# Patient Record
Sex: FEMALE | Race: WHITE | NOT HISPANIC OR LATINO | URBAN - METROPOLITAN AREA
[De-identification: names, ages, dates, MRNs, and addresses within clinical notes are randomized per-mention and may not be internally consistent; named-entity substitution may affect disease eponyms.]

---

## 2017-07-03 ENCOUNTER — EMERGENCY (EMERGENCY)
Facility: HOSPITAL | Age: 57
LOS: 0 days | Discharge: HOME | End: 2017-07-03
Attending: EMERGENCY MEDICINE
Payer: COMMERCIAL

## 2017-07-03 VITALS
OXYGEN SATURATION: 94 % | HEIGHT: 61 IN | RESPIRATION RATE: 18 BRPM | SYSTOLIC BLOOD PRESSURE: 135 MMHG | TEMPERATURE: 99 F | WEIGHT: 199.96 LBS | HEART RATE: 99 BPM | DIASTOLIC BLOOD PRESSURE: 110 MMHG

## 2017-07-03 VITALS
DIASTOLIC BLOOD PRESSURE: 99 MMHG | TEMPERATURE: 98 F | OXYGEN SATURATION: 94 % | RESPIRATION RATE: 16 BRPM | HEART RATE: 99 BPM | SYSTOLIC BLOOD PRESSURE: 142 MMHG

## 2017-07-03 PROCEDURE — 71010: CPT | Mod: 26

## 2017-07-03 PROCEDURE — 99284 EMERGENCY DEPT VISIT MOD MDM: CPT

## 2017-07-03 RX ORDER — IPRATROPIUM/ALBUTEROL SULFATE 18-103MCG
3 AEROSOL WITH ADAPTER (GRAM) INHALATION
Qty: 0 | Refills: 0 | Status: COMPLETED | OUTPATIENT
Start: 2017-07-03 | End: 2017-07-03

## 2017-07-03 RX ORDER — ALBUTEROL 90 UG/1
2 AEROSOL, METERED ORAL
Qty: 2 | Refills: 0 | OUTPATIENT
Start: 2017-07-03 | End: 2017-07-06

## 2017-07-03 RX ORDER — AZITHROMYCIN 500 MG/1
1 TABLET, FILM COATED ORAL
Qty: 5 | Refills: 0 | OUTPATIENT
Start: 2017-07-03 | End: 2017-07-08

## 2017-07-03 RX ADMIN — Medication 3 MILLILITER(S): at 13:44

## 2017-07-03 RX ADMIN — Medication 3 MILLILITER(S): at 13:55

## 2017-07-03 RX ADMIN — Medication 3 MILLILITER(S): at 14:17

## 2017-07-03 RX ADMIN — Medication 50 MILLIGRAM(S): at 13:43

## 2017-07-03 NOTE — ED ADULT NURSE NOTE - PMH
Agoraphobia    Anxiety    COPD (chronic obstructive pulmonary disease)    DM (diabetes mellitus)    Emphysema lung    HTN (hypertension)    Hypothyroid    Smoker

## 2017-07-03 NOTE — ED PROVIDER NOTE - OBJECTIVE STATEMENT
56 yo female smoker, history of copd, Emphysema, agoraphobia presents with cough, white/clear sputum and difficulty breathing. Patient denies fever, chills, chest pain, recent travel.

## 2017-07-03 NOTE — ED ADULT NURSE NOTE - OBJECTIVE STATEMENT
pt states has copd, emphysema. pt c/o cough with clear white mucus last night. pt has agoraphoria, has not been treated for copd. denies chest pain. pt c/o upper back pain x 2 weeks

## 2017-07-04 DIAGNOSIS — E11.9 TYPE 2 DIABETES MELLITUS WITHOUT COMPLICATIONS: ICD-10-CM

## 2017-07-04 DIAGNOSIS — E03.9 HYPOTHYROIDISM, UNSPECIFIED: ICD-10-CM

## 2017-07-04 DIAGNOSIS — R06.89 OTHER ABNORMALITIES OF BREATHING: ICD-10-CM

## 2017-07-04 DIAGNOSIS — J44.9 CHRONIC OBSTRUCTIVE PULMONARY DISEASE, UNSPECIFIED: ICD-10-CM

## 2017-07-04 DIAGNOSIS — Z79.51 LONG TERM (CURRENT) USE OF INHALED STEROIDS: ICD-10-CM

## 2017-07-04 DIAGNOSIS — I10 ESSENTIAL (PRIMARY) HYPERTENSION: ICD-10-CM

## 2017-07-27 ENCOUNTER — EMERGENCY (EMERGENCY)
Facility: HOSPITAL | Age: 57
LOS: 0 days | Discharge: HOME | End: 2017-07-27
Attending: EMERGENCY MEDICINE
Payer: COMMERCIAL

## 2017-07-27 VITALS
RESPIRATION RATE: 18 BRPM | SYSTOLIC BLOOD PRESSURE: 158 MMHG | OXYGEN SATURATION: 99 % | TEMPERATURE: 98 F | HEART RATE: 90 BPM | DIASTOLIC BLOOD PRESSURE: 89 MMHG

## 2017-07-27 VITALS
SYSTOLIC BLOOD PRESSURE: 147 MMHG | WEIGHT: 190.04 LBS | TEMPERATURE: 99 F | OXYGEN SATURATION: 96 % | HEIGHT: 61 IN | HEART RATE: 101 BPM | DIASTOLIC BLOOD PRESSURE: 93 MMHG | RESPIRATION RATE: 18 BRPM

## 2017-07-27 LAB
ALBUMIN SERPL ELPH-MCNC: 3.8 G/DL — SIGNIFICANT CHANGE UP (ref 3.3–5)
ALP SERPL-CCNC: 129 U/L — HIGH (ref 40–120)
ALT FLD-CCNC: 17 U/L — SIGNIFICANT CHANGE UP (ref 12–78)
ANION GAP SERPL CALC-SCNC: 9 MMOL/L — SIGNIFICANT CHANGE UP (ref 5–17)
APPEARANCE UR: CLEAR — SIGNIFICANT CHANGE UP
APTT BLD: 30.9 SEC — SIGNIFICANT CHANGE UP (ref 27.5–37.4)
AST SERPL-CCNC: 12 U/L — LOW (ref 15–37)
BACTERIA # UR AUTO: ABNORMAL
BASOPHILS # BLD AUTO: 0.2 K/UL — SIGNIFICANT CHANGE UP (ref 0–0.2)
BASOPHILS NFR BLD AUTO: 1.9 % — SIGNIFICANT CHANGE UP (ref 0–2)
BILIRUB SERPL-MCNC: 0.5 MG/DL — SIGNIFICANT CHANGE UP (ref 0.2–1.2)
BILIRUB UR-MCNC: NEGATIVE — SIGNIFICANT CHANGE UP
BUN SERPL-MCNC: 9 MG/DL — SIGNIFICANT CHANGE UP (ref 7–23)
CALCIUM SERPL-MCNC: 8.9 MG/DL — SIGNIFICANT CHANGE UP (ref 8.5–10.1)
CHLORIDE SERPL-SCNC: 100 MMOL/L — SIGNIFICANT CHANGE UP (ref 96–108)
CK MB BLD-MCNC: <0.9 % — SIGNIFICANT CHANGE UP (ref 0–3.5)
CK MB CFR SERPL CALC: <0.5 NG/ML — SIGNIFICANT CHANGE UP (ref 0.5–3.6)
CK SERPL-CCNC: 57 U/L — SIGNIFICANT CHANGE UP (ref 26–192)
CO2 SERPL-SCNC: 25 MMOL/L — SIGNIFICANT CHANGE UP (ref 22–31)
COLOR SPEC: YELLOW — SIGNIFICANT CHANGE UP
CREAT SERPL-MCNC: 0.9 MG/DL — SIGNIFICANT CHANGE UP (ref 0.5–1.3)
DIFF PNL FLD: NEGATIVE — SIGNIFICANT CHANGE UP
EOSINOPHIL # BLD AUTO: 0.5 K/UL — SIGNIFICANT CHANGE UP (ref 0–0.5)
EOSINOPHIL NFR BLD AUTO: 4 % — SIGNIFICANT CHANGE UP (ref 0–6)
EPI CELLS # UR: SIGNIFICANT CHANGE UP
GLUCOSE SERPL-MCNC: 358 MG/DL — HIGH (ref 70–99)
GLUCOSE UR QL: 1000 MG/DL
HCT VFR BLD CALC: 46 % — HIGH (ref 34.5–45)
HGB BLD-MCNC: 15 G/DL — SIGNIFICANT CHANGE UP (ref 11.5–15.5)
INR BLD: 0.97 RATIO — SIGNIFICANT CHANGE UP (ref 0.88–1.16)
KETONES UR-MCNC: NEGATIVE — SIGNIFICANT CHANGE UP
LACTATE SERPL-SCNC: 2.2 MMOL/L — HIGH (ref 0.7–2)
LEUKOCYTE ESTERASE UR-ACNC: ABNORMAL
LYMPHOCYTES # BLD AUTO: 37 % — SIGNIFICANT CHANGE UP (ref 13–44)
LYMPHOCYTES # BLD AUTO: 4.5 K/UL — HIGH (ref 1–3.3)
MCHC RBC-ENTMCNC: 30.9 PG — SIGNIFICANT CHANGE UP (ref 27–34)
MCHC RBC-ENTMCNC: 32.6 GM/DL — SIGNIFICANT CHANGE UP (ref 32–36)
MCV RBC AUTO: 94.5 FL — SIGNIFICANT CHANGE UP (ref 80–100)
MONOCYTES # BLD AUTO: 0.7 K/UL — SIGNIFICANT CHANGE UP (ref 0–0.9)
MONOCYTES NFR BLD AUTO: 5.6 % — SIGNIFICANT CHANGE UP (ref 2–14)
NEUTROPHILS # BLD AUTO: 6.3 K/UL — SIGNIFICANT CHANGE UP (ref 1.8–7.4)
NEUTROPHILS NFR BLD AUTO: 51.5 % — SIGNIFICANT CHANGE UP (ref 43–77)
NITRITE UR-MCNC: NEGATIVE — SIGNIFICANT CHANGE UP
PH UR: 6 — SIGNIFICANT CHANGE UP (ref 5–8)
PLATELET # BLD AUTO: 225 K/UL — SIGNIFICANT CHANGE UP (ref 150–400)
POTASSIUM SERPL-MCNC: 4.3 MMOL/L — SIGNIFICANT CHANGE UP (ref 3.5–5.3)
POTASSIUM SERPL-SCNC: 4.3 MMOL/L — SIGNIFICANT CHANGE UP (ref 3.5–5.3)
PROT SERPL-MCNC: 7.5 GM/DL — SIGNIFICANT CHANGE UP (ref 6–8.3)
PROT UR-MCNC: 15 MG/DL
PROTHROM AB SERPL-ACNC: 10.6 SEC — SIGNIFICANT CHANGE UP (ref 9.8–12.7)
RBC # BLD: 4.86 M/UL — SIGNIFICANT CHANGE UP (ref 3.8–5.2)
RBC # FLD: 12.4 % — SIGNIFICANT CHANGE UP (ref 11–15)
SODIUM SERPL-SCNC: 134 MMOL/L — LOW (ref 135–145)
SP GR SPEC: 1.01 — SIGNIFICANT CHANGE UP (ref 1.01–1.02)
TROPONIN I SERPL-MCNC: <.015 NG/ML — SIGNIFICANT CHANGE UP (ref 0.01–0.04)
UROBILINOGEN FLD QL: NEGATIVE MG/DL — SIGNIFICANT CHANGE UP
WBC # BLD: 12.3 K/UL — HIGH (ref 3.8–10.5)
WBC # FLD AUTO: 12.3 K/UL — HIGH (ref 3.8–10.5)
WBC UR QL: ABNORMAL

## 2017-07-27 PROCEDURE — 71020: CPT | Mod: 26

## 2017-07-27 PROCEDURE — 99285 EMERGENCY DEPT VISIT HI MDM: CPT | Mod: 25

## 2017-07-27 RX ORDER — CLONAZEPAM 1 MG
1 TABLET ORAL ONCE
Qty: 0 | Refills: 0 | Status: DISCONTINUED | OUTPATIENT
Start: 2017-07-27 | End: 2017-07-27

## 2017-07-27 RX ORDER — IPRATROPIUM/ALBUTEROL SULFATE 18-103MCG
3 AEROSOL WITH ADAPTER (GRAM) INHALATION ONCE
Qty: 0 | Refills: 0 | Status: COMPLETED | OUTPATIENT
Start: 2017-07-27 | End: 2017-07-27

## 2017-07-27 RX ORDER — METFORMIN HYDROCHLORIDE 850 MG/1
1 TABLET ORAL
Qty: 28 | Refills: 0 | OUTPATIENT
Start: 2017-07-27 | End: 2017-08-10

## 2017-07-27 RX ORDER — NITROFURANTOIN MACROCRYSTAL 50 MG
100 CAPSULE ORAL ONCE
Qty: 0 | Refills: 0 | Status: COMPLETED | OUTPATIENT
Start: 2017-07-27 | End: 2017-07-27

## 2017-07-27 RX ORDER — ALBUTEROL 90 UG/1
2 AEROSOL, METERED ORAL
Qty: 1 | Refills: 0 | OUTPATIENT
Start: 2017-07-27 | End: 2017-08-01

## 2017-07-27 RX ORDER — ESCITALOPRAM OXALATE 10 MG/1
50 TABLET, FILM COATED ORAL
Qty: 0 | Refills: 0 | COMMUNITY

## 2017-07-27 RX ORDER — IBUPROFEN 200 MG
1 TABLET ORAL
Qty: 20 | Refills: 0 | OUTPATIENT
Start: 2017-07-27 | End: 2017-08-01

## 2017-07-27 RX ORDER — FLUCONAZOLE 150 MG/1
150 TABLET ORAL ONCE
Qty: 0 | Refills: 0 | Status: COMPLETED | OUTPATIENT
Start: 2017-07-27 | End: 2017-07-27

## 2017-07-27 RX ORDER — INSULIN HUMAN 100 [IU]/ML
4 INJECTION, SOLUTION SUBCUTANEOUS ONCE
Qty: 0 | Refills: 0 | Status: COMPLETED | OUTPATIENT
Start: 2017-07-27 | End: 2017-07-27

## 2017-07-27 RX ORDER — NITROFURANTOIN MACROCRYSTAL 50 MG
1 CAPSULE ORAL
Qty: 14 | Refills: 0 | OUTPATIENT
Start: 2017-07-27 | End: 2017-08-03

## 2017-07-27 RX ADMIN — FLUCONAZOLE 150 MILLIGRAM(S): 150 TABLET ORAL at 11:23

## 2017-07-27 RX ADMIN — Medication 3 MILLILITER(S): at 08:24

## 2017-07-27 RX ADMIN — INSULIN HUMAN 4 UNIT(S): 100 INJECTION, SOLUTION SUBCUTANEOUS at 11:23

## 2017-07-27 RX ADMIN — Medication 100 MILLIGRAM(S): at 11:22

## 2017-07-27 RX ADMIN — Medication 1 MILLIGRAM(S): at 12:15

## 2017-07-27 NOTE — ED ADULT NURSE REASSESSMENT NOTE - NS ED NURSE REASSESS COMMENT FT1
58 yo female c/o sore throat, denies sob, n/v/d. requesting Klonopin dose, md aware. pt resting comfortably. no acute distress noted. fs to be repeated at 1230.

## 2017-07-27 NOTE — ED PROVIDER NOTE - MEDICAL DECISION MAKING DETAILS
pt with new onset DM II spoke to Dr. Espinoza regarding situation to dc with metformin as per Dr. Espinoza, close follow up urged, dc with macrobid as well for UTI and albuterol for COPD.

## 2017-07-27 NOTE — ED ADULT NURSE NOTE - OBJECTIVE STATEMENT
Received patient complaining of sore throat, sores on tongue, severe dry cough, vaginal soreness/burning , no discharge x 2 weeks. Patient states she ran out of her prescription medication and has not followed up with her PMD.

## 2017-07-27 NOTE — ED PROVIDER NOTE - ENMT, MLM
Airway patent, Nasal mucosa clear. Mouth with normal mucosa. Throat has no vesicles, no oropharyngeal exudates  mild erythema on soft palate, and uvula is midline.

## 2017-07-27 NOTE — ED ADULT TRIAGE NOTE - CHIEF COMPLAINT QUOTE
"Sore throat" Reports having sore throat for 3 days, worsens with swallowing, with no productive cough, fever highest recorded fever 102, chills, denies chest pain, shortness of breath, abdominal pain, diarrhea.

## 2017-07-28 DIAGNOSIS — I10 ESSENTIAL (PRIMARY) HYPERTENSION: ICD-10-CM

## 2017-07-28 DIAGNOSIS — E03.9 HYPOTHYROIDISM, UNSPECIFIED: ICD-10-CM

## 2017-07-28 DIAGNOSIS — F17.200 NICOTINE DEPENDENCE, UNSPECIFIED, UNCOMPLICATED: ICD-10-CM

## 2017-07-28 DIAGNOSIS — J02.9 ACUTE PHARYNGITIS, UNSPECIFIED: ICD-10-CM

## 2017-07-28 DIAGNOSIS — F40.00 AGORAPHOBIA, UNSPECIFIED: ICD-10-CM

## 2017-07-28 DIAGNOSIS — F41.9 ANXIETY DISORDER, UNSPECIFIED: ICD-10-CM

## 2017-07-28 DIAGNOSIS — E11.9 TYPE 2 DIABETES MELLITUS WITHOUT COMPLICATIONS: ICD-10-CM

## 2017-07-28 DIAGNOSIS — N39.0 URINARY TRACT INFECTION, SITE NOT SPECIFIED: ICD-10-CM

## 2017-07-28 DIAGNOSIS — J44.9 CHRONIC OBSTRUCTIVE PULMONARY DISEASE, UNSPECIFIED: ICD-10-CM

## 2017-07-28 LAB
CULTURE RESULTS: SIGNIFICANT CHANGE UP
SPECIMEN SOURCE: SIGNIFICANT CHANGE UP

## 2017-08-03 ENCOUNTER — EMERGENCY (EMERGENCY)
Facility: HOSPITAL | Age: 57
LOS: 0 days | Discharge: HOME | End: 2017-08-03
Attending: EMERGENCY MEDICINE
Payer: COMMERCIAL

## 2017-08-03 VITALS
HEIGHT: 61 IN | DIASTOLIC BLOOD PRESSURE: 91 MMHG | WEIGHT: 190.04 LBS | TEMPERATURE: 99 F | SYSTOLIC BLOOD PRESSURE: 153 MMHG | HEART RATE: 87 BPM | RESPIRATION RATE: 20 BRPM | OXYGEN SATURATION: 96 %

## 2017-08-03 VITALS
RESPIRATION RATE: 17 BRPM | SYSTOLIC BLOOD PRESSURE: 157 MMHG | HEART RATE: 78 BPM | OXYGEN SATURATION: 97 % | TEMPERATURE: 98 F

## 2017-08-03 LAB
ACETONE SERPL-MCNC: NEGATIVE — SIGNIFICANT CHANGE UP
ALBUMIN SERPL ELPH-MCNC: 3.9 G/DL — SIGNIFICANT CHANGE UP (ref 3.3–5)
ALP SERPL-CCNC: 107 U/L — SIGNIFICANT CHANGE UP (ref 40–120)
ALT FLD-CCNC: 22 U/L — SIGNIFICANT CHANGE UP (ref 12–78)
ANION GAP SERPL CALC-SCNC: 9 MMOL/L — SIGNIFICANT CHANGE UP (ref 5–17)
APPEARANCE UR: ABNORMAL
AST SERPL-CCNC: 12 U/L — LOW (ref 15–37)
BACTERIA # UR AUTO: ABNORMAL
BASOPHILS # BLD AUTO: 0.1 K/UL — SIGNIFICANT CHANGE UP (ref 0–0.2)
BASOPHILS NFR BLD AUTO: 1 % — SIGNIFICANT CHANGE UP (ref 0–2)
BILIRUB SERPL-MCNC: 0.3 MG/DL — SIGNIFICANT CHANGE UP (ref 0.2–1.2)
BILIRUB UR-MCNC: NEGATIVE — SIGNIFICANT CHANGE UP
BUN SERPL-MCNC: 15 MG/DL — SIGNIFICANT CHANGE UP (ref 7–23)
CALCIUM SERPL-MCNC: 9 MG/DL — SIGNIFICANT CHANGE UP (ref 8.5–10.1)
CHLORIDE SERPL-SCNC: 99 MMOL/L — SIGNIFICANT CHANGE UP (ref 96–108)
CO2 SERPL-SCNC: 29 MMOL/L — SIGNIFICANT CHANGE UP (ref 22–31)
COLOR SPEC: YELLOW — SIGNIFICANT CHANGE UP
CREAT SERPL-MCNC: 0.87 MG/DL — SIGNIFICANT CHANGE UP (ref 0.5–1.3)
DIFF PNL FLD: NEGATIVE — SIGNIFICANT CHANGE UP
EOSINOPHIL # BLD AUTO: 0.6 K/UL — HIGH (ref 0–0.5)
EOSINOPHIL NFR BLD AUTO: 4.4 % — SIGNIFICANT CHANGE UP (ref 0–6)
EPI CELLS # UR: ABNORMAL
GLUCOSE SERPL-MCNC: 310 MG/DL — HIGH (ref 70–99)
GLUCOSE UR QL: 1000 MG/DL
HCT VFR BLD CALC: 46.4 % — HIGH (ref 34.5–45)
HGB BLD-MCNC: 15.6 G/DL — HIGH (ref 11.5–15.5)
KETONES UR-MCNC: NEGATIVE — SIGNIFICANT CHANGE UP
LEUKOCYTE ESTERASE UR-ACNC: NEGATIVE — SIGNIFICANT CHANGE UP
LYMPHOCYTES # BLD AUTO: 31.2 % — SIGNIFICANT CHANGE UP (ref 13–44)
LYMPHOCYTES # BLD AUTO: 4.1 K/UL — HIGH (ref 1–3.3)
MAGNESIUM SERPL-MCNC: 2.1 MG/DL — SIGNIFICANT CHANGE UP (ref 1.6–2.6)
MCHC RBC-ENTMCNC: 31.2 PG — SIGNIFICANT CHANGE UP (ref 27–34)
MCHC RBC-ENTMCNC: 33.6 GM/DL — SIGNIFICANT CHANGE UP (ref 32–36)
MCV RBC AUTO: 93 FL — SIGNIFICANT CHANGE UP (ref 80–100)
MONOCYTES # BLD AUTO: 0.6 K/UL — SIGNIFICANT CHANGE UP (ref 0–0.9)
MONOCYTES NFR BLD AUTO: 4.7 % — SIGNIFICANT CHANGE UP (ref 2–14)
NEUTROPHILS # BLD AUTO: 7.7 K/UL — HIGH (ref 1.8–7.4)
NEUTROPHILS NFR BLD AUTO: 58.7 % — SIGNIFICANT CHANGE UP (ref 43–77)
NITRITE UR-MCNC: NEGATIVE — SIGNIFICANT CHANGE UP
PH UR: 5 — SIGNIFICANT CHANGE UP (ref 5–8)
PLATELET # BLD AUTO: 250 K/UL — SIGNIFICANT CHANGE UP (ref 150–400)
POTASSIUM SERPL-MCNC: 4.2 MMOL/L — SIGNIFICANT CHANGE UP (ref 3.5–5.3)
POTASSIUM SERPL-SCNC: 4.2 MMOL/L — SIGNIFICANT CHANGE UP (ref 3.5–5.3)
PROT SERPL-MCNC: 7.6 GM/DL — SIGNIFICANT CHANGE UP (ref 6–8.3)
PROT UR-MCNC: NEGATIVE MG/DL — SIGNIFICANT CHANGE UP
RBC # BLD: 4.99 M/UL — SIGNIFICANT CHANGE UP (ref 3.8–5.2)
RBC # FLD: 12.1 % — SIGNIFICANT CHANGE UP (ref 11–15)
SODIUM SERPL-SCNC: 137 MMOL/L — SIGNIFICANT CHANGE UP (ref 135–145)
SP GR SPEC: 1.01 — SIGNIFICANT CHANGE UP (ref 1.01–1.02)
UROBILINOGEN FLD QL: NEGATIVE MG/DL — SIGNIFICANT CHANGE UP
WBC # BLD: 13.2 K/UL — HIGH (ref 3.8–10.5)
WBC # FLD AUTO: 13.2 K/UL — HIGH (ref 3.8–10.5)
WBC UR QL: ABNORMAL

## 2017-08-03 PROCEDURE — 99284 EMERGENCY DEPT VISIT MOD MDM: CPT

## 2017-08-03 RX ORDER — UMECLIDINIUM BROMIDE AND VILANTEROL TRIFENATATE 62.5; 25 UG/1; UG/1
1 POWDER RESPIRATORY (INHALATION)
Qty: 1 | Refills: 0 | OUTPATIENT
Start: 2017-08-03 | End: 2017-09-02

## 2017-08-03 RX ORDER — METFORMIN HYDROCHLORIDE 850 MG/1
1 TABLET ORAL
Qty: 60 | Refills: 0 | OUTPATIENT
Start: 2017-08-03 | End: 2017-09-02

## 2017-08-03 RX ORDER — INSULIN HUMAN 100 [IU]/ML
4 INJECTION, SOLUTION SUBCUTANEOUS ONCE
Qty: 0 | Refills: 0 | Status: COMPLETED | OUTPATIENT
Start: 2017-08-03 | End: 2017-08-03

## 2017-08-03 RX ORDER — IPRATROPIUM/ALBUTEROL SULFATE 18-103MCG
3 AEROSOL WITH ADAPTER (GRAM) INHALATION ONCE
Qty: 0 | Refills: 0 | Status: COMPLETED | OUTPATIENT
Start: 2017-08-03 | End: 2017-08-03

## 2017-08-03 RX ORDER — SODIUM CHLORIDE 9 MG/ML
2000 INJECTION INTRAMUSCULAR; INTRAVENOUS; SUBCUTANEOUS ONCE
Qty: 0 | Refills: 0 | Status: COMPLETED | OUTPATIENT
Start: 2017-08-03 | End: 2017-08-03

## 2017-08-03 RX ADMIN — Medication 3 MILLILITER(S): at 20:23

## 2017-08-03 RX ADMIN — INSULIN HUMAN 4 UNIT(S): 100 INJECTION, SOLUTION SUBCUTANEOUS at 19:20

## 2017-08-03 RX ADMIN — SODIUM CHLORIDE 2000 MILLILITER(S): 9 INJECTION INTRAMUSCULAR; INTRAVENOUS; SUBCUTANEOUS at 17:54

## 2017-08-03 NOTE — ED PROVIDER NOTE - PHYSICAL EXAMINATION
Gen: Alert, NAD, obese  Head: NC, AT   Eyes: PERRL, EOMI, normal lids/conjunctiva  ENT: dry muous membranes  Neck: supple, no tenderness, Trachea midline  Pulm: Bilateral BS, normal resp effort, no wheeze/stridor/retractions  CV: RRR, no M/R/G, 2+ radial and dp pulses bl, no edema  Abd: soft, NT/ND, +BS, no hepatosplenomegaly  Mskel: extremities x4 with normal ROM and no joint effusions. no ctl spine ttp.   Skin: no rash, no bruising   Neuro: AAOx3, no sensory/motor deficits, CN 2-12 intact

## 2017-08-03 NOTE — ED PROVIDER NOTE - OBJECTIVE STATEMENT
Pertinent PMH/PSH/FHx/SHx and Review of Systems contained within:  57F hx of dm pw hyperglycemia dced july 27 from inpatient. unable to take metformin because script ran out. no dizziness, nausea, vomiting, weakness or lethargy  Fh and Sh not otherwise contributory  ROS otherwise negative

## 2017-08-03 NOTE — ED PROVIDER NOTE - MEDICAL DECISION MAKING DETAILS
patient pw hyperglycemia, likely owning to medication non compliance and dietary indiscretion. will need hydrated and rule out dka or hnk. patient pw hyperglycemia, likely owning to medication non compliance and dietary indiscretion. will need hydrated and rule out dka or hnk. blood sugar now in low 200s. will dc with uptitrated metformin. patient has no symptoms of uti, will not treat.

## 2017-08-04 DIAGNOSIS — Z79.4 LONG TERM (CURRENT) USE OF INSULIN: ICD-10-CM

## 2017-08-04 DIAGNOSIS — F41.9 ANXIETY DISORDER, UNSPECIFIED: ICD-10-CM

## 2017-08-04 DIAGNOSIS — E03.8 OTHER SPECIFIED HYPOTHYROIDISM: ICD-10-CM

## 2017-08-04 DIAGNOSIS — Z79.1 LONG TERM (CURRENT) USE OF NON-STEROIDAL ANTI-INFLAMMATORIES (NSAID): ICD-10-CM

## 2017-08-04 DIAGNOSIS — E11.65 TYPE 2 DIABETES MELLITUS WITH HYPERGLYCEMIA: ICD-10-CM

## 2017-08-04 DIAGNOSIS — Z79.51 LONG TERM (CURRENT) USE OF INHALED STEROIDS: ICD-10-CM

## 2017-08-04 DIAGNOSIS — J44.9 CHRONIC OBSTRUCTIVE PULMONARY DISEASE, UNSPECIFIED: ICD-10-CM

## 2017-08-04 DIAGNOSIS — R73.9 HYPERGLYCEMIA, UNSPECIFIED: ICD-10-CM

## 2017-08-04 DIAGNOSIS — I10 ESSENTIAL (PRIMARY) HYPERTENSION: ICD-10-CM

## 2017-08-04 DIAGNOSIS — J43.9 EMPHYSEMA, UNSPECIFIED: ICD-10-CM

## 2017-08-04 DIAGNOSIS — F40.00 AGORAPHOBIA, UNSPECIFIED: ICD-10-CM

## 2017-08-04 DIAGNOSIS — F17.210 NICOTINE DEPENDENCE, CIGARETTES, UNCOMPLICATED: ICD-10-CM

## 2017-08-04 LAB
B-OH-BUTYR SERPL-SCNC: 0.1 MMOL/L — SIGNIFICANT CHANGE UP
CULTURE RESULTS: SIGNIFICANT CHANGE UP
SPECIMEN SOURCE: SIGNIFICANT CHANGE UP

## 2017-11-15 ENCOUNTER — EMERGENCY (EMERGENCY)
Facility: HOSPITAL | Age: 57
LOS: 0 days | Discharge: HOME | End: 2017-11-16
Attending: EMERGENCY MEDICINE
Payer: COMMERCIAL

## 2017-11-15 VITALS
RESPIRATION RATE: 20 BRPM | SYSTOLIC BLOOD PRESSURE: 152 MMHG | HEART RATE: 110 BPM | HEIGHT: 61 IN | TEMPERATURE: 99 F | OXYGEN SATURATION: 98 % | WEIGHT: 190.04 LBS | DIASTOLIC BLOOD PRESSURE: 74 MMHG

## 2017-11-15 DIAGNOSIS — F17.210 NICOTINE DEPENDENCE, CIGARETTES, UNCOMPLICATED: ICD-10-CM

## 2017-11-15 DIAGNOSIS — R06.02 SHORTNESS OF BREATH: ICD-10-CM

## 2017-11-15 DIAGNOSIS — F41.9 ANXIETY DISORDER, UNSPECIFIED: ICD-10-CM

## 2017-11-15 DIAGNOSIS — E03.9 HYPOTHYROIDISM, UNSPECIFIED: ICD-10-CM

## 2017-11-15 DIAGNOSIS — R05 COUGH: ICD-10-CM

## 2017-11-15 DIAGNOSIS — J44.1 CHRONIC OBSTRUCTIVE PULMONARY DISEASE WITH (ACUTE) EXACERBATION: ICD-10-CM

## 2017-11-15 DIAGNOSIS — E11.9 TYPE 2 DIABETES MELLITUS WITHOUT COMPLICATIONS: ICD-10-CM

## 2017-11-15 DIAGNOSIS — I10 ESSENTIAL (PRIMARY) HYPERTENSION: ICD-10-CM

## 2017-11-15 DIAGNOSIS — F40.00 AGORAPHOBIA, UNSPECIFIED: ICD-10-CM

## 2017-11-15 DIAGNOSIS — R06.00 DYSPNEA, UNSPECIFIED: ICD-10-CM

## 2017-11-15 DIAGNOSIS — Z79.4 LONG TERM (CURRENT) USE OF INSULIN: ICD-10-CM

## 2017-11-15 DIAGNOSIS — J44.9 CHRONIC OBSTRUCTIVE PULMONARY DISEASE, UNSPECIFIED: ICD-10-CM

## 2017-11-15 LAB
ALBUMIN SERPL ELPH-MCNC: 3.7 G/DL — SIGNIFICANT CHANGE UP (ref 3.3–5)
ALP SERPL-CCNC: 86 U/L — SIGNIFICANT CHANGE UP (ref 40–120)
ALT FLD-CCNC: 23 U/L — SIGNIFICANT CHANGE UP (ref 12–78)
ANION GAP SERPL CALC-SCNC: 10 MMOL/L — SIGNIFICANT CHANGE UP (ref 5–17)
APTT BLD: 35.5 SEC — SIGNIFICANT CHANGE UP (ref 27.5–37.4)
AST SERPL-CCNC: 18 U/L — SIGNIFICANT CHANGE UP (ref 15–37)
BASOPHILS # BLD AUTO: 0.2 K/UL — SIGNIFICANT CHANGE UP (ref 0–0.2)
BASOPHILS NFR BLD AUTO: 1 % — SIGNIFICANT CHANGE UP (ref 0–2)
BILIRUB SERPL-MCNC: 0.4 MG/DL — SIGNIFICANT CHANGE UP (ref 0.2–1.2)
BUN SERPL-MCNC: 28 MG/DL — HIGH (ref 7–23)
CALCIUM SERPL-MCNC: 9.4 MG/DL — SIGNIFICANT CHANGE UP (ref 8.5–10.1)
CHLORIDE SERPL-SCNC: 105 MMOL/L — SIGNIFICANT CHANGE UP (ref 96–108)
CK MB BLD-MCNC: <0.9 % — SIGNIFICANT CHANGE UP (ref 0–3.5)
CK MB BLD-MCNC: <1 % — SIGNIFICANT CHANGE UP (ref 0–3.5)
CK MB CFR SERPL CALC: <0.5 NG/ML — SIGNIFICANT CHANGE UP (ref 0.5–3.6)
CK MB CFR SERPL CALC: <0.5 NG/ML — SIGNIFICANT CHANGE UP (ref 0.5–3.6)
CK SERPL-CCNC: 50 U/L — SIGNIFICANT CHANGE UP (ref 26–192)
CK SERPL-CCNC: 54 U/L — SIGNIFICANT CHANGE UP (ref 26–192)
CO2 SERPL-SCNC: 24 MMOL/L — SIGNIFICANT CHANGE UP (ref 22–31)
CREAT SERPL-MCNC: 0.96 MG/DL — SIGNIFICANT CHANGE UP (ref 0.5–1.3)
D DIMER BLD IA.RAPID-MCNC: 221 NG/ML DDU — SIGNIFICANT CHANGE UP
EOSINOPHIL # BLD AUTO: 0.3 K/UL — SIGNIFICANT CHANGE UP (ref 0–0.5)
EOSINOPHIL NFR BLD AUTO: 2.1 % — SIGNIFICANT CHANGE UP (ref 0–6)
GLUCOSE SERPL-MCNC: 105 MG/DL — HIGH (ref 70–99)
HCG SERPL-ACNC: 4 MIU/ML — SIGNIFICANT CHANGE UP
HCT VFR BLD CALC: 41.3 % — SIGNIFICANT CHANGE UP (ref 34.5–45)
HGB BLD-MCNC: 14.3 G/DL — SIGNIFICANT CHANGE UP (ref 11.5–15.5)
INR BLD: 1.06 RATIO — SIGNIFICANT CHANGE UP (ref 0.88–1.16)
LYMPHOCYTES # BLD AUTO: 25.1 % — SIGNIFICANT CHANGE UP (ref 13–44)
LYMPHOCYTES # BLD AUTO: 3.9 K/UL — HIGH (ref 1–3.3)
MCHC RBC-ENTMCNC: 32 PG — SIGNIFICANT CHANGE UP (ref 27–34)
MCHC RBC-ENTMCNC: 34.7 GM/DL — SIGNIFICANT CHANGE UP (ref 32–36)
MCV RBC AUTO: 92.3 FL — SIGNIFICANT CHANGE UP (ref 80–100)
MONOCYTES # BLD AUTO: 1 K/UL — HIGH (ref 0–0.9)
MONOCYTES NFR BLD AUTO: 6.2 % — SIGNIFICANT CHANGE UP (ref 2–14)
NEUTROPHILS # BLD AUTO: 10.1 K/UL — HIGH (ref 1.8–7.4)
NEUTROPHILS NFR BLD AUTO: 65.6 % — SIGNIFICANT CHANGE UP (ref 43–77)
NT-PROBNP SERPL-SCNC: 65 PG/ML — SIGNIFICANT CHANGE UP (ref 0–125)
PLATELET # BLD AUTO: 276 K/UL — SIGNIFICANT CHANGE UP (ref 150–400)
POTASSIUM SERPL-MCNC: 4.7 MMOL/L — SIGNIFICANT CHANGE UP (ref 3.5–5.3)
POTASSIUM SERPL-SCNC: 4.7 MMOL/L — SIGNIFICANT CHANGE UP (ref 3.5–5.3)
PROT SERPL-MCNC: 8 GM/DL — SIGNIFICANT CHANGE UP (ref 6–8.3)
PROTHROM AB SERPL-ACNC: 11.6 SEC — SIGNIFICANT CHANGE UP (ref 9.8–12.7)
RBC # BLD: 4.47 M/UL — SIGNIFICANT CHANGE UP (ref 3.8–5.2)
RBC # FLD: 12.1 % — SIGNIFICANT CHANGE UP (ref 11–15)
SODIUM SERPL-SCNC: 139 MMOL/L — SIGNIFICANT CHANGE UP (ref 135–145)
TROPONIN I SERPL-MCNC: <.015 NG/ML — SIGNIFICANT CHANGE UP (ref 0.01–0.04)
TROPONIN I SERPL-MCNC: <.015 NG/ML — SIGNIFICANT CHANGE UP (ref 0.01–0.04)
TSH SERPL-MCNC: 0.01 UIU/ML — LOW (ref 0.36–3.74)
WBC # BLD: 15.4 K/UL — HIGH (ref 3.8–10.5)
WBC # FLD AUTO: 15.4 K/UL — HIGH (ref 3.8–10.5)

## 2017-11-15 PROCEDURE — 71010: CPT | Mod: 26

## 2017-11-15 PROCEDURE — 71275 CT ANGIOGRAPHY CHEST: CPT | Mod: 26

## 2017-11-15 PROCEDURE — 99285 EMERGENCY DEPT VISIT HI MDM: CPT

## 2017-11-15 RX ORDER — DIPHENHYDRAMINE HCL 50 MG
50 CAPSULE ORAL ONCE
Qty: 0 | Refills: 0 | Status: COMPLETED | OUTPATIENT
Start: 2017-11-15 | End: 2017-11-15

## 2017-11-15 RX ORDER — IPRATROPIUM/ALBUTEROL SULFATE 18-103MCG
3 AEROSOL WITH ADAPTER (GRAM) INHALATION ONCE
Qty: 0 | Refills: 0 | Status: COMPLETED | OUTPATIENT
Start: 2017-11-15 | End: 2017-11-15

## 2017-11-15 RX ORDER — MAGNESIUM SULFATE 500 MG/ML
1 VIAL (ML) INJECTION ONCE
Qty: 0 | Refills: 0 | Status: COMPLETED | OUTPATIENT
Start: 2017-11-15 | End: 2017-11-15

## 2017-11-15 RX ORDER — CLONAZEPAM 1 MG
1 TABLET ORAL ONCE
Qty: 0 | Refills: 0 | Status: DISCONTINUED | OUTPATIENT
Start: 2017-11-15 | End: 2017-11-15

## 2017-11-15 RX ADMIN — Medication 3 MILLILITER(S): at 18:32

## 2017-11-15 RX ADMIN — Medication 3 MILLILITER(S): at 22:39

## 2017-11-15 RX ADMIN — Medication 1 MILLIGRAM(S): at 19:01

## 2017-11-15 RX ADMIN — Medication 50 MILLIGRAM(S): at 22:35

## 2017-11-15 RX ADMIN — Medication 125 MILLIGRAM(S): at 18:32

## 2017-11-15 RX ADMIN — Medication 3 MILLILITER(S): at 23:43

## 2017-11-15 NOTE — ED PROVIDER NOTE - PROGRESS NOTE DETAILS
Pt endorsed by Dr Ramirez present for eval of SOB & cough, pending CT & repeat CE CT neg for acute pathology, pt still c/o sx, will given more meds  re-assess Pt improved.  Discussed results and outcome of testing with the patient, given copy as well.  Patient advised to please follow up with their primary care doctor within the next 24 hours and return to the Emergency Department for worsening symptoms or any other concerns.  Patient advised that their doctor may call  to follow up on the specific results of the tests performed today in the emergency department.

## 2017-11-15 NOTE — ED PROVIDER NOTE - OBJECTIVE STATEMENT
57 year old female with PMH of COPD, 57 year old female with PMH of COPD, anxiety, DM II, HTN, hypothyroid presenting due to SOB, wheezing, SOB noted since yesterday worse since this AM. Denies fever/chills, + cough.

## 2017-11-15 NOTE — ED PROVIDER NOTE - CONSTITUTIONAL, MLM
normal... Well appearing, well nourished, awake, alert, oriented to person, place, time/situation and in no apparent distress. Well appearing, well nourished, awake, alert, oriented to person, place, time/situation and in mild respiratory distress

## 2017-11-15 NOTE — ED ADULT TRIAGE NOTE - CHIEF COMPLAINT QUOTE
cough with increase difficulty breathing for a couple of days. Pt c/o mid back pain. Pt has hx of COPD able to speak in full sentences in triage

## 2017-11-16 VITALS
DIASTOLIC BLOOD PRESSURE: 64 MMHG | RESPIRATION RATE: 18 BRPM | OXYGEN SATURATION: 98 % | TEMPERATURE: 98 F | SYSTOLIC BLOOD PRESSURE: 144 MMHG | HEART RATE: 101 BPM

## 2017-11-16 LAB
BASE EXCESS BLDA CALC-SCNC: -2.2 MMOL/L — LOW (ref -2–2)
BLOOD GAS COMMENTS: SIGNIFICANT CHANGE UP
BLOOD GAS SOURCE: SIGNIFICANT CHANGE UP
HCO3 BLDA-SCNC: 20 MMOL/L — LOW (ref 21–29)
HOROWITZ INDEX BLDA+IHG-RTO: 21 — SIGNIFICANT CHANGE UP
PCO2 BLDA: 30 MMHG — LOW (ref 32–46)
PH BLD: 7.45 — SIGNIFICANT CHANGE UP (ref 7.35–7.45)
PO2 BLDA: 99 MMHG — SIGNIFICANT CHANGE UP (ref 74–108)
SAO2 % BLDA: 98 % — HIGH (ref 92–96)

## 2017-11-16 RX ORDER — ALBUTEROL 90 UG/1
2 AEROSOL, METERED ORAL
Qty: 1 | Refills: 0 | OUTPATIENT
Start: 2017-11-16 | End: 2017-12-16

## 2017-11-16 RX ORDER — IPRATROPIUM/ALBUTEROL SULFATE 18-103MCG
3 AEROSOL WITH ADAPTER (GRAM) INHALATION ONCE
Qty: 0 | Refills: 0 | Status: DISCONTINUED | OUTPATIENT
Start: 2017-11-16 | End: 2017-11-16

## 2017-11-16 RX ORDER — ACETAMINOPHEN WITH CODEINE 300MG-30MG
1 TABLET ORAL ONCE
Qty: 0 | Refills: 0 | Status: DISCONTINUED | OUTPATIENT
Start: 2017-11-16 | End: 2017-11-16

## 2017-11-16 RX ORDER — IPRATROPIUM/ALBUTEROL SULFATE 18-103MCG
3 AEROSOL WITH ADAPTER (GRAM) INHALATION ONCE
Qty: 0 | Refills: 0 | Status: COMPLETED | OUTPATIENT
Start: 2017-11-16 | End: 2017-11-16

## 2017-11-16 RX ADMIN — Medication 100 GRAM(S): at 00:07

## 2017-11-16 RX ADMIN — Medication 3 MILLILITER(S): at 01:09

## 2017-11-16 RX ADMIN — Medication 1 TABLET(S): at 01:09

## 2018-02-15 ENCOUNTER — INPATIENT (INPATIENT)
Facility: HOSPITAL | Age: 58
LOS: 0 days | Discharge: HOME | End: 2018-02-15
Attending: STUDENT IN AN ORGANIZED HEALTH CARE EDUCATION/TRAINING PROGRAM | Admitting: INTERNAL MEDICINE
Payer: COMMERCIAL

## 2018-02-15 VITALS
SYSTOLIC BLOOD PRESSURE: 125 MMHG | WEIGHT: 240.08 LBS | DIASTOLIC BLOOD PRESSURE: 77 MMHG | HEART RATE: 102 BPM | HEIGHT: 61 IN | TEMPERATURE: 99 F | OXYGEN SATURATION: 95 % | RESPIRATION RATE: 19 BRPM

## 2018-02-15 VITALS
HEART RATE: 93 BPM | SYSTOLIC BLOOD PRESSURE: 137 MMHG | DIASTOLIC BLOOD PRESSURE: 79 MMHG | TEMPERATURE: 98 F | OXYGEN SATURATION: 96 % | RESPIRATION RATE: 19 BRPM

## 2018-02-15 LAB
ALBUMIN SERPL ELPH-MCNC: 3.1 G/DL — LOW (ref 3.3–5)
ALP SERPL-CCNC: 102 U/L — SIGNIFICANT CHANGE UP (ref 40–120)
ALT FLD-CCNC: 14 U/L — SIGNIFICANT CHANGE UP (ref 12–78)
AMYLASE P1 CFR SERPL: 25 U/L — SIGNIFICANT CHANGE UP (ref 25–115)
ANION GAP SERPL CALC-SCNC: 6 MMOL/L — SIGNIFICANT CHANGE UP (ref 5–17)
APPEARANCE UR: CLEAR — SIGNIFICANT CHANGE UP
AST SERPL-CCNC: 15 U/L — SIGNIFICANT CHANGE UP (ref 15–37)
BACTERIA # UR AUTO: ABNORMAL
BASOPHILS # BLD AUTO: 0.06 K/UL — SIGNIFICANT CHANGE UP (ref 0–0.2)
BASOPHILS NFR BLD AUTO: 0.4 % — SIGNIFICANT CHANGE UP (ref 0–2)
BILIRUB DIRECT SERPL-MCNC: 0.1 MG/DL — SIGNIFICANT CHANGE UP (ref 0.05–0.2)
BILIRUB INDIRECT FLD-MCNC: 0.2 MG/DL — SIGNIFICANT CHANGE UP (ref 0.2–1)
BILIRUB SERPL-MCNC: 0.3 MG/DL — SIGNIFICANT CHANGE UP (ref 0.2–1.2)
BILIRUB UR-MCNC: NEGATIVE — SIGNIFICANT CHANGE UP
BUN SERPL-MCNC: 13 MG/DL — SIGNIFICANT CHANGE UP (ref 7–23)
CALCIUM SERPL-MCNC: 8.6 MG/DL — SIGNIFICANT CHANGE UP (ref 8.5–10.1)
CHLORIDE SERPL-SCNC: 108 MMOL/L — SIGNIFICANT CHANGE UP (ref 96–108)
CO2 SERPL-SCNC: 27 MMOL/L — SIGNIFICANT CHANGE UP (ref 22–31)
COLOR SPEC: YELLOW — SIGNIFICANT CHANGE UP
CREAT SERPL-MCNC: 0.76 MG/DL — SIGNIFICANT CHANGE UP (ref 0.5–1.3)
DIFF PNL FLD: ABNORMAL
EOSINOPHIL # BLD AUTO: 0.44 K/UL — SIGNIFICANT CHANGE UP (ref 0–0.5)
EOSINOPHIL NFR BLD AUTO: 2.7 % — SIGNIFICANT CHANGE UP (ref 0–6)
EPI CELLS # UR: ABNORMAL
GLUCOSE SERPL-MCNC: 72 MG/DL — SIGNIFICANT CHANGE UP (ref 70–99)
GLUCOSE UR QL: NEGATIVE MG/DL — SIGNIFICANT CHANGE UP
HCG UR QL: NEGATIVE — SIGNIFICANT CHANGE UP
HCT VFR BLD CALC: 38 % — SIGNIFICANT CHANGE UP (ref 34.5–45)
HGB BLD-MCNC: 12.6 G/DL — SIGNIFICANT CHANGE UP (ref 11.5–15.5)
IMM GRANULOCYTES NFR BLD AUTO: 0.7 % — SIGNIFICANT CHANGE UP (ref 0–1.5)
KETONES UR-MCNC: NEGATIVE — SIGNIFICANT CHANGE UP
LEUKOCYTE ESTERASE UR-ACNC: ABNORMAL
LIDOCAIN IGE QN: 104 U/L — SIGNIFICANT CHANGE UP (ref 73–393)
LYMPHOCYTES # BLD AUTO: 24.5 % — SIGNIFICANT CHANGE UP (ref 13–44)
LYMPHOCYTES # BLD AUTO: 4 K/UL — HIGH (ref 1–3.3)
MCHC RBC-ENTMCNC: 29.6 PG — SIGNIFICANT CHANGE UP (ref 27–34)
MCHC RBC-ENTMCNC: 33.2 GM/DL — SIGNIFICANT CHANGE UP (ref 32–36)
MCV RBC AUTO: 89.4 FL — SIGNIFICANT CHANGE UP (ref 80–100)
MONOCYTES # BLD AUTO: 0.84 K/UL — SIGNIFICANT CHANGE UP (ref 0–0.9)
MONOCYTES NFR BLD AUTO: 5.1 % — SIGNIFICANT CHANGE UP (ref 2–14)
NEUTROPHILS # BLD AUTO: 10.89 K/UL — HIGH (ref 1.8–7.4)
NEUTROPHILS NFR BLD AUTO: 66.6 % — SIGNIFICANT CHANGE UP (ref 43–77)
NITRITE UR-MCNC: NEGATIVE — SIGNIFICANT CHANGE UP
NRBC # BLD: 0 /100 WBCS — SIGNIFICANT CHANGE UP (ref 0–0)
PH UR: 6 — SIGNIFICANT CHANGE UP (ref 5–8)
PLATELET # BLD AUTO: 238 K/UL — SIGNIFICANT CHANGE UP (ref 150–400)
POTASSIUM SERPL-MCNC: 4.2 MMOL/L — SIGNIFICANT CHANGE UP (ref 3.5–5.3)
POTASSIUM SERPL-SCNC: 4.2 MMOL/L — SIGNIFICANT CHANGE UP (ref 3.5–5.3)
PROT SERPL-MCNC: 7.3 GM/DL — SIGNIFICANT CHANGE UP (ref 6–8.3)
PROT UR-MCNC: NEGATIVE MG/DL — SIGNIFICANT CHANGE UP
RBC # BLD: 4.25 M/UL — SIGNIFICANT CHANGE UP (ref 3.8–5.2)
RBC # FLD: 13.2 % — SIGNIFICANT CHANGE UP (ref 10.3–14.5)
RBC CASTS # UR COMP ASSIST: SIGNIFICANT CHANGE UP /HPF (ref 0–4)
SODIUM SERPL-SCNC: 141 MMOL/L — SIGNIFICANT CHANGE UP (ref 135–145)
SP GR SPEC: 1.01 — SIGNIFICANT CHANGE UP (ref 1.01–1.02)
UROBILINOGEN FLD QL: NEGATIVE MG/DL — SIGNIFICANT CHANGE UP
WBC # BLD: 16.34 K/UL — HIGH (ref 3.8–10.5)
WBC # FLD AUTO: 16.34 K/UL — HIGH (ref 3.8–10.5)
WBC UR QL: ABNORMAL

## 2018-02-15 PROCEDURE — 99285 EMERGENCY DEPT VISIT HI MDM: CPT

## 2018-02-15 PROCEDURE — 74177 CT ABD & PELVIS W/CONTRAST: CPT | Mod: 26

## 2018-02-15 RX ORDER — PIPERACILLIN AND TAZOBACTAM 4; .5 G/20ML; G/20ML
3.38 INJECTION, POWDER, LYOPHILIZED, FOR SOLUTION INTRAVENOUS ONCE
Qty: 0 | Refills: 0 | Status: COMPLETED | OUTPATIENT
Start: 2018-02-15 | End: 2018-02-15

## 2018-02-15 RX ORDER — SODIUM CHLORIDE 9 MG/ML
1000 INJECTION INTRAMUSCULAR; INTRAVENOUS; SUBCUTANEOUS
Qty: 0 | Refills: 0 | Status: DISCONTINUED | OUTPATIENT
Start: 2018-02-15 | End: 2018-02-15

## 2018-02-15 RX ORDER — METRONIDAZOLE 500 MG
1 TABLET ORAL
Qty: 28 | Refills: 0 | OUTPATIENT
Start: 2018-02-15 | End: 2018-02-21

## 2018-02-15 RX ORDER — MOXIFLOXACIN HYDROCHLORIDE TABLETS, 400 MG 400 MG/1
1 TABLET, FILM COATED ORAL
Qty: 14 | Refills: 0 | OUTPATIENT
Start: 2018-02-15 | End: 2018-02-21

## 2018-02-15 RX ORDER — ALBUTEROL 90 UG/1
2 AEROSOL, METERED ORAL
Qty: 1 | Refills: 0 | OUTPATIENT
Start: 2018-02-15 | End: 2018-03-16

## 2018-02-15 RX ORDER — TRAMADOL HYDROCHLORIDE 50 MG/1
1 TABLET ORAL
Qty: 12 | Refills: 0 | OUTPATIENT
Start: 2018-02-15 | End: 2018-02-17

## 2018-02-15 RX ORDER — KETOROLAC TROMETHAMINE 30 MG/ML
30 SYRINGE (ML) INJECTION ONCE
Qty: 0 | Refills: 0 | Status: DISCONTINUED | OUTPATIENT
Start: 2018-02-15 | End: 2018-02-15

## 2018-02-15 RX ADMIN — Medication 30 MILLIGRAM(S): at 13:44

## 2018-02-15 RX ADMIN — PIPERACILLIN AND TAZOBACTAM 200 GRAM(S): 4; .5 INJECTION, POWDER, LYOPHILIZED, FOR SOLUTION INTRAVENOUS at 16:21

## 2018-02-15 RX ADMIN — SODIUM CHLORIDE 75 MILLILITER(S): 9 INJECTION INTRAMUSCULAR; INTRAVENOUS; SUBCUTANEOUS at 11:07

## 2018-02-15 RX ADMIN — Medication 30 MILLIGRAM(S): at 11:59

## 2018-02-15 NOTE — H&P ADULT - NSHPLABSRESULTS_GEN_ALL_CORE
12.6   16.34 )-----------( 238      ( 15 Feb 2018 11:16 )             38.0   02-15    141  |  108  |  13  ----------------------------<  72  4.2   |  27  |  0.76    Ca    8.6      15 Feb 2018 11:16    TPro  7.3  /  Alb  3.1<L>  /  TBili  0.3  /  DBili  .10  /  AST  15  /  ALT  14  /  AlkPhos  102  02-15  < from: CT Abdomen and Pelvis w/ IV Cont (02.15.18 @ 14:02) >    Findings consistent with acute colitis with involvement of the cecum and  ascending colon. No CT evidence of appendicitis. Initially noted are   multiple gallstones without gallbladder wall thickening or ductal   dilatation..

## 2018-02-15 NOTE — ED PROVIDER NOTE - PROGRESS NOTE DETAILS
pt does not want to be admitted, states that she has agraphobia and does not want to stay, wants to try oral if her symptoms worsen, she states that she will return

## 2018-02-15 NOTE — ED ADULT NURSE NOTE - OBJECTIVE STATEMENT
Complaint of abdominal pain starting monday, with diarrhea and chills, denies fever, nausea, vomiting, chest pain, reeder, sob.

## 2018-02-15 NOTE — ED PROVIDER NOTE - OBJECTIVE STATEMENT
58 year old female presents today c/o abdominal pain x 1 day, she describes a diffuse severe and constant pain rated "11/10" worse om the RLQ, associated with diarrhea (-) nausea or vomiting (-) fevers or chills (-) distenntion 58 year old female presents today c/o abdominal pain x 1 day, she describes a diffuse severe and constant pain rated "11/10" worse om the RLQ, associated with diarrhea (-) nausea or vomiting (-) fevers or chills (-) distention

## 2018-02-15 NOTE — H&P ADULT - HISTORY OF PRESENT ILLNESS
: 58 year old female presents today c/o abdominal pain x 1 day, she describes a diffuse severe and constant pain rated "11/10" worse om the RLQ, associated with diarrhea (-) nausea or vomiting (-) fevers or chills (-) distention

## 2018-02-16 LAB
CULTURE RESULTS: SIGNIFICANT CHANGE UP
SPECIMEN SOURCE: SIGNIFICANT CHANGE UP

## 2018-02-20 DIAGNOSIS — I10 ESSENTIAL (PRIMARY) HYPERTENSION: ICD-10-CM

## 2018-02-20 DIAGNOSIS — R10.9 UNSPECIFIED ABDOMINAL PAIN: ICD-10-CM

## 2018-02-20 DIAGNOSIS — F41.9 ANXIETY DISORDER, UNSPECIFIED: ICD-10-CM

## 2018-02-20 DIAGNOSIS — F40.00 AGORAPHOBIA, UNSPECIFIED: ICD-10-CM

## 2018-02-20 DIAGNOSIS — J43.9 EMPHYSEMA, UNSPECIFIED: ICD-10-CM

## 2018-02-20 DIAGNOSIS — E11.9 TYPE 2 DIABETES MELLITUS WITHOUT COMPLICATIONS: ICD-10-CM

## 2018-02-20 DIAGNOSIS — E03.9 HYPOTHYROIDISM, UNSPECIFIED: ICD-10-CM

## 2018-02-20 DIAGNOSIS — F17.210 NICOTINE DEPENDENCE, CIGARETTES, UNCOMPLICATED: ICD-10-CM

## 2018-02-20 DIAGNOSIS — K52.9 NONINFECTIVE GASTROENTERITIS AND COLITIS, UNSPECIFIED: ICD-10-CM

## 2018-03-02 NOTE — ED PROVIDER NOTE - EXITCARE/DISCHARGE INSTRUCTIONS
See Cardiac Rehab monitor notes  
Launch [employer] and print the 'Prescriptions from this Visit' Report

## 2018-05-01 ENCOUNTER — OUTPATIENT (OUTPATIENT)
Dept: OUTPATIENT SERVICES | Facility: HOSPITAL | Age: 58
LOS: 1 days | End: 2018-05-01
Payer: MEDICAID

## 2018-05-01 PROCEDURE — G9005: CPT

## 2018-05-12 DIAGNOSIS — R69 ILLNESS, UNSPECIFIED: ICD-10-CM

## 2018-09-13 ENCOUNTER — EMERGENCY (EMERGENCY)
Facility: HOSPITAL | Age: 58
LOS: 0 days | Discharge: HOME | End: 2018-09-13
Attending: EMERGENCY MEDICINE
Payer: COMMERCIAL

## 2018-09-13 VITALS
DIASTOLIC BLOOD PRESSURE: 84 MMHG | OXYGEN SATURATION: 93 % | TEMPERATURE: 99 F | HEART RATE: 97 BPM | SYSTOLIC BLOOD PRESSURE: 144 MMHG | RESPIRATION RATE: 16 BRPM

## 2018-09-13 VITALS
TEMPERATURE: 98 F | DIASTOLIC BLOOD PRESSURE: 92 MMHG | HEART RATE: 108 BPM | RESPIRATION RATE: 17 BRPM | WEIGHT: 262.35 LBS | OXYGEN SATURATION: 100 % | HEIGHT: 61 IN | SYSTOLIC BLOOD PRESSURE: 145 MMHG

## 2018-09-13 LAB
ALBUMIN SERPL ELPH-MCNC: 3.5 G/DL — SIGNIFICANT CHANGE UP (ref 3.3–5)
ALP SERPL-CCNC: 102 U/L — SIGNIFICANT CHANGE UP (ref 40–120)
ALT FLD-CCNC: 26 U/L — SIGNIFICANT CHANGE UP (ref 12–78)
ANION GAP SERPL CALC-SCNC: 8 MMOL/L — SIGNIFICANT CHANGE UP (ref 5–17)
APPEARANCE UR: CLEAR — SIGNIFICANT CHANGE UP
AST SERPL-CCNC: 24 U/L — SIGNIFICANT CHANGE UP (ref 15–37)
BACTERIA # UR AUTO: ABNORMAL
BASOPHILS # BLD AUTO: 0.11 K/UL — SIGNIFICANT CHANGE UP (ref 0–0.2)
BASOPHILS NFR BLD AUTO: 0.7 % — SIGNIFICANT CHANGE UP (ref 0–2)
BILIRUB SERPL-MCNC: 0.2 MG/DL — SIGNIFICANT CHANGE UP (ref 0.2–1.2)
BILIRUB UR-MCNC: NEGATIVE — SIGNIFICANT CHANGE UP
BUN SERPL-MCNC: 15 MG/DL — SIGNIFICANT CHANGE UP (ref 7–23)
CALCIUM SERPL-MCNC: 8.9 MG/DL — SIGNIFICANT CHANGE UP (ref 8.5–10.1)
CHLORIDE SERPL-SCNC: 106 MMOL/L — SIGNIFICANT CHANGE UP (ref 96–108)
CO2 SERPL-SCNC: 26 MMOL/L — SIGNIFICANT CHANGE UP (ref 22–31)
COLOR SPEC: YELLOW — SIGNIFICANT CHANGE UP
CREAT SERPL-MCNC: 0.97 MG/DL — SIGNIFICANT CHANGE UP (ref 0.5–1.3)
DIFF PNL FLD: NEGATIVE — SIGNIFICANT CHANGE UP
EOSINOPHIL # BLD AUTO: 0.43 K/UL — SIGNIFICANT CHANGE UP (ref 0–0.5)
EOSINOPHIL NFR BLD AUTO: 2.8 % — SIGNIFICANT CHANGE UP (ref 0–6)
EPI CELLS # UR: ABNORMAL
GLUCOSE SERPL-MCNC: 119 MG/DL — HIGH (ref 70–99)
GLUCOSE UR QL: NEGATIVE MG/DL — SIGNIFICANT CHANGE UP
HCT VFR BLD CALC: 41.2 % — SIGNIFICANT CHANGE UP (ref 34.5–45)
HGB BLD-MCNC: 13.7 G/DL — SIGNIFICANT CHANGE UP (ref 11.5–15.5)
IMM GRANULOCYTES NFR BLD AUTO: 0.9 % — SIGNIFICANT CHANGE UP (ref 0–1.5)
KETONES UR-MCNC: NEGATIVE — SIGNIFICANT CHANGE UP
LACTATE SERPL-SCNC: 1.5 MMOL/L — SIGNIFICANT CHANGE UP (ref 0.7–2)
LEUKOCYTE ESTERASE UR-ACNC: ABNORMAL
LIDOCAIN IGE QN: 117 U/L — SIGNIFICANT CHANGE UP (ref 73–393)
LYMPHOCYTES # BLD AUTO: 23.2 % — SIGNIFICANT CHANGE UP (ref 13–44)
LYMPHOCYTES # BLD AUTO: 3.52 K/UL — HIGH (ref 1–3.3)
MCHC RBC-ENTMCNC: 30.2 PG — SIGNIFICANT CHANGE UP (ref 27–34)
MCHC RBC-ENTMCNC: 33.3 GM/DL — SIGNIFICANT CHANGE UP (ref 32–36)
MCV RBC AUTO: 90.9 FL — SIGNIFICANT CHANGE UP (ref 80–100)
MONOCYTES # BLD AUTO: 0.82 K/UL — SIGNIFICANT CHANGE UP (ref 0–0.9)
MONOCYTES NFR BLD AUTO: 5.4 % — SIGNIFICANT CHANGE UP (ref 2–14)
NEUTROPHILS # BLD AUTO: 10.14 K/UL — HIGH (ref 1.8–7.4)
NEUTROPHILS NFR BLD AUTO: 67 % — SIGNIFICANT CHANGE UP (ref 43–77)
NITRITE UR-MCNC: NEGATIVE — SIGNIFICANT CHANGE UP
NRBC # BLD: 0 /100 WBCS — SIGNIFICANT CHANGE UP (ref 0–0)
PH UR: 5 — SIGNIFICANT CHANGE UP (ref 5–8)
PLATELET # BLD AUTO: 218 K/UL — SIGNIFICANT CHANGE UP (ref 150–400)
POTASSIUM SERPL-MCNC: 4.5 MMOL/L — SIGNIFICANT CHANGE UP (ref 3.5–5.3)
POTASSIUM SERPL-SCNC: 4.5 MMOL/L — SIGNIFICANT CHANGE UP (ref 3.5–5.3)
PROT SERPL-MCNC: 7.4 GM/DL — SIGNIFICANT CHANGE UP (ref 6–8.3)
PROT UR-MCNC: NEGATIVE MG/DL — SIGNIFICANT CHANGE UP
RBC # BLD: 4.53 M/UL — SIGNIFICANT CHANGE UP (ref 3.8–5.2)
RBC # FLD: 13.9 % — SIGNIFICANT CHANGE UP (ref 10.3–14.5)
SODIUM SERPL-SCNC: 140 MMOL/L — SIGNIFICANT CHANGE UP (ref 135–145)
SP GR SPEC: 1.01 — SIGNIFICANT CHANGE UP (ref 1.01–1.02)
UROBILINOGEN FLD QL: NEGATIVE MG/DL — SIGNIFICANT CHANGE UP
WBC # BLD: 15.16 K/UL — HIGH (ref 3.8–10.5)
WBC # FLD AUTO: 15.16 K/UL — HIGH (ref 3.8–10.5)
WBC UR QL: SIGNIFICANT CHANGE UP

## 2018-09-13 PROCEDURE — 99285 EMERGENCY DEPT VISIT HI MDM: CPT

## 2018-09-13 PROCEDURE — 74177 CT ABD & PELVIS W/CONTRAST: CPT | Mod: 26

## 2018-09-13 RX ORDER — ALBUTEROL 90 UG/1
0 AEROSOL, METERED ORAL
Qty: 0 | Refills: 0 | COMMUNITY

## 2018-09-13 RX ORDER — TRAMADOL HYDROCHLORIDE 50 MG/1
1 TABLET ORAL
Qty: 12 | Refills: 0 | OUTPATIENT
Start: 2018-09-13 | End: 2018-09-15

## 2018-09-13 RX ORDER — FLUCONAZOLE 150 MG/1
150 TABLET ORAL ONCE
Qty: 0 | Refills: 0 | Status: COMPLETED | OUTPATIENT
Start: 2018-09-13 | End: 2018-09-13

## 2018-09-13 RX ORDER — METFORMIN HYDROCHLORIDE 850 MG/1
1 TABLET ORAL
Qty: 0 | Refills: 0 | COMMUNITY

## 2018-09-13 RX ORDER — MORPHINE SULFATE 50 MG/1
4 CAPSULE, EXTENDED RELEASE ORAL ONCE
Qty: 0 | Refills: 0 | Status: DISCONTINUED | OUTPATIENT
Start: 2018-09-13 | End: 2018-09-13

## 2018-09-13 RX ORDER — METRONIDAZOLE 7.5 MG/G
1 GEL VAGINAL
Qty: 1 | Refills: 0 | OUTPATIENT
Start: 2018-09-13 | End: 2018-09-19

## 2018-09-13 RX ADMIN — MORPHINE SULFATE 4 MILLIGRAM(S): 50 CAPSULE, EXTENDED RELEASE ORAL at 16:50

## 2018-09-13 RX ADMIN — FLUCONAZOLE 150 MILLIGRAM(S): 150 TABLET ORAL at 18:37

## 2018-09-13 RX ADMIN — MORPHINE SULFATE 4 MILLIGRAM(S): 50 CAPSULE, EXTENDED RELEASE ORAL at 16:46

## 2018-09-13 NOTE — ED ADULT TRIAGE NOTE - CHIEF COMPLAINT QUOTE
"Last couple of weeks I was having a knife in my vagina, it is that pain, I went to my doctor and he took my urine and I had no infection and I got it again and I went to urgent care and they gave me medication, my gynecologist will not see me until November, I also have the pain in my right side and the last time I was here they said I have colitis" patient denies vaginal discharge, reports burning

## 2018-09-13 NOTE — ED PROVIDER NOTE - MEDICAL DECISION MAKING DETAILS
pt with adnexal mass, vaginitis to treat with flagyl and given fluconazole in ED - otherwise diarrhea to

## 2018-09-13 NOTE — ED ADULT NURSE NOTE - NSIMPLEMENTINTERV_GEN_ALL_ED
Implemented All Fall with Harm Risk Interventions:  [place] to call system. Call bell, personal items and telephone within reach. Instruct patient to call for assistance. Room bathroom lighting operational. Non-slip footwear when patient is off stretcher. Physically safe environment: no spills, clutter or unnecessary equipment. Stretcher in lowest position, wheels locked, appropriate side rails in place. Provide visual cue, wrist band, yellow gown, etc. Monitor gait and stability. Monitor for mental status changes and reorient to person, place, and time. Review medications for side effects contributing to fall risk. Reinforce activity limits and safety measures with patient and family. Provide visual clues: red socks.

## 2018-09-13 NOTE — ED PROVIDER NOTE - OBJECTIVE STATEMENT
58 year old female with PMH of DM II, COPD, Anxiety, Hypothyroid, colitis hx presenting to ED due to vaginal pain on and off for past 2-3 weeks. States no fever/chills but noted some persistent diarrhea. States otherwise no vaginal discharge.

## 2018-09-14 DIAGNOSIS — N76.0 ACUTE VAGINITIS: ICD-10-CM

## 2018-09-14 DIAGNOSIS — J44.9 CHRONIC OBSTRUCTIVE PULMONARY DISEASE, UNSPECIFIED: ICD-10-CM

## 2018-09-14 DIAGNOSIS — I10 ESSENTIAL (PRIMARY) HYPERTENSION: ICD-10-CM

## 2018-09-14 DIAGNOSIS — K52.9 NONINFECTIVE GASTROENTERITIS AND COLITIS, UNSPECIFIED: ICD-10-CM

## 2018-09-14 DIAGNOSIS — E11.9 TYPE 2 DIABETES MELLITUS WITHOUT COMPLICATIONS: ICD-10-CM

## 2018-09-14 DIAGNOSIS — F41.9 ANXIETY DISORDER, UNSPECIFIED: ICD-10-CM

## 2018-09-14 DIAGNOSIS — F40.00 AGORAPHOBIA, UNSPECIFIED: ICD-10-CM

## 2018-09-14 DIAGNOSIS — F17.200 NICOTINE DEPENDENCE, UNSPECIFIED, UNCOMPLICATED: ICD-10-CM

## 2018-09-14 DIAGNOSIS — R19.7 DIARRHEA, UNSPECIFIED: ICD-10-CM

## 2018-09-14 DIAGNOSIS — N94.9 UNSPECIFIED CONDITION ASSOCIATED WITH FEMALE GENITAL ORGANS AND MENSTRUAL CYCLE: ICD-10-CM

## 2018-09-14 DIAGNOSIS — E03.9 HYPOTHYROIDISM, UNSPECIFIED: ICD-10-CM

## 2018-09-14 DIAGNOSIS — R10.2 PELVIC AND PERINEAL PAIN: ICD-10-CM

## 2018-09-14 LAB
CULTURE RESULTS: SIGNIFICANT CHANGE UP
SPECIMEN SOURCE: SIGNIFICANT CHANGE UP

## 2018-12-04 ENCOUNTER — INPATIENT (INPATIENT)
Facility: HOSPITAL | Age: 58
LOS: 2 days | Discharge: HOME | End: 2018-12-07
Attending: INTERNAL MEDICINE | Admitting: INTERNAL MEDICINE
Payer: COMMERCIAL

## 2018-12-04 VITALS
DIASTOLIC BLOOD PRESSURE: 95 MMHG | RESPIRATION RATE: 20 BRPM | WEIGHT: 199.96 LBS | HEART RATE: 112 BPM | TEMPERATURE: 99 F | HEIGHT: 61 IN | OXYGEN SATURATION: 93 % | SYSTOLIC BLOOD PRESSURE: 192 MMHG

## 2018-12-04 DIAGNOSIS — F41.9 ANXIETY DISORDER, UNSPECIFIED: ICD-10-CM

## 2018-12-04 DIAGNOSIS — I10 ESSENTIAL (PRIMARY) HYPERTENSION: ICD-10-CM

## 2018-12-04 DIAGNOSIS — E03.9 HYPOTHYROIDISM, UNSPECIFIED: ICD-10-CM

## 2018-12-04 DIAGNOSIS — E66.9 OBESITY, UNSPECIFIED: ICD-10-CM

## 2018-12-04 DIAGNOSIS — E11.9 TYPE 2 DIABETES MELLITUS WITHOUT COMPLICATIONS: ICD-10-CM

## 2018-12-04 DIAGNOSIS — J44.1 CHRONIC OBSTRUCTIVE PULMONARY DISEASE WITH (ACUTE) EXACERBATION: ICD-10-CM

## 2018-12-04 DIAGNOSIS — F17.200 NICOTINE DEPENDENCE, UNSPECIFIED, UNCOMPLICATED: ICD-10-CM

## 2018-12-04 DIAGNOSIS — J40 BRONCHITIS, NOT SPECIFIED AS ACUTE OR CHRONIC: ICD-10-CM

## 2018-12-04 LAB
ALBUMIN SERPL ELPH-MCNC: 3.3 G/DL — SIGNIFICANT CHANGE UP (ref 3.3–5)
ALP SERPL-CCNC: 136 U/L — HIGH (ref 40–120)
ALT FLD-CCNC: 22 U/L — SIGNIFICANT CHANGE UP (ref 12–78)
ANION GAP SERPL CALC-SCNC: 8 MMOL/L — SIGNIFICANT CHANGE UP (ref 5–17)
APTT BLD: 37.3 SEC — HIGH (ref 28.5–37)
AST SERPL-CCNC: 18 U/L — SIGNIFICANT CHANGE UP (ref 15–37)
BASOPHILS # BLD AUTO: 0.11 K/UL — SIGNIFICANT CHANGE UP (ref 0–0.2)
BASOPHILS NFR BLD AUTO: 0.6 % — SIGNIFICANT CHANGE UP (ref 0–2)
BILIRUB SERPL-MCNC: 0.3 MG/DL — SIGNIFICANT CHANGE UP (ref 0.2–1.2)
BUN SERPL-MCNC: 14 MG/DL — SIGNIFICANT CHANGE UP (ref 7–23)
CALCIUM SERPL-MCNC: 8.4 MG/DL — LOW (ref 8.5–10.1)
CHLORIDE SERPL-SCNC: 105 MMOL/L — SIGNIFICANT CHANGE UP (ref 96–108)
CK MB BLD-MCNC: 0.8 % — SIGNIFICANT CHANGE UP (ref 0–3.5)
CK MB CFR SERPL CALC: 1.6 NG/ML — SIGNIFICANT CHANGE UP (ref 0.5–3.6)
CK SERPL-CCNC: 194 U/L — HIGH (ref 26–192)
CO2 SERPL-SCNC: 28 MMOL/L — SIGNIFICANT CHANGE UP (ref 22–31)
CREAT SERPL-MCNC: 0.99 MG/DL — SIGNIFICANT CHANGE UP (ref 0.5–1.3)
EOSINOPHIL # BLD AUTO: 0.2 K/UL — SIGNIFICANT CHANGE UP (ref 0–0.5)
EOSINOPHIL NFR BLD AUTO: 1 % — SIGNIFICANT CHANGE UP (ref 0–6)
GLUCOSE BLDC GLUCOMTR-MCNC: 318 MG/DL — HIGH (ref 70–99)
GLUCOSE BLDC GLUCOMTR-MCNC: 337 MG/DL — HIGH (ref 70–99)
GLUCOSE SERPL-MCNC: 277 MG/DL — HIGH (ref 70–99)
HCT VFR BLD CALC: 40.4 % — SIGNIFICANT CHANGE UP (ref 34.5–45)
HGB BLD-MCNC: 13.3 G/DL — SIGNIFICANT CHANGE UP (ref 11.5–15.5)
IMM GRANULOCYTES NFR BLD AUTO: 1.2 % — SIGNIFICANT CHANGE UP (ref 0–1.5)
INR BLD: 1.06 RATIO — SIGNIFICANT CHANGE UP (ref 0.88–1.16)
LACTATE SERPL-SCNC: 1.4 MMOL/L — SIGNIFICANT CHANGE UP (ref 0.7–2)
LYMPHOCYTES # BLD AUTO: 13.3 % — SIGNIFICANT CHANGE UP (ref 13–44)
LYMPHOCYTES # BLD AUTO: 2.65 K/UL — SIGNIFICANT CHANGE UP (ref 1–3.3)
MCHC RBC-ENTMCNC: 30.5 PG — SIGNIFICANT CHANGE UP (ref 27–34)
MCHC RBC-ENTMCNC: 32.9 GM/DL — SIGNIFICANT CHANGE UP (ref 32–36)
MCV RBC AUTO: 92.7 FL — SIGNIFICANT CHANGE UP (ref 80–100)
MONOCYTES # BLD AUTO: 1.23 K/UL — HIGH (ref 0–0.9)
MONOCYTES NFR BLD AUTO: 6.2 % — SIGNIFICANT CHANGE UP (ref 2–14)
NEUTROPHILS # BLD AUTO: 15.44 K/UL — HIGH (ref 1.8–7.4)
NEUTROPHILS NFR BLD AUTO: 77.7 % — HIGH (ref 43–77)
NRBC # BLD: 0 /100 WBCS — SIGNIFICANT CHANGE UP (ref 0–0)
NT-PROBNP SERPL-SCNC: 252 PG/ML — HIGH (ref 0–125)
PLATELET # BLD AUTO: 278 K/UL — SIGNIFICANT CHANGE UP (ref 150–400)
POTASSIUM SERPL-MCNC: 3.9 MMOL/L — SIGNIFICANT CHANGE UP (ref 3.5–5.3)
POTASSIUM SERPL-SCNC: 3.9 MMOL/L — SIGNIFICANT CHANGE UP (ref 3.5–5.3)
PROT SERPL-MCNC: 8 GM/DL — SIGNIFICANT CHANGE UP (ref 6–8.3)
PROTHROM AB SERPL-ACNC: 11.9 SEC — SIGNIFICANT CHANGE UP (ref 10–12.9)
RBC # BLD: 4.36 M/UL — SIGNIFICANT CHANGE UP (ref 3.8–5.2)
RBC # FLD: 13.7 % — SIGNIFICANT CHANGE UP (ref 10.3–14.5)
SODIUM SERPL-SCNC: 141 MMOL/L — SIGNIFICANT CHANGE UP (ref 135–145)
TROPONIN I SERPL-MCNC: <.015 NG/ML — SIGNIFICANT CHANGE UP (ref 0.01–0.04)
WBC # BLD: 19.87 K/UL — HIGH (ref 3.8–10.5)
WBC # FLD AUTO: 19.87 K/UL — HIGH (ref 3.8–10.5)

## 2018-12-04 PROCEDURE — 99285 EMERGENCY DEPT VISIT HI MDM: CPT | Mod: 25

## 2018-12-04 PROCEDURE — 99223 1ST HOSP IP/OBS HIGH 75: CPT

## 2018-12-04 PROCEDURE — 93010 ELECTROCARDIOGRAM REPORT: CPT

## 2018-12-04 PROCEDURE — 71045 X-RAY EXAM CHEST 1 VIEW: CPT | Mod: 26

## 2018-12-04 RX ORDER — FLUTICASONE PROPIONATE 50 MCG
1 SPRAY, SUSPENSION NASAL EVERY 12 HOURS
Qty: 0 | Refills: 0 | Status: DISCONTINUED | OUTPATIENT
Start: 2018-12-04 | End: 2018-12-07

## 2018-12-04 RX ORDER — FLUTICASONE PROPIONATE 220 MCG
0 AEROSOL WITH ADAPTER (GRAM) INHALATION
Qty: 0 | Refills: 0 | COMMUNITY

## 2018-12-04 RX ORDER — IPRATROPIUM/ALBUTEROL SULFATE 18-103MCG
3 AEROSOL WITH ADAPTER (GRAM) INHALATION EVERY 6 HOURS
Qty: 0 | Refills: 0 | Status: DISCONTINUED | OUTPATIENT
Start: 2018-12-04 | End: 2018-12-07

## 2018-12-04 RX ORDER — ACETAMINOPHEN 500 MG
650 TABLET ORAL ONCE
Qty: 0 | Refills: 0 | Status: COMPLETED | OUTPATIENT
Start: 2018-12-04 | End: 2018-12-04

## 2018-12-04 RX ORDER — INSULIN LISPRO 100/ML
VIAL (ML) SUBCUTANEOUS AT BEDTIME
Qty: 0 | Refills: 0 | Status: DISCONTINUED | OUTPATIENT
Start: 2018-12-04 | End: 2018-12-07

## 2018-12-04 RX ORDER — SITAGLIPTIN 50 MG/1
0 TABLET, FILM COATED ORAL
Qty: 0 | Refills: 0 | COMMUNITY

## 2018-12-04 RX ORDER — ESCITALOPRAM OXALATE 10 MG/1
10 TABLET, FILM COATED ORAL DAILY
Qty: 0 | Refills: 0 | Status: DISCONTINUED | OUTPATIENT
Start: 2018-12-04 | End: 2018-12-05

## 2018-12-04 RX ORDER — DEXTROSE 50 % IN WATER 50 %
25 SYRINGE (ML) INTRAVENOUS ONCE
Qty: 0 | Refills: 0 | Status: DISCONTINUED | OUTPATIENT
Start: 2018-12-04 | End: 2018-12-07

## 2018-12-04 RX ORDER — CLONAZEPAM 1 MG
1 TABLET ORAL THREE TIMES A DAY
Qty: 0 | Refills: 0 | Status: DISCONTINUED | OUTPATIENT
Start: 2018-12-04 | End: 2018-12-04

## 2018-12-04 RX ORDER — METFORMIN HYDROCHLORIDE 850 MG/1
1000 TABLET ORAL ONCE
Qty: 0 | Refills: 0 | Status: COMPLETED | OUTPATIENT
Start: 2018-12-04 | End: 2018-12-04

## 2018-12-04 RX ORDER — SODIUM CHLORIDE 9 MG/ML
1000 INJECTION, SOLUTION INTRAVENOUS
Qty: 0 | Refills: 0 | Status: DISCONTINUED | OUTPATIENT
Start: 2018-12-04 | End: 2018-12-07

## 2018-12-04 RX ORDER — CLONAZEPAM 1 MG
0 TABLET ORAL
Qty: 0 | Refills: 0 | COMMUNITY

## 2018-12-04 RX ORDER — DEXTROSE 50 % IN WATER 50 %
15 SYRINGE (ML) INTRAVENOUS ONCE
Qty: 0 | Refills: 0 | Status: DISCONTINUED | OUTPATIENT
Start: 2018-12-04 | End: 2018-12-07

## 2018-12-04 RX ORDER — GABAPENTIN 400 MG/1
600 CAPSULE ORAL THREE TIMES A DAY
Qty: 0 | Refills: 0 | Status: DISCONTINUED | OUTPATIENT
Start: 2018-12-04 | End: 2018-12-07

## 2018-12-04 RX ORDER — CLONAZEPAM 1 MG
1 TABLET ORAL ONCE
Qty: 0 | Refills: 0 | Status: DISCONTINUED | OUTPATIENT
Start: 2018-12-04 | End: 2018-12-04

## 2018-12-04 RX ORDER — HYDROXYZINE HCL 10 MG
50 TABLET ORAL EVERY 8 HOURS
Qty: 0 | Refills: 0 | Status: DISCONTINUED | OUTPATIENT
Start: 2018-12-04 | End: 2018-12-07

## 2018-12-04 RX ORDER — AZITHROMYCIN 500 MG/1
500 TABLET, FILM COATED ORAL ONCE
Qty: 0 | Refills: 0 | Status: COMPLETED | OUTPATIENT
Start: 2018-12-04 | End: 2018-12-04

## 2018-12-04 RX ORDER — METFORMIN HYDROCHLORIDE 850 MG/1
1000 TABLET ORAL
Qty: 0 | Refills: 0 | Status: DISCONTINUED | OUTPATIENT
Start: 2018-12-04 | End: 2018-12-07

## 2018-12-04 RX ORDER — IPRATROPIUM/ALBUTEROL SULFATE 18-103MCG
3 AEROSOL WITH ADAPTER (GRAM) INHALATION ONCE
Qty: 0 | Refills: 0 | Status: COMPLETED | OUTPATIENT
Start: 2018-12-04 | End: 2018-12-04

## 2018-12-04 RX ORDER — LEVOTHYROXINE SODIUM 125 MCG
175 TABLET ORAL DAILY
Qty: 0 | Refills: 0 | Status: DISCONTINUED | OUTPATIENT
Start: 2018-12-04 | End: 2018-12-07

## 2018-12-04 RX ORDER — DEXTROSE 50 % IN WATER 50 %
12.5 SYRINGE (ML) INTRAVENOUS ONCE
Qty: 0 | Refills: 0 | Status: DISCONTINUED | OUTPATIENT
Start: 2018-12-04 | End: 2018-12-07

## 2018-12-04 RX ORDER — MAGNESIUM SULFATE 500 MG/ML
2 VIAL (ML) INJECTION ONCE
Qty: 0 | Refills: 0 | Status: COMPLETED | OUTPATIENT
Start: 2018-12-04 | End: 2018-12-04

## 2018-12-04 RX ORDER — AZITHROMYCIN 500 MG/1
250 TABLET, FILM COATED ORAL DAILY
Qty: 0 | Refills: 0 | Status: DISCONTINUED | OUTPATIENT
Start: 2018-12-05 | End: 2018-12-07

## 2018-12-04 RX ORDER — METFORMIN HYDROCHLORIDE 850 MG/1
0 TABLET ORAL
Qty: 0 | Refills: 0 | COMMUNITY

## 2018-12-04 RX ORDER — GLIMEPIRIDE 1 MG
0 TABLET ORAL
Qty: 0 | Refills: 0 | COMMUNITY

## 2018-12-04 RX ORDER — GABAPENTIN 400 MG/1
0 CAPSULE ORAL
Qty: 0 | Refills: 0 | COMMUNITY

## 2018-12-04 RX ORDER — BENZOCAINE AND MENTHOL 5; 1 G/100ML; G/100ML
1 LIQUID ORAL ONCE
Qty: 0 | Refills: 0 | Status: COMPLETED | OUTPATIENT
Start: 2018-12-04 | End: 2018-12-04

## 2018-12-04 RX ORDER — ESCITALOPRAM OXALATE 10 MG/1
20 TABLET, FILM COATED ORAL ONCE
Qty: 0 | Refills: 0 | Status: COMPLETED | OUTPATIENT
Start: 2018-12-04 | End: 2018-12-04

## 2018-12-04 RX ORDER — ESCITALOPRAM OXALATE 10 MG/1
0 TABLET, FILM COATED ORAL
Qty: 0 | Refills: 0 | COMMUNITY

## 2018-12-04 RX ORDER — GLUCAGON INJECTION, SOLUTION 0.5 MG/.1ML
1 INJECTION, SOLUTION SUBCUTANEOUS ONCE
Qty: 0 | Refills: 0 | Status: DISCONTINUED | OUTPATIENT
Start: 2018-12-04 | End: 2018-12-07

## 2018-12-04 RX ORDER — ENOXAPARIN SODIUM 100 MG/ML
40 INJECTION SUBCUTANEOUS DAILY
Qty: 0 | Refills: 0 | Status: DISCONTINUED | OUTPATIENT
Start: 2018-12-04 | End: 2018-12-07

## 2018-12-04 RX ORDER — INSULIN LISPRO 100/ML
VIAL (ML) SUBCUTANEOUS
Qty: 0 | Refills: 0 | Status: DISCONTINUED | OUTPATIENT
Start: 2018-12-04 | End: 2018-12-07

## 2018-12-04 RX ADMIN — Medication 125 MILLIGRAM(S): at 08:39

## 2018-12-04 RX ADMIN — Medication 3 MILLILITER(S): at 17:15

## 2018-12-04 RX ADMIN — ESCITALOPRAM OXALATE 20 MILLIGRAM(S): 10 TABLET, FILM COATED ORAL at 09:39

## 2018-12-04 RX ADMIN — Medication 650 MILLIGRAM(S): at 11:53

## 2018-12-04 RX ADMIN — AZITHROMYCIN 255 MILLIGRAM(S): 500 TABLET, FILM COATED ORAL at 10:40

## 2018-12-04 RX ADMIN — Medication 40 MILLIGRAM(S): at 21:41

## 2018-12-04 RX ADMIN — METFORMIN HYDROCHLORIDE 1000 MILLIGRAM(S): 850 TABLET ORAL at 09:38

## 2018-12-04 RX ADMIN — Medication 3 MILLILITER(S): at 08:20

## 2018-12-04 RX ADMIN — Medication 4: at 21:38

## 2018-12-04 RX ADMIN — METFORMIN HYDROCHLORIDE 1000 MILLIGRAM(S): 850 TABLET ORAL at 18:07

## 2018-12-04 RX ADMIN — Medication 650 MILLIGRAM(S): at 12:35

## 2018-12-04 RX ADMIN — Medication 1 MILLIGRAM(S): at 09:38

## 2018-12-04 RX ADMIN — Medication 50 GRAM(S): at 08:39

## 2018-12-04 RX ADMIN — Medication 40 MILLIGRAM(S): at 16:06

## 2018-12-04 RX ADMIN — GABAPENTIN 600 MILLIGRAM(S): 400 CAPSULE ORAL at 21:39

## 2018-12-04 RX ADMIN — Medication 1 MILLIGRAM(S): at 16:01

## 2018-12-04 RX ADMIN — Medication 8: at 16:15

## 2018-12-04 RX ADMIN — BENZOCAINE AND MENTHOL 1 LOZENGE: 5; 1 LIQUID ORAL at 20:22

## 2018-12-04 NOTE — CONSULT NOTE ADULT - SUBJECTIVE AND OBJECTIVE BOX
Patient is a 58y old  Female who presents with a chief complaint of short of breath (04 Dec 2018 14:14)    HPI:  : 58 year old female with  COPD, Active tobacco abuse since age 13,  DM II, HTN, Hypothyroid, Anxiety and Obesity.  Presented  to ED due to cough and SOB - states worsening for past few days but has had cough for past few months. Denies any fever/chills and otherwise feeling of sore throat and ear congestion as well, brought in with O2 sat of 77% otherwise wheezing diffusely. (04 Dec 2018 14:14)    PAST MEDICAL & SURGICAL HISTORY:  DM (diabetes mellitus)  HTN (hypertension)  Smoker  Emphysema lung  Hypothyroid  Agoraphobia  COPD (chronic obstructive pulmonary disease)  Anxiety  No significant past surgical history    FAMILY HISTORY:  No pertinent family history in first degree relatives    SOCIAL HISTORY: BMI (kg/m2): 37.8 . Active smoker.    Allergies  No Known Allergies    MEDICATIONS  (STANDING):  ALBUTerol/ipratropium for Nebulization 3 milliLiter(s) Nebulizer every 6 hours  dextrose 5%. 1000 milliLiter(s) (50 mL/Hr) IV Continuous <Continuous>  dextrose 50% Injectable 12.5 Gram(s) IV Push once  dextrose 50% Injectable 25 Gram(s) IV Push once  dextrose 50% Injectable 25 Gram(s) IV Push once  escitalopram 10 milliGRAM(s) Oral daily  fluticasone propionate 50 MICROgram(s)/spray Nasal Spray 1 Spray(s) Both Nostrils every 12 hours  gabapentin 600 milliGRAM(s) Oral three times a day  insulin lispro (HumaLOG) corrective regimen sliding scale   SubCutaneous three times a day before meals  insulin lispro (HumaLOG) corrective regimen sliding scale   SubCutaneous at bedtime  levothyroxine 175 MICROGram(s) Oral daily  metFORMIN 1000 milliGRAM(s) Oral two times a day  methylPREDNISolone sodium succinate Injectable 40 milliGRAM(s) IV Push every 8 hours    MEDICATIONS  (PRN):  dextrose 40% Gel 15 Gram(s) Oral once PRN Blood Glucose LESS THAN 70 milliGRAM(s)/deciliter  glucagon  Injectable 1 milliGRAM(s) IntraMuscular once PRN Glucose LESS THAN 70 milligrams/deciliter  hydrOXYzine hydrochloride 50 milliGRAM(s) Oral every 8 hours PRN Itching    REVIEW OF SYSTEMS:    Constitutional:            No fever, weight loss or fatigue  HEENT:                     No difficulty hearing, tinnitus, vertigo; No sinus or throat pain  Respiratory:             sob, cough  Cardiovascular:           No chest pain, palpitations  Gastrointestinal:        No abdominal or epigastric pain. No N/V/diarrhea or hematemesis  Genitourinary:            No dysuria, frequency, hematuria or incontinence  SKIN:                         no rash  Musculoskeletal:        No joint pain or swelling  Extremities:                No swelling  Neurological:              No headaches  Psychiatric:                 No depression, anxiety    Vital Signs Last 24 Hrs  T(C): 37.2 (04 Dec 2018 18:09), Max: 37.3 (04 Dec 2018 14:12)  T(F): 99 (04 Dec 2018 18:09), Max: 99.1 (04 Dec 2018 14:12)  HR: 112 (04 Dec 2018 18:09) (99 - 112)  BP: 160/97 (04 Dec 2018 18:09) (140/59 - 192/95)  BP(mean): --  RR: 20 (04 Dec 2018 18:09) (16 - 20)  SpO2: 96% (04 Dec 2018 18:09) (92% - 98%)    PHYSICAL EXAM:  GEN:         Awake, responsive and comfortable.  HEENT:    Normal.    RESP:        wheezing  CVS:          Regular rate and rhythm.   ABD:         Soft, non-tender, non-distended;   :             No costovertebral angle tenderness  SKIN:           Warm and dry.  EXTR:            No clubbing, cyanosis or edema  CNS:              Intact sensory and motor function.  PSYCH:        cooperative, no anxiety or depression    LABS:                        13.3   19.87 )-----------( 278      ( 04 Dec 2018 09:25 )             40.4     12-04    141  |  105  |  14  ----------------------------<  277<H>  3.9   |  28  |  0.99    Ca    8.4<L>      04 Dec 2018 09:25    TPro  8.0  /  Alb  3.3  /  TBili  0.3  /  DBili  x   /  AST  18  /  ALT  22  /  AlkPhos  136<H>  12-04    PT/INR - ( 04 Dec 2018 09:25 )   PT: 11.9 sec;   INR: 1.06 ratio      PTT - ( 04 Dec 2018 09:25 )  PTT:37.3 sec    EKG:  sinus    RADIOLOGY & ADDITIONAL STUDIES:  < from: Xray Chest 1 View AP/PA. (12.04.18 @ 09:47) >    EXAM:  XR CHEST AP OR PA 1V                          PROCEDURE DATE:  12/04/2018      INTERPRETATION:  AP semierect chest on December 4, 2018 at 9:05 AM.   Patient has cough and is short of breath.    Heart appears normal in size.    The lungfields and pleural surfaces are unremarkable.    The chest is similar to November 15, 2017.    IMPRESSION: Negative chest.    GIANA VILLEDA M.D., ATTENDING RADIOLOGIST  This document has been electronically signed. Dec  4 2018  9:50AM      ASSESSMENT AND PLAN:  ·	SOB with wheezing.  ·	Acute COPD exacerbation.  ·	Leukocytosis.  ·	Tobacco abuse.  ·	Obesity.  ·	Suspect MENEU  ·	HTN.  ·	DM  ·	Hypothyroidism  ·	Anxiety.    Continue nebulizer and steroids.  Empiric antibiotic, obtain procalcitonin.  Smoking cessation.  PFT and sleep study out pt.  Weight reduction.  DVT prophylaxis.

## 2018-12-04 NOTE — H&P ADULT - NSHPLABSRESULTS_GEN_ALL_CORE
13.3   19.87 )-----------( 278      ( 04 Dec 2018 09:25 )             40.4   Serum Pro-Brain Natriuretic Peptide: 252 pg/mL (12.04.18 @ 09:25)  12-04    141  |  105  |  14  ----------------------------<  277<H>  3.9   |  28  |  0.99    Ca    8.4<L>      04 Dec 2018 09:25    TPro  8.0  /  Alb  3.3  /  TBili  0.3  /  DBili  x   /  AST  18  /  ALT  22  /  AlkPhos  136<H>  12-04  < from: Xray Chest 1 View AP/PA. (12.04.18 @ 09:47) >      IMPRESSION: Negative chest.

## 2018-12-04 NOTE — ED ADULT TRIAGE NOTE - CHIEF COMPLAINT QUOTE
Not feeling well, coughing with sob and sore throat for 1 week, completed 7 days of antibiotic but still not feeling better.

## 2018-12-04 NOTE — ED PROVIDER NOTE - OBJECTIVE STATEMENT
58 year old female with PMH of COPD, DM II, HTN, Hypothyroid, anxiety presenting to ED due to cough and SOB - states worsening for past few days but has had cough for past few months. Denies any fever/chills and otherwise feeling of sore throat and ear congestion as well, brought in with O2 sat of 77% otherwise wheezing diffusely.

## 2018-12-04 NOTE — ED ADULT NURSE NOTE - OBJECTIVE STATEMENT
Patient complains of sob, sore throat,  and  cough, pt has hx of copd. patient took antibiotics with no relief. Patient sounds like she is losing her voice Patient complains of sob, sore throat,  and  cough, pt has hx of copd. patient took antibiotics with no relief. Patient has hoarseness in her voice.

## 2018-12-04 NOTE — H&P ADULT - HISTORY OF PRESENT ILLNESS
: 58 year old female with PMH of COPD, DM II, HTN, Hypothyroid, anxiety presenting to ED due to cough and SOB - states worsening for past few days but has had cough for past few months. Denies any fever/chills and otherwise feeling of sore throat and ear congestion as well, brought in with O2 sat of 77% otherwise wheezing diffusely.

## 2018-12-04 NOTE — ED PROVIDER NOTE - MEDICAL DECISION MAKING DETAILS
COPD exacerbation noted with likely bronchitis given WBC elevation - started on azithromycin will admit for further care with Dr. Cummings.

## 2018-12-04 NOTE — H&P ADULT - ASSESSMENT
58f with history of emphysema still smoking with short of breath /wheezing     IMPROVE VTE Individual Risk Assessment    RISK                                                          Points  [] Previous VTE                                           3  [] Thrombophilia                                        2  [] Lower limb paralysis                              2   [] Current Cancer                                       2   [] Immobilization > 24 hrs                        1  [] ICU/CCU stay > 24 hours                       1  [] Age > 60                                                   1    IMPROVE VTE Score:2

## 2018-12-04 NOTE — ED PROVIDER NOTE - CONSTITUTIONAL, MLM
normal... Well appearing, well nourished, awake, alert, oriented to person, place, time/situation and mild to moderate distress secondary to SOB

## 2018-12-05 DIAGNOSIS — E66.9 OBESITY, UNSPECIFIED: ICD-10-CM

## 2018-12-05 DIAGNOSIS — E03.9 HYPOTHYROIDISM, UNSPECIFIED: ICD-10-CM

## 2018-12-05 LAB
ANION GAP SERPL CALC-SCNC: 7 MMOL/L — SIGNIFICANT CHANGE UP (ref 5–17)
BUN SERPL-MCNC: 16 MG/DL — SIGNIFICANT CHANGE UP (ref 7–23)
CALCIUM SERPL-MCNC: 8.4 MG/DL — LOW (ref 8.5–10.1)
CHLORIDE SERPL-SCNC: 102 MMOL/L — SIGNIFICANT CHANGE UP (ref 96–108)
CO2 SERPL-SCNC: 29 MMOL/L — SIGNIFICANT CHANGE UP (ref 22–31)
CREAT SERPL-MCNC: 0.94 MG/DL — SIGNIFICANT CHANGE UP (ref 0.5–1.3)
FLUAV SPEC QL CULT: NEGATIVE — SIGNIFICANT CHANGE UP
FLUBV AG SPEC QL IA: NEGATIVE — SIGNIFICANT CHANGE UP
GLUCOSE BLDC GLUCOMTR-MCNC: 308 MG/DL — HIGH (ref 70–99)
GLUCOSE SERPL-MCNC: 311 MG/DL — HIGH (ref 70–99)
HBA1C BLD-MCNC: 9.1 % — HIGH (ref 4–5.6)
HCT VFR BLD CALC: 38.1 % — SIGNIFICANT CHANGE UP (ref 34.5–45)
HGB BLD-MCNC: 12.4 G/DL — SIGNIFICANT CHANGE UP (ref 11.5–15.5)
MCHC RBC-ENTMCNC: 30 PG — SIGNIFICANT CHANGE UP (ref 27–34)
MCHC RBC-ENTMCNC: 32.5 GM/DL — SIGNIFICANT CHANGE UP (ref 32–36)
MCV RBC AUTO: 92 FL — SIGNIFICANT CHANGE UP (ref 80–100)
NRBC # BLD: 0 /100 WBCS — SIGNIFICANT CHANGE UP (ref 0–0)
PLATELET # BLD AUTO: 273 K/UL — SIGNIFICANT CHANGE UP (ref 150–400)
POTASSIUM SERPL-MCNC: 4.4 MMOL/L — SIGNIFICANT CHANGE UP (ref 3.5–5.3)
POTASSIUM SERPL-SCNC: 4.4 MMOL/L — SIGNIFICANT CHANGE UP (ref 3.5–5.3)
PROCALCITONIN SERPL-MCNC: 1.92 NG/ML — HIGH (ref 0.02–0.1)
RBC # BLD: 4.14 M/UL — SIGNIFICANT CHANGE UP (ref 3.8–5.2)
RBC # FLD: 13.6 % — SIGNIFICANT CHANGE UP (ref 10.3–14.5)
SODIUM SERPL-SCNC: 138 MMOL/L — SIGNIFICANT CHANGE UP (ref 135–145)
WBC # BLD: 18.3 K/UL — HIGH (ref 3.8–10.5)
WBC # FLD AUTO: 18.3 K/UL — HIGH (ref 3.8–10.5)

## 2018-12-05 PROCEDURE — 99233 SBSQ HOSP IP/OBS HIGH 50: CPT | Mod: 25

## 2018-12-05 PROCEDURE — 99406 BEHAV CHNG SMOKING 3-10 MIN: CPT

## 2018-12-05 RX ORDER — MONTELUKAST 4 MG/1
10 TABLET, CHEWABLE ORAL DAILY
Qty: 0 | Refills: 0 | Status: DISCONTINUED | OUTPATIENT
Start: 2018-12-05 | End: 2018-12-07

## 2018-12-05 RX ORDER — IPRATROPIUM/ALBUTEROL SULFATE 18-103MCG
3 AEROSOL WITH ADAPTER (GRAM) INHALATION ONCE
Qty: 0 | Refills: 0 | Status: COMPLETED | OUTPATIENT
Start: 2018-12-05 | End: 2018-12-05

## 2018-12-05 RX ORDER — PIPERACILLIN AND TAZOBACTAM 4; .5 G/20ML; G/20ML
3.38 INJECTION, POWDER, LYOPHILIZED, FOR SOLUTION INTRAVENOUS EVERY 8 HOURS
Qty: 0 | Refills: 0 | Status: DISCONTINUED | OUTPATIENT
Start: 2018-12-05 | End: 2018-12-07

## 2018-12-05 RX ORDER — CLONAZEPAM 1 MG
1 TABLET ORAL THREE TIMES A DAY
Qty: 0 | Refills: 0 | Status: DISCONTINUED | OUTPATIENT
Start: 2018-12-05 | End: 2018-12-07

## 2018-12-05 RX ORDER — ACETAMINOPHEN 500 MG
650 TABLET ORAL EVERY 6 HOURS
Qty: 0 | Refills: 0 | Status: DISCONTINUED | OUTPATIENT
Start: 2018-12-05 | End: 2018-12-07

## 2018-12-05 RX ORDER — CLONAZEPAM 1 MG
1 TABLET ORAL ONCE
Qty: 0 | Refills: 0 | Status: DISCONTINUED | OUTPATIENT
Start: 2018-12-05 | End: 2018-12-05

## 2018-12-05 RX ORDER — NICOTINE POLACRILEX 2 MG
1 GUM BUCCAL DAILY
Qty: 0 | Refills: 0 | Status: DISCONTINUED | OUTPATIENT
Start: 2018-12-05 | End: 2018-12-07

## 2018-12-05 RX ORDER — ACETAMINOPHEN 500 MG
650 TABLET ORAL ONCE
Qty: 0 | Refills: 0 | Status: COMPLETED | OUTPATIENT
Start: 2018-12-05 | End: 2018-12-05

## 2018-12-05 RX ORDER — ESCITALOPRAM OXALATE 10 MG/1
20 TABLET, FILM COATED ORAL DAILY
Qty: 0 | Refills: 0 | Status: DISCONTINUED | OUTPATIENT
Start: 2018-12-05 | End: 2018-12-07

## 2018-12-05 RX ORDER — BUDESONIDE, MICRONIZED 100 %
0.5 POWDER (GRAM) MISCELLANEOUS EVERY 12 HOURS
Qty: 0 | Refills: 0 | Status: DISCONTINUED | OUTPATIENT
Start: 2018-12-05 | End: 2018-12-07

## 2018-12-05 RX ADMIN — Medication 1 MILLIGRAM(S): at 22:10

## 2018-12-05 RX ADMIN — ENOXAPARIN SODIUM 40 MILLIGRAM(S): 100 INJECTION SUBCUTANEOUS at 13:45

## 2018-12-05 RX ADMIN — GABAPENTIN 600 MILLIGRAM(S): 400 CAPSULE ORAL at 22:11

## 2018-12-05 RX ADMIN — Medication 3 MILLILITER(S): at 18:07

## 2018-12-05 RX ADMIN — Medication 10: at 13:32

## 2018-12-05 RX ADMIN — AZITHROMYCIN 250 MILLIGRAM(S): 500 TABLET, FILM COATED ORAL at 14:29

## 2018-12-05 RX ADMIN — Medication 40 MILLIGRAM(S): at 14:30

## 2018-12-05 RX ADMIN — ESCITALOPRAM OXALATE 20 MILLIGRAM(S): 10 TABLET, FILM COATED ORAL at 12:07

## 2018-12-05 RX ADMIN — Medication 40 MILLIGRAM(S): at 05:24

## 2018-12-05 RX ADMIN — METFORMIN HYDROCHLORIDE 1000 MILLIGRAM(S): 850 TABLET ORAL at 05:27

## 2018-12-05 RX ADMIN — Medication 0.5 MILLIGRAM(S): at 18:07

## 2018-12-05 RX ADMIN — PIPERACILLIN AND TAZOBACTAM 25 GRAM(S): 4; .5 INJECTION, POWDER, LYOPHILIZED, FOR SOLUTION INTRAVENOUS at 22:10

## 2018-12-05 RX ADMIN — Medication 3 MILLILITER(S): at 11:38

## 2018-12-05 RX ADMIN — Medication 175 MICROGRAM(S): at 05:28

## 2018-12-05 RX ADMIN — GABAPENTIN 600 MILLIGRAM(S): 400 CAPSULE ORAL at 05:26

## 2018-12-05 RX ADMIN — Medication 40 MILLIGRAM(S): at 22:10

## 2018-12-05 RX ADMIN — MONTELUKAST 10 MILLIGRAM(S): 4 TABLET, CHEWABLE ORAL at 14:30

## 2018-12-05 RX ADMIN — Medication 3 MILLILITER(S): at 23:45

## 2018-12-05 RX ADMIN — Medication 650 MILLIGRAM(S): at 00:27

## 2018-12-05 RX ADMIN — Medication 2: at 22:14

## 2018-12-05 RX ADMIN — Medication 200 MILLIGRAM(S): at 12:06

## 2018-12-05 RX ADMIN — Medication 8: at 08:20

## 2018-12-05 RX ADMIN — Medication 3 MILLILITER(S): at 04:55

## 2018-12-05 RX ADMIN — Medication 3 MILLILITER(S): at 05:09

## 2018-12-05 RX ADMIN — Medication 1 MILLIGRAM(S): at 06:30

## 2018-12-05 RX ADMIN — Medication 3 MILLILITER(S): at 00:00

## 2018-12-05 RX ADMIN — Medication 3 MILLILITER(S): at 16:16

## 2018-12-05 RX ADMIN — METFORMIN HYDROCHLORIDE 1000 MILLIGRAM(S): 850 TABLET ORAL at 17:26

## 2018-12-05 RX ADMIN — Medication 1 PATCH: at 14:29

## 2018-12-05 RX ADMIN — GABAPENTIN 600 MILLIGRAM(S): 400 CAPSULE ORAL at 14:29

## 2018-12-05 RX ADMIN — Medication 1 MILLIGRAM(S): at 14:30

## 2018-12-05 RX ADMIN — Medication 8: at 16:30

## 2018-12-05 NOTE — PROGRESS NOTE ADULT - PROBLEM SELECTOR PLAN 1
Continue IV Solumedrol  On Zithromax  Pulmonary following  Continue Duoneb tx q6h, added Pulmicort neb BID  Added Singulair 10 mg qhs  supplemental O2  Tessalon prn cough Continue IV Solumedrol  On Zithromax  Pulmonary following  Continue Duoneb tx q6h, added Pulmicort neb BID  Added Singulair 10 mg qhs  supplemental O2  Tessalon prn cough  check respiratory failure viral panel

## 2018-12-05 NOTE — PHYSICAL THERAPY INITIAL EVALUATION ADULT - LIVES WITH, PROFILE
Pt lives in an apt with her son. Stairs (+). Independent prior to admission. No AD owned. No falls reported./children

## 2018-12-06 LAB
BASE EXCESS BLDA CALC-SCNC: 3.4 MMOL/L — HIGH (ref -2–2)
BLOOD GAS COMMENTS: SIGNIFICANT CHANGE UP
BLOOD GAS COMMENTS: SIGNIFICANT CHANGE UP
BLOOD GAS SOURCE: SIGNIFICANT CHANGE UP
HCO3 BLDA-SCNC: 29 MMOL/L — SIGNIFICANT CHANGE UP (ref 21–29)
HCT VFR BLD CALC: 39 % — SIGNIFICANT CHANGE UP (ref 34.5–45)
HGB BLD-MCNC: 12.5 G/DL — SIGNIFICANT CHANGE UP (ref 11.5–15.5)
HOROWITZ INDEX BLDA+IHG-RTO: 32 — SIGNIFICANT CHANGE UP
MCHC RBC-ENTMCNC: 30 PG — SIGNIFICANT CHANGE UP (ref 27–34)
MCHC RBC-ENTMCNC: 32.1 GM/DL — SIGNIFICANT CHANGE UP (ref 32–36)
MCV RBC AUTO: 93.8 FL — SIGNIFICANT CHANGE UP (ref 80–100)
NRBC # BLD: 0 /100 WBCS — SIGNIFICANT CHANGE UP (ref 0–0)
PCO2 BLDA: 49 MMHG — HIGH (ref 32–46)
PH BLD: 7.39 — SIGNIFICANT CHANGE UP (ref 7.35–7.45)
PLATELET # BLD AUTO: 269 K/UL — SIGNIFICANT CHANGE UP (ref 150–400)
PO2 BLDA: 74 MMHG — SIGNIFICANT CHANGE UP (ref 74–108)
RBC # BLD: 4.16 M/UL — SIGNIFICANT CHANGE UP (ref 3.8–5.2)
RBC # FLD: 13.5 % — SIGNIFICANT CHANGE UP (ref 10.3–14.5)
SAO2 % BLDA: 94 % — SIGNIFICANT CHANGE UP (ref 92–96)
WBC # BLD: 18.61 K/UL — HIGH (ref 3.8–10.5)
WBC # FLD AUTO: 18.61 K/UL — HIGH (ref 3.8–10.5)

## 2018-12-06 PROCEDURE — 99232 SBSQ HOSP IP/OBS MODERATE 35: CPT

## 2018-12-06 RX ADMIN — PIPERACILLIN AND TAZOBACTAM 25 GRAM(S): 4; .5 INJECTION, POWDER, LYOPHILIZED, FOR SOLUTION INTRAVENOUS at 22:08

## 2018-12-06 RX ADMIN — Medication 1 PATCH: at 19:51

## 2018-12-06 RX ADMIN — GABAPENTIN 600 MILLIGRAM(S): 400 CAPSULE ORAL at 14:37

## 2018-12-06 RX ADMIN — METFORMIN HYDROCHLORIDE 1000 MILLIGRAM(S): 850 TABLET ORAL at 05:27

## 2018-12-06 RX ADMIN — Medication 40 MILLIGRAM(S): at 22:02

## 2018-12-06 RX ADMIN — Medication 3 MILLILITER(S): at 11:05

## 2018-12-06 RX ADMIN — PIPERACILLIN AND TAZOBACTAM 25 GRAM(S): 4; .5 INJECTION, POWDER, LYOPHILIZED, FOR SOLUTION INTRAVENOUS at 05:27

## 2018-12-06 RX ADMIN — MONTELUKAST 10 MILLIGRAM(S): 4 TABLET, CHEWABLE ORAL at 14:37

## 2018-12-06 RX ADMIN — Medication 175 MICROGRAM(S): at 05:27

## 2018-12-06 RX ADMIN — METFORMIN HYDROCHLORIDE 1000 MILLIGRAM(S): 850 TABLET ORAL at 17:16

## 2018-12-06 RX ADMIN — Medication 1 MILLIGRAM(S): at 05:25

## 2018-12-06 RX ADMIN — ENOXAPARIN SODIUM 40 MILLIGRAM(S): 100 INJECTION SUBCUTANEOUS at 11:30

## 2018-12-06 RX ADMIN — Medication 8: at 08:32

## 2018-12-06 RX ADMIN — Medication 200 MILLIGRAM(S): at 04:02

## 2018-12-06 RX ADMIN — Medication 0.5 MILLIGRAM(S): at 17:47

## 2018-12-06 RX ADMIN — Medication 40 MILLIGRAM(S): at 05:27

## 2018-12-06 RX ADMIN — Medication 4: at 16:37

## 2018-12-06 RX ADMIN — AZITHROMYCIN 250 MILLIGRAM(S): 500 TABLET, FILM COATED ORAL at 11:29

## 2018-12-06 RX ADMIN — Medication 6: at 11:32

## 2018-12-06 RX ADMIN — GABAPENTIN 600 MILLIGRAM(S): 400 CAPSULE ORAL at 22:02

## 2018-12-06 RX ADMIN — Medication 4: at 22:08

## 2018-12-06 RX ADMIN — Medication 1 PATCH: at 11:30

## 2018-12-06 RX ADMIN — Medication 40 MILLIGRAM(S): at 14:37

## 2018-12-06 RX ADMIN — Medication 1 PATCH: at 17:17

## 2018-12-06 RX ADMIN — ESCITALOPRAM OXALATE 20 MILLIGRAM(S): 10 TABLET, FILM COATED ORAL at 11:28

## 2018-12-06 RX ADMIN — GABAPENTIN 600 MILLIGRAM(S): 400 CAPSULE ORAL at 05:27

## 2018-12-06 RX ADMIN — Medication 1 MILLIGRAM(S): at 22:02

## 2018-12-06 RX ADMIN — Medication 200 MILLIGRAM(S): at 11:29

## 2018-12-06 RX ADMIN — Medication 200 MILLIGRAM(S): at 19:48

## 2018-12-06 RX ADMIN — Medication 3 MILLILITER(S): at 17:47

## 2018-12-06 RX ADMIN — Medication 1 MILLIGRAM(S): at 14:37

## 2018-12-06 RX ADMIN — Medication 0.5 MILLIGRAM(S): at 06:26

## 2018-12-06 RX ADMIN — Medication 3 MILLILITER(S): at 06:26

## 2018-12-06 RX ADMIN — Medication 3 MILLILITER(S): at 23:21

## 2018-12-06 RX ADMIN — PIPERACILLIN AND TAZOBACTAM 25 GRAM(S): 4; .5 INJECTION, POWDER, LYOPHILIZED, FOR SOLUTION INTRAVENOUS at 14:38

## 2018-12-06 RX ADMIN — Medication 1 PATCH: at 05:30

## 2018-12-06 NOTE — PROGRESS NOTE ADULT - PROBLEM SELECTOR PLAN 1
Continue IV Solumedrol  On Zithromax and Zosyn  Pulmonary following  Continue Duoneb tx q6h, added Pulmicort neb BID  Added Singulair 10 mg qhs  supplemental O2  Tessalon prn cough  respiratory viral panel negative

## 2018-12-06 NOTE — DIETITIAN INITIAL EVALUATION ADULT. - NS AS NUTRI INTERV MEALS SNACK
Fiber - modified diet/Recommend Low fiber & continue [facility] no snack/Carbohydrate - modified diet

## 2018-12-06 NOTE — DIETITIAN INITIAL EVALUATION ADULT. - PERTINENT MEDS FT
acetaminophen   Tablet .. PRN  ALBUTerol/ipratropium for Nebulization  azithromycin   Tablet  benzonatate PRN  buDESOnide   0.5 milliGRAM(s) Respule  clonazePAM Tablet  dextrose 40% Gel PRN  dextrose 5%.  dextrose 50% Injectable  dextrose 50% Injectable  dextrose 50% Injectable  enoxaparin Injectable  escitalopram  fluticasone propionate 50 MICROgram(s)/spray Nasal Spray  gabapentin  glucagon  Injectable PRN  hydrOXYzine hydrochloride PRN  insulin lispro (HumaLOG) corrective regimen sliding scale  insulin lispro (HumaLOG) corrective regimen sliding scale  levothyroxine  metFORMIN  methylPREDNISolone sodium succinate Injectable  montelukast  nicotine -  14 mG/24Hr(s) Patch  piperacillin/tazobactam IVPB.

## 2018-12-06 NOTE — DIETITIAN INITIAL EVALUATION ADULT. - ORAL INTAKE PTA
2 meals daily; Breakfast; Coffee & occasional banana Lunch; convenient foods weight watches or [employer] cuisine frozen foods & frozen vegetables Dinner; Mac & cheese with broccoli, peas or steak ,hamburger/good

## 2018-12-06 NOTE — DIETITIAN INITIAL EVALUATION ADULT. - PERTINENT LABORATORY DATA
12-05 Na 138   Glu 311   K+ 4.4   Cr 0.94   BUN 16   Phos n/a   Alb 3.3(12/4)   PAB n/a   Hgb 12.5 g/dL Hct 39.0 % HgA1C n/a     24Hr FS:287 mg/dL, IXQM=570,323, 298, WBC=18.61

## 2018-12-06 NOTE — PROGRESS NOTE ADULT - SUBJECTIVE AND OBJECTIVE BOX
INTERVAL HPI:   58 year old female with  COPD, Active tobacco abuse since age 13,  DM II, HTN, Hypothyroid, Anxiety and Obesity.  Presented  to ED due to cough and SOB - states worsening for past few days but has had cough for past few months. Denies any fever/chills and otherwise feeling of sore throat and ear congestion as well, brought in with O2 sat of 77% otherwise wheezing diffusely.     OVERNIGHT EVENTS:  Feels better today.    Vital Signs Last 24 Hrs  T(C): 36.6 (06 Dec 2018 11:10), Max: 37.2 (05 Dec 2018 17:04)  T(F): 97.8 (06 Dec 2018 11:10), Max: 99 (05 Dec 2018 17:04)  HR: 92 (06 Dec 2018 12:28) (84 - 112)  BP: 135/67 (06 Dec 2018 11:10) (106/60 - 158/77)  BP(mean): --  RR: 17 (06 Dec 2018 11:10) (16 - 18)  SpO2: 90% (06 Dec 2018 12:28) (90% - 96%)    PHYSICAL EXAM:  GEN:         Awake, responsive and comfortable.  HEENT:    Normal.    RESP:      no wheezing.  CVS:         Regular rate and rhythm.   ABD:         Soft, non-tender, non-distended;     MEDICATIONS  (STANDING):  ALBUTerol/ipratropium for Nebulization 3 milliLiter(s) Nebulizer every 6 hours  azithromycin   Tablet 250 milliGRAM(s) Oral daily  buDESOnide   0.5 milliGRAM(s) Respule 0.5 milliGRAM(s) Inhalation every 12 hours  clonazePAM Tablet 1 milliGRAM(s) Oral three times a day  dextrose 5%. 1000 milliLiter(s) (50 mL/Hr) IV Continuous <Continuous>  dextrose 50% Injectable 12.5 Gram(s) IV Push once  dextrose 50% Injectable 25 Gram(s) IV Push once  dextrose 50% Injectable 25 Gram(s) IV Push once  enoxaparin Injectable 40 milliGRAM(s) SubCutaneous daily  escitalopram 20 milliGRAM(s) Oral daily  fluticasone propionate 50 MICROgram(s)/spray Nasal Spray 1 Spray(s) Both Nostrils every 12 hours  gabapentin 600 milliGRAM(s) Oral three times a day  insulin lispro (HumaLOG) corrective regimen sliding scale   SubCutaneous three times a day before meals  insulin lispro (HumaLOG) corrective regimen sliding scale   SubCutaneous at bedtime  levothyroxine 175 MICROGram(s) Oral daily  metFORMIN 1000 milliGRAM(s) Oral two times a day  methylPREDNISolone sodium succinate Injectable 40 milliGRAM(s) IV Push every 8 hours  montelukast 10 milliGRAM(s) Oral daily  nicotine -  14 mG/24Hr(s) Patch 1 patch Transdermal daily  piperacillin/tazobactam IVPB. 3.375 Gram(s) IV Intermittent every 8 hours    MEDICATIONS  (PRN):  acetaminophen   Tablet .. 650 milliGRAM(s) Oral every 6 hours PRN Mild Pain (1 - 3), Moderate Pain (4 - 6)  benzonatate 200 milliGRAM(s) Oral every 8 hours PRN Cough  dextrose 40% Gel 15 Gram(s) Oral once PRN Blood Glucose LESS THAN 70 milliGRAM(s)/deciliter  glucagon  Injectable 1 milliGRAM(s) IntraMuscular once PRN Glucose LESS THAN 70 milligrams/deciliter  hydrOXYzine hydrochloride 50 milliGRAM(s) Oral every 8 hours PRN Itching    LABS:                        12.5   18.61 )-----------( 269      ( 06 Dec 2018 07:47 )             39.0     12-05    138  |  102  |  16  ----------------------------<  311<H>  4.4   |  29  |  0.94    Ca    8.4<L>      05 Dec 2018 08:10    12-06 @ 12:57  pH: 7.39  pCO2: 49  pO2: 74  SaO2: 94    ASSESSMENT AND PLAN:  ·	SOB with wheezing.  ·	Acute COPD exacerbation.  ·	Leukocytosis.  ·	Tobacco abuse.  ·	Obesity.  ·	Suspect MEENU  ·	HTN.  ·	DM  ·	Hypothyroidism  ·	Anxiety.    Continue antibiotics, nebulizer and steroids.  Smoking cessation.  PFT and sleep study out pt.  Weight reduction.  DVT prophylaxis.
INTERVAL HPI:  58 year old female with  COPD, Active tobacco abuse since age 13,  DM II, HTN, Hypothyroid, Anxiety and Obesity.  Presented  to ED due to cough and SOB - states worsening for past few days but has had cough for past few months. Denies any fever/chills and otherwise feeling of sore throat and ear congestion as well, brought in with O2 sat of 77% otherwise wheezing diffusely. (04 Dec 2018 14:14)    OVERNIGHT EVENTS:  Awake, still with cough.    Vital Signs Last 24 Hrs  T(C): 36.7 (05 Dec 2018 14:07), Max: 37.5 (05 Dec 2018 00:01)  T(F): 98.1 (05 Dec 2018 14:07), Max: 99.5 (05 Dec 2018 00:01)  HR: 95 (05 Dec 2018 18:01) (84 - 114)  BP: 115/69 (05 Dec 2018 18:01) (106/60 - 148/79)  BP(mean): --  RR: 16 (05 Dec 2018 14:07) (16 - 18)  SpO2: 93% (05 Dec 2018 14:07) (92% - 97%)    PHYSICAL EXAM:  GEN:         Awake, responsive and comfortable.  HEENT:    Normal.    RESP:        wheezing  CVS:           Regular rate and rhythm.   ABD:         Soft, non-tender, non-distended;     MEDICATIONS  (STANDING):  ALBUTerol/ipratropium for Nebulization 3 milliLiter(s) Nebulizer every 6 hours  azithromycin   Tablet 250 milliGRAM(s) Oral daily  buDESOnide   0.5 milliGRAM(s) Respule 0.5 milliGRAM(s) Inhalation every 12 hours  clonazePAM Tablet 1 milliGRAM(s) Oral three times a day  dextrose 5%. 1000 milliLiter(s) (50 mL/Hr) IV Continuous <Continuous>  dextrose 50% Injectable 12.5 Gram(s) IV Push once  dextrose 50% Injectable 25 Gram(s) IV Push once  dextrose 50% Injectable 25 Gram(s) IV Push once  enoxaparin Injectable 40 milliGRAM(s) SubCutaneous daily  escitalopram 20 milliGRAM(s) Oral daily  fluticasone propionate 50 MICROgram(s)/spray Nasal Spray 1 Spray(s) Both Nostrils every 12 hours  gabapentin 600 milliGRAM(s) Oral three times a day  insulin lispro (HumaLOG) corrective regimen sliding scale   SubCutaneous three times a day before meals  insulin lispro (HumaLOG) corrective regimen sliding scale   SubCutaneous at bedtime  levothyroxine 175 MICROGram(s) Oral daily  metFORMIN 1000 milliGRAM(s) Oral two times a day  methylPREDNISolone sodium succinate Injectable 40 milliGRAM(s) IV Push every 8 hours  montelukast 10 milliGRAM(s) Oral daily  nicotine -  14 mG/24Hr(s) Patch 1 patch Transdermal daily    MEDICATIONS  (PRN):  acetaminophen   Tablet .. 650 milliGRAM(s) Oral every 6 hours PRN Mild Pain (1 - 3), Moderate Pain (4 - 6)  benzonatate 200 milliGRAM(s) Oral every 8 hours PRN Cough  dextrose 40% Gel 15 Gram(s) Oral once PRN Blood Glucose LESS THAN 70 milliGRAM(s)/deciliter  glucagon  Injectable 1 milliGRAM(s) IntraMuscular once PRN Glucose LESS THAN 70 milligrams/deciliter  hydrOXYzine hydrochloride 50 milliGRAM(s) Oral every 8 hours PRN Itching    LABS:                        12.4   18.30 )-----------( 273      ( 05 Dec 2018 08:10 )             38.1     12-05    138  |  102  |  16  ----------------------------<  311<H>  4.4   |  29  |  0.94    Ca    8.4<L>      05 Dec 2018 08:10    TPro  8.0  /  Alb  3.3  /  TBili  0.3  /  DBili  x   /  AST  18  /  ALT  22  /  AlkPhos  136<H>  12-04    PT/INR - ( 04 Dec 2018 09:25 )   PT: 11.9 sec;   INR: 1.06 ratio      PTT - ( 04 Dec 2018 09:25 )  PTT:37.3 sec    ASSESSMENT AND PLAN:  ·	SOB with wheezing.  ·	Acute COPD exacerbation.  ·	Leukocytosis.  ·	Tobacco abuse.  ·	Obesity.  ·	Suspect MEENU  ·	HTN.  ·	DM  ·	Hypothyroidism  ·	Anxiety.    Procalcitonin is elevated, along with leukocytosis, so will broaden antibiotic coverage.   Continue nebulizer and steroids.  Smoking cessation.  PFT and sleep study out pt.  Weight reduction.  DVT prophylaxis.
Patient is a 58y old  Female who presents with a chief complaint of short of breath (04 Dec 2018 21:55)    INTERVAL HPI/OVERNIGHT EVENTS:  Reports continued SOB and cough with dyspnea.    MEDICATIONS  (STANDING):  ALBUTerol/ipratropium for Nebulization 3 milliLiter(s) Nebulizer every 6 hours  azithromycin   Tablet 250 milliGRAM(s) Oral daily  buDESOnide   0.5 milliGRAM(s) Respule 0.5 milliGRAM(s) Inhalation every 12 hours  dextrose 5%. 1000 milliLiter(s) (50 mL/Hr) IV Continuous <Continuous>  dextrose 50% Injectable 12.5 Gram(s) IV Push once  dextrose 50% Injectable 25 Gram(s) IV Push once  dextrose 50% Injectable 25 Gram(s) IV Push once  enoxaparin Injectable 40 milliGRAM(s) SubCutaneous daily  escitalopram 10 milliGRAM(s) Oral daily  fluticasone propionate 50 MICROgram(s)/spray Nasal Spray 1 Spray(s) Both Nostrils every 12 hours  gabapentin 600 milliGRAM(s) Oral three times a day  insulin lispro (HumaLOG) corrective regimen sliding scale   SubCutaneous three times a day before meals  insulin lispro (HumaLOG) corrective regimen sliding scale   SubCutaneous at bedtime  levothyroxine 175 MICROGram(s) Oral daily  metFORMIN 1000 milliGRAM(s) Oral two times a day  methylPREDNISolone sodium succinate Injectable 40 milliGRAM(s) IV Push every 8 hours  montelukast 10 milliGRAM(s) Oral daily  nicotine -  14 mG/24Hr(s) Patch 1 patch Transdermal daily    MEDICATIONS  (PRN):  benzonatate 200 milliGRAM(s) Oral every 8 hours PRN Cough  dextrose 40% Gel 15 Gram(s) Oral once PRN Blood Glucose LESS THAN 70 milliGRAM(s)/deciliter  glucagon  Injectable 1 milliGRAM(s) IntraMuscular once PRN Glucose LESS THAN 70 milligrams/deciliter  hydrOXYzine hydrochloride 50 milliGRAM(s) Oral every 8 hours PRN Itching    Allergies:  No Known Allergies    REVIEW OF SYSTEMS:  CONSTITUTIONAL: No fever, weight loss, or fatigue  EYES: No eye pain, visual disturbances, or discharge  ENMT:  No difficulty hearing, tinnitus, vertigo; No sinus or throat pain  NECK: No pain or stiffness  BREASTS: No pain, masses, or nipple discharge  RESPIRATORY: +cough, + wheezing, no chills or hemoptysis; + shortness of breath  CARDIOVASCULAR: No chest pain, palpitations, dizziness, or leg swelling  GASTROINTESTINAL: No abdominal or epigastric pain. No nausea, vomiting, or hematemesis; No diarrhea or constipation. No melena or hematochezia.  GENITOURINARY: No dysuria, frequency, hematuria, or incontinence  NEUROLOGICAL: No headaches, memory loss, loss of strength, numbness, or tremors  SKIN: No itching, burning, rashes, or lesions   LYMPH NODES: No enlarged glands  ENDOCRINE: No heat or cold intolerance; No hair loss  MUSCULOSKELETAL: No joint pain or swelling; No muscle, back, or extremity pain  PSYCHIATRIC: No depression, anxiety, mood swings, or difficulty sleeping  HEME/LYMPH: No easy bruising, or bleeding gums  ALLERGY AND IMMUNOLOGIC: No hives or eczema    Vital Signs Last 24 Hrs  T(C): 37.4 (05 Dec 2018 05:20), Max: 37.5 (05 Dec 2018 00:01)  T(F): 99.4 (05 Dec 2018 05:20), Max: 99.5 (05 Dec 2018 00:01)  HR: 91 (05 Dec 2018 05:20) (84 - 114)  BP: 124/67 (05 Dec 2018 05:20) (124/67 - 160/97)  BP(mean): --  RR: 18 (05 Dec 2018 05:20) (18 - 20)  SpO2: 97% (05 Dec 2018 05:20) (93% - 98%)  Daily     Daily   I&O's Summary    04 Dec 2018 07:01  -  05 Dec 2018 07:00  --------------------------------------------------------  IN: 200 mL / OUT: 0 mL / NET: 200 mL      CAPILLARY BLOOD GLUCOSE      POCT Blood Glucose.: 308 mg/dL (05 Dec 2018 08:00)  POCT Blood Glucose.: 318 mg/dL (04 Dec 2018 21:34)  POCT Blood Glucose.: 337 mg/dL (04 Dec 2018 16:13)    PHYSICAL EXAM:  GENERAL: NAD, well-groomed, well-developed  HEAD:  Atraumatic, Normocephalic  EYES: EOMI, PERRLA, conjunctiva and sclera clear  ENMT: No tonsillar erythema, exudates, or enlargement; Moist mucous membranes, Good dentition, No lesions  NECK: Supple, No JVD, Normal thyroid  NERVOUS SYSTEM:  Alert & Oriented X3, Good concentration; Motor Strength 5/5 B/L upper and lower extremities; DTRs 2+ intact and symmetric  CHEST/LUNG: + bilateral wheezing  HEART: Regular rate and rhythm; No murmurs, rubs, or gallops  ABDOMEN: obese, Soft, Nontender, Nondistended; Bowel sounds present  EXTREMITIES:  2+ Peripheral Pulses, No clubbing, cyanosis, or edema  LYMPH: No lymphadenopathy noted  SKIN: No rashes or lesions    LABS:                        12.4   18.30 )-----------( 273      ( 05 Dec 2018 08:10 )             38.1     12-05    138  |  102  |  16  ----------------------------<  311<H>  4.4   |  29  |  0.94    Ca    8.4<L>      05 Dec 2018 08:10    TPro  8.0  /  Alb  3.3  /  TBili  0.3  /  DBili  x   /  AST  18  /  ALT  22  /  AlkPhos  136<H>  12-04    CARDIAC MARKERS ( 04 Dec 2018 09:25 )  <.015 ng/mL / x     / 194 U/L / x     / 1.6 ng/mL    PT/INR - ( 04 Dec 2018 09:25 )   PT: 11.9 sec;   INR: 1.06 ratio    PTT - ( 04 Dec 2018 09:25 )  PTT:37.3 sec    Culture - Blood (collected 04 Dec 2018 09:55)  Source: .Blood Blood  Preliminary Report (05 Dec 2018 10:01):    No growth to date.    RADIOLOGY & ADDITIONAL TESTS:  < from: Xray Chest 1 View AP/PA. (12.04.18 @ 09:47) >  EXAM:  XR CHEST AP OR PA 1V                            PROCEDURE DATE:  12/04/2018          INTERPRETATION:  AP semierect chest on December 4, 2018 at 9:05 AM.   Patient has cough and is short of breath.    Heart appears normal in size.    The lungfields and pleural surfaces are unremarkable.    The chest is similar to November 15, 2017.    IMPRESSION: Negative chest.    DVT prophylaxis - Lovenox
Patient is a 58y old  Female who presents with a chief complaint of short of breath (05 Dec 2018 18:33)    INTERVAL HPI/OVERNIGHT EVENTS:  Reports feeling "50 times better" compared to yesterday.    MEDICATIONS  (STANDING):  ALBUTerol/ipratropium for Nebulization 3 milliLiter(s) Nebulizer every 6 hours  azithromycin   Tablet 250 milliGRAM(s) Oral daily  buDESOnide   0.5 milliGRAM(s) Respule 0.5 milliGRAM(s) Inhalation every 12 hours  clonazePAM Tablet 1 milliGRAM(s) Oral three times a day  dextrose 5%. 1000 milliLiter(s) (50 mL/Hr) IV Continuous <Continuous>  dextrose 50% Injectable 12.5 Gram(s) IV Push once  dextrose 50% Injectable 25 Gram(s) IV Push once  dextrose 50% Injectable 25 Gram(s) IV Push once  enoxaparin Injectable 40 milliGRAM(s) SubCutaneous daily  escitalopram 20 milliGRAM(s) Oral daily  fluticasone propionate 50 MICROgram(s)/spray Nasal Spray 1 Spray(s) Both Nostrils every 12 hours  gabapentin 600 milliGRAM(s) Oral three times a day  insulin lispro (HumaLOG) corrective regimen sliding scale   SubCutaneous three times a day before meals  insulin lispro (HumaLOG) corrective regimen sliding scale   SubCutaneous at bedtime  levothyroxine 175 MICROGram(s) Oral daily  metFORMIN 1000 milliGRAM(s) Oral two times a day  methylPREDNISolone sodium succinate Injectable 40 milliGRAM(s) IV Push every 8 hours  montelukast 10 milliGRAM(s) Oral daily  nicotine -  14 mG/24Hr(s) Patch 1 patch Transdermal daily  piperacillin/tazobactam IVPB. 3.375 Gram(s) IV Intermittent every 8 hours    MEDICATIONS  (PRN):  acetaminophen   Tablet .. 650 milliGRAM(s) Oral every 6 hours PRN Mild Pain (1 - 3), Moderate Pain (4 - 6)  benzonatate 200 milliGRAM(s) Oral every 8 hours PRN Cough  dextrose 40% Gel 15 Gram(s) Oral once PRN Blood Glucose LESS THAN 70 milliGRAM(s)/deciliter  glucagon  Injectable 1 milliGRAM(s) IntraMuscular once PRN Glucose LESS THAN 70 milligrams/deciliter  hydrOXYzine hydrochloride 50 milliGRAM(s) Oral every 8 hours PRN Itching    Allergies:  No Known Allergies    REVIEW OF SYSTEMS:  CONSTITUTIONAL: No fever, weight loss, or fatigue  EYES: No eye pain, visual disturbances, or discharge  ENMT:  No difficulty hearing, tinnitus, vertigo; No sinus or throat pain  NECK: No pain or stiffness  BREASTS: No pain, masses, or nipple discharge  RESPIRATORY: + cough, no wheezing, chills or hemoptysis; + shortness of breath  CARDIOVASCULAR: No chest pain, palpitations, dizziness, or leg swelling  GASTROINTESTINAL: No abdominal or epigastric pain. No nausea, vomiting, or hematemesis; No diarrhea or constipation. No melena or hematochezia.  GENITOURINARY: No dysuria, frequency, hematuria, or incontinence  NEUROLOGICAL: No headaches, memory loss, loss of strength, numbness, or tremors  SKIN: No itching, burning, rashes, or lesions   LYMPH NODES: No enlarged glands  ENDOCRINE: No heat or cold intolerance; No hair loss  MUSCULOSKELETAL: No joint pain or swelling; No muscle, back, or extremity pain  PSYCHIATRIC: No depression, anxiety, mood swings, or difficulty sleeping  HEME/LYMPH: No easy bruising, or bleeding gums  ALLERGY AND IMMUNOLOGIC: No hives or eczema    Vital Signs Last 24 Hrs  T(C): 36.8 (06 Dec 2018 06:10), Max: 37.2 (05 Dec 2018 17:04)  T(F): 98.2 (06 Dec 2018 06:10), Max: 99 (05 Dec 2018 17:04)  HR: 86 (06 Dec 2018 06:40) (86 - 112)  BP: 158/77 (06 Dec 2018 06:10) (106/60 - 158/77)  BP(mean): --  RR: 18 (06 Dec 2018 06:10) (16 - 18)  SpO2: 94% (06 Dec 2018 06:40) (91% - 96%)  Daily     Daily Weight in k.4 (06 Dec 2018 06:10)  I&O's Summary    CAPILLARY BLOOD GLUCOSE      POCT Blood Glucose.: 323 mg/dL (06 Dec 2018 08:30)  POCT Blood Glucose.: 298 mg/dL (05 Dec 2018 22:13)  POCT Blood Glucose.: 312 mg/dL (05 Dec 2018 16:30)  POCT Blood Glucose.: 399 mg/dL (05 Dec 2018 12:34)    PHYSICAL EXAM:  GENERAL: NAD, well-groomed, well-developed  HEAD:  Atraumatic, Normocephalic  EYES: EOMI, PERRLA, conjunctiva and sclera clear  ENMT: No tonsillar erythema, exudates, or enlargement; Moist mucous membranes, Good dentition, No lesions  NECK: Supple, No JVD, Normal thyroid  NERVOUS SYSTEM:  Alert & Oriented X3, Good concentration; Motor Strength 5/5 B/L upper and lower extremities; DTRs 2+ intact and symmetric  CHEST/LUNG: Clear to percussion bilaterally; No rales, rhonchi, wheezing, or rubs  HEART: Regular rate and rhythm; No murmurs, rubs, or gallops  ABDOMEN: Soft, Nontender, Nondistended; Bowel sounds present  EXTREMITIES:  2+ Peripheral Pulses, No clubbing, cyanosis, or edema  LYMPH: No lymphadenopathy noted  SKIN: No rashes or lesions    LABS:                        12.5   18.61 )-----------( 269      ( 06 Dec 2018 07:47 )             39.0     12-05    138  |  102  |  16  ----------------------------<  311<H>  4.4   |  29  |  0.94    Ca    8.4<L>      05 Dec 2018 08:10    Culture - Blood (collected 04 Dec 2018 14:57)  Source: .Blood Blood  Preliminary Report (05 Dec 2018 15:05):    No growth to date.    Culture - Blood (collected 04 Dec 2018 09:55)  Source: .Blood Blood  Preliminary Report (05 Dec 2018 10:01):    No growth to date.      DVT prophylaxis - Lovenox

## 2018-12-06 NOTE — DIETITIAN INITIAL EVALUATION ADULT. - NS AS NUTRI INTERV ED CONTENT
Purpose of the nutrition education/Provided meal planning with the plate method & ambulatory nutrition services outpatient directory/Priority modifications/Survival information/Recommended modifications Purpose of the nutrition education/Priority modifications/Survival information/Recommended modifications/Provided meal planning with the plate method & ambulatory nutrition services outpatient directory & fiber restricted nutrition therapy handout material

## 2018-12-06 NOTE — PROGRESS NOTE ADULT - PROBLEM SELECTOR PLAN 2
was on Metformin 1000 mg BID and Januvia 100 mg daily at home  now on Metformin and SSI coverage
was on Metformin 1000 mg BID and Januvia 100 mg daily at home  now on Metformin and SSI coverage

## 2018-12-06 NOTE — DIETITIAN INITIAL EVALUATION ADULT. - OTHER INFO
Pt seen for BMI>40 & TxoC1V=6.1%. Pt lives with son. Pt's son does food shopping & pt cooks; pt uses food stamps. Pt manages DM type 2 with Glimepiride 4mg daily, Metformin 1000mg daily & Metformin 1000mg daily & unable to SMBG due to unable to afford cost of strips & did not receive prescription from MD. Pt with hyperglycemia due to IV solumedrol. Pt with progressively worsening SOB, cough x 5-6 days. Pt consumed 50% breakfast & lunch today. Pt states hx Crohn's & colitis with hx diarrhea & occasional +Rt sided bd pain, however GI distress does not effect po appetite & po intake. Pt reports last BM x 1(12/5). Pt agrees to follow Low Fiber diet to improve GI distress associated with Crohn's & colitis.

## 2018-12-06 NOTE — DIETITIAN INITIAL EVALUATION ADULT. - ADHERENCE
NCS; pt states food insecurity & unable to afford fresh fruits or vegetables & healthier food items./fair

## 2018-12-06 NOTE — PROGRESS NOTE ADULT - PROBLEM SELECTOR PLAN 4
counseled on proper diet and exercise as well as weight loss strategies especially given diabetes
counseled on proper diet and exercise as well as weight loss strategies especially given diabetes

## 2018-12-06 NOTE — PROGRESS NOTE ADULT - ASSESSMENT
59 yo F with complaint of SOB, cough and dyspnea for past 5-6 days, progressively worsening.  Denies fever.  Received flu shot 2 months ago.  She continued to smoke and quit 2 days ago.
59 yo F with complaint of SOB, cough and dyspnea for past 5-6 days, progressively worsening.  Denies fever.  Received flu shot 2 months ago.  She continued to smoke and quit 3 days ago.

## 2018-12-06 NOTE — PROGRESS NOTE ADULT - ATTENDING COMMENTS
DC plan - Home when stable, possibly tomorrow if cleared by Pulmonary on PO steroids
DC plan - Home when stable

## 2018-12-06 NOTE — CHART NOTE - NSCHARTNOTEFT_GEN_A_CORE
Upon Nutritional Assessment by the Registered Dietitian your patient was determined to meet criteria / has evidence of the following diagnosis/diagnoses:          [ ]  Mild Protein Calorie Malnutrition        [ ]  Moderate Protein Calorie Malnutrition        [ ] Severe Protein Calorie Malnutrition        [ ] Unspecified Protein Calorie Malnutrition        [ ] Underweight / BMI <19        [x ] Morbid Obesity / BMI > 40      Findings as based on:  •  Comprehensive nutrition assessment and consultation  •  Calorie counts (nutrient intake analysis)  •  Food acceptance and intake status from observations by staff  •  Follow up  •  Patient education  •  Intervention secondary to interdisciplinary rounds  •   concerns      Treatment:    The following diet has been recommended:  Low fiber/[facility] no snack    PROVIDER Section:     By signing this assessment you are acknowledging and agree with the diagnosis/diagnoses assigned by the Registered Dietitian    Comments:

## 2018-12-07 ENCOUNTER — TRANSCRIPTION ENCOUNTER (OUTPATIENT)
Age: 58
End: 2018-12-07

## 2018-12-07 VITALS
DIASTOLIC BLOOD PRESSURE: 67 MMHG | OXYGEN SATURATION: 96 % | RESPIRATION RATE: 16 BRPM | TEMPERATURE: 98 F | HEART RATE: 97 BPM | SYSTOLIC BLOOD PRESSURE: 121 MMHG

## 2018-12-07 LAB
HCT VFR BLD CALC: 42.9 % — SIGNIFICANT CHANGE UP (ref 34.5–45)
HGB BLD-MCNC: 14.1 G/DL — SIGNIFICANT CHANGE UP (ref 11.5–15.5)
MCHC RBC-ENTMCNC: 30 PG — SIGNIFICANT CHANGE UP (ref 27–34)
MCHC RBC-ENTMCNC: 32.9 GM/DL — SIGNIFICANT CHANGE UP (ref 32–36)
MCV RBC AUTO: 91.3 FL — SIGNIFICANT CHANGE UP (ref 80–100)
NRBC # BLD: 0 /100 WBCS — SIGNIFICANT CHANGE UP (ref 0–0)
PLATELET # BLD AUTO: 281 K/UL — SIGNIFICANT CHANGE UP (ref 150–400)
RBC # BLD: 4.7 M/UL — SIGNIFICANT CHANGE UP (ref 3.8–5.2)
RBC # FLD: 13.1 % — SIGNIFICANT CHANGE UP (ref 10.3–14.5)
WBC # BLD: 20.72 K/UL — HIGH (ref 3.8–10.5)
WBC # FLD AUTO: 20.72 K/UL — HIGH (ref 3.8–10.5)

## 2018-12-07 PROCEDURE — 99239 HOSP IP/OBS DSCHRG MGMT >30: CPT

## 2018-12-07 RX ORDER — GLIMEPIRIDE 1 MG
1 TABLET ORAL
Qty: 0 | Refills: 0 | COMMUNITY

## 2018-12-07 RX ORDER — IPRATROPIUM/ALBUTEROL SULFATE 18-103MCG
3 AEROSOL WITH ADAPTER (GRAM) INHALATION
Qty: 300 | Refills: 0 | OUTPATIENT
Start: 2018-12-07

## 2018-12-07 RX ORDER — HYDROXYZINE HCL 10 MG
0 TABLET ORAL
Qty: 0 | Refills: 0 | COMMUNITY

## 2018-12-07 RX ORDER — NICOTINE POLACRILEX 2 MG
1 GUM BUCCAL
Qty: 30 | Refills: 0 | OUTPATIENT
Start: 2018-12-07

## 2018-12-07 RX ORDER — HYDROXYZINE HCL 10 MG
1 TABLET ORAL
Qty: 0 | Refills: 0 | COMMUNITY

## 2018-12-07 RX ADMIN — PIPERACILLIN AND TAZOBACTAM 25 GRAM(S): 4; .5 INJECTION, POWDER, LYOPHILIZED, FOR SOLUTION INTRAVENOUS at 05:26

## 2018-12-07 RX ADMIN — Medication 3 MILLILITER(S): at 11:47

## 2018-12-07 RX ADMIN — Medication 1 MILLIGRAM(S): at 05:25

## 2018-12-07 RX ADMIN — Medication 40 MILLIGRAM(S): at 14:07

## 2018-12-07 RX ADMIN — ESCITALOPRAM OXALATE 20 MILLIGRAM(S): 10 TABLET, FILM COATED ORAL at 11:58

## 2018-12-07 RX ADMIN — Medication 40 MILLIGRAM(S): at 05:24

## 2018-12-07 RX ADMIN — METFORMIN HYDROCHLORIDE 1000 MILLIGRAM(S): 850 TABLET ORAL at 08:25

## 2018-12-07 RX ADMIN — Medication 200 MILLIGRAM(S): at 05:24

## 2018-12-07 RX ADMIN — AZITHROMYCIN 250 MILLIGRAM(S): 500 TABLET, FILM COATED ORAL at 11:56

## 2018-12-07 RX ADMIN — Medication 1 PATCH: at 06:41

## 2018-12-07 RX ADMIN — Medication 1 PATCH: at 11:59

## 2018-12-07 RX ADMIN — Medication 10: at 11:55

## 2018-12-07 RX ADMIN — Medication 175 MICROGRAM(S): at 05:25

## 2018-12-07 RX ADMIN — MONTELUKAST 10 MILLIGRAM(S): 4 TABLET, CHEWABLE ORAL at 12:00

## 2018-12-07 RX ADMIN — Medication 10: at 08:24

## 2018-12-07 RX ADMIN — Medication 3 MILLILITER(S): at 05:38

## 2018-12-07 RX ADMIN — Medication 0.5 MILLIGRAM(S): at 05:38

## 2018-12-07 RX ADMIN — ENOXAPARIN SODIUM 40 MILLIGRAM(S): 100 INJECTION SUBCUTANEOUS at 11:57

## 2018-12-07 RX ADMIN — Medication 1 PATCH: at 12:01

## 2018-12-07 RX ADMIN — GABAPENTIN 600 MILLIGRAM(S): 400 CAPSULE ORAL at 05:25

## 2018-12-07 NOTE — DISCHARGE NOTE ADULT - NSFTFATTESTRESTRICTRD_GEN_ALL_CORE
Meng Pabon  1957  Date of Office Visit: 6/28/18  Encounter Provider: Han Nicholas MD  Place of Service: [facility] CARDIOLOGY    CHIEF COMPLAINT:   Chronic total occlusion of the LAD   stable angina     HISTORY OF PRESENT ILLNESS:  I had the pleasure of seeing Mr. Pabon back in follow up today. As you well know, he is a very pleasant 61-year-old gentleman with a medical history of gastroesophageal reflux disease, hypertension, hyperlipidemia, dyspnea on exertion with chronic stable angina. He had a positive treadmill stress test and had ischemia in his distal septal and apical walls. He was taken for coronary angiography and he had a  of the mid LAD followed by a tubular 99% stenosis in the mid segment that was seen by right to left collaterals. He had just moderate disease in his circumflex and PDA with just at most 50% stenosis. At that time, he had a CCS score of 2 and was placed on Imdur therapy.   Since our last visit he has been doing very well. He denies any dyspnea on exertion. He does state that when he walks up an incline at a brisk pace he will occasionally have chest discomfort that he describes as moderate in intensity. It is short in duration and lasts 2-3 minutes in duration and resolves with rest. Once he rests he is able to start walking again at a slower pace typically. He denies any chest pain when walking downhill or on flat ground. 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 He does not have any significant issues from what I can tell with that. His blood pressure has been well controlled. He has not followed with primary care and his last lab work was back in 07/2017 when he had a very mildly elevated LDL at 96.           Review of Systems   Constitution: Negative for fever, weakness and malaise/fatigue.   HENT: Negative for nosebleeds and sore throat.    Eyes: Negative for blurred vision and double  "vision.   Cardiovascular: Positive for chest pain. Negative for claudication, palpitations and syncope.   Respiratory: Negative for cough, shortness of breath and snoring.    Endocrine: Negative for cold intolerance, heat intolerance and polydipsia.   Skin: Negative for itching, poor wound healing and rash.   Musculoskeletal: Negative for joint pain, joint swelling, muscle weakness and myalgias.   Gastrointestinal: Negative for abdominal pain, melena, nausea and vomiting.   Neurological: Positive for light-headedness. Negative for loss of balance, seizures and vertigo.   Psychiatric/Behavioral: Negative for altered mental status and depression.          Past Medical History:   Diagnosis Date   • Atypical chest pain    • Basal cell carcinoma    • CAD (coronary artery disease)    • Chronic total occlusion of coronary artery    • GERD (gastroesophageal reflux disease)    • Hyperlipidemia    • Hypertension        The following portions of the patient's history were reviewed and updated as appropriate: Social history , Family history and Surgical history     Current Outpatient Prescriptions on File Prior to Visit   Medication Sig Dispense Refill   • aspirin 325 MG tablet Take 1 tablet by mouth daily.     • atenolol (TENORMIN) 50 MG tablet TAKE 1 AND 1/2 TABLETS BY MOUTH DAILY 135 tablet 0   • atorvastatin (LIPITOR) 40 MG tablet TAKE 1 TABLET BY MOUTH DAILY 90 tablet 1   • isosorbide mononitrate (IMDUR) 30 MG 24 hr tablet TAKE 1 TABLET BY MOUTH DAILY 90 tablet 0   • lisinopril (PRINIVIL,ZESTRIL) 20 MG tablet TAKE 1 TABLET BY MOUTH DAILY 30 tablet 5     No current facility-administered medications on file prior to visit.        No Known Allergies    Vitals:    06/28/18 1532   BP: 138/70   Pulse: 70   Weight: 104 kg (229 lb)   Height: 182.9 cm (72\")     Physical Exam   Constitutional: He is oriented to person, place, and time. He appears well-developed and well-nourished.   HENT:   Head: Normocephalic and atraumatic. "   Eyes: Conjunctivae and EOM are normal. No scleral icterus.   Neck: Normal range of motion. Neck supple. Normal carotid pulses, no hepatojugular reflux and no JVD present. Carotid bruit is not present. No tracheal deviation present. No thyromegaly present.   Cardiovascular: Normal rate and regular rhythm.  Exam reveals no gallop and no friction rub.    No murmur heard.  Pulses:       Carotid pulses are 2+ on the right side, and 2+ on the left side.       Radial pulses are 2+ on the right side, and 2+ on the left side.        Femoral pulses are 2+ on the right side, and 2+ on the left side.       Dorsalis pedis pulses are 2+ on the right side, and 2+ on the left side.        Posterior tibial pulses are 2+ on the right side, and 2+ on the left side.   Pulmonary/Chest: Breath sounds normal. No respiratory distress. He has no decreased breath sounds. He has no wheezes. He has no rhonchi. He has no rales. He exhibits no tenderness.   Abdominal: Soft. Bowel sounds are normal. He exhibits no distension. There is no tenderness. There is no rebound.   Musculoskeletal: He exhibits no edema or deformity.   Neurological: He is alert and oriented to person, place, and time. He has normal strength. No sensory deficit.   Skin: No rash noted. No erythema.   Psychiatric: He has a normal mood and affect. His behavior is normal.       No components found for: CBC  No results found for: CMP  No components found for: LIPID  No results found for: BMP      ECG 12 Lead  Date/Time: 6/28/2018 6:09 PM  Performed by: BENEDICT RICHMOND  Authorized by: BENEDICT RICHMOND   Comparison: compared with previous ECG from 5/18/2017  Similar to previous ECG  Rhythm: sinus rhythm  Rate: normal  QRS axis: normal  Clinical impression: normal ECG               DISCUSSION/SUMMARY 61-year-old gentleman with a medical history of essential hypertension, coronary disease with a chronic total occlusion of the mid left anterior descending, mild to  moderate disease in the posterior descending artery and a history of stable angina who presents back for follow-up.   Patient currently has angina only when he walks at a brisk pace up inclines.  He does fine with his normal day-to-day activities and is remaining pretty active.      1. Coronary artery disease with previously documented chronic total occlusion of the mid left anterior descending.  Still with CCS class II angina    -I would recommend continuation of his current medical regimen including lisinopril, Atorvatatin, and Imdur.  I have told him that hits his chest discomfort becomes more frequent he can  double down on his Imdur therapy as he is on a low-dose     -Continue Lipitor 40 mg.  Repeat CMP has been ordered along with lipid panel.     2. Essential hypertension.  Continue current medical regimen.  Well controlled.  3.   Hyperlipidemia: Repeat CMP and lipid panel.  On Lipitor.    I will see him back in six months or earlier with problems.      Coronary Artery Disease  Assessment  • The patient has CCS class II - angina only during vigorous physical activity  • The patient has stable angina     Plan  • Lifestyle modifications discussed include adhering to a heart healthy diet, avoidance of tobacco products, maintenance of a healthy weight, medication compliance, regular exercise and regular monitoring of cholesterol and blood pressure    Subjective - Objective  • Current antiplatelet therapy includes aspirin 81 mg       ATTENDING CERTIFICATION

## 2018-12-07 NOTE — DISCHARGE NOTE ADULT - CARE PROVIDER_API CALL
Mark Camara), Internal Medicine  [address]  [address]  [place] [address]  Phone: [phone]  Fax: [phone]    Faizan Claudio), Medicine  [address]  [address]  [place] [address]  Phone: [phone]  Fax: [phone]

## 2018-12-07 NOTE — DISCHARGE NOTE ADULT - PATIENT PORTAL LINK FT
You can access the [facility] Patient Portal, offered by [facility], by registering with the following website: [email or web address]

## 2018-12-07 NOTE — DISCHARGE NOTE ADULT - SECONDARY DIAGNOSIS.
Type 2 diabetes mellitus without complication, without long-term current use of insulin Smoker Obesity, Class II, BMI 35-39.9, with comorbidity Hypothyroidism, unspecified type Morbid obesity with BMI of 45.0-49.9, adult

## 2018-12-07 NOTE — DISCHARGE NOTE ADULT - MEDICATION SUMMARY - MEDICATIONS TO TAKE
I will START or STAY ON the medications listed below when I get home from the hospital:    predniSONE 10 mg oral tablet  -- 4 tabs - 40 mg by mouth once daily for 3 days then 3 tabs daily for 3 days then 2 tabs daily for 3 days then 1 tab daily for 3 days  -- It is very important that you take or use this exactly as directed.  Do not skip doses or discontinue unless directed by your doctor.  Obtain medical advice before taking any non-prescription drugs as some may affect the action of this medication.  Take with food or milk.    -- Indication: For COPD exacerbation    KlonoPIN 1 mg oral tablet  -- orally 3 times a day, As Needed  -- Indication: For Anxiety    gabapentin 600 mg oral tablet  -- orally 3 times a day  -- Indication: For Type 2 diabetes mellitus without complication, without long-term current use of insulin    escitalopram 10 mg oral tablet  -- 1 tab(s) by mouth once a day  -- Indication: For Anxiety    Lexapro 20 mg oral tablet  -- orally once a day  -- Indication: For Anxiety    Januvia 100 mg oral tablet  -- orally once a day  -- Indication: For Type 2 diabetes mellitus without complication, without long-term current use of insulin    metFORMIN 1000 mg oral tablet  -- orally 2 times a day  -- Indication: For Type 2 diabetes mellitus without complication, without long-term current use of insulin    cetirizine 10 mg oral tablet  -- Indication: For Allergy    benzonatate 100 mg oral capsule  -- 2 cap(s) by mouth every 8 hours, As needed, Cough  -- Indication: For COugh    hydrOXYzine hydrochloride 50 mg oral tablet  -- 1 tab(s) by mouth every 8 hours, As needed, Itching  -- Indication: For itch    ipratropium-albuterol 0.5 mg-2.5 mg/3 mLinhalation solution  -- 3 milliliter(s) inhaled every 6 hours, As Needed -for shortness of breath and/or wheezing   -- Indication: For COPD exacerbation    Ventolin HFA 90 mcg/inh inhalation aerosol  -- 2 puff(s) inhaled 4 times a day   -- For inhalation only.  It is very important that you take or use this exactly as directed.  Do not skip doses or discontinue unless directed by your doctor.  Obtain medical advice before taking any non-prescription drugs as some may affect the action of this medication.  Shake well before use.    -- Indication: For COPD exacerbation    Anoro Ellipta 62.5 mcg-25 mcg/inh inhalation powder  -- 1 puff(s) inhaled once a day  -- Indication: For COPD exacerbation    montelukast 10 mg oral tablet  -- 1 tab(s) by mouth once a day (at bedtime)   -- Indication: For COPD exacerbation    azithromycin 250 mg oral tablet  -- 1 tab(s) by mouth once a day  -- Indication: For COPD exacerbation    fluticasone 50 mcg/inh nasal spray  -- 1 spray(s) into nose once a day  -- Indication: For Allergy    nicotine 21 mg/24 hr transdermal film, extended release  -- 1 patch by transdermal patch once a day  -- Indication: For Smoker    levothyroxine 175 mcg (0.175 mg) oral tablet  -- Indication: For Hypothyroidism, unspecified type    Vitamin D3 1000 intl units oral capsule  -- 1 cap(s) by mouth once a day  -- Indication: For vit d

## 2018-12-07 NOTE — DISCHARGE NOTE ADULT - CARE PLAN
Principal Discharge DX:	COPD exacerbation  Goal:	quit smoking and prevent further worsening  Assessment and plan of treatment:	advised smoking cessation, compliance with medications and outpatient follow-up  Secondary Diagnosis:	Type 2 diabetes mellitus without complication, without long-term current use of insulin  Assessment and plan of treatment:	diet, medication compliance  Secondary Diagnosis:	Smoker  Secondary Diagnosis:	Obesity, Class II, BMI 35-39.9, with comorbidity  Assessment and plan of treatment:	strict diet, exercise and weight loss  Secondary Diagnosis:	Hypothyroidism, unspecified type Principal Discharge DX:	COPD exacerbation  Goal:	quit smoking and prevent further worsening  Assessment and plan of treatment:	advised smoking cessation, compliance with medications and outpatient follow-up  follow-up CBC within 1 week for normalization of elevated WBC  Secondary Diagnosis:	Type 2 diabetes mellitus without complication, without long-term current use of insulin  Assessment and plan of treatment:	diet, medication compliance  Secondary Diagnosis:	Smoker  Secondary Diagnosis:	Obesity, Class II, BMI 35-39.9, with comorbidity  Assessment and plan of treatment:	strict diet, exercise and weight loss  Secondary Diagnosis:	Hypothyroidism, unspecified type Principal Discharge DX:	COPD exacerbation  Goal:	quit smoking and prevent further worsening  Assessment and plan of treatment:	advised smoking cessation, compliance with medications and outpatient follow-up  follow-up CBC within 1 week for normalization of elevated WBC  needs home O2 (desaturated to 84% on ambulation) and Nebulizer for home  Secondary Diagnosis:	Type 2 diabetes mellitus without complication, without long-term current use of insulin  Assessment and plan of treatment:	diet, medication compliance  Secondary Diagnosis:	Smoker  Secondary Diagnosis:	Obesity, Class II, BMI 35-39.9, with comorbidity  Assessment and plan of treatment:	strict diet, exercise and weight loss  Secondary Diagnosis:	Hypothyroidism, unspecified type Principal Discharge DX:	COPD exacerbation  Goal:	quit smoking and prevent further worsening  Assessment and plan of treatment:	advised smoking cessation, compliance with medications and outpatient follow-up  follow-up CBC within 1 week for normalization of elevated WBC  needs home O2 (desaturated to 84% on ambulation) and Nebulizer for home  Secondary Diagnosis:	Type 2 diabetes mellitus without complication, without long-term current use of insulin  Assessment and plan of treatment:	diet, medication compliance  Secondary Diagnosis:	Smoker  Secondary Diagnosis:	Hypothyroidism, unspecified type  Secondary Diagnosis:	Morbid obesity with BMI of 45.0-49.9, adult  Assessment and plan of treatment:	strict diet, exercise and weight loss

## 2018-12-07 NOTE — DISCHARGE NOTE ADULT - PLAN OF CARE
quit smoking and prevent further worsening advised smoking cessation, compliance with medications and outpatient follow-up diet, medication compliance strict diet, exercise and weight loss advised smoking cessation, compliance with medications and outpatient follow-up  follow-up CBC within 1 week for normalization of elevated WBC advised smoking cessation, compliance with medications and outpatient follow-up  follow-up CBC within 1 week for normalization of elevated WBC  needs home O2 (desaturated to 84% on ambulation) and Nebulizer for home

## 2018-12-07 NOTE — DISCHARGE NOTE ADULT - HOSPITAL COURSE
57 yo F with complaint of SOB, cough and dyspnea for 5-6 days prior to admission, progressively worsening. Treated with IV Solumedrol, IV Zithromax and Zosyn and Nebulizer treatment with improvement in symptoms.  Stable for discharge home on steroid taper, Neb and MDI as well as Nicotine patch.  Needs follow-up with new PCP and Pulmonary in 3-4 days.

## 2018-12-08 RX ORDER — AZITHROMYCIN 500 MG/1
1 TABLET, FILM COATED ORAL
Qty: 1 | Refills: 0 | OUTPATIENT
Start: 2018-12-08

## 2018-12-09 LAB
CULTURE RESULTS: SIGNIFICANT CHANGE UP
CULTURE RESULTS: SIGNIFICANT CHANGE UP
SPECIMEN SOURCE: SIGNIFICANT CHANGE UP
SPECIMEN SOURCE: SIGNIFICANT CHANGE UP

## 2018-12-10 DIAGNOSIS — E03.9 HYPOTHYROIDISM, UNSPECIFIED: ICD-10-CM

## 2018-12-10 DIAGNOSIS — J44.1 CHRONIC OBSTRUCTIVE PULMONARY DISEASE WITH (ACUTE) EXACERBATION: ICD-10-CM

## 2018-12-10 DIAGNOSIS — Z72.0 TOBACCO USE: ICD-10-CM

## 2018-12-10 DIAGNOSIS — J40 BRONCHITIS, NOT SPECIFIED AS ACUTE OR CHRONIC: ICD-10-CM

## 2018-12-10 DIAGNOSIS — E11.9 TYPE 2 DIABETES MELLITUS WITHOUT COMPLICATIONS: ICD-10-CM

## 2018-12-10 DIAGNOSIS — F41.9 ANXIETY DISORDER, UNSPECIFIED: ICD-10-CM

## 2018-12-10 DIAGNOSIS — R06.02 SHORTNESS OF BREATH: ICD-10-CM

## 2018-12-10 DIAGNOSIS — D72.829 ELEVATED WHITE BLOOD CELL COUNT, UNSPECIFIED: ICD-10-CM

## 2018-12-10 DIAGNOSIS — E66.01 MORBID (SEVERE) OBESITY DUE TO EXCESS CALORIES: ICD-10-CM

## 2018-12-10 DIAGNOSIS — F17.210 NICOTINE DEPENDENCE, CIGARETTES, UNCOMPLICATED: ICD-10-CM

## 2018-12-10 DIAGNOSIS — I10 ESSENTIAL (PRIMARY) HYPERTENSION: ICD-10-CM

## 2018-12-10 DIAGNOSIS — G47.33 OBSTRUCTIVE SLEEP APNEA (ADULT) (PEDIATRIC): ICD-10-CM

## 2018-12-18 ENCOUNTER — INPATIENT (INPATIENT)
Facility: HOSPITAL | Age: 58
LOS: 3 days | Discharge: HOME HEALTH | End: 2018-12-22
Attending: INTERNAL MEDICINE | Admitting: INTERNAL MEDICINE
Payer: COMMERCIAL

## 2018-12-18 VITALS
OXYGEN SATURATION: 95 % | RESPIRATION RATE: 19 BRPM | HEART RATE: 107 BPM | TEMPERATURE: 99 F | DIASTOLIC BLOOD PRESSURE: 79 MMHG | WEIGHT: 240.08 LBS | SYSTOLIC BLOOD PRESSURE: 142 MMHG | HEIGHT: 61 IN

## 2018-12-18 DIAGNOSIS — F41.9 ANXIETY DISORDER, UNSPECIFIED: ICD-10-CM

## 2018-12-18 DIAGNOSIS — E11.9 TYPE 2 DIABETES MELLITUS WITHOUT COMPLICATIONS: ICD-10-CM

## 2018-12-18 DIAGNOSIS — N73.9 FEMALE PELVIC INFLAMMATORY DISEASE, UNSPECIFIED: ICD-10-CM

## 2018-12-18 DIAGNOSIS — E03.9 HYPOTHYROIDISM, UNSPECIFIED: ICD-10-CM

## 2018-12-18 DIAGNOSIS — F17.200 NICOTINE DEPENDENCE, UNSPECIFIED, UNCOMPLICATED: ICD-10-CM

## 2018-12-18 DIAGNOSIS — I10 ESSENTIAL (PRIMARY) HYPERTENSION: ICD-10-CM

## 2018-12-18 DIAGNOSIS — J44.9 CHRONIC OBSTRUCTIVE PULMONARY DISEASE, UNSPECIFIED: ICD-10-CM

## 2018-12-18 LAB
ACETONE SERPL-MCNC: NEGATIVE — SIGNIFICANT CHANGE UP
ALBUMIN SERPL ELPH-MCNC: 3.1 G/DL — LOW (ref 3.3–5)
ALP SERPL-CCNC: 116 U/L — SIGNIFICANT CHANGE UP (ref 40–120)
ALT FLD-CCNC: 16 U/L — SIGNIFICANT CHANGE UP (ref 12–78)
ANION GAP SERPL CALC-SCNC: 8 MMOL/L — SIGNIFICANT CHANGE UP (ref 5–17)
APPEARANCE UR: CLEAR — SIGNIFICANT CHANGE UP
APTT BLD: 33.3 SEC — SIGNIFICANT CHANGE UP (ref 28.5–37)
AST SERPL-CCNC: 6 U/L — LOW (ref 15–37)
BASOPHILS # BLD AUTO: 0.13 K/UL — SIGNIFICANT CHANGE UP (ref 0–0.2)
BASOPHILS NFR BLD AUTO: 0.5 % — SIGNIFICANT CHANGE UP (ref 0–2)
BILIRUB SERPL-MCNC: 0.4 MG/DL — SIGNIFICANT CHANGE UP (ref 0.2–1.2)
BILIRUB UR-MCNC: NEGATIVE — SIGNIFICANT CHANGE UP
BLD GP AB SCN SERPL QL: SIGNIFICANT CHANGE UP
BUN SERPL-MCNC: 12 MG/DL — SIGNIFICANT CHANGE UP (ref 7–23)
CALCIUM SERPL-MCNC: 9.1 MG/DL — SIGNIFICANT CHANGE UP (ref 8.5–10.1)
CHLORIDE SERPL-SCNC: 100 MMOL/L — SIGNIFICANT CHANGE UP (ref 96–108)
CO2 SERPL-SCNC: 27 MMOL/L — SIGNIFICANT CHANGE UP (ref 22–31)
COLOR SPEC: YELLOW — SIGNIFICANT CHANGE UP
CREAT SERPL-MCNC: 0.93 MG/DL — SIGNIFICANT CHANGE UP (ref 0.5–1.3)
DIFF PNL FLD: NEGATIVE — SIGNIFICANT CHANGE UP
EOSINOPHIL # BLD AUTO: 0.42 K/UL — SIGNIFICANT CHANGE UP (ref 0–0.5)
EOSINOPHIL NFR BLD AUTO: 1.7 % — SIGNIFICANT CHANGE UP (ref 0–6)
EPI CELLS # UR: SIGNIFICANT CHANGE UP
GLUCOSE BLDC GLUCOMTR-MCNC: 291 MG/DL — HIGH (ref 70–99)
GLUCOSE BLDC GLUCOMTR-MCNC: 463 MG/DL — CRITICAL HIGH (ref 70–99)
GLUCOSE BLDC GLUCOMTR-MCNC: 486 MG/DL — CRITICAL HIGH (ref 70–99)
GLUCOSE SERPL-MCNC: 348 MG/DL — HIGH (ref 70–99)
GLUCOSE UR QL: 1000 MG/DL
HCT VFR BLD CALC: 41.6 % — SIGNIFICANT CHANGE UP (ref 34.5–45)
HGB BLD-MCNC: 14 G/DL — SIGNIFICANT CHANGE UP (ref 11.5–15.5)
IMM GRANULOCYTES NFR BLD AUTO: 1.1 % — SIGNIFICANT CHANGE UP (ref 0–1.5)
INR BLD: 1.07 RATIO — SIGNIFICANT CHANGE UP (ref 0.88–1.16)
KETONES UR-MCNC: NEGATIVE — SIGNIFICANT CHANGE UP
LACTATE SERPL-SCNC: 2.5 MMOL/L — HIGH (ref 0.7–2)
LEUKOCYTE ESTERASE UR-ACNC: NEGATIVE — SIGNIFICANT CHANGE UP
LYMPHOCYTES # BLD AUTO: 12.4 % — LOW (ref 13–44)
LYMPHOCYTES # BLD AUTO: 3.06 K/UL — SIGNIFICANT CHANGE UP (ref 1–3.3)
MAGNESIUM SERPL-MCNC: 1.9 MG/DL — SIGNIFICANT CHANGE UP (ref 1.6–2.6)
MCHC RBC-ENTMCNC: 30.4 PG — SIGNIFICANT CHANGE UP (ref 27–34)
MCHC RBC-ENTMCNC: 33.7 GM/DL — SIGNIFICANT CHANGE UP (ref 32–36)
MCV RBC AUTO: 90.4 FL — SIGNIFICANT CHANGE UP (ref 80–100)
MONOCYTES # BLD AUTO: 1.6 K/UL — HIGH (ref 0–0.9)
MONOCYTES NFR BLD AUTO: 6.5 % — SIGNIFICANT CHANGE UP (ref 2–14)
NEUTROPHILS # BLD AUTO: 19.16 K/UL — HIGH (ref 1.8–7.4)
NEUTROPHILS NFR BLD AUTO: 77.8 % — HIGH (ref 43–77)
NITRITE UR-MCNC: NEGATIVE — SIGNIFICANT CHANGE UP
NRBC # BLD: 0 /100 WBCS — SIGNIFICANT CHANGE UP (ref 0–0)
PH UR: 5 — SIGNIFICANT CHANGE UP (ref 5–8)
PLATELET # BLD AUTO: 270 K/UL — SIGNIFICANT CHANGE UP (ref 150–400)
POTASSIUM SERPL-MCNC: 4.5 MMOL/L — SIGNIFICANT CHANGE UP (ref 3.5–5.3)
POTASSIUM SERPL-SCNC: 4.5 MMOL/L — SIGNIFICANT CHANGE UP (ref 3.5–5.3)
PROT SERPL-MCNC: 7.6 GM/DL — SIGNIFICANT CHANGE UP (ref 6–8.3)
PROT UR-MCNC: 15 MG/DL
PROTHROM AB SERPL-ACNC: 12 SEC — SIGNIFICANT CHANGE UP (ref 10–12.9)
RBC # BLD: 4.6 M/UL — SIGNIFICANT CHANGE UP (ref 3.8–5.2)
RBC # FLD: 13.8 % — SIGNIFICANT CHANGE UP (ref 10.3–14.5)
SODIUM SERPL-SCNC: 135 MMOL/L — SIGNIFICANT CHANGE UP (ref 135–145)
SP GR SPEC: 1.01 — SIGNIFICANT CHANGE UP (ref 1.01–1.02)
UROBILINOGEN FLD QL: NEGATIVE MG/DL — SIGNIFICANT CHANGE UP
WBC # BLD: 24.64 K/UL — HIGH (ref 3.8–10.5)
WBC # FLD AUTO: 24.64 K/UL — HIGH (ref 3.8–10.5)
WBC UR QL: SIGNIFICANT CHANGE UP

## 2018-12-18 PROCEDURE — 99285 EMERGENCY DEPT VISIT HI MDM: CPT | Mod: 25

## 2018-12-18 PROCEDURE — 76700 US EXAM ABDOM COMPLETE: CPT | Mod: 26

## 2018-12-18 PROCEDURE — 10060 I&D ABSCESS SIMPLE/SINGLE: CPT

## 2018-12-18 PROCEDURE — 74177 CT ABD & PELVIS W/CONTRAST: CPT | Mod: 26

## 2018-12-18 RX ORDER — INSULIN LISPRO 100/ML
VIAL (ML) SUBCUTANEOUS AT BEDTIME
Qty: 0 | Refills: 0 | Status: DISCONTINUED | OUTPATIENT
Start: 2018-12-18 | End: 2018-12-22

## 2018-12-18 RX ORDER — SODIUM CHLORIDE 9 MG/ML
1000 INJECTION, SOLUTION INTRAVENOUS
Qty: 0 | Refills: 0 | Status: DISCONTINUED | OUTPATIENT
Start: 2018-12-18 | End: 2018-12-22

## 2018-12-18 RX ORDER — CLONAZEPAM 1 MG
1 TABLET ORAL THREE TIMES A DAY
Qty: 0 | Refills: 0 | Status: DISCONTINUED | OUTPATIENT
Start: 2018-12-18 | End: 2018-12-22

## 2018-12-18 RX ORDER — IPRATROPIUM/ALBUTEROL SULFATE 18-103MCG
3 AEROSOL WITH ADAPTER (GRAM) INHALATION EVERY 6 HOURS
Qty: 0 | Refills: 0 | Status: DISCONTINUED | OUTPATIENT
Start: 2018-12-18 | End: 2018-12-22

## 2018-12-18 RX ORDER — METFORMIN HYDROCHLORIDE 850 MG/1
1000 TABLET ORAL
Qty: 0 | Refills: 0 | Status: DISCONTINUED | OUTPATIENT
Start: 2018-12-18 | End: 2018-12-22

## 2018-12-18 RX ORDER — KETOROLAC TROMETHAMINE 30 MG/ML
15 SYRINGE (ML) INJECTION ONCE
Qty: 0 | Refills: 0 | Status: DISCONTINUED | OUTPATIENT
Start: 2018-12-18 | End: 2018-12-18

## 2018-12-18 RX ORDER — CEFAZOLIN SODIUM 1 G
2000 VIAL (EA) INJECTION EVERY 8 HOURS
Qty: 0 | Refills: 0 | Status: DISCONTINUED | OUTPATIENT
Start: 2018-12-19 | End: 2018-12-22

## 2018-12-18 RX ORDER — MORPHINE SULFATE 50 MG/1
4 CAPSULE, EXTENDED RELEASE ORAL ONCE
Qty: 0 | Refills: 0 | Status: DISCONTINUED | OUTPATIENT
Start: 2018-12-18 | End: 2018-12-18

## 2018-12-18 RX ORDER — SODIUM CHLORIDE 9 MG/ML
1000 INJECTION INTRAMUSCULAR; INTRAVENOUS; SUBCUTANEOUS ONCE
Qty: 0 | Refills: 0 | Status: COMPLETED | OUTPATIENT
Start: 2018-12-18 | End: 2018-12-18

## 2018-12-18 RX ORDER — INSULIN LISPRO 100/ML
VIAL (ML) SUBCUTANEOUS
Qty: 0 | Refills: 0 | Status: DISCONTINUED | OUTPATIENT
Start: 2018-12-18 | End: 2018-12-19

## 2018-12-18 RX ORDER — NICOTINE POLACRILEX 2 MG
1 GUM BUCCAL DAILY
Qty: 0 | Refills: 0 | Status: DISCONTINUED | OUTPATIENT
Start: 2018-12-18 | End: 2018-12-22

## 2018-12-18 RX ORDER — GLUCAGON INJECTION, SOLUTION 0.5 MG/.1ML
1 INJECTION, SOLUTION SUBCUTANEOUS ONCE
Qty: 0 | Refills: 0 | Status: DISCONTINUED | OUTPATIENT
Start: 2018-12-18 | End: 2018-12-22

## 2018-12-18 RX ORDER — ENOXAPARIN SODIUM 100 MG/ML
40 INJECTION SUBCUTANEOUS DAILY
Qty: 0 | Refills: 0 | Status: DISCONTINUED | OUTPATIENT
Start: 2018-12-18 | End: 2018-12-22

## 2018-12-18 RX ORDER — CEFAZOLIN SODIUM 1 G
VIAL (EA) INJECTION
Qty: 0 | Refills: 0 | Status: DISCONTINUED | OUTPATIENT
Start: 2018-12-18 | End: 2018-12-22

## 2018-12-18 RX ORDER — DEXTROSE 50 % IN WATER 50 %
25 SYRINGE (ML) INTRAVENOUS ONCE
Qty: 0 | Refills: 0 | Status: DISCONTINUED | OUTPATIENT
Start: 2018-12-18 | End: 2018-12-22

## 2018-12-18 RX ORDER — LOSARTAN POTASSIUM 100 MG/1
50 TABLET, FILM COATED ORAL DAILY
Qty: 0 | Refills: 0 | Status: DISCONTINUED | OUTPATIENT
Start: 2018-12-18 | End: 2018-12-22

## 2018-12-18 RX ORDER — ESCITALOPRAM OXALATE 10 MG/1
10 TABLET, FILM COATED ORAL DAILY
Qty: 0 | Refills: 0 | Status: DISCONTINUED | OUTPATIENT
Start: 2018-12-18 | End: 2018-12-22

## 2018-12-18 RX ORDER — CEFAZOLIN SODIUM 1 G
2000 VIAL (EA) INJECTION ONCE
Qty: 0 | Refills: 0 | Status: COMPLETED | OUTPATIENT
Start: 2018-12-18 | End: 2018-12-18

## 2018-12-18 RX ORDER — DEXTROSE 50 % IN WATER 50 %
12.5 SYRINGE (ML) INTRAVENOUS ONCE
Qty: 0 | Refills: 0 | Status: DISCONTINUED | OUTPATIENT
Start: 2018-12-18 | End: 2018-12-22

## 2018-12-18 RX ORDER — PIPERACILLIN AND TAZOBACTAM 4; .5 G/20ML; G/20ML
3.38 INJECTION, POWDER, LYOPHILIZED, FOR SOLUTION INTRAVENOUS ONCE
Qty: 0 | Refills: 0 | Status: COMPLETED | OUTPATIENT
Start: 2018-12-18 | End: 2018-12-18

## 2018-12-18 RX ORDER — ACETAMINOPHEN 500 MG
975 TABLET ORAL ONCE
Qty: 0 | Refills: 0 | Status: COMPLETED | OUTPATIENT
Start: 2018-12-18 | End: 2018-12-18

## 2018-12-18 RX ORDER — FLUTICASONE PROPIONATE 50 MCG
1 SPRAY, SUSPENSION NASAL
Qty: 0 | Refills: 0 | Status: DISCONTINUED | OUTPATIENT
Start: 2018-12-18 | End: 2018-12-22

## 2018-12-18 RX ORDER — ATORVASTATIN CALCIUM 80 MG/1
10 TABLET, FILM COATED ORAL AT BEDTIME
Qty: 0 | Refills: 0 | Status: DISCONTINUED | OUTPATIENT
Start: 2018-12-18 | End: 2018-12-22

## 2018-12-18 RX ORDER — OXYCODONE AND ACETAMINOPHEN 5; 325 MG/1; MG/1
1 TABLET ORAL ONCE
Qty: 0 | Refills: 0 | Status: DISCONTINUED | OUTPATIENT
Start: 2018-12-18 | End: 2018-12-18

## 2018-12-18 RX ORDER — LEVOTHYROXINE SODIUM 125 MCG
175 TABLET ORAL DAILY
Qty: 0 | Refills: 0 | Status: DISCONTINUED | OUTPATIENT
Start: 2018-12-18 | End: 2018-12-22

## 2018-12-18 RX ORDER — DEXTROSE 50 % IN WATER 50 %
15 SYRINGE (ML) INTRAVENOUS ONCE
Qty: 0 | Refills: 0 | Status: DISCONTINUED | OUTPATIENT
Start: 2018-12-18 | End: 2018-12-22

## 2018-12-18 RX ORDER — LACTOBACILLUS ACIDOPHILUS 100MM CELL
1 CAPSULE ORAL
Qty: 0 | Refills: 0 | Status: DISCONTINUED | OUTPATIENT
Start: 2018-12-18 | End: 2018-12-22

## 2018-12-18 RX ORDER — GABAPENTIN 400 MG/1
600 CAPSULE ORAL THREE TIMES A DAY
Qty: 0 | Refills: 0 | Status: DISCONTINUED | OUTPATIENT
Start: 2018-12-18 | End: 2018-12-22

## 2018-12-18 RX ADMIN — MORPHINE SULFATE 4 MILLIGRAM(S): 50 CAPSULE, EXTENDED RELEASE ORAL at 19:01

## 2018-12-18 RX ADMIN — GABAPENTIN 600 MILLIGRAM(S): 400 CAPSULE ORAL at 21:29

## 2018-12-18 RX ADMIN — MORPHINE SULFATE 4 MILLIGRAM(S): 50 CAPSULE, EXTENDED RELEASE ORAL at 09:19

## 2018-12-18 RX ADMIN — SODIUM CHLORIDE 1000 MILLILITER(S): 9 INJECTION INTRAMUSCULAR; INTRAVENOUS; SUBCUTANEOUS at 12:21

## 2018-12-18 RX ADMIN — Medication 975 MILLIGRAM(S): at 12:20

## 2018-12-18 RX ADMIN — Medication 15 MILLIGRAM(S): at 12:21

## 2018-12-18 RX ADMIN — Medication 100 MILLIGRAM(S): at 09:19

## 2018-12-18 RX ADMIN — Medication 100 MILLIGRAM(S): at 21:29

## 2018-12-18 RX ADMIN — PIPERACILLIN AND TAZOBACTAM 200 GRAM(S): 4; .5 INJECTION, POWDER, LYOPHILIZED, FOR SOLUTION INTRAVENOUS at 18:16

## 2018-12-18 RX ADMIN — Medication 3 MILLILITER(S): at 23:55

## 2018-12-18 RX ADMIN — SODIUM CHLORIDE 1000 MILLILITER(S): 9 INJECTION INTRAMUSCULAR; INTRAVENOUS; SUBCUTANEOUS at 09:19

## 2018-12-18 RX ADMIN — ATORVASTATIN CALCIUM 10 MILLIGRAM(S): 80 TABLET, FILM COATED ORAL at 21:29

## 2018-12-18 RX ADMIN — Medication 4: at 23:27

## 2018-12-18 RX ADMIN — MORPHINE SULFATE 4 MILLIGRAM(S): 50 CAPSULE, EXTENDED RELEASE ORAL at 18:16

## 2018-12-18 RX ADMIN — Medication 100 MILLIGRAM(S): at 19:29

## 2018-12-18 NOTE — H&P ADULT - ASSESSMENT
IMPROVE VTE Individual Risk Assessment    RISK                                                                Points    [  ] Previous VTE                                                  3    [  ] Thrombophilia                                               2    [  ] Lower limb paralysis                                      2        (unable to hold up >15 seconds)      [  ] Current Cancer                                              2         (within 6 months)    [  ] Immobilization > 24 hrs                                1    [  ] ICU/CCU stay > 24 hours                              1    [  ] Age > 60                                                      1    IMPROVE VTE Score ____0  ____ given  secondary  to  morbid  obesity  copd  pelvic  abscess

## 2018-12-18 NOTE — ED ADULT NURSE NOTE - NSIMPLEMENTINTERV_GEN_ALL_ED
Implemented All Universal Safety Interventions:  [place] to call system. Call bell, personal items and telephone within reach. Instruct patient to call for assistance. Room bathroom lighting operational. Non-slip footwear when patient is off stretcher. Physically safe environment: no spills, clutter or unnecessary equipment. Stretcher in lowest position, wheels locked, appropriate side rails in place.

## 2018-12-18 NOTE — ED PROVIDER NOTE - ATTENDING CONTRIBUTION TO CARE
Agree with Resident Dr Fay  In brief, patient with morbid obesity and dm pw pelvic abscess. on exam, febrile. large area of thickened cellulitis with deep tenderness and induration. ct shows pelvic abscess. drained by me at bedside. admit for abx.

## 2018-12-18 NOTE — H&P ADULT - HISTORY OF PRESENT ILLNESS
patient  reports worsening  lower  abdominal  pain  for  several  dys  evaluated  in  eER  found  to  have  pelvic  abscess  incised  drained  by  ER  MD  admitted  because  of  comorbidities  DM  COPD  Morbid  Obesty  and  leucocyrotisis of  24,000

## 2018-12-18 NOTE — H&P ADULT - NSHPPHYSICALEXAM_GEN_ALL_CORE
PHYSICAL EXAM:    GENERAL: NAD, morbidly  obese  HEAD:  Atraumatic, Normocephalic  EYES: EOMI, PERRLA, conjunctiva and sclera clear  ENMT: No tonsillar erythema, exudates, or enlargement; Moist mucous membranes, , No lesions  NECK: Supple, No JVD, Normal thyroid  NERVOUS SYSTEM:  Alert & Oriented X4, ; Motor Strength 5/5 B/L upper and lower extremities; DTRs 2+ intact and symmetric  CHEST/LUNG: Clear  bilaterally; No rales, rhonchi, wheezing, or rubs  HEART: Regular rate and rhythm; No murmurs, rubs, or gallops  ABDOMEN: Soft, Nontender, Nondistended; no  masses Bowel sounds present  EXTREMITIES:  + Peripheral Pulses, No clubbing, cyanosis, or edema  LYMPH: No lymphadenopathy noted   GEN  hard  erythematos  mass  suprapbic  area  RECTAL: deferred

## 2018-12-18 NOTE — H&P ADULT - NSHPLABSRESULTS_GEN_ALL_CORE
LABS:                        14.0   24.64 )-----------( 270      ( 18 Dec 2018 09:14 )             41.6         135  |  100  |  12  ----------------------------<  348<H>  4.5   |  27  |  0.93    Ca    9.1      18 Dec 2018 09:14  Mg     1.9         TPro  7.6  /  Alb  3.1<L>  /  TBili  0.4  /  DBili  x   /  AST  6<L>  /  ALT  16  /  AlkPhos  116      PT/INR - ( 18 Dec 2018 13:48 )   PT: 12.0 sec;   INR: 1.07 ratio         PTT - ( 18 Dec 2018 13:48 )  PTT:33.3 sec  Urinalysis Basic - ( 18 Dec 2018 12:42 )    Color: Yellow / Appearance: Clear / S.010 / pH: x  Gluc: x / Ketone: Negative  / Bili: Negative / Urobili: Negative mg/dL   Blood: x / Protein: 15 mg/dL / Nitrite: Negative   Leuk Esterase: Negative / RBC: x / WBC 0-2   Sq Epi: x / Non Sq Epi: Few / Bacteria: x      < from: CT Abdomen and Pelvis w/ IV Cont (18 @ 13:43) >      INTERPRETATION:  CT of the abdomen and pelvis with IV contrast    Clinical Indication: pelvic abscess    Technique: Axial multidetector CT images of theabdomen and pelvis are   acquired following the administration of oral and IV contrast (95 cc   Omnipaque-350 administered, 5 cc discarded).    Comparison: 2018.    Findings: Limited sections through the lung bases demonstrate small   bilateral atelectasis.    Hepatic steatosis, unchanged. Gallstones are again seen in the   gallbladder. Hyperdensity in the gallbladder fundus may represent sludge;   however, a gallbladder mass/cancer cannot be excluded. The common bile   duct is mildly dilated at 8 mm in caliber. The pancreas, spleen, kidneys   and the right adrenal appear unremarkable. No evidence for a ureteral   calculus. No hydronephrosis. Stable nonspecific left adrenal thickening.    Small fat-containing periumbilical hernia.    Theappendix appears normal. No bowel obstruction or grossly thickened   bowel wall. No evidence for free air, or ascites. Enlarged 1.3 cm right   external iliac chain lymph node. Mildly enlarged 1.2 cm right inguinal   lymph node.    The urinary bladderand uterus appear unremarkable. There is a 4.9 cm   hypodense lesion in the left adnexal region, grossly unchanged,   suggestive of a left ovarian cyst.    There is a subcutaneous soft tissue stranding in the right groin.   Apparent 3.3 x 2.4 x 4.0 cm subcutaneous fluid collection in the right   groin with an enhancing wall, suggestive of an abscess.    Impression: Hepatic steatosis.    Cholelithiasis. Hyperdensity in the gallbladder fundus may represent   sludge; however, a gallbladder mass/cancer cannot be excluded. Mildly   dilated common bile duct. If clinically indicated, abdominal MR without   and with IV contrast including MRCP may be pursued for further evaluation.    Mildly enlarged lymph nodes at the right external iliac chain and the   right groin.    4.9 cm hypodense lesion in the left adnexal region, grossly unchanged,   suggestive of a left ovarian cyst.    Apparent 3.3 x 2.4 x 4.0 cm subcutaneous fluid collection in the right   groin with an enhancing wall and adjacent stranding, suggestive of an   abscess.      < end of copied text >

## 2018-12-18 NOTE — ED PROVIDER NOTE - MEDICAL DECISION MAKING DETAILS
patient pw pelvic abscess. will evaluate for the depth of the abscess and the extent to which it can be incised at bedside

## 2018-12-18 NOTE — ED PROVIDER NOTE - OBJECTIVE STATEMENT
Pertinent PMH/PSH/FHx/SHx and Review of Systems contained within:  58F with recent admission for copd, hx morbid obesity, hypothyroidism and smoker pw pelvic abscess. patient notes she has developed swelling in the upper vaginal area for several days. associated with pain, discomfort and difficulty urinating 2/2 pressure. she has no nausea, vomiting, fever, chills. she notes her blood sugars have been high. respirations have improved since that time and she is stable on her home o2. there is no diarrhea, dysuria, rash, bleeding, ha, vision loss, rhinorrhea. she hasn't taken anything for her symptoms   Fh and Sh not otherwise contributory  ROS otherwise negative

## 2018-12-18 NOTE — ED PROVIDER NOTE - PHYSICAL EXAMINATION
Gen: Alert, NAD, on oxygen  Head: NC, AT   Eyes: PERRL, EOMI, normal lids/conjunctiva  ENT: normal hearing, patent oropharynx without erythema/exudate, uvula midline  Neck: supple, no tenderness, Trachea midline  Pulm: Bilateral BS, normal resp effort, no wheeze/stridor/retractions  CV: RRR, no M/R/G, 2+ radial and dp pulses bl, no edema  Abd: soft, obese NT/ND, +BS, no hepatosplenomegaly  Mskel: extremities x4 with normal ROM and no joint effusions. no ctl spine ttp.   Skin: no rash, no bruising   Neuro: AAOx3, no sensory/motor deficits, CN 2-12 intact  : large 10 x 10cm area of induration and swelling in the suprapubic region

## 2018-12-18 NOTE — ED ADULT TRIAGE NOTE - CHIEF COMPLAINT QUOTE
patient BIBA , c/o of vaginal pain , patient stated " I have an abscess on my vagina " , patient denied chest pain , denied difficulty breathing , patient on oxygen at home 2 L NC

## 2018-12-19 LAB
ALBUMIN SERPL ELPH-MCNC: 3.1 G/DL — LOW (ref 3.3–5)
ALP SERPL-CCNC: 110 U/L — SIGNIFICANT CHANGE UP (ref 40–120)
ALT FLD-CCNC: 14 U/L — SIGNIFICANT CHANGE UP (ref 12–78)
ANION GAP SERPL CALC-SCNC: 8 MMOL/L — SIGNIFICANT CHANGE UP (ref 5–17)
AST SERPL-CCNC: 13 U/L — LOW (ref 15–37)
BILIRUB SERPL-MCNC: 0.6 MG/DL — SIGNIFICANT CHANGE UP (ref 0.2–1.2)
BUN SERPL-MCNC: 9 MG/DL — SIGNIFICANT CHANGE UP (ref 7–23)
CALCIUM SERPL-MCNC: 8.8 MG/DL — SIGNIFICANT CHANGE UP (ref 8.5–10.1)
CHLORIDE SERPL-SCNC: 96 MMOL/L — SIGNIFICANT CHANGE UP (ref 96–108)
CO2 SERPL-SCNC: 29 MMOL/L — SIGNIFICANT CHANGE UP (ref 22–31)
CREAT SERPL-MCNC: 1 MG/DL — SIGNIFICANT CHANGE UP (ref 0.5–1.3)
ERYTHROCYTE [SEDIMENTATION RATE] IN BLOOD: 67 MM/HR — HIGH (ref 0–20)
GLUCOSE BLDC GLUCOMTR-MCNC: 368 MG/DL — HIGH (ref 70–99)
GLUCOSE BLDC GLUCOMTR-MCNC: 378 MG/DL — HIGH (ref 70–99)
GLUCOSE BLDC GLUCOMTR-MCNC: 380 MG/DL — HIGH (ref 70–99)
GLUCOSE BLDC GLUCOMTR-MCNC: 411 MG/DL — HIGH (ref 70–99)
GLUCOSE BLDC GLUCOMTR-MCNC: 458 MG/DL — CRITICAL HIGH (ref 70–99)
GLUCOSE BLDC GLUCOMTR-MCNC: 483 MG/DL — CRITICAL HIGH (ref 70–99)
GLUCOSE SERPL-MCNC: 360 MG/DL — HIGH (ref 70–99)
HCT VFR BLD CALC: 39.7 % — SIGNIFICANT CHANGE UP (ref 34.5–45)
HGB BLD-MCNC: 13 G/DL — SIGNIFICANT CHANGE UP (ref 11.5–15.5)
LACTATE SERPL-SCNC: 1.6 MMOL/L — SIGNIFICANT CHANGE UP (ref 0.7–2)
MCHC RBC-ENTMCNC: 30.1 PG — SIGNIFICANT CHANGE UP (ref 27–34)
MCHC RBC-ENTMCNC: 32.7 GM/DL — SIGNIFICANT CHANGE UP (ref 32–36)
MCV RBC AUTO: 91.9 FL — SIGNIFICANT CHANGE UP (ref 80–100)
NRBC # BLD: 0 /100 WBCS — SIGNIFICANT CHANGE UP (ref 0–0)
PLATELET # BLD AUTO: 271 K/UL — SIGNIFICANT CHANGE UP (ref 150–400)
POTASSIUM SERPL-MCNC: 4.2 MMOL/L — SIGNIFICANT CHANGE UP (ref 3.5–5.3)
POTASSIUM SERPL-SCNC: 4.2 MMOL/L — SIGNIFICANT CHANGE UP (ref 3.5–5.3)
PROT SERPL-MCNC: 7.8 GM/DL — SIGNIFICANT CHANGE UP (ref 6–8.3)
RBC # BLD: 4.32 M/UL — SIGNIFICANT CHANGE UP (ref 3.8–5.2)
RBC # FLD: 13.7 % — SIGNIFICANT CHANGE UP (ref 10.3–14.5)
SODIUM SERPL-SCNC: 133 MMOL/L — LOW (ref 135–145)
WBC # BLD: 21.6 K/UL — HIGH (ref 3.8–10.5)
WBC # FLD AUTO: 21.6 K/UL — HIGH (ref 3.8–10.5)

## 2018-12-19 RX ORDER — OXYCODONE HYDROCHLORIDE 5 MG/1
5 TABLET ORAL
Qty: 0 | Refills: 0 | Status: DISCONTINUED | OUTPATIENT
Start: 2018-12-19 | End: 2018-12-22

## 2018-12-19 RX ORDER — INSULIN GLARGINE 100 [IU]/ML
5 INJECTION, SOLUTION SUBCUTANEOUS AT BEDTIME
Qty: 0 | Refills: 0 | Status: DISCONTINUED | OUTPATIENT
Start: 2018-12-19 | End: 2018-12-20

## 2018-12-19 RX ORDER — ACETAMINOPHEN 500 MG
650 TABLET ORAL EVERY 6 HOURS
Qty: 0 | Refills: 0 | Status: DISCONTINUED | OUTPATIENT
Start: 2018-12-19 | End: 2018-12-22

## 2018-12-19 RX ORDER — DOCUSATE SODIUM 100 MG
100 CAPSULE ORAL THREE TIMES A DAY
Qty: 0 | Refills: 0 | Status: DISCONTINUED | OUTPATIENT
Start: 2018-12-19 | End: 2018-12-22

## 2018-12-19 RX ORDER — INSULIN LISPRO 100/ML
6 VIAL (ML) SUBCUTANEOUS ONCE
Qty: 0 | Refills: 0 | Status: COMPLETED | OUTPATIENT
Start: 2018-12-19 | End: 2018-12-19

## 2018-12-19 RX ORDER — INSULIN LISPRO 100/ML
VIAL (ML) SUBCUTANEOUS
Qty: 0 | Refills: 0 | Status: DISCONTINUED | OUTPATIENT
Start: 2018-12-19 | End: 2018-12-22

## 2018-12-19 RX ADMIN — Medication 10 MILLIGRAM(S): at 05:44

## 2018-12-19 RX ADMIN — ENOXAPARIN SODIUM 40 MILLIGRAM(S): 100 INJECTION SUBCUTANEOUS at 12:45

## 2018-12-19 RX ADMIN — Medication 3 MILLILITER(S): at 05:14

## 2018-12-19 RX ADMIN — Medication 3 MILLILITER(S): at 23:10

## 2018-12-19 RX ADMIN — Medication 100 MILLIGRAM(S): at 21:27

## 2018-12-19 RX ADMIN — OXYCODONE HYDROCHLORIDE 5 MILLIGRAM(S): 5 TABLET ORAL at 19:35

## 2018-12-19 RX ADMIN — GABAPENTIN 600 MILLIGRAM(S): 400 CAPSULE ORAL at 05:44

## 2018-12-19 RX ADMIN — Medication 3 MILLILITER(S): at 17:07

## 2018-12-19 RX ADMIN — Medication 1 MILLIGRAM(S): at 05:52

## 2018-12-19 RX ADMIN — Medication 1 TABLET(S): at 12:05

## 2018-12-19 RX ADMIN — OXYCODONE AND ACETAMINOPHEN 1 TABLET(S): 5; 325 TABLET ORAL at 00:37

## 2018-12-19 RX ADMIN — Medication 3 MILLILITER(S): at 11:01

## 2018-12-19 RX ADMIN — Medication 1 SPRAY(S): at 05:45

## 2018-12-19 RX ADMIN — Medication 650 MILLIGRAM(S): at 05:13

## 2018-12-19 RX ADMIN — Medication 175 MICROGRAM(S): at 06:47

## 2018-12-19 RX ADMIN — Medication 6: at 12:05

## 2018-12-19 RX ADMIN — Medication 100 MILLIGRAM(S): at 05:44

## 2018-12-19 RX ADMIN — Medication 1 PATCH: at 21:46

## 2018-12-19 RX ADMIN — GABAPENTIN 600 MILLIGRAM(S): 400 CAPSULE ORAL at 13:32

## 2018-12-19 RX ADMIN — OXYCODONE HYDROCHLORIDE 5 MILLIGRAM(S): 5 TABLET ORAL at 11:16

## 2018-12-19 RX ADMIN — Medication 100 MILLIGRAM(S): at 13:32

## 2018-12-19 RX ADMIN — Medication 1 TABLET(S): at 18:24

## 2018-12-19 RX ADMIN — Medication 3: at 21:28

## 2018-12-19 RX ADMIN — Medication 100 MILLIGRAM(S): at 21:31

## 2018-12-19 RX ADMIN — METFORMIN HYDROCHLORIDE 1000 MILLIGRAM(S): 850 TABLET ORAL at 17:43

## 2018-12-19 RX ADMIN — LOSARTAN POTASSIUM 50 MILLIGRAM(S): 100 TABLET, FILM COATED ORAL at 05:44

## 2018-12-19 RX ADMIN — METFORMIN HYDROCHLORIDE 1000 MILLIGRAM(S): 850 TABLET ORAL at 08:50

## 2018-12-19 RX ADMIN — Medication 1 PATCH: at 12:44

## 2018-12-19 RX ADMIN — Medication 12: at 17:43

## 2018-12-19 RX ADMIN — Medication 100 MILLIGRAM(S): at 14:24

## 2018-12-19 RX ADMIN — ESCITALOPRAM OXALATE 10 MILLIGRAM(S): 10 TABLET, FILM COATED ORAL at 12:45

## 2018-12-19 RX ADMIN — Medication 6 UNIT(S): at 12:50

## 2018-12-19 RX ADMIN — Medication 1 TABLET(S): at 09:44

## 2018-12-19 RX ADMIN — INSULIN GLARGINE 5 UNIT(S): 100 INJECTION, SOLUTION SUBCUTANEOUS at 21:27

## 2018-12-19 RX ADMIN — Medication 5: at 08:51

## 2018-12-19 RX ADMIN — GABAPENTIN 600 MILLIGRAM(S): 400 CAPSULE ORAL at 21:27

## 2018-12-19 RX ADMIN — OXYCODONE HYDROCHLORIDE 5 MILLIGRAM(S): 5 TABLET ORAL at 17:51

## 2018-12-19 RX ADMIN — Medication 1 SPRAY(S): at 17:44

## 2018-12-19 RX ADMIN — Medication 100 MILLIGRAM(S): at 06:40

## 2018-12-19 RX ADMIN — OXYCODONE HYDROCHLORIDE 5 MILLIGRAM(S): 5 TABLET ORAL at 09:53

## 2018-12-19 NOTE — PHYSICAL THERAPY INITIAL EVALUATION ADULT - MODIFIED CLINICAL TEST OF SENSORY INTEGRATION IN BALANCE TEST
Barthel Index: Feeding Score _10__, Bathing Score __5_, Grooming Score _5__, Dressing Score _10__, Bowels Score _10__, Bladder Score 10___, Toilet Score _10__, Transfers Score _15__, Mobility Score 15___, Stairs Score _0__,     Total Score ___90

## 2018-12-19 NOTE — CONSULT NOTE ADULT - SUBJECTIVE AND OBJECTIVE BOX
Dr. Dajuan Molina contact information [phone]    GENERAL SURGERY CONSULT NOTE    Patient is a 58y old  morbidly obesse Female who presents with a chief complaint of an abscess in the lower abdomen to the ER.  She had a CT scan which revealed an absces with surrounding cellulitis.  She had an I&D in the ER - cultures were taken    HPI:  Patient is a 58y old  morbidly obesse Female who presents with a chief complaint of an abscess in the lower abdomen to the ER.  She had a CT scan which revealed an absces with surrounding cellulitis.  She had an I&D in the ER - cultures were taken - her WBC ws 24K    PAST MEDICAL & SURGICAL HISTORY:  DM (diabetes mellitus)  HTN (hypertension)  Smoker  Emphysema lung  Hypothyroid  Agoraphobia  COPD (chronic obstructive pulmonary disease)  Anxiety  No significant past surgical history      Review of Systems:    I have reviewed 9 systems with the patient and the only positive findings were     MEDICATIONS  (STANDING):  ALBUTerol/ipratropium for Nebulization 3 milliLiter(s) Nebulizer every 6 hours  atorvastatin 10 milliGRAM(s) Oral at bedtime  ceFAZolin   IVPB 2000 milliGRAM(s) IV Intermittent every 8 hours  ceFAZolin   IVPB      clindamycin IVPB 600 milliGRAM(s) IV Intermittent every 8 hours  dextrose 5%. 1000 milliLiter(s) (50 mL/Hr) IV Continuous <Continuous>  dextrose 50% Injectable 12.5 Gram(s) IV Push once  dextrose 50% Injectable 25 Gram(s) IV Push once  dextrose 50% Injectable 25 Gram(s) IV Push once  docusate sodium 100 milliGRAM(s) Oral three times a day  enoxaparin Injectable 40 milliGRAM(s) SubCutaneous daily  escitalopram 10 milliGRAM(s) Oral daily  fluticasone propionate 50 MICROgram(s)/spray Nasal Spray 1 Spray(s) Both Nostrils two times a day  gabapentin 600 milliGRAM(s) Oral three times a day  insulin glargine Injectable (LANTUS) 5 Unit(s) SubCutaneous at bedtime  insulin lispro (HumaLOG) corrective regimen sliding scale   SubCutaneous three times a day before meals  insulin lispro (HumaLOG) corrective regimen sliding scale   SubCutaneous at bedtime  lactobacillus acidophilus 1 Tablet(s) Oral three times a day with meals  levothyroxine 175 MICROGram(s) Oral daily  losartan 50 milliGRAM(s) Oral daily  metFORMIN 1000 milliGRAM(s) Oral two times a day  nicotine - 21 mG/24Hr(s) Patch 1 patch Transdermal daily  predniSONE   Tablet 10 milliGRAM(s) Oral daily    MEDICATIONS  (PRN):  acetaminophen   Tablet .. 650 milliGRAM(s) Oral every 6 hours PRN Moderate Pain (4 - 6)  clonazePAM Tablet 1 milliGRAM(s) Oral three times a day PRN anxiety  dextrose 40% Gel 15 Gram(s) Oral once PRN Blood Glucose LESS THAN 70 milliGRAM(s)/deciliter  glucagon  Injectable 1 milliGRAM(s) IntraMuscular once PRN Glucose LESS THAN 70 milligrams/deciliter  oxyCODONE    IR 5 milliGRAM(s) Oral four times a day PRN Severe Pain (7 - 10)      Allergies    No Known Allergies    Intolerances  FAMILY HISTORY:  No pertinent family history in first degree relatives      Vital Signs Last 24 Hrs  T(C): 37.3 (19 Dec 2018 14:19), Max: 38.3 (18 Dec 2018 21:05)  T(F): 99.1 (19 Dec 2018 14:19), Max: 101 (19 Dec 2018 04:53)  HR: 97 (19 Dec 2018 14:19) (97 - 115)  BP: 93/41 (19 Dec 2018 14:19) (93/41 - 160/74)  BP(mean): --  RR: 18 (19 Dec 2018 14:19) (18 - 20)  SpO2: 90% (19 Dec 2018 14:19) (90% - 98%)    I&O's Detail    18 Dec 2018 07:01  -  19 Dec 2018 07:00  --------------------------------------------------------  IN:    IV PiggyBack: 150 mL    Oral Fluid: 100 mL  Total IN: 250 mL    OUT:    Voided: 2 mL  Total OUT: 2 mL    Total NET: 248 mL      19 Dec 2018 07:01  -  19 Dec 2018 15:11  --------------------------------------------------------  IN:    Oral Fluid: 360 mL  Total IN: 360 mL    OUT:  Total OUT: 0 mL    Total NET: 360 mL          Physical Exam:    General:  Appears stated age, well-groomed, well-nourished, no distress  Eyes : LEVON  Abdomen:  Obese - lower abd cellulitsi - some purulent dranage of the dressing  Skin:  No rash  Musculoskeletal:  normal strength  Neuro/Psych:  Alert, oriented tp time, place and person       LABS:                        13.0   21.60 )-----------( 271      ( 19 Dec 2018 08:04 )             39.7         133<L>  |  96  |  9   ----------------------------<  360<H>  4.2   |  29  |  1.00    Ca    8.8      19 Dec 2018 08:04  Mg     1.9         TPro  7.8  /  Alb  3.1<L>  /  TBili  0.6  /  DBili  x   /  AST  13<L>  /  ALT  14  /  AlkPhos  110      PT/INR - ( 18 Dec 2018 13:48 )   PT: 12.0 sec;   INR: 1.07 ratio         PTT - ( 18 Dec 2018 13:48 )  PTT:33.3 sec  Urinalysis Basic - ( 18 Dec 2018 12:42 )    Color: Yellow / Appearance: Clear / S.010 / pH: x  Gluc: x / Ketone: Negative  / Bili: Negative / Urobili: Negative mg/dL   Blood: x / Protein: 15 mg/dL / Nitrite: Negative   Leuk Esterase: Negative / RBC: x / WBC 0-2   Sq Epi: x / Non Sq Epi: Few / Bacteria: x        RADIOLOGY & ADDITIONAL STUDIES:  < from: CT Abdomen and Pelvis w/ IV Cont (18 @ 13:43) >  Impression: Hepatic steatosis.    Cholelithiasis. Hyperdensity in the gallbladder fundus may represent   sludge; however, a gallbladder mass/cancer cannot be excluded. Mildly   dilated common bile duct. If clinically indicated, abdominal MR without   and with IV contrast including MRCP may be pursued for further evaluation.    Mildly enlarged lymph nodes at the right external iliac chain and the   right groin.    4.9 cm hypodense lesion in the left adnexal region, grossly unchanged,   suggestive of a left ovarian cyst.    Apparent 3.3 x 2.4 x 4.0 cm subcutaneous fluid collection in the right   groin with an enhancing wall and adjacent stranding, suggestive of an   abscess.    < end of copied text >

## 2018-12-19 NOTE — CONSULT NOTE ADULT - ASSESSMENT
Patient has lower abdominal cellulitis with abscess which has been drained in the ER cultures have been taken. Patient at the present time is on IV antibiotics. We are waiting for cultures and sensitivities to adjust the amount.    I reviewed the CT images with radiology and compared them to previous CTs which were done there is no change in her intra-abdominal organ systems the gallbladder is the same as it was in the past without any evidence of inflammation.

## 2018-12-20 ENCOUNTER — TRANSCRIPTION ENCOUNTER (OUTPATIENT)
Age: 58
End: 2018-12-20

## 2018-12-20 LAB
-  AMIKACIN: SIGNIFICANT CHANGE UP
-  AMOXICILLIN/CLAVULANIC ACID: SIGNIFICANT CHANGE UP
-  AMPICILLIN/SULBACTAM: SIGNIFICANT CHANGE UP
-  AMPICILLIN: SIGNIFICANT CHANGE UP
-  AZTREONAM: SIGNIFICANT CHANGE UP
-  CEFAZOLIN: SIGNIFICANT CHANGE UP
-  CEFEPIME: SIGNIFICANT CHANGE UP
-  CEFOXITIN: SIGNIFICANT CHANGE UP
-  CEFTRIAXONE: SIGNIFICANT CHANGE UP
-  CIPROFLOXACIN: SIGNIFICANT CHANGE UP
-  CIPROFLOXACIN: SIGNIFICANT CHANGE UP
-  ERTAPENEM: SIGNIFICANT CHANGE UP
-  GENTAMICIN: SIGNIFICANT CHANGE UP
-  IMIPENEM: SIGNIFICANT CHANGE UP
-  LEVOFLOXACIN: SIGNIFICANT CHANGE UP
-  LEVOFLOXACIN: SIGNIFICANT CHANGE UP
-  MEROPENEM: SIGNIFICANT CHANGE UP
-  NITROFURANTOIN: SIGNIFICANT CHANGE UP
-  PIPERACILLIN/TAZOBACTAM: SIGNIFICANT CHANGE UP
-  TETRACYCLINE: SIGNIFICANT CHANGE UP
-  TETRACYCLINE: SIGNIFICANT CHANGE UP
-  TOBRAMYCIN: SIGNIFICANT CHANGE UP
-  TRIMETHOPRIM/SULFAMETHOXAZOLE: SIGNIFICANT CHANGE UP
-  VANCOMYCIN: SIGNIFICANT CHANGE UP
-  VANCOMYCIN: SIGNIFICANT CHANGE UP
ALBUMIN SERPL ELPH-MCNC: 2.6 G/DL — LOW (ref 3.3–5)
ALP SERPL-CCNC: 91 U/L — SIGNIFICANT CHANGE UP (ref 40–120)
ALT FLD-CCNC: 13 U/L — SIGNIFICANT CHANGE UP (ref 12–78)
ANION GAP SERPL CALC-SCNC: 9 MMOL/L — SIGNIFICANT CHANGE UP (ref 5–17)
AST SERPL-CCNC: 4 U/L — LOW (ref 15–37)
BILIRUB SERPL-MCNC: 0.4 MG/DL — SIGNIFICANT CHANGE UP (ref 0.2–1.2)
BUN SERPL-MCNC: 12 MG/DL — SIGNIFICANT CHANGE UP (ref 7–23)
CALCIUM SERPL-MCNC: 8.5 MG/DL — SIGNIFICANT CHANGE UP (ref 8.5–10.1)
CHLORIDE SERPL-SCNC: 99 MMOL/L — SIGNIFICANT CHANGE UP (ref 96–108)
CO2 SERPL-SCNC: 28 MMOL/L — SIGNIFICANT CHANGE UP (ref 22–31)
CREAT SERPL-MCNC: 0.89 MG/DL — SIGNIFICANT CHANGE UP (ref 0.5–1.3)
CULTURE RESULTS: SIGNIFICANT CHANGE UP
GLUCOSE BLDC GLUCOMTR-MCNC: 298 MG/DL — HIGH (ref 70–99)
GLUCOSE BLDC GLUCOMTR-MCNC: 376 MG/DL — HIGH (ref 70–99)
GLUCOSE BLDC GLUCOMTR-MCNC: 411 MG/DL — HIGH (ref 70–99)
GLUCOSE BLDC GLUCOMTR-MCNC: 420 MG/DL — HIGH (ref 70–99)
GLUCOSE BLDC GLUCOMTR-MCNC: 440 MG/DL — HIGH (ref 70–99)
GLUCOSE BLDC GLUCOMTR-MCNC: 465 MG/DL — CRITICAL HIGH (ref 70–99)
GLUCOSE BLDC GLUCOMTR-MCNC: 474 MG/DL — CRITICAL HIGH (ref 70–99)
GLUCOSE SERPL-MCNC: 343 MG/DL — HIGH (ref 70–99)
HCT VFR BLD CALC: 37.7 % — SIGNIFICANT CHANGE UP (ref 34.5–45)
HGB BLD-MCNC: 12.2 G/DL — SIGNIFICANT CHANGE UP (ref 11.5–15.5)
MCHC RBC-ENTMCNC: 29.5 PG — SIGNIFICANT CHANGE UP (ref 27–34)
MCHC RBC-ENTMCNC: 32.4 GM/DL — SIGNIFICANT CHANGE UP (ref 32–36)
MCV RBC AUTO: 91.3 FL — SIGNIFICANT CHANGE UP (ref 80–100)
METHOD TYPE: SIGNIFICANT CHANGE UP
NRBC # BLD: 0 /100 WBCS — SIGNIFICANT CHANGE UP (ref 0–0)
ORGANISM # SPEC MICROSCOPIC CNT: SIGNIFICANT CHANGE UP
ORGANISM # SPEC MICROSCOPIC CNT: SIGNIFICANT CHANGE UP
PLATELET # BLD AUTO: 254 K/UL — SIGNIFICANT CHANGE UP (ref 150–400)
POTASSIUM SERPL-MCNC: 3.8 MMOL/L — SIGNIFICANT CHANGE UP (ref 3.5–5.3)
POTASSIUM SERPL-SCNC: 3.8 MMOL/L — SIGNIFICANT CHANGE UP (ref 3.5–5.3)
PROT SERPL-MCNC: 6.7 GM/DL — SIGNIFICANT CHANGE UP (ref 6–8.3)
RBC # BLD: 4.13 M/UL — SIGNIFICANT CHANGE UP (ref 3.8–5.2)
RBC # FLD: 13.5 % — SIGNIFICANT CHANGE UP (ref 10.3–14.5)
SODIUM SERPL-SCNC: 136 MMOL/L — SIGNIFICANT CHANGE UP (ref 135–145)
SPECIMEN SOURCE: SIGNIFICANT CHANGE UP
WBC # BLD: 12.15 K/UL — HIGH (ref 3.8–10.5)
WBC # FLD AUTO: 12.15 K/UL — HIGH (ref 3.8–10.5)

## 2018-12-20 RX ORDER — MORPHINE SULFATE 50 MG/1
2 CAPSULE, EXTENDED RELEASE ORAL ONCE
Qty: 0 | Refills: 0 | Status: DISCONTINUED | OUTPATIENT
Start: 2018-12-20 | End: 2018-12-22

## 2018-12-20 RX ORDER — INSULIN LISPRO 100/ML
5 VIAL (ML) SUBCUTANEOUS
Qty: 0 | Refills: 0 | Status: DISCONTINUED | OUTPATIENT
Start: 2018-12-20 | End: 2018-12-22

## 2018-12-20 RX ORDER — INSULIN GLARGINE 100 [IU]/ML
8 INJECTION, SOLUTION SUBCUTANEOUS AT BEDTIME
Qty: 0 | Refills: 0 | Status: DISCONTINUED | OUTPATIENT
Start: 2018-12-20 | End: 2018-12-21

## 2018-12-20 RX ORDER — ACETAMINOPHEN 500 MG
1000 TABLET ORAL ONCE
Qty: 0 | Refills: 0 | Status: COMPLETED | OUTPATIENT
Start: 2018-12-20 | End: 2018-12-20

## 2018-12-20 RX ADMIN — INSULIN GLARGINE 8 UNIT(S): 100 INJECTION, SOLUTION SUBCUTANEOUS at 21:20

## 2018-12-20 RX ADMIN — OXYCODONE HYDROCHLORIDE 5 MILLIGRAM(S): 5 TABLET ORAL at 17:34

## 2018-12-20 RX ADMIN — Medication 3 MILLILITER(S): at 17:14

## 2018-12-20 RX ADMIN — METFORMIN HYDROCHLORIDE 1000 MILLIGRAM(S): 850 TABLET ORAL at 05:58

## 2018-12-20 RX ADMIN — Medication 100 MILLIGRAM(S): at 12:59

## 2018-12-20 RX ADMIN — Medication 1 MILLIGRAM(S): at 21:20

## 2018-12-20 RX ADMIN — Medication 100 MILLIGRAM(S): at 05:57

## 2018-12-20 RX ADMIN — Medication 1: at 21:22

## 2018-12-20 RX ADMIN — METFORMIN HYDROCHLORIDE 1000 MILLIGRAM(S): 850 TABLET ORAL at 16:59

## 2018-12-20 RX ADMIN — OXYCODONE HYDROCHLORIDE 5 MILLIGRAM(S): 5 TABLET ORAL at 17:04

## 2018-12-20 RX ADMIN — Medication 1 TABLET(S): at 08:01

## 2018-12-20 RX ADMIN — Medication 1 TABLET(S): at 16:58

## 2018-12-20 RX ADMIN — Medication 100 MILLIGRAM(S): at 21:21

## 2018-12-20 RX ADMIN — Medication 1 PATCH: at 19:30

## 2018-12-20 RX ADMIN — Medication 1000 MILLIGRAM(S): at 13:50

## 2018-12-20 RX ADMIN — Medication 1 TABLET(S): at 11:24

## 2018-12-20 RX ADMIN — Medication 3 MILLILITER(S): at 05:43

## 2018-12-20 RX ADMIN — GABAPENTIN 600 MILLIGRAM(S): 400 CAPSULE ORAL at 21:20

## 2018-12-20 RX ADMIN — OXYCODONE HYDROCHLORIDE 5 MILLIGRAM(S): 5 TABLET ORAL at 02:15

## 2018-12-20 RX ADMIN — Medication 1 PATCH: at 09:27

## 2018-12-20 RX ADMIN — Medication 100 MILLIGRAM(S): at 12:46

## 2018-12-20 RX ADMIN — Medication 1 PATCH: at 11:23

## 2018-12-20 RX ADMIN — Medication 1 SPRAY(S): at 16:59

## 2018-12-20 RX ADMIN — Medication 12: at 11:25

## 2018-12-20 RX ADMIN — Medication 10: at 08:02

## 2018-12-20 RX ADMIN — Medication 1 MILLIGRAM(S): at 11:22

## 2018-12-20 RX ADMIN — GABAPENTIN 600 MILLIGRAM(S): 400 CAPSULE ORAL at 12:47

## 2018-12-20 RX ADMIN — Medication 3 MILLILITER(S): at 11:11

## 2018-12-20 RX ADMIN — Medication 400 MILLIGRAM(S): at 12:40

## 2018-12-20 RX ADMIN — Medication 1 PATCH: at 13:42

## 2018-12-20 RX ADMIN — ENOXAPARIN SODIUM 40 MILLIGRAM(S): 100 INJECTION SUBCUTANEOUS at 11:24

## 2018-12-20 RX ADMIN — Medication 3 MILLILITER(S): at 23:54

## 2018-12-20 RX ADMIN — Medication 10 MILLIGRAM(S): at 05:57

## 2018-12-20 RX ADMIN — ESCITALOPRAM OXALATE 10 MILLIGRAM(S): 10 TABLET, FILM COATED ORAL at 11:25

## 2018-12-20 RX ADMIN — Medication 100 MILLIGRAM(S): at 12:47

## 2018-12-20 RX ADMIN — Medication 12: at 16:57

## 2018-12-20 RX ADMIN — GABAPENTIN 600 MILLIGRAM(S): 400 CAPSULE ORAL at 05:58

## 2018-12-20 RX ADMIN — OXYCODONE HYDROCHLORIDE 5 MILLIGRAM(S): 5 TABLET ORAL at 01:44

## 2018-12-20 RX ADMIN — Medication 175 MICROGRAM(S): at 05:41

## 2018-12-20 RX ADMIN — Medication 1 MILLIGRAM(S): at 05:57

## 2018-12-20 NOTE — DISCHARGE NOTE ADULT - CARE PROVIDERS DIRECT ADDRESSES
[email or web address]

## 2018-12-20 NOTE — DISCHARGE NOTE ADULT - INSTRUCTIONS
Cleanse wound with NS and pack with 1/4inch iodoform gauze and cover with sterile gauze daily, dash tlc  diabetic

## 2018-12-20 NOTE — PROGRESS NOTE ADULT - ATTENDING COMMENTS
Patient seen/examined.  Agree w above note and plan and have discussed plan w house staff.  Remains afebrile.  Pubis w packing in and surrounding erythema.  Continue abx.      Baldo Bhatia MD

## 2018-12-20 NOTE — DIETITIAN INITIAL EVALUATION ADULT. - OTHER INFO
Pt seen today due to BMI > 40 & HgbA1c 9.1 %. She lives w/ her son. Does not follow any type of diet,  States she really isn't interested Poor upper dentition without any difficulty chewing/swallowing. Last BM yesterday 12/19. Pt reports checking her FS 3-4x/d, she is able verbalize foods containing carbohydrates. Receptive to diet education. Consuming 100% meals.

## 2018-12-20 NOTE — DIETITIAN INITIAL EVALUATION ADULT. - NS AS NUTRI INTERV ED CONTENT
Type 2 DN nutrition therapy; Dash/TLC ; Weight loss Tips/Purpose of the nutrition education/Survival information

## 2018-12-20 NOTE — DISCHARGE NOTE ADULT - PATIENT PORTAL LINK FT
You can access the [facility] Patient Portal, offered by [facility], by registering with the following website: [email or web address]

## 2018-12-20 NOTE — DISCHARGE NOTE ADULT - MEDICATION SUMMARY - MEDICATIONS TO TAKE
I will START or STAY ON the medications listed below when I get home from the hospital:    predniSONE 10 mg oral tablet  -- 1 tab(s) by mouth once a day   -- It is very important that you take or use this exactly as directed.  Do not skip doses or discontinue unless directed by your doctor.  Obtain medical advice before taking any non-prescription drugs as some may affect the action of this medication.  Take with food or milk.    -- Indication: For COPD (chronic obstructive pulmonary disease)    losartan 50 mg oral tablet  -- 1 tab(s) by mouth once a day  -- Indication: For HTN (hypertension)    KlonoPIN 1 mg oral tablet  -- orally 3 times a day, As Needed  -- Indication: For Anxiety    Lexapro 20 mg oral tablet  -- orally once a day  -- Indication: For Anxiety    metFORMIN 1000 mg oral tablet  -- orally 2 times a day  -- Indication: For DM (diabetes mellitus)    Januvia 100 mg oral tablet  -- orally once a day  -- Indication: For DM (diabetes mellitus)    atorvastatin 10 mg oral tablet  -- 1 tab(s) by mouth once a day (at bedtime)  -- Indication: For DM (diabetes mellitus)    hydrOXYzine hydrochloride 50 mg oral tablet  -- 1 tab(s) by mouth every 8 hours, As needed, Itching  -- Indication: For Anxiety    ipratropium-albuterol 0.5 mg-2.5 mg/3 mLinhalation solution  -- 3 milliliter(s) inhaled every 6 hours, As Needed -for shortness of breath and/or wheezing   -- Indication: For COPD (chronic obstructive pulmonary disease)    Anoro Ellipta 62.5 mcg-25 mcg/inh inhalation powder  -- 1 puff(s) inhaled once a day  -- Indication: For COPD (chronic obstructive pulmonary disease)    docusate sodium 100 mg oral capsule  -- 1 cap(s) by mouth 3 times a day  -- Indication: For COnstipation    montelukast 10 mg oral tablet  -- 1 tab(s) by mouth once a day (at bedtime)   -- Indication: For COPD (chronic obstructive pulmonary disease)    fluticasone 50 mcg/inh nasal spray  -- 1 spray(s) into nose once a day  -- Indication: For COPD (chronic obstructive pulmonary disease)    Augmentin 875 mg-125 mg oral tablet  -- 875 milligram(s) by mouth every 12 hours   -- Finish all this medication unless otherwise directed by prescriber.  Take with food or milk.    -- Indication: For Pelvic abscess in female    lactobacillus acidophilus oral capsule  -- 1 cap(s) by mouth 2 times a day   -- Indication: For Supplement    nicotine 21 mg/24 hr transdermal film, extended release  -- 1 patch by transdermal patch once a day  -- Indication: For Smoker    levothyroxine 175 mcg (0.175 mg) oral tablet  -- Indication: For Hypothyroid

## 2018-12-20 NOTE — DISCHARGE NOTE ADULT - CARE PROVIDER_API CALL
Dajuan Molina), Surgery  [address]  [address]  [place] [address]  Phone: [phone]  Fax: [phone]    Florentino Espinoza), [place] [address]  Phone: [phone]  Fax: [phone]

## 2018-12-20 NOTE — DISCHARGE NOTE ADULT - NS AS DC PROVIDER CONTACT Y/N MULTI
During CST at 0512 baby had a 30 sec destat. O2 steady at 84%. HR and Resp WNL. Baby self resolved.    Yes

## 2018-12-20 NOTE — DIETITIAN INITIAL EVALUATION ADULT. - PERTINENT LABORATORY DATA
12-20 Na136 mmol/L Glu 343 mg/dL<H> K+ 3.8 mmol/L Cr  0.89 mg/dL BUN 12 mg/dL 12-20 Alb 2.6 g/dL<L> 12-05 [id number] 9.1 %<H>12-20 ALT 13 U/L AST 4 U/L<L> Alkaline Phosphatase 91 U/L

## 2018-12-20 NOTE — DIETITIAN INITIAL EVALUATION ADULT. - PERTINENT MEDS FT
MEDICATIONS  (STANDING):  ALBUTerol/ipratropium for Nebulization 3 milliLiter(s) Nebulizer every 6 hours  atorvastatin 10 milliGRAM(s) Oral at bedtime  ceFAZolin   IVPB 2000 milliGRAM(s) IV Intermittent every 8 hours  ceFAZolin   IVPB      clindamycin IVPB 600 milliGRAM(s) IV Intermittent every 8 hours  dextrose 5%. 1000 milliLiter(s) (50 mL/Hr) IV Continuous <Continuous>  dextrose 50% Injectable 12.5 Gram(s) IV Push once  dextrose 50% Injectable 25 Gram(s) IV Push once  dextrose 50% Injectable 25 Gram(s) IV Push once  docusate sodium 100 milliGRAM(s) Oral three times a day  enoxaparin Injectable 40 milliGRAM(s) SubCutaneous daily  escitalopram 10 milliGRAM(s) Oral daily  fluticasone propionate 50 MICROgram(s)/spray Nasal Spray 1 Spray(s) Both Nostrils two times a day  gabapentin 600 milliGRAM(s) Oral three times a day  insulin glargine Injectable (LANTUS) 8 Unit(s) SubCutaneous at bedtime  insulin lispro (HumaLOG) corrective regimen sliding scale   SubCutaneous at bedtime  insulin lispro (HumaLOG) corrective regimen sliding scale   SubCutaneous three times a day before meals  lactobacillus acidophilus 1 Tablet(s) Oral three times a day with meals  levothyroxine 175 MICROGram(s) Oral daily  losartan 50 milliGRAM(s) Oral daily  metFORMIN 1000 milliGRAM(s) Oral two times a day  morphine  - Injectable 2 milliGRAM(s) IV Push once  nicotine - 21 mG/24Hr(s) Patch 1 patch Transdermal daily  predniSONE   Tablet 10 milliGRAM(s) Oral daily    MEDICATIONS  (PRN):  acetaminophen   Tablet .. 650 milliGRAM(s) Oral every 6 hours PRN Moderate Pain (4 - 6)  clonazePAM Tablet 1 milliGRAM(s) Oral three times a day PRN anxiety  dextrose 40% Gel 15 Gram(s) Oral once PRN Blood Glucose LESS THAN 70 milliGRAM(s)/deciliter  glucagon  Injectable 1 milliGRAM(s) IntraMuscular once PRN Glucose LESS THAN 70 milligrams/deciliter  oxyCODONE    IR 5 milliGRAM(s) Oral four times a day PRN Severe Pain (7 - 10)

## 2018-12-20 NOTE — DISCHARGE NOTE ADULT - SECONDARY DIAGNOSIS.
COPD (chronic obstructive pulmonary disease) Anxiety DM (diabetes mellitus) Hypothyroid Smoker Morbid obesity

## 2018-12-20 NOTE — DISCHARGE NOTE ADULT - HOSPITAL COURSE
patient  treated  with  I/D  in  ER  local  wound  care  pain  managed  with  tylenol  and  Oxycodone  required  long  and  and  short  acting  ibsulin  for  glycemic  control,  abscess  improved  glycemia  controlled  patient  discharged  with  continued  oral  Ab  local  wound  care  and  lantus

## 2018-12-20 NOTE — DISCHARGE NOTE ADULT - MEDICATION SUMMARY - MEDICATIONS TO STOP TAKING
I will STOP taking the medications listed below when I get home from the hospital:    cetirizine 10 mg oral tablet    escitalopram 10 mg oral tablet  -- 1 tab(s) by mouth once a day    predniSONE 10 mg oral tablet  -- 4 tabs - 40 mg by mouth once daily for 3 days then 3 tabs daily for 3 days then 2 tabs daily for 3 days then 1 tab daily for 3 days  -- It is very important that you take or use this exactly as directed.  Do not skip doses or discontinue unless directed by your doctor.  Obtain medical advice before taking any non-prescription drugs as some may affect the action of this medication.  Take with food or milk.    benzonatate 100 mg oral capsule  -- 2 cap(s) by mouth every 8 hours, As needed, Cough    azithromycin 250 mg oral tablet  -- 1 tab(s) by mouth once a day

## 2018-12-20 NOTE — DISCHARGE NOTE ADULT - CARE PLAN
Principal Discharge DX:	Pelvic abscess in female  Goal:	avoidance of  recurrence  Assessment and plan of treatment:	local  wound  care  finish  Ab  glycaemic  control  Secondary Diagnosis:	COPD (chronic obstructive pulmonary disease)  Secondary Diagnosis:	Anxiety  Secondary Diagnosis:	DM (diabetes mellitus)  Secondary Diagnosis:	Hypothyroid  Secondary Diagnosis:	Smoker  Secondary Diagnosis:	Morbid obesity

## 2018-12-21 LAB
ANION GAP SERPL CALC-SCNC: 9 MMOL/L — SIGNIFICANT CHANGE UP (ref 5–17)
BUN SERPL-MCNC: 11 MG/DL — SIGNIFICANT CHANGE UP (ref 7–23)
CALCIUM SERPL-MCNC: 8.8 MG/DL — SIGNIFICANT CHANGE UP (ref 8.5–10.1)
CHLORIDE SERPL-SCNC: 97 MMOL/L — SIGNIFICANT CHANGE UP (ref 96–108)
CO2 SERPL-SCNC: 29 MMOL/L — SIGNIFICANT CHANGE UP (ref 22–31)
CREAT SERPL-MCNC: 0.98 MG/DL — SIGNIFICANT CHANGE UP (ref 0.5–1.3)
GLUCOSE BLDC GLUCOMTR-MCNC: 309 MG/DL — HIGH (ref 70–99)
GLUCOSE BLDC GLUCOMTR-MCNC: 357 MG/DL — HIGH (ref 70–99)
GLUCOSE BLDC GLUCOMTR-MCNC: 415 MG/DL — HIGH (ref 70–99)
GLUCOSE BLDC GLUCOMTR-MCNC: 465 MG/DL — CRITICAL HIGH (ref 70–99)
GLUCOSE BLDC GLUCOMTR-MCNC: 474 MG/DL — CRITICAL HIGH (ref 70–99)
GLUCOSE BLDC GLUCOMTR-MCNC: 519 MG/DL — CRITICAL HIGH (ref 70–99)
GLUCOSE SERPL-MCNC: 426 MG/DL — HIGH (ref 70–99)
HCT VFR BLD CALC: 38.3 % — SIGNIFICANT CHANGE UP (ref 34.5–45)
HGB BLD-MCNC: 12.4 G/DL — SIGNIFICANT CHANGE UP (ref 11.5–15.5)
MAGNESIUM SERPL-MCNC: 2 MG/DL — SIGNIFICANT CHANGE UP (ref 1.6–2.6)
MCHC RBC-ENTMCNC: 29.7 PG — SIGNIFICANT CHANGE UP (ref 27–34)
MCHC RBC-ENTMCNC: 32.4 GM/DL — SIGNIFICANT CHANGE UP (ref 32–36)
MCV RBC AUTO: 91.8 FL — SIGNIFICANT CHANGE UP (ref 80–100)
NRBC # BLD: 0 /100 WBCS — SIGNIFICANT CHANGE UP (ref 0–0)
PHOSPHATE SERPL-MCNC: 2.9 MG/DL — SIGNIFICANT CHANGE UP (ref 2.5–4.5)
PLATELET # BLD AUTO: 308 K/UL — SIGNIFICANT CHANGE UP (ref 150–400)
POTASSIUM SERPL-MCNC: 4.3 MMOL/L — SIGNIFICANT CHANGE UP (ref 3.5–5.3)
POTASSIUM SERPL-SCNC: 4.3 MMOL/L — SIGNIFICANT CHANGE UP (ref 3.5–5.3)
RBC # BLD: 4.17 M/UL — SIGNIFICANT CHANGE UP (ref 3.8–5.2)
RBC # FLD: 13.6 % — SIGNIFICANT CHANGE UP (ref 10.3–14.5)
SODIUM SERPL-SCNC: 135 MMOL/L — SIGNIFICANT CHANGE UP (ref 135–145)
WBC # BLD: 13.06 K/UL — HIGH (ref 3.8–10.5)
WBC # FLD AUTO: 13.06 K/UL — HIGH (ref 3.8–10.5)

## 2018-12-21 RX ORDER — INSULIN GLARGINE 100 [IU]/ML
8 INJECTION, SOLUTION SUBCUTANEOUS
Qty: 0 | Refills: 0 | Status: DISCONTINUED | OUTPATIENT
Start: 2018-12-21 | End: 2018-12-22

## 2018-12-21 RX ADMIN — Medication 12: at 11:20

## 2018-12-21 RX ADMIN — Medication 1 TABLET(S): at 17:02

## 2018-12-21 RX ADMIN — Medication 5 UNIT(S): at 15:40

## 2018-12-21 RX ADMIN — Medication 100 MILLIGRAM(S): at 22:45

## 2018-12-21 RX ADMIN — Medication 12: at 07:59

## 2018-12-21 RX ADMIN — Medication 2: at 21:34

## 2018-12-21 RX ADMIN — ATORVASTATIN CALCIUM 10 MILLIGRAM(S): 80 TABLET, FILM COATED ORAL at 21:32

## 2018-12-21 RX ADMIN — Medication 1 TABLET(S): at 08:03

## 2018-12-21 RX ADMIN — Medication 100 MILLIGRAM(S): at 12:46

## 2018-12-21 RX ADMIN — GABAPENTIN 600 MILLIGRAM(S): 400 CAPSULE ORAL at 21:32

## 2018-12-21 RX ADMIN — INSULIN GLARGINE 8 UNIT(S): 100 INJECTION, SOLUTION SUBCUTANEOUS at 21:33

## 2018-12-21 RX ADMIN — INSULIN GLARGINE 8 UNIT(S): 100 INJECTION, SOLUTION SUBCUTANEOUS at 08:02

## 2018-12-21 RX ADMIN — Medication 100 MILLIGRAM(S): at 21:32

## 2018-12-21 RX ADMIN — Medication 175 MICROGRAM(S): at 05:06

## 2018-12-21 RX ADMIN — GABAPENTIN 600 MILLIGRAM(S): 400 CAPSULE ORAL at 06:39

## 2018-12-21 RX ADMIN — Medication 3 MILLILITER(S): at 23:54

## 2018-12-21 RX ADMIN — OXYCODONE HYDROCHLORIDE 5 MILLIGRAM(S): 5 TABLET ORAL at 15:40

## 2018-12-21 RX ADMIN — Medication 1 PATCH: at 11:25

## 2018-12-21 RX ADMIN — Medication 5 UNIT(S): at 08:00

## 2018-12-21 RX ADMIN — Medication 100 MILLIGRAM(S): at 06:37

## 2018-12-21 RX ADMIN — Medication 10 MILLIGRAM(S): at 06:39

## 2018-12-21 RX ADMIN — Medication 100 MILLIGRAM(S): at 13:09

## 2018-12-21 RX ADMIN — ENOXAPARIN SODIUM 40 MILLIGRAM(S): 100 INJECTION SUBCUTANEOUS at 11:20

## 2018-12-21 RX ADMIN — Medication 1 PATCH: at 08:03

## 2018-12-21 RX ADMIN — OXYCODONE HYDROCHLORIDE 5 MILLIGRAM(S): 5 TABLET ORAL at 16:30

## 2018-12-21 RX ADMIN — Medication 100 MILLIGRAM(S): at 05:06

## 2018-12-21 RX ADMIN — Medication 3 MILLILITER(S): at 05:11

## 2018-12-21 RX ADMIN — Medication 3 MILLILITER(S): at 17:07

## 2018-12-21 RX ADMIN — OXYCODONE HYDROCHLORIDE 5 MILLIGRAM(S): 5 TABLET ORAL at 20:44

## 2018-12-21 RX ADMIN — OXYCODONE HYDROCHLORIDE 5 MILLIGRAM(S): 5 TABLET ORAL at 19:51

## 2018-12-21 RX ADMIN — METFORMIN HYDROCHLORIDE 1000 MILLIGRAM(S): 850 TABLET ORAL at 17:02

## 2018-12-21 RX ADMIN — Medication 5 UNIT(S): at 11:20

## 2018-12-21 RX ADMIN — ESCITALOPRAM OXALATE 10 MILLIGRAM(S): 10 TABLET, FILM COATED ORAL at 12:46

## 2018-12-21 RX ADMIN — LOSARTAN POTASSIUM 50 MILLIGRAM(S): 100 TABLET, FILM COATED ORAL at 06:41

## 2018-12-21 RX ADMIN — Medication 3 MILLILITER(S): at 11:34

## 2018-12-21 RX ADMIN — Medication 10: at 15:40

## 2018-12-21 RX ADMIN — OXYCODONE HYDROCHLORIDE 5 MILLIGRAM(S): 5 TABLET ORAL at 07:13

## 2018-12-21 RX ADMIN — OXYCODONE HYDROCHLORIDE 5 MILLIGRAM(S): 5 TABLET ORAL at 06:39

## 2018-12-21 RX ADMIN — Medication 1 MILLIGRAM(S): at 17:02

## 2018-12-21 RX ADMIN — Medication 1 TABLET(S): at 11:22

## 2018-12-21 RX ADMIN — METFORMIN HYDROCHLORIDE 1000 MILLIGRAM(S): 850 TABLET ORAL at 06:39

## 2018-12-21 RX ADMIN — Medication 1 MILLIGRAM(S): at 07:57

## 2018-12-21 RX ADMIN — GABAPENTIN 600 MILLIGRAM(S): 400 CAPSULE ORAL at 13:10

## 2018-12-21 NOTE — PROGRESS NOTE ADULT - ATTENDING COMMENTS
The patient only needs local wound care. The patient can be discharged when medically cleared the visiting nurses and office followup.

## 2018-12-21 NOTE — CHART NOTE - NSCHARTNOTEFT_GEN_A_CORE
Per discussion with Dr. Molina, patient is surgically stable for d/c home with antibiotics and VNS for wound care to continue daily irrigation of wound with NS, and packing with iodoform. Per discussion with Dr. Molina, patient is surgically stable for d/c home with antibiotics and VNS for wound care to continue daily irrigation of wound with NS, and packing with iodoform once cleared by medical team.

## 2018-12-22 VITALS — OXYGEN SATURATION: 98 %

## 2018-12-22 LAB
ANION GAP SERPL CALC-SCNC: 7 MMOL/L — SIGNIFICANT CHANGE UP (ref 5–17)
BUN SERPL-MCNC: 16 MG/DL — SIGNIFICANT CHANGE UP (ref 7–23)
CALCIUM SERPL-MCNC: 8.9 MG/DL — SIGNIFICANT CHANGE UP (ref 8.5–10.1)
CHLORIDE SERPL-SCNC: 99 MMOL/L — SIGNIFICANT CHANGE UP (ref 96–108)
CO2 SERPL-SCNC: 32 MMOL/L — HIGH (ref 22–31)
CREAT SERPL-MCNC: 0.94 MG/DL — SIGNIFICANT CHANGE UP (ref 0.5–1.3)
CULTURE RESULTS: SIGNIFICANT CHANGE UP
GLUCOSE BLDC GLUCOMTR-MCNC: 240 MG/DL — HIGH (ref 70–99)
GLUCOSE BLDC GLUCOMTR-MCNC: 258 MG/DL — HIGH (ref 70–99)
GLUCOSE SERPL-MCNC: 258 MG/DL — HIGH (ref 70–99)
HCT VFR BLD CALC: 37.3 % — SIGNIFICANT CHANGE UP (ref 34.5–45)
HGB BLD-MCNC: 12.1 G/DL — SIGNIFICANT CHANGE UP (ref 11.5–15.5)
MCHC RBC-ENTMCNC: 29.8 PG — SIGNIFICANT CHANGE UP (ref 27–34)
MCHC RBC-ENTMCNC: 32.4 GM/DL — SIGNIFICANT CHANGE UP (ref 32–36)
MCV RBC AUTO: 91.9 FL — SIGNIFICANT CHANGE UP (ref 80–100)
NRBC # BLD: 0 /100 WBCS — SIGNIFICANT CHANGE UP (ref 0–0)
ORGANISM # SPEC MICROSCOPIC CNT: SIGNIFICANT CHANGE UP
PLATELET # BLD AUTO: 277 K/UL — SIGNIFICANT CHANGE UP (ref 150–400)
POTASSIUM SERPL-MCNC: 4.1 MMOL/L — SIGNIFICANT CHANGE UP (ref 3.5–5.3)
POTASSIUM SERPL-SCNC: 4.1 MMOL/L — SIGNIFICANT CHANGE UP (ref 3.5–5.3)
RBC # BLD: 4.06 M/UL — SIGNIFICANT CHANGE UP (ref 3.8–5.2)
RBC # FLD: 13.5 % — SIGNIFICANT CHANGE UP (ref 10.3–14.5)
SODIUM SERPL-SCNC: 138 MMOL/L — SIGNIFICANT CHANGE UP (ref 135–145)
SPECIMEN SOURCE: SIGNIFICANT CHANGE UP
WBC # BLD: 10.97 K/UL — HIGH (ref 3.8–10.5)
WBC # FLD AUTO: 10.97 K/UL — HIGH (ref 3.8–10.5)

## 2018-12-22 RX ORDER — CETIRIZINE HYDROCHLORIDE 10 MG/1
0 TABLET ORAL
Qty: 0 | Refills: 0 | COMMUNITY

## 2018-12-22 RX ORDER — GABAPENTIN 400 MG/1
0 CAPSULE ORAL
Qty: 0 | Refills: 0 | COMMUNITY

## 2018-12-22 RX ORDER — CHOLECALCIFEROL (VITAMIN D3) 125 MCG
1 CAPSULE ORAL
Qty: 0 | Refills: 0 | COMMUNITY

## 2018-12-22 RX ORDER — ESCITALOPRAM OXALATE 10 MG/1
1 TABLET, FILM COATED ORAL
Qty: 0 | Refills: 0 | COMMUNITY

## 2018-12-22 RX ORDER — DOCUSATE SODIUM 100 MG
1 CAPSULE ORAL
Qty: 0 | Refills: 0 | COMMUNITY
Start: 2018-12-22

## 2018-12-22 RX ORDER — LACTOBACILLUS ACIDOPHILUS 100MM CELL
1 CAPSULE ORAL
Qty: 20 | Refills: 0 | OUTPATIENT
Start: 2018-12-22 | End: 2018-12-31

## 2018-12-22 RX ORDER — ATORVASTATIN CALCIUM 80 MG/1
1 TABLET, FILM COATED ORAL
Qty: 30 | Refills: 0
Start: 2018-12-22 | End: 2019-01-20

## 2018-12-22 RX ADMIN — Medication 650 MILLIGRAM(S): at 12:17

## 2018-12-22 RX ADMIN — Medication 3 MILLILITER(S): at 05:18

## 2018-12-22 RX ADMIN — Medication 5 UNIT(S): at 06:15

## 2018-12-22 RX ADMIN — ENOXAPARIN SODIUM 40 MILLIGRAM(S): 100 INJECTION SUBCUTANEOUS at 11:29

## 2018-12-22 RX ADMIN — ESCITALOPRAM OXALATE 10 MILLIGRAM(S): 10 TABLET, FILM COATED ORAL at 11:28

## 2018-12-22 RX ADMIN — INSULIN GLARGINE 8 UNIT(S): 100 INJECTION, SOLUTION SUBCUTANEOUS at 07:30

## 2018-12-22 RX ADMIN — Medication 650 MILLIGRAM(S): at 11:27

## 2018-12-22 RX ADMIN — LOSARTAN POTASSIUM 50 MILLIGRAM(S): 100 TABLET, FILM COATED ORAL at 05:48

## 2018-12-22 RX ADMIN — Medication 10 MILLIGRAM(S): at 05:49

## 2018-12-22 RX ADMIN — GABAPENTIN 600 MILLIGRAM(S): 400 CAPSULE ORAL at 05:46

## 2018-12-22 RX ADMIN — OXYCODONE HYDROCHLORIDE 5 MILLIGRAM(S): 5 TABLET ORAL at 12:55

## 2018-12-22 RX ADMIN — Medication 1 MILLIGRAM(S): at 01:51

## 2018-12-22 RX ADMIN — Medication 175 MICROGRAM(S): at 05:47

## 2018-12-22 RX ADMIN — Medication 1 TABLET(S): at 11:28

## 2018-12-22 RX ADMIN — Medication 4: at 06:15

## 2018-12-22 RX ADMIN — Medication 1 MILLIGRAM(S): at 08:22

## 2018-12-22 RX ADMIN — METFORMIN HYDROCHLORIDE 1000 MILLIGRAM(S): 850 TABLET ORAL at 05:48

## 2018-12-22 RX ADMIN — Medication 100 MILLIGRAM(S): at 06:28

## 2018-12-22 RX ADMIN — OXYCODONE HYDROCHLORIDE 5 MILLIGRAM(S): 5 TABLET ORAL at 08:10

## 2018-12-22 RX ADMIN — OXYCODONE HYDROCHLORIDE 5 MILLIGRAM(S): 5 TABLET ORAL at 06:33

## 2018-12-22 RX ADMIN — Medication 1 TABLET(S): at 07:29

## 2018-12-22 RX ADMIN — Medication 100 MILLIGRAM(S): at 05:28

## 2018-12-22 RX ADMIN — Medication 3 MILLILITER(S): at 12:16

## 2018-12-22 NOTE — PROGRESS NOTE ADULT - REASON FOR ADMISSION
pelvic  abscess
pelvic  abscess  leucocytosis

## 2018-12-22 NOTE — PROGRESS NOTE ADULT - SUBJECTIVE AND OBJECTIVE BOX
INTERVAL HPI/OVERNIGHT EVENTS:        REVIEW OF SYSTEMS:  CONSTITUTIONAL:  lowe4r  abd  pain  episodically    NECK: No pain or stiffnes  RESPIRATORY: No SOB   CARDIOVASCULAR: No chest pain, palpitations, dizziness,   GASTROINTESTINAL: No abdominal pain. No nausea, vomiting,   NEUROLOGICAL: No headaches, no  blurry  vision no  dizziness  SKIN: No itching,   MUSCULOSKELETAL:  MEDICATION:  acetaminophen   Tablet .. 650 milliGRAM(s) Oral every 6 hours PRN  ALBUTerol/ipratropium for Nebulization 3 milliLiter(s) Nebulizer every 6 hours  atorvastatin 10 milliGRAM(s) Oral at bedtime  ceFAZolin   IVPB 2000 milliGRAM(s) IV Intermittent every 8 hours  ceFAZolin   IVPB      clindamycin IVPB 600 milliGRAM(s) IV Intermittent every 8 hours  clonazePAM Tablet 1 milliGRAM(s) Oral three times a day PRN  dextrose 40% Gel 15 Gram(s) Oral once PRN  dextrose 5%. 1000 milliLiter(s) IV Continuous <Continuous>  dextrose 50% Injectable 12.5 Gram(s) IV Push once  dextrose 50% Injectable 25 Gram(s) IV Push once  dextrose 50% Injectable 25 Gram(s) IV Push once  docusate sodium 100 milliGRAM(s) Oral three times a day  enoxaparin Injectable 40 milliGRAM(s) SubCutaneous daily  escitalopram 10 milliGRAM(s) Oral daily  fluticasone propionate 50 MICROgram(s)/spray Nasal Spray 1 Spray(s) Both Nostrils two times a day  gabapentin 600 milliGRAM(s) Oral three times a day  glucagon  Injectable 1 milliGRAM(s) IntraMuscular once PRN  insulin glargine Injectable (LANTUS) 5 Unit(s) SubCutaneous at bedtime  insulin lispro (HumaLOG) corrective regimen sliding scale   SubCutaneous at bedtime  insulin lispro (HumaLOG) corrective regimen sliding scale   SubCutaneous three times a day before meals  lactobacillus acidophilus 1 Tablet(s) Oral three times a day with meals  levothyroxine 175 MICROGram(s) Oral daily  losartan 50 milliGRAM(s) Oral daily  metFORMIN 1000 milliGRAM(s) Oral two times a day  morphine  - Injectable 2 milliGRAM(s) IV Push once  nicotine - 21 mG/24Hr(s) Patch 1 patch Transdermal daily  oxyCODONE    IR 5 milliGRAM(s) Oral four times a day PRN  predniSONE   Tablet 10 milliGRAM(s) Oral daily    Vital Signs Last 24 Hrs  T(C): 36.2 (20 Dec 2018 05:03), Max: 37.4 (19 Dec 2018 17:40)  T(F): 97.2 (20 Dec 2018 05:03), Max: 99.3 (19 Dec 2018 17:40)  HR: 89 (20 Dec 2018 06:22) (79 - 111)  BP: 119/57 (20 Dec 2018 05:03) (93/41 - 155/68)  BP(mean): --  RR: 18 (20 Dec 2018 05:03) (16 - 18)  SpO2: 98% (20 Dec 2018 06:22) (88% - 98%)    PHYSICAL EXAM:  GENERAL: NAD, well-groomed, well-developed  EYES:  conjunctiva and sclera clear  ENMT:  Moist mucous membranes,   NECK: Supple, No JVD, Normal thyroid  NERVOUS SYSTEM:  Alert oriented   no  focal  deficits;   CHEST/LUNG: Clear    HEART: Regular rate and rhythm; No murmurs, rubs, or gallops  ABDOMEN: Soft, Nontender, Nondistended; Bowel sounds present  rt  lower  abd  eryhtematoe  mildly  tender  nmasss    EXTREMITIES:  no  edema no  tenderness  SKIN: No rashes   LABS:                        13.0   21.60 )-----------( 271      ( 19 Dec 2018 08:04 )             39.7     12-    133<L>  |  96  |  9   ----------------------------<  360<H>  4.2   |  29  |  1.00    Ca    8.8      19 Dec 2018 08:04  Mg     1.9     18    TPro  7.8  /  Alb  3.1<L>  /  TBili  0.6  /  DBili  x   /  AST  13<L>  /  ALT  14  /  AlkPhos  110  12-19    PT/INR - ( 18 Dec 2018 13:48 )   PT: 12.0 sec;   INR: 1.07 ratio         PTT - ( 18 Dec 2018 13:48 )  PTT:33.3 sec  Urinalysis Basic - ( 18 Dec 2018 12:42 )    Color: Yellow / Appearance: Clear / S.010 / pH: x  Gluc: x / Ketone: Negative  / Bili: Negative / Urobili: Negative mg/dL   Blood: x / Protein: 15 mg/dL / Nitrite: Negative   Leuk Esterase: Negative / RBC: x / WBC 0-2   Sq Epi: x / Non Sq Epi: Few / Bacteria: x      CAPILLARY BLOOD GLUCOSE      POCT Blood Glucose.: 368 mg/dL (19 Dec 2018 21:18)  POCT Blood Glucose.: 411 mg/dL (19 Dec 2018 17:35)  POCT Blood Glucose.: 378 mg/dL (19 Dec 2018 16:00)  POCT Blood Glucose.: 483 mg/dL (19 Dec 2018 11:27)  POCT Blood Glucose.: 458 mg/dL (19 Dec 2018 11:24)  POCT Blood Glucose.: 380 mg/dL (19 Dec 2018 08:10)      RADIOLOGY & ADDITIONAL TESTS:    Imaging reports  Personally Reviewed:  [x ] YES  [ ] NO    Consultant(s) Notes Reviewed:  [ x] YES  [ ] NO    Care Discussed with Consultants/Other Providers [ x] YES  [ ] NO  Problem/Plan - 1:  ·  Problem: Pelvic abscess in female.  Plan: ancef  clindamycin surgical eval. cont  tylenol  add  oxycodone  prn    Problem/Plan - 2:  ·  Problem: DM (diabetes mellitus).  Plan: metformin  diet short  and  long  acting  insulins  statin  ARB. increase  lantus    Problem/Plan - 3:  ·  Problem: HTN (hypertension).  Plan: cozaar.     Problem/Plan - 4:  ·  Problem: COPD (chronic obstructive pulmonary disease).  Plan: duoneb prednisone.     Problem/Plan - 5:  ·  Problem: Hypothyroid.  Plan: synthroid.     Problem/Plan - 6:  Problem: Smoker. Plan: nicotine  patch.    Problem/Plan - 7:  ·  Problem: Anxiety.  Plan: klonopin.
INTERVAL HPI/OVERNIGHT EVENTS:        REVIEW OF SYSTEMS:  CONSTITUTIONAL:  abd  pain  with  dressing  change    NECK: No pain or stiffnes  RESPIRATORY: No SOB   CARDIOVASCULAR: No chest pain, palpitations, dizziness,   GASTROINTESTINAL: No abdominal pain. No nausea, vomiting,   NEUROLOGICAL: No headaches, no  blurry  vision no  dizziness  SKIN: No itching,   MUSCULOSKELETAL: No pain    MEDICATION:  acetaminophen   Tablet .. 650 milliGRAM(s) Oral every 6 hours PRN  ALBUTerol/ipratropium for Nebulization 3 milliLiter(s) Nebulizer every 6 hours  atorvastatin 10 milliGRAM(s) Oral at bedtime  ceFAZolin   IVPB 2000 milliGRAM(s) IV Intermittent every 8 hours  ceFAZolin   IVPB      clindamycin IVPB 600 milliGRAM(s) IV Intermittent every 8 hours  clonazePAM Tablet 1 milliGRAM(s) Oral three times a day PRN  dextrose 40% Gel 15 Gram(s) Oral once PRN  dextrose 5%. 1000 milliLiter(s) IV Continuous <Continuous>  dextrose 50% Injectable 12.5 Gram(s) IV Push once  dextrose 50% Injectable 25 Gram(s) IV Push once  dextrose 50% Injectable 25 Gram(s) IV Push once  docusate sodium 100 milliGRAM(s) Oral three times a day  enoxaparin Injectable 40 milliGRAM(s) SubCutaneous daily  escitalopram 10 milliGRAM(s) Oral daily  fluticasone propionate 50 MICROgram(s)/spray Nasal Spray 1 Spray(s) Both Nostrils two times a day  gabapentin 600 milliGRAM(s) Oral three times a day  glucagon  Injectable 1 milliGRAM(s) IntraMuscular once PRN  insulin glargine Injectable (LANTUS) 8 Unit(s) SubCutaneous two times a day  insulin lispro (HumaLOG) corrective regimen sliding scale   SubCutaneous at bedtime  insulin lispro (HumaLOG) corrective regimen sliding scale   SubCutaneous three times a day before meals  insulin lispro Injectable (HumaLOG) 5 Unit(s) SubCutaneous three times a day before meals  lactobacillus acidophilus 1 Tablet(s) Oral three times a day with meals  levothyroxine 175 MICROGram(s) Oral daily  losartan 50 milliGRAM(s) Oral daily  metFORMIN 1000 milliGRAM(s) Oral two times a day  morphine  - Injectable 2 milliGRAM(s) IV Push once  nicotine - 21 mG/24Hr(s) Patch 1 patch Transdermal daily  oxyCODONE    IR 5 milliGRAM(s) Oral four times a day PRN  predniSONE   Tablet 10 milliGRAM(s) Oral daily    Vital Signs Last 24 Hrs  T(C): 36.2 (21 Dec 2018 04:57), Max: 36.8 (20 Dec 2018 10:53)  T(F): 97.2 (21 Dec 2018 04:57), Max: 98.3 (20 Dec 2018 10:53)  HR: 85 (21 Dec 2018 05:12) (76 - 94)  BP: 125/60 (21 Dec 2018 04:57) (103/43 - 143/64)  BP(mean): --  RR: 18 (21 Dec 2018 04:57) (16 - 18)  SpO2: 95% (21 Dec 2018 05:12) (93% - 99%)    PHYSICAL EXAM:  GENERAL: NAD, well-groomed, well-developed  EYES:  conjunctiva and sclera clear  ENMT:  Moist mucous membranes,   NECK: Supple, No JVD, Normal thyroid  NERVOUS SYSTEM:  Alert oriented   no  focal  deficits;   CHEST/LUNG: Clear    HEART: Regular rate and rhythm; No murmurs, rubs, or gallops  ABDOMEN: Soft, Nontender, Nondistended; Bowel sounds present  EXTREMITIES:  no  edema no  tenderness  SKIN: No rashes   LABS:                        12.2   12.15 )-----------( 254      ( 20 Dec 2018 07:41 )             37.7     12-20    136  |  99  |  12  ----------------------------<  343<H>  3.8   |  28  |  0.89    Ca    8.5      20 Dec 2018 07:41    TPro  6.7  /  Alb  2.6<L>  /  TBili  0.4  /  DBili  x   /  AST  4<L>  /  ALT  13  /  AlkPhos  91  12-20        CAPILLARY BLOOD GLUCOSE      POCT Blood Glucose.: 298 mg/dL (20 Dec 2018 21:14)  POCT Blood Glucose.: 474 mg/dL (20 Dec 2018 16:44)  POCT Blood Glucose.: 465 mg/dL (20 Dec 2018 16:43)  POCT Blood Glucose.: 411 mg/dL (20 Dec 2018 11:20)  POCT Blood Glucose.: 440 mg/dL (20 Dec 2018 11:19)  POCT Blood Glucose.: 376 mg/dL (20 Dec 2018 08:01)  POCT Blood Glucose.: 420 mg/dL (20 Dec 2018 08:00)      RADIOLOGY & ADDITIONAL TESTS:    Imaging reports  Personally Reviewed:  [x ] YES  [ ] NO    Consultant(s) Notes Reviewed:  [x ] YES  [ ] NO    Care Discussed with Consultants/Other Providers [ x] YES  [ ] NO  Problem/Plan - 1:  ·  Problem: Pelvic abscess in female.  Plan: ancef  clindamycin surgical eval. cont  tylenol  add  oxycodone  prn    Problem/Plan - 2:  ·  Problem: DM (diabetes mellitus).  Plan: metformin  diet short  and  long  acting  insulins  statin  ARB. increase  lantus to  bid  bnutritional  insulin    Problem/Plan - 3:  ·  Problem: HTN (hypertension).  Plan: cozaar.     Problem/Plan - 4:  ·  Problem: COPD (chronic obstructive pulmonary disease).  Plan: duoneb prednisone.     Problem/Plan - 5:  ·  Problem: Hypothyroid.  Plan: synthroid.     Problem/Plan - 6:  Problem: Smoker. Plan: nicotine  patch.
INTERVAL HPI/OVERNIGHT EVENTS:        REVIEW OF SYSTEMS:  CONSTITUTIONAL:  low  abd  pain    NECK: No pain or stiffnes  RESPIRATORY: No SOB   CARDIOVASCULAR: No chest pain, palpitations, dizziness,   GASTROINTESTINAL: No abdominal pain. No nausea, vomiting,   NEUROLOGICAL: No headaches, no  blurry  vision no  dizziness  SKIN: No itching,   MUSCULOSKELETAL:    MEDICATION:  acetaminophen   Tablet .. 650 milliGRAM(s) Oral every 6 hours PRN  ALBUTerol/ipratropium for Nebulization 3 milliLiter(s) Nebulizer every 6 hours  atorvastatin 10 milliGRAM(s) Oral at bedtime  ceFAZolin   IVPB 2000 milliGRAM(s) IV Intermittent every 8 hours  ceFAZolin   IVPB      clindamycin IVPB 600 milliGRAM(s) IV Intermittent every 8 hours  clonazePAM Tablet 1 milliGRAM(s) Oral three times a day PRN  dextrose 40% Gel 15 Gram(s) Oral once PRN  dextrose 5%. 1000 milliLiter(s) IV Continuous <Continuous>  dextrose 50% Injectable 12.5 Gram(s) IV Push once  dextrose 50% Injectable 25 Gram(s) IV Push once  dextrose 50% Injectable 25 Gram(s) IV Push once  enoxaparin Injectable 40 milliGRAM(s) SubCutaneous daily  escitalopram 10 milliGRAM(s) Oral daily  fluticasone propionate 50 MICROgram(s)/spray Nasal Spray 1 Spray(s) Both Nostrils two times a day  gabapentin 600 milliGRAM(s) Oral three times a day  glucagon  Injectable 1 milliGRAM(s) IntraMuscular once PRN  insulin lispro (HumaLOG) corrective regimen sliding scale   SubCutaneous three times a day before meals  insulin lispro (HumaLOG) corrective regimen sliding scale   SubCutaneous at bedtime  lactobacillus acidophilus 1 Tablet(s) Oral three times a day with meals  levothyroxine 175 MICROGram(s) Oral daily  losartan 50 milliGRAM(s) Oral daily  metFORMIN 1000 milliGRAM(s) Oral two times a day  nicotine - 21 mG/24Hr(s) Patch 1 patch Transdermal daily  predniSONE   Tablet 10 milliGRAM(s) Oral daily    Vital Signs Last 24 Hrs  T(C): 38.3 (19 Dec 2018 04:53), Max: 38.8 (18 Dec 2018 12:26)  T(F): 101 (19 Dec 2018 04:53), Max: 101.9 (18 Dec 2018 12:26)  HR: 106 (19 Dec 2018 05:14) (105 - 115)  BP: 135/83 (19 Dec 2018 04:53) (118/67 - 184/73)  BP(mean): --  RR: 19 (19 Dec 2018 04:53) (18 - 20)  SpO2: 98% (19 Dec 2018 05:14) (91% - 98%)    PHYSICAL EXAM:  GENERAL: NAD, well-groomed, well-developed  EYES:  conjunctiva and sclera clear  ENMT:  Moist mucous membranes,   NECK: Supple, No JVD, Normal thyroid  NERVOUS SYSTEM:  Alert oriented   no  focal  deficits;   CHEST/LUNG: Clear    HEART: Regular rate and rhythm; No murmurs, rubs, or gallops  ABDOMEN: Rt  pelvic  abscess  EXTREMITIES:  no  edema no  tenderness  SKIN: No rashes   LABS:                        13.0   21.60 )-----------( 271      ( 19 Dec 2018 08:04 )             39.7     12    133<L>  |  96  |  9   ----------------------------<  360<H>  4.2   |  29  |  1.00    Ca    8.8      19 Dec 2018 08:04  Mg     1.9     12    TPro  7.8  /  Alb  3.1<L>  /  TBili  0.6  /  DBili  x   /  AST  13<L>  /  ALT  14  /  AlkPhos  110  12-    PT/INR - ( 18 Dec 2018 13:48 )   PT: 12.0 sec;   INR: 1.07 ratio         PTT - ( 18 Dec 2018 13:48 )  PTT:33.3 sec  Urinalysis Basic - ( 18 Dec 2018 12:42 )    Color: Yellow / Appearance: Clear / S.010 / pH: x  Gluc: x / Ketone: Negative  / Bili: Negative / Urobili: Negative mg/dL   Blood: x / Protein: 15 mg/dL / Nitrite: Negative   Leuk Esterase: Negative / RBC: x / WBC 0-2   Sq Epi: x / Non Sq Epi: Few / Bacteria: x      CAPILLARY BLOOD GLUCOSE      POCT Blood Glucose.: 380 mg/dL (19 Dec 2018 08:10)  POCT Blood Glucose.: 486 mg/dL (18 Dec 2018 23:04)  POCT Blood Glucose.: 463 mg/dL (18 Dec 2018 23:02)  POCT Blood Glucose.: 291 mg/dL (18 Dec 2018 12:11)      RADIOLOGY & ADDITIONAL TESTS:    Imaging reports  Personally Reviewed:  [x ] YES  [ ] NO    Consultant(s) Notes Reviewed:  [x ] YES  [ ] NO    Care Discussed with Consultants/Other Providers [ x] YES  [ ] NO  Problem/Plan - 1:  ·  Problem: Pelvic abscess in female.  Plan: ancef  clindamycin surgical eval. cont  tylenol  add  oxycodone  prn    Problem/Plan - 2:  ·  Problem: DM (diabetes mellitus).  Plan: metformin  diet short  and  long  acting  insulins  statin  ARB.     Problem/Plan - 3:  ·  Problem: HTN (hypertension).  Plan: cozaar.     Problem/Plan - 4:  ·  Problem: COPD (chronic obstructive pulmonary disease).  Plan: duoneb prednisone.     Problem/Plan - 5:  ·  Problem: Hypothyroid.  Plan: synthroid.     Problem/Plan - 6:  Problem: Smoker. Plan: nicotine  patch.    Problem/Plan - 7:  ·  Problem: Anxiety.  Plan: klonopin.
INTERVAL HPI/OVERNIGHT EVENTS:        REVIEW OF SYSTEMS:  CONSTITUTIONAL:  no  complaints    NECK: No pain or stiffnes  RESPIRATORY: No SOB   CARDIOVASCULAR: No chest pain, palpitations, dizziness,   GASTROINTESTINAL: No abdominal pain. No nausea, vomiting,   NEUROLOGICAL: No headaches, no  blurry  vision no  dizziness  SKIN: No itching,   MUSCULOSKELETAL: No pain    MEDICATION:  acetaminophen   Tablet .. 650 milliGRAM(s) Oral every 6 hours PRN  ALBUTerol/ipratropium for Nebulization 3 milliLiter(s) Nebulizer every 6 hours  atorvastatin 10 milliGRAM(s) Oral at bedtime  ceFAZolin   IVPB 2000 milliGRAM(s) IV Intermittent every 8 hours  ceFAZolin   IVPB      clindamycin IVPB 600 milliGRAM(s) IV Intermittent every 8 hours  clonazePAM Tablet 1 milliGRAM(s) Oral three times a day PRN  dextrose 40% Gel 15 Gram(s) Oral once PRN  dextrose 5%. 1000 milliLiter(s) IV Continuous <Continuous>  dextrose 50% Injectable 12.5 Gram(s) IV Push once  dextrose 50% Injectable 25 Gram(s) IV Push once  dextrose 50% Injectable 25 Gram(s) IV Push once  docusate sodium 100 milliGRAM(s) Oral three times a day  enoxaparin Injectable 40 milliGRAM(s) SubCutaneous daily  escitalopram 10 milliGRAM(s) Oral daily  fluticasone propionate 50 MICROgram(s)/spray Nasal Spray 1 Spray(s) Both Nostrils two times a day  gabapentin 600 milliGRAM(s) Oral three times a day  glucagon  Injectable 1 milliGRAM(s) IntraMuscular once PRN  insulin glargine Injectable (LANTUS) 8 Unit(s) SubCutaneous two times a day  insulin lispro (HumaLOG) corrective regimen sliding scale   SubCutaneous at bedtime  insulin lispro (HumaLOG) corrective regimen sliding scale   SubCutaneous three times a day before meals  insulin lispro Injectable (HumaLOG) 5 Unit(s) SubCutaneous three times a day before meals  lactobacillus acidophilus 1 Tablet(s) Oral three times a day with meals  levothyroxine 175 MICROGram(s) Oral daily  losartan 50 milliGRAM(s) Oral daily  metFORMIN 1000 milliGRAM(s) Oral two times a day  morphine  - Injectable 2 milliGRAM(s) IV Push once  nicotine - 21 mG/24Hr(s) Patch 1 patch Transdermal daily  oxyCODONE    IR 5 milliGRAM(s) Oral four times a day PRN  predniSONE   Tablet 10 milliGRAM(s) Oral daily    Vital Signs Last 24 Hrs  T(C): 36.6 (22 Dec 2018 05:04), Max: 37.1 (21 Dec 2018 11:20)  T(F): 97.8 (22 Dec 2018 05:04), Max: 98.7 (21 Dec 2018 11:20)  HR: 82 (22 Dec 2018 05:18) (82 - 100)  BP: 121/56 (22 Dec 2018 05:04) (112/58 - 151/73)  BP(mean): --  RR: 18 (22 Dec 2018 05:04) (16 - 18)  SpO2: 98% (22 Dec 2018 05:18) (92% - 98%)    PHYSICAL EXAM:  GENERAL: NAD, well-groomed, well-developed  EYES:  conjunctiva and sclera clear  ENMT:  Moist mucous membranes,   NECK: Supple, No JVD, Normal thyroid  NERVOUS SYSTEM:  Alert oriented   no  focal  deficits;   CHEST/LUNG: Clear    HEART: Regular rate and rhythm; No murmurs, rubs, or gallops  ABDOMEN: Soft, Nontender, Nondistended; Bowel sounds present  EXTREMITIES:  no  edema no  tenderness  SKIN: No rashes   LABS:                        12.4   13.06 )-----------( 308      ( 21 Dec 2018 08:29 )             38.3     12-21    135  |  97  |  11  ----------------------------<  426<H>  4.3   |  29  |  0.98    Ca    8.8      21 Dec 2018 08:29  Phos  2.9     12-21  Mg     2.0     12-21    TPro  6.7  /  Alb  2.6<L>  /  TBili  0.4  /  DBili  x   /  AST  4<L>  /  ALT  13  /  AlkPhos  91  12-20        CAPILLARY BLOOD GLUCOSE      POCT Blood Glucose.: 258 mg/dL (22 Dec 2018 07:26)  POCT Blood Glucose.: 240 mg/dL (22 Dec 2018 06:10)  POCT Blood Glucose.: 309 mg/dL (21 Dec 2018 21:08)  POCT Blood Glucose.: 357 mg/dL (21 Dec 2018 15:37)  POCT Blood Glucose.: 474 mg/dL (21 Dec 2018 11:12)  POCT Blood Glucose.: 519 mg/dL (21 Dec 2018 11:11)  POCT Blood Glucose.: 415 mg/dL (21 Dec 2018 07:55)  POCT Blood Glucose.: 465 mg/dL (21 Dec 2018 07:54)      RADIOLOGY & ADDITIONAL TESTS:    Imaging reports  Personally Reviewed:  [ x] YES  [ ] NO    Consultant(s) Notes Reviewed:  [ x] YES  [ ] NO    Care Discussed with Consultants/Other Providers [x ] YES  [ ] NO  Problem/Plan - 1:  ·  Problem: Pelvic abscess in female.  Plan:  surgical eval. cont  tylenol  add  oxycodone  prn  give  Augmentin  as  out  patient  continue  local care    Problem/Plan - 2:  ·  Problem: DM (diabetes mellitus).  Plan: metformin  diet short  and  long  acting  insulins  statin  ARB. increase  lantus to  bid  bnutritional  insulin    Problem/Plan - 3:  ·  Problem: HTN (hypertension).  Plan: cozaar.     Problem/Plan - 4:  ·  Problem: COPD (chronic obstructive pulmonary disease).  Plan: duoneb prednisone.     Problem/Plan - 5:  ·  Problem: Hypothyroid.  Plan: synthroid.     Problem/Plan - 6:  Problem: Smoker. Plan: nicotine  patch.  discharge  home  continue  lantus  and  humalog  will  restart  januvia  and continue  metformin
INTERVAL HPI/OVERNIGHT EVENTS: No acute events overnight. patient states that pain is improved.    Vital Signs Last 24 Hrs  T(C): 36.6 (22 Dec 2018 05:04), Max: 37.1 (21 Dec 2018 11:20)  T(F): 97.8 (22 Dec 2018 05:04), Max: 98.7 (21 Dec 2018 11:20)  HR: 82 (22 Dec 2018 05:18) (82 - 100)  BP: 121/56 (22 Dec 2018 05:04) (112/58 - 151/73)  BP(mean): --  RR: 18 (22 Dec 2018 05:04) (16 - 18)  SpO2: 98% (22 Dec 2018 05:18) (92% - 98%)    MEDICATIONS  (STANDING):  ALBUTerol/ipratropium for Nebulization 3 milliLiter(s) Nebulizer every 6 hours  atorvastatin 10 milliGRAM(s) Oral at bedtime  ceFAZolin   IVPB 2000 milliGRAM(s) IV Intermittent every 8 hours  ceFAZolin   IVPB      clindamycin IVPB 600 milliGRAM(s) IV Intermittent every 8 hours  dextrose 5%. 1000 milliLiter(s) (50 mL/Hr) IV Continuous <Continuous>  dextrose 50% Injectable 12.5 Gram(s) IV Push once  dextrose 50% Injectable 25 Gram(s) IV Push once  dextrose 50% Injectable 25 Gram(s) IV Push once  docusate sodium 100 milliGRAM(s) Oral three times a day  enoxaparin Injectable 40 milliGRAM(s) SubCutaneous daily  escitalopram 10 milliGRAM(s) Oral daily  fluticasone propionate 50 MICROgram(s)/spray Nasal Spray 1 Spray(s) Both Nostrils two times a day  gabapentin 600 milliGRAM(s) Oral three times a day  insulin glargine Injectable (LANTUS) 8 Unit(s) SubCutaneous two times a day  insulin lispro (HumaLOG) corrective regimen sliding scale   SubCutaneous at bedtime  insulin lispro (HumaLOG) corrective regimen sliding scale   SubCutaneous three times a day before meals  insulin lispro Injectable (HumaLOG) 5 Unit(s) SubCutaneous three times a day before meals  lactobacillus acidophilus 1 Tablet(s) Oral three times a day with meals  levothyroxine 175 MICROGram(s) Oral daily  losartan 50 milliGRAM(s) Oral daily  metFORMIN 1000 milliGRAM(s) Oral two times a day  morphine  - Injectable 2 milliGRAM(s) IV Push once  nicotine - 21 mG/24Hr(s) Patch 1 patch Transdermal daily  predniSONE   Tablet 10 milliGRAM(s) Oral daily    MEDICATIONS  (PRN):  acetaminophen   Tablet .. 650 milliGRAM(s) Oral every 6 hours PRN Moderate Pain (4 - 6)  clonazePAM Tablet 1 milliGRAM(s) Oral three times a day PRN anxiety  dextrose 40% Gel 15 Gram(s) Oral once PRN Blood Glucose LESS THAN 70 milliGRAM(s)/deciliter  glucagon  Injectable 1 milliGRAM(s) IntraMuscular once PRN Glucose LESS THAN 70 milligrams/deciliter  oxyCODONE    IR 5 milliGRAM(s) Oral four times a day PRN Severe Pain (7 - 10)      PHYSICAL EXAM    GENERAL: AAO in NAD  CHEST/LUNG: No respiratory distress   ABDOMEN: Obese, soft, NTND.  : Midline/Right groin with 2 sites from prior I&D. 1 with packing, which was removed. No gross pus, minimal blood. Area indurated and hard, tender. No units in hospital with 1/4" packing (I visited all units this morning in search for supplies), so I used 1 layer of gauze to pack both incision sites. Covered with 4x4. Patient tolerated well.    I&O:  I&O's Detail    20 Dec 2018 07:01  -  21 Dec 2018 07:00  --------------------------------------------------------  IN:    IV PiggyBack: 100 mL    Oral Fluid: 440 mL    Solution: 150 mL    Solution: 150 mL  Total IN: 840 mL    OUT:  Total OUT: 0 mL    Total NET: 840 mL      21 Dec 2018 07:01  -  22 Dec 2018 06:25  --------------------------------------------------------  IN:    Oral Fluid: 540 mL    Solution: 50 mL    Solution: 50 mL  Total IN: 640 mL    OUT:  Total OUT: 0 mL    Total NET: 640 mL          LABS:                        12.4   13.06 )-----------( 308      ( 21 Dec 2018 08:29 )             38.3     12-21    135  |  97  |  11  ----------------------------<  426<H>  4.3   |  29  |  0.98    Ca    8.8      21 Dec 2018 08:29  Phos  2.9     12-21  Mg     2.0     12-21    TPro  6.7  /  Alb  2.6<L>  /  TBili  0.4  /  DBili  x   /  AST  4<L>  /  ALT  13  /  AlkPhos  91  12-20              Impression: 58F with groin abscess s/p I&D.    Plan:  Pain control  Needs better blood glucose control  Discharge planning with [facility].
SURGERY PROGRESS HPI:  Called by RN because patient's dressing fell off while she was going to the bathroom. Pt seen and examined at bedside. Pain is well controlled on pain medication. Pt denies complaints. Pt tolerating diet. Pt denies nausea and vomiting.  +Normal BM/flatus. Voiding well. Pt denies chest pain, SOB, dizziness, fever, chills. Ambulating.    Vital Signs Last 24 Hrs  T(C): 36.5 (19 Dec 2018 23:36), Max: 38.3 (19 Dec 2018 04:53)  T(F): 97.7 (19 Dec 2018 23:36), Max: 101 (19 Dec 2018 04:53)  HR: 95 (19 Dec 2018 23:52) (81 - 111)  BP: 100/58 (19 Dec 2018 23:36) (93/41 - 155/68)  BP(mean): --  RR: 16 (19 Dec 2018 23:36) (16 - 19)  SpO2: 97% (19 Dec 2018 23:52) (88% - 98%)      PHYSICAL EXAM:  GENERAL: NAD  CHEST/LUNG: Clear to ausculation, bilaterally   HEART: RRR S1S2  ABDOMEN: non distended, +BS, soft, non tender, no guarding  Groin: Right groin abscess with a small opening and small amount of purulent drainage. Cellulitis with induration. Tender to palpation. Irrigated with NS. Iodoform packing placed and new dressing placed on top. Patient tolerated well.    EXTREMITIES:  calf soft, non tender b/l    I&O's Detail    18 Dec 2018 07:01  -  19 Dec 2018 07:00  --------------------------------------------------------  IN:    IV PiggyBack: 150 mL    Oral Fluid: 100 mL  Total IN: 250 mL    OUT:    Voided: 2 mL  Total OUT: 2 mL    Total NET: 248 mL      19 Dec 2018 07:01  -  20 Dec 2018 04:16  --------------------------------------------------------  IN:    IV PiggyBack: 100 mL    Oral Fluid: 360 mL    Solution: 50 mL  Total IN: 510 mL    OUT:  Total OUT: 0 mL    Total NET: 510 mL      LABS:                        13.0   21.60 )-----------( 271      ( 19 Dec 2018 08:04 )             39.7         133<L>  |  96  |  9   ----------------------------<  360<H>  4.2   |  29  |  1.00    Ca    8.8      19 Dec 2018 08:04  Mg     1.9         TPro  7.8  /  Alb  3.1<L>  /  TBili  0.6  /  DBili  x   /  AST  13<L>  /  ALT  14  /  AlkPhos  110      PT/INR - ( 18 Dec 2018 13:48 )   PT: 12.0 sec;   INR: 1.07 ratio    PTT - ( 18 Dec 2018 13:48 )  PTT:33.3 sec    Urinalysis Basic - ( 18 Dec 2018 12:42 )  Color: Yellow / Appearance: Clear / S.010 / pH: x  Gluc: x / Ketone: Negative  / Bili: Negative / Urobili: Negative mg/dL   Blood: x / Protein: 15 mg/dL / Nitrite: Negative   Leuk Esterase: Negative / RBC: x / WBC 0-2   Sq Epi: x / Non Sq Epi: Few / Bacteria: x    Culture Results:   Few Escherichia coli  Few Enterococcus faecalis ( @ 00:35)  Culture Results:   10,000 - 49,000 CFU/mL Enterococcus faecalis ( @ 17:03)    CT Abdomen and Pelvis w/ IV Cont (18 @ 13:43)   Impression: Hepatic steatosis.  Cholelithiasis. Hyperdensity in the gallbladder fundus may represent   sludge; however, a gallbladder mass/cancer cannot be excluded. Mildly   dilated common bile duct. If clinically indicated, abdominal MR without   and with IV contrast including MRCP may be pursued for further evaluation.  Mildly enlarged lymph nodes at the right external iliac chain and the   right groin.  4.9 cm hypodense lesion in the left adnexal region, grossly unchanged,   suggestive of a left ovarian cyst.  Apparent 3.3 x 2.4 x 4.0 cm subcutaneous fluid collection in the right   groin with an enhancing wall and adjacent stranding, suggestive of an   abscess.    US Abdomen Complete (18 @ 17:34)   IMPRESSION:  Gallstones and sludge within a thickened and edematous gallbladder. No   intrahepatic ductal dilatation. Underlying hepatomegaly with fatty   infiltration.  Negative sonographic Gil sign..        Assessment: 57 y/o female with right groin abscess    Plan:  -continue local wound care and packing  -continue antibiotics   -pain management prn  -continue DVT prophylaxis, OOB, Ambulating  -f/u labs and culture  -continue medical management   -will discuss pt with surgical attending
SURGERY PROGRESS HPI:  Pt seen and examined at bedside. Pain is well controlled on pain medication. Pt denies complaints. Pt tolerating diet. Pt denies nausea and vomiting.  +Normal BM/flatus. Voiding well. Pt denies chest pain, SOB, dizziness, fever, chills. Ambulating.      Vital Signs Last 24 Hrs  T(C): 36.2 (21 Dec 2018 04:57), Max: 36.8 (20 Dec 2018 10:53)  T(F): 97.2 (21 Dec 2018 04:57), Max: 98.3 (20 Dec 2018 10:53)  HR: 85 (21 Dec 2018 05:12) (76 - 94)  BP: 125/60 (21 Dec 2018 04:57) (103/43 - 143/64)  BP(mean): --  RR: 18 (21 Dec 2018 04:57) (16 - 18)  SpO2: 95% (21 Dec 2018 05:12) (93% - 99%)      GENERAL: NAD  CHEST/LUNG: Clear to ausculation, bilaterally   HEART: RRR S1S2  ABDOMEN: non distended, +BS, soft, non tender, no guarding  Groin: Right groin abscess with a small opening and small amount of purulent drainage. Cellulitis with induration. Tender to palpation. Irrigated with NS. Iodoform packing placed and new dressing placed on top. Patient tolerated well.    EXTREMITIES:  calf soft, non tender b/l    I&O's Detail    19 Dec 2018 07:01  -  20 Dec 2018 07:00  --------------------------------------------------------  IN:    IV PiggyBack: 100 mL    Oral Fluid: 360 mL    Solution: 100 mL    Solution: 50 mL  Total IN: 610 mL    OUT:  Total OUT: 0 mL    Total NET: 610 mL      20 Dec 2018 07:01  -  21 Dec 2018 06:34  --------------------------------------------------------  IN:    IV PiggyBack: 100 mL    Oral Fluid: 440 mL    Solution: 150 mL    Solution: 150 mL  Total IN: 840 mL    OUT:  Total OUT: 0 mL    Total NET: 840 mL          LABS:                        12.2   12.15 )-----------( 254      ( 20 Dec 2018 07:41 )             37.7     12-20    136  |  99  |  12  ----------------------------<  343<H>  3.8   |  28  |  0.89    Ca    8.5      20 Dec 2018 07:41    TPro  6.7  /  Alb  2.6<L>  /  TBili  0.4  /  DBili  x   /  AST  4<L>  /  ALT  13  /  AlkPhos  91  12-20        Culture Results:   Few Escherichia coli  Few Enterococcus faecalis  Few Coag Negative Staphylococcus "Susceptibilities not performed" (12-19 @ 00:35)  Culture Results:   10,000 - 49,000 CFU/mL Enterococcus faecalis (12-18 @ 17:03)      Assessment: 59 y/o female with right groin abscess    Plan:  -continue local wound care and packing  -continue antibiotics   -pain management prn  -continue DVT prophylaxis, OOB, Ambulating  -f/u labs and culture  -continue medical management   -will discuss pt with surgical attending

## 2018-12-22 NOTE — PROGRESS NOTE ADULT - ATTENDING COMMENTS
I have seen and examined the patient and agree with the surgical PA's note. This abscess is no pelvic it is cellulitis in the left gron extended over to the pubis - concur with outpatient ABX and wound care.  Dr. Dajuan Molina contact information [phone]

## 2018-12-26 DIAGNOSIS — N73.9 FEMALE PELVIC INFLAMMATORY DISEASE, UNSPECIFIED: ICD-10-CM

## 2018-12-26 DIAGNOSIS — J44.9 CHRONIC OBSTRUCTIVE PULMONARY DISEASE, UNSPECIFIED: ICD-10-CM

## 2018-12-26 DIAGNOSIS — F41.9 ANXIETY DISORDER, UNSPECIFIED: ICD-10-CM

## 2018-12-26 DIAGNOSIS — D72.829 ELEVATED WHITE BLOOD CELL COUNT, UNSPECIFIED: ICD-10-CM

## 2018-12-26 DIAGNOSIS — E03.9 HYPOTHYROIDISM, UNSPECIFIED: ICD-10-CM

## 2018-12-26 DIAGNOSIS — K42.9 UMBILICAL HERNIA WITHOUT OBSTRUCTION OR GANGRENE: ICD-10-CM

## 2018-12-26 DIAGNOSIS — Z79.84 LONG TERM (CURRENT) USE OF ORAL HYPOGLYCEMIC DRUGS: ICD-10-CM

## 2018-12-26 DIAGNOSIS — F40.00 AGORAPHOBIA, UNSPECIFIED: ICD-10-CM

## 2018-12-26 DIAGNOSIS — F17.200 NICOTINE DEPENDENCE, UNSPECIFIED, UNCOMPLICATED: ICD-10-CM

## 2018-12-26 DIAGNOSIS — E66.01 MORBID (SEVERE) OBESITY DUE TO EXCESS CALORIES: ICD-10-CM

## 2018-12-26 DIAGNOSIS — E11.9 TYPE 2 DIABETES MELLITUS WITHOUT COMPLICATIONS: ICD-10-CM

## 2019-01-19 ENCOUNTER — INPATIENT (INPATIENT)
Facility: HOSPITAL | Age: 59
LOS: 2 days | Discharge: HOME HEALTH | End: 2019-01-22
Attending: INTERNAL MEDICINE | Admitting: INTERNAL MEDICINE
Payer: COMMERCIAL

## 2019-01-19 VITALS
HEIGHT: 62 IN | HEART RATE: 100 BPM | DIASTOLIC BLOOD PRESSURE: 54 MMHG | RESPIRATION RATE: 18 BRPM | OXYGEN SATURATION: 95 % | SYSTOLIC BLOOD PRESSURE: 108 MMHG | TEMPERATURE: 98 F | WEIGHT: 263.89 LBS

## 2019-01-19 LAB
ALBUMIN SERPL ELPH-MCNC: 3.7 G/DL — SIGNIFICANT CHANGE UP (ref 3.3–5)
ALP SERPL-CCNC: 105 U/L — SIGNIFICANT CHANGE UP (ref 40–120)
ALT FLD-CCNC: 17 U/L — SIGNIFICANT CHANGE UP (ref 12–78)
ANION GAP SERPL CALC-SCNC: 8 MMOL/L — SIGNIFICANT CHANGE UP (ref 5–17)
APPEARANCE UR: CLEAR — SIGNIFICANT CHANGE UP
APTT BLD: 35.4 SEC — SIGNIFICANT CHANGE UP (ref 27.5–36.3)
AST SERPL-CCNC: 20 U/L — SIGNIFICANT CHANGE UP (ref 15–37)
BACTERIA # UR AUTO: ABNORMAL
BASOPHILS # BLD AUTO: 0.13 K/UL — SIGNIFICANT CHANGE UP (ref 0–0.2)
BASOPHILS NFR BLD AUTO: 0.7 % — SIGNIFICANT CHANGE UP (ref 0–2)
BILIRUB SERPL-MCNC: 0.4 MG/DL — SIGNIFICANT CHANGE UP (ref 0.2–1.2)
BILIRUB UR-MCNC: NEGATIVE — SIGNIFICANT CHANGE UP
BUN SERPL-MCNC: 16 MG/DL — SIGNIFICANT CHANGE UP (ref 7–23)
CALCIUM SERPL-MCNC: 9.2 MG/DL — SIGNIFICANT CHANGE UP (ref 8.5–10.1)
CHLORIDE SERPL-SCNC: 99 MMOL/L — SIGNIFICANT CHANGE UP (ref 96–108)
CO2 SERPL-SCNC: 28 MMOL/L — SIGNIFICANT CHANGE UP (ref 22–31)
COLOR SPEC: YELLOW — SIGNIFICANT CHANGE UP
CREAT SERPL-MCNC: 0.85 MG/DL — SIGNIFICANT CHANGE UP (ref 0.5–1.3)
DIFF PNL FLD: ABNORMAL
EOSINOPHIL # BLD AUTO: 0.59 K/UL — HIGH (ref 0–0.5)
EOSINOPHIL NFR BLD AUTO: 3.2 % — SIGNIFICANT CHANGE UP (ref 0–6)
EPI CELLS # UR: SIGNIFICANT CHANGE UP
ERYTHROCYTE [SEDIMENTATION RATE] IN BLOOD: 29 MM/HR — HIGH (ref 0–20)
GLUCOSE BLDC GLUCOMTR-MCNC: 274 MG/DL — HIGH (ref 70–99)
GLUCOSE SERPL-MCNC: 192 MG/DL — HIGH (ref 70–99)
GLUCOSE UR QL: NEGATIVE MG/DL — SIGNIFICANT CHANGE UP
HCT VFR BLD CALC: 42.4 % — SIGNIFICANT CHANGE UP (ref 34.5–45)
HGB BLD-MCNC: 13.8 G/DL — SIGNIFICANT CHANGE UP (ref 11.5–15.5)
IMM GRANULOCYTES NFR BLD AUTO: 1.4 % — SIGNIFICANT CHANGE UP (ref 0–1.5)
INR BLD: 0.99 RATIO — SIGNIFICANT CHANGE UP (ref 0.88–1.16)
KETONES UR-MCNC: NEGATIVE — SIGNIFICANT CHANGE UP
LACTATE SERPL-SCNC: 1.6 MMOL/L — SIGNIFICANT CHANGE UP (ref 0.7–2)
LEUKOCYTE ESTERASE UR-ACNC: NEGATIVE — SIGNIFICANT CHANGE UP
LYMPHOCYTES # BLD AUTO: 23.8 % — SIGNIFICANT CHANGE UP (ref 13–44)
LYMPHOCYTES # BLD AUTO: 4.36 K/UL — HIGH (ref 1–3.3)
MAGNESIUM SERPL-MCNC: 2.1 MG/DL — SIGNIFICANT CHANGE UP (ref 1.6–2.6)
MCHC RBC-ENTMCNC: 30.1 PG — SIGNIFICANT CHANGE UP (ref 27–34)
MCHC RBC-ENTMCNC: 32.5 GM/DL — SIGNIFICANT CHANGE UP (ref 32–36)
MCV RBC AUTO: 92.4 FL — SIGNIFICANT CHANGE UP (ref 80–100)
MONOCYTES # BLD AUTO: 0.92 K/UL — HIGH (ref 0–0.9)
MONOCYTES NFR BLD AUTO: 5 % — SIGNIFICANT CHANGE UP (ref 2–14)
NEUTROPHILS # BLD AUTO: 12.04 K/UL — HIGH (ref 1.8–7.4)
NEUTROPHILS NFR BLD AUTO: 65.9 % — SIGNIFICANT CHANGE UP (ref 43–77)
NITRITE UR-MCNC: NEGATIVE — SIGNIFICANT CHANGE UP
NRBC # BLD: 0 /100 WBCS — SIGNIFICANT CHANGE UP (ref 0–0)
PH UR: 5 — SIGNIFICANT CHANGE UP (ref 5–8)
PLATELET # BLD AUTO: 302 K/UL — SIGNIFICANT CHANGE UP (ref 150–400)
POTASSIUM SERPL-MCNC: 4.3 MMOL/L — SIGNIFICANT CHANGE UP (ref 3.5–5.3)
POTASSIUM SERPL-SCNC: 4.3 MMOL/L — SIGNIFICANT CHANGE UP (ref 3.5–5.3)
PROT SERPL-MCNC: 8 GM/DL — SIGNIFICANT CHANGE UP (ref 6–8.3)
PROT UR-MCNC: NEGATIVE MG/DL — SIGNIFICANT CHANGE UP
PROTHROM AB SERPL-ACNC: 11.1 SEC — SIGNIFICANT CHANGE UP (ref 10–12.9)
RBC # BLD: 4.59 M/UL — SIGNIFICANT CHANGE UP (ref 3.8–5.2)
RBC # FLD: 14.2 % — SIGNIFICANT CHANGE UP (ref 10.3–14.5)
RBC CASTS # UR COMP ASSIST: SIGNIFICANT CHANGE UP /HPF (ref 0–4)
SODIUM SERPL-SCNC: 135 MMOL/L — SIGNIFICANT CHANGE UP (ref 135–145)
SP GR SPEC: 1.01 — SIGNIFICANT CHANGE UP (ref 1.01–1.02)
UROBILINOGEN FLD QL: NEGATIVE MG/DL — SIGNIFICANT CHANGE UP
WBC # BLD: 18.66 K/UL — HIGH (ref 3.8–10.5)
WBC # FLD AUTO: 18.66 K/UL — HIGH (ref 3.8–10.5)
WBC UR QL: SIGNIFICANT CHANGE UP

## 2019-01-19 PROCEDURE — 99223 1ST HOSP IP/OBS HIGH 75: CPT

## 2019-01-19 PROCEDURE — 99285 EMERGENCY DEPT VISIT HI MDM: CPT

## 2019-01-19 PROCEDURE — 71045 X-RAY EXAM CHEST 1 VIEW: CPT | Mod: 26

## 2019-01-19 PROCEDURE — 93010 ELECTROCARDIOGRAM REPORT: CPT

## 2019-01-19 PROCEDURE — 72193 CT PELVIS W/DYE: CPT | Mod: 26

## 2019-01-19 RX ORDER — GLUCAGON INJECTION, SOLUTION 0.5 MG/.1ML
1 INJECTION, SOLUTION SUBCUTANEOUS ONCE
Qty: 0 | Refills: 0 | Status: DISCONTINUED | OUTPATIENT
Start: 2019-01-19 | End: 2019-01-22

## 2019-01-19 RX ORDER — SODIUM CHLORIDE 9 MG/ML
1000 INJECTION, SOLUTION INTRAVENOUS
Qty: 0 | Refills: 0 | Status: DISCONTINUED | OUTPATIENT
Start: 2019-01-19 | End: 2019-01-22

## 2019-01-19 RX ORDER — INSULIN LISPRO 100/ML
VIAL (ML) SUBCUTANEOUS AT BEDTIME
Qty: 0 | Refills: 0 | Status: DISCONTINUED | OUTPATIENT
Start: 2019-01-19 | End: 2019-01-22

## 2019-01-19 RX ORDER — CLONAZEPAM 1 MG
1 TABLET ORAL THREE TIMES A DAY
Qty: 0 | Refills: 0 | Status: DISCONTINUED | OUTPATIENT
Start: 2019-01-19 | End: 2019-01-22

## 2019-01-19 RX ORDER — INSULIN LISPRO 100/ML
VIAL (ML) SUBCUTANEOUS
Qty: 0 | Refills: 0 | Status: DISCONTINUED | OUTPATIENT
Start: 2019-01-19 | End: 2019-01-22

## 2019-01-19 RX ORDER — LOSARTAN POTASSIUM 100 MG/1
50 TABLET, FILM COATED ORAL DAILY
Qty: 0 | Refills: 0 | Status: DISCONTINUED | OUTPATIENT
Start: 2019-01-19 | End: 2019-01-22

## 2019-01-19 RX ORDER — HYDROXYZINE HCL 10 MG
50 TABLET ORAL DAILY
Qty: 0 | Refills: 0 | Status: DISCONTINUED | OUTPATIENT
Start: 2019-01-19 | End: 2019-01-22

## 2019-01-19 RX ORDER — SODIUM CHLORIDE 9 MG/ML
1000 INJECTION INTRAMUSCULAR; INTRAVENOUS; SUBCUTANEOUS
Qty: 0 | Refills: 0 | Status: DISCONTINUED | OUTPATIENT
Start: 2019-01-19 | End: 2019-01-22

## 2019-01-19 RX ORDER — ONDANSETRON 8 MG/1
4 TABLET, FILM COATED ORAL ONCE
Qty: 0 | Refills: 0 | Status: DISCONTINUED | OUTPATIENT
Start: 2019-01-19 | End: 2019-01-22

## 2019-01-19 RX ORDER — DEXTROSE 50 % IN WATER 50 %
25 SYRINGE (ML) INTRAVENOUS ONCE
Qty: 0 | Refills: 0 | Status: DISCONTINUED | OUTPATIENT
Start: 2019-01-19 | End: 2019-01-22

## 2019-01-19 RX ORDER — DEXTROSE 50 % IN WATER 50 %
12.5 SYRINGE (ML) INTRAVENOUS ONCE
Qty: 0 | Refills: 0 | Status: DISCONTINUED | OUTPATIENT
Start: 2019-01-19 | End: 2019-01-22

## 2019-01-19 RX ORDER — ENOXAPARIN SODIUM 100 MG/ML
40 INJECTION SUBCUTANEOUS DAILY
Qty: 0 | Refills: 0 | Status: DISCONTINUED | OUTPATIENT
Start: 2019-01-19 | End: 2019-01-22

## 2019-01-19 RX ORDER — MORPHINE SULFATE 50 MG/1
4 CAPSULE, EXTENDED RELEASE ORAL ONCE
Qty: 0 | Refills: 0 | Status: DISCONTINUED | OUTPATIENT
Start: 2019-01-19 | End: 2019-01-19

## 2019-01-19 RX ORDER — NICOTINE POLACRILEX 2 MG
1 GUM BUCCAL DAILY
Qty: 0 | Refills: 0 | Status: DISCONTINUED | OUTPATIENT
Start: 2019-01-19 | End: 2019-01-22

## 2019-01-19 RX ORDER — ESCITALOPRAM OXALATE 10 MG/1
20 TABLET, FILM COATED ORAL DAILY
Qty: 0 | Refills: 0 | Status: DISCONTINUED | OUTPATIENT
Start: 2019-01-19 | End: 2019-01-22

## 2019-01-19 RX ORDER — ATORVASTATIN CALCIUM 80 MG/1
10 TABLET, FILM COATED ORAL AT BEDTIME
Qty: 0 | Refills: 0 | Status: DISCONTINUED | OUTPATIENT
Start: 2019-01-19 | End: 2019-01-22

## 2019-01-19 RX ORDER — MONTELUKAST 4 MG/1
10 TABLET, CHEWABLE ORAL AT BEDTIME
Qty: 0 | Refills: 0 | Status: DISCONTINUED | OUTPATIENT
Start: 2019-01-19 | End: 2019-01-22

## 2019-01-19 RX ORDER — TIOTROPIUM BROMIDE 18 UG/1
1 CAPSULE ORAL; RESPIRATORY (INHALATION) DAILY
Qty: 0 | Refills: 0 | Status: DISCONTINUED | OUTPATIENT
Start: 2019-01-19 | End: 2019-01-22

## 2019-01-19 RX ORDER — ALBUTEROL 90 UG/1
2 AEROSOL, METERED ORAL EVERY 6 HOURS
Qty: 0 | Refills: 0 | Status: DISCONTINUED | OUTPATIENT
Start: 2019-01-19 | End: 2019-01-22

## 2019-01-19 RX ORDER — DEXTROSE 50 % IN WATER 50 %
15 SYRINGE (ML) INTRAVENOUS ONCE
Qty: 0 | Refills: 0 | Status: DISCONTINUED | OUTPATIENT
Start: 2019-01-19 | End: 2019-01-22

## 2019-01-19 RX ORDER — INSULIN GLARGINE 100 [IU]/ML
30 INJECTION, SOLUTION SUBCUTANEOUS AT BEDTIME
Qty: 0 | Refills: 0 | Status: DISCONTINUED | OUTPATIENT
Start: 2019-01-19 | End: 2019-01-22

## 2019-01-19 RX ORDER — DOCUSATE SODIUM 100 MG
100 CAPSULE ORAL THREE TIMES A DAY
Qty: 0 | Refills: 0 | Status: DISCONTINUED | OUTPATIENT
Start: 2019-01-19 | End: 2019-01-22

## 2019-01-19 RX ORDER — PIPERACILLIN AND TAZOBACTAM 4; .5 G/20ML; G/20ML
3.38 INJECTION, POWDER, LYOPHILIZED, FOR SOLUTION INTRAVENOUS ONCE
Qty: 0 | Refills: 0 | Status: COMPLETED | OUTPATIENT
Start: 2019-01-19 | End: 2019-01-19

## 2019-01-19 RX ORDER — VANCOMYCIN HCL 1 G
1000 VIAL (EA) INTRAVENOUS ONCE
Qty: 0 | Refills: 0 | Status: COMPLETED | OUTPATIENT
Start: 2019-01-19 | End: 2019-01-19

## 2019-01-19 RX ORDER — LEVOTHYROXINE SODIUM 125 MCG
175 TABLET ORAL DAILY
Qty: 0 | Refills: 0 | Status: DISCONTINUED | OUTPATIENT
Start: 2019-01-19 | End: 2019-01-22

## 2019-01-19 RX ADMIN — ATORVASTATIN CALCIUM 10 MILLIGRAM(S): 80 TABLET, FILM COATED ORAL at 22:16

## 2019-01-19 RX ADMIN — Medication 1 MILLIGRAM(S): at 20:56

## 2019-01-19 RX ADMIN — ENOXAPARIN SODIUM 40 MILLIGRAM(S): 100 INJECTION SUBCUTANEOUS at 22:57

## 2019-01-19 RX ADMIN — MONTELUKAST 10 MILLIGRAM(S): 4 TABLET, CHEWABLE ORAL at 22:16

## 2019-01-19 RX ADMIN — SODIUM CHLORIDE 75 MILLILITER(S): 9 INJECTION INTRAMUSCULAR; INTRAVENOUS; SUBCUTANEOUS at 22:17

## 2019-01-19 RX ADMIN — PIPERACILLIN AND TAZOBACTAM 200 GRAM(S): 4; .5 INJECTION, POWDER, LYOPHILIZED, FOR SOLUTION INTRAVENOUS at 19:09

## 2019-01-19 RX ADMIN — INSULIN GLARGINE 30 UNIT(S): 100 INJECTION, SOLUTION SUBCUTANEOUS at 22:57

## 2019-01-19 RX ADMIN — MORPHINE SULFATE 4 MILLIGRAM(S): 50 CAPSULE, EXTENDED RELEASE ORAL at 16:05

## 2019-01-19 RX ADMIN — ESCITALOPRAM OXALATE 20 MILLIGRAM(S): 10 TABLET, FILM COATED ORAL at 22:16

## 2019-01-19 RX ADMIN — Medication 1: at 22:17

## 2019-01-19 RX ADMIN — Medication 250 MILLIGRAM(S): at 19:47

## 2019-01-19 RX ADMIN — ALBUTEROL 2 PUFF(S): 90 AEROSOL, METERED ORAL at 23:12

## 2019-01-19 RX ADMIN — MORPHINE SULFATE 4 MILLIGRAM(S): 50 CAPSULE, EXTENDED RELEASE ORAL at 15:51

## 2019-01-19 NOTE — H&P ADULT - NSHPLABSRESULTS_GEN_ALL_CORE
13.8   18.66 )-----------( 302      ( 19 Jan 2019 15:51 )             42.4     01-19    135  |  99  |  16  ----------------------------<  192<H>  4.3   |  28  |  0.85    Ca    9.2      19 Jan 2019 15:51  Mg     2.1     01-19    TPro  8.0  /  Alb  3.7  /  TBili  0.4  /  DBili  x   /  AST  20  /  ALT  17  /  AlkPhos  105  01-19    CT pelvis with IV contrast.  IMPRESSION: Interval decrease of the size of the right groin collection   and surrounding fat infiltration with residual collection of 2.5 x 1.6 cm.    CXR - pending  EKG - pending 13.8   18.66 )-----------( 302      ( 19 Jan 2019 15:51 )             42.4     01-19    135  |  99  |  16  ----------------------------<  192<H>  4.3   |  28  |  0.85    Ca    9.2      19 Jan 2019 15:51  Mg     2.1     01-19    TPro  8.0  /  Alb  3.7  /  TBili  0.4  /  DBili  x   /  AST  20  /  ALT  17  /  AlkPhos  105  01-19    CT pelvis with IV contrast.  IMPRESSION: Interval decrease of the size of the right groin collection   and surrounding fat infiltration with residual collection of 2.5 x 1.6 cm.    CXR - on my review, no obvious infiltrate or edema.  EKG - pending.

## 2019-01-19 NOTE — ED PROVIDER NOTE - MEDICAL DECISION MAKING DETAILS
patient pw groin abscess. will ct scan and reassess. patient pw groin abscess. will ct scan and reassess. ct shows smaller collection that is actively draining. will restart on iv abx but do not think it will be again necessary to do bedside i&d. will discuss with dr recinos who saw patient previously. case discussed with denis who asks to admit to hospitalist and he will take over in the morning.

## 2019-01-19 NOTE — H&P ADULT - NSHPREVIEWOFSYSTEMS_GEN_ALL_CORE
Constitutional:  Denied fever/chills.  Denied acute weight change.  Denied excessive fatigue.  Eyes:  Denied acute visual change or pain.  Ears:  Denied acute hearing loss or pain.  Nose/throat:  Denied nasal congestion, sinus pain, sore throat.  Respiratory:  Denied acute SOB or cough.  Cardiovascular:  Denied chest pain or palpitations.  GI:  Denied nausea/vomiting.  Denied abdominal pain.  Denied constipation or diarrhea.  :  Denied dysuria.  Denied suprapubic or flank pain.  Neuro:  Denied HA or dizziness.  Denied acute neurological deficits.    Musculoskeletal:  Denied acute joint pain.  Denied acute change in ROM.  Skin:  Denied acute rash.

## 2019-01-19 NOTE — H&P ADULT - ASSESSMENT
1.  Right groin abscess - not resolving.  IV Zosyn and Vancomycin.  Surgery consult - Dr. Molina notified by ER.  ID consult - Dr. Claros.  Add on to labs already done - ESR, CRP, and lactate.    2.  Diabetes.  Reports outpatient Levemir increased to 30 units BID by endocrinologist.  Will start with Lantus 30 units at bedtime.  Sliding scale insulin.    3.  COPD - not in exacerbation.  Smoker.  Continue home inhaler.  Counseled and encouraged to stop smoking.  Nicotine patch.    4.  Hypothyroidism.  Check TSH.  Outpatient list from 12/9 shows levothyroxine 150 mcg daily but discharge note from 12/22 shows 175 mcg daily.    Dr. Espinoza to clarify.    5.  Hypertension.  Continue outpatient medications.    Lovenox for DVT prophylaxis.  Due to non-resolving, symptomatic abscess, my clinical judgment is that patient will require at least two inpatient night hospital stay. 1.  Right groin abscess - not resolving.  IV Zosyn and Vancomycin.  Surgery consult - Dr. Molina notified by ER.  ID consult - defer to Dr. Espinoza.  Add on to labs already done - ESR, CRP, and lactate.    2.  Diabetes.  Reports outpatient Levemir increased to 30 units BID by endocrinologist.  Will start with Lantus 30 units at bedtime.  Sliding scale insulin.    3.  COPD - not in exacerbation.  Smoker.  Continue home inhaler.  Counseled and encouraged to stop smoking.  Nicotine patch.    4.  Hypothyroidism.  Check TSH.  Outpatient list from 12/9 shows levothyroxine 150 mcg daily but discharge note from 12/22 shows 175 mcg daily.    Dr. Espinoza to clarify.    5.  Hypertension.  Continue outpatient medications.    Lovenox for DVT prophylaxis.  Due to non-resolving, symptomatic abscess, my clinical judgment is that patient will require at least two inpatient night hospital stay. 1.  Right groin abscess - not resolving.  IV Zosyn and Vancomycin.  Surgery consult - Dr. Molina notified by ER.  ID consult - defer to Dr. Espinoza.  Add on to labs already done - ESR, CRP, and lactate.    2.  Diabetes.  Reports outpatient Levemir increased to 30 units BID by endocrinologist.  Will start with Lantus 30 units at bedtime and increase as needed.  Sliding scale insulin.    3.  COPD - not in exacerbation.  Smoker.  Continue home inhaler.  Counseled and encouraged to stop smoking.  Nicotine patch.    4.  Hypothyroidism.  Check TSH.  Outpatient list from 12/9 shows levothyroxine 150 mcg daily but discharge note from 12/22 shows 175 mcg daily.    Dr. Espinoza to clarify.    5.  Hypertension.  Continue outpatient medications.    Lovenox for DVT prophylaxis.  Due to non-resolving, symptomatic abscess, my clinical judgment is that patient will require at least two inpatient night hospital stay. 1.  Right groin abscess - not resolving.  Meet sepsis criteria.  IV Zosyn and Vancomycin.  Surgery consult - Dr. Molina notified by ER.  ID consult - defer to Dr. Espinoza.  Add on to labs already done - ESR, CRP, and lactate.    2.  Diabetes.  Reports outpatient Levemir increased to 30 units BID by endocrinologist.  Will start with Lantus 30 units at bedtime and increase as needed.  Sliding scale insulin.    3.  COPD - not in exacerbation.  Chronic respiratory failure, uses oxygen 2LNC at home as needed.  Cigarette smoker.  Continue home inhaler.  Counseled and encouraged to stop smoking.  Nicotine patch.    4.  Hypothyroidism.  Check TSH.  Outpatient list from 12/9 shows levothyroxine 150 mcg daily but discharge note from 12/22 shows 175 mcg daily.    Dr. Espinoza to clarify.    5.  Hypertension.  Continue outpatient medications.    Lovenox for DVT prophylaxis.  Due to non-resolving, symptomatic abscess, my clinical judgment is that patient will require at least two inpatient night hospital stay. 1.  Right groin abscess - not resolving.  Meet sepsis criteria.  IV Zosyn and Vancomycin.  Surgery consult - Dr. Molina notified by ER.  ID consult - defer to Dr. Espinoza.  Add on to labs already done - ESR, CRP, and lactate.  Check blood cultures.  IVF x one liter as cardiac status unknown.  Patient denied history of CHF.    2.  Diabetes.  Reports outpatient Levemir increased to 30 units BID by endocrinologist.  Will start with Lantus 30 units at bedtime and increase as needed.  Sliding scale insulin.    3.  COPD - not in exacerbation.  Chronic respiratory failure, uses oxygen 2LNC at home as needed.  Cigarette smoker.  Continue home inhaler.  Counseled and encouraged to stop smoking.  Nicotine patch.    4.  Hypothyroidism.  Check TSH.  Outpatient list from 12/9 shows levothyroxine 150 mcg daily but discharge note from 12/22 shows 175 mcg daily.    Dr. Espinoza to clarify.    5.  Hypertension.  Continue outpatient medications.    Lovenox for DVT prophylaxis.  Due to non-resolving, symptomatic abscess, my clinical judgment is that patient will require at least two inpatient night hospital stay. 1.  Right groin abscess - not resolving.  Meet sepsis criteria.  IV Zosyn and Vancomycin.  Surgery consult - Dr. Gresham aware per discussion with ER.  ID consult - defer to Dr. Espinoza.  Add on to labs already done - ESR, CRP, and lactate.  Check blood cultures.  IVF x one liter as cardiac status unknown.  Patient denied history of CHF.    2.  Diabetes.  Reports outpatient Levemir increased to 30 units BID by endocrinologist.  Will start with Lantus 30 units at bedtime and increase as needed.  Sliding scale insulin.    3.  COPD - not in exacerbation.  Chronic respiratory failure, uses oxygen 2LNC at home as needed.  Cigarette smoker.  Continue home inhaler.  Counseled and encouraged to stop smoking.  Nicotine patch.    4.  Hypothyroidism.  Check TSH.  Outpatient list from 12/9 shows levothyroxine 150 mcg daily but discharge note from 12/22 shows 175 mcg daily.    Dr. Espinoza to clarify.    5.  Hypertension.  Continue outpatient medications.    Lovenox for DVT prophylaxis.  Due to non-resolving, symptomatic abscess, my clinical judgment is that patient will require at least two inpatient night hospital stay.

## 2019-01-19 NOTE — ED ADULT TRIAGE NOTE - CHIEF COMPLAINT QUOTE
biba for eval of abscess lower abdominal area s/p I&d 1/17/19 here x 4 days following procedure states abscess ruptured last night with drainage recommended to return to er for re eval per pmd and surgery, also c/o elevated blood glucose seen at endocrinologist today with meds adjusted

## 2019-01-19 NOTE — ED ADULT NURSE NOTE - NSIMPLEMENTINTERV_GEN_ALL_ED
Implemented All Universal Safety Interventions:  [place] to call system. Call bell, personal items and telephone within reach. Instruct patient to call for assistance. Room bathroom lighting operational. Non-slip footwear when patient is off stretcher. Physically safe environment: no spills, clutter or unnecessary equipment. Stretcher in lowest position, wheels locked, appropriate side rails in place.

## 2019-01-19 NOTE — CONSULT NOTE ADULT - SUBJECTIVE AND OBJECTIVE BOX
GENERAL SURGERY CONSULT NOTE    Patient is a 59y old  Female who presents with a chief complaint of Drainage from right groin abscess. (2019 18:48)      HPI:  Patient came to ER due to right groin pain and drainage (started today).  Fluid described as brown.  Denied fever or chills.  Patient at [facility] from 18-18 for right groin abscess, treated with bedside I&D with antibiotics.  Patient discharged on Augmentin.  States she completed course of antibiotic.  CT abdomen and pelvis shows decreased right groin collection. (2019 18:48)    Pt seen and examined at bedside. Complains of pain around right groin abscess since discharge from the hospital in [facility], states she completed her antibiotics at home, and per home nursing, wound was healing well. Last night, she states a copious amount of purulent fluid drained from right groin wound. Denies fever/chills, abdominal pain, n/v or other complaints.     PAST MEDICAL & SURGICAL HISTORY:  DM (diabetes mellitus)  HTN (hypertension)  Smoker  Emphysema lung  Hypothyroid  Agoraphobia  COPD (chronic obstructive pulmonary disease)  Anxiety  No significant past surgical history      Review of Systems:    I have reviewed 9 systems with the patient and the only positive findings were +right groin pain    MEDICATIONS  (STANDING):  ALBUTerol    90 MICROgram(s) HFA Inhaler 2 Puff(s) Inhalation every 6 hours  atorvastatin 10 milliGRAM(s) Oral at bedtime  dextrose 5%. 1000 milliLiter(s) (50 mL/Hr) IV Continuous <Continuous>  dextrose 50% Injectable 12.5 Gram(s) IV Push once  dextrose 50% Injectable 25 Gram(s) IV Push once  dextrose 50% Injectable 25 Gram(s) IV Push once  docusate sodium 100 milliGRAM(s) Oral three times a day  enoxaparin Injectable 40 milliGRAM(s) SubCutaneous daily  escitalopram 20 milliGRAM(s) Oral daily  insulin glargine Injectable (LANTUS) 30 Unit(s) SubCutaneous at bedtime  insulin lispro (HumaLOG) corrective regimen sliding scale   SubCutaneous three times a day before meals  insulin lispro (HumaLOG) corrective regimen sliding scale   SubCutaneous at bedtime  levothyroxine 175 MICROGram(s) Oral daily  losartan 50 milliGRAM(s) Oral daily  montelukast 10 milliGRAM(s) Oral at bedtime  nicotine -  14 mG/24Hr(s) Patch 1 patch Transdermal daily  sodium chloride 0.9%. 1000 milliLiter(s) (75 mL/Hr) IV Continuous <Continuous>  tiotropium 18 MICROgram(s) Capsule 1 Capsule(s) Inhalation daily    MEDICATIONS  (PRN):  clonazePAM Tablet 1 milliGRAM(s) Oral three times a day PRN anxiety  dextrose 40% Gel 15 Gram(s) Oral once PRN Blood Glucose LESS THAN 70 milliGRAM(s)/deciliter  glucagon  Injectable 1 milliGRAM(s) IntraMuscular once PRN Glucose LESS THAN 70 milligrams/deciliter  hydrOXYzine  Oral Tab/Cap - Peds 50 milliGRAM(s) Oral daily PRN Itching      Allergies    No Known Allergies    SOCIAL HISTORY   Smokes "3 cigarettes a day" x15 years  Denies alcohol or illicit drug use.    FAMILY HISTORY:  No pertinent family history in first degree relatives    Vital Signs Last 24 Hrs  T(C): 36.2 (2019 20:30), Max: 36.9 (2019 14:09)  T(F): 97.1 (2019 20:30), Max: 98.4 (2019 14:09)  HR: 100 (2019 20:30) (99 - 100)  BP: 119/82 (2019 20:30) (108/54 - 151/97)  RR: 18 (2019 20:30) (18 - 22)  SpO2: 92% (2019 20:30) (92% - 95%)    Physical Exam:    General:  Appears stated age, well-groomed, well-nourished, no distress  Eyes : LEVON  HENT:  WNL, no JVD  Chest:  clear breath sounds  Cardiovascular: S1S2, RRR  Abdomen:  Obese. Soft, NT/ND.   Extremities: calf soft, nontender b/l   Skin: Palpable 2x2cm indurated area, with tiny opening to right groin. No real fluctuance appreciated. Expressed minimal light s/s fluid, no purulence. Covered with DSD.   Musculoskeletal:  normal strength  Neuro/Psych:  Alert, oriented to time, place and person       LABS:                        13.8   18.66 )-----------( 302      ( 2019 15:51 )             42.4     -    135  |  99  |  16  ----------------------------<  192<H>  4.3   |  28  |  0.85    Ca    9.2      2019 15:51  Mg     2.1         TPro  8.0  /  Alb  3.7  /  TBili  0.4  /  DBili  x   /  AST  20  /  ALT  17  /  AlkPhos  105      PT/INR - ( 2019 15:51 )   PT: 11.1 sec;   INR: 0.99 ratio         PTT - ( 2019 15:51 )  PTT:35.4 sec  Urinalysis Basic - ( 2019 18:38 )    Color: Yellow / Appearance: Clear / S.010 / pH: x  Gluc: x / Ketone: Negative  / Bili: Negative / Urobili: Negative mg/dL   Blood: x / Protein: Negative mg/dL / Nitrite: Negative   Leuk Esterase: Negative / RBC: 0-2 /HPF / WBC 0-2   Sq Epi: x / Non Sq Epi: Few / Bacteria: Occasional    RADIOLOGY & ADDITIONAL STUDIES:  < from: CT Pelvis w/ IV Cont (19 @ 17:59) >    IMPRESSION: Interval decrease of the size of the right groin collection   and surrounding fat infiltration with residual collection of 2.5 x 1.6 cm.    < end of copied text >

## 2019-01-19 NOTE — ED ADULT NURSE NOTE - OBJECTIVE STATEMENT
received er bed 26 c/o persistent pain with redness and warmth purulent d/c from r lower abdominal/suprapubic abscess seen here 2 weeks ago for i&d of abscess admitted x 4 days states abscess ruptured last night recommended to return to er for further treatment and eval

## 2019-01-19 NOTE — H&P ADULT - NSHPPHYSICALEXAM_GEN_ALL_CORE
T(C): 36.9 (01-19-19 @ 14:09), Max: 36.9 (01-19-19 @ 14:09)  HR: 99 (01-19-19 @ 18:24) (99 - 100)  BP: 151/97 (01-19-19 @ 18:24) (108/54 - 151/97)  RR: 22 (01-19-19 @ 18:24) (18 - 22)  SpO2: 93% (01-19-19 @ 18:24) (93% - 95%)    GENERAL:  NAD.    HEENT:  PERRL, EOMI, anicteric sclerae, normal appearing oral mucosa, poor/missing dentition  NECK:  Supple, no JVD.  Thyroid not palpable.    LUNGS:  Clear to auscultation, no wheezes, rhonchi, or crackles.  Respiratory effort does not appear labored.  CVS:  RRR, S1 and S2.  No obvious rubs or murmurs.  No peripheral edema.  ABDOMEN:  Soft, not tender to palpation.  No masses or HSM noted.  No distension.  Bowel sounds present.  MUSCULOSKELETAL:  Moves all extremities, strength 5/5.  No obvious deformities.    NEURO:  Cranial nerves grossly intact.  No focal sensorimotor deficits.  DTR 2+.  SKIN:  No obvious rash.  Turgor okay.  Right groin mass, appx. 3 cm, brownish red clear fluid noted with applied pressure.  Left groin intertriginous candidiasis.    PSYCHIATRIC:  Mood and affect appear appropriate to situation.    LYMPH NODES:  No cervical or supraclavicular nodes palpable.

## 2019-01-19 NOTE — ED PROVIDER NOTE - OBJECTIVE STATEMENT
Pertinent PMH/PSH/FHx/SHx and Review of Systems contained within:  59F hx obesity and dm pw right groin drainage. patient had abscess there in dec, s/p bedside I&d and admitted for iv abx. patient subsequently was discharged and was doing well till last night when she noted discharge from the right groin again. no fever, chills, nausea, vomiting, bleeding, weakness, vision change, sob. patient denies ha, vision change, rhinorrhea, abd pain  Fh and Sh not otherwise contributory  ROS otherwise negative

## 2019-01-19 NOTE — H&P ADULT - HISTORY OF PRESENT ILLNESS
Patient came to ER due to right groin pain and drainage (started today).  Fluid described as brown.  Denied fever or chills.  Patient at [facility] from 12/18/18-12/22/18 for right groin abscess, treated with bedside I&D with antibiotics.  Patient discharged on Augmentin.  States she completed course of antibiotic.  CT abdomen and pelvis shows decreased right groin collection.

## 2019-01-19 NOTE — ED PROVIDER NOTE - PHYSICAL EXAMINATION
Gen: Alert, NAD  Head: NC, AT   Eyes: PERRL, EOMI, normal lids/conjunctiva  ENT: normal hearing, patent oropharynx without erythema/exudate, uvula midline  Neck: supple, no tenderness, Trachea midline  Pulm: Bilateral BS, normal resp effort, no wheeze/stridor/retractions  CV: RRR, no M/R/G, 2+ radial and dp pulses bl, no edema  Abd: obese, soft, NT/ND, +BS, no hepatosplenomegaly  Mskel: extremities x4 with normal ROM and no joint effusions. no ctl spine ttp.   Skin: right groin fluctuance with purulent drainage   Neuro: AAOx3, no sensory/motor deficits, CN 2-12 intact

## 2019-01-20 LAB
ANION GAP SERPL CALC-SCNC: 6 MMOL/L — SIGNIFICANT CHANGE UP (ref 5–17)
BASOPHILS # BLD AUTO: 0.09 K/UL — SIGNIFICANT CHANGE UP (ref 0–0.2)
BASOPHILS NFR BLD AUTO: 0.7 % — SIGNIFICANT CHANGE UP (ref 0–2)
BUN SERPL-MCNC: 16 MG/DL — SIGNIFICANT CHANGE UP (ref 7–23)
CALCIUM SERPL-MCNC: 9.1 MG/DL — SIGNIFICANT CHANGE UP (ref 8.5–10.1)
CHLORIDE SERPL-SCNC: 104 MMOL/L — SIGNIFICANT CHANGE UP (ref 96–108)
CO2 SERPL-SCNC: 31 MMOL/L — SIGNIFICANT CHANGE UP (ref 22–31)
CREAT SERPL-MCNC: 0.9 MG/DL — SIGNIFICANT CHANGE UP (ref 0.5–1.3)
CRP SERPL-MCNC: 3.7 MG/DL — HIGH (ref 0–0.4)
EOSINOPHIL # BLD AUTO: 0.44 K/UL — SIGNIFICANT CHANGE UP (ref 0–0.5)
EOSINOPHIL NFR BLD AUTO: 3.3 % — SIGNIFICANT CHANGE UP (ref 0–6)
GLUCOSE BLDC GLUCOMTR-MCNC: 134 MG/DL — HIGH (ref 70–99)
GLUCOSE BLDC GLUCOMTR-MCNC: 147 MG/DL — HIGH (ref 70–99)
GLUCOSE BLDC GLUCOMTR-MCNC: 197 MG/DL — HIGH (ref 70–99)
GLUCOSE BLDC GLUCOMTR-MCNC: 277 MG/DL — HIGH (ref 70–99)
GLUCOSE SERPL-MCNC: 130 MG/DL — HIGH (ref 70–99)
HCT VFR BLD CALC: 39.5 % — SIGNIFICANT CHANGE UP (ref 34.5–45)
HGB BLD-MCNC: 12.9 G/DL — SIGNIFICANT CHANGE UP (ref 11.5–15.5)
IMM GRANULOCYTES NFR BLD AUTO: 1.3 % — SIGNIFICANT CHANGE UP (ref 0–1.5)
LYMPHOCYTES # BLD AUTO: 22.6 % — SIGNIFICANT CHANGE UP (ref 13–44)
LYMPHOCYTES # BLD AUTO: 3.02 K/UL — SIGNIFICANT CHANGE UP (ref 1–3.3)
MCHC RBC-ENTMCNC: 30.4 PG — SIGNIFICANT CHANGE UP (ref 27–34)
MCHC RBC-ENTMCNC: 32.7 GM/DL — SIGNIFICANT CHANGE UP (ref 32–36)
MCV RBC AUTO: 93.2 FL — SIGNIFICANT CHANGE UP (ref 80–100)
MONOCYTES # BLD AUTO: 0.78 K/UL — SIGNIFICANT CHANGE UP (ref 0–0.9)
MONOCYTES NFR BLD AUTO: 5.8 % — SIGNIFICANT CHANGE UP (ref 2–14)
NEUTROPHILS # BLD AUTO: 8.83 K/UL — HIGH (ref 1.8–7.4)
NEUTROPHILS NFR BLD AUTO: 66.3 % — SIGNIFICANT CHANGE UP (ref 43–77)
NRBC # BLD: 0 /100 WBCS — SIGNIFICANT CHANGE UP (ref 0–0)
PLATELET # BLD AUTO: 254 K/UL — SIGNIFICANT CHANGE UP (ref 150–400)
POTASSIUM SERPL-MCNC: 4.5 MMOL/L — SIGNIFICANT CHANGE UP (ref 3.5–5.3)
POTASSIUM SERPL-SCNC: 4.5 MMOL/L — SIGNIFICANT CHANGE UP (ref 3.5–5.3)
RBC # BLD: 4.24 M/UL — SIGNIFICANT CHANGE UP (ref 3.8–5.2)
RBC # FLD: 14.4 % — SIGNIFICANT CHANGE UP (ref 10.3–14.5)
SODIUM SERPL-SCNC: 141 MMOL/L — SIGNIFICANT CHANGE UP (ref 135–145)
TSH SERPL-MCNC: 1.39 UU/ML — SIGNIFICANT CHANGE UP (ref 0.36–3.74)
WBC # BLD: 13.34 K/UL — HIGH (ref 3.8–10.5)
WBC # FLD AUTO: 13.34 K/UL — HIGH (ref 3.8–10.5)

## 2019-01-20 RX ORDER — ACETAMINOPHEN 500 MG
650 TABLET ORAL ONCE
Qty: 0 | Refills: 0 | Status: COMPLETED | OUTPATIENT
Start: 2019-01-20 | End: 2019-01-20

## 2019-01-20 RX ORDER — OXYCODONE AND ACETAMINOPHEN 5; 325 MG/1; MG/1
1 TABLET ORAL ONCE
Qty: 0 | Refills: 0 | Status: DISCONTINUED | OUTPATIENT
Start: 2019-01-20 | End: 2019-01-20

## 2019-01-20 RX ADMIN — Medication 650 MILLIGRAM(S): at 02:00

## 2019-01-20 RX ADMIN — MONTELUKAST 10 MILLIGRAM(S): 4 TABLET, CHEWABLE ORAL at 21:23

## 2019-01-20 RX ADMIN — Medication 1 PATCH: at 12:35

## 2019-01-20 RX ADMIN — Medication 175 MICROGRAM(S): at 06:00

## 2019-01-20 RX ADMIN — Medication 1 PATCH: at 19:00

## 2019-01-20 RX ADMIN — ALBUTEROL 2 PUFF(S): 90 AEROSOL, METERED ORAL at 06:01

## 2019-01-20 RX ADMIN — ESCITALOPRAM OXALATE 20 MILLIGRAM(S): 10 TABLET, FILM COATED ORAL at 12:35

## 2019-01-20 RX ADMIN — ALBUTEROL 2 PUFF(S): 90 AEROSOL, METERED ORAL at 11:54

## 2019-01-20 RX ADMIN — OXYCODONE AND ACETAMINOPHEN 1 TABLET(S): 5; 325 TABLET ORAL at 08:35

## 2019-01-20 RX ADMIN — INSULIN GLARGINE 30 UNIT(S): 100 INJECTION, SOLUTION SUBCUTANEOUS at 21:24

## 2019-01-20 RX ADMIN — LOSARTAN POTASSIUM 50 MILLIGRAM(S): 100 TABLET, FILM COATED ORAL at 06:00

## 2019-01-20 RX ADMIN — Medication 3: at 11:55

## 2019-01-20 RX ADMIN — Medication 1: at 16:10

## 2019-01-20 RX ADMIN — TIOTROPIUM BROMIDE 1 CAPSULE(S): 18 CAPSULE ORAL; RESPIRATORY (INHALATION) at 12:37

## 2019-01-20 RX ADMIN — OXYCODONE AND ACETAMINOPHEN 1 TABLET(S): 5; 325 TABLET ORAL at 07:36

## 2019-01-20 RX ADMIN — ATORVASTATIN CALCIUM 10 MILLIGRAM(S): 80 TABLET, FILM COATED ORAL at 21:23

## 2019-01-20 RX ADMIN — Medication 650 MILLIGRAM(S): at 01:00

## 2019-01-20 RX ADMIN — Medication 1 MILLIGRAM(S): at 21:28

## 2019-01-20 RX ADMIN — ENOXAPARIN SODIUM 40 MILLIGRAM(S): 100 INJECTION SUBCUTANEOUS at 11:54

## 2019-01-21 DIAGNOSIS — E11.9 TYPE 2 DIABETES MELLITUS WITHOUT COMPLICATIONS: ICD-10-CM

## 2019-01-21 DIAGNOSIS — J43.9 EMPHYSEMA, UNSPECIFIED: ICD-10-CM

## 2019-01-21 DIAGNOSIS — F17.200 NICOTINE DEPENDENCE, UNSPECIFIED, UNCOMPLICATED: ICD-10-CM

## 2019-01-21 DIAGNOSIS — I10 ESSENTIAL (PRIMARY) HYPERTENSION: ICD-10-CM

## 2019-01-21 DIAGNOSIS — L02.214 CUTANEOUS ABSCESS OF GROIN: ICD-10-CM

## 2019-01-21 LAB
ALBUMIN SERPL ELPH-MCNC: 3.2 G/DL — LOW (ref 3.3–5)
ALP SERPL-CCNC: 92 U/L — SIGNIFICANT CHANGE UP (ref 40–120)
ALT FLD-CCNC: 16 U/L — SIGNIFICANT CHANGE UP (ref 12–78)
ANION GAP SERPL CALC-SCNC: 4 MMOL/L — LOW (ref 5–17)
AST SERPL-CCNC: 15 U/L — SIGNIFICANT CHANGE UP (ref 15–37)
BILIRUB SERPL-MCNC: 0.3 MG/DL — SIGNIFICANT CHANGE UP (ref 0.2–1.2)
BUN SERPL-MCNC: 15 MG/DL — SIGNIFICANT CHANGE UP (ref 7–23)
CALCIUM SERPL-MCNC: 8.5 MG/DL — SIGNIFICANT CHANGE UP (ref 8.5–10.1)
CHLORIDE SERPL-SCNC: 105 MMOL/L — SIGNIFICANT CHANGE UP (ref 96–108)
CO2 SERPL-SCNC: 32 MMOL/L — HIGH (ref 22–31)
CREAT SERPL-MCNC: 0.89 MG/DL — SIGNIFICANT CHANGE UP (ref 0.5–1.3)
ERYTHROCYTE [SEDIMENTATION RATE] IN BLOOD: 51 MM/HR — HIGH (ref 0–20)
GLUCOSE BLDC GLUCOMTR-MCNC: 153 MG/DL — HIGH (ref 70–99)
GLUCOSE BLDC GLUCOMTR-MCNC: 233 MG/DL — HIGH (ref 70–99)
GLUCOSE BLDC GLUCOMTR-MCNC: 236 MG/DL — HIGH (ref 70–99)
GLUCOSE BLDC GLUCOMTR-MCNC: 259 MG/DL — HIGH (ref 70–99)
GLUCOSE BLDC GLUCOMTR-MCNC: 260 MG/DL — HIGH (ref 70–99)
GLUCOSE SERPL-MCNC: 211 MG/DL — HIGH (ref 70–99)
HCT VFR BLD CALC: 42.9 % — SIGNIFICANT CHANGE UP (ref 34.5–45)
HGB BLD-MCNC: 13.3 G/DL — SIGNIFICANT CHANGE UP (ref 11.5–15.5)
MCHC RBC-ENTMCNC: 29.3 PG — SIGNIFICANT CHANGE UP (ref 27–34)
MCHC RBC-ENTMCNC: 31 GM/DL — LOW (ref 32–36)
MCV RBC AUTO: 94.5 FL — SIGNIFICANT CHANGE UP (ref 80–100)
NRBC # BLD: 0 /100 WBCS — SIGNIFICANT CHANGE UP (ref 0–0)
PLATELET # BLD AUTO: 266 K/UL — SIGNIFICANT CHANGE UP (ref 150–400)
POTASSIUM SERPL-MCNC: 4.6 MMOL/L — SIGNIFICANT CHANGE UP (ref 3.5–5.3)
POTASSIUM SERPL-SCNC: 4.6 MMOL/L — SIGNIFICANT CHANGE UP (ref 3.5–5.3)
PROT SERPL-MCNC: 7.3 GM/DL — SIGNIFICANT CHANGE UP (ref 6–8.3)
RBC # BLD: 4.54 M/UL — SIGNIFICANT CHANGE UP (ref 3.8–5.2)
RBC # FLD: 14.2 % — SIGNIFICANT CHANGE UP (ref 10.3–14.5)
SODIUM SERPL-SCNC: 141 MMOL/L — SIGNIFICANT CHANGE UP (ref 135–145)
WBC # BLD: 11.71 K/UL — HIGH (ref 3.8–10.5)
WBC # FLD AUTO: 11.71 K/UL — HIGH (ref 3.8–10.5)

## 2019-01-21 RX ORDER — NYSTATIN CREAM 100000 [USP'U]/G
1 CREAM TOPICAL
Qty: 0 | Refills: 0 | Status: DISCONTINUED | OUTPATIENT
Start: 2019-01-21 | End: 2019-01-22

## 2019-01-21 RX ORDER — PIPERACILLIN AND TAZOBACTAM 4; .5 G/20ML; G/20ML
3.38 INJECTION, POWDER, LYOPHILIZED, FOR SOLUTION INTRAVENOUS EVERY 8 HOURS
Qty: 0 | Refills: 0 | Status: DISCONTINUED | OUTPATIENT
Start: 2019-01-21 | End: 2019-01-22

## 2019-01-21 RX ORDER — VANCOMYCIN HCL 1 G
1000 VIAL (EA) INTRAVENOUS EVERY 12 HOURS
Qty: 0 | Refills: 0 | Status: DISCONTINUED | OUTPATIENT
Start: 2019-01-21 | End: 2019-01-22

## 2019-01-21 RX ORDER — POLYETHYLENE GLYCOL 3350 17 G/17G
17 POWDER, FOR SOLUTION ORAL DAILY
Qty: 0 | Refills: 0 | Status: DISCONTINUED | OUTPATIENT
Start: 2019-01-21 | End: 2019-01-22

## 2019-01-21 RX ORDER — FLUCONAZOLE 150 MG/1
100 TABLET ORAL DAILY
Qty: 0 | Refills: 0 | Status: DISCONTINUED | OUTPATIENT
Start: 2019-01-21 | End: 2019-01-22

## 2019-01-21 RX ADMIN — Medication 2: at 11:44

## 2019-01-21 RX ADMIN — PIPERACILLIN AND TAZOBACTAM 25 GRAM(S): 4; .5 INJECTION, POWDER, LYOPHILIZED, FOR SOLUTION INTRAVENOUS at 22:40

## 2019-01-21 RX ADMIN — ATORVASTATIN CALCIUM 10 MILLIGRAM(S): 80 TABLET, FILM COATED ORAL at 22:44

## 2019-01-21 RX ADMIN — Medication 1 PATCH: at 19:51

## 2019-01-21 RX ADMIN — Medication 175 MICROGRAM(S): at 06:29

## 2019-01-21 RX ADMIN — ALBUTEROL 2 PUFF(S): 90 AEROSOL, METERED ORAL at 06:30

## 2019-01-21 RX ADMIN — ALBUTEROL 2 PUFF(S): 90 AEROSOL, METERED ORAL at 00:10

## 2019-01-21 RX ADMIN — Medication 1 PATCH: at 08:26

## 2019-01-21 RX ADMIN — PIPERACILLIN AND TAZOBACTAM 25 GRAM(S): 4; .5 INJECTION, POWDER, LYOPHILIZED, FOR SOLUTION INTRAVENOUS at 13:16

## 2019-01-21 RX ADMIN — Medication 1: at 22:43

## 2019-01-21 RX ADMIN — Medication 250 MILLIGRAM(S): at 09:06

## 2019-01-21 RX ADMIN — Medication 1 PATCH: at 11:45

## 2019-01-21 RX ADMIN — Medication 1: at 08:04

## 2019-01-21 RX ADMIN — Medication 1 PATCH: at 12:06

## 2019-01-21 RX ADMIN — Medication 250 MILLIGRAM(S): at 21:09

## 2019-01-21 RX ADMIN — ALBUTEROL 2 PUFF(S): 90 AEROSOL, METERED ORAL at 11:08

## 2019-01-21 RX ADMIN — INSULIN GLARGINE 30 UNIT(S): 100 INJECTION, SOLUTION SUBCUTANEOUS at 22:45

## 2019-01-21 RX ADMIN — Medication 1 MILLIGRAM(S): at 06:29

## 2019-01-21 RX ADMIN — Medication 1 MILLIGRAM(S): at 13:19

## 2019-01-21 RX ADMIN — Medication 50 MILLIGRAM(S): at 11:44

## 2019-01-21 RX ADMIN — TIOTROPIUM BROMIDE 1 CAPSULE(S): 18 CAPSULE ORAL; RESPIRATORY (INHALATION) at 11:08

## 2019-01-21 RX ADMIN — ALBUTEROL 2 PUFF(S): 90 AEROSOL, METERED ORAL at 17:06

## 2019-01-21 RX ADMIN — NYSTATIN CREAM 1 APPLICATION(S): 100000 CREAM TOPICAL at 17:36

## 2019-01-21 RX ADMIN — LOSARTAN POTASSIUM 50 MILLIGRAM(S): 100 TABLET, FILM COATED ORAL at 06:30

## 2019-01-21 RX ADMIN — ESCITALOPRAM OXALATE 20 MILLIGRAM(S): 10 TABLET, FILM COATED ORAL at 11:08

## 2019-01-21 RX ADMIN — ENOXAPARIN SODIUM 40 MILLIGRAM(S): 100 INJECTION SUBCUTANEOUS at 11:08

## 2019-01-21 RX ADMIN — MONTELUKAST 10 MILLIGRAM(S): 4 TABLET, CHEWABLE ORAL at 22:44

## 2019-01-21 RX ADMIN — Medication 2: at 16:01

## 2019-01-21 NOTE — CONSULT NOTE ADULT - ASSESSMENT
A/P: 59F admitted with Right groin abscess, collection decreased in size since admission in December 2018 for right groin abscess.   Nontoxic appearing, afebrile, leukocytosis.     -Continue IV antibiotics  -local wound care with gauze dressing, monitor area for developing fluctuance, may need I&D if not improving  -F/u labs, trend WBC  -medical management, tight blood glucose control  -DVT ppx, OOB to ambulate as tolerated  -Discussed with Dr. Gresham as above
groin abscess; intertrigo  prev discharge on augmentin which covers everything that grew in groin culture   much more candida intertrigo treasure as lots of steroid use for her copd   add diflucan   sxfu   likely discharge on po augmentin and diflucan in 24-48 hours   will follow with you thanks

## 2019-01-21 NOTE — CONSULT NOTE ADULT - SUBJECTIVE AND OBJECTIVE BOX
HPI:  Patient came to ER due to right groin pain and drainage (started today).  Fluid described as brown.  Denied fever or chills.  Patient at [facility] from 12/18/18-12/22/18 for right groin abscess, treated with bedside I&D with antibiotics.  Patient discharged on Augmentin.  States she completed course of antibiotic.  CT abdomen and pelvis shows decreased right groin collection. (19 Jan 2019 18:48)      PAST MEDICAL & SURGICAL HISTORY:  DM (diabetes mellitus)  HTN (hypertension)  Smoker  Emphysema lung  Hypothyroid  Agoraphobia  COPD (chronic obstructive pulmonary disease)  Anxiety  No significant past surgical history      SOCHX:   tobacco,  -  alcohol    FMHX: FA/MO  - contributory       Recent Travel:    Immunizations:    Allergies    No Known Allergies    Intolerances        MEDICATIONS  (STANDING):  ALBUTerol    90 MICROgram(s) HFA Inhaler 2 Puff(s) Inhalation every 6 hours  atorvastatin 10 milliGRAM(s) Oral at bedtime  dextrose 5%. 1000 milliLiter(s) (50 mL/Hr) IV Continuous <Continuous>  dextrose 50% Injectable 12.5 Gram(s) IV Push once  dextrose 50% Injectable 25 Gram(s) IV Push once  dextrose 50% Injectable 25 Gram(s) IV Push once  docusate sodium 100 milliGRAM(s) Oral three times a day  enoxaparin Injectable 40 milliGRAM(s) SubCutaneous daily  escitalopram 20 milliGRAM(s) Oral daily  insulin glargine Injectable (LANTUS) 30 Unit(s) SubCutaneous at bedtime  insulin lispro (HumaLOG) corrective regimen sliding scale   SubCutaneous three times a day before meals  insulin lispro (HumaLOG) corrective regimen sliding scale   SubCutaneous at bedtime  levothyroxine 175 MICROGram(s) Oral daily  losartan 50 milliGRAM(s) Oral daily  montelukast 10 milliGRAM(s) Oral at bedtime  nicotine -  14 mG/24Hr(s) Patch 1 patch Transdermal daily  nystatin Cream 1 Application(s) Topical two times a day  ondansetron Injectable 4 milliGRAM(s) IV Push once  piperacillin/tazobactam IVPB. 3.375 Gram(s) IV Intermittent every 8 hours  sodium chloride 0.9%. 1000 milliLiter(s) (75 mL/Hr) IV Continuous <Continuous>  tiotropium 18 MICROgram(s) Capsule 1 Capsule(s) Inhalation daily  vancomycin  IVPB 1000 milliGRAM(s) IV Intermittent every 12 hours    MEDICATIONS  (PRN):  clonazePAM Tablet 1 milliGRAM(s) Oral three times a day PRN anxiety  dextrose 40% Gel 15 Gram(s) Oral once PRN Blood Glucose LESS THAN 70 milliGRAM(s)/deciliter  glucagon  Injectable 1 milliGRAM(s) IntraMuscular once PRN Glucose LESS THAN 70 milligrams/deciliter  hydrOXYzine  Oral Tab/Cap - Peds 50 milliGRAM(s) Oral daily PRN Itching  polyethylene glycol 3350 17 Gram(s) Oral daily PRN Constipation      REVIEW OF SYSTEMS:  CONSTITUTIONAL: No fever, weight loss, or fatigue  EYES: No eye pain, visual disturbances, or discharge  ENMT:  No difficulty hearing, tinnitus, vertigo; No sinus or throat pain  NECK: No pain or stiffness  BREASTS: No pain, masses, or nipple discharge  RESPIRATORY: No cough, wheezing, chills or hemoptysis; No shortness of breath  CARDIOVASCULAR: No chest pain, palpitations, dizziness, or leg swelling  GASTROINTESTINAL: No abdominal or epigastric pain. No nausea, vomiting, or hematemesis; No diarrhea or constipation. No melena or hematochezia.  GENITOURINARY: No dysuria, frequency, hematuria, or incontinence  NEUROLOGICAL: No headaches, memory loss, loss of strength, numbness, or tremors  SKIN: No itching, burning, rashes, or lesions   LYMPH NODES: No enlarged glands  ENDOCRINE: No heat or cold intolerance; No hair loss  MUSCULOSKELETAL: No joint pain or swelling; No muscle, back, or extremity pain  PSYCHIATRIC: No depression, anxiety, mood swings, or difficulty sleeping  HEME/LYMPH: No easy bruising, or bleeding gums  ALLERGY AND IMMUNOLOGIC: No hives or eczema    VITAL SIGNS:    T(C): 36.6 (01-21-19 @ 17:38), Max: 36.8 (01-20-19 @ 23:01)  T(F): 97.8 (01-21-19 @ 17:38), Max: 98.3 (01-20-19 @ 23:01)  HR: 88 (01-21-19 @ 17:38) (88 - 96)  BP: 135/93 (01-21-19 @ 17:38) (124/68 - 151/72)  RR: 18 (01-21-19 @ 17:38) (17 - 18)  SpO2: 94% (01-21-19 @ 17:38) (91% - 98%)    PHYSICAL EXAM:    GENERAL: NAD, well-groomed, well-developed  HEAD:  Atraumatic, Normocephalic  EYES: EOMI, PERRLA, conjunctiva and sclera clear  ENMT: No tonsillar erythema, exudates, or enlargement; Moist mucous membranes, Good dentition, No lesions  NECK: Supple, No JVD, Normal thyroid  NERVOUS SYSTEM:  Alert & Oriented X3, Good concentration; Motor Strength 5/5 B/L upper and lower extremities; DTRs 2+ intact and symmetric  CHEST/LUNG: Clear to auscultation bilaterally; No rales, rhonchi, wheezing bilaterally  HEART: Regular rate and rhythm; No murmurs, rubs, or gallops  ABDOMEN: Soft, Nontender, Nondistended; Bowel sounds present  EXTREMITIES:  2+ Peripheral Pulses, No clubbing, cyanosis, or edema  LYMPH: No lymphadenopathy noted  SKIN: No rashes or lesions  BACK: no pressor sore     LABS:                         13.3   11.71 )-----------( 266      ( 21 Jan 2019 06:17 )             42.9     01-21    141  |  105  |  15  ----------------------------<  211<H>  4.6   |  32<H>  |  0.89    Ca    8.5      21 Jan 2019 06:17    TPro  7.3  /  Alb  3.2<L>  /  TBili  0.3  /  DBili  x   /  AST  15  /  ALT  16  /  AlkPhos  92  01-21    LIVER FUNCTIONS - ( 21 Jan 2019 06:17 )  Alb: 3.2 g/dL / Pro: 7.3 gm/dL / ALK PHOS: 92 U/L / ALT: 16 U/L / AST: 15 U/L / GGT: x                     Thyroid Stimulating Hormone, Serum: 1.390 uU/mL (01-20 @ 07:30)      Sedimentation Rate, Erythrocyte: 51 mm/hr (01-21 @ 06:17)  Sedimentation Rate, Erythrocyte: 29 mm/hr (01-19 @ 21:56)                          Radiology: 59 year old with recurrent admissions in last 2 months ;; first copd ; then groin abscess ; now same again  Patient came to ER due to right groin pain and drainage (started today).  Fluid described as brown.  Denied fever or chills.  Patient at [facility] from 12/18/18-12/22/18 for right groin abscess, treated with bedside I&D with antibiotics.  Patient discharged on Augmentin.  States she completed course of antibiotic.  CT abdomen and pelvis shows decreased right groin collection. (19 Jan 2019 18:48)      PAST MEDICAL & SURGICAL HISTORY:  DM (diabetes mellitus)  HTN (hypertension)  Smoker  Emphysema lung  Hypothyroid  Agoraphobia  COPD (chronic obstructive pulmonary disease)  Anxiety  No significant past surgical history      SOCHX:   2 cig per day tobacco,  1ppd till 1 month ago   lives with 26 year old son who works   no    alcohol  unemployed  FMHX: FA/MO  -non  contributory       Recent Travel:    Immunizations:    Allergies    No Known Allergies    Intolerances        MEDICATIONS  (STANDING):  ALBUTerol    90 MICROgram(s) HFA Inhaler 2 Puff(s) Inhalation every 6 hours  atorvastatin 10 milliGRAM(s) Oral at bedtime  dextrose 5%. 1000 milliLiter(s) (50 mL/Hr) IV Continuous <Continuous>  dextrose 50% Injectable 12.5 Gram(s) IV Push once  dextrose 50% Injectable 25 Gram(s) IV Push once  dextrose 50% Injectable 25 Gram(s) IV Push once  docusate sodium 100 milliGRAM(s) Oral three times a day  enoxaparin Injectable 40 milliGRAM(s) SubCutaneous daily  escitalopram 20 milliGRAM(s) Oral daily  insulin glargine Injectable (LANTUS) 30 Unit(s) SubCutaneous at bedtime  insulin lispro (HumaLOG) corrective regimen sliding scale   SubCutaneous three times a day before meals  insulin lispro (HumaLOG) corrective regimen sliding scale   SubCutaneous at bedtime  levothyroxine 175 MICROGram(s) Oral daily  losartan 50 milliGRAM(s) Oral daily  montelukast 10 milliGRAM(s) Oral at bedtime  nicotine -  14 mG/24Hr(s) Patch 1 patch Transdermal daily  nystatin Cream 1 Application(s) Topical two times a day  ondansetron Injectable 4 milliGRAM(s) IV Push once  piperacillin/tazobactam IVPB. 3.375 Gram(s) IV Intermittent every 8 hours  sodium chloride 0.9%. 1000 milliLiter(s) (75 mL/Hr) IV Continuous <Continuous>  tiotropium 18 MICROgram(s) Capsule 1 Capsule(s) Inhalation daily  vancomycin  IVPB 1000 milliGRAM(s) IV Intermittent every 12 hours    MEDICATIONS  (PRN):  clonazePAM Tablet 1 milliGRAM(s) Oral three times a day PRN anxiety  dextrose 40% Gel 15 Gram(s) Oral once PRN Blood Glucose LESS THAN 70 milliGRAM(s)/deciliter  glucagon  Injectable 1 milliGRAM(s) IntraMuscular once PRN Glucose LESS THAN 70 milligrams/deciliter  hydrOXYzine  Oral Tab/Cap - Peds 50 milliGRAM(s) Oral daily PRN Itching  polyethylene glycol 3350 17 Gram(s) Oral daily PRN Constipation      REVIEW OF SYSTEMS:  CONSTITUTIONAL: No fever, weight loss, or fatigue  EYES: No eye pain, visual disturbances, or discharge  ENMT:  No difficulty hearing, tinnitus, vertigo; No sinus or throat pain  NECK: No pain or stiffness  BREASTS: No pain, masses, or nipple discharge  RESPIRATORY: No cough, wheezing, chills or hemoptysis; No shortness of breath  CARDIOVASCULAR: No chest pain, palpitations, dizziness, or leg swelling  GASTROINTESTINAL: No abdominal or epigastric pain. No nausea, vomiting, or hematemesis; No diarrhea or constipation. No melena or hematochezia.  GENITOURINARY: No dysuria, frequency, hematuria, or incontinence  NEUROLOGICAL: No headaches, memory loss, loss of strength, numbness, or tremors  SKIN: severe  itching, burning, rashes, over groin   LYMPH NODES: No enlarged glands  ENDOCRINE: No heat or cold intolerance; No hair loss  MUSCULOSKELETAL: No joint pain or swelling; No muscle, back, or extremity pain  PSYCHIATRIC: No depression, anxiety, mood swings, or difficulty sleeping  HEME/LYMPH: No easy bruising, or bleeding gums  ALLERGY AND IMMUNOLOGIC: No hives or eczema    VITAL SIGNS:    T(C): 36.6 (01-21-19 @ 17:38), Max: 36.8 (01-20-19 @ 23:01)  T(F): 97.8 (01-21-19 @ 17:38), Max: 98.3 (01-20-19 @ 23:01)  HR: 88 (01-21-19 @ 17:38) (88 - 96)  BP: 135/93 (01-21-19 @ 17:38) (124/68 - 151/72)  RR: 18 (01-21-19 @ 17:38) (17 - 18)  SpO2: 94% (01-21-19 @ 17:38) (91% - 98%)    PHYSICAL EXAM:    GENERAL: NAD, well-groomed, well-developed  morbid obesity   HEAD:  Atraumatic, Normocephalic  EYES: EOMI, PERRLA, conjunctiva and sclera clear  ENMT:  Moist mucous membranes,   NECK: Supple, No JVD, Normal thyroid  NERVOUS SYSTEM:  Alert & Oriented X3, Good concentration; Motor Strength 5/5 B/L upper and lower extremities;   CHEST/LUNG: Clear to auscultation bilaterally; No rales, rhonchi, wheezing bilaterally  HEART: Regular rate and rhythm; No murmurs, rubs, or gallops  ABDOMEN: Soft, Nontender, Nondistended; Bowel sounds present  EXTREMITIES:  2+ Peripheral Pulses, No clubbing, cyanosis, or edema  LYMPH: No lymphadenopathy noted  SKIN: extensive groin intertrigo   i cant exoress any pus ;; fungal vs stap vs gram NEGATIVE   BACK: no pressor sore   morbid obesity   LABS:                         13.3   11.71 )-----------( 266      ( 21 Jan 2019 06:17 )             42.9     01-21    141  |  105  |  15  ----------------------------<  211<H>  4.6   |  32<H>  |  0.89    Ca    8.5      21 Jan 2019 06:17    TPro  7.3  /  Alb  3.2<L>  /  TBili  0.3  /  DBili  x   /  AST  15  /  ALT  16  /  AlkPhos  92  01-21    LIVER FUNCTIONS - ( 21 Jan 2019 06:17 )  Alb: 3.2 g/dL / Pro: 7.3 gm/dL / ALK PHOS: 92 U/L / ALT: 16 U/L / AST: 15 U/L / GGT: x                     Thyroid Stimulating Hormone, Serum: 1.390 uU/mL (01-20 @ 07:30)      Sedimentation Rate, Erythrocyte: 51 mm/hr (01-21 @ 06:17)  Sedimentation Rate, Erythrocyte: 29 mm/hr (01-19 @ 21:56)                          Radiology:      < from: CT Pelvis w/ IV Cont (01.19.19 @ 17:59) >  EXAM:  CT PELVIS ONLY IC                            PROCEDURE DATE:  01/19/2019          INTERPRETATION:  CLINICAL INFORMATION: Right pelvic abscess follow-up    COMPARISON: 12/18/2018 (CT of the abdomen)    PROCEDURE:   Contiguous axial scan of the pelvis with intravenous, followed by coronal   and sagittal reformation.   96 mL of Omnipaque were administered without   adverse reaction.   4 mL of contrast were discarded.    FINDINGS:    BLADDER: Within normal limits.  REPRODUCTIVE ORGANS: 5 x 3.5 cm sized hypodense lesion in the left adnexa   without significant interval change.         BOWEL: No bowel obstruction. Appendix is normal.  PERITONEUM: No ascites.  VESSELS:  Within normal limits.  RETROPERITONEUM: No lymphadenopathy.    ABDOMINAL WALL: Moderate sized fat-containing ventral hernia with a small   calcification within the hernia sac, new since prior. There is a 2.5 x   1.6 cm sized focal collection is mild enhancement with surrounding fat   stranding in the right groin (3-379).Compared to prior, there has been   significant decrease in the size of the collection, surrounding fat   stranding and overlying skin thickening.  BONES: No acute abnormality.    IMPRESSION: Interval decrease of the size of the right groin collection   and surrounding fat infiltration with residual collection of 2.5 x 1.6 cm.          < end of copied text >

## 2019-01-22 ENCOUNTER — TRANSCRIPTION ENCOUNTER (OUTPATIENT)
Age: 59
End: 2019-01-22

## 2019-01-22 VITALS
OXYGEN SATURATION: 93 % | DIASTOLIC BLOOD PRESSURE: 74 MMHG | SYSTOLIC BLOOD PRESSURE: 156 MMHG | TEMPERATURE: 99 F | RESPIRATION RATE: 16 BRPM | HEART RATE: 96 BPM

## 2019-01-22 LAB
ANION GAP SERPL CALC-SCNC: 6 MMOL/L — SIGNIFICANT CHANGE UP (ref 5–17)
BUN SERPL-MCNC: 15 MG/DL — SIGNIFICANT CHANGE UP (ref 7–23)
CALCIUM SERPL-MCNC: 8.1 MG/DL — LOW (ref 8.5–10.1)
CHLORIDE SERPL-SCNC: 106 MMOL/L — SIGNIFICANT CHANGE UP (ref 96–108)
CO2 SERPL-SCNC: 28 MMOL/L — SIGNIFICANT CHANGE UP (ref 22–31)
CREAT SERPL-MCNC: 0.89 MG/DL — SIGNIFICANT CHANGE UP (ref 0.5–1.3)
GLUCOSE BLDC GLUCOMTR-MCNC: 175 MG/DL — HIGH (ref 70–99)
GLUCOSE BLDC GLUCOMTR-MCNC: 183 MG/DL — HIGH (ref 70–99)
GLUCOSE SERPL-MCNC: 210 MG/DL — HIGH (ref 70–99)
HCT VFR BLD CALC: 40.7 % — SIGNIFICANT CHANGE UP (ref 34.5–45)
HGB BLD-MCNC: 13 G/DL — SIGNIFICANT CHANGE UP (ref 11.5–15.5)
MCHC RBC-ENTMCNC: 29.6 PG — SIGNIFICANT CHANGE UP (ref 27–34)
MCHC RBC-ENTMCNC: 31.9 GM/DL — LOW (ref 32–36)
MCV RBC AUTO: 92.7 FL — SIGNIFICANT CHANGE UP (ref 80–100)
NRBC # BLD: 0 /100 WBCS — SIGNIFICANT CHANGE UP (ref 0–0)
PLATELET # BLD AUTO: 249 K/UL — SIGNIFICANT CHANGE UP (ref 150–400)
POTASSIUM SERPL-MCNC: 4.2 MMOL/L — SIGNIFICANT CHANGE UP (ref 3.5–5.3)
POTASSIUM SERPL-SCNC: 4.2 MMOL/L — SIGNIFICANT CHANGE UP (ref 3.5–5.3)
RBC # BLD: 4.39 M/UL — SIGNIFICANT CHANGE UP (ref 3.8–5.2)
RBC # FLD: 13.8 % — SIGNIFICANT CHANGE UP (ref 10.3–14.5)
SODIUM SERPL-SCNC: 140 MMOL/L — SIGNIFICANT CHANGE UP (ref 135–145)
WBC # BLD: 11.77 K/UL — HIGH (ref 3.8–10.5)
WBC # FLD AUTO: 11.77 K/UL — HIGH (ref 3.8–10.5)

## 2019-01-22 RX ORDER — NICOTINE POLACRILEX 2 MG
1 GUM BUCCAL
Qty: 30 | Refills: 0
Start: 2019-01-22

## 2019-01-22 RX ORDER — METFORMIN HYDROCHLORIDE 850 MG/1
0 TABLET ORAL
Qty: 0 | Refills: 0 | COMMUNITY

## 2019-01-22 RX ORDER — ESCITALOPRAM OXALATE 10 MG/1
0 TABLET, FILM COATED ORAL
Qty: 0 | Refills: 0 | COMMUNITY

## 2019-01-22 RX ORDER — FLUCONAZOLE 150 MG/1
1 TABLET ORAL
Qty: 14 | Refills: 0 | OUTPATIENT
Start: 2019-01-22 | End: 2019-02-04

## 2019-01-22 RX ORDER — NYSTATIN CREAM 100000 [USP'U]/G
1 CREAM TOPICAL
Qty: 60 | Refills: 0
Start: 2019-01-22 | End: 2019-02-04

## 2019-01-22 RX ADMIN — LOSARTAN POTASSIUM 50 MILLIGRAM(S): 100 TABLET, FILM COATED ORAL at 05:48

## 2019-01-22 RX ADMIN — PIPERACILLIN AND TAZOBACTAM 25 GRAM(S): 4; .5 INJECTION, POWDER, LYOPHILIZED, FOR SOLUTION INTRAVENOUS at 05:45

## 2019-01-22 RX ADMIN — NYSTATIN CREAM 1 APPLICATION(S): 100000 CREAM TOPICAL at 05:46

## 2019-01-22 RX ADMIN — Medication 1: at 11:11

## 2019-01-22 RX ADMIN — Medication 1 MILLIGRAM(S): at 05:52

## 2019-01-22 RX ADMIN — TIOTROPIUM BROMIDE 1 CAPSULE(S): 18 CAPSULE ORAL; RESPIRATORY (INHALATION) at 11:11

## 2019-01-22 RX ADMIN — FLUCONAZOLE 100 MILLIGRAM(S): 150 TABLET ORAL at 11:10

## 2019-01-22 RX ADMIN — ALBUTEROL 2 PUFF(S): 90 AEROSOL, METERED ORAL at 11:11

## 2019-01-22 RX ADMIN — Medication 1 PATCH: at 07:25

## 2019-01-22 RX ADMIN — ESCITALOPRAM OXALATE 20 MILLIGRAM(S): 10 TABLET, FILM COATED ORAL at 11:10

## 2019-01-22 RX ADMIN — Medication 1 PATCH: at 11:11

## 2019-01-22 RX ADMIN — Medication 1: at 07:40

## 2019-01-22 RX ADMIN — Medication 175 MICROGRAM(S): at 05:48

## 2019-01-22 NOTE — DISCHARGE NOTE ADULT - CARE PROVIDER_API CALL
Florentino Espinoza), [facility] Medicine  [address] [place] [address]  Phone: [phone]  Fax: [phone]    Belkis Gresham), Surgery  [address] [address]  Phone: [phone]  Fax: [phone]    private  pulmonary,   Phone: (   )    -  Fax: (   )    -    private endocrinology,   Phone: (   )    -  Fax: (   )    -

## 2019-01-22 NOTE — PROGRESS NOTE ADULT - ASSESSMENT
groin abscess; intertrigo  prev discharge on augmentin which covers everything that grew in groin culture   much more candida intertrigo treasure as lots of steroid use for her copd   add diflucan   sxfu   likely discharge on po augmentin and diflucan in 24-48 hours   will follow with you thanks

## 2019-01-22 NOTE — DISCHARGE NOTE ADULT - NSTOBACCOHOTLINE_GEN_A_CS
[facility] Smokers Quitline ([phone]) [facility] Smokers Quitline ([phone])

## 2019-01-22 NOTE — PROGRESS NOTE ADULT - REASON FOR ADMISSION
Drainage from right groin abscess.

## 2019-01-22 NOTE — DISCHARGE NOTE ADULT - PATIENT PORTAL LINK FT
You can access the [facility] Portal, offered by [facility], by registering with the following website: [email or web address]

## 2019-01-22 NOTE — DISCHARGE NOTE ADULT - CARE PLAN
Principal Discharge DX:	Groin abscess  Goal:	healing  avoidance of  recurrence  Assessment and plan of treatment:	local  care  Oral AB  antifungals diabetic  control  Secondary Diagnosis:	COPD (chronic obstructive pulmonary disease)  Secondary Diagnosis:	Anxiety  Secondary Diagnosis:	DM (diabetes mellitus)  Secondary Diagnosis:	HTN (hypertension)  Secondary Diagnosis:	Hypothyroid  Secondary Diagnosis:	Morbid obesity

## 2019-01-22 NOTE — PROGRESS NOTE ADULT - SUBJECTIVE AND OBJECTIVE BOX
INTERVAL HPI/OVERNIGHT EVENTS:        REVIEW OF SYSTEMS:  CONSTITUTIONAL:  c/o  rt  groin  pain    NECK: No pain or stiffnes  RESPIRATORY: No SOB   CARDIOVASCULAR: No chest pain, palpitations, dizziness,   GASTROINTESTINAL: No abdominal pain. No nausea, vomiting,   NEUROLOGICAL: No headaches, no  blurry  vision no  dizziness  SKIN: itching, groin  agrea  MUSCULOSKELETAL: No pain    MEDICATION:  ALBUTerol    90 MICROgram(s) HFA Inhaler 2 Puff(s) Inhalation every 6 hours  atorvastatin 10 milliGRAM(s) Oral at bedtime  clonazePAM Tablet 1 milliGRAM(s) Oral three times a day PRN  dextrose 40% Gel 15 Gram(s) Oral once PRN  dextrose 5%. 1000 milliLiter(s) IV Continuous <Continuous>  dextrose 50% Injectable 12.5 Gram(s) IV Push once  dextrose 50% Injectable 25 Gram(s) IV Push once  dextrose 50% Injectable 25 Gram(s) IV Push once  docusate sodium 100 milliGRAM(s) Oral three times a day  enoxaparin Injectable 40 milliGRAM(s) SubCutaneous daily  escitalopram 20 milliGRAM(s) Oral daily  glucagon  Injectable 1 milliGRAM(s) IntraMuscular once PRN  hydrOXYzine  Oral Tab/Cap - Peds 50 milliGRAM(s) Oral daily PRN  insulin glargine Injectable (LANTUS) 30 Unit(s) SubCutaneous at bedtime  insulin lispro (HumaLOG) corrective regimen sliding scale   SubCutaneous three times a day before meals  insulin lispro (HumaLOG) corrective regimen sliding scale   SubCutaneous at bedtime  levothyroxine 175 MICROGram(s) Oral daily  losartan 50 milliGRAM(s) Oral daily  montelukast 10 milliGRAM(s) Oral at bedtime  nicotine -  14 mG/24Hr(s) Patch 1 patch Transdermal daily  ondansetron Injectable 4 milliGRAM(s) IV Push once  piperacillin/tazobactam IVPB. 3.375 Gram(s) IV Intermittent every 8 hours  sodium chloride 0.9%. 1000 milliLiter(s) IV Continuous <Continuous>  tiotropium 18 MICROgram(s) Capsule 1 Capsule(s) Inhalation daily  vancomycin  IVPB 1000 milliGRAM(s) IV Intermittent every 12 hours    Vital Signs Last 24 Hrs  T(C): 36.7 (2019 05:06), Max: 36.8 (2019 23:01)  T(F): 98 (2019 05:06), Max: 98.3 (2019 23:01)  HR: 93 (2019 05:06) (93 - 101)  BP: 141/70 (2019 05:06) (124/68 - 141/70)  BP(mean): --  RR: 18 (2019 05:06) (17 - 18)  SpO2: 91% (2019 05:06) (91% - 95%)    PHYSICAL EXAM:  GENERAL: NAD, well-groomed, well-developed  EYES:  conjunctiva and sclera clear  ENMT:  Moist mucous membranes,   NECK: Supple, No JVD, Normal thyroid  NERVOUS SYSTEM:  Alert oriented   no  focal  deficits;   CHEST/LUNG: Clear    HEART: Regular rate and rhythm; No murmurs, rubs, or gallops  ABDOMEN: Soft, Nontender, Nondistended; Bowel sounds present  EXTREMITIES:  no  edema no  tenderness  SKIN:  erythematous  rash  groin  erythematous  open  indurated  area  rt  groin  with  serosanguinous  deicharge  LABS:                        13.3   11.71 )-----------( 266      ( 2019 06:17 )             42.9     -    141  |  105  |  15  ----------------------------<  211<H>  4.6   |  32<H>  |  0.89    Ca    8.5      2019 06:17  Mg     2.1         TPro  7.3  /  Alb  3.2<L>  /  TBili  0.3  /  DBili  x   /  AST  15  /  ALT  16  /  AlkPhos  92      PT/INR - ( 2019 15:51 )   PT: 11.1 sec;   INR: 0.99 ratio         PTT - ( 2019 15:51 )  PTT:35.4 sec  Urinalysis Basic - ( 2019 18:38 )    Color: Yellow / Appearance: Clear / S.010 / pH: x  Gluc: x / Ketone: Negative  / Bili: Negative / Urobili: Negative mg/dL   Blood: x / Protein: Negative mg/dL / Nitrite: Negative   Leuk Esterase: Negative / RBC: 0-2 /HPF / WBC 0-2   Sq Epi: x / Non Sq Epi: Few / Bacteria: Occasional      CAPILLARY BLOOD GLUCOSE      POCT Blood Glucose.: 153 mg/dL (2019 07:33)  POCT Blood Glucose.: 147 mg/dL (2019 21:22)  POCT Blood Glucose.: 197 mg/dL (2019 15:26)  POCT Blood Glucose.: 277 mg/dL (2019 11:36)      RADIOLOGY & ADDITIONAL TESTS:    Imaging reports  Personally Reviewed:  [ x] YES  [ ] NO    Consultant(s) Notes Reviewed:  [x ] YES  [ ] NO    Care Discussed with Consultants/Other Providers [ x] YES  [ ] NO  · Assessment		  1.  Right groin abscess - not resolving.  Meet sepsis criteria.  IV Zosyn and Vancomycin.  Surgery consult - NP  surgical  evaluations  noted  ID consult - WITH  DR RAHMAN  CALLED  Check blood cultures.  IVF AS  NEEDED    2.  Diabetes.  Reports outpatient Levemir increased to 30 units BID by endocrinologist.  Will start with Lantus 30 units at bedtime and increase as needed.  Sliding scale insulin.    3.  COPD - not in exacerbation.  Chronic respiratory failure, uses oxygen 2LNC at home as needed.  Cigarette smoker.  Continue home inhaler.  Counseled and encouraged to stop smoking.  Nicotine patch.    4.  Hypothyroidism.  Check TSH.  CONTINUE  SYNTHROID  175MCG  DAILY
HPI:  Patient came to ER due to right groin pain and drainage (started today).  Fluid described as brown.  Denied fever or chills.  Patient at [facility] from 12/18/18-12/22/18 for right groin abscess, treated with bedside I&D with antibiotics.  Patient discharged on Augmentin.  States she completed course of antibiotic.  CT abdomen and pelvis shows decreased right groin collection. (19 Jan 2019 18:48)      Allergies    No Known Allergies    Intolerances        MEDICATIONS  (STANDING):  ALBUTerol    90 MICROgram(s) HFA Inhaler 2 Puff(s) Inhalation every 6 hours  atorvastatin 10 milliGRAM(s) Oral at bedtime  dextrose 5%. 1000 milliLiter(s) (50 mL/Hr) IV Continuous <Continuous>  dextrose 50% Injectable 12.5 Gram(s) IV Push once  dextrose 50% Injectable 25 Gram(s) IV Push once  dextrose 50% Injectable 25 Gram(s) IV Push once  docusate sodium 100 milliGRAM(s) Oral three times a day  enoxaparin Injectable 40 milliGRAM(s) SubCutaneous daily  escitalopram 20 milliGRAM(s) Oral daily  fluconAZOLE   Tablet 100 milliGRAM(s) Oral daily  insulin glargine Injectable (LANTUS) 30 Unit(s) SubCutaneous at bedtime  insulin lispro (HumaLOG) corrective regimen sliding scale   SubCutaneous three times a day before meals  insulin lispro (HumaLOG) corrective regimen sliding scale   SubCutaneous at bedtime  levothyroxine 175 MICROGram(s) Oral daily  losartan 50 milliGRAM(s) Oral daily  montelukast 10 milliGRAM(s) Oral at bedtime  nicotine -  14 mG/24Hr(s) Patch 1 patch Transdermal daily  nystatin Cream 1 Application(s) Topical two times a day  ondansetron Injectable 4 milliGRAM(s) IV Push once  piperacillin/tazobactam IVPB. 3.375 Gram(s) IV Intermittent every 8 hours  sodium chloride 0.9%. 1000 milliLiter(s) (75 mL/Hr) IV Continuous <Continuous>  tiotropium 18 MICROgram(s) Capsule 1 Capsule(s) Inhalation daily  vancomycin  IVPB 1000 milliGRAM(s) IV Intermittent every 12 hours    MEDICATIONS  (PRN):  clonazePAM Tablet 1 milliGRAM(s) Oral three times a day PRN anxiety  dextrose 40% Gel 15 Gram(s) Oral once PRN Blood Glucose LESS THAN 70 milliGRAM(s)/deciliter  glucagon  Injectable 1 milliGRAM(s) IntraMuscular once PRN Glucose LESS THAN 70 milligrams/deciliter  hydrOXYzine  Oral Tab/Cap - Peds 50 milliGRAM(s) Oral daily PRN Itching  polyethylene glycol 3350 17 Gram(s) Oral daily PRN Constipation      REVIEW OF SYSTEMS:    CONSTITUTIONAL: No fever, chills, weight loss, or fatigue  HEENT: No sore throat, runny nose, ear ache  RESPIRATORY: No cough, wheezing, No shortness of breath  CARDIOVASCULAR: No chest pain, palpitations, dizziness  GASTROINTESTINAL: No abdominal pain. No nausea, vomiting, diarrhea  GENITOURINARY: No dysuria, increase frequency, hematuria, or incontinence  NEUROLOGICAL: No headaches, memory loss, loss of strength, numbness, or tremors, no weakness  EXTREMITY: No pedal edema BLE  SKIN: No itching, burning, rashes, or lesions     VITAL SIGNS:  T(C): 37 (01-22-19 @ 05:50), Max: 37.1 (01-21-19 @ 23:45)  T(F): 98.6 (01-22-19 @ 05:50), Max: 98.8 (01-21-19 @ 23:45)  HR: 95 (01-22-19 @ 05:50) (88 - 95)  BP: 150/75 (01-22-19 @ 05:50) (135/93 - 153/76)  RR: 18 (01-22-19 @ 05:50) (17 - 18)  SpO2: 95% (01-22-19 @ 05:50) (94% - 100%)  Wt(kg): --    PHYSICAL EXAM:    GENERAL: not in any distress  HEENT: Neck is supple, normocephalic, atraumatic   CHEST/LUNG: Clear to percussion bilaterally; No rales, rhonchi, wheezing  HEART: Regular rate and rhythm; No murmurs, rubs, or gallops  ABDOMEN: Soft, Nontender, Nondistended; Bowel sounds present, no rebound   EXTREMITIES:  2+ Peripheral Pulses, No clubbing, cyanosis, or edema  GENITOURINARY:   SKIN: wound looks good   BACK: no pressor sore   NERVOUS SYSTEM:  Alert & Oriented X3, Good concentration      LABS:                         13.0   11.77 )-----------( 249      ( 22 Jan 2019 07:27 )             40.7     01-22    140  |  106  |  15  ----------------------------<  210<H>  4.2   |  28  |  0.89    Ca    8.1<L>      22 Jan 2019 07:27    TPro  7.3  /  Alb  3.2<L>  /  TBili  0.3  /  DBili  x   /  AST  15  /  ALT  16  /  AlkPhos  92  01-21    LIVER FUNCTIONS - ( 21 Jan 2019 06:17 )  Alb: 3.2 g/dL / Pro: 7.3 gm/dL / ALK PHOS: 92 U/L / ALT: 16 U/L / AST: 15 U/L / GGT: x                     Thyroid Stimulating Hormone, Serum: 1.390 uU/mL (01-20 @ 07:30)      Sedimentation Rate, Erythrocyte: 51 mm/hr (01-21 @ 06:17)  Sedimentation Rate, Erythrocyte: 29 mm/hr (01-19 @ 21:56)                          Radiology:
INTERVAL HPI/OVERNIGHT EVENTS:        REVIEW OF SYSTEMS:  CONSTITUTIONAL: feels  well   no  complaints    NECK: No pain or stiffnes  RESPIRATORY: No SOB   CARDIOVASCULAR: No chest pain, palpitations, dizziness,   GASTROINTESTINAL: No abdominal pain. No nausea, vomiting,   NEUROLOGICAL: No headaches, no  blurry  vision no  dizziness  SKIN: No itching,   MUSCULOSKELETAL: No pain    MEDICATION:  ALBUTerol    90 MICROgram(s) HFA Inhaler 2 Puff(s) Inhalation every 6 hours  atorvastatin 10 milliGRAM(s) Oral at bedtime  clonazePAM Tablet 1 milliGRAM(s) Oral three times a day PRN  dextrose 40% Gel 15 Gram(s) Oral once PRN  dextrose 5%. 1000 milliLiter(s) IV Continuous <Continuous>  dextrose 50% Injectable 12.5 Gram(s) IV Push once  dextrose 50% Injectable 25 Gram(s) IV Push once  dextrose 50% Injectable 25 Gram(s) IV Push once  docusate sodium 100 milliGRAM(s) Oral three times a day  enoxaparin Injectable 40 milliGRAM(s) SubCutaneous daily  escitalopram 20 milliGRAM(s) Oral daily  fluconAZOLE   Tablet 100 milliGRAM(s) Oral daily  glucagon  Injectable 1 milliGRAM(s) IntraMuscular once PRN  hydrOXYzine  Oral Tab/Cap - Peds 50 milliGRAM(s) Oral daily PRN  insulin glargine Injectable (LANTUS) 30 Unit(s) SubCutaneous at bedtime  insulin lispro (HumaLOG) corrective regimen sliding scale   SubCutaneous three times a day before meals  insulin lispro (HumaLOG) corrective regimen sliding scale   SubCutaneous at bedtime  levothyroxine 175 MICROGram(s) Oral daily  losartan 50 milliGRAM(s) Oral daily  montelukast 10 milliGRAM(s) Oral at bedtime  nicotine -  14 mG/24Hr(s) Patch 1 patch Transdermal daily  nystatin Cream 1 Application(s) Topical two times a day  ondansetron Injectable 4 milliGRAM(s) IV Push once  piperacillin/tazobactam IVPB. 3.375 Gram(s) IV Intermittent every 8 hours  polyethylene glycol 3350 17 Gram(s) Oral daily PRN  sodium chloride 0.9%. 1000 milliLiter(s) IV Continuous <Continuous>  tiotropium 18 MICROgram(s) Capsule 1 Capsule(s) Inhalation daily  vancomycin  IVPB 1000 milliGRAM(s) IV Intermittent every 12 hours    Vital Signs Last 24 Hrs  T(C): 37 (22 Jan 2019 05:50), Max: 37.1 (21 Jan 2019 23:45)  T(F): 98.6 (22 Jan 2019 05:50), Max: 98.8 (21 Jan 2019 23:45)  HR: 95 (22 Jan 2019 05:50) (88 - 95)  BP: 150/75 (22 Jan 2019 05:50) (135/93 - 153/76)  BP(mean): --  RR: 18 (22 Jan 2019 05:50) (17 - 18)  SpO2: 95% (22 Jan 2019 05:50) (94% - 100%)    PHYSICAL EXAM:  GENERAL: NAD, well-groomed, well-developed  EYES:  conjunctiva and sclera clear  ENMT:  Moist mucous membranes,   NECK: Supple, No JVD, Normal thyroid  NERVOUS SYSTEM:  Alert oriented   no  focal  deficits;   CHEST/LUNG: Clear    HEART: Regular rate and rhythm; No murmurs, rubs, or gallops  ABDOMEN: Soft, Nontender, Nondistended; Bowel sounds present  EXTREMITIES:  no  edema no  tenderness  SKIN: rt  groin  erythema  no  tenderness    LABS:                        13.0   11.77 )-----------( 249      ( 22 Jan 2019 07:27 )             40.7     01-22    140  |  106  |  15  ----------------------------<  210<H>  4.2   |  28  |  0.89    Ca    8.1<L>      22 Jan 2019 07:27    TPro  7.3  /  Alb  3.2<L>  /  TBili  0.3  /  DBili  x   /  AST  15  /  ALT  16  /  AlkPhos  92  01-21        CAPILLARY BLOOD GLUCOSE      POCT Blood Glucose.: 183 mg/dL (22 Jan 2019 07:36)  POCT Blood Glucose.: 259 mg/dL (21 Jan 2019 22:39)  POCT Blood Glucose.: 260 mg/dL (21 Jan 2019 21:25)  POCT Blood Glucose.: 233 mg/dL (21 Jan 2019 15:50)  POCT Blood Glucose.: 236 mg/dL (21 Jan 2019 11:41)      RADIOLOGY & ADDITIONAL TESTS:    Imaging reports  Personally Reviewed:  [x ] YES  [ ] NO    Consultant(s) Notes Reviewed:  [x ] YES  [ ] NO    Care Discussed with Consultants/Other Providers [ x] YES  [ ] NO  · Assessment		  1.  Right groin abscess - not resolving.  Meet sepsis criteria.  IV Zosyn and Vancomycin.  Surgery consult - NP  surgical  evaluations  noted  ID consult - WITH  DR RAHMAN  appreciated  IVF AS  NEEDED    2.  Diabetes.  Reports outpatient Levemir increased to 30 units BID by endocrinologist.  Will discharge  with  home  medications  as pe r out  patient  Endo  reccommendations  Sliding scale insulin.    3.  COPD - not in exacerbation.  Chronic respiratory failure, uses oxygen 2LNC at home as needed.  Cigarette smoker.  Continue home inhaler.  Counseled and encouraged to stop smoking.  Nicotine patch.    4.  Hypothyroidism.  Check TSH.  CONTINUE  SYNTHROID  175MCG  DAILY  discharge  home  with  home  care
INTERVAL HPI/OVERNIGHT EVENTS:  PT  ADMITTED TO  MY  SERVICE  BY  HOSPITALIST  AS PE R MARY/[facility]MOND  PROTOCOL.PT  KNOW  TO  ME  FROM  MOFFICE  AND  PRIOR  HOSPITALIZATION  C/O  OF  PUS  DRAINING  FROM  KNOWN  ABDOMINAL  ABSCESS  SITE  EVALUATED  IN  ER  FOUND  TO  BE  SEPTIC  ADMITTED  FOR  FURTHER  W/U  AND  TREATMENT    REVIEW OF SYSTEMS:  CONSTITUTIONAL: FEELS  BETTER    NECK: No pain or stiffnes  RESPIRATORY: No SOB   CARDIOVASCULAR: No chest pain, palpitations, dizziness,   GASTROINTESTINAL: No abdominal pain. No nausea, vomiting,   NEUROLOGICAL: No headaches, no  blurry  vision no  dizziness  SKIN: No itching,   MUSCULOSKELETAL: No pain    MEDICATION:  ALBUTerol    90 MICROgram(s) HFA Inhaler 2 Puff(s) Inhalation every 6 hours  atorvastatin 10 milliGRAM(s) Oral at bedtime  clonazePAM Tablet 1 milliGRAM(s) Oral three times a day PRN  dextrose 40% Gel 15 Gram(s) Oral once PRN  dextrose 5%. 1000 milliLiter(s) IV Continuous <Continuous>  dextrose 50% Injectable 12.5 Gram(s) IV Push once  dextrose 50% Injectable 25 Gram(s) IV Push once  dextrose 50% Injectable 25 Gram(s) IV Push once  docusate sodium 100 milliGRAM(s) Oral three times a day  enoxaparin Injectable 40 milliGRAM(s) SubCutaneous daily  escitalopram 20 milliGRAM(s) Oral daily  glucagon  Injectable 1 milliGRAM(s) IntraMuscular once PRN  hydrOXYzine  Oral Tab/Cap - Peds 50 milliGRAM(s) Oral daily PRN  insulin glargine Injectable (LANTUS) 30 Unit(s) SubCutaneous at bedtime  insulin lispro (HumaLOG) corrective regimen sliding scale   SubCutaneous three times a day before meals  insulin lispro (HumaLOG) corrective regimen sliding scale   SubCutaneous at bedtime  levothyroxine 175 MICROGram(s) Oral daily  losartan 50 milliGRAM(s) Oral daily  montelukast 10 milliGRAM(s) Oral at bedtime  nicotine -  14 mG/24Hr(s) Patch 1 patch Transdermal daily  ondansetron Injectable 4 milliGRAM(s) IV Push once  sodium chloride 0.9%. 1000 milliLiter(s) IV Continuous <Continuous>  tiotropium 18 MICROgram(s) Capsule 1 Capsule(s) Inhalation daily    Vital Signs Last 24 Hrs  T(C): 36.8 (2019 05:48), Max: 36.9 (2019 14:09)  T(F): 98.3 (2019 05:48), Max: 98.4 (2019 14:09)  HR: 104 (2019 05:48) (99 - 104)  BP: 126/57 (2019 05:48) (108/54 - 151/97)  BP(mean): --  RR: 16 (2019 05:48) (16 - 22)  SpO2: 96% (2019 05:48) (92% - 96%)    PHYSICAL EXAM:  GENERAL: NAD, well-groomed, well-developed  EYES:  conjunctiva and sclera clear  ENMT:  Moist mucous membranes,   NECK: Supple, No JVD, Normal thyroid  NERVOUS SYSTEM:  Alert oriented   no  focal  deficits;   CHEST/LUNG: Clear    HEART: Regular rate and rhythm; No murmurs, rubs, or gallops  ABDOMEN: Soft, Nontender     EXTREMITIES:  no  edema no  tenderness  SKIN: RT  GROIN  ERYTHEMATOUS  SUPPLANT  AREA  SEROSANGUINOUS  DSCHARGE  LABS:                        12.9   13.34 )-----------( 254      ( 2019 07:30 )             39.5     01-20    141  |  104  |  16  ----------------------------<  130<H>  4.5   |  31  |  0.90    Ca    9.1      2019 07:30  Mg     2.1         TPro  8.0  /  Alb  3.7  /  TBili  0.4  /  DBili  x   /  AST  20  /  ALT  17  /  AlkPhos  105  -    PT/INR - ( 2019 15:51 )   PT: 11.1 sec;   INR: 0.99 ratio         PTT - ( 2019 15:51 )  PTT:35.4 sec  Urinalysis Basic - ( 2019 18:38 )    Color: Yellow / Appearance: Clear / S.010 / pH: x  Gluc: x / Ketone: Negative  / Bili: Negative / Urobili: Negative mg/dL   Blood: x / Protein: Negative mg/dL / Nitrite: Negative   Leuk Esterase: Negative / RBC: 0-2 /HPF / WBC 0-2   Sq Epi: x / Non Sq Epi: Few / Bacteria: Occasional      CAPILLARY BLOOD GLUCOSE      POCT Blood Glucose.: 134 mg/dL (2019 07:42)  POCT Blood Glucose.: 274 mg/dL (2019 22:01)      RADIOLOGY & ADDITIONAL TESTS:    Imaging reports  Personally Reviewed:  [ X] YES  [ ] NO    Consultant(s) Notes Reviewed:  [ ] YES  [ ] NO    Care Discussed with Consultants/Other Providers [X ] YES  [ ] NO  · Assessment      1.  Right groin abscess - not resolving.  Meet sepsis criteria.  IV Zosyn and Vancomycin.  Surgery consult - Dr. Gresham aware per discussion with ER.  ID consult - WITH  DR RAHMAN  CALLED  Check blood cultures.  IVF AS  NEEDED    2.  Diabetes.  Reports outpatient Levemir increased to 30 units BID by endocrinologist.  Will start with Lantus 30 units at bedtime and increase as needed.  Sliding scale insulin.    3.  COPD - not in exacerbation.  Chronic respiratory failure, uses oxygen 2LNC at home as needed.  Cigarette smoker.  Continue home inhaler.  Counseled and encouraged to stop smoking.  Nicotine patch.    4.  Hypothyroidism.  Check TSH.  CONTINUE  SYNTHROID  175MCG  DAILY      5.  Hypertension.  LOSARTAN    Lovenox for DVT prophylaxis.  Due to non-resolving, symptomatic abscess, my clinical judgment is that patient will require at least two inpatient night hospital sty
Surgery NP note  Patient seen and examined bedside resting comfortably.  No complaints offered.   Denies nausea and vomiting. Tolerating diet.  Normal flatus/BM.     T(F): 98 (01-20-19 @ 17:05), Max: 98.3 (01-20-19 @ 05:48)  HR: 94 (01-20-19 @ 17:05) (94 - 104)  BP: 138/69 (01-20-19 @ 17:05) (119/82 - 138/69)  RR: 18 (01-20-19 @ 17:05) (16 - 18)  SpO2: 91% (01-20-19 @ 17:05) (91% - 96%)  Wt(kg): --  CAPILLARY BLOOD GLUCOSE      POCT Blood Glucose.: 197 mg/dL (20 Jan 2019 15:26)  POCT Blood Glucose.: 277 mg/dL (20 Jan 2019 11:36)  POCT Blood Glucose.: 134 mg/dL (20 Jan 2019 07:42)  POCT Blood Glucose.: 274 mg/dL (19 Jan 2019 22:01)      PHYSICAL EXAM:  General: NAD, WDWN.   Neuro:  Alert & responsive  HEENT: NCAT, EOMI, conjunctiva clear  CV: +S1+S2 regular rate and rhythm  Lung: clear to ausculation bilaterally, respirations nonlabored, good inspiratory effort  Abdomen: soft, Non tender, No Distension. Normal active BS  Skin:  indurated area, with small opening to right groin. No fluctuance, no purulence. Covered with DSD.   Extremities: no pedal edema or calf tenderness noted     LABS:                        12.9   13.34 )-----------( 254      ( 20 Jan 2019 07:30 )             39.5     01-20    141  |  104  |  16  ----------------------------<  130<H>  4.5   |  31  |  0.90    Ca    9.1      20 Jan 2019 07:30  Mg     2.1     01-19    TPro  8.0  /  Alb  3.7  /  TBili  0.4  /  DBili  x   /  AST  20  /  ALT  17  /  AlkPhos  105  01-19    LIVER FUNCTIONS - ( 19 Jan 2019 15:51 )  Alb: 3.7 g/dL / Pro: 8.0 gm/dL / ALK PHOS: 105 U/L / ALT: 17 U/L / AST: 20 U/L / GGT: x           PT/INR - ( 19 Jan 2019 15:51 )   PT: 11.1 sec;   INR: 0.99 ratio         PTT - ( 19 Jan 2019 15:51 )  PTT:35.4 sec  I&O's Detail      A/P: 59F admitted with Right groin abscess, collection decreased in size since admission in December 2018 for right groin abscess. Nontoxic appearing, afebrile, leukocytosis.     -Continue IV antibiotics  -local wound care with gauze dressing, monitor area for developing fluctuance, may need I&D if not improving  -F/u labs, trend WBC  -medical management, tight blood glucose control  -DVT ppx, OOB to ambulate as tolerated  -Discussed with Dr. Gresham no surgical intervention

## 2019-01-22 NOTE — DISCHARGE NOTE ADULT - CARE PROVIDERS DIRECT ADDRESSES
,[email or web address],DirectAddress_Unknown,DirectAddress_Unknown,DirectAddress_Unknown

## 2019-01-22 NOTE — DISCHARGE NOTE ADULT - PROVIDER TOKENS
TOKEN:'[email or web address]',TOKEN:'[email or web address]',FREE:[LAST:[private  pulmonary],PHONE:[(   )    -],FAX:[(   )    -]],FREE:[LAST:[private endocrinology],PHONE:[(   )    -],FAX:[(   )    -]]

## 2019-01-22 NOTE — DISCHARGE NOTE ADULT - SECONDARY DIAGNOSIS.
COPD (chronic obstructive pulmonary disease) Anxiety DM (diabetes mellitus) HTN (hypertension) Hypothyroid Morbid obesity

## 2019-01-22 NOTE — DISCHARGE NOTE ADULT - HOSPITAL COURSE
patient treated  with  local  wound  care  IV  AB  bronchodilators  dvt  prophylaxis  evaluated  by  surgery  ID  clinically  improved  discharged  with  home care  to  follow in  office  with  myself  surgery  pulmonary  and  endocrinology  counseled  on  tobacco  avoidance and  weight  reduction understands  and  agrees patient treated  with  local  wound  care  IV  AB  bronchodilators  dvt  prophylaxis  evaluated  by  surgery  ID  clinically  improved  discharged  with  home care  to  follow in  office  with  myself  surgery  pulmonary  and  endocrinology  counseled  on  tobacco  avoidance and  weight  reduction understands  and  agrees  ADDENDUM DG  GROIN ABSCESS  LEUCOCYTOSIS  POD  NO CLINICAL EVIDENCE  OF  FEVER  OR  SEPSIS

## 2019-01-22 NOTE — DISCHARGE NOTE ADULT - MEDICATION SUMMARY - MEDICATIONS TO TAKE
I will START or STAY ON the medications listed below when I get home from the hospital:    losartan 50 mg oral tablet  -- 1 tab(s) by mouth once a day  -- Indication: For HTN (hypertension)    KlonoPIN 1 mg oral tablet  -- orally 3 times a day, As Needed  -- Indication: For anxiety    Januvia 100 mg oral tablet  -- orally once a day  -- Indication: For DM (diabetes mellitus)    Basaglar KwikPen 100 units/mL subcutaneous solution  -- Indication: For DM (diabetes mellitus)    fluconazole 100 mg oral tablet  -- 1 tab(s) by mouth once a day  -- Indication: For rhinitis    atorvastatin 10 mg oral tablet  -- 1 tab(s) by mouth once a day (at bedtime)  -- Indication: For DM (diabetes mellitus)    hydrOXYzine hydrochloride 50 mg oral tablet  -- 1 tab(s) by mouth every 8 hours, As needed, Itching  -- Indication: For allergies    Anoro Ellipta 62.5 mcg-25 mcg/inh inhalation powder  -- 1 puff(s) inhaled once a day  -- Indication: For copd    ipratropium-albuterol 0.5 mg-2.5 mg/3 mLinhalation solution  -- 3 milliliter(s) by nebulizer 3 times a day   -- For inhalation only.  It is very important that you take or use this exactly as directed.  Do not skip doses or discontinue unless directed by your doctor.  Obtain medical advice before taking any non-prescription drugs as some may affect the action of this medication.    -- Indication: For copd    nystatin 100,000 units/g topical cream  -- 1 application on skin 2 times a day  -- Indication: For tinea  corporis    docusate sodium 100 mg oral capsule  -- 1 cap(s) by mouth 3 times a day  -- Indication: For constipation    montelukast 10 mg oral tablet  -- 1 tab(s) by mouth once a day (at bedtime)   -- Indication: For allergies    fluticasone 50 mcg/inh nasal spray  -- 1 spray(s) into nose once a day  -- Indication: For allergies    Augmentin 875 mg-125 mg oral tablet  -- 1 tab(s) by mouth every 12 hours   -- Finish all this medication unless otherwise directed by prescriber.  Take with food or milk.    -- Indication: For Skin  abscess    nicotine 14 mg/24 hr transdermal film, extended release  -- 1 patch by transdermal patch once a day   -- Indication: For tobacco  abuse    levothyroxine 175 mcg (0.175 mg) oral tablet  -- Indication: For Hypothyroidism

## 2019-01-29 DIAGNOSIS — I10 ESSENTIAL (PRIMARY) HYPERTENSION: ICD-10-CM

## 2019-01-29 DIAGNOSIS — J96.10 CHRONIC RESPIRATORY FAILURE, UNSPECIFIED WHETHER WITH HYPOXIA OR HYPERCAPNIA: ICD-10-CM

## 2019-01-29 DIAGNOSIS — B37.2 CANDIDIASIS OF SKIN AND NAIL: ICD-10-CM

## 2019-01-29 DIAGNOSIS — E11.9 TYPE 2 DIABETES MELLITUS WITHOUT COMPLICATIONS: ICD-10-CM

## 2019-01-29 DIAGNOSIS — F17.200 NICOTINE DEPENDENCE, UNSPECIFIED, UNCOMPLICATED: ICD-10-CM

## 2019-01-29 DIAGNOSIS — Z28.21 IMMUNIZATION NOT CARRIED OUT BECAUSE OF PATIENT REFUSAL: ICD-10-CM

## 2019-01-29 DIAGNOSIS — Z79.84 LONG TERM (CURRENT) USE OF ORAL HYPOGLYCEMIC DRUGS: ICD-10-CM

## 2019-01-29 DIAGNOSIS — J44.9 CHRONIC OBSTRUCTIVE PULMONARY DISEASE, UNSPECIFIED: ICD-10-CM

## 2019-01-29 DIAGNOSIS — E66.01 MORBID (SEVERE) OBESITY DUE TO EXCESS CALORIES: ICD-10-CM

## 2019-01-29 DIAGNOSIS — L02.214 CUTANEOUS ABSCESS OF GROIN: ICD-10-CM

## 2019-01-29 DIAGNOSIS — F40.00 AGORAPHOBIA, UNSPECIFIED: ICD-10-CM

## 2019-01-29 DIAGNOSIS — F41.9 ANXIETY DISORDER, UNSPECIFIED: ICD-10-CM

## 2019-01-29 DIAGNOSIS — E03.9 HYPOTHYROIDISM, UNSPECIFIED: ICD-10-CM

## 2019-03-25 ENCOUNTER — EMERGENCY (EMERGENCY)
Facility: HOSPITAL | Age: 59
LOS: 0 days | Discharge: HOME | End: 2019-03-25
Attending: EMERGENCY MEDICINE
Payer: COMMERCIAL

## 2019-03-25 VITALS
WEIGHT: 259.04 LBS | HEIGHT: 61 IN | OXYGEN SATURATION: 94 % | DIASTOLIC BLOOD PRESSURE: 72 MMHG | RESPIRATION RATE: 20 BRPM | SYSTOLIC BLOOD PRESSURE: 124 MMHG | HEART RATE: 91 BPM | TEMPERATURE: 98 F

## 2019-03-25 VITALS
DIASTOLIC BLOOD PRESSURE: 78 MMHG | RESPIRATION RATE: 20 BRPM | SYSTOLIC BLOOD PRESSURE: 155 MMHG | HEART RATE: 109 BPM | OXYGEN SATURATION: 94 % | TEMPERATURE: 100 F

## 2019-03-25 DIAGNOSIS — E11.9 TYPE 2 DIABETES MELLITUS WITHOUT COMPLICATIONS: ICD-10-CM

## 2019-03-25 DIAGNOSIS — J44.9 CHRONIC OBSTRUCTIVE PULMONARY DISEASE, UNSPECIFIED: ICD-10-CM

## 2019-03-25 DIAGNOSIS — F40.00 AGORAPHOBIA, UNSPECIFIED: ICD-10-CM

## 2019-03-25 DIAGNOSIS — R06.02 SHORTNESS OF BREATH: ICD-10-CM

## 2019-03-25 DIAGNOSIS — I10 ESSENTIAL (PRIMARY) HYPERTENSION: ICD-10-CM

## 2019-03-25 DIAGNOSIS — F41.9 ANXIETY DISORDER, UNSPECIFIED: ICD-10-CM

## 2019-03-25 DIAGNOSIS — J43.9 EMPHYSEMA, UNSPECIFIED: ICD-10-CM

## 2019-03-25 DIAGNOSIS — F17.210 NICOTINE DEPENDENCE, CIGARETTES, UNCOMPLICATED: ICD-10-CM

## 2019-03-25 DIAGNOSIS — E03.9 HYPOTHYROIDISM, UNSPECIFIED: ICD-10-CM

## 2019-03-25 DIAGNOSIS — R07.9 CHEST PAIN, UNSPECIFIED: ICD-10-CM

## 2019-03-25 LAB
ALBUMIN SERPL ELPH-MCNC: 3.9 G/DL — SIGNIFICANT CHANGE UP (ref 3.3–5)
ALP SERPL-CCNC: 95 U/L — SIGNIFICANT CHANGE UP (ref 40–120)
ALT FLD-CCNC: 20 U/L — SIGNIFICANT CHANGE UP (ref 12–78)
ANION GAP SERPL CALC-SCNC: 8 MMOL/L — SIGNIFICANT CHANGE UP (ref 5–17)
APPEARANCE UR: CLEAR — SIGNIFICANT CHANGE UP
APTT BLD: 38.1 SEC — HIGH (ref 27.5–36.3)
AST SERPL-CCNC: 16 U/L — SIGNIFICANT CHANGE UP (ref 15–37)
BACTERIA # UR AUTO: ABNORMAL
BASE EXCESS BLDA CALC-SCNC: 2.6 MMOL/L — HIGH (ref -2–2)
BASOPHILS # BLD AUTO: 0.09 K/UL — SIGNIFICANT CHANGE UP (ref 0–0.2)
BASOPHILS NFR BLD AUTO: 0.6 % — SIGNIFICANT CHANGE UP (ref 0–2)
BILIRUB SERPL-MCNC: 0.3 MG/DL — SIGNIFICANT CHANGE UP (ref 0.2–1.2)
BILIRUB UR-MCNC: NEGATIVE — SIGNIFICANT CHANGE UP
BLOOD GAS COMMENTS: SIGNIFICANT CHANGE UP
BLOOD GAS SOURCE: SIGNIFICANT CHANGE UP
BUN SERPL-MCNC: 20 MG/DL — SIGNIFICANT CHANGE UP (ref 7–23)
CALCIUM SERPL-MCNC: 9.7 MG/DL — SIGNIFICANT CHANGE UP (ref 8.5–10.1)
CHLORIDE SERPL-SCNC: 102 MMOL/L — SIGNIFICANT CHANGE UP (ref 96–108)
CO2 SERPL-SCNC: 28 MMOL/L — SIGNIFICANT CHANGE UP (ref 22–31)
COLOR SPEC: YELLOW — SIGNIFICANT CHANGE UP
CREAT SERPL-MCNC: 0.98 MG/DL — SIGNIFICANT CHANGE UP (ref 0.5–1.3)
DIFF PNL FLD: NEGATIVE — SIGNIFICANT CHANGE UP
EOSINOPHIL # BLD AUTO: 0.99 K/UL — HIGH (ref 0–0.5)
EOSINOPHIL NFR BLD AUTO: 6.2 % — HIGH (ref 0–6)
EPI CELLS # UR: SIGNIFICANT CHANGE UP
GLUCOSE SERPL-MCNC: 71 MG/DL — SIGNIFICANT CHANGE UP (ref 70–99)
GLUCOSE UR QL: NEGATIVE MG/DL — SIGNIFICANT CHANGE UP
HCG SERPL-ACNC: 2 MIU/ML — SIGNIFICANT CHANGE UP
HCO3 BLDA-SCNC: 27 MMOL/L — SIGNIFICANT CHANGE UP (ref 21–29)
HCT VFR BLD CALC: 42.7 % — SIGNIFICANT CHANGE UP (ref 34.5–45)
HGB BLD-MCNC: 14.1 G/DL — SIGNIFICANT CHANGE UP (ref 11.5–15.5)
HOROWITZ INDEX BLDA+IHG-RTO: 28 — SIGNIFICANT CHANGE UP
IMM GRANULOCYTES NFR BLD AUTO: 0.7 % — SIGNIFICANT CHANGE UP (ref 0–1.5)
INR BLD: 1.02 RATIO — SIGNIFICANT CHANGE UP (ref 0.88–1.16)
KETONES UR-MCNC: NEGATIVE — SIGNIFICANT CHANGE UP
LACTATE SERPL-SCNC: 1.4 MMOL/L — SIGNIFICANT CHANGE UP (ref 0.7–2)
LEUKOCYTE ESTERASE UR-ACNC: NEGATIVE — SIGNIFICANT CHANGE UP
LYMPHOCYTES # BLD AUTO: 23 % — SIGNIFICANT CHANGE UP (ref 13–44)
LYMPHOCYTES # BLD AUTO: 3.69 K/UL — HIGH (ref 1–3.3)
MAGNESIUM SERPL-MCNC: 2.2 MG/DL — SIGNIFICANT CHANGE UP (ref 1.6–2.6)
MCHC RBC-ENTMCNC: 29.7 PG — SIGNIFICANT CHANGE UP (ref 27–34)
MCHC RBC-ENTMCNC: 33 GM/DL — SIGNIFICANT CHANGE UP (ref 32–36)
MCV RBC AUTO: 89.9 FL — SIGNIFICANT CHANGE UP (ref 80–100)
MONOCYTES # BLD AUTO: 0.91 K/UL — HIGH (ref 0–0.9)
MONOCYTES NFR BLD AUTO: 5.7 % — SIGNIFICANT CHANGE UP (ref 2–14)
NEUTROPHILS # BLD AUTO: 10.26 K/UL — HIGH (ref 1.8–7.4)
NEUTROPHILS NFR BLD AUTO: 63.8 % — SIGNIFICANT CHANGE UP (ref 43–77)
NITRITE UR-MCNC: NEGATIVE — SIGNIFICANT CHANGE UP
NRBC # BLD: 0 /100 WBCS — SIGNIFICANT CHANGE UP (ref 0–0)
NT-PROBNP SERPL-SCNC: 32 PG/ML — SIGNIFICANT CHANGE UP (ref 0–125)
PCO2 BLDA: 45 MMHG — SIGNIFICANT CHANGE UP (ref 32–46)
PH BLD: 7.4 — SIGNIFICANT CHANGE UP (ref 7.35–7.45)
PH UR: 5 — SIGNIFICANT CHANGE UP (ref 5–8)
PLATELET # BLD AUTO: 284 K/UL — SIGNIFICANT CHANGE UP (ref 150–400)
PO2 BLDA: 83 MMHG — SIGNIFICANT CHANGE UP (ref 74–108)
POTASSIUM SERPL-MCNC: 4.2 MMOL/L — SIGNIFICANT CHANGE UP (ref 3.5–5.3)
POTASSIUM SERPL-SCNC: 4.2 MMOL/L — SIGNIFICANT CHANGE UP (ref 3.5–5.3)
PROT SERPL-MCNC: 8.4 GM/DL — HIGH (ref 6–8.3)
PROT UR-MCNC: NEGATIVE MG/DL — SIGNIFICANT CHANGE UP
PROTHROM AB SERPL-ACNC: 11.4 SEC — SIGNIFICANT CHANGE UP (ref 10–12.9)
RBC # BLD: 4.75 M/UL — SIGNIFICANT CHANGE UP (ref 3.8–5.2)
RBC # FLD: 13.5 % — SIGNIFICANT CHANGE UP (ref 10.3–14.5)
SAO2 % BLDA: 96 % — SIGNIFICANT CHANGE UP (ref 92–96)
SODIUM SERPL-SCNC: 138 MMOL/L — SIGNIFICANT CHANGE UP (ref 135–145)
SP GR SPEC: 1 — LOW (ref 1.01–1.02)
TROPONIN I SERPL-MCNC: <.015 NG/ML — SIGNIFICANT CHANGE UP (ref 0.01–0.04)
UROBILINOGEN FLD QL: NEGATIVE MG/DL — SIGNIFICANT CHANGE UP
WBC # BLD: 16.06 K/UL — HIGH (ref 3.8–10.5)
WBC # FLD AUTO: 16.06 K/UL — HIGH (ref 3.8–10.5)
WBC UR QL: SIGNIFICANT CHANGE UP

## 2019-03-25 PROCEDURE — 93010 ELECTROCARDIOGRAM REPORT: CPT

## 2019-03-25 PROCEDURE — 71045 X-RAY EXAM CHEST 1 VIEW: CPT | Mod: 26

## 2019-03-25 PROCEDURE — 99285 EMERGENCY DEPT VISIT HI MDM: CPT | Mod: 25

## 2019-03-25 PROCEDURE — 71275 CT ANGIOGRAPHY CHEST: CPT | Mod: 26

## 2019-03-25 RX ORDER — PANTOPRAZOLE SODIUM 20 MG/1
40 TABLET, DELAYED RELEASE ORAL ONCE
Qty: 0 | Refills: 0 | Status: COMPLETED | OUTPATIENT
Start: 2019-03-25 | End: 2019-03-25

## 2019-03-25 RX ORDER — ALBUTEROL 90 UG/1
2 AEROSOL, METERED ORAL
Qty: 1 | Refills: 0
Start: 2019-03-25 | End: 2019-04-23

## 2019-03-25 RX ORDER — SODIUM CHLORIDE 9 MG/ML
2000 INJECTION INTRAMUSCULAR; INTRAVENOUS; SUBCUTANEOUS ONCE
Qty: 0 | Refills: 0 | Status: COMPLETED | OUTPATIENT
Start: 2019-03-25 | End: 2019-03-25

## 2019-03-25 RX ORDER — KETOROLAC TROMETHAMINE 30 MG/ML
15 SYRINGE (ML) INJECTION ONCE
Qty: 0 | Refills: 0 | Status: DISCONTINUED | OUTPATIENT
Start: 2019-03-25 | End: 2019-03-25

## 2019-03-25 RX ORDER — IPRATROPIUM/ALBUTEROL SULFATE 18-103MCG
3 AEROSOL WITH ADAPTER (GRAM) INHALATION ONCE
Qty: 0 | Refills: 0 | Status: COMPLETED | OUTPATIENT
Start: 2019-03-25 | End: 2019-03-25

## 2019-03-25 RX ORDER — IBUPROFEN 200 MG
1 TABLET ORAL
Qty: 15 | Refills: 0
Start: 2019-03-25 | End: 2019-03-29

## 2019-03-25 RX ADMIN — Medication 15 MILLIGRAM(S): at 08:20

## 2019-03-25 RX ADMIN — Medication 3 MILLILITER(S): at 09:11

## 2019-03-25 RX ADMIN — Medication 15 MILLIGRAM(S): at 08:50

## 2019-03-25 RX ADMIN — Medication 125 MILLIGRAM(S): at 07:51

## 2019-03-25 RX ADMIN — Medication 3 MILLILITER(S): at 08:20

## 2019-03-25 RX ADMIN — PANTOPRAZOLE SODIUM 40 MILLIGRAM(S): 20 TABLET, DELAYED RELEASE ORAL at 08:20

## 2019-03-25 RX ADMIN — SODIUM CHLORIDE 2000 MILLILITER(S): 9 INJECTION INTRAMUSCULAR; INTRAVENOUS; SUBCUTANEOUS at 08:20

## 2019-03-25 RX ADMIN — Medication 3 MILLILITER(S): at 07:52

## 2019-03-25 NOTE — ED PROVIDER NOTE - MUSCULOSKELETAL, MLM
Spine appears normal, range of motion is not limited, no muscle or joint tenderness no calf tenderness bilaterally no erythema bilaterally

## 2019-03-25 NOTE — ED PROVIDER NOTE - OBJECTIVE STATEMENT
59 years old female c/o mid chest radiates to left chest pain constant increases with breathing and sob since 15:00 pm yesterday. Pt sts she has a hx of copd home O2 2 liters. Pt denies recent hx of trauma, headache, dizziness, blurred visions, light sensitivities, focal/distal weakness or numbness, bilateral calfs pain, nausea, vomiting, fever, chills, abd pain, dysuria, vaginal spotting or discharge or irregular bowel movements. Pt 's last menstrual cycle was 20 years ago.

## 2019-03-25 NOTE — ED PROVIDER NOTE - CONSTITUTIONAL, MLM
normal... Well appearing, well nourished, awake, alert, oriented to person, place, time/situation and in no apparent distress. Speaking in clear full sentences no nasal flaring no shoulders retractions no diaphoresis, not holding her head/chest/abdomen.

## 2019-03-25 NOTE — ED PROVIDER NOTE - PROGRESS NOTE DETAILS
Pt has been ambulating with normal gaits without sob, chest pain, dizziness. Pt sts she is feeling much better now. Breath sounds are clear equal bilaterally. Pt is give and explained all test reports and advised to follow up with her doctor as soon as possible.

## 2019-03-26 LAB
CULTURE RESULTS: SIGNIFICANT CHANGE UP
SPECIMEN SOURCE: SIGNIFICANT CHANGE UP

## 2019-04-26 ENCOUNTER — EMERGENCY (EMERGENCY)
Facility: HOSPITAL | Age: 59
LOS: 0 days | Discharge: HOME | End: 2019-04-26
Attending: EMERGENCY MEDICINE
Payer: COMMERCIAL

## 2019-04-26 VITALS
OXYGEN SATURATION: 90 % | RESPIRATION RATE: 20 BRPM | SYSTOLIC BLOOD PRESSURE: 111 MMHG | DIASTOLIC BLOOD PRESSURE: 64 MMHG | TEMPERATURE: 98 F | HEART RATE: 92 BPM

## 2019-04-26 VITALS
DIASTOLIC BLOOD PRESSURE: 93 MMHG | HEART RATE: 105 BPM | WEIGHT: 259.93 LBS | OXYGEN SATURATION: 95 % | RESPIRATION RATE: 18 BRPM | HEIGHT: 61 IN | SYSTOLIC BLOOD PRESSURE: 152 MMHG | TEMPERATURE: 98 F

## 2019-04-26 DIAGNOSIS — Z79.1 LONG TERM (CURRENT) USE OF NON-STEROIDAL ANTI-INFLAMMATORIES (NSAID): ICD-10-CM

## 2019-04-26 DIAGNOSIS — R60.9 EDEMA, UNSPECIFIED: ICD-10-CM

## 2019-04-26 DIAGNOSIS — F17.210 NICOTINE DEPENDENCE, CIGARETTES, UNCOMPLICATED: ICD-10-CM

## 2019-04-26 DIAGNOSIS — E11.9 TYPE 2 DIABETES MELLITUS WITHOUT COMPLICATIONS: ICD-10-CM

## 2019-04-26 DIAGNOSIS — I10 ESSENTIAL (PRIMARY) HYPERTENSION: ICD-10-CM

## 2019-04-26 DIAGNOSIS — F41.9 ANXIETY DISORDER, UNSPECIFIED: ICD-10-CM

## 2019-04-26 DIAGNOSIS — F40.00 AGORAPHOBIA, UNSPECIFIED: ICD-10-CM

## 2019-04-26 DIAGNOSIS — J44.1 CHRONIC OBSTRUCTIVE PULMONARY DISEASE WITH (ACUTE) EXACERBATION: ICD-10-CM

## 2019-04-26 DIAGNOSIS — J44.9 CHRONIC OBSTRUCTIVE PULMONARY DISEASE, UNSPECIFIED: ICD-10-CM

## 2019-04-26 DIAGNOSIS — E03.9 HYPOTHYROIDISM, UNSPECIFIED: ICD-10-CM

## 2019-04-26 DIAGNOSIS — R06.2 WHEEZING: ICD-10-CM

## 2019-04-26 DIAGNOSIS — Z79.4 LONG TERM (CURRENT) USE OF INSULIN: ICD-10-CM

## 2019-04-26 DIAGNOSIS — J43.9 EMPHYSEMA, UNSPECIFIED: ICD-10-CM

## 2019-04-26 DIAGNOSIS — R05 COUGH: ICD-10-CM

## 2019-04-26 LAB
ALBUMIN SERPL ELPH-MCNC: 3.6 G/DL — SIGNIFICANT CHANGE UP (ref 3.3–5)
ALP SERPL-CCNC: 94 U/L — SIGNIFICANT CHANGE UP (ref 40–120)
ALT FLD-CCNC: 20 U/L — SIGNIFICANT CHANGE UP (ref 12–78)
ANION GAP SERPL CALC-SCNC: 6 MMOL/L — SIGNIFICANT CHANGE UP (ref 5–17)
APTT BLD: 30.9 SEC — SIGNIFICANT CHANGE UP (ref 28.5–37)
AST SERPL-CCNC: 24 U/L — SIGNIFICANT CHANGE UP (ref 15–37)
BASOPHILS # BLD AUTO: 0.08 K/UL — SIGNIFICANT CHANGE UP (ref 0–0.2)
BASOPHILS NFR BLD AUTO: 0.6 % — SIGNIFICANT CHANGE UP (ref 0–2)
BILIRUB SERPL-MCNC: 0.3 MG/DL — SIGNIFICANT CHANGE UP (ref 0.2–1.2)
BUN SERPL-MCNC: 16 MG/DL — SIGNIFICANT CHANGE UP (ref 7–23)
CALCIUM SERPL-MCNC: 9.1 MG/DL — SIGNIFICANT CHANGE UP (ref 8.5–10.1)
CHLORIDE SERPL-SCNC: 108 MMOL/L — SIGNIFICANT CHANGE UP (ref 96–108)
CK MB BLD-MCNC: 1 % — SIGNIFICANT CHANGE UP (ref 0–3.5)
CK MB CFR SERPL CALC: 1.8 NG/ML — SIGNIFICANT CHANGE UP (ref 0.5–3.6)
CK SERPL-CCNC: 176 U/L — SIGNIFICANT CHANGE UP (ref 26–192)
CO2 SERPL-SCNC: 27 MMOL/L — SIGNIFICANT CHANGE UP (ref 22–31)
CREAT SERPL-MCNC: 0.9 MG/DL — SIGNIFICANT CHANGE UP (ref 0.5–1.3)
EOSINOPHIL # BLD AUTO: 0.41 K/UL — SIGNIFICANT CHANGE UP (ref 0–0.5)
EOSINOPHIL NFR BLD AUTO: 3.2 % — SIGNIFICANT CHANGE UP (ref 0–6)
GLUCOSE SERPL-MCNC: 107 MG/DL — HIGH (ref 70–99)
HCT VFR BLD CALC: 38.8 % — SIGNIFICANT CHANGE UP (ref 34.5–45)
HGB BLD-MCNC: 12.4 G/DL — SIGNIFICANT CHANGE UP (ref 11.5–15.5)
IMM GRANULOCYTES NFR BLD AUTO: 0.8 % — SIGNIFICANT CHANGE UP (ref 0–1.5)
INR BLD: 0.98 RATIO — SIGNIFICANT CHANGE UP (ref 0.88–1.16)
LACTATE SERPL-SCNC: 1.7 MMOL/L — SIGNIFICANT CHANGE UP (ref 0.7–2)
LYMPHOCYTES # BLD AUTO: 2.8 K/UL — SIGNIFICANT CHANGE UP (ref 1–3.3)
LYMPHOCYTES # BLD AUTO: 21.7 % — SIGNIFICANT CHANGE UP (ref 13–44)
MCHC RBC-ENTMCNC: 29.4 PG — SIGNIFICANT CHANGE UP (ref 27–34)
MCHC RBC-ENTMCNC: 32 GM/DL — SIGNIFICANT CHANGE UP (ref 32–36)
MCV RBC AUTO: 91.9 FL — SIGNIFICANT CHANGE UP (ref 80–100)
MONOCYTES # BLD AUTO: 0.71 K/UL — SIGNIFICANT CHANGE UP (ref 0–0.9)
MONOCYTES NFR BLD AUTO: 5.5 % — SIGNIFICANT CHANGE UP (ref 2–14)
NEUTROPHILS # BLD AUTO: 8.82 K/UL — HIGH (ref 1.8–7.4)
NEUTROPHILS NFR BLD AUTO: 68.2 % — SIGNIFICANT CHANGE UP (ref 43–77)
NRBC # BLD: 0 /100 WBCS — SIGNIFICANT CHANGE UP (ref 0–0)
NT-PROBNP SERPL-SCNC: 123 PG/ML — SIGNIFICANT CHANGE UP (ref 0–125)
PLATELET # BLD AUTO: 247 K/UL — SIGNIFICANT CHANGE UP (ref 150–400)
POTASSIUM SERPL-MCNC: 4.8 MMOL/L — SIGNIFICANT CHANGE UP (ref 3.5–5.3)
POTASSIUM SERPL-SCNC: 4.8 MMOL/L — SIGNIFICANT CHANGE UP (ref 3.5–5.3)
PROT SERPL-MCNC: 7.7 GM/DL — SIGNIFICANT CHANGE UP (ref 6–8.3)
PROTHROM AB SERPL-ACNC: 11 SEC — SIGNIFICANT CHANGE UP (ref 10–12.9)
RBC # BLD: 4.22 M/UL — SIGNIFICANT CHANGE UP (ref 3.8–5.2)
RBC # FLD: 14.5 % — SIGNIFICANT CHANGE UP (ref 10.3–14.5)
SODIUM SERPL-SCNC: 141 MMOL/L — SIGNIFICANT CHANGE UP (ref 135–145)
TROPONIN I SERPL-MCNC: <.015 NG/ML — SIGNIFICANT CHANGE UP (ref 0.01–0.04)
WBC # BLD: 12.92 K/UL — HIGH (ref 3.8–10.5)
WBC # FLD AUTO: 12.92 K/UL — HIGH (ref 3.8–10.5)

## 2019-04-26 PROCEDURE — 99285 EMERGENCY DEPT VISIT HI MDM: CPT

## 2019-04-26 PROCEDURE — 93010 ELECTROCARDIOGRAM REPORT: CPT

## 2019-04-26 PROCEDURE — 93970 EXTREMITY STUDY: CPT | Mod: 26

## 2019-04-26 PROCEDURE — 71045 X-RAY EXAM CHEST 1 VIEW: CPT | Mod: 26

## 2019-04-26 RX ORDER — AZITHROMYCIN 500 MG/1
500 TABLET, FILM COATED ORAL ONCE
Qty: 0 | Refills: 0 | Status: COMPLETED | OUTPATIENT
Start: 2019-04-26 | End: 2019-04-26

## 2019-04-26 RX ORDER — ALBUTEROL 90 UG/1
2 AEROSOL, METERED ORAL
Qty: 1 | Refills: 1
Start: 2019-04-26 | End: 2019-05-09

## 2019-04-26 RX ORDER — OXYCODONE AND ACETAMINOPHEN 5; 325 MG/1; MG/1
1 TABLET ORAL ONCE
Qty: 0 | Refills: 0 | Status: DISCONTINUED | OUTPATIENT
Start: 2019-04-26 | End: 2019-04-26

## 2019-04-26 RX ORDER — AZITHROMYCIN 500 MG/1
1 TABLET, FILM COATED ORAL
Qty: 4 | Refills: 0
Start: 2019-04-26 | End: 2019-04-29

## 2019-04-26 RX ORDER — CEFTRIAXONE 500 MG/1
1 INJECTION, POWDER, FOR SOLUTION INTRAMUSCULAR; INTRAVENOUS ONCE
Qty: 0 | Refills: 0 | Status: COMPLETED | OUTPATIENT
Start: 2019-04-26 | End: 2019-04-26

## 2019-04-26 RX ORDER — IPRATROPIUM/ALBUTEROL SULFATE 18-103MCG
3 AEROSOL WITH ADAPTER (GRAM) INHALATION
Qty: 0 | Refills: 0 | Status: COMPLETED | OUTPATIENT
Start: 2019-04-26 | End: 2019-04-26

## 2019-04-26 RX ORDER — TRAMADOL HYDROCHLORIDE 50 MG/1
1 TABLET ORAL
Qty: 10 | Refills: 0
Start: 2019-04-26 | End: 2019-04-30

## 2019-04-26 RX ADMIN — CEFTRIAXONE 100 GRAM(S): 500 INJECTION, POWDER, FOR SOLUTION INTRAMUSCULAR; INTRAVENOUS at 15:53

## 2019-04-26 RX ADMIN — Medication 3 MILLILITER(S): at 14:26

## 2019-04-26 RX ADMIN — Medication 3 MILLILITER(S): at 14:02

## 2019-04-26 RX ADMIN — OXYCODONE AND ACETAMINOPHEN 1 TABLET(S): 5; 325 TABLET ORAL at 15:57

## 2019-04-26 RX ADMIN — Medication 125 MILLIGRAM(S): at 14:30

## 2019-04-26 RX ADMIN — OXYCODONE AND ACETAMINOPHEN 1 TABLET(S): 5; 325 TABLET ORAL at 14:30

## 2019-04-26 RX ADMIN — Medication 3 MILLILITER(S): at 14:21

## 2019-04-26 RX ADMIN — AZITHROMYCIN 255 MILLIGRAM(S): 500 TABLET, FILM COATED ORAL at 14:48

## 2019-04-26 NOTE — ED ADULT TRIAGE NOTE - CHIEF COMPLAINT QUOTE
[facility] and referred to ed for evaluation of swelling to legs for 2 weeks It has never been this bad. The pain to legs is worse. She takes Motrin 800 mg usual helps  pain but she ran out and is now taking Tylenol with no relief She has a history of COPD, DM, hypothyroid, high cholesterol anxiety  and HTN

## 2019-04-26 NOTE — ED PROVIDER NOTE - CLINICAL SUMMARY MEDICAL DECISION MAKING FREE TEXT BOX
Patient with copd exacerbation, leg edema, lower extremity sono without dvt, will discharge with steroids, albuterol and abx; results provided, pmd f/u encouraged

## 2019-04-26 NOTE — ED ADULT NURSE NOTE - OBJECTIVE STATEMENT
59 YEAR OLD FEMALE C/C OF PAIN AND SWELLING IN BOTH LOWER EXTREMITIES. PT HAS EXPIRATORY WHEEZES AND HX OF COPD.

## 2019-04-26 NOTE — ED PROVIDER NOTE - OBJECTIVE STATEMENT
58 yo female with obesity, copd, smoker presents with cough, wheezing, difficulty breathing for one week. Patient also c/o edema for one week. Patient denies calf tenderness, recent travel, fever, chills, abdominal pain, chest pain. Patient referred to ED from urgent care. ?sputum; Patient c/o pain to feet.

## 2019-05-01 LAB
CULTURE RESULTS: SIGNIFICANT CHANGE UP
SPECIMEN SOURCE: SIGNIFICANT CHANGE UP

## 2019-05-02 LAB
CULTURE RESULTS: SIGNIFICANT CHANGE UP
SPECIMEN SOURCE: SIGNIFICANT CHANGE UP

## 2019-06-18 ENCOUNTER — EMERGENCY (EMERGENCY)
Facility: HOSPITAL | Age: 59
LOS: 0 days | Discharge: HOME | End: 2019-06-19
Attending: EMERGENCY MEDICINE
Payer: COMMERCIAL

## 2019-06-18 VITALS
HEART RATE: 101 BPM | TEMPERATURE: 99 F | OXYGEN SATURATION: 95 % | WEIGHT: 257.94 LBS | RESPIRATION RATE: 15 BRPM | DIASTOLIC BLOOD PRESSURE: 91 MMHG | HEIGHT: 61 IN | SYSTOLIC BLOOD PRESSURE: 144 MMHG

## 2019-06-18 DIAGNOSIS — R05 COUGH: ICD-10-CM

## 2019-06-18 DIAGNOSIS — R06.02 SHORTNESS OF BREATH: ICD-10-CM

## 2019-06-18 DIAGNOSIS — R06.2 WHEEZING: ICD-10-CM

## 2019-06-18 DIAGNOSIS — R10.9 UNSPECIFIED ABDOMINAL PAIN: ICD-10-CM

## 2019-06-18 LAB
ALBUMIN SERPL ELPH-MCNC: 3.5 G/DL — SIGNIFICANT CHANGE UP (ref 3.3–5)
ALP SERPL-CCNC: 96 U/L — SIGNIFICANT CHANGE UP (ref 40–120)
ALT FLD-CCNC: 19 U/L — SIGNIFICANT CHANGE UP (ref 12–78)
ANION GAP SERPL CALC-SCNC: 6 MMOL/L — SIGNIFICANT CHANGE UP (ref 5–17)
APPEARANCE UR: CLEAR — SIGNIFICANT CHANGE UP
APTT BLD: 36.9 SEC — SIGNIFICANT CHANGE UP (ref 28.5–37)
AST SERPL-CCNC: 20 U/L — SIGNIFICANT CHANGE UP (ref 15–37)
BASOPHILS # BLD AUTO: 0.1 K/UL — SIGNIFICANT CHANGE UP (ref 0–0.2)
BASOPHILS NFR BLD AUTO: 0.6 % — SIGNIFICANT CHANGE UP (ref 0–2)
BILIRUB SERPL-MCNC: 0.4 MG/DL — SIGNIFICANT CHANGE UP (ref 0.2–1.2)
BILIRUB UR-MCNC: NEGATIVE — SIGNIFICANT CHANGE UP
BUN SERPL-MCNC: 17 MG/DL — SIGNIFICANT CHANGE UP (ref 7–23)
CALCIUM SERPL-MCNC: 9.6 MG/DL — SIGNIFICANT CHANGE UP (ref 8.5–10.1)
CHLORIDE SERPL-SCNC: 104 MMOL/L — SIGNIFICANT CHANGE UP (ref 96–108)
CK MB BLD-MCNC: <1.1 % — SIGNIFICANT CHANGE UP (ref 0–3.5)
CK MB CFR SERPL CALC: <1 NG/ML — SIGNIFICANT CHANGE UP (ref 0.5–3.6)
CK SERPL-CCNC: 88 U/L — SIGNIFICANT CHANGE UP (ref 26–192)
CO2 SERPL-SCNC: 31 MMOL/L — SIGNIFICANT CHANGE UP (ref 22–31)
COLOR SPEC: YELLOW — SIGNIFICANT CHANGE UP
CREAT SERPL-MCNC: 0.87 MG/DL — SIGNIFICANT CHANGE UP (ref 0.5–1.3)
DIFF PNL FLD: NEGATIVE — SIGNIFICANT CHANGE UP
EOSINOPHIL # BLD AUTO: 0.38 K/UL — SIGNIFICANT CHANGE UP (ref 0–0.5)
EOSINOPHIL NFR BLD AUTO: 2.4 % — SIGNIFICANT CHANGE UP (ref 0–6)
GLUCOSE SERPL-MCNC: 99 MG/DL — SIGNIFICANT CHANGE UP (ref 70–99)
GLUCOSE UR QL: NEGATIVE MG/DL — SIGNIFICANT CHANGE UP
HCT VFR BLD CALC: 41.2 % — SIGNIFICANT CHANGE UP (ref 34.5–45)
HGB BLD-MCNC: 13.6 G/DL — SIGNIFICANT CHANGE UP (ref 11.5–15.5)
IMM GRANULOCYTES NFR BLD AUTO: 0.7 % — SIGNIFICANT CHANGE UP (ref 0–1.5)
INR BLD: 1.08 RATIO — SIGNIFICANT CHANGE UP (ref 0.88–1.16)
KETONES UR-MCNC: NEGATIVE — SIGNIFICANT CHANGE UP
LACTATE SERPL-SCNC: 1.3 MMOL/L — SIGNIFICANT CHANGE UP (ref 0.7–2)
LEUKOCYTE ESTERASE UR-ACNC: NEGATIVE — SIGNIFICANT CHANGE UP
LYMPHOCYTES # BLD AUTO: 24.8 % — SIGNIFICANT CHANGE UP (ref 13–44)
LYMPHOCYTES # BLD AUTO: 3.87 K/UL — HIGH (ref 1–3.3)
MCHC RBC-ENTMCNC: 29.3 PG — SIGNIFICANT CHANGE UP (ref 27–34)
MCHC RBC-ENTMCNC: 33 GM/DL — SIGNIFICANT CHANGE UP (ref 32–36)
MCV RBC AUTO: 88.8 FL — SIGNIFICANT CHANGE UP (ref 80–100)
MONOCYTES # BLD AUTO: 0.82 K/UL — SIGNIFICANT CHANGE UP (ref 0–0.9)
MONOCYTES NFR BLD AUTO: 5.2 % — SIGNIFICANT CHANGE UP (ref 2–14)
NEUTROPHILS # BLD AUTO: 10.35 K/UL — HIGH (ref 1.8–7.4)
NEUTROPHILS NFR BLD AUTO: 66.3 % — SIGNIFICANT CHANGE UP (ref 43–77)
NITRITE UR-MCNC: NEGATIVE — SIGNIFICANT CHANGE UP
NRBC # BLD: 0 /100 WBCS — SIGNIFICANT CHANGE UP (ref 0–0)
PH UR: 7 — SIGNIFICANT CHANGE UP (ref 5–8)
PLATELET # BLD AUTO: 282 K/UL — SIGNIFICANT CHANGE UP (ref 150–400)
POTASSIUM SERPL-MCNC: 4.6 MMOL/L — SIGNIFICANT CHANGE UP (ref 3.5–5.3)
POTASSIUM SERPL-SCNC: 4.6 MMOL/L — SIGNIFICANT CHANGE UP (ref 3.5–5.3)
PROT SERPL-MCNC: 8 GM/DL — SIGNIFICANT CHANGE UP (ref 6–8.3)
PROT UR-MCNC: NEGATIVE MG/DL — SIGNIFICANT CHANGE UP
PROTHROM AB SERPL-ACNC: 12.1 SEC — SIGNIFICANT CHANGE UP (ref 10–12.9)
RBC # BLD: 4.64 M/UL — SIGNIFICANT CHANGE UP (ref 3.8–5.2)
RBC # FLD: 13.4 % — SIGNIFICANT CHANGE UP (ref 10.3–14.5)
SODIUM SERPL-SCNC: 141 MMOL/L — SIGNIFICANT CHANGE UP (ref 135–145)
SP GR SPEC: 1 — LOW (ref 1.01–1.02)
TROPONIN I SERPL-MCNC: <.015 NG/ML — SIGNIFICANT CHANGE UP (ref 0.01–0.04)
UROBILINOGEN FLD QL: NEGATIVE MG/DL — SIGNIFICANT CHANGE UP
WBC # BLD: 15.63 K/UL — HIGH (ref 3.8–10.5)
WBC # FLD AUTO: 15.63 K/UL — HIGH (ref 3.8–10.5)

## 2019-06-18 PROCEDURE — 71045 X-RAY EXAM CHEST 1 VIEW: CPT | Mod: 26

## 2019-06-18 PROCEDURE — 99285 EMERGENCY DEPT VISIT HI MDM: CPT

## 2019-06-18 PROCEDURE — 93010 ELECTROCARDIOGRAM REPORT: CPT

## 2019-06-18 RX ORDER — IPRATROPIUM/ALBUTEROL SULFATE 18-103MCG
3 AEROSOL WITH ADAPTER (GRAM) INHALATION ONCE
Refills: 0 | Status: COMPLETED | OUTPATIENT
Start: 2019-06-18 | End: 2019-06-18

## 2019-06-18 RX ORDER — MORPHINE SULFATE 50 MG/1
4 CAPSULE, EXTENDED RELEASE ORAL ONCE
Refills: 0 | Status: DISCONTINUED | OUTPATIENT
Start: 2019-06-18 | End: 2019-06-18

## 2019-06-18 RX ORDER — IOHEXOL 300 MG/ML
30 INJECTION, SOLUTION INTRAVENOUS ONCE
Refills: 0 | Status: COMPLETED | OUTPATIENT
Start: 2019-06-18 | End: 2019-06-19

## 2019-06-18 RX ORDER — SODIUM CHLORIDE 9 MG/ML
1000 INJECTION INTRAMUSCULAR; INTRAVENOUS; SUBCUTANEOUS ONCE
Refills: 0 | Status: COMPLETED | OUTPATIENT
Start: 2019-06-18 | End: 2019-06-18

## 2019-06-18 RX ADMIN — SODIUM CHLORIDE 1000 MILLILITER(S): 9 INJECTION INTRAMUSCULAR; INTRAVENOUS; SUBCUTANEOUS at 20:48

## 2019-06-18 RX ADMIN — Medication 3 MILLILITER(S): at 20:48

## 2019-06-18 RX ADMIN — MORPHINE SULFATE 4 MILLIGRAM(S): 50 CAPSULE, EXTENDED RELEASE ORAL at 20:47

## 2019-06-18 RX ADMIN — MORPHINE SULFATE 4 MILLIGRAM(S): 50 CAPSULE, EXTENDED RELEASE ORAL at 23:08

## 2019-06-18 NOTE — ED PROVIDER NOTE - OBJECTIVE STATEMENT
Pertinent PMH/PSH/FHx/SHx and Review of Systems contained within:    58yo F w PMH of Agoraphobia, Anxiety, COPD, emphysema , +smoker, DM, HTN, Hypothyroidism presents to ED for eval of abd pain.  Pt states sx started 3wks ago, described as indigestion, not improved w ranitidine taken at home.  Pt now states pain is RLQ.  Denies fever, chills, vomiting, diarrhea.  Pt also reports SOB, associated w COPD.    No fever/chills, No photophobia/eye pain/changes in vision, No ear pain/sore throat/dysphagia, No chest pain/palpitations, +SOB/cough/wheeze/stridor, +abdominal pain, No neck/back pain, no rash, no changes in neurological status/function. Pertinent PMH/PSH/FHx/SHx and Review of Systems contained within:    58yo F w PMH of Agoraphobia, Anxiety, COPD, emphysema , +smoker, DM, HTN, Hypothyroidism presents to ED for eval of abd pain.  Pt states sx started 3wks ago, described as indigestion, not improved w ranitidine taken at home.  Pt now states pain is RLQ.  Denies fever, chills, vomiting, diarrhea.  Pt also reports SOB, associated w COPD.    No fever/chills, No photophobia/eye pain/changes in vision, No ear pain/sore throat/dysphagia, No chest pain/palpitations, +SOB/cough/wheeze, +abdominal pain, No neck/back pain, no rash, no changes in neurological status/function.

## 2019-06-18 NOTE — ED ADULT NURSE NOTE - NSIMPLEMENTINTERV_GEN_ALL_ED
Implemented All Universal Safety Interventions:  Cassandra to call system. Call bell, personal items and telephone within reach. Instruct patient to call for assistance. Room bathroom lighting operational. Non-slip footwear when patient is off stretcher. Physically safe environment: no spills, clutter or unnecessary equipment. Stretcher in lowest position, wheels locked, appropriate side rails in place.

## 2019-06-18 NOTE — ED PROVIDER NOTE - PHYSICAL EXAMINATION
Gen: Alert, c/o pain, speaking in complete sentences  Head: NC, AT, EOMI, normal lids/conjunctiva  ENT: normal hearing, patent oropharynx, MMM  Neck: supple, no tenderness/meningismus, FROM, Trachea midline  Pulm: distant BS bilaterally  CV: RRR, no M/R/G, +dist pulses  Abd: soft, obese, +RLQ TTP, ND, +BS, no guarding/rebound tenderness  Mskel: no edema/erythema/cyanosis  Skin: no rash  Neuro: no sensory/motor deficits

## 2019-06-19 VITALS
DIASTOLIC BLOOD PRESSURE: 89 MMHG | TEMPERATURE: 98 F | OXYGEN SATURATION: 98 % | SYSTOLIC BLOOD PRESSURE: 135 MMHG | HEART RATE: 94 BPM | RESPIRATION RATE: 18 BRPM

## 2019-06-19 PROCEDURE — 74177 CT ABD & PELVIS W/CONTRAST: CPT | Mod: 26

## 2019-06-19 RX ORDER — ALBUTEROL 90 UG/1
3 AEROSOL, METERED ORAL
Qty: 120 | Refills: 0
Start: 2019-06-19 | End: 2019-06-28

## 2019-06-19 RX ORDER — CLONAZEPAM 1 MG
1 TABLET ORAL ONCE
Refills: 0 | Status: DISCONTINUED | OUTPATIENT
Start: 2019-06-19 | End: 2019-06-19

## 2019-06-19 RX ADMIN — IOHEXOL 30 MILLILITER(S): 300 INJECTION, SOLUTION INTRAVENOUS at 00:24

## 2019-06-19 RX ADMIN — Medication 1 MILLIGRAM(S): at 01:57

## 2019-06-19 NOTE — ED ADULT NURSE REASSESSMENT NOTE - NS ED NURSE REASSESS COMMENT FT1
Assuming patient's care for coverage. Report received from RN and patient informed during rounding. Assessment available on KB. Will continue to monitor. patient ambulatory in ED at this time

## 2019-06-20 LAB
CULTURE RESULTS: SIGNIFICANT CHANGE UP
SPECIMEN SOURCE: SIGNIFICANT CHANGE UP

## 2019-07-03 ENCOUNTER — INBOUND DOCUMENT (OUTPATIENT)
Age: 59
End: 2019-07-03

## 2019-07-11 ENCOUNTER — INPATIENT (INPATIENT)
Facility: HOSPITAL | Age: 59
LOS: 3 days | Discharge: HOME | End: 2019-07-15
Attending: INTERNAL MEDICINE | Admitting: INTERNAL MEDICINE
Payer: COMMERCIAL

## 2019-07-11 VITALS
TEMPERATURE: 98 F | WEIGHT: 250 LBS | RESPIRATION RATE: 17 BRPM | OXYGEN SATURATION: 100 % | HEIGHT: 65 IN | SYSTOLIC BLOOD PRESSURE: 117 MMHG | DIASTOLIC BLOOD PRESSURE: 75 MMHG | HEART RATE: 88 BPM

## 2019-07-11 DIAGNOSIS — J43.9 EMPHYSEMA, UNSPECIFIED: ICD-10-CM

## 2019-07-11 DIAGNOSIS — K80.50 CALCULUS OF BILE DUCT WITHOUT CHOLANGITIS OR CHOLECYSTITIS WITHOUT OBSTRUCTION: ICD-10-CM

## 2019-07-11 DIAGNOSIS — I10 ESSENTIAL (PRIMARY) HYPERTENSION: ICD-10-CM

## 2019-07-11 DIAGNOSIS — K80.20 CALCULUS OF GALLBLADDER WITHOUT CHOLECYSTITIS WITHOUT OBSTRUCTION: ICD-10-CM

## 2019-07-11 DIAGNOSIS — E03.9 HYPOTHYROIDISM, UNSPECIFIED: ICD-10-CM

## 2019-07-11 DIAGNOSIS — E11.9 TYPE 2 DIABETES MELLITUS WITHOUT COMPLICATIONS: ICD-10-CM

## 2019-07-11 DIAGNOSIS — K80.10 CALCULUS OF GALLBLADDER WITH CHRONIC CHOLECYSTITIS WITHOUT OBSTRUCTION: ICD-10-CM

## 2019-07-11 DIAGNOSIS — E66.01 MORBID (SEVERE) OBESITY DUE TO EXCESS CALORIES: ICD-10-CM

## 2019-07-11 LAB
ALBUMIN SERPL ELPH-MCNC: 3.3 G/DL — SIGNIFICANT CHANGE UP (ref 3.3–5)
ALP SERPL-CCNC: 104 U/L — SIGNIFICANT CHANGE UP (ref 40–120)
ALT FLD-CCNC: 18 U/L — SIGNIFICANT CHANGE UP (ref 12–78)
ANION GAP SERPL CALC-SCNC: 4 MMOL/L — LOW (ref 5–17)
APPEARANCE UR: CLEAR — SIGNIFICANT CHANGE UP
APTT BLD: 37 SEC — HIGH (ref 27.5–36.3)
AST SERPL-CCNC: 9 U/L — LOW (ref 15–37)
BASOPHILS # BLD AUTO: 0.09 K/UL — SIGNIFICANT CHANGE UP (ref 0–0.2)
BASOPHILS NFR BLD AUTO: 0.7 % — SIGNIFICANT CHANGE UP (ref 0–2)
BILIRUB SERPL-MCNC: 0.3 MG/DL — SIGNIFICANT CHANGE UP (ref 0.2–1.2)
BILIRUB UR-MCNC: NEGATIVE — SIGNIFICANT CHANGE UP
BUN SERPL-MCNC: 16 MG/DL — SIGNIFICANT CHANGE UP (ref 7–23)
CALCIUM SERPL-MCNC: 9.3 MG/DL — SIGNIFICANT CHANGE UP (ref 8.5–10.1)
CHLORIDE SERPL-SCNC: 102 MMOL/L — SIGNIFICANT CHANGE UP (ref 96–108)
CO2 SERPL-SCNC: 31 MMOL/L — SIGNIFICANT CHANGE UP (ref 22–31)
COLOR SPEC: YELLOW — SIGNIFICANT CHANGE UP
CREAT SERPL-MCNC: 1 MG/DL — SIGNIFICANT CHANGE UP (ref 0.5–1.3)
DIFF PNL FLD: NEGATIVE — SIGNIFICANT CHANGE UP
EOSINOPHIL # BLD AUTO: 0.44 K/UL — SIGNIFICANT CHANGE UP (ref 0–0.5)
EOSINOPHIL NFR BLD AUTO: 3.4 % — SIGNIFICANT CHANGE UP (ref 0–6)
GLUCOSE BLDC GLUCOMTR-MCNC: 86 MG/DL — SIGNIFICANT CHANGE UP (ref 70–99)
GLUCOSE BLDC GLUCOMTR-MCNC: 89 MG/DL — SIGNIFICANT CHANGE UP (ref 70–99)
GLUCOSE SERPL-MCNC: 122 MG/DL — HIGH (ref 70–99)
GLUCOSE UR QL: NEGATIVE MG/DL — SIGNIFICANT CHANGE UP
HCT VFR BLD CALC: 41 % — SIGNIFICANT CHANGE UP (ref 34.5–45)
HGB BLD-MCNC: 13.4 G/DL — SIGNIFICANT CHANGE UP (ref 11.5–15.5)
IMM GRANULOCYTES NFR BLD AUTO: 0.7 % — SIGNIFICANT CHANGE UP (ref 0–1.5)
INR BLD: 1.03 RATIO — SIGNIFICANT CHANGE UP (ref 0.88–1.16)
KETONES UR-MCNC: NEGATIVE — SIGNIFICANT CHANGE UP
LACTATE SERPL-SCNC: 1.4 MMOL/L — SIGNIFICANT CHANGE UP (ref 0.7–2)
LEUKOCYTE ESTERASE UR-ACNC: NEGATIVE — SIGNIFICANT CHANGE UP
LIDOCAIN IGE QN: 129 U/L — SIGNIFICANT CHANGE UP (ref 73–393)
LYMPHOCYTES # BLD AUTO: 27 % — SIGNIFICANT CHANGE UP (ref 13–44)
LYMPHOCYTES # BLD AUTO: 3.46 K/UL — HIGH (ref 1–3.3)
MCHC RBC-ENTMCNC: 29.2 PG — SIGNIFICANT CHANGE UP (ref 27–34)
MCHC RBC-ENTMCNC: 32.7 GM/DL — SIGNIFICANT CHANGE UP (ref 32–36)
MCV RBC AUTO: 89.3 FL — SIGNIFICANT CHANGE UP (ref 80–100)
MONOCYTES # BLD AUTO: 0.91 K/UL — HIGH (ref 0–0.9)
MONOCYTES NFR BLD AUTO: 7.1 % — SIGNIFICANT CHANGE UP (ref 2–14)
NEUTROPHILS # BLD AUTO: 7.82 K/UL — HIGH (ref 1.8–7.4)
NEUTROPHILS NFR BLD AUTO: 61.1 % — SIGNIFICANT CHANGE UP (ref 43–77)
NITRITE UR-MCNC: NEGATIVE — SIGNIFICANT CHANGE UP
NRBC # BLD: 0 /100 WBCS — SIGNIFICANT CHANGE UP (ref 0–0)
PH UR: 5 — SIGNIFICANT CHANGE UP (ref 5–8)
PLATELET # BLD AUTO: 262 K/UL — SIGNIFICANT CHANGE UP (ref 150–400)
POTASSIUM SERPL-MCNC: 4.2 MMOL/L — SIGNIFICANT CHANGE UP (ref 3.5–5.3)
POTASSIUM SERPL-SCNC: 4.2 MMOL/L — SIGNIFICANT CHANGE UP (ref 3.5–5.3)
PROT SERPL-MCNC: 7.4 GM/DL — SIGNIFICANT CHANGE UP (ref 6–8.3)
PROT UR-MCNC: NEGATIVE MG/DL — SIGNIFICANT CHANGE UP
PROTHROM AB SERPL-ACNC: 11.5 SEC — SIGNIFICANT CHANGE UP (ref 10–12.9)
RBC # BLD: 4.59 M/UL — SIGNIFICANT CHANGE UP (ref 3.8–5.2)
RBC # FLD: 13 % — SIGNIFICANT CHANGE UP (ref 10.3–14.5)
SODIUM SERPL-SCNC: 137 MMOL/L — SIGNIFICANT CHANGE UP (ref 135–145)
SP GR SPEC: 1.01 — SIGNIFICANT CHANGE UP (ref 1.01–1.02)
UROBILINOGEN FLD QL: NEGATIVE MG/DL — SIGNIFICANT CHANGE UP
WBC # BLD: 12.81 K/UL — HIGH (ref 3.8–10.5)
WBC # FLD AUTO: 12.81 K/UL — HIGH (ref 3.8–10.5)

## 2019-07-11 PROCEDURE — 76700 US EXAM ABDOM COMPLETE: CPT | Mod: 26

## 2019-07-11 PROCEDURE — 99285 EMERGENCY DEPT VISIT HI MDM: CPT

## 2019-07-11 PROCEDURE — 99223 1ST HOSP IP/OBS HIGH 75: CPT

## 2019-07-11 PROCEDURE — 93010 ELECTROCARDIOGRAM REPORT: CPT

## 2019-07-11 RX ORDER — IPRATROPIUM/ALBUTEROL SULFATE 18-103MCG
3 AEROSOL WITH ADAPTER (GRAM) INHALATION EVERY 6 HOURS
Refills: 0 | Status: DISCONTINUED | OUTPATIENT
Start: 2019-07-11 | End: 2019-07-15

## 2019-07-11 RX ORDER — FAMOTIDINE 10 MG/ML
20 INJECTION INTRAVENOUS ONCE
Refills: 0 | Status: COMPLETED | OUTPATIENT
Start: 2019-07-11 | End: 2019-07-11

## 2019-07-11 RX ORDER — HYDROXYZINE HCL 10 MG
50 TABLET ORAL EVERY 8 HOURS
Refills: 0 | Status: DISCONTINUED | OUTPATIENT
Start: 2019-07-11 | End: 2019-07-15

## 2019-07-11 RX ORDER — ALBUTEROL 90 UG/1
1 AEROSOL, METERED ORAL EVERY 4 HOURS
Refills: 0 | Status: DISCONTINUED | OUTPATIENT
Start: 2019-07-11 | End: 2019-07-15

## 2019-07-11 RX ORDER — DEXTROSE 50 % IN WATER 50 %
12.5 SYRINGE (ML) INTRAVENOUS ONCE
Refills: 0 | Status: DISCONTINUED | OUTPATIENT
Start: 2019-07-11 | End: 2019-07-15

## 2019-07-11 RX ORDER — ONDANSETRON 8 MG/1
8 TABLET, FILM COATED ORAL ONCE
Refills: 0 | Status: COMPLETED | OUTPATIENT
Start: 2019-07-11 | End: 2019-07-11

## 2019-07-11 RX ORDER — LOSARTAN POTASSIUM 100 MG/1
50 TABLET, FILM COATED ORAL DAILY
Refills: 0 | Status: DISCONTINUED | OUTPATIENT
Start: 2019-07-11 | End: 2019-07-15

## 2019-07-11 RX ORDER — TIOTROPIUM BROMIDE 18 UG/1
1 CAPSULE ORAL; RESPIRATORY (INHALATION) DAILY
Refills: 0 | Status: DISCONTINUED | OUTPATIENT
Start: 2019-07-11 | End: 2019-07-15

## 2019-07-11 RX ORDER — DEXTROSE 50 % IN WATER 50 %
15 SYRINGE (ML) INTRAVENOUS ONCE
Refills: 0 | Status: DISCONTINUED | OUTPATIENT
Start: 2019-07-11 | End: 2019-07-15

## 2019-07-11 RX ORDER — SODIUM CHLORIDE 9 MG/ML
1000 INJECTION, SOLUTION INTRAVENOUS
Refills: 0 | Status: DISCONTINUED | OUTPATIENT
Start: 2019-07-11 | End: 2019-07-15

## 2019-07-11 RX ORDER — IOHEXOL 300 MG/ML
30 INJECTION, SOLUTION INTRAVENOUS ONCE
Refills: 0 | Status: COMPLETED | OUTPATIENT
Start: 2019-07-11 | End: 2019-07-11

## 2019-07-11 RX ORDER — DEXTROSE 50 % IN WATER 50 %
25 SYRINGE (ML) INTRAVENOUS ONCE
Refills: 0 | Status: DISCONTINUED | OUTPATIENT
Start: 2019-07-11 | End: 2019-07-15

## 2019-07-11 RX ORDER — GLUCAGON INJECTION, SOLUTION 0.5 MG/.1ML
1 INJECTION, SOLUTION SUBCUTANEOUS ONCE
Refills: 0 | Status: DISCONTINUED | OUTPATIENT
Start: 2019-07-11 | End: 2019-07-15

## 2019-07-11 RX ORDER — CLONAZEPAM 1 MG
0.5 TABLET ORAL THREE TIMES A DAY
Refills: 0 | Status: DISCONTINUED | OUTPATIENT
Start: 2019-07-11 | End: 2019-07-15

## 2019-07-11 RX ORDER — PIPERACILLIN AND TAZOBACTAM 4; .5 G/20ML; G/20ML
3.38 INJECTION, POWDER, LYOPHILIZED, FOR SOLUTION INTRAVENOUS EVERY 8 HOURS
Refills: 0 | Status: DISCONTINUED | OUTPATIENT
Start: 2019-07-11 | End: 2019-07-15

## 2019-07-11 RX ORDER — PIPERACILLIN AND TAZOBACTAM 4; .5 G/20ML; G/20ML
3.38 INJECTION, POWDER, LYOPHILIZED, FOR SOLUTION INTRAVENOUS ONCE
Refills: 0 | Status: COMPLETED | OUTPATIENT
Start: 2019-07-11 | End: 2019-07-11

## 2019-07-11 RX ORDER — MORPHINE SULFATE 50 MG/1
4 CAPSULE, EXTENDED RELEASE ORAL ONCE
Refills: 0 | Status: DISCONTINUED | OUTPATIENT
Start: 2019-07-11 | End: 2019-07-11

## 2019-07-11 RX ORDER — LEVOTHYROXINE SODIUM 125 MCG
175 TABLET ORAL DAILY
Refills: 0 | Status: DISCONTINUED | OUTPATIENT
Start: 2019-07-11 | End: 2019-07-12

## 2019-07-11 RX ORDER — MORPHINE SULFATE 50 MG/1
2 CAPSULE, EXTENDED RELEASE ORAL EVERY 4 HOURS
Refills: 0 | Status: DISCONTINUED | OUTPATIENT
Start: 2019-07-11 | End: 2019-07-14

## 2019-07-11 RX ORDER — SODIUM CHLORIDE 9 MG/ML
2000 INJECTION INTRAMUSCULAR; INTRAVENOUS; SUBCUTANEOUS ONCE
Refills: 0 | Status: COMPLETED | OUTPATIENT
Start: 2019-07-11 | End: 2019-07-11

## 2019-07-11 RX ORDER — INSULIN LISPRO 100/ML
VIAL (ML) SUBCUTANEOUS AT BEDTIME
Refills: 0 | Status: DISCONTINUED | OUTPATIENT
Start: 2019-07-11 | End: 2019-07-14

## 2019-07-11 RX ORDER — HEPARIN SODIUM 5000 [USP'U]/ML
5000 INJECTION INTRAVENOUS; SUBCUTANEOUS EVERY 8 HOURS
Refills: 0 | Status: DISCONTINUED | OUTPATIENT
Start: 2019-07-11 | End: 2019-07-15

## 2019-07-11 RX ADMIN — MORPHINE SULFATE 4 MILLIGRAM(S): 50 CAPSULE, EXTENDED RELEASE ORAL at 11:04

## 2019-07-11 RX ADMIN — Medication 50 MILLIGRAM(S): at 23:05

## 2019-07-11 RX ADMIN — IOHEXOL 30 MILLILITER(S): 300 INJECTION, SOLUTION INTRAVENOUS at 07:59

## 2019-07-11 RX ADMIN — MORPHINE SULFATE 2 MILLIGRAM(S): 50 CAPSULE, EXTENDED RELEASE ORAL at 19:02

## 2019-07-11 RX ADMIN — PIPERACILLIN AND TAZOBACTAM 25 GRAM(S): 4; .5 INJECTION, POWDER, LYOPHILIZED, FOR SOLUTION INTRAVENOUS at 21:38

## 2019-07-11 RX ADMIN — Medication 0.5 MILLIGRAM(S): at 21:49

## 2019-07-11 RX ADMIN — SODIUM CHLORIDE 2000 MILLILITER(S): 9 INJECTION INTRAMUSCULAR; INTRAVENOUS; SUBCUTANEOUS at 07:59

## 2019-07-11 RX ADMIN — ONDANSETRON 8 MILLIGRAM(S): 8 TABLET, FILM COATED ORAL at 07:59

## 2019-07-11 RX ADMIN — SODIUM CHLORIDE 110 MILLILITER(S): 9 INJECTION, SOLUTION INTRAVENOUS at 14:39

## 2019-07-11 RX ADMIN — Medication 30 MILLILITER(S): at 07:59

## 2019-07-11 RX ADMIN — MORPHINE SULFATE 2 MILLIGRAM(S): 50 CAPSULE, EXTENDED RELEASE ORAL at 19:45

## 2019-07-11 RX ADMIN — Medication 3 MILLILITER(S): at 17:03

## 2019-07-11 RX ADMIN — HEPARIN SODIUM 5000 UNIT(S): 5000 INJECTION INTRAVENOUS; SUBCUTANEOUS at 21:37

## 2019-07-11 RX ADMIN — HEPARIN SODIUM 5000 UNIT(S): 5000 INJECTION INTRAVENOUS; SUBCUTANEOUS at 16:09

## 2019-07-11 RX ADMIN — FAMOTIDINE 20 MILLIGRAM(S): 10 INJECTION INTRAVENOUS at 07:59

## 2019-07-11 RX ADMIN — PIPERACILLIN AND TAZOBACTAM 200 GRAM(S): 4; .5 INJECTION, POWDER, LYOPHILIZED, FOR SOLUTION INTRAVENOUS at 14:41

## 2019-07-11 RX ADMIN — Medication 3 MILLILITER(S): at 23:26

## 2019-07-11 NOTE — ED ADULT TRIAGE NOTE - CHIEF COMPLAINT QUOTE
patient reports abdominal pain with nausea, diagnosed with gallstones last month, fever 101 at home, took motrin at 6am

## 2019-07-11 NOTE — H&P ADULT - NSICDXPASTMEDICALHX_GEN_ALL_CORE_FT
PAST MEDICAL HISTORY:  Agoraphobia     Anxiety     COPD (chronic obstructive pulmonary disease)     DM (diabetes mellitus)     Emphysema lung     HTN (hypertension)     Hypothyroid     Smoker

## 2019-07-11 NOTE — H&P ADULT - NSHPPHYSICALEXAM_GEN_ALL_CORE
ICU Vital Signs Last 24 Hrs  T(C): 36.9 (11 Jul 2019 14:17), Max: 36.9 (11 Jul 2019 14:17)  T(F): 98.4 (11 Jul 2019 14:17), Max: 98.4 (11 Jul 2019 14:17)  HR: 96 (11 Jul 2019 14:17) (78 - 96)  BP: 155/57 (11 Jul 2019 14:17) (117/75 - 155/57)  BP(mean): --  ABP: --  ABP(mean): --  RR: 16 (11 Jul 2019 14:17) (16 - 20)  SpO2: 92% (11 Jul 2019 14:17) (91% - 100%)  GENERAL: NAD well-developed  HEAD:  Atraumatic, Normocephalic  EYES: EOMI, PERRLA, conjunctiva and sclera clear  ENMT: No tonsillar erythema, exudates, or enlargement; Moist mucous membranes, Good dentition, No lesions  NECK: Supple, No JVD, Normal thyroid  NERVOUS SYSTEM:  Alert & Oriented X3, Good concentration; Motor Strength 5/5 B/L upper and lower extremities; DTRs 2+ intact and symmetric  CHEST/LUNG: Clear to percussion bilaterally; No rales, rhonchi, wheezing, or rubs  HEART: Regular rate and rhythm; No murmurs, rubs, or gallops  ABDOMEN: Soft, Nontender, Nondistended; Bowel sounds present  EXTREMITIES:  2+ Peripheral Pulses, No clubbing, cyanosis, or edema  LYMPH: No lymphadenopathy   SKIN: No rashes or lesions

## 2019-07-11 NOTE — H&P ADULT - HISTORY OF PRESENT ILLNESS
60yo female with pmh Anxiety, COPD, DM, HTN, Hypothyroidism, psh: none, presents with epigastric pain radiating to b/l back x 2 dyas with fever last night and nausea. Pt states this pain is worse then last month when she had CT with sludge. denies D/C, dysuria. denies sob.

## 2019-07-11 NOTE — ED ADULT NURSE NOTE - TEMPLATE LIST FOR HEAD TO TOE ASSESSMENT
Psychological condition is newly identified.  Medication changes per orders.  Psychological condition  will be reassessed at the next regular appointment.   Abdominal Pain, N/V/D

## 2019-07-11 NOTE — ED PROVIDER NOTE - OBJECTIVE STATEMENT
58yo female with pmh Anxiety, COPD, DM, HTN, Hypothyroidism, psh: none, presents with epigastric pain radiating to b/l back x 2 dyas with fever last night and nausea. Pt states this pain is worse then last month when she had CT with sludge. denies D/C, dysuria. denies sob.     ROS: +fever, no chills. No photophobia/eye pain/changes in vision, No ear pain/sore throat/dysphagia, No chest pain/palpitations. No SOB/cough. +abdominal pain,  no V/D, no black/bloody bm. No dysuria/frequency/discharge, No headache. No Dizziness.  No rash.  No numbness/tingling/weakness.

## 2019-07-11 NOTE — CONSULT NOTE ADULT - SUBJECTIVE AND OBJECTIVE BOX
Chief Complaint:  Patient is a 59y old  Female who presents with a chief complaint of abdominal pain    HPI:   58yo female with pmh Anxiety, COPD, DM, HTN, Hypothyroidism, psh: none, presents with epigastric pain radiating to b/l back x 2 dyas with fever last night and nausea. Pt states this pain is worse then last month when she had CT with sludge. denies D/C, dysuria. denies sob.     PMH/PSH:PAST MEDICAL & SURGICAL HISTORY:  DM (diabetes mellitus)  HTN (hypertension)  Smoker  Emphysema lung  Hypothyroid  Agoraphobia  COPD (chronic obstructive pulmonary disease)  Anxiety  No significant past surgical history      Allergies:  No Known Allergies      Medications:      REVIEW OF SYSTEMS:  All other review of systems is negative unless indicated above.    Relevant Family History:   FAMILY HISTORY:    Relevant Social History:  Denies + every day smoker    Physical Exam:    Vital Signs:  Vital Signs Last 24 Hrs  T(C): 36.6 (2019 11:02), Max: 36.8 (2019 07:02)  T(F): 97.9 (2019 11:02), Max: 98.2 (2019 07:02)  HR: 78 (2019 11:02) (78 - 88)  BP: 120/104 (2019 11:02) (117/75 - 120/104)  BP(mean): --  RR: 20 (2019 11:02) (17 - 20)  SpO2: 91% (2019 11:02) (91% - 100%)  Daily Height in cm: 165.1 (2019 07:02)    Daily     Constitutional: WDWN resting comfortably in bed; NAD  HEENT: NC/AT, PERRL, EOMI, anicteric sclera, no nasal discharge; uvula midline, no oropharyngeal erythema or exudates  Neck: supple; no JVD or thyromegaly  Respiratory: + RHONCHI JACOB, shallow BS  Cardiac: +S1/S2; RRR; no M/R/G; PMI non-displaced  Gastrointestinal: soft,+ RUQ tenderness, ND no rebound or guarding; +BS   Extremities: , no clubbing or cyanosis; no peripheral edema  Musculoskeletal:  no joint swelling, tenderness or erythema  Vascular: 2+ radial, femoral, DP/PT pulses B/L  Skin: warm, dry and intact; no rashes, wounds, or scars  Neurologic: AAOx3; CNS grossly intact; no focal deficits no asterixis, no tremor, no encephalopathy    Laboratory:                          13.4   12.81 )-----------( 262      ( 2019 07:49 )             41.0     07-11    137  |  102  |  16  ----------------------------<  122<H>  4.2   |  31  |  1.00    Ca    9.3      2019 07:49    TPro  7.4  /  Alb  3.3  /  TBili  0.3  /  DBili  x   /  AST  9<L>  /  ALT  18  /  AlkPhos  104  0711    LIVER FUNCTIONS - ( 2019 07:49 )  Alb: 3.3 g/dL / Pro: 7.4 gm/dL / ALK PHOS: 104 U/L / ALT: 18 U/L / AST: 9 U/L / GGT: x           PT/INR - ( 2019 07:49 )   PT: 11.5 sec;   INR: 1.03 ratio         PTT - ( 2019 07:49 )  PTT:37.0 sec  Urinalysis Basic - ( 2019 10:37 )    Color: Yellow / Appearance: Clear / S.010 / pH: x  Gluc: x / Ketone: Negative  / Bili: Negative / Urobili: Negative mg/dL   Blood: x / Protein: Negative mg/dL / Nitrite: Negative   Leuk Esterase: Negative / RBC: x / WBC x   Sq Epi: x / Non Sq Epi: x / Bacteria: x        Lipase [id number] U/L       Intake and Output    Imaging:    < from: CT Abdomen and Pelvis w/ Oral Cont and w/ IV Cont (19 @ 00:29) >    EXAM:  CT ABDOMEN AND PELVIS OC IC                            PROCEDURE DATE:  2019          INTERPRETATION:  CLINICAL INFORMATION: Right lower quadrant pain    COMPARISON: 2019    PROCEDURE:   Contiguous axial scan of the abdomen and pelvis with intravenous and oral   contrast, followed by coronal and sagittal reformation. 85 mL of   Omnipaque were administered without adverse reaction. 15 mL of contrast   were discarded.    FINDINGS:    LOWER CHEST: Within normal limits.    LIVER: Within normal limits.  BILE DUCTS: Normal caliber.  GALLBLADDER: Gallstone and a sludge ball no wall thickening..  SPLEEN: Within normal limits.  PANCREAS: Within normal limits.  ADRENALS: Within normal limits.  KIDNEYS/URETERS: Within normal limits.    BLADDER: Within normal limits.  REPRODUCTIVE ORGANS: 3.3 x 4.8 cm sized left ovarian cyst.     BOWEL: No bowel obstruction. Appendix is normal.  PERITONEUM: No ascites.  VESSELS:  Within normal limits.  RETROPERITONEUM: No lymphadenopathy.    ABDOMINAL WALL: Within normal limits.  BONES: Within normal limits.    IMPRESSION: Gallstone and a sludge ball.    Left ovarian cyst.    LEVY LOPEZ M.D., ATTENDING RADIOLOGIST  This document has been electronically signed. 2019  1:27AM      < from: US Abdomen Complete (19 @ 10:17) >    EXAM:  US ABDOMINAL COMPLETE                        PROCEDURE DATE:  2019    INTERPRETATION:  CLINICAL INFORMATION: Epigastric pain  COMPARISON: None available.  TECHNIQUE: Sonography of the abdomen.     FINDINGS:    Liver: Fatty infiltration.    Bile ducts: Normal caliber. Common bile duct measures 7 mm. Questionable   3 mm calculus in the mid CBD    Gallbladder: Large gallstones with internal sludge. No gallbladder wall   thickening. Sonographic Gil's sign is negative.    Pancreas: Visualized portions are within normal limits.    Spleen: 10.8 cm. Within normal limits.    Right kidney: 10.6 cm. No hydronephrosis.    Left kidney: 10.8 cm.  No hydronephrosis.    Ascites: None.    Aorta and IVC: Visualized portions are within normal limits.    IMPRESSION:     Gallstones and sludge.    Questionable 3 mm calculus in the mid CBD without biliary ductal   dilatation. Correlate with LFTs and with follow-up MRCP if clinically   indicated.    Fatty infiltration of the liver      AIDA QUISPE M.D., ATTENDING RADIOLOGIST  This document has been electronically signed. 2019 11:03AM

## 2019-07-11 NOTE — CONSULT NOTE ADULT - SUBJECTIVE AND OBJECTIVE BOX
Chief Complaint:  Patient is a 59y old  Female who presents with a chief complaint of epigastric pain (2019 15:12)      HPI:  58yo female with pmh Anxiety, COPD, DM, HTN, Hypothyroidism, psh: none, presents with epigastric pain radiating to b/l back x 2 days with fever last night and nausea. Pt states this pain is worse then last month when she had CT with sludge. denies D/C, dysuria. denies sob. (2019 14:18) We were asked to evaluate patient for ? cbd stone       PMH/PSH:PAST MEDICAL & SURGICAL HISTORY:  DM (diabetes mellitus)  HTN (hypertension)  Smoker  Emphysema lung  Hypothyroid  Agoraphobia  COPD (chronic obstructive pulmonary disease)  Anxiety  No significant past surgical history      Allergies:  No Known Allergies      Medications:  ALBUTerol    90 MICROgram(s) HFA Inhaler 1 Puff(s) Inhalation every 4 hours  ALBUTerol/ipratropium for Nebulization 3 milliLiter(s) Nebulizer every 6 hours  clonazePAM  Tablet 0.5 milliGRAM(s) Oral three times a day PRN  dextrose 40% Gel 15 Gram(s) Oral once PRN  dextrose 5% + sodium chloride 0.45%. 1000 milliLiter(s) IV Continuous <Continuous>  dextrose 5%. 1000 milliLiter(s) IV Continuous <Continuous>  dextrose 50% Injectable 12.5 Gram(s) IV Push once  dextrose 50% Injectable 25 Gram(s) IV Push once  dextrose 50% Injectable 25 Gram(s) IV Push once  glucagon  Injectable 1 milliGRAM(s) IntraMuscular once PRN  heparin  Injectable 5000 Unit(s) SubCutaneous every 8 hours  insulin lispro (HumaLOG) corrective regimen sliding scale   SubCutaneous at bedtime  levothyroxine 175 MICROGram(s) Oral daily  losartan 50 milliGRAM(s) Oral daily  piperacillin/tazobactam IVPB.. 3.375 Gram(s) IV Intermittent every 8 hours  tiotropium 18 MICROgram(s) Capsule 1 Capsule(s) Inhalation daily      Review of Systems:    General:  No weight loss, fevers, chills, night sweats, fatigue,   Eyes:  Good vision, no reported pain  ENT:  No sore throat, pain, runny nose, dysphagia  CV:  No pain, palpitations, hypo/hypertension  Resp:  No dyspnea, cough, tachypnea, wheezing  GI:  +pain, No nausea, No vomiting, No diarrhea, No constipation, No pruritis, No rectal bleeding, No tarry stools, No dysphagia,  :  No pain, bleeding, incontinence, nocturia  Muscle:  No pain, weakness  Breast:  No pain, abscess, mass, discharge  Neuro:  No weakness, tingling, memory problems  Psych:  No fatigue, insomnia, mood problems, depression  Endocrine:  No polyuria, polydipsia, cold/heat intolerance  Heme:  No petechiae, ecchymosis, easy bruisability  Skin:  No rash, tattoos, scars, edema    Relevant Family History:   FAMILY HISTORY:      Relevant Social History: Alcohol ( -) , Tobacco ( -) , Illicit drugs (- )     Physical Exam:    Vital Signs:  Vital Signs Last 24 Hrs  T(C): 36.9 (2019 14:17), Max: 36.9 (2019 14:17)  T(F): 98.4 (2019 14:17), Max: 98.4 (2019 14:17)  HR: 96 (2019 14:17) (78 - 96)  BP: 155/57 (2019 14:17) (117/75 - 155/57)  BP(mean): --  RR: 16 (2019 14:17) (16 - 20)  SpO2: 92% (2019 14:17) (91% - 100%)  Daily Height in cm: 165.1 (2019 07:02)    Daily     General:  Appears stated age, well-groomed, well-nourished, no distress  HEENT:  NC/AT,  conjunctivae clear and pink, no thyromegaly, nodules, adenopathy, no JVD, anicteric sclera  Chest:  Full & symmetric excursion, no increased effort, breath sounds clear  Cardiovascular:  Regular rhythm, S1, S2, no murmur/rub/S3/S4, no abdominal bruit, no edema  Abdomen:  Soft, + epi tender, non distended, OBESE  normoactive bowel sounds,  no masses , no hepatosplenomegaly, no signs of chronic liver disease  Extremities:  no cyanosis, clubbing or edema  Skin:  No rash/erythema/ecchymoses/petechiae/wounds/abscess/warm/dry  Neuro/Psych:  Alert, oriented, no asterixis, no tremor, no encephalopathy    Laboratory:                          13.4   12.81 )-----------( 262      ( 2019 07:49 )             41.0     07-11    137  |  102  |  16  ----------------------------<  122<H>  4.2   |  31  |  1.00    Ca    9.3      2019 07:49    TPro  7.4  /  Alb  3.3  /  TBili  0.3  /  DBili  x   /  AST  9<L>  /  ALT  18  /  AlkPhos  104  07-11    LIVER FUNCTIONS - ( 2019 07:49 )  Alb: 3.3 g/dL / Pro: 7.4 gm/dL / ALK PHOS: 104 U/L / ALT: 18 U/L / AST: 9 U/L / GGT: x           PT/INR - ( 2019 07:49 )   PT: 11.5 sec;   INR: 1.03 ratio         PTT - ( 2019 07:49 )  PTT:37.0 sec  Urinalysis Basic - ( 2019 10:37 )    Color: Yellow / Appearance: Clear / S.010 / pH: x  Gluc: x / Ketone: Negative  / Bili: Negative / Urobili: Negative mg/dL   Blood: x / Protein: Negative mg/dL / Nitrite: Negative   Leuk Esterase: Negative / RBC: x / WBC x   Sq Epi: x / Non Sq Epi: x / Bacteria: x         Lipase [id number] U/L       Imaging:  EXAM:  US ABDOMINAL COMPLETE                        PROCEDURE DATE:  2019    INTERPRETATION:  CLINICAL INFORMATION: Epigastric pain    COMPARISON: None available.    TECHNIQUE: Sonography of the abdomen.     FINDINGS:    Liver: Fatty infiltration.    Bile ducts: Normal caliber. Common bile duct measures 7 mm. Questionable   3 mm calculus in the mid CBD    Gallbladder: Large gallstones with internal sludge. No gallbladder wall   thickening. Sonographic Gil's sign is negative.    Pancreas: Visualized portions are within normal limits.    Spleen: 10.8 cm. Within normal limits.    Right kidney: 10.6 cm. No hydronephrosis.    Left kidney: 10.8 cm.  No hydronephrosis.    Ascites: None.    Aorta and IVC: Visualized portions are within normal limits.    IMPRESSION:     Gallstones and sludge.    Questionable 3 mm calculus in the mid CBD without biliary ductal   dilatation. Correlate with LFTs and with follow-up MRCP if clinically   indicated.    Fatty infiltration of the liver    AIDA QUISPE M.D., ATTENDING RADIOLOGIST  This document has been electronically signed. 2019 11:03AM

## 2019-07-11 NOTE — ED PROVIDER NOTE - CLINICAL SUMMARY MEDICAL DECISION MAKING FREE TEXT BOX
pt nontoxic, d/w dr hall for admission for biliary colic who will contact GI for possible CBD stone - normal LFTs. pt nontoxic, d/w dr hall for admission for biliary colic who will contact GI for possible CBD stone - normal LFTs.    pt was to be admitted to surgery but reports to admit to medicine. d/w dr. holm for admission. and dr morris pyle. pt nontoxic, d/w dr hall for admission for biliary colic who will contact GI for possible CBD stone - normal LFTs.    pt was to be admitted to surgery but seen by surg PA and states to admit to medicine. d/w dr. holm for admission. and dr morris pyle. pt nontoxic, d/w dr hall for admission for biliary colic who will contact GI for possible CBD stone - normal LFTs.    pt was to be admitted to surgery but seen by surg PA and states to admit to medicine. d/w dr. cummings for admission. and dr morris pyle.    pt was admitted to DR. Cummings, however realized later that PMD is Dr. Espinoza, d/w dr espinoza for admission.

## 2019-07-11 NOTE — CONSULT NOTE ADULT - PROBLEM SELECTOR RECOMMENDATION 2
doubt CBD stone .  Normal LFTS non dilted CBD .  ptient is clastrphibic and refused MRCp  follow LFTS and advance diey

## 2019-07-11 NOTE — ED PROVIDER NOTE - PHYSICAL EXAMINATION
Gen: Alert, Well appearing. NAD    Head: NC, AT, PERRL, normal lids/conjunctiva   ENT: Bilateral TM WNL, patent oropharynx without erythema/exudate, uvula midline  Neck: supple, no tenderness/meningismus  Pulm: Bilateral clear BS, normal resp effort  CV: RRR, no M/R/G, +dist pulses   Abd: soft, ++ tender epigastric, ND, +BS, no guarding/rebound tenderness  Mskel: no edema/erythema/cyanosis   Skin: no rash, no bruising  Neuro: AAOx3, no sensory/motor deficits, CN 2-12 intact

## 2019-07-11 NOTE — CONSULT NOTE ADULT - SUBJECTIVE AND OBJECTIVE BOX
HPI:  58yo female with pmh Anxiety, COPD, DM, HTN, Hypothyroidism, psh: none, presents with epigastric pain radiating to b/l back x 2 days with fever last night and nausea. Pt states this pain is worse then last month when she had CT with sludge. denies D/C, dysuria. denies sob. continues to smoke now for about 45 years. Has home nebulizer, no home o2.  Has productive cough. found to have o2 sat of 88% by respiratory therapist.    PAST MEDICAL & SURGICAL HISTORY:  DM (diabetes mellitus)  HTN (hypertension)  Smoker  Emphysema lung  Hypothyroid  Agoraphobia  COPD (chronic obstructive pulmonary disease)  Anxiety  No significant past surgical history      FAMILY HISTORY:    SOCIAL HISTORY: BMI (kg/m2): 41.6 (-11 @ 07:02)    Allergies  No Known Allergies          MEDICATIONS  (STANDING):  ALBUTerol    90 MICROgram(s) HFA Inhaler 1 Puff(s) Inhalation every 4 hours  ALBUTerol/ipratropium for Nebulization 3 milliLiter(s) Nebulizer every 6 hours  dextrose 5% + sodium chloride 0.45%. 1000 milliLiter(s) (110 mL/Hr) IV Continuous <Continuous>  dextrose 5%. 1000 milliLiter(s) (50 mL/Hr) IV Continuous <Continuous>  dextrose 50% Injectable 12.5 Gram(s) IV Push once  dextrose 50% Injectable 25 Gram(s) IV Push once  dextrose 50% Injectable 25 Gram(s) IV Push once  heparin  Injectable 5000 Unit(s) SubCutaneous every 8 hours  insulin lispro (HumaLOG) corrective regimen sliding scale   SubCutaneous at bedtime  levothyroxine 175 MICROGram(s) Oral daily  losartan 50 milliGRAM(s) Oral daily  piperacillin/tazobactam IVPB.. 3.375 Gram(s) IV Intermittent every 8 hours  tiotropium 18 MICROgram(s) Capsule 1 Capsule(s) Inhalation daily    MEDICATIONS  (PRN):  clonazePAM  Tablet 0.5 milliGRAM(s) Oral three times a day PRN anxiety  dextrose 40% Gel 15 Gram(s) Oral once PRN Blood Glucose LESS THAN 70 milliGRAM(s)/deciliter  glucagon  Injectable 1 milliGRAM(s) IntraMuscular once PRN Glucose LESS THAN 70 milligrams/deciliter  hydrOXYzine hydrochloride 50 milliGRAM(s) Oral every 8 hours PRN Itching  morphine  - Injectable 2 milliGRAM(s) IV Push every 4 hours PRN Moderate Pain (4 - 6)    REVIEW OF SYSTEMS:    Constitutional:            No fever, weight loss or fatigue  HEENT:                         No difficulty hearing, tinnitus, vertigo; No sinus or throat pain  Respiratory:                     Wheezing, SOB  Cardiovascular:           No chest pain, palpitations  Gastrointestinal:        No abdominal or epigastric pain. No N/V/diarrhea or hematemesis  Genitourinary:            No dysuria, frequency, hematuria or incontinence  SKIN:                             no rash  Musculoskeletal:        No joint pain or swelling  Extremities:                No swelling  Neurological:              No headaches  Psychiatric:                 No depression, anxiety    PQRS:  Vaccines - Influenza and Pneumovax:  BMI:  Tobacco:  Depression:   Colorectal Screening:  Breast Cancer Screening:  Blood Presssure Screening / Control of:  HbAIc:  Ischemic Vascular Disease:  Current Medications Reviewed:    Vital Signs Last 24 Hrs  T(C): 36.9 (2019 16:42), Max: 36.9 (2019 14:17)  T(F): 98.5 (2019 16:42), Max: 98.5 (2019 16:42)  HR: 85 (2019 23:29) (78 - 96)  BP: 129/66 (2019 16:42) (117/75 - 155/57)  BP(mean): --  RR: 18 (2019 16:42) (16 - 20)  SpO2: 88% (2019 23:29) (88% - 100%)    PHYSICAL EXAM:  GEN:         Awake, responsive and comfortable.  HEENT:    Normal.    RESP:            Bilat expiratory wheezes  CVS:             Regular rate and rhythm.   ABD:         obese, RUQ tenderness,   :             No costovertebral angle tenderness  SKIN:           Warm and dry.  EXTR:            No clubbing, cyanosis or edema  CNS:              Intact sensory and motor function.  PSYCH:        cooperative, no anxiety or depression            LABS:                        13.4   12.81 )-----------( 262      ( 2019 07:49 )             41.0     07-11    137  |  102  |  16  ----------------------------<  122<H>  4.2   |  31  |  1.00    Ca    9.3      2019 07:49    TPro  7.4  /  Alb  3.3  /  TBili  0.3  /  DBili  x   /  AST  9<L>  /  ALT  18  /  AlkPhos  104  07-11    PT/INR - ( 2019 07:49 )   PT: 11.5 sec;   INR: 1.03 ratio         PTT - ( 2019 07:49 )  PTT:37.0 sec    Urinalysis Basic - ( 2019 10:37 )    Color: Yellow / Appearance: Clear / S.010 / pH: x  Gluc: x / Ketone: Negative  / Bili: Negative / Urobili: Negative mg/dL   Blood: x / Protein: Negative mg/dL / Nitrite: Negative   Leuk Esterase: Negative / RBC: x / WBC x   Sq Epi: x / Non Sq Epi: x / Bacteria: x            EKG:     RADIOLOGY & ADDITIONAL STUDIES:    ASSESSMENT: cholelithiasis, r/o CBD stone. COPD  PLAN: continue duoneb unit dose. budesonide 160/4.5 Bid, nco2 2l/min for now HPI:  58yo female with pmh Anxiety, COPD, DM, HTN, Hypothyroidism, psh: none, presents with epigastric pain radiating to b/l back x 2 days with fever last night and nausea. Pt states this pain is worse then last month when she had CT with sludge. denies D/C, dysuria. denies sob. continues to smoke now for about 45 years. Has home nebulizer, no home o2.  Has productive cough. found to have o2 sat of 88% by respiratory therapist.    PAST MEDICAL & SURGICAL HISTORY:  DM (diabetes mellitus)  HTN (hypertension)  Smoker  Emphysema lung  Hypothyroid  Agoraphobia  COPD (chronic obstructive pulmonary disease)  Anxiety  No significant past surgical history      FAMILY HISTORY:    SOCIAL HISTORY: BMI (kg/m2): 41.6 (-11 @ 07:02)    Allergies  No Known Allergies          MEDICATIONS  (STANDING):  ALBUTerol    90 MICROgram(s) HFA Inhaler 1 Puff(s) Inhalation every 4 hours  ALBUTerol/ipratropium for Nebulization 3 milliLiter(s) Nebulizer every 6 hours  dextrose 5% + sodium chloride 0.45%. 1000 milliLiter(s) (110 mL/Hr) IV Continuous <Continuous>  dextrose 5%. 1000 milliLiter(s) (50 mL/Hr) IV Continuous <Continuous>  dextrose 50% Injectable 12.5 Gram(s) IV Push once  dextrose 50% Injectable 25 Gram(s) IV Push once  dextrose 50% Injectable 25 Gram(s) IV Push once  heparin  Injectable 5000 Unit(s) SubCutaneous every 8 hours  insulin lispro (HumaLOG) corrective regimen sliding scale   SubCutaneous at bedtime  levothyroxine 175 MICROGram(s) Oral daily  losartan 50 milliGRAM(s) Oral daily  piperacillin/tazobactam IVPB.. 3.375 Gram(s) IV Intermittent every 8 hours  tiotropium 18 MICROgram(s) Capsule 1 Capsule(s) Inhalation daily    MEDICATIONS  (PRN):  clonazePAM  Tablet 0.5 milliGRAM(s) Oral three times a day PRN anxiety  dextrose 40% Gel 15 Gram(s) Oral once PRN Blood Glucose LESS THAN 70 milliGRAM(s)/deciliter  glucagon  Injectable 1 milliGRAM(s) IntraMuscular once PRN Glucose LESS THAN 70 milligrams/deciliter  hydrOXYzine hydrochloride 50 milliGRAM(s) Oral every 8 hours PRN Itching  morphine  - Injectable 2 milliGRAM(s) IV Push every 4 hours PRN Moderate Pain (4 - 6)    REVIEW OF SYSTEMS:    Constitutional:            No fever, weight loss or fatigue  HEENT:                         No difficulty hearing, tinnitus, vertigo; No sinus or throat pain  Respiratory:                     Wheezing, SOB  Cardiovascular:           No chest pain, palpitations  Gastrointestinal:        No abdominal or epigastric pain. No N/V/diarrhea or hematemesis  Genitourinary:            No dysuria, frequency, hematuria or incontinence  SKIN:                             no rash  Musculoskeletal:        No joint pain or swelling  Extremities:                No swelling  Neurological:              No headaches  Psychiatric:                 No depression, anxiety    PQRS:  Vaccines - Influenza and Pneumovax:  BMI:  Tobacco:  Depression:   Colorectal Screening:  Breast Cancer Screening:  Blood Presssure Screening / Control of:  HbAIc:  Ischemic Vascular Disease:  Current Medications Reviewed:    Vital Signs Last 24 Hrs  T(C): 36.9 (2019 16:42), Max: 36.9 (2019 14:17)  T(F): 98.5 (2019 16:42), Max: 98.5 (2019 16:42)  HR: 85 (2019 23:29) (78 - 96)  BP: 129/66 (2019 16:42) (117/75 - 155/57)  BP(mean): --  RR: 18 (2019 16:42) (16 - 20)  SpO2: 88% (2019 23:29) (88% - 100%)    PHYSICAL EXAM:  GEN:         Awake, responsive and comfortable.  HEENT:    Normal.    RESP:            Bilat expiratory wheezes  CVS:             Regular rate and rhythm.   ABD:         obese, RUQ tenderness,   :             No costovertebral angle tenderness  SKIN:           Warm and dry.  EXTR:            No clubbing, cyanosis or edema  CNS:              Intact sensory and motor function.  PSYCH:        cooperative, no anxiety or depression            LABS:                        13.4   12.81 )-----------( 262      ( 2019 07:49 )             41.0     07-11    137  |  102  |  16  ----------------------------<  122<H>  4.2   |  31  |  1.00    Ca    9.3      2019 07:49    TPro  7.4  /  Alb  3.3  /  TBili  0.3  /  DBili  x   /  AST  9<L>  /  ALT  18  /  AlkPhos  104  07-11    PT/INR - ( 2019 07:49 )   PT: 11.5 sec;   INR: 1.03 ratio         PTT - ( 2019 07:49 )  PTT:37.0 sec    Urinalysis Basic - ( 2019 10:37 )    Color: Yellow / Appearance: Clear / S.010 / pH: x  Gluc: x / Ketone: Negative  / Bili: Negative / Urobili: Negative mg/dL   Blood: x / Protein: Negative mg/dL / Nitrite: Negative   Leuk Esterase: Negative / RBC: x / WBC x   Sq Epi: x / Non Sq Epi: x / Bacteria: x            EKG:     RADIOLOGY & ADDITIONAL STUDIES:    ASSESSMENT: cholelithiasis, r/o CBD stone. COPD  PLAN: continue duoneb unit dose. budesonide 160/4.5 Bid, nco2 2l/min for now. CXR.

## 2019-07-11 NOTE — CONSULT NOTE ADULT - PROBLEM SELECTOR RECOMMENDATION 9
Recommend HIDA scan  IV antibiotics, IV hydration  Clear liquids  f/u LFTs  Possible Laparoscopic Cholecystectomy if med/pulm stable

## 2019-07-12 LAB
ALBUMIN SERPL ELPH-MCNC: 3.4 G/DL — SIGNIFICANT CHANGE UP (ref 3.3–5)
ALP SERPL-CCNC: 86 U/L — SIGNIFICANT CHANGE UP (ref 40–120)
ALT FLD-CCNC: 18 U/L — SIGNIFICANT CHANGE UP (ref 12–78)
ANION GAP SERPL CALC-SCNC: 4 MMOL/L — LOW (ref 5–17)
AST SERPL-CCNC: 12 U/L — LOW (ref 15–37)
BILIRUB DIRECT SERPL-MCNC: 0.15 MG/DL — SIGNIFICANT CHANGE UP (ref 0.05–0.2)
BILIRUB INDIRECT FLD-MCNC: 0.2 MG/DL — SIGNIFICANT CHANGE UP (ref 0.2–1)
BILIRUB SERPL-MCNC: 0.3 MG/DL — SIGNIFICANT CHANGE UP (ref 0.2–1.2)
BUN SERPL-MCNC: 10 MG/DL — SIGNIFICANT CHANGE UP (ref 7–23)
CALCIUM SERPL-MCNC: 8.9 MG/DL — SIGNIFICANT CHANGE UP (ref 8.5–10.1)
CHLORIDE SERPL-SCNC: 104 MMOL/L — SIGNIFICANT CHANGE UP (ref 96–108)
CO2 SERPL-SCNC: 32 MMOL/L — HIGH (ref 22–31)
CREAT SERPL-MCNC: 0.88 MG/DL — SIGNIFICANT CHANGE UP (ref 0.5–1.3)
CULTURE RESULTS: SIGNIFICANT CHANGE UP
GLUCOSE BLDC GLUCOMTR-MCNC: 107 MG/DL — HIGH (ref 70–99)
GLUCOSE BLDC GLUCOMTR-MCNC: 190 MG/DL — HIGH (ref 70–99)
GLUCOSE BLDC GLUCOMTR-MCNC: 200 MG/DL — HIGH (ref 70–99)
GLUCOSE SERPL-MCNC: 126 MG/DL — HIGH (ref 70–99)
HBA1C BLD-MCNC: 6.5 % — HIGH (ref 4–5.6)
HCT VFR BLD CALC: 39.1 % — SIGNIFICANT CHANGE UP (ref 34.5–45)
HCV AB S/CO SERPL IA: 0.07 S/CO — SIGNIFICANT CHANGE UP (ref 0–0.99)
HCV AB SERPL-IMP: SIGNIFICANT CHANGE UP
HGB BLD-MCNC: 12.5 G/DL — SIGNIFICANT CHANGE UP (ref 11.5–15.5)
LACTATE SERPL-SCNC: 1.2 MMOL/L — SIGNIFICANT CHANGE UP (ref 0.7–2)
MAGNESIUM SERPL-MCNC: 2.4 MG/DL — SIGNIFICANT CHANGE UP (ref 1.6–2.6)
MCHC RBC-ENTMCNC: 28.9 PG — SIGNIFICANT CHANGE UP (ref 27–34)
MCHC RBC-ENTMCNC: 32 GM/DL — SIGNIFICANT CHANGE UP (ref 32–36)
MCV RBC AUTO: 90.5 FL — SIGNIFICANT CHANGE UP (ref 80–100)
NRBC # BLD: 0 /100 WBCS — SIGNIFICANT CHANGE UP (ref 0–0)
PLATELET # BLD AUTO: 247 K/UL — SIGNIFICANT CHANGE UP (ref 150–400)
POTASSIUM SERPL-MCNC: 4.1 MMOL/L — SIGNIFICANT CHANGE UP (ref 3.5–5.3)
POTASSIUM SERPL-SCNC: 4.1 MMOL/L — SIGNIFICANT CHANGE UP (ref 3.5–5.3)
PROT SERPL-MCNC: 7.1 GM/DL — SIGNIFICANT CHANGE UP (ref 6–8.3)
RBC # BLD: 4.32 M/UL — SIGNIFICANT CHANGE UP (ref 3.8–5.2)
RBC # FLD: 12.8 % — SIGNIFICANT CHANGE UP (ref 10.3–14.5)
SODIUM SERPL-SCNC: 140 MMOL/L — SIGNIFICANT CHANGE UP (ref 135–145)
SPECIMEN SOURCE: SIGNIFICANT CHANGE UP
WBC # BLD: 12.51 K/UL — HIGH (ref 3.8–10.5)
WBC # FLD AUTO: 12.51 K/UL — HIGH (ref 3.8–10.5)

## 2019-07-12 PROCEDURE — 78226 HEPATOBILIARY SYSTEM IMAGING: CPT | Mod: 26

## 2019-07-12 PROCEDURE — 71045 X-RAY EXAM CHEST 1 VIEW: CPT | Mod: 26

## 2019-07-12 RX ORDER — LEVOTHYROXINE SODIUM 125 MCG
150 TABLET ORAL DAILY
Refills: 0 | Status: DISCONTINUED | OUTPATIENT
Start: 2019-07-12 | End: 2019-07-15

## 2019-07-12 RX ORDER — INSULIN GLARGINE 100 [IU]/ML
0 INJECTION, SOLUTION SUBCUTANEOUS
Qty: 0 | Refills: 0 | DISCHARGE

## 2019-07-12 RX ORDER — ESCITALOPRAM OXALATE 10 MG/1
20 TABLET, FILM COATED ORAL DAILY
Refills: 0 | Status: DISCONTINUED | OUTPATIENT
Start: 2019-07-12 | End: 2019-07-15

## 2019-07-12 RX ORDER — FLUTICASONE PROPIONATE 50 MCG
1 SPRAY, SUSPENSION NASAL
Qty: 0 | Refills: 0 | DISCHARGE

## 2019-07-12 RX ORDER — LEVOTHYROXINE SODIUM 125 MCG
0 TABLET ORAL
Qty: 0 | Refills: 0 | DISCHARGE

## 2019-07-12 RX ORDER — GABAPENTIN 400 MG/1
600 CAPSULE ORAL THREE TIMES A DAY
Refills: 0 | Status: DISCONTINUED | OUTPATIENT
Start: 2019-07-12 | End: 2019-07-15

## 2019-07-12 RX ORDER — ATORVASTATIN CALCIUM 80 MG/1
10 TABLET, FILM COATED ORAL AT BEDTIME
Refills: 0 | Status: DISCONTINUED | OUTPATIENT
Start: 2019-07-12 | End: 2019-07-15

## 2019-07-12 RX ORDER — ACETAMINOPHEN 500 MG
650 TABLET ORAL EVERY 6 HOURS
Refills: 0 | Status: DISCONTINUED | OUTPATIENT
Start: 2019-07-12 | End: 2019-07-15

## 2019-07-12 RX ORDER — MORPHINE SULFATE 50 MG/1
4 CAPSULE, EXTENDED RELEASE ORAL ONCE
Refills: 0 | Status: DISCONTINUED | OUTPATIENT
Start: 2019-07-12 | End: 2019-07-12

## 2019-07-12 RX ORDER — BUDESONIDE AND FORMOTEROL FUMARATE DIHYDRATE 160; 4.5 UG/1; UG/1
2 AEROSOL RESPIRATORY (INHALATION)
Refills: 0 | Status: DISCONTINUED | OUTPATIENT
Start: 2019-07-11 | End: 2019-07-15

## 2019-07-12 RX ORDER — SITAGLIPTIN 50 MG/1
0 TABLET, FILM COATED ORAL
Qty: 0 | Refills: 0 | DISCHARGE

## 2019-07-12 RX ORDER — MONTELUKAST 4 MG/1
10 TABLET, CHEWABLE ORAL AT BEDTIME
Refills: 0 | Status: DISCONTINUED | OUTPATIENT
Start: 2019-07-12 | End: 2019-07-15

## 2019-07-12 RX ORDER — NYSTATIN CREAM 100000 [USP'U]/G
1 CREAM TOPICAL
Refills: 0 | Status: DISCONTINUED | OUTPATIENT
Start: 2019-07-12 | End: 2019-07-15

## 2019-07-12 RX ADMIN — MORPHINE SULFATE 4 MILLIGRAM(S): 50 CAPSULE, EXTENDED RELEASE ORAL at 10:08

## 2019-07-12 RX ADMIN — LOSARTAN POTASSIUM 50 MILLIGRAM(S): 100 TABLET, FILM COATED ORAL at 05:39

## 2019-07-12 RX ADMIN — MORPHINE SULFATE 2 MILLIGRAM(S): 50 CAPSULE, EXTENDED RELEASE ORAL at 00:51

## 2019-07-12 RX ADMIN — PIPERACILLIN AND TAZOBACTAM 25 GRAM(S): 4; .5 INJECTION, POWDER, LYOPHILIZED, FOR SOLUTION INTRAVENOUS at 05:13

## 2019-07-12 RX ADMIN — HEPARIN SODIUM 5000 UNIT(S): 5000 INJECTION INTRAVENOUS; SUBCUTANEOUS at 21:45

## 2019-07-12 RX ADMIN — BUDESONIDE AND FORMOTEROL FUMARATE DIHYDRATE 2 PUFF(S): 160; 4.5 AEROSOL RESPIRATORY (INHALATION) at 05:13

## 2019-07-12 RX ADMIN — MORPHINE SULFATE 2 MILLIGRAM(S): 50 CAPSULE, EXTENDED RELEASE ORAL at 05:28

## 2019-07-12 RX ADMIN — Medication 3 MILLILITER(S): at 17:33

## 2019-07-12 RX ADMIN — GABAPENTIN 600 MILLIGRAM(S): 400 CAPSULE ORAL at 14:13

## 2019-07-12 RX ADMIN — Medication 3 MILLILITER(S): at 05:26

## 2019-07-12 RX ADMIN — BUDESONIDE AND FORMOTEROL FUMARATE DIHYDRATE 2 PUFF(S): 160; 4.5 AEROSOL RESPIRATORY (INHALATION) at 17:45

## 2019-07-12 RX ADMIN — MORPHINE SULFATE 2 MILLIGRAM(S): 50 CAPSULE, EXTENDED RELEASE ORAL at 01:30

## 2019-07-12 RX ADMIN — GABAPENTIN 600 MILLIGRAM(S): 400 CAPSULE ORAL at 05:38

## 2019-07-12 RX ADMIN — ESCITALOPRAM OXALATE 20 MILLIGRAM(S): 10 TABLET, FILM COATED ORAL at 14:13

## 2019-07-12 RX ADMIN — GABAPENTIN 600 MILLIGRAM(S): 400 CAPSULE ORAL at 21:45

## 2019-07-12 RX ADMIN — MORPHINE SULFATE 2 MILLIGRAM(S): 50 CAPSULE, EXTENDED RELEASE ORAL at 14:06

## 2019-07-12 RX ADMIN — NYSTATIN CREAM 1 APPLICATION(S): 100000 CREAM TOPICAL at 17:45

## 2019-07-12 RX ADMIN — Medication 3 MILLILITER(S): at 23:34

## 2019-07-12 RX ADMIN — MONTELUKAST 10 MILLIGRAM(S): 4 TABLET, CHEWABLE ORAL at 21:45

## 2019-07-12 RX ADMIN — ATORVASTATIN CALCIUM 10 MILLIGRAM(S): 80 TABLET, FILM COATED ORAL at 21:44

## 2019-07-12 RX ADMIN — Medication 650 MILLIGRAM(S): at 19:47

## 2019-07-12 RX ADMIN — SODIUM CHLORIDE 110 MILLILITER(S): 9 INJECTION, SOLUTION INTRAVENOUS at 14:44

## 2019-07-12 RX ADMIN — HEPARIN SODIUM 5000 UNIT(S): 5000 INJECTION INTRAVENOUS; SUBCUTANEOUS at 05:13

## 2019-07-12 RX ADMIN — Medication 650 MILLIGRAM(S): at 21:30

## 2019-07-12 RX ADMIN — MORPHINE SULFATE 2 MILLIGRAM(S): 50 CAPSULE, EXTENDED RELEASE ORAL at 14:26

## 2019-07-12 RX ADMIN — HEPARIN SODIUM 5000 UNIT(S): 5000 INJECTION INTRAVENOUS; SUBCUTANEOUS at 14:12

## 2019-07-12 RX ADMIN — PIPERACILLIN AND TAZOBACTAM 25 GRAM(S): 4; .5 INJECTION, POWDER, LYOPHILIZED, FOR SOLUTION INTRAVENOUS at 14:13

## 2019-07-12 RX ADMIN — PIPERACILLIN AND TAZOBACTAM 25 GRAM(S): 4; .5 INJECTION, POWDER, LYOPHILIZED, FOR SOLUTION INTRAVENOUS at 21:44

## 2019-07-12 RX ADMIN — SODIUM CHLORIDE 110 MILLILITER(S): 9 INJECTION, SOLUTION INTRAVENOUS at 19:48

## 2019-07-12 RX ADMIN — SODIUM CHLORIDE 110 MILLILITER(S): 9 INJECTION, SOLUTION INTRAVENOUS at 00:54

## 2019-07-12 RX ADMIN — MORPHINE SULFATE 2 MILLIGRAM(S): 50 CAPSULE, EXTENDED RELEASE ORAL at 06:17

## 2019-07-12 RX ADMIN — Medication 150 MICROGRAM(S): at 05:13

## 2019-07-12 NOTE — ED ADULT NURSE NOTE - GASTROINTESTINAL WDL
Earleen Najjar from [facility] called  [phone] on call service for weekend    Pt arrived home from [facility] facility without any pain medication : Percocet  Can you refill to [facility]/    Thank you Abdomen soft, nontender, nondistended, bowel sounds present in all 4 quadrants.

## 2019-07-12 NOTE — CHART NOTE - NSCHARTNOTEFT_GEN_A_CORE
Notified by RN pt c/o itching, requesting home med hydroxyzine which is not ordered.  Found pt med rec incomplete.  Reviewed all home meds w/ pt using [facility] on her phone.  Med rec is now completed and home meds ordered as appropriate.

## 2019-07-13 LAB
GLUCOSE BLDC GLUCOMTR-MCNC: 189 MG/DL — HIGH (ref 70–99)
GLUCOSE BLDC GLUCOMTR-MCNC: 195 MG/DL — HIGH (ref 70–99)

## 2019-07-13 RX ORDER — KETOROLAC TROMETHAMINE 30 MG/ML
15 SYRINGE (ML) INJECTION ONCE
Refills: 0 | Status: DISCONTINUED | OUTPATIENT
Start: 2019-07-13 | End: 2019-07-13

## 2019-07-13 RX ORDER — HYDROXYZINE HCL 10 MG
25 TABLET ORAL ONCE
Refills: 0 | Status: COMPLETED | OUTPATIENT
Start: 2019-07-13 | End: 2019-07-13

## 2019-07-13 RX ADMIN — Medication 3 MILLILITER(S): at 17:30

## 2019-07-13 RX ADMIN — Medication 3 MILLILITER(S): at 23:51

## 2019-07-13 RX ADMIN — MORPHINE SULFATE 2 MILLIGRAM(S): 50 CAPSULE, EXTENDED RELEASE ORAL at 09:24

## 2019-07-13 RX ADMIN — PIPERACILLIN AND TAZOBACTAM 25 GRAM(S): 4; .5 INJECTION, POWDER, LYOPHILIZED, FOR SOLUTION INTRAVENOUS at 13:00

## 2019-07-13 RX ADMIN — GABAPENTIN 600 MILLIGRAM(S): 400 CAPSULE ORAL at 21:21

## 2019-07-13 RX ADMIN — Medication 50 MILLIGRAM(S): at 17:02

## 2019-07-13 RX ADMIN — Medication 15 MILLIGRAM(S): at 13:27

## 2019-07-13 RX ADMIN — LOSARTAN POTASSIUM 50 MILLIGRAM(S): 100 TABLET, FILM COATED ORAL at 05:47

## 2019-07-13 RX ADMIN — ATORVASTATIN CALCIUM 10 MILLIGRAM(S): 80 TABLET, FILM COATED ORAL at 21:21

## 2019-07-13 RX ADMIN — ESCITALOPRAM OXALATE 20 MILLIGRAM(S): 10 TABLET, FILM COATED ORAL at 11:35

## 2019-07-13 RX ADMIN — MORPHINE SULFATE 2 MILLIGRAM(S): 50 CAPSULE, EXTENDED RELEASE ORAL at 21:21

## 2019-07-13 RX ADMIN — MORPHINE SULFATE 2 MILLIGRAM(S): 50 CAPSULE, EXTENDED RELEASE ORAL at 13:57

## 2019-07-13 RX ADMIN — HEPARIN SODIUM 5000 UNIT(S): 5000 INJECTION INTRAVENOUS; SUBCUTANEOUS at 13:00

## 2019-07-13 RX ADMIN — MORPHINE SULFATE 2 MILLIGRAM(S): 50 CAPSULE, EXTENDED RELEASE ORAL at 04:02

## 2019-07-13 RX ADMIN — BUDESONIDE AND FORMOTEROL FUMARATE DIHYDRATE 2 PUFF(S): 160; 4.5 AEROSOL RESPIRATORY (INHALATION) at 17:01

## 2019-07-13 RX ADMIN — Medication 25 MILLIGRAM(S): at 21:36

## 2019-07-13 RX ADMIN — Medication 150 MICROGRAM(S): at 05:48

## 2019-07-13 RX ADMIN — GABAPENTIN 600 MILLIGRAM(S): 400 CAPSULE ORAL at 05:47

## 2019-07-13 RX ADMIN — HEPARIN SODIUM 5000 UNIT(S): 5000 INJECTION INTRAVENOUS; SUBCUTANEOUS at 05:48

## 2019-07-13 RX ADMIN — MONTELUKAST 10 MILLIGRAM(S): 4 TABLET, CHEWABLE ORAL at 21:21

## 2019-07-13 RX ADMIN — Medication 3 MILLILITER(S): at 05:44

## 2019-07-13 RX ADMIN — SODIUM CHLORIDE 110 MILLILITER(S): 9 INJECTION, SOLUTION INTRAVENOUS at 03:59

## 2019-07-13 RX ADMIN — MORPHINE SULFATE 2 MILLIGRAM(S): 50 CAPSULE, EXTENDED RELEASE ORAL at 21:36

## 2019-07-13 RX ADMIN — MORPHINE SULFATE 2 MILLIGRAM(S): 50 CAPSULE, EXTENDED RELEASE ORAL at 04:47

## 2019-07-13 RX ADMIN — Medication 3 MILLILITER(S): at 11:07

## 2019-07-13 RX ADMIN — GABAPENTIN 600 MILLIGRAM(S): 400 CAPSULE ORAL at 13:00

## 2019-07-13 RX ADMIN — MORPHINE SULFATE 2 MILLIGRAM(S): 50 CAPSULE, EXTENDED RELEASE ORAL at 09:54

## 2019-07-13 RX ADMIN — PIPERACILLIN AND TAZOBACTAM 25 GRAM(S): 4; .5 INJECTION, POWDER, LYOPHILIZED, FOR SOLUTION INTRAVENOUS at 05:47

## 2019-07-13 RX ADMIN — NYSTATIN CREAM 1 APPLICATION(S): 100000 CREAM TOPICAL at 06:02

## 2019-07-13 RX ADMIN — Medication 15 MILLIGRAM(S): at 13:57

## 2019-07-13 RX ADMIN — MORPHINE SULFATE 2 MILLIGRAM(S): 50 CAPSULE, EXTENDED RELEASE ORAL at 13:27

## 2019-07-13 RX ADMIN — BUDESONIDE AND FORMOTEROL FUMARATE DIHYDRATE 2 PUFF(S): 160; 4.5 AEROSOL RESPIRATORY (INHALATION) at 05:47

## 2019-07-13 RX ADMIN — Medication 0: at 21:28

## 2019-07-13 RX ADMIN — NYSTATIN CREAM 1 APPLICATION(S): 100000 CREAM TOPICAL at 17:01

## 2019-07-13 RX ADMIN — PIPERACILLIN AND TAZOBACTAM 25 GRAM(S): 4; .5 INJECTION, POWDER, LYOPHILIZED, FOR SOLUTION INTRAVENOUS at 21:22

## 2019-07-13 NOTE — PHYSICAL THERAPY INITIAL EVALUATION ADULT - DISCHARGE DISPOSITION, PT EVAL
Home PT with RW and supplemental O2 Home with home PT for home safety evaluation and to improve functional mobility./home w/ home PT

## 2019-07-13 NOTE — PHYSICAL THERAPY INITIAL EVALUATION ADULT - STRENGTHENING, PT EVAL
B UE/LE increase 1 grade B UE/LE increase 1 grade                   Stairs: Independent with Stair climbing step to 1 ral 13 steps

## 2019-07-14 LAB
ALBUMIN SERPL ELPH-MCNC: 3.1 G/DL — LOW (ref 3.3–5)
ALP SERPL-CCNC: 83 U/L — SIGNIFICANT CHANGE UP (ref 40–120)
ALT FLD-CCNC: 13 U/L — SIGNIFICANT CHANGE UP (ref 12–78)
ANION GAP SERPL CALC-SCNC: 5 MMOL/L — SIGNIFICANT CHANGE UP (ref 5–17)
AST SERPL-CCNC: 10 U/L — LOW (ref 15–37)
BILIRUB SERPL-MCNC: 0.3 MG/DL — SIGNIFICANT CHANGE UP (ref 0.2–1.2)
BUN SERPL-MCNC: 9 MG/DL — SIGNIFICANT CHANGE UP (ref 7–23)
CALCIUM SERPL-MCNC: 8.7 MG/DL — SIGNIFICANT CHANGE UP (ref 8.5–10.1)
CHLORIDE SERPL-SCNC: 105 MMOL/L — SIGNIFICANT CHANGE UP (ref 96–108)
CO2 SERPL-SCNC: 31 MMOL/L — SIGNIFICANT CHANGE UP (ref 22–31)
CREAT SERPL-MCNC: 0.78 MG/DL — SIGNIFICANT CHANGE UP (ref 0.5–1.3)
GLUCOSE BLDC GLUCOMTR-MCNC: 178 MG/DL — HIGH (ref 70–99)
GLUCOSE BLDC GLUCOMTR-MCNC: 231 MG/DL — HIGH (ref 70–99)
GLUCOSE BLDC GLUCOMTR-MCNC: 286 MG/DL — HIGH (ref 70–99)
GLUCOSE BLDC GLUCOMTR-MCNC: 311 MG/DL — HIGH (ref 70–99)
GLUCOSE SERPL-MCNC: 159 MG/DL — HIGH (ref 70–99)
HCT VFR BLD CALC: 37.2 % — SIGNIFICANT CHANGE UP (ref 34.5–45)
HGB BLD-MCNC: 12.2 G/DL — SIGNIFICANT CHANGE UP (ref 11.5–15.5)
MCHC RBC-ENTMCNC: 29.5 PG — SIGNIFICANT CHANGE UP (ref 27–34)
MCHC RBC-ENTMCNC: 32.8 GM/DL — SIGNIFICANT CHANGE UP (ref 32–36)
MCV RBC AUTO: 89.9 FL — SIGNIFICANT CHANGE UP (ref 80–100)
NRBC # BLD: 0 /100 WBCS — SIGNIFICANT CHANGE UP (ref 0–0)
PLATELET # BLD AUTO: 216 K/UL — SIGNIFICANT CHANGE UP (ref 150–400)
POTASSIUM SERPL-MCNC: 4.1 MMOL/L — SIGNIFICANT CHANGE UP (ref 3.5–5.3)
POTASSIUM SERPL-SCNC: 4.1 MMOL/L — SIGNIFICANT CHANGE UP (ref 3.5–5.3)
PROT SERPL-MCNC: 6.9 GM/DL — SIGNIFICANT CHANGE UP (ref 6–8.3)
RBC # BLD: 4.14 M/UL — SIGNIFICANT CHANGE UP (ref 3.8–5.2)
RBC # FLD: 12.5 % — SIGNIFICANT CHANGE UP (ref 10.3–14.5)
SODIUM SERPL-SCNC: 141 MMOL/L — SIGNIFICANT CHANGE UP (ref 135–145)
WBC # BLD: 11.25 K/UL — HIGH (ref 3.8–10.5)
WBC # FLD AUTO: 11.25 K/UL — HIGH (ref 3.8–10.5)

## 2019-07-14 RX ORDER — DOCUSATE SODIUM 100 MG
100 CAPSULE ORAL DAILY
Refills: 0 | Status: DISCONTINUED | OUTPATIENT
Start: 2019-07-14 | End: 2019-07-15

## 2019-07-14 RX ORDER — INSULIN LISPRO 100/ML
VIAL (ML) SUBCUTANEOUS
Refills: 0 | Status: DISCONTINUED | OUTPATIENT
Start: 2019-07-14 | End: 2019-07-15

## 2019-07-14 RX ORDER — OXYCODONE AND ACETAMINOPHEN 5; 325 MG/1; MG/1
1 TABLET ORAL EVERY 6 HOURS
Refills: 0 | Status: DISCONTINUED | OUTPATIENT
Start: 2019-07-14 | End: 2019-07-15

## 2019-07-14 RX ORDER — MAGNESIUM HYDROXIDE 400 MG/1
30 TABLET, CHEWABLE ORAL DAILY
Refills: 0 | Status: DISCONTINUED | OUTPATIENT
Start: 2019-07-14 | End: 2019-07-15

## 2019-07-14 RX ADMIN — GABAPENTIN 600 MILLIGRAM(S): 400 CAPSULE ORAL at 14:30

## 2019-07-14 RX ADMIN — HEPARIN SODIUM 5000 UNIT(S): 5000 INJECTION INTRAVENOUS; SUBCUTANEOUS at 21:23

## 2019-07-14 RX ADMIN — BUDESONIDE AND FORMOTEROL FUMARATE DIHYDRATE 2 PUFF(S): 160; 4.5 AEROSOL RESPIRATORY (INHALATION) at 18:57

## 2019-07-14 RX ADMIN — GABAPENTIN 600 MILLIGRAM(S): 400 CAPSULE ORAL at 21:24

## 2019-07-14 RX ADMIN — NYSTATIN CREAM 1 APPLICATION(S): 100000 CREAM TOPICAL at 05:09

## 2019-07-14 RX ADMIN — Medication 50 MILLIGRAM(S): at 05:08

## 2019-07-14 RX ADMIN — LOSARTAN POTASSIUM 50 MILLIGRAM(S): 100 TABLET, FILM COATED ORAL at 05:19

## 2019-07-14 RX ADMIN — Medication 150 MICROGRAM(S): at 05:07

## 2019-07-14 RX ADMIN — NYSTATIN CREAM 1 APPLICATION(S): 100000 CREAM TOPICAL at 18:42

## 2019-07-14 RX ADMIN — MORPHINE SULFATE 2 MILLIGRAM(S): 50 CAPSULE, EXTENDED RELEASE ORAL at 10:20

## 2019-07-14 RX ADMIN — Medication 3 MILLILITER(S): at 11:38

## 2019-07-14 RX ADMIN — Medication 3 MILLILITER(S): at 05:19

## 2019-07-14 RX ADMIN — BUDESONIDE AND FORMOTEROL FUMARATE DIHYDRATE 2 PUFF(S): 160; 4.5 AEROSOL RESPIRATORY (INHALATION) at 05:08

## 2019-07-14 RX ADMIN — OXYCODONE AND ACETAMINOPHEN 1 TABLET(S): 5; 325 TABLET ORAL at 21:23

## 2019-07-14 RX ADMIN — Medication 0.5 MILLIGRAM(S): at 14:36

## 2019-07-14 RX ADMIN — Medication 50 MILLIGRAM(S): at 14:33

## 2019-07-14 RX ADMIN — PIPERACILLIN AND TAZOBACTAM 25 GRAM(S): 4; .5 INJECTION, POWDER, LYOPHILIZED, FOR SOLUTION INTRAVENOUS at 05:08

## 2019-07-14 RX ADMIN — SODIUM CHLORIDE 110 MILLILITER(S): 9 INJECTION, SOLUTION INTRAVENOUS at 08:39

## 2019-07-14 RX ADMIN — MORPHINE SULFATE 2 MILLIGRAM(S): 50 CAPSULE, EXTENDED RELEASE ORAL at 10:35

## 2019-07-14 RX ADMIN — MAGNESIUM HYDROXIDE 30 MILLILITER(S): 400 TABLET, CHEWABLE ORAL at 18:40

## 2019-07-14 RX ADMIN — ATORVASTATIN CALCIUM 10 MILLIGRAM(S): 80 TABLET, FILM COATED ORAL at 21:23

## 2019-07-14 RX ADMIN — Medication 3 MILLILITER(S): at 18:29

## 2019-07-14 RX ADMIN — GABAPENTIN 600 MILLIGRAM(S): 400 CAPSULE ORAL at 05:08

## 2019-07-14 RX ADMIN — SODIUM CHLORIDE 110 MILLILITER(S): 9 INJECTION, SOLUTION INTRAVENOUS at 02:13

## 2019-07-14 RX ADMIN — HEPARIN SODIUM 5000 UNIT(S): 5000 INJECTION INTRAVENOUS; SUBCUTANEOUS at 05:08

## 2019-07-14 RX ADMIN — Medication 100 MILLIGRAM(S): at 21:23

## 2019-07-14 RX ADMIN — ESCITALOPRAM OXALATE 20 MILLIGRAM(S): 10 TABLET, FILM COATED ORAL at 12:35

## 2019-07-14 RX ADMIN — OXYCODONE AND ACETAMINOPHEN 1 TABLET(S): 5; 325 TABLET ORAL at 22:23

## 2019-07-14 RX ADMIN — MONTELUKAST 10 MILLIGRAM(S): 4 TABLET, CHEWABLE ORAL at 21:24

## 2019-07-14 NOTE — CHART NOTE - NSCHARTNOTEFT_GEN_A_CORE
Upon Nutritional Assessment by the Registered Dietitian your patient was determined to meet criteria / has evidence of the following diagnosis/diagnoses:          [ ]  Mild Protein Calorie Malnutrition        [ ]  Moderate Protein Calorie Malnutrition        [ ] Severe Protein Calorie Malnutrition        [ ] Unspecified Protein Calorie Malnutrition        [ ] Underweight / BMI <19        [x ] Morbid Obesity / BMI > 40      Findings as based on:  •  Comprehensive nutrition assessment and consultation  •  Calorie counts (nutrient intake analysis)  •  Food acceptance and intake status from observations by staff  •  Follow up  •  Patient education  •  Intervention secondary to interdisciplinary rounds  •   concerns      Treatment:    The following diet has been recommended:  Continue Dash/TLC diet    PROVIDER Section:     By signing this assessment you are acknowledging and agree with the diagnosis/diagnoses assigned by the Registered Dietitian    Comments:

## 2019-07-14 NOTE — CONSULT NOTE ADULT - SUBJECTIVE AND OBJECTIVE BOX
INTERVAL HPI/OVERNIGHT EVENTS:  condition unchanged. GI f/u noted    Vital Signs Last 24 Hrs  T(C): 36.7 (14 Jul 2019 17:36), Max: 36.8 (14 Jul 2019 11:00)  T(F): 98 (14 Jul 2019 17:36), Max: 98.2 (14 Jul 2019 11:00)  HR: 81 (14 Jul 2019 17:36) (81 - 91)  BP: 135/63 (14 Jul 2019 17:36) (126/55 - 135/63)  BP(mean): --  RR: 18 (14 Jul 2019 17:36) (16 - 18)  SpO2: 90% (14 Jul 2019 17:36) (90% - 98%)        PHYSICAL EXAM:  GEN:         Awake, responsive and comfortable at rest  HEENT:    Normal.    RESP:          diminished BS diffusely  CVS:             Regular rate and rhythm.   ABD:         Soft, non-tender, non-distended;   :             No costovertebral angle tenderness  EXTR:            No clubbing, cyanosis or edema  CNS:              Intact sensory and motor function.        MEDICATIONS  (STANDING):  ALBUTerol    90 MICROgram(s) HFA Inhaler 1 Puff(s) Inhalation every 4 hours  ALBUTerol/ipratropium for Nebulization 3 milliLiter(s) Nebulizer every 6 hours  atorvastatin 10 milliGRAM(s) Oral at bedtime  buDESOnide 160 MICROgram(s)/formoterol 4.5 MICROgram(s) Inhaler 2 Puff(s) Inhalation two times a day  dextrose 5% + sodium chloride 0.45%. 1000 milliLiter(s) (110 mL/Hr) IV Continuous <Continuous>  dextrose 5%. 1000 milliLiter(s) (50 mL/Hr) IV Continuous <Continuous>  dextrose 50% Injectable 12.5 Gram(s) IV Push once  dextrose 50% Injectable 25 Gram(s) IV Push once  dextrose 50% Injectable 25 Gram(s) IV Push once  docusate sodium 100 milliGRAM(s) Oral daily  escitalopram 20 milliGRAM(s) Oral daily  gabapentin 600 milliGRAM(s) Oral three times a day  heparin  Injectable 5000 Unit(s) SubCutaneous every 8 hours  insulin lispro (HumaLOG) corrective regimen sliding scale   SubCutaneous four times a day before meals  levothyroxine 150 MICROGram(s) Oral daily  losartan 50 milliGRAM(s) Oral daily  montelukast 10 milliGRAM(s) Oral at bedtime  nystatin Cream 1 Application(s) Topical two times a day  piperacillin/tazobactam IVPB.. 3.375 Gram(s) IV Intermittent every 8 hours  tiotropium 18 MICROgram(s) Capsule 1 Capsule(s) Inhalation daily    MEDICATIONS  (PRN):  acetaminophen   Tablet .. 650 milliGRAM(s) Oral every 6 hours PRN Temp greater or equal to 38C (100.4F), Mild Pain (1 - 3)  benzonatate 100 milliGRAM(s) Oral every 8 hours PRN Cough  clonazePAM  Tablet 0.5 milliGRAM(s) Oral three times a day PRN anxiety  dextrose 40% Gel 15 Gram(s) Oral once PRN Blood Glucose LESS THAN 70 milliGRAM(s)/deciliter  glucagon  Injectable 1 milliGRAM(s) IntraMuscular once PRN Glucose LESS THAN 70 milligrams/deciliter  hydrOXYzine hydrochloride 50 milliGRAM(s) Oral every 8 hours PRN Itching  magnesium hydroxide Suspension 30 milliLiter(s) Oral daily PRN Constipation  morphine  - Injectable 2 milliGRAM(s) IV Push every 4 hours PRN Moderate Pain (4 - 6)        LABS:                        12.2   11.25 )-----------( 216      ( 14 Jul 2019 06:37 )             37.2     07-14    141  |  105  |  9   ----------------------------<  159<H>  4.1   |  31  |  0.78    Ca    8.7      14 Jul 2019 06:37    TPro  6.9  /  Alb  3.1<L>  /  TBili  0.3  /  DBili  x   /  AST  10<L>  /  ALT  13  /  AlkPhos  83  07-14              RADIOLOGY & ADDITIONAL STUDIES:    ASSESSMENT: cholelithasis, COPD  PLAN: GI mgmt, consult has been requested for home o2.

## 2019-07-15 ENCOUNTER — TRANSCRIPTION ENCOUNTER (OUTPATIENT)
Age: 59
End: 2019-07-15

## 2019-07-15 VITALS
SYSTOLIC BLOOD PRESSURE: 125 MMHG | OXYGEN SATURATION: 95 % | HEART RATE: 80 BPM | DIASTOLIC BLOOD PRESSURE: 80 MMHG | RESPIRATION RATE: 18 BRPM | TEMPERATURE: 98 F

## 2019-07-15 LAB
ALBUMIN SERPL ELPH-MCNC: 3.1 G/DL — LOW (ref 3.3–5)
ALP SERPL-CCNC: 89 U/L — SIGNIFICANT CHANGE UP (ref 40–120)
ALT FLD-CCNC: 14 U/L — SIGNIFICANT CHANGE UP (ref 12–78)
ANION GAP SERPL CALC-SCNC: 6 MMOL/L — SIGNIFICANT CHANGE UP (ref 5–17)
AST SERPL-CCNC: 10 U/L — LOW (ref 15–37)
BILIRUB SERPL-MCNC: 0.4 MG/DL — SIGNIFICANT CHANGE UP (ref 0.2–1.2)
BUN SERPL-MCNC: 10 MG/DL — SIGNIFICANT CHANGE UP (ref 7–23)
CALCIUM SERPL-MCNC: 8.6 MG/DL — SIGNIFICANT CHANGE UP (ref 8.5–10.1)
CHLORIDE SERPL-SCNC: 107 MMOL/L — SIGNIFICANT CHANGE UP (ref 96–108)
CO2 SERPL-SCNC: 32 MMOL/L — HIGH (ref 22–31)
CREAT SERPL-MCNC: 0.75 MG/DL — SIGNIFICANT CHANGE UP (ref 0.5–1.3)
GLUCOSE BLDC GLUCOMTR-MCNC: 131 MG/DL — HIGH (ref 70–99)
GLUCOSE BLDC GLUCOMTR-MCNC: 158 MG/DL — HIGH (ref 70–99)
GLUCOSE SERPL-MCNC: 117 MG/DL — HIGH (ref 70–99)
HCT VFR BLD CALC: 39.4 % — SIGNIFICANT CHANGE UP (ref 34.5–45)
HGB BLD-MCNC: 12.7 G/DL — SIGNIFICANT CHANGE UP (ref 11.5–15.5)
MCHC RBC-ENTMCNC: 29 PG — SIGNIFICANT CHANGE UP (ref 27–34)
MCHC RBC-ENTMCNC: 32.2 GM/DL — SIGNIFICANT CHANGE UP (ref 32–36)
MCV RBC AUTO: 90 FL — SIGNIFICANT CHANGE UP (ref 80–100)
NRBC # BLD: 0 /100 WBCS — SIGNIFICANT CHANGE UP (ref 0–0)
PLATELET # BLD AUTO: 214 K/UL — SIGNIFICANT CHANGE UP (ref 150–400)
POTASSIUM SERPL-MCNC: 4.1 MMOL/L — SIGNIFICANT CHANGE UP (ref 3.5–5.3)
POTASSIUM SERPL-SCNC: 4.1 MMOL/L — SIGNIFICANT CHANGE UP (ref 3.5–5.3)
PROT SERPL-MCNC: 6.9 GM/DL — SIGNIFICANT CHANGE UP (ref 6–8.3)
RBC # BLD: 4.38 M/UL — SIGNIFICANT CHANGE UP (ref 3.8–5.2)
RBC # FLD: 12.5 % — SIGNIFICANT CHANGE UP (ref 10.3–14.5)
SODIUM SERPL-SCNC: 145 MMOL/L — SIGNIFICANT CHANGE UP (ref 135–145)
WBC # BLD: 10.57 K/UL — HIGH (ref 3.8–10.5)
WBC # FLD AUTO: 10.57 K/UL — HIGH (ref 3.8–10.5)

## 2019-07-15 RX ORDER — ACETAMINOPHEN 500 MG
2 TABLET ORAL
Qty: 0 | Refills: 0 | DISCHARGE
Start: 2019-07-15

## 2019-07-15 RX ORDER — GABAPENTIN 400 MG/1
1 CAPSULE ORAL
Qty: 0 | Refills: 0 | DISCHARGE

## 2019-07-15 RX ADMIN — OXYCODONE AND ACETAMINOPHEN 1 TABLET(S): 5; 325 TABLET ORAL at 05:30

## 2019-07-15 RX ADMIN — GABAPENTIN 600 MILLIGRAM(S): 400 CAPSULE ORAL at 13:48

## 2019-07-15 RX ADMIN — Medication 50 MILLIGRAM(S): at 11:10

## 2019-07-15 RX ADMIN — GABAPENTIN 600 MILLIGRAM(S): 400 CAPSULE ORAL at 05:28

## 2019-07-15 RX ADMIN — OXYCODONE AND ACETAMINOPHEN 1 TABLET(S): 5; 325 TABLET ORAL at 13:44

## 2019-07-15 RX ADMIN — OXYCODONE AND ACETAMINOPHEN 1 TABLET(S): 5; 325 TABLET ORAL at 14:45

## 2019-07-15 RX ADMIN — BUDESONIDE AND FORMOTEROL FUMARATE DIHYDRATE 2 PUFF(S): 160; 4.5 AEROSOL RESPIRATORY (INHALATION) at 07:27

## 2019-07-15 RX ADMIN — OXYCODONE AND ACETAMINOPHEN 1 TABLET(S): 5; 325 TABLET ORAL at 04:29

## 2019-07-15 RX ADMIN — ESCITALOPRAM OXALATE 20 MILLIGRAM(S): 10 TABLET, FILM COATED ORAL at 11:30

## 2019-07-15 RX ADMIN — Medication 150 MICROGRAM(S): at 05:28

## 2019-07-15 RX ADMIN — LOSARTAN POTASSIUM 50 MILLIGRAM(S): 100 TABLET, FILM COATED ORAL at 05:28

## 2019-07-15 RX ADMIN — HEPARIN SODIUM 5000 UNIT(S): 5000 INJECTION INTRAVENOUS; SUBCUTANEOUS at 05:28

## 2019-07-15 RX ADMIN — Medication 3 MILLILITER(S): at 11:07

## 2019-07-15 RX ADMIN — Medication 3 MILLILITER(S): at 05:58

## 2019-07-15 NOTE — DISCHARGE NOTE PROVIDER - HOSPITAL COURSE
patient  treated  with  ivf  ivab  antalgics  evalauated  by  surgery  GI   clinically  improved  no  cholecystitis  posssible  stone  in  CND  without  dilatation  patient  refused  MRI  and  ERCP    reported  with  SOB  episodically  Pt  not  c/w  O2  NC  recorded  O2  saturations  on RA  about 95% discharge home   will  obtain O2  sat  after walking  off  O2  before discharge.  to  follow  closely  with  pulmonary  Endocrinology and  in office  to  continue  off  tobacco  and  continue  wt  reduction  diet

## 2019-07-15 NOTE — PROGRESS NOTE ADULT - PROBLEM SELECTOR PROBLEM 1
Cholecystitis with cholelithiasis
Cholelithiasis without cholecystitis

## 2019-07-15 NOTE — DISCHARGE NOTE PROVIDER - NSDCCPCAREPLAN_GEN_ALL_CORE_FT
PRINCIPAL DISCHARGE DIAGNOSIS  Diagnosis: Biliary colic  Assessment and Plan of Treatment:       SECONDARY DISCHARGE DIAGNOSES  Diagnosis: Asthma with COPD  Assessment and Plan of Treatment:     Diagnosis: Common bile duct calculi  Assessment and Plan of Treatment:

## 2019-07-15 NOTE — PROGRESS NOTE ADULT - PROVIDER SPECIALTY LIST ADULT
Gastroenterology
Internal Medicine
Pulmonology
Surgery
Internal Medicine
Gastroenterology

## 2019-07-15 NOTE — DISCHARGE NOTE NURSING/CASE MANAGEMENT/SOCIAL WORK - NSDCDPATPORTLINK_GEN_ALL_CORE
You can access the [facility] Portal, offered by [facility], by registering with the following website: [email or web address]

## 2019-07-15 NOTE — DISCHARGE NOTE PROVIDER - CARE PROVIDERS DIRECT ADDRESSES
,DirectAddress_Unknown,[email or web address],DirectAddress_Unknown

## 2019-07-15 NOTE — DISCHARGE NOTE PROVIDER - PROVIDER TOKENS
PROVIDER:[TOKEN:[[id number]]],PROVIDER:[TOKEN:[[id number]]],PROVIDER:[TOKEN:[[id number]]],FREE:[LAST:[private  endocrinologist],PHONE:[(   )    -],FAX:[(   )    -]]

## 2019-07-15 NOTE — PROGRESS NOTE ADULT - ASSESSMENT
Cholelithiasis with ? CBD stone (3mm) on sonogram.  Normal Lfts     epigastric pain reflux likely

## 2019-07-15 NOTE — PROGRESS NOTE ADULT - SUBJECTIVE AND OBJECTIVE BOX
Gastroenterology  Patient seen and examined bedside resting comfortably.  No complaints offered.   + vague  abdominal pain  Denies nausea and vomiting. Tolerating diet.  Normal flatus/BM.     T(F): 99.4 (07-12-19 @ 05:41), Max: 99.4 (07-12-19 @ 05:41)  HR: 94 (07-12-19 @ 05:41) (78 - 101)  BP: 113/64 (07-12-19 @ 05:41) (113/64 - 155/57)  RR: 17 (07-12-19 @ 05:41) (16 - 20)  SpO2: 91% (07-12-19 @ 05:41) (88% - 95%)  Wt(kg): --  CAPILLARY BLOOD GLUCOSE      POCT Blood Glucose.: 107 mg/dL (12 Jul 2019 05:41)  POCT Blood Glucose.: 89 mg/dL (11 Jul 2019 23:08)  POCT Blood Glucose.: 86 mg/dL (11 Jul 2019 14:18)      PHYSICAL EXAM:  General: NAD, WDWN.   Neuro:  Alert & responsive  HEENT: NCAT, EOMI, conjunctiva clear  CV: +S1+S2 regular rate and rhythm  Lung: clear to ausculation bilaterally, respirations nonlabored, good inspiratory effort  Abdomen: soft, Non Tender, No distention Normal active BS  Extremities: no cyanosis, clubbing or edema    LABS:                        12.5   12.51 )-----------( 247      ( 12 Jul 2019 07:51 )             39.1     07-11    137  |  102  |  16  ----------------------------<  122<H>  4.2   |  31  |  1.00    Ca    9.3      11 Jul 2019 07:49    TPro  7.4  /  Alb  3.3  /  TBili  0.3  /  DBili  x   /  AST  9<L>  /  ALT  18  /  AlkPhos  104  07-11    LIVER FUNCTIONS - ( 11 Jul 2019 07:49 )  Alb: 3.3 g/dL / Pro: 7.4 gm/dL / ALK PHOS: 104 U/L / ALT: 18 U/L / AST: 9 U/L / GGT: x           PT/INR - ( 11 Jul 2019 07:49 )   PT: 11.5 sec;   INR: 1.03 ratio         PTT - ( 11 Jul 2019 07:49 )  PTT:37.0 sec  I&O's Detail    11 Jul 2019 07:01  -  12 Jul 2019 07:00  --------------------------------------------------------  IN:    dextrose 5% + sodium chloride 0.45%.: 1210 mL    IV PiggyBack: 100 mL  Total IN: 1310 mL    OUT:  Total OUT: 0 mL    Total NET: 1310 mL        07-11 @ 07:49    137 | 102 | 16  /9.3 | -- | --  _______________________/  4.2 | 31 | 1.00                           \par   Lipase, Serum: 129 U/L (07-11 @ 07:49)
Gastroenterology  Patient seen and examined bedside resting comfortably.  No complaints offered.   +Vague abdominal pain  Denies nausea and vomiting. Tolerating diet.  Normal flatus/BM.     T(F): 98.2 (07-14-19 @ 11:00), Max: 98.2 (07-14-19 @ 11:00)  HR: 90 (07-14-19 @ 11:00) (81 - 101)  BP: 126/74 (07-14-19 @ 11:00) (126/55 - 137/49)  RR: 16 (07-14-19 @ 11:00) (16 - 16)  SpO2: 98% (07-14-19 @ 11:00) (90% - 98%)  Wt(kg): --  CAPILLARY BLOOD GLUCOSE      POCT Blood Glucose.: 311 mg/dL (14 Jul 2019 08:23)  POCT Blood Glucose.: 189 mg/dL (13 Jul 2019 21:24)      PHYSICAL EXAM:  General: NAD, WDWN.   Neuro:  Alert & responsive  HEENT: NCAT, EOMI, conjunctiva clear  CV: +S1+S2 regular rate and rhythm  Lung: clear to ausculation bilaterally, respirations nonlabored, good inspiratory effort  Abdomen: soft, Non tender, No Distension. Normal active BS  Extremities: no pedal edema or calf tenderness noted     LABS:                        12.2   11.25 )-----------( 216      ( 14 Jul 2019 06:37 )             37.2     07-14    141  |  105  |  9   ----------------------------<  159<H>  4.1   |  31  |  0.78    Ca    8.7      14 Jul 2019 06:37    TPro  6.9  /  Alb  3.1<L>  /  TBili  0.3  /  DBili  x   /  AST  10<L>  /  ALT  13  /  AlkPhos  83  07-14    LIVER FUNCTIONS - ( 14 Jul 2019 06:37 )  Alb: 3.1 g/dL / Pro: 6.9 gm/dL / ALK PHOS: 83 U/L / ALT: 13 U/L / AST: 10 U/L / GGT: x             I&O's Detail    13 Jul 2019 07:01  -  14 Jul 2019 07:00  --------------------------------------------------------  IN:    dextrose 5% + sodium chloride 0.45%.: 1320 mL    Solution: 200 mL  Total IN: 1520 mL    OUT:  Total OUT: 0 mL    Total NET: 1520 mL
Gastroenterology  Patient seen and examined bedside resting comfortably.  No complaints offered.   mild vague abdominal pain  Denies nausea and vomiting. Tolerating diet.  Normal flatus/BM.     T(F): 97.6 (07-15-19 @ 10:56), Max: 98 (07-14-19 @ 17:36)  HR: 84 (07-15-19 @ 10:56) (81 - 91)  BP: 130/77 (07-15-19 @ 10:56) (130/76 - 149/69)  RR: 16 (07-15-19 @ 10:56) (16 - 20)  SpO2: 94% (07-15-19 @ 10:56) (88% - 97%)  Wt(kg): --  CAPILLARY BLOOD GLUCOSE      POCT Blood Glucose.: 158 mg/dL (15 Jul 2019 10:51)  POCT Blood Glucose.: 131 mg/dL (15 Jul 2019 07:26)  POCT Blood Glucose.: 231 mg/dL (14 Jul 2019 21:51)  POCT Blood Glucose.: 178 mg/dL (14 Jul 2019 17:02)  POCT Blood Glucose.: 286 mg/dL (14 Jul 2019 12:30)      PHYSICAL EXAM:  General: NAD, WDWN.   Neuro:  Alert & responsive  HEENT: NCAT, EOMI, conjunctiva clear  CV: +S1+S2 regular rate and rhythm  Lung: clear to ausculation bilaterally, respirations nonlabored, good inspiratory effort  Abdomen: soft, Obese Non Tender, No distention Normal active BS  Extremities: no cyanosis, clubbing or edema    LABS:                        12.7   10.57 )-----------( 214      ( 15 Jul 2019 07:37 )             39.4     07-15    145  |  107  |  10  ----------------------------<  117<H>  4.1   |  32<H>  |  0.75    Ca    8.6      15 Jul 2019 07:37    TPro  6.9  /  Alb  3.1<L>  /  TBili  0.4  /  DBili  x   /  AST  10<L>  /  ALT  14  /  AlkPhos  89  07-15    LIVER FUNCTIONS - ( 15 Jul 2019 07:37 )  Alb: 3.1 g/dL / Pro: 6.9 gm/dL / ALK PHOS: 89 U/L / ALT: 14 U/L / AST: 10 U/L / GGT: x             I&O's Detail    07-15 @ 07:37    145 | 107 | 10  /8.6 | -- | --  _______________________/  4.1 | 32 | 0.75                           \par
INTERVAL HPI/OVERNIGHT EVENTS:        REVIEW OF SYSTEMS:  CONSTITUTIONAL:  c/o abdominal;  pain  diffuse body pains    NECK: No pain or stiffnes  RESPIRATORY: No SOB   CARDIOVASCULAR: No chest pain, palpitations, dizziness,   GASTROINTESTINAL: abdominal pain. No nausea, vomiting,   NEUROLOGICAL: No headaches, no  blurry  vision no  dizziness  SKIN: No itching,   MUSCULOSKELETAL: No pain    MEDICATION:  ALBUTerol    90 MICROgram(s) HFA Inhaler 1 Puff(s) Inhalation every 4 hours  ALBUTerol/ipratropium for Nebulization 3 milliLiter(s) Nebulizer every 6 hours  atorvastatin 10 milliGRAM(s) Oral at bedtime  benzonatate 100 milliGRAM(s) Oral every 8 hours PRN  buDESOnide 160 MICROgram(s)/formoterol 4.5 MICROgram(s) Inhaler 2 Puff(s) Inhalation two times a day  clonazePAM  Tablet 0.5 milliGRAM(s) Oral three times a day PRN  dextrose 40% Gel 15 Gram(s) Oral once PRN  dextrose 5% + sodium chloride 0.45%. 1000 milliLiter(s) IV Continuous <Continuous>  dextrose 5%. 1000 milliLiter(s) IV Continuous <Continuous>  dextrose 50% Injectable 12.5 Gram(s) IV Push once  dextrose 50% Injectable 25 Gram(s) IV Push once  dextrose 50% Injectable 25 Gram(s) IV Push once  escitalopram 20 milliGRAM(s) Oral daily  gabapentin 600 milliGRAM(s) Oral three times a day  glucagon  Injectable 1 milliGRAM(s) IntraMuscular once PRN  heparin  Injectable 5000 Unit(s) SubCutaneous every 8 hours  hydrOXYzine hydrochloride 50 milliGRAM(s) Oral every 8 hours PRN  insulin lispro (HumaLOG) corrective regimen sliding scale   SubCutaneous at bedtime  levothyroxine 150 MICROGram(s) Oral daily  losartan 50 milliGRAM(s) Oral daily  montelukast 10 milliGRAM(s) Oral at bedtime  morphine  - Injectable 2 milliGRAM(s) IV Push every 4 hours PRN  nystatin Cream 1 Application(s) Topical two times a day  piperacillin/tazobactam IVPB.. 3.375 Gram(s) IV Intermittent every 8 hours  tiotropium 18 MICROgram(s) Capsule 1 Capsule(s) Inhalation daily    Vital Signs Last 24 Hrs  T(C): 37.4 (2019 05:41), Max: 37.4 (2019 05:41)  T(F): 99.4 (2019 05:41), Max: 99.4 (2019 05:41)  HR: 94 (2019 05:41) (82 - 101)  BP: 113/64 (2019 05:41) (113/64 - 155/57)  BP(mean): --  RR: 17 (2019 05:41) (16 - 18)  SpO2: 91% (2019 05:41) (88% - 95%)    PHYSICAL EXAM:  GENERAL: NAD, well-groomed, well-developed  EYES:  conjunctiva and sclera clear  ENMT:  Moist mucous membranes,   NECK: Supple, No JVD, Normal thyroid  NERVOUS SYSTEM:  Alert oriented   no  focal  deficits;   CHEST/LUNG: Clear    HEART: Regular rate and rhythm; No murmurs, rubs, or gallops  ABDOMEN: Soft, Nontender, Nondistended; Bowel sounds present  EXTREMITIES:  no  edema no  tenderness  SKIN: No rashes   LABS:                        12.5   12.51 )-----------( 247      ( 2019 07:51 )             39.1     07-12    140  |  104  |  10  ----------------------------<  126<H>  4.1   |  32<H>  |  0.88    Ca    8.9      2019 07:51  Mg     2.4     07-12    TPro  7.1  /  Alb  3.4  /  TBili  0.3  /  DBili  .15  /  AST  12<L>  /  ALT  18  /  AlkPhos  86  07-12    PT/INR - ( 2019 07:49 )   PT: 11.5 sec;   INR: 1.03 ratio         PTT - ( 2019 07:49 )  PTT:37.0 sec  Urinalysis Basic - ( 2019 10:37 )    Color: Yellow / Appearance: Clear / S.010 / pH: x  Gluc: x / Ketone: Negative  / Bili: Negative / Urobili: Negative mg/dL   Blood: x / Protein: Negative mg/dL / Nitrite: Negative   Leuk Esterase: Negative / RBC: x / WBC x   Sq Epi: x / Non Sq Epi: x / Bacteria: x      CAPILLARY BLOOD GLUCOSE      POCT Blood Glucose.: 107 mg/dL (2019 05:41)  POCT Blood Glucose.: 89 mg/dL (2019 23:08)  POCT Blood Glucose.: 86 mg/dL (2019 14:18)    HIDA  scan    no  evedence  of  cholecystitis   RADIOLOGY & ADDITIONAL TESTS:    Imaging reports  Personally Reviewed:  [ x] YES  [ ] NO    Consultant(s) Notes Reviewed:  [x] YES  [ ] NO    Care Discussed with Consultants/Other Providers [x] YES  [ ] NO  Assessment and Plan:   Problem/Plan - 1:  ·  Problem:  cholelithiasis.choledocolithiasis?    Plan: IVF  IVAB  ANTALGICS  fruther  w/u  and  treatment  as per  clincal  course  GI  eval  appreciated    Problem/Plan - 2:  ·  Problem: DM (diabetes mellitus).  Plan: INSULIN COVERAGE.     Problem/Plan - 3:  ·  Problem: HTN (hypertension).  Plan: COZAAR.     Problem/Plan - 4:  ·  Problem: Hypothyroid.  Plan: SYNTHROID.     Problem/Plan - 5:  ·  Problem: Emphysema lung.  Plan: DUONEB.     Problem/Plan - 6:  Problem: Morbid obesity. Plan: WT  REDUCTION  COUNSELING.  anxiety  clonopin
INTERVAL HPI/OVERNIGHT EVENTS:        REVIEW OF SYSTEMS:  CONSTITUTIONAL:  patient in  bed  OFF O2. c/o  tired  of  being  in  hospital    NECK: No pain or stiffnes  RESPIRATORY: No SOB + BLACKBURN  CARDIOVASCULAR: No chest pain, palpitations, dizziness,   GASTROINTESTINAL: No abdominal pain. No nausea, vomiting,   NEUROLOGICAL: No headaches, no  blurry  vision no  dizziness  SKIN: No itching,   MUSCULOSKELETAL: No pain    MEDICATION:  acetaminophen   Tablet .. 650 milliGRAM(s) Oral every 6 hours PRN  ALBUTerol    90 MICROgram(s) HFA Inhaler 1 Puff(s) Inhalation every 4 hours  ALBUTerol/ipratropium for Nebulization 3 milliLiter(s) Nebulizer every 6 hours  atorvastatin 10 milliGRAM(s) Oral at bedtime  benzonatate 100 milliGRAM(s) Oral every 8 hours PRN  buDESOnide 160 MICROgram(s)/formoterol 4.5 MICROgram(s) Inhaler 2 Puff(s) Inhalation two times a day  clonazePAM  Tablet 0.5 milliGRAM(s) Oral three times a day PRN  dextrose 40% Gel 15 Gram(s) Oral once PRN  dextrose 5% + sodium chloride 0.45%. 1000 milliLiter(s) IV Continuous <Continuous>  dextrose 5%. 1000 milliLiter(s) IV Continuous <Continuous>  dextrose 50% Injectable 12.5 Gram(s) IV Push once  dextrose 50% Injectable 25 Gram(s) IV Push once  dextrose 50% Injectable 25 Gram(s) IV Push once  escitalopram 20 milliGRAM(s) Oral daily  gabapentin 600 milliGRAM(s) Oral three times a day  glucagon  Injectable 1 milliGRAM(s) IntraMuscular once PRN  heparin  Injectable 5000 Unit(s) SubCutaneous every 8 hours  hydrOXYzine hydrochloride 50 milliGRAM(s) Oral every 8 hours PRN  insulin lispro (HumaLOG) corrective regimen sliding scale   SubCutaneous four times a day before meals  levothyroxine 150 MICROGram(s) Oral daily  losartan 50 milliGRAM(s) Oral daily  montelukast 10 milliGRAM(s) Oral at bedtime  morphine  - Injectable 2 milliGRAM(s) IV Push every 4 hours PRN  nystatin Cream 1 Application(s) Topical two times a day  piperacillin/tazobactam IVPB.. 3.375 Gram(s) IV Intermittent every 8 hours  tiotropium 18 MICROgram(s) Capsule 1 Capsule(s) Inhalation daily    Vital Signs Last 24 Hrs  T(C): 36.8 (14 Jul 2019 11:00), Max: 36.8 (14 Jul 2019 11:00)  T(F): 98.2 (14 Jul 2019 11:00), Max: 98.2 (14 Jul 2019 11:00)  HR: 81 (14 Jul 2019 11:39) (81 - 101)  BP: 126/74 (14 Jul 2019 11:00) (126/55 - 137/49)  BP(mean): --  RR: 16 (14 Jul 2019 11:00) (16 - 16)  SpO2: 96% (14 Jul 2019 11:39) (91% - 98%)    PHYSICAL EXAM:  GENERAL: NAD, well-groomed, well-developed  EYES:  conjunctiva and sclera clear  ENMT:  Moist mucous membranes,   NECK: Supple, No JVD, Normal thyroid  NERVOUS SYSTEM:  Alert oriented   no  focal  deficits;   CHEST/LUNG: Clear    HEART: Regular rate and rhythm; No murmurs, rubs, or gallops  ABDOMEN: Soft, Nontender, Nondistended; Bowel sounds present  EXTREMITIES:  no  edema no  tenderness  SKIN: No rashes   LABS:                        12.2   11.25 )-----------( 216      ( 14 Jul 2019 06:37 )             37.2     07-14    141  |  105  |  9   ----------------------------<  159<H>  4.1   |  31  |  0.78    Ca    8.7      14 Jul 2019 06:37    TPro  6.9  /  Alb  3.1<L>  /  TBili  0.3  /  DBili  x   /  AST  10<L>  /  ALT  13  /  AlkPhos  83  07-14        CAPILLARY BLOOD GLUCOSE      POCT Blood Glucose.: 286 mg/dL (14 Jul 2019 12:30)  POCT Blood Glucose.: 311 mg/dL (14 Jul 2019 08:23)  POCT Blood Glucose.: 189 mg/dL (13 Jul 2019 21:24)      RADIOLOGY & ADDITIONAL TESTS:    Imaging reports  Personally Reviewed:  [ x] YES  [ ] NO    Consultant(s) Notes Reviewed:  [x ] YES  [ ] NO    Care Discussed with Consultants/Other Providers [ x] YES  [ ] NO  Problem/Plan - 1:  ·  Problem:  cholelithiasis. choledocolithiasis?    Plan: IVF  IVAB  ANTALGICS  further  w/u  and  treatment  as per  clinical  course  GI  lester  appreciated advance  diet  monitor  albs      Problem/Plan - 2:  ·  Problem: DM (diabetes mellitus).  Plan: INSULIN COVERAGE.     Problem/Plan - 3:  ·  Problem: HTN (hypertension).  Plan: COZAAR.     Problem/Plan - 4:  ·  Problem: Hypothyroid.  Plan: SYNTHROID.     Problem/Plan - 5:  ·  Problem: Emphysema lung.  Plan: DUONEB.     Problem/Plan - 6:  Problem: Morbid obesity. Plan: WT  REDUCTION  COUNSELING.  anxiety  clonopin  Neuropathy  neurontin  discharge  in  AM  with  home  O2
INTERVAL HPI/OVERNIGHT EVENTS:    ASKED  BY  ER MD  TO  ADMIT  PT  IN  ER  EVALUATED  BY  SURGERY  FOR  ABDOMINAL  PAIN  DMITTED  FOR  POSSIBLE  CHOLECYSTECTOMY  PT  KNOWN  TO ME  FROM  OFFICE  AND  PRIOR  HOSPITALIZATIONS  ADMITTED  BY  HOSPITALIST      REVIEW OF SYSTEMS:  CONSTITUTIONAL:  ABD PAIN    NECK: No pain or stiffnes  RESPIRATORY: No SOB   CARDIOVASCULAR: No chest pain, palpitations, dizziness,   GASTROINTESTINAL:  abdominal pain. Novomiting,   NEUROLOGICAL: No headaches, no  blurry  vision no  dizziness  SKIN: No itching,   MUSCULOSKELETAL: No pain    MEDICATION:  ALBUTerol    90 MICROgram(s) HFA Inhaler 1 Puff(s) Inhalation every 4 hours  ALBUTerol/ipratropium for Nebulization 3 milliLiter(s) Nebulizer every 6 hours  dextrose 40% Gel 15 Gram(s) Oral once PRN  dextrose 5% + sodium chloride 0.45%. 1000 milliLiter(s) IV Continuous <Continuous>  dextrose 5%. 1000 milliLiter(s) IV Continuous <Continuous>  dextrose 50% Injectable 12.5 Gram(s) IV Push once  dextrose 50% Injectable 25 Gram(s) IV Push once  dextrose 50% Injectable 25 Gram(s) IV Push once  glucagon  Injectable 1 milliGRAM(s) IntraMuscular once PRN  heparin  Injectable 5000 Unit(s) SubCutaneous every 8 hours  insulin lispro (HumaLOG) corrective regimen sliding scale   SubCutaneous at bedtime  levothyroxine 175 MICROGram(s) Oral daily  losartan 50 milliGRAM(s) Oral daily  piperacillin/tazobactam IVPB.. 3.375 Gram(s) IV Intermittent every 8 hours  tiotropium 18 MICROgram(s) Capsule 1 Capsule(s) Inhalation daily    Vital Signs Last 24 Hrs  T(C): 36.9 (2019 14:17), Max: 36.9 (2019 14:17)  T(F): 98.4 (2019 14:17), Max: 98.4 (2019 14:17)  HR: 96 (2019 14:17) (78 - 96)  BP: 155/57 (2019 14:17) (117/75 - 155/57)  BP(mean): --  RR: 16 (2019 14:17) (16 - 20)  SpO2: 92% (2019 14:17) (91% - 100%)    PHYSICAL EXAM:  GENERAL: NAD, well-groomed, well-developed  EYES:  conjunctiva and sclera clear  ENMT:  Moist mucous membranes,   NECK: Supple, No JVD, Normal thyroid  NERVOUS SYSTEM:  Alert oriented   no  focal  deficits;   CHEST/LUNG: Clear    HEART: Regular rate and rhythm; No murmurs, rubs, or gallops  ABDOMEN: Soft,  RT Q  TENDERNESS  , Nondistended; Bowel sounds present  EXTREMITIES:  no  edema no  tenderness  SKIN: No rashes   LABS:                        13.4   12.81 )-----------( 262      ( 2019 07:49 )             41.0     07-11    137  |  102  |  16  ----------------------------<  122<H>  4.2   |  31  |  1.00    Ca    9.3      2019 07:49    TPro  7.4  /  Alb  3.3  /  TBili  0.3  /  DBili  x   /  AST  9<L>  /  ALT  18  /  AlkPhos  104  07-11    PT/INR - ( 2019 07:49 )   PT: 11.5 sec;   INR: 1.03 ratio         PTT - ( 2019 07:49 )  PTT:37.0 sec  Urinalysis Basic - ( 2019 10:37 )    Color: Yellow / Appearance: Clear / S.010 / pH: x  Gluc: x / Ketone: Negative  / Bili: Negative / Urobili: Negative mg/dL   Blood: x / Protein: Negative mg/dL / Nitrite: Negative   Leuk Esterase: Negative / RBC: x / WBC x   Sq Epi: x / Non Sq Epi: x / Bacteria: x      CAPILLARY BLOOD GLUCOSE      POCT Blood Glucose.: 86 mg/dL (2019 14:18)      RADIOLOGY & ADDITIONAL TESTS:    Imaging reports  Personally Reviewed:  [X ] YES  [ ] NO    Consultant(s) Notes Reviewed:  [X ] YES  [ ] NO    Care Discussed with Consultants/Other Providers [X ] YES  [ ] NO
INTERVAL HPI/OVERNIGHT EVENTS:  sitting on side of bed. o2 sat 88%. reportedly dsaturated to mid 70's with ambulation        Vital Signs Last 24 Hrs  T(C): 36.7 (13 Jul 2019 17:02), Max: 37.6 (12 Jul 2019 21:30)  T(F): 98.1 (13 Jul 2019 17:02), Max: 99.6 (12 Jul 2019 21:30)  HR: 91 (13 Jul 2019 17:34) (85 - 101)  BP: 137/49 (13 Jul 2019 17:02) (110/50 - 137/86)  BP(mean): --  RR: 18 (13 Jul 2019 10:00) (18 - 20)  SpO2: 91% (13 Jul 2019 17:34) (90% - 98%)        PHYSICAL EXAM:  GEN:         Awake, responsive and comfortable.  HEENT:    Normal.    RESP:      diminished diffusely  CVS:             Regular rate and rhythm.   ABD:         Soft, non-tender, non-distended;   :             No costovertebral angle tenderness  EXTR:            No clubbing, cyanosis or edema  CNS:              Intact sensory and motor function.        MEDICATIONS  (STANDING):  ALBUTerol    90 MICROgram(s) HFA Inhaler 1 Puff(s) Inhalation every 4 hours  ALBUTerol/ipratropium for Nebulization 3 milliLiter(s) Nebulizer every 6 hours  atorvastatin 10 milliGRAM(s) Oral at bedtime  buDESOnide 160 MICROgram(s)/formoterol 4.5 MICROgram(s) Inhaler 2 Puff(s) Inhalation two times a day  dextrose 5% + sodium chloride 0.45%. 1000 milliLiter(s) (110 mL/Hr) IV Continuous <Continuous>  dextrose 5%. 1000 milliLiter(s) (50 mL/Hr) IV Continuous <Continuous>  dextrose 50% Injectable 12.5 Gram(s) IV Push once  dextrose 50% Injectable 25 Gram(s) IV Push once  dextrose 50% Injectable 25 Gram(s) IV Push once  escitalopram 20 milliGRAM(s) Oral daily  gabapentin 600 milliGRAM(s) Oral three times a day  heparin  Injectable 5000 Unit(s) SubCutaneous every 8 hours  insulin lispro (HumaLOG) corrective regimen sliding scale   SubCutaneous at bedtime  levothyroxine 150 MICROGram(s) Oral daily  losartan 50 milliGRAM(s) Oral daily  montelukast 10 milliGRAM(s) Oral at bedtime  nystatin Cream 1 Application(s) Topical two times a day  piperacillin/tazobactam IVPB.. 3.375 Gram(s) IV Intermittent every 8 hours  tiotropium 18 MICROgram(s) Capsule 1 Capsule(s) Inhalation daily    MEDICATIONS  (PRN):  acetaminophen   Tablet .. 650 milliGRAM(s) Oral every 6 hours PRN Temp greater or equal to 38C (100.4F), Mild Pain (1 - 3)  benzonatate 100 milliGRAM(s) Oral every 8 hours PRN Cough  clonazePAM  Tablet 0.5 milliGRAM(s) Oral three times a day PRN anxiety  dextrose 40% Gel 15 Gram(s) Oral once PRN Blood Glucose LESS THAN 70 milliGRAM(s)/deciliter  glucagon  Injectable 1 milliGRAM(s) IntraMuscular once PRN Glucose LESS THAN 70 milligrams/deciliter  hydrOXYzine hydrochloride 50 milliGRAM(s) Oral every 8 hours PRN Itching  morphine  - Injectable 2 milliGRAM(s) IV Push every 4 hours PRN Moderate Pain (4 - 6)        LABS:                        12.5   12.51 )-----------( 247      ( 12 Jul 2019 07:51 )             39.1     07-12    140  |  104  |  10  ----------------------------<  126<H>  4.1   |  32<H>  |  0.88    Ca    8.9      12 Jul 2019 07:51  Mg     2.4     07-12    TPro  7.1  /  Alb  3.4  /  TBili  0.3  /  DBili  .15  /  AST  12<L>  /  ALT  18  /  AlkPhos  86  07-12              RADIOLOGY & ADDITIONAL STUDIES:    ASSESSMENT: cholithiasis, COPD, tolerating clear liquids  PLAN: GI mgmt, social svcs consult for home o2, continue duoneb, symbicort and nco2
INTERVAL HPI/OVERNIGHT EVENTS:  tolerating  regular  diet  not  keeping  O2  on most  of  time      REVIEW OF SYSTEMS:  CONSTITUTIONAL: feels  well   no  complaints    NECK: No pain or stiffnes  RESPIRATORY: No SOB   CARDIOVASCULAR: No chest pain, palpitations, dizziness,   GASTROINTESTINAL: No abdominal pain. No nausea, vomiting,   NEUROLOGICAL: No headaches, no  blurry  vision no  dizziness  SKIN: No itching,   MUSCULOSKELETAL: No pain    MEDICATION:  acetaminophen   Tablet .. 650 milliGRAM(s) Oral every 6 hours PRN  ALBUTerol    90 MICROgram(s) HFA Inhaler 1 Puff(s) Inhalation every 4 hours  ALBUTerol/ipratropium for Nebulization 3 milliLiter(s) Nebulizer every 6 hours  atorvastatin 10 milliGRAM(s) Oral at bedtime  benzonatate 100 milliGRAM(s) Oral every 8 hours PRN  buDESOnide 160 MICROgram(s)/formoterol 4.5 MICROgram(s) Inhaler 2 Puff(s) Inhalation two times a day  clonazePAM  Tablet 0.5 milliGRAM(s) Oral three times a day PRN  dextrose 40% Gel 15 Gram(s) Oral once PRN  dextrose 5% + sodium chloride 0.45%. 1000 milliLiter(s) IV Continuous <Continuous>  dextrose 5%. 1000 milliLiter(s) IV Continuous <Continuous>  dextrose 50% Injectable 12.5 Gram(s) IV Push once  dextrose 50% Injectable 25 Gram(s) IV Push once  dextrose 50% Injectable 25 Gram(s) IV Push once  docusate sodium 100 milliGRAM(s) Oral daily  escitalopram 20 milliGRAM(s) Oral daily  gabapentin 600 milliGRAM(s) Oral three times a day  glucagon  Injectable 1 milliGRAM(s) IntraMuscular once PRN  heparin  Injectable 5000 Unit(s) SubCutaneous every 8 hours  hydrOXYzine hydrochloride 50 milliGRAM(s) Oral every 8 hours PRN  insulin lispro (HumaLOG) corrective regimen sliding scale   SubCutaneous four times a day before meals  levothyroxine 150 MICROGram(s) Oral daily  losartan 50 milliGRAM(s) Oral daily  magnesium hydroxide Suspension 30 milliLiter(s) Oral daily PRN  montelukast 10 milliGRAM(s) Oral at bedtime  nystatin Cream 1 Application(s) Topical two times a day  oxyCODONE    5 mG/acetaminophen 325 mG 1 Tablet(s) Oral every 6 hours PRN  piperacillin/tazobactam IVPB.. 3.375 Gram(s) IV Intermittent every 8 hours  tiotropium 18 MICROgram(s) Capsule 1 Capsule(s) Inhalation daily    Vital Signs Last 24 Hrs  T(C): 36.6 (15 Jul 2019 05:13), Max: 36.8 (14 Jul 2019 11:00)  T(F): 97.8 (15 Jul 2019 05:13), Max: 98.2 (14 Jul 2019 11:00)  HR: 83 (15 Jul 2019 05:13) (81 - 91)  BP: 149/69 (15 Jul 2019 05:13) (126/74 - 149/69)  BP(mean): --  RR: 18 (15 Jul 2019 05:13) (16 - 18)  SpO2: 92% (15 Jul 2019 05:13) (90% - 98%)    PHYSICAL EXAM:  GENERAL: NAD, well-groomed, well-developed  EYES:  conjunctiva and sclera clear  ENMT:  Moist mucous membranes,   NECK: Supple, No JVD, Normal thyroid  NERVOUS SYSTEM:  Alert oriented   no  focal  deficits;   CHEST/LUNG: Clear    HEART: Regular rate and rhythm; No murmurs, rubs, or gallops  ABDOMEN: Soft, Nontender, Nondistended; Bowel sounds present  EXTREMITIES:  no  edema no  tenderness  SKIN: No rashes   LABS:                        12.2   11.25 )-----------( 216      ( 14 Jul 2019 06:37 )             37.2     07-14    141  |  105  |  9   ----------------------------<  159<H>  4.1   |  31  |  0.78    Ca    8.7      14 Jul 2019 06:37    TPro  6.9  /  Alb  3.1<L>  /  TBili  0.3  /  DBili  x   /  AST  10<L>  /  ALT  13  /  AlkPhos  83  07-14        CAPILLARY BLOOD GLUCOSE      POCT Blood Glucose.: 231 mg/dL (14 Jul 2019 21:51)  POCT Blood Glucose.: 178 mg/dL (14 Jul 2019 17:02)  POCT Blood Glucose.: 286 mg/dL (14 Jul 2019 12:30)  POCT Blood Glucose.: 311 mg/dL (14 Jul 2019 08:23)      RADIOLOGY & ADDITIONAL TESTS:    Imaging reports  Personally Reviewed:  [x ] YES  [ ] NO    Consultant(s) Notes Reviewed:  [x ] YES  [ ] NO    Care Discussed with Consultants/Other Providers [ x] YES  [ ] NO  Problem/Plan - 1:  ·  Problem:  cholelithiasis. choledocolithiasis?    resolved  discharge  home    Problem/Plan - 2:  ·  Problem: DM (diabetes mellitus).  Plan: INSULIN COVERAGE.     Problem/Plan - 3:  ·  Problem: HTN (hypertension).  Plan: COZAAR.     Problem/Plan - 4:  ·  Problem: Hypothyroid.  Plan: SYNTHROID.     Problem/Plan - 5:  ·  Problem: Emphysema lung.  Plan: DUONEB. tiotropium proventil   discharge  home  follow  closely  with  pulmonary  chck  O2  sat  off  O2  after  exercise    Problem/Plan - 6:  Problem: Morbid obesity. Plan: WT  REDUCTION  COUNSELING.  anxiety  clonopin
Patient seen and examined bedside resting comfortably.  c/o occasional mild abd pain.   Denies nausea and vomiting. Tolerating diet.  Flatus+ /BM. said she has not had BM "in a couple of days"  Denies chest pain, dyspnea, cough.    T(F): 99.4 (07-12-19 @ 05:41), Max: 99.4 (07-12-19 @ 05:41)  HR: 94 (07-12-19 @ 05:41) (82 - 101)  BP: 113/64 (07-12-19 @ 05:41) (113/64 - 129/66)  RR: 17 (07-12-19 @ 05:41) (17 - 18)  SpO2: 91% (07-12-19 @ 05:41) (88% - 95%)    CAPILLARY BLOOD GLUCOSE  POCT Blood Glucose.: 107 mg/dL (12 Jul 2019 05:41)  POCT Blood Glucose.: 89 mg/dL (11 Jul 2019 23:08)      PHYSICAL EXAM:  General: NAD  Neuro:  Alert & oriented x 3  Abdomen: obese, soft, nontender . Normactive BS    LABS:                        12.5   12.51 )-----------( 247      ( 12 Jul 2019 07:51 )             39.1     07-12    140  |  104  |  10  ----------------------------<  126<H>  4.1   |  32<H>  |  0.88    Ca    8.9      12 Jul 2019 07:51  Mg     2.4     07-12    TPro  7.1  /  Alb  3.4  /  TBili  0.3  /  DBili  .15  /  AST  12<L>  /  ALT  18  /  AlkPhos  86  07-12      < from: NM Hepatobiliary Imaging (07.12.19 @ 11:28) >    EXAM:  NM HEPATOBILIARY IMG                            PROCEDURE DATE:  07/12/2019          INTERPRETATION:  CLINICAL STATEMENT: 59-year-old female with abdominal   pain.    RADIOPHARMACEUTICAL: 3.0 mCi Tc-99m-Mebrofenin, I.V.; 2 doses    TECHNIQUE:  Dynamic images of the anterior abdomen were obtained for 1   hour following injection of radiotracer. Morphine 4 mg I.V. and a second   dose of radiotracer were administered at 1 hour. Dynamic imaging was   continued for 1 hour followed by static images of the abdomen in the   right lateral and right anterior oblique views immediately thereafter.    FINDINGS: There is prompt, homogeneous uptake of radiotracer by the   hepatocytes. Activity is first seen in the bowel at 60 minutes. The   gallbladder is not visualized in the first hour of imaging, but was seen   5 minutes after the administration of morphine. There is good clearance   of activity from the liver at the end of the study.    IMPRESSION: Normal morphine-augmented hepatobiliary scan.    No evidence of acute cholecystitis.  OBED BRISENO M.D., NUCLEAR MEDICINE ATTENDING  This document has been electronically signed. Jul 12 2019 11:47AM      < from: Xray Chest 1 View- PORTABLE-Routine (07.12.19 @ 07:50) >    EXAM:  XR CHEST PORTABLE ROUTINE 1V                            PROCEDURE DATE:  07/12/2019          INTERPRETATION:  PROCEDURE: AP view of the chest.    CLINICAL INFORMATION: Cough.    COMPARISON: 6/18/2019.    FINDINGS:    Lungs: There are basilarlung opacities.  Heart: The heart is top normal in size.  Mediastinum: The mediastinum is within normal limits.    IMPRESSION:    Basilar lung opacities, which may represent atelectasis or pneumonia.  LALA HANKS M.D., ATTENDING RADIOLOGIST  This document has been electronically signed. Jul 12 2019  8:02AM        I&O's Detail    11 Jul 2019 07:01  -  12 Jul 2019 07:00  --------------------------------------------------------  IN:    dextrose 5% + sodium chloride 0.45%.: 1210 mL    IV PiggyBack: 100 mL  Total IN: 1310 mL    OUT:  Total OUT: 0 mL    Total NET: 1310 mL    GI f/u noted.      Impression:   abd pain has resolved  LFT are normalizing - unable to do MRCP because of pt's claustrophobia  bibasilar opacities - atelectasis vs pneumonia    Plan:  -continue GI & medical  - Pt was discussed with Dr. Gresham. He feels pt does not need urgent surgical intervention at this time. When medically stable, she can be discharged to home with outpt f/u in his office. Elective cholecystectomy after medical optimization.  -reconsult PRN per Dr. Gresham.
INTERVAL HPI/OVERNIGHT EVENTS:        REVIEW OF SYSTEMS:  CONSTITUTIONAL:  feels  better toleratuing  clear  liquids    NECK: No pain or stiffnes  RESPIRATORY: No SOB   CARDIOVASCULAR: No chest pain, palpitations, dizziness,   GASTROINTESTINAL: No abdominal pain. No nausea, vomiting,   NEUROLOGICAL: No headaches, no  blurry  vision no  dizziness  SKIN: No itching,   MUSCULOSKELETAL: No pain    MEDICATION:  acetaminophen   Tablet .. 650 milliGRAM(s) Oral every 6 hours PRN  ALBUTerol    90 MICROgram(s) HFA Inhaler 1 Puff(s) Inhalation every 4 hours  ALBUTerol/ipratropium for Nebulization 3 milliLiter(s) Nebulizer every 6 hours  atorvastatin 10 milliGRAM(s) Oral at bedtime  benzonatate 100 milliGRAM(s) Oral every 8 hours PRN  buDESOnide 160 MICROgram(s)/formoterol 4.5 MICROgram(s) Inhaler 2 Puff(s) Inhalation two times a day  clonazePAM  Tablet 0.5 milliGRAM(s) Oral three times a day PRN  dextrose 40% Gel 15 Gram(s) Oral once PRN  dextrose 5% + sodium chloride 0.45%. 1000 milliLiter(s) IV Continuous <Continuous>  dextrose 5%. 1000 milliLiter(s) IV Continuous <Continuous>  dextrose 50% Injectable 12.5 Gram(s) IV Push once  dextrose 50% Injectable 25 Gram(s) IV Push once  dextrose 50% Injectable 25 Gram(s) IV Push once  escitalopram 20 milliGRAM(s) Oral daily  gabapentin 600 milliGRAM(s) Oral three times a day  glucagon  Injectable 1 milliGRAM(s) IntraMuscular once PRN  heparin  Injectable 5000 Unit(s) SubCutaneous every 8 hours  hydrOXYzine hydrochloride 50 milliGRAM(s) Oral every 8 hours PRN  insulin lispro (HumaLOG) corrective regimen sliding scale   SubCutaneous at bedtime  levothyroxine 150 MICROGram(s) Oral daily  losartan 50 milliGRAM(s) Oral daily  montelukast 10 milliGRAM(s) Oral at bedtime  morphine  - Injectable 2 milliGRAM(s) IV Push every 4 hours PRN  nystatin Cream 1 Application(s) Topical two times a day  piperacillin/tazobactam IVPB.. 3.375 Gram(s) IV Intermittent every 8 hours  tiotropium 18 MICROgram(s) Capsule 1 Capsule(s) Inhalation daily    Vital Signs Last 24 Hrs  T(C): 37.6 (2019 05:55), Max: 38.6 (2019 17:30)  T(F): 99.6 (2019 05:55), Max: 101.4 (2019 17:30)  HR: 90 (2019 05:56) (90 - 119)  BP: 137/86 (2019 05:55) (110/50 - 147/52)  BP(mean): --  RR: 20 (2019 05:55) (18 - 20)  SpO2: 98% (2019 05:56) (93% - 98%)    PHYSICAL EXAM:  GENERAL: NAD, well-groomed, well-developed  EYES:  conjunctiva and sclera clear  ENMT:  Moist mucous membranes,   NECK: Supple, No JVD, Normal thyroid  NERVOUS SYSTEM:  Alert oriented   no  focal  deficits;   CHEST/LUNG: Clear    HEART: Regular rate and rhythm; No murmurs, rubs, or gallops  ABDOMEN: Soft, Nontender, Nondistended; Bowel sounds present  EXTREMITIES:  no  edema no  tenderness  SKIN: No rashes   LABS:                        12.5   12.51 )-----------( 247      ( 2019 07:51 )             39.1     07-12    140  |  104  |  10  ----------------------------<  126<H>  4.1   |  32<H>  |  0.88    Ca    8.9      2019 07:51  Mg     2.4     07-12    TPro  7.1  /  Alb  3.4  /  TBili  0.3  /  DBili  .15  /  AST  12<L>  /  ALT  18  /  AlkPhos  86  07-12      Urinalysis Basic - ( 2019 10:37 )    Color: Yellow / Appearance: Clear / S.010 / pH: x  Gluc: x / Ketone: Negative  / Bili: Negative / Urobili: Negative mg/dL   Blood: x / Protein: Negative mg/dL / Nitrite: Negative   Leuk Esterase: Negative / RBC: x / WBC x   Sq Epi: x / Non Sq Epi: x / Bacteria: x      CAPILLARY BLOOD GLUCOSE      POCT Blood Glucose.: 195 mg/dL (2019 07:39)  POCT Blood Glucose.: 200 mg/dL (2019 21:58)  POCT Blood Glucose.: 190 mg/dL (2019 16:58)      RADIOLOGY & ADDITIONAL TESTS:    Imaging reports  Personally Reviewed:  [ x] YES  [ ] NO    Consultant(s) Notes Reviewed:  [x ] YES  [ ] NO    Care Discussed with Consultants/Other Providers [ x] YES  [ ] NO  Problem/Plan - 1:  ·  Problem:  cholelithiasis. choledocolithiasis?    Plan: IVF  IVAB  ANTALGICS  further  w/u  and  treatment  as per  clinical  course  GI  eval  appreciated advance  diet  monitor  albs      Problem/Plan - 2:  ·  Problem: DM (diabetes mellitus).  Plan: INSULIN COVERAGE.     Problem/Plan - 3:  ·  Problem: HTN (hypertension).  Plan: COZAAR.     Problem/Plan - 4:  ·  Problem: Hypothyroid.  Plan: SYNTHROID.     Problem/Plan - 5:  ·  Problem: Emphysema lung.  Plan: DUONEB.     Problem/Plan - 6:  Problem: Morbid obesity. Plan: WT  REDUCTION  COUNSELING.  anxiety  clonopin
Gastroenterology  Patient seen and examined bedside resting comfortably.  No complaints offered.   +Vague abdominal pain  Denies nausea and vomiting. Tolerating diet.  Normal flatus/BM.     T(F): 98.5 (07-13-19 @ 11:25), Max: 101.4 (07-12-19 @ 17:30)  HR: 99 (07-13-19 @ 11:25) (90 - 119)  BP: 107/61 (07-13-19 @ 11:25) (107/61 - 147/52)  RR: 17 (07-13-19 @ 11:25) (17 - 20)  SpO2: 98% (07-13-19 @ 11:25) (90% - 98%)  Wt(kg): --  CAPILLARY BLOOD GLUCOSE      POCT Blood Glucose.: 195 mg/dL (13 Jul 2019 07:39)  POCT Blood Glucose.: 200 mg/dL (12 Jul 2019 21:58)  POCT Blood Glucose.: 190 mg/dL (12 Jul 2019 16:58)      PHYSICAL EXAM:  General: NAD, WDWN.   Neuro:  Alert & responsive  HEENT: NCAT, EOMI, conjunctiva clear  CV: +S1+S2 regular rate and rhythm  Lung: clear to ausculation bilaterally, respirations nonlabored, good inspiratory effort  Abdomen: soft, Non tender, No Distension. Normal active BS  Extremities: no pedal edema or calf tenderness noted     LABS:                        12.5   12.51 )-----------( 247      ( 12 Jul 2019 07:51 )             39.1     07-12    140  |  104  |  10  ----------------------------<  126<H>  4.1   |  32<H>  |  0.88    Ca    8.9      12 Jul 2019 07:51  Mg     2.4     07-12    TPro  7.1  /  Alb  3.4  /  TBili  0.3  /  DBili  .15  /  AST  12<L>  /  ALT  18  /  AlkPhos  86  07-12    LIVER FUNCTIONS - ( 12 Jul 2019 07:51 )  Alb: 3.4 g/dL / Pro: 7.1 gm/dL / ALK PHOS: 86 U/L / ALT: 18 U/L / AST: 12 U/L / GGT: x             I&O's Detail    12 Jul 2019 07:01  -  13 Jul 2019 07:00  --------------------------------------------------------  IN:    dextrose 5% + sodium chloride 0.45%.: 1475 mL    IV PiggyBack: 325 mL  Total IN: 1800 mL    OUT:  Total OUT: 0 mL    Total NET: 1800 mL

## 2019-07-15 NOTE — PROGRESS NOTE ADULT - PROBLEM SELECTOR PLAN 2
doubt CBD stone .  Normal LFTS non dilated CBD .  patient is claustrophobic and refused MRCP  follow LFTS and advance diet.
INSULIN COVERAGE
Doubt CBD stone  Normal LFTS non dilated CBD .  patient is claustrophobic and refused MRCP  follow LFTS and advance diet.
Normal LFTS non dilated CBD .  patient is claustrophobic and refused MRCP  follow LFTS and advance diet.
doubt CBD stone .  Normal LFTS non dilated CBD .  patient is claustrophobic and refused MRCP  follow LFTS and advance diet.

## 2019-07-15 NOTE — PROGRESS NOTE ADULT - PROBLEM SELECTOR PROBLEM 2
DM (diabetes mellitus)
R/O Choledocholithiasis

## 2019-07-15 NOTE — DISCHARGE NOTE PROVIDER - CARE PROVIDER_API CALL
Wm Porras)  Internal Medicine  [address] [place] [address]  Phone: [phone]  Fax: [phone]  Follow Up Time:     Carl Flores)  Medicine  [address] [facility], [address] [place] [id number]  Phone: [phone]  Fax: [phone]  Follow Up Time:     Florentino Espinoza)  [place] [address]  Phone: [phone]  Fax: [phone]  Follow Up Time:     private  endocrinologist,   Phone: (   )    -  Fax: (   )    -  Follow Up Time:

## 2019-07-23 DIAGNOSIS — K21.9 GASTRO-ESOPHAGEAL REFLUX DISEASE WITHOUT ESOPHAGITIS: ICD-10-CM

## 2019-07-23 DIAGNOSIS — E11.40 TYPE 2 DIABETES MELLITUS WITH DIABETIC NEUROPATHY, UNSPECIFIED: ICD-10-CM

## 2019-07-23 DIAGNOSIS — F41.9 ANXIETY DISORDER, UNSPECIFIED: ICD-10-CM

## 2019-07-23 DIAGNOSIS — K80.20 CALCULUS OF GALLBLADDER WITHOUT CHOLECYSTITIS WITHOUT OBSTRUCTION: ICD-10-CM

## 2019-07-23 DIAGNOSIS — E03.9 HYPOTHYROIDISM, UNSPECIFIED: ICD-10-CM

## 2019-07-23 DIAGNOSIS — F17.210 NICOTINE DEPENDENCE, CIGARETTES, UNCOMPLICATED: ICD-10-CM

## 2019-07-23 DIAGNOSIS — Z79.84 LONG TERM (CURRENT) USE OF ORAL HYPOGLYCEMIC DRUGS: ICD-10-CM

## 2019-07-23 DIAGNOSIS — K80.50 CALCULUS OF BILE DUCT WITHOUT CHOLANGITIS OR CHOLECYSTITIS WITHOUT OBSTRUCTION: ICD-10-CM

## 2019-07-23 DIAGNOSIS — E66.01 MORBID (SEVERE) OBESITY DUE TO EXCESS CALORIES: ICD-10-CM

## 2019-07-23 DIAGNOSIS — I10 ESSENTIAL (PRIMARY) HYPERTENSION: ICD-10-CM

## 2019-07-23 DIAGNOSIS — Z99.81 DEPENDENCE ON SUPPLEMENTAL OXYGEN: ICD-10-CM

## 2019-07-23 DIAGNOSIS — F40.240 CLAUSTROPHOBIA: ICD-10-CM

## 2019-07-23 DIAGNOSIS — J43.9 EMPHYSEMA, UNSPECIFIED: ICD-10-CM

## 2019-08-05 ENCOUNTER — INPATIENT (INPATIENT)
Facility: HOSPITAL | Age: 59
LOS: 7 days | Discharge: HOME | End: 2019-08-13
Attending: INTERNAL MEDICINE | Admitting: INTERNAL MEDICINE
Payer: MEDICAID

## 2019-08-05 VITALS
HEART RATE: 98 BPM | HEIGHT: 61 IN | RESPIRATION RATE: 16 BRPM | TEMPERATURE: 99 F | SYSTOLIC BLOOD PRESSURE: 139 MMHG | DIASTOLIC BLOOD PRESSURE: 78 MMHG | WEIGHT: 259.93 LBS | OXYGEN SATURATION: 96 %

## 2019-08-05 DIAGNOSIS — E11.9 TYPE 2 DIABETES MELLITUS WITHOUT COMPLICATIONS: ICD-10-CM

## 2019-08-05 DIAGNOSIS — I10 ESSENTIAL (PRIMARY) HYPERTENSION: ICD-10-CM

## 2019-08-05 DIAGNOSIS — F41.9 ANXIETY DISORDER, UNSPECIFIED: ICD-10-CM

## 2019-08-05 DIAGNOSIS — K81.0 ACUTE CHOLECYSTITIS: ICD-10-CM

## 2019-08-05 DIAGNOSIS — E03.9 HYPOTHYROIDISM, UNSPECIFIED: ICD-10-CM

## 2019-08-05 LAB
ALBUMIN SERPL ELPH-MCNC: 3.3 G/DL — SIGNIFICANT CHANGE UP (ref 3.3–5)
ALP SERPL-CCNC: 102 U/L — SIGNIFICANT CHANGE UP (ref 40–120)
ALT FLD-CCNC: 12 U/L — SIGNIFICANT CHANGE UP (ref 12–78)
ANION GAP SERPL CALC-SCNC: 7 MMOL/L — SIGNIFICANT CHANGE UP (ref 5–17)
ANISOCYTOSIS BLD QL: SLIGHT — SIGNIFICANT CHANGE UP
APTT BLD: 35.9 SEC — SIGNIFICANT CHANGE UP (ref 27.5–36.3)
AST SERPL-CCNC: 10 U/L — LOW (ref 15–37)
BASOPHILS # BLD AUTO: 0 K/UL — SIGNIFICANT CHANGE UP (ref 0–0.2)
BASOPHILS NFR BLD AUTO: 0 % — SIGNIFICANT CHANGE UP (ref 0–2)
BILIRUB SERPL-MCNC: 0.2 MG/DL — SIGNIFICANT CHANGE UP (ref 0.2–1.2)
BLD GP AB SCN SERPL QL: SIGNIFICANT CHANGE UP
BUN SERPL-MCNC: 15 MG/DL — SIGNIFICANT CHANGE UP (ref 7–23)
CALCIUM SERPL-MCNC: 9 MG/DL — SIGNIFICANT CHANGE UP (ref 8.5–10.1)
CHLORIDE SERPL-SCNC: 106 MMOL/L — SIGNIFICANT CHANGE UP (ref 96–108)
CO2 SERPL-SCNC: 29 MMOL/L — SIGNIFICANT CHANGE UP (ref 22–31)
CREAT SERPL-MCNC: 0.91 MG/DL — SIGNIFICANT CHANGE UP (ref 0.5–1.3)
EOSINOPHIL # BLD AUTO: 0.33 K/UL — SIGNIFICANT CHANGE UP (ref 0–0.5)
EOSINOPHIL NFR BLD AUTO: 2 % — SIGNIFICANT CHANGE UP (ref 0–6)
GLUCOSE BLDC GLUCOMTR-MCNC: 79 MG/DL — SIGNIFICANT CHANGE UP (ref 70–99)
GLUCOSE BLDC GLUCOMTR-MCNC: 92 MG/DL — SIGNIFICANT CHANGE UP (ref 70–99)
GLUCOSE SERPL-MCNC: 105 MG/DL — HIGH (ref 70–99)
HCT VFR BLD CALC: 41.1 % — SIGNIFICANT CHANGE UP (ref 34.5–45)
HGB BLD-MCNC: 13.3 G/DL — SIGNIFICANT CHANGE UP (ref 11.5–15.5)
HYPOCHROMIA BLD QL: SLIGHT — SIGNIFICANT CHANGE UP
INR BLD: 0.99 RATIO — SIGNIFICANT CHANGE UP (ref 0.88–1.16)
LIDOCAIN IGE QN: 223 U/L — SIGNIFICANT CHANGE UP (ref 73–393)
LYMPHOCYTES # BLD AUTO: 34 % — SIGNIFICANT CHANGE UP (ref 13–44)
LYMPHOCYTES # BLD AUTO: 5.55 K/UL — HIGH (ref 1–3.3)
MAGNESIUM SERPL-MCNC: 2.3 MG/DL — SIGNIFICANT CHANGE UP (ref 1.6–2.6)
MANUAL SMEAR VERIFICATION: SIGNIFICANT CHANGE UP
MCHC RBC-ENTMCNC: 28.8 PG — SIGNIFICANT CHANGE UP (ref 27–34)
MCHC RBC-ENTMCNC: 32.4 GM/DL — SIGNIFICANT CHANGE UP (ref 32–36)
MCV RBC AUTO: 89 FL — SIGNIFICANT CHANGE UP (ref 80–100)
MICROCYTES BLD QL: SLIGHT — SIGNIFICANT CHANGE UP
MONOCYTES # BLD AUTO: 0.65 K/UL — SIGNIFICANT CHANGE UP (ref 0–0.9)
MONOCYTES NFR BLD AUTO: 4 % — SIGNIFICANT CHANGE UP (ref 2–14)
NEUTROPHILS # BLD AUTO: 9.79 K/UL — HIGH (ref 1.8–7.4)
NEUTROPHILS NFR BLD AUTO: 60 % — SIGNIFICANT CHANGE UP (ref 43–77)
NRBC # BLD: 0 /100 — SIGNIFICANT CHANGE UP (ref 0–0)
NRBC # BLD: SIGNIFICANT CHANGE UP /100 WBCS (ref 0–0)
PLAT MORPH BLD: NORMAL — SIGNIFICANT CHANGE UP
PLATELET # BLD AUTO: 282 K/UL — SIGNIFICANT CHANGE UP (ref 150–400)
POLYCHROMASIA BLD QL SMEAR: SLIGHT — SIGNIFICANT CHANGE UP
POTASSIUM SERPL-MCNC: 3.9 MMOL/L — SIGNIFICANT CHANGE UP (ref 3.5–5.3)
POTASSIUM SERPL-SCNC: 3.9 MMOL/L — SIGNIFICANT CHANGE UP (ref 3.5–5.3)
PROT SERPL-MCNC: 7.6 GM/DL — SIGNIFICANT CHANGE UP (ref 6–8.3)
PROTHROM AB SERPL-ACNC: 11.1 SEC — SIGNIFICANT CHANGE UP (ref 10–12.9)
RBC # BLD: 4.62 M/UL — SIGNIFICANT CHANGE UP (ref 3.8–5.2)
RBC # FLD: 12.9 % — SIGNIFICANT CHANGE UP (ref 10.3–14.5)
RBC BLD AUTO: ABNORMAL
SODIUM SERPL-SCNC: 142 MMOL/L — SIGNIFICANT CHANGE UP (ref 135–145)
WBC # BLD: 16.32 K/UL — HIGH (ref 3.8–10.5)
WBC # FLD AUTO: 16.32 K/UL — HIGH (ref 3.8–10.5)

## 2019-08-05 PROCEDURE — 99285 EMERGENCY DEPT VISIT HI MDM: CPT

## 2019-08-05 PROCEDURE — 99223 1ST HOSP IP/OBS HIGH 75: CPT

## 2019-08-05 PROCEDURE — 76700 US EXAM ABDOM COMPLETE: CPT | Mod: 26

## 2019-08-05 RX ORDER — LEVOTHYROXINE SODIUM 125 MCG
150 TABLET ORAL DAILY
Refills: 0 | Status: DISCONTINUED | OUTPATIENT
Start: 2019-08-05 | End: 2019-08-13

## 2019-08-05 RX ORDER — ESCITALOPRAM OXALATE 10 MG/1
20 TABLET, FILM COATED ORAL DAILY
Refills: 0 | Status: DISCONTINUED | OUTPATIENT
Start: 2019-08-05 | End: 2019-08-13

## 2019-08-05 RX ORDER — MORPHINE SULFATE 50 MG/1
4 CAPSULE, EXTENDED RELEASE ORAL EVERY 4 HOURS
Refills: 0 | Status: DISCONTINUED | OUTPATIENT
Start: 2019-08-05 | End: 2019-08-12

## 2019-08-05 RX ORDER — LOSARTAN POTASSIUM 100 MG/1
50 TABLET, FILM COATED ORAL DAILY
Refills: 0 | Status: DISCONTINUED | OUTPATIENT
Start: 2019-08-05 | End: 2019-08-13

## 2019-08-05 RX ORDER — CLONAZEPAM 1 MG
1 TABLET ORAL THREE TIMES A DAY
Refills: 0 | Status: DISCONTINUED | OUTPATIENT
Start: 2019-08-05 | End: 2019-08-12

## 2019-08-05 RX ORDER — MORPHINE SULFATE 50 MG/1
4 CAPSULE, EXTENDED RELEASE ORAL ONCE
Refills: 0 | Status: DISCONTINUED | OUTPATIENT
Start: 2019-08-05 | End: 2019-08-05

## 2019-08-05 RX ORDER — PIPERACILLIN AND TAZOBACTAM 4; .5 G/20ML; G/20ML
3.38 INJECTION, POWDER, LYOPHILIZED, FOR SOLUTION INTRAVENOUS EVERY 8 HOURS
Refills: 0 | Status: DISCONTINUED | OUTPATIENT
Start: 2019-08-05 | End: 2019-08-08

## 2019-08-05 RX ORDER — MONTELUKAST 4 MG/1
10 TABLET, CHEWABLE ORAL AT BEDTIME
Refills: 0 | Status: DISCONTINUED | OUTPATIENT
Start: 2019-08-05 | End: 2019-08-13

## 2019-08-05 RX ORDER — PIPERACILLIN AND TAZOBACTAM 4; .5 G/20ML; G/20ML
3.38 INJECTION, POWDER, LYOPHILIZED, FOR SOLUTION INTRAVENOUS ONCE
Refills: 0 | Status: COMPLETED | OUTPATIENT
Start: 2019-08-05 | End: 2019-08-05

## 2019-08-05 RX ORDER — ATORVASTATIN CALCIUM 80 MG/1
10 TABLET, FILM COATED ORAL AT BEDTIME
Refills: 0 | Status: DISCONTINUED | OUTPATIENT
Start: 2019-08-05 | End: 2019-08-13

## 2019-08-05 RX ORDER — HEPARIN SODIUM 5000 [USP'U]/ML
5000 INJECTION INTRAVENOUS; SUBCUTANEOUS EVERY 8 HOURS
Refills: 0 | Status: DISCONTINUED | OUTPATIENT
Start: 2019-08-05 | End: 2019-08-13

## 2019-08-05 RX ORDER — IPRATROPIUM/ALBUTEROL SULFATE 18-103MCG
3 AEROSOL WITH ADAPTER (GRAM) INHALATION EVERY 6 HOURS
Refills: 0 | Status: DISCONTINUED | OUTPATIENT
Start: 2019-08-05 | End: 2019-08-13

## 2019-08-05 RX ORDER — ACETAMINOPHEN 500 MG
1000 TABLET ORAL ONCE
Refills: 0 | Status: COMPLETED | OUTPATIENT
Start: 2019-08-05 | End: 2019-08-05

## 2019-08-05 RX ORDER — SODIUM CHLORIDE 9 MG/ML
1000 INJECTION, SOLUTION INTRAVENOUS
Refills: 0 | Status: DISCONTINUED | OUTPATIENT
Start: 2019-08-05 | End: 2019-08-09

## 2019-08-05 RX ADMIN — MORPHINE SULFATE 4 MILLIGRAM(S): 50 CAPSULE, EXTENDED RELEASE ORAL at 18:29

## 2019-08-05 RX ADMIN — MORPHINE SULFATE 4 MILLIGRAM(S): 50 CAPSULE, EXTENDED RELEASE ORAL at 14:45

## 2019-08-05 RX ADMIN — HEPARIN SODIUM 5000 UNIT(S): 5000 INJECTION INTRAVENOUS; SUBCUTANEOUS at 21:38

## 2019-08-05 RX ADMIN — Medication 1 MILLIGRAM(S): at 15:06

## 2019-08-05 RX ADMIN — HEPARIN SODIUM 5000 UNIT(S): 5000 INJECTION INTRAVENOUS; SUBCUTANEOUS at 13:38

## 2019-08-05 RX ADMIN — SODIUM CHLORIDE 125 MILLILITER(S): 9 INJECTION, SOLUTION INTRAVENOUS at 13:36

## 2019-08-05 RX ADMIN — MORPHINE SULFATE 4 MILLIGRAM(S): 50 CAPSULE, EXTENDED RELEASE ORAL at 08:01

## 2019-08-05 RX ADMIN — MORPHINE SULFATE 4 MILLIGRAM(S): 50 CAPSULE, EXTENDED RELEASE ORAL at 22:40

## 2019-08-05 RX ADMIN — MONTELUKAST 10 MILLIGRAM(S): 4 TABLET, CHEWABLE ORAL at 21:38

## 2019-08-05 RX ADMIN — ATORVASTATIN CALCIUM 10 MILLIGRAM(S): 80 TABLET, FILM COATED ORAL at 21:38

## 2019-08-05 RX ADMIN — ESCITALOPRAM OXALATE 20 MILLIGRAM(S): 10 TABLET, FILM COATED ORAL at 15:03

## 2019-08-05 RX ADMIN — MORPHINE SULFATE 4 MILLIGRAM(S): 50 CAPSULE, EXTENDED RELEASE ORAL at 08:15

## 2019-08-05 RX ADMIN — PIPERACILLIN AND TAZOBACTAM 200 GRAM(S): 4; .5 INJECTION, POWDER, LYOPHILIZED, FOR SOLUTION INTRAVENOUS at 11:03

## 2019-08-05 RX ADMIN — Medication 150 MICROGRAM(S): at 15:47

## 2019-08-05 RX ADMIN — LOSARTAN POTASSIUM 50 MILLIGRAM(S): 100 TABLET, FILM COATED ORAL at 15:03

## 2019-08-05 RX ADMIN — MORPHINE SULFATE 4 MILLIGRAM(S): 50 CAPSULE, EXTENDED RELEASE ORAL at 13:38

## 2019-08-05 RX ADMIN — MORPHINE SULFATE 4 MILLIGRAM(S): 50 CAPSULE, EXTENDED RELEASE ORAL at 22:55

## 2019-08-05 RX ADMIN — Medication 400 MILLIGRAM(S): at 12:02

## 2019-08-05 RX ADMIN — SODIUM CHLORIDE 125 MILLILITER(S): 9 INJECTION, SOLUTION INTRAVENOUS at 21:39

## 2019-08-05 RX ADMIN — PIPERACILLIN AND TAZOBACTAM 25 GRAM(S): 4; .5 INJECTION, POWDER, LYOPHILIZED, FOR SOLUTION INTRAVENOUS at 21:38

## 2019-08-05 RX ADMIN — Medication 20 MILLIGRAM(S): at 21:38

## 2019-08-05 RX ADMIN — MORPHINE SULFATE 4 MILLIGRAM(S): 50 CAPSULE, EXTENDED RELEASE ORAL at 09:50

## 2019-08-05 RX ADMIN — MORPHINE SULFATE 4 MILLIGRAM(S): 50 CAPSULE, EXTENDED RELEASE ORAL at 19:46

## 2019-08-05 RX ADMIN — PIPERACILLIN AND TAZOBACTAM 25 GRAM(S): 4; .5 INJECTION, POWDER, LYOPHILIZED, FOR SOLUTION INTRAVENOUS at 13:37

## 2019-08-05 NOTE — ED ADULT NURSE NOTE - NSIMPLEMENTINTERV_GEN_ALL_ED
Implemented All Universal Safety Interventions:  [place] to call system. Call bell, personal items and telephone within reach. Instruct patient to call for assistance. Room bathroom lighting operational. Non-slip footwear when patient is off stretcher. Physically safe environment: no spills, clutter or unnecessary equipment. Stretcher in lowest position, wheels locked, appropriate side rails in place.

## 2019-08-05 NOTE — H&P ADULT - HISTORY OF PRESENT ILLNESS
58yo Female with PMH Anxiety, COPD, DM, HTN, Hypothyroidism, active smoker, cholelithiasis and no PSH presents to the ER c/o recurrent epigastric abdominal pain x5-6 days, started on wed or thurs per patient but she did not want to come to the ER over the weekend, states pain is worse than usual, described as "sharp" constant with radiation to the back, 10/10 in severity with no alleviating or known triggering factors. Associated with nausea, denies vomiting.    Patient was here last month for similar complaints, HIDA was negative and pain improved, was supposed to follow up with Surgeon in the office for elective cholecystectomy however she states that surgeon did not accept her insurance.

## 2019-08-05 NOTE — ED PROVIDER NOTE - CLINICAL SUMMARY MEDICAL DECISION MAKING FREE TEXT BOX
epigastric pain. likely from stones. epigastric pain. likely from stones. sono positive for acute yuri. case dw dr ennis. admit. epigastric pain. likely from stones. sono positive for acute yuri. case dw dr cheung who wants medical admit. timpone asks for hospitalist to cover for 1st day

## 2019-08-05 NOTE — CONSULT NOTE ADULT - SUBJECTIVE AND OBJECTIVE BOX
GENERAL SURGERY CONSULT NOTE    Patient is a 59y old  Female who presents with a chief complaint of Epigastric abdominal pain.    HPI: 60yo Female with PMH Anxiety, COPD, DM, HTN, Hypothyroidism, active smoker, cholelithiasis and no PSH presents to the ER c/o recurrent epigastric abdominal pain x5-6 days, started on wed or thurs per patient but she did not want to come to the ER over the weekend, states pain is worse than usual, described as "sharp" constant with radiation to the back, 10/10 in severity with no alleviating or known triggering factors. Associated with nausea, denies vomiting.    Patient was here last month for similar complaints, HIDA was negative and pain improved, was supposed to follow up with Surgeon in the office for elective cholecystectomy however she states that surgeon did not accept her insurance.     Denies fevers, chest pain, shortness of breath, or dizziness. No change in BM.      PAST MEDICAL & SURGICAL HISTORY:  DM (diabetes mellitus)  HTN (hypertension)  Smoker  Emphysema lung  Hypothyroid  Agoraphobia  COPD (chronic obstructive pulmonary disease)  Anxiety  No significant past surgical history      Review of Systems:  Contained within HPI    MEDICATIONS  (STANDING):  acetaminophen  IVPB .. 1000 milliGRAM(s) IV Intermittent once    Allergies    No Known Allergies    SOCIAL HISTORY   Smoker, 1/2 pack per day x >25years  Denies recent alcohol or illicit drug use  Patient has agoraphobia     FAMILY HISTORY:  Denies    Vital Signs Last 24 Hrs  T(C): 37 (05 Aug 2019 07:11), Max: 37 (05 Aug 2019 07:11)  T(F): 98.6 (05 Aug 2019 07:11), Max: 98.6 (05 Aug 2019 07:11)  HR: 94 (05 Aug 2019 09:47) (94 - 98)  BP: 151/56 (05 Aug 2019 09:47) (139/78 - 151/56)  BP(mean): --  RR: 18 (05 Aug 2019 09:47) (16 - 18)  SpO2: 92% (05 Aug 2019 09:47) (92% - 96%)    Physical Exam:    General: Morbidly obese, no acute distress  Eyes : LEVON  HENT:  WNL, no JVD  Chest:  clear breath sounds  Cardiovascular:  Regular rate & rhythm  Abdomen: Morbidly obese, Nondistended, Normoactive BS, Soft, +mild epigastric and RUQ tenderness, no guarding, no rebound   Extremities: Calf soft, nontender b/l.    Skin:  No rash  Musculoskeletal:  normal strength  Neuro/Psych:  Alert, oriented to time, place and person       LABS:                        13.3   16.32 )-----------( 282      ( 05 Aug 2019 08:02 )             41.1     08-05    142  |  106  |  15  ----------------------------<  105<H>  3.9   |  29  |  0.91    Ca    9.0      05 Aug 2019 08:02  Mg     2.3     08-05    TPro  7.6  /  Alb  3.3  /  TBili  0.2  /  DBili  x   /  AST  10<L>  /  ALT  12  /  AlkPhos  102  08-05    PT/INR - ( 05 Aug 2019 10:56 )   PT: 11.1 sec;   INR: 0.99 ratio      PTT - ( 05 Aug 2019 10:56 )  PTT:35.9 sec    RADIOLOGY & ADDITIONAL STUDIES:  < from: US Abdomen Complete (08.05.19 @ 09:51) >  EXAM:  US ABDOMINAL COMPLETE                            PROCEDURE DATE:  08/05/2019          INTERPRETATION:  CLINICAL INFORMATION: Right upper quadrant abdominal pain    COMPARISON: 7/11/2019 and CT dated 6/19/2019    TECHNIQUE: Sonography of the abdomen.     FINDINGS:    Liver: Increased echogenicity.    Bile ducts: Borderline dilated. Common bile duct measures 8 mm.     Gallbladder: Cholelithiasis and sludge. Wall thickening. Positive   sonographic Gil's sign noted by technologist.    Pancreas: Obscured by bowel gas.    Spleen: 9.8 cm. Within normal limits.    Right kidney: 10.5 cm. No hydronephrosis.    Left kidney: 10.6 cm.  No hydronephrosis.    Ascites: None.    Aorta and IVC: Visualized portions are within normal limits.    Incidental 1.2 x 1.7 cm urinary bladder nodule.    IMPRESSION:     Fatty liver.  Cholelithiasis with sonographic evidence of acute cholecystitis.  Borderline biliary ductal dilatation.  1.7 cm urinary bladder nodule. Direct visualization with cystoscopy   recommended for further assessment.    < end of copied text >

## 2019-08-05 NOTE — ED PROVIDER NOTE - PHYSICAL EXAMINATION
Gen: Alert, NAD  Head: NC, AT   Eyes: PERRL, EOMI, normal lids/conjunctiva  ENT: normal hearing, patent oropharynx without erythema/exudate, uvula midline  Neck: supple, no tenderness, Trachea midline  Pulm: Bilateral BS, normal resp effort, no wheeze/stridor/retractions  CV: RRR, no M/R/G, 2+ radial and dp pulses bl, no edema  Abd: obese. soft, epigastric ttp.   Mskel: extremities x4 with normal ROM and no joint effusions. no ctl spine ttp.   Skin: no rash, no bruising   Neuro: AAOx3, no sensory/motor deficits, CN 2-12 intact

## 2019-08-05 NOTE — ED ADULT TRIAGE NOTE - CHIEF COMPLAINT QUOTE
Right mid abd pain radiating to back H/O Gall stones from prior visit, No Vomit or diarrhea, drinking from a thermos in triage

## 2019-08-05 NOTE — ED ADULT NURSE NOTE - CHPI ED NUR SYMPTOMS NEG
no nausea/no hematuria/no fever/no chills/no dysuria/no blood in stool/no burning urination/no vomiting

## 2019-08-05 NOTE — H&P ADULT - NSHPPHYSICALEXAM_GEN_ALL_CORE
ICU Vital Signs Last 24 Hrs  T(C): 37 (05 Aug 2019 11:20), Max: 37 (05 Aug 2019 07:11)  T(F): 98.6 (05 Aug 2019 11:20), Max: 98.6 (05 Aug 2019 07:11)  HR: 93 (05 Aug 2019 11:20) (93 - 98)  BP: 123/47 (05 Aug 2019 11:20) (123/47 - 151/56)  BP(mean): --  ABP: --  ABP(mean): --  RR: 18 (05 Aug 2019 11:20) (16 - 18)  SpO2: 92% (05 Aug 2019 11:20) (92% - 96%)  GENERAL: NAD well-developed  HEAD:  Atraumatic, Normocephalic  EYES: EOMI, PERRLA, conjunctiva and sclera clear  ENMT: No tonsillar erythema, exudates, or enlargement; Moist mucous membranes, Good dentition, No lesions  NECK: Supple, No JVD, Normal thyroid  NERVOUS SYSTEM:  Alert & Oriented X3, Good concentration; Motor Strength 5/5 B/L upper and lower extremities; DTRs 2+ intact and symmetric  CHEST/LUNG: Clear to percussion bilaterally; No rales, rhonchi, wheezing, or rubs  HEART: Regular rate and rhythm; No murmurs, rubs, or gallops  ABDOMEN: Soft, Nontender, Nondistended; Bowel sounds present  EXTREMITIES:  2+ Peripheral Pulses, No clubbing, cyanosis, or edema  LYMPH: No lymphadenopathy   SKIN: No rashes or lesions

## 2019-08-05 NOTE — CONSULT NOTE ADULT - ASSESSMENT
A/P: 58YO Female with PMH Anxiety, COPD, Active smoker, HTN, HLD, DM, cholelithiasis presents to the ER c/o Epigastric abdominal pain x5-6 days associated with nausea, Admitted with Cholelithiasis, acute cholecystitis.  LFTs normal, Leukocytosis (16)    -Admit to medicine   -Zosyn  -NPO for now/IV hydration  -Pain management  -GI consult  -OR planning this admission for Lap yuri once Medically optimized and pulm and cardio cleared for procedure. If patient acutely decompensates or clinically worsening, will need perc yuri  -F/u labs  -medical management and supportive care, DVT ppx  -Discussed with Dr. Rios as above

## 2019-08-05 NOTE — ED PROVIDER NOTE - OBJECTIVE STATEMENT
Pertinent PMH/PSH/FHx/SHx and Review of Systems contained within:  59F hx dm, copd on home o2, noncompliant, gallstones and gerd pw epigastric abd pain. pain since jul 23 when was dced from the hospital. she was admitted for r/o choledocholithiasis but unable to lay for mrcp. patient has some nausea without vomiting. no fever, chills, rash, bleeding, cp, sob, ha, vision loss, rhinorrhea, dysuria, numbness, rash, bleeding  Fh and Sh not otherwise contributory  ROS otherwise negative

## 2019-08-05 NOTE — CONSULT NOTE ADULT - SUBJECTIVE AND OBJECTIVE BOX
Patient is a 59y old  Female who presents with a chief complaint of abdominal pain (05 Aug 2019 12:14)    HPI:  60yo Female with  Anxiety, COPD has O2 at home, DM, HTN, Hypothyroidism, Active smoker since age 15, Cholelithiasis and no PSH presents to the ER c/o recurrent epigastric abdominal pain x5-6 days, started on wed or thurs per patient but she did not want to come to the ER over the weekend, states pain is worse than usual, described as "sharp" constant with radiation to the back, 10/10 in severity with no alleviating or known triggering factors. Associated with nausea, denies vomiting.  Patient was here last month for similar complaints, HIDA was negative and pain improved, was supposed to follow up with Surgeon in the office for elective cholecystectomy however she states that surgeon did not accept her insurance.   Admitted with sonographic evidence of Cholecystitis.    PAST MEDICAL & SURGICAL HISTORY:  DM (diabetes mellitus)  HTN (hypertension)  Smoker  Emphysema lung  Hypothyroid  Agoraphobia  COPD (chronic obstructive pulmonary disease)  Anxiety  No significant past surgical history    SOCIAL HISTORY: BMI (kg/m2): 49.1 (08-05 @ 07:11)    Allergies  No Known Allergies    MEDICATIONS  (STANDING):  atorvastatin 10 milliGRAM(s) Oral at bedtime  escitalopram 20 milliGRAM(s) Oral daily  heparin  Injectable 5000 Unit(s) SubCutaneous every 8 hours  lactated ringers. 1000 milliLiter(s) (125 mL/Hr) IV Continuous <Continuous>  levothyroxine 150 MICROGram(s) Oral daily  losartan 50 milliGRAM(s) Oral daily  montelukast 10 milliGRAM(s) Oral at bedtime  piperacillin/tazobactam IVPB.. 3.375 Gram(s) IV Intermittent every 8 hours    MEDICATIONS  (PRN):  ALBUTerol/ipratropium for Nebulization 3 milliLiter(s) Nebulizer every 6 hours PRN Shortness of Breath and/or Wheezing  clonazePAM  Tablet 1 milliGRAM(s) Oral three times a day PRN anxiety  morphine  - Injectable 4 milliGRAM(s) IV Push every 4 hours PRN Moderate Pain (4 - 6)    REVIEW OF SYSTEMS:    Constitutional:            No fever, weight loss or fatigue  HEENT:                         No difficulty hearing, tinnitus, vertigo; No sinus or throat pain  Respiratory:                   no sob.  Cardiovascular:           No chest pain, palpitations  Gastrointestinal:        abdominal and  epigastric pain.   Genitourinary:            No dysuria, frequency, hematuria or incontinence  SKIN:                          no rash  Musculoskeletal:        No joint pain or swelling  Extremities:                No swelling  Neurological:              No headaches  Psychiatric:                 No depression, anxiety    Vital Signs Last 24 Hrs  T(C): 36.7 (05 Aug 2019 17:07), Max: 37 (05 Aug 2019 07:11)  T(F): 98.1 (05 Aug 2019 17:07), Max: 98.6 (05 Aug 2019 07:11)  HR: 87 (05 Aug 2019 17:07) (87 - 98)  BP: 107/59 (05 Aug 2019 17:07) (107/59 - 155/84)  BP(mean): --  RR: 18 (05 Aug 2019 17:07) (16 - 18)  SpO2: 90% (05 Aug 2019 17:07) (90% - 97%)    PHYSICAL EXAM:  GEN:         Awake, responsive and comfortable.  HEENT:    Normal.    RESP:         no wheezing.  CVS:           Regular rate and rhythm.   ABD:         Soft, non-tender, non-distended;   :             No costovertebral angle tenderness  SKIN:           Warm and dry.  EXTR:            No clubbing, cyanosis or edema  CNS:              Intact sensory and motor function.  PSYCH:        cooperative, no anxiety or depression    LABS:                        13.3   16.32 )-----------( 282      ( 05 Aug 2019 08:02 )             41.1     08-05    142  |  106  |  15  ----------------------------<  105<H>  3.9   |  29  |  0.91    Ca    9.0      05 Aug 2019 08:02  Mg     2.3     08-05    TPro  7.6  /  Alb  3.3  /  TBili  0.2  /  DBili  x   /  AST  10<L>  /  ALT  12  /  AlkPhos  102  08-05    PT/INR - ( 05 Aug 2019 10:56 )   PT: 11.1 sec;   INR: 0.99 ratio      PTT - ( 05 Aug 2019 10:56 )  PTT:35.9 sec    RADIOLOGY & ADDITIONAL STUDIES:  < from: US Abdomen Complete (08.05.19 @ 09:51) >    EXAM:  US ABDOMINAL COMPLETE                          PROCEDURE DATE:  08/05/2019      INTERPRETATION:  CLINICAL INFORMATION: Right upper quadrant abdominal pain    COMPARISON: 7/11/2019 and CT dated 6/19/2019    TECHNIQUE: Sonography of the abdomen.     FINDINGS:    Liver: Increased echogenicity.    Bile ducts: Borderline dilated. Common bile duct measures 8 mm.     Gallbladder: Cholelithiasis and sludge. Wall thickening. Positive   sonographic Gil's sign noted by technologist.    Pancreas: Obscured by bowel gas.    Spleen: 9.8 cm. Within normal limits.    Right kidney: 10.5 cm. No hydronephrosis.    Left kidney: 10.6 cm.  No hydronephrosis.    Ascites: None.    Aorta and IVC: Visualized portions are within normal limits.    Incidental 1.2 x 1.7 cm urinary bladder nodule.    IMPRESSION:     Fatty liver.  Cholelithiasis with sonographic evidence of acute cholecystitis.  Borderline biliary ductal dilatation.  1.7 cm urinary bladder nodule. Direct visualization with cystoscopy   recommended for further assessment.    LALA HANKS M.D., ATTENDING RADIOLOGIST  This document has been electronically signed. Aug  5 2019  9:59AM      ASSESSMENT AND PLAN:  ·	Acute Cholecystitis.  ·	Abdominal pain.  ·	Leukocytosis.  ·	COPD.  ·	Tobacco abuse.  ·	Obesity.  ·	Suspect MEENU.  ·	HTN.  ·	DM.  ·	Hypothyroidism.  ·	Anxiety.    Supplemental O2, short course of steroids, nebulizer.  Continue antibiotics,  Acceptable risk for possible cholecystectomy.  ABG.

## 2019-08-05 NOTE — ED ADULT NURSE NOTE - OBJECTIVE STATEMENT
c/o Right lower abd pain radiating to back. 10/10 constant pain that started 2 weeks ago.  H/O Gall stones from prior visit, No Vomit or diarrhea.   Was here 2 weeks ago for gallstones and admitted. Patient received antibiotics and no surgical procedure.  Patient was discharged 1 week ago.  Denies dysuria/hematuria/N/V

## 2019-08-05 NOTE — H&P ADULT - ASSESSMENT
59f with history of  emphysema with acute yuri to be seen by pulmonary for clearance     IMPROVE VTE Individual Risk Assessment        RISK                                                          Points  [  ] Previous VTE                                                3  [  ] Thrombophilia                                             2  [  ] Lower limb paralysis                                   2        (unable to hold up >15 seconds)    [  ] Current Cancer                                            2         (within 6 months)  [  ] Immobilization > 24 hrs                              1  [  ] ICU/CCU stay > 24 hours                            1  [  ] Age > 60                                                    1  IMPROVE VTE Score ___0______

## 2019-08-06 LAB
ALBUMIN SERPL ELPH-MCNC: 3.2 G/DL — LOW (ref 3.3–5)
ALP SERPL-CCNC: 109 U/L — SIGNIFICANT CHANGE UP (ref 40–120)
ALT FLD-CCNC: 20 U/L — SIGNIFICANT CHANGE UP (ref 12–78)
ANION GAP SERPL CALC-SCNC: 7 MMOL/L — SIGNIFICANT CHANGE UP (ref 5–17)
AST SERPL-CCNC: 18 U/L — SIGNIFICANT CHANGE UP (ref 15–37)
BASE EXCESS BLDA CALC-SCNC: 2.9 MMOL/L — HIGH (ref -2–2)
BILIRUB SERPL-MCNC: 0.4 MG/DL — SIGNIFICANT CHANGE UP (ref 0.2–1.2)
BLOOD GAS COMMENTS: SIGNIFICANT CHANGE UP
BLOOD GAS COMMENTS: SIGNIFICANT CHANGE UP
BLOOD GAS SOURCE: SIGNIFICANT CHANGE UP
BUN SERPL-MCNC: 11 MG/DL — SIGNIFICANT CHANGE UP (ref 7–23)
CALCIUM SERPL-MCNC: 8.9 MG/DL — SIGNIFICANT CHANGE UP (ref 8.5–10.1)
CHLORIDE SERPL-SCNC: 103 MMOL/L — SIGNIFICANT CHANGE UP (ref 96–108)
CO2 SERPL-SCNC: 28 MMOL/L — SIGNIFICANT CHANGE UP (ref 22–31)
CREAT SERPL-MCNC: 0.82 MG/DL — SIGNIFICANT CHANGE UP (ref 0.5–1.3)
GLUCOSE BLDC GLUCOMTR-MCNC: 192 MG/DL — HIGH (ref 70–99)
GLUCOSE BLDC GLUCOMTR-MCNC: 195 MG/DL — HIGH (ref 70–99)
GLUCOSE BLDC GLUCOMTR-MCNC: 208 MG/DL — HIGH (ref 70–99)
GLUCOSE SERPL-MCNC: 198 MG/DL — HIGH (ref 70–99)
HCO3 BLDA-SCNC: 28 MMOL/L — SIGNIFICANT CHANGE UP (ref 21–29)
HCT VFR BLD CALC: 40.4 % — SIGNIFICANT CHANGE UP (ref 34.5–45)
HGB BLD-MCNC: 13.2 G/DL — SIGNIFICANT CHANGE UP (ref 11.5–15.5)
HOROWITZ INDEX BLDA+IHG-RTO: 21 — SIGNIFICANT CHANGE UP
LIDOCAIN IGE QN: 77 U/L — SIGNIFICANT CHANGE UP (ref 73–393)
MCHC RBC-ENTMCNC: 29.1 PG — SIGNIFICANT CHANGE UP (ref 27–34)
MCHC RBC-ENTMCNC: 32.7 GM/DL — SIGNIFICANT CHANGE UP (ref 32–36)
MCV RBC AUTO: 89 FL — SIGNIFICANT CHANGE UP (ref 80–100)
NRBC # BLD: 0 /100 WBCS — SIGNIFICANT CHANGE UP (ref 0–0)
PCO2 BLDA: 50 MMHG — HIGH (ref 32–46)
PH BLD: 7.38 — SIGNIFICANT CHANGE UP (ref 7.35–7.45)
PLATELET # BLD AUTO: 262 K/UL — SIGNIFICANT CHANGE UP (ref 150–400)
PO2 BLDA: 52 MMHG — LOW (ref 74–108)
POTASSIUM SERPL-MCNC: 4.6 MMOL/L — SIGNIFICANT CHANGE UP (ref 3.5–5.3)
POTASSIUM SERPL-SCNC: 4.6 MMOL/L — SIGNIFICANT CHANGE UP (ref 3.5–5.3)
PROT SERPL-MCNC: 7.5 GM/DL — SIGNIFICANT CHANGE UP (ref 6–8.3)
RBC # BLD: 4.54 M/UL — SIGNIFICANT CHANGE UP (ref 3.8–5.2)
RBC # FLD: 12.9 % — SIGNIFICANT CHANGE UP (ref 10.3–14.5)
SAO2 % BLDA: 85 % — LOW (ref 92–96)
SODIUM SERPL-SCNC: 138 MMOL/L — SIGNIFICANT CHANGE UP (ref 135–145)
WBC # BLD: 13.38 K/UL — HIGH (ref 3.8–10.5)
WBC # FLD AUTO: 13.38 K/UL — HIGH (ref 3.8–10.5)

## 2019-08-06 PROCEDURE — 93306 TTE W/DOPPLER COMPLETE: CPT | Mod: 26

## 2019-08-06 RX ORDER — INSULIN LISPRO 100/ML
VIAL (ML) SUBCUTANEOUS AT BEDTIME
Refills: 0 | Status: DISCONTINUED | OUTPATIENT
Start: 2019-08-06 | End: 2019-08-08

## 2019-08-06 RX ORDER — DEXTROSE 50 % IN WATER 50 %
15 SYRINGE (ML) INTRAVENOUS ONCE
Refills: 0 | Status: DISCONTINUED | OUTPATIENT
Start: 2019-08-06 | End: 2019-08-08

## 2019-08-06 RX ORDER — GLUCAGON INJECTION, SOLUTION 0.5 MG/.1ML
1 INJECTION, SOLUTION SUBCUTANEOUS ONCE
Refills: 0 | Status: DISCONTINUED | OUTPATIENT
Start: 2019-08-06 | End: 2019-08-08

## 2019-08-06 RX ORDER — DEXTROSE 50 % IN WATER 50 %
25 SYRINGE (ML) INTRAVENOUS ONCE
Refills: 0 | Status: DISCONTINUED | OUTPATIENT
Start: 2019-08-06 | End: 2019-08-08

## 2019-08-06 RX ORDER — SODIUM CHLORIDE 9 MG/ML
1000 INJECTION, SOLUTION INTRAVENOUS
Refills: 0 | Status: DISCONTINUED | OUTPATIENT
Start: 2019-08-06 | End: 2019-08-08

## 2019-08-06 RX ORDER — HYDROMORPHONE HYDROCHLORIDE 2 MG/ML
1 INJECTION INTRAMUSCULAR; INTRAVENOUS; SUBCUTANEOUS EVERY 6 HOURS
Refills: 0 | Status: DISCONTINUED | OUTPATIENT
Start: 2019-08-06 | End: 2019-08-13

## 2019-08-06 RX ORDER — NYSTATIN CREAM 100000 [USP'U]/G
1 CREAM TOPICAL
Refills: 0 | Status: DISCONTINUED | OUTPATIENT
Start: 2019-08-06 | End: 2019-08-13

## 2019-08-06 RX ORDER — INSULIN LISPRO 100/ML
VIAL (ML) SUBCUTANEOUS
Refills: 0 | Status: DISCONTINUED | OUTPATIENT
Start: 2019-08-06 | End: 2019-08-08

## 2019-08-06 RX ORDER — DEXTROSE 50 % IN WATER 50 %
12.5 SYRINGE (ML) INTRAVENOUS ONCE
Refills: 0 | Status: DISCONTINUED | OUTPATIENT
Start: 2019-08-06 | End: 2019-08-08

## 2019-08-06 RX ADMIN — Medication 20 MILLIGRAM(S): at 21:26

## 2019-08-06 RX ADMIN — Medication 1 MILLIGRAM(S): at 21:40

## 2019-08-06 RX ADMIN — SODIUM CHLORIDE 125 MILLILITER(S): 9 INJECTION, SOLUTION INTRAVENOUS at 21:41

## 2019-08-06 RX ADMIN — PIPERACILLIN AND TAZOBACTAM 25 GRAM(S): 4; .5 INJECTION, POWDER, LYOPHILIZED, FOR SOLUTION INTRAVENOUS at 05:53

## 2019-08-06 RX ADMIN — MORPHINE SULFATE 4 MILLIGRAM(S): 50 CAPSULE, EXTENDED RELEASE ORAL at 08:56

## 2019-08-06 RX ADMIN — HEPARIN SODIUM 5000 UNIT(S): 5000 INJECTION INTRAVENOUS; SUBCUTANEOUS at 21:27

## 2019-08-06 RX ADMIN — PIPERACILLIN AND TAZOBACTAM 25 GRAM(S): 4; .5 INJECTION, POWDER, LYOPHILIZED, FOR SOLUTION INTRAVENOUS at 21:27

## 2019-08-06 RX ADMIN — HYDROMORPHONE HYDROCHLORIDE 1 MILLIGRAM(S): 2 INJECTION INTRAMUSCULAR; INTRAVENOUS; SUBCUTANEOUS at 18:19

## 2019-08-06 RX ADMIN — PIPERACILLIN AND TAZOBACTAM 25 GRAM(S): 4; .5 INJECTION, POWDER, LYOPHILIZED, FOR SOLUTION INTRAVENOUS at 14:30

## 2019-08-06 RX ADMIN — Medication 2: at 12:18

## 2019-08-06 RX ADMIN — Medication 20 MILLIGRAM(S): at 14:27

## 2019-08-06 RX ADMIN — MONTELUKAST 10 MILLIGRAM(S): 4 TABLET, CHEWABLE ORAL at 21:40

## 2019-08-06 RX ADMIN — HEPARIN SODIUM 5000 UNIT(S): 5000 INJECTION INTRAVENOUS; SUBCUTANEOUS at 14:28

## 2019-08-06 RX ADMIN — Medication 20 MILLIGRAM(S): at 05:54

## 2019-08-06 RX ADMIN — Medication 1: at 16:47

## 2019-08-06 RX ADMIN — LOSARTAN POTASSIUM 50 MILLIGRAM(S): 100 TABLET, FILM COATED ORAL at 05:53

## 2019-08-06 RX ADMIN — ESCITALOPRAM OXALATE 20 MILLIGRAM(S): 10 TABLET, FILM COATED ORAL at 11:48

## 2019-08-06 RX ADMIN — NYSTATIN CREAM 1 APPLICATION(S): 100000 CREAM TOPICAL at 18:19

## 2019-08-06 RX ADMIN — HYDROMORPHONE HYDROCHLORIDE 1 MILLIGRAM(S): 2 INJECTION INTRAMUSCULAR; INTRAVENOUS; SUBCUTANEOUS at 18:33

## 2019-08-06 RX ADMIN — MORPHINE SULFATE 4 MILLIGRAM(S): 50 CAPSULE, EXTENDED RELEASE ORAL at 09:11

## 2019-08-06 RX ADMIN — Medication 150 MICROGRAM(S): at 05:54

## 2019-08-06 RX ADMIN — SODIUM CHLORIDE 125 MILLILITER(S): 9 INJECTION, SOLUTION INTRAVENOUS at 14:31

## 2019-08-06 RX ADMIN — ATORVASTATIN CALCIUM 10 MILLIGRAM(S): 80 TABLET, FILM COATED ORAL at 21:26

## 2019-08-06 RX ADMIN — HEPARIN SODIUM 5000 UNIT(S): 5000 INJECTION INTRAVENOUS; SUBCUTANEOUS at 05:53

## 2019-08-06 NOTE — PROGRESS NOTE ADULT - SUBJECTIVE AND OBJECTIVE BOX
INTERVAL HPI:  58yo Female with  Anxiety, COPD has O2 at home, DM, HTN, Hypothyroidism, Active smoker since age 15, Cholelithiasis and no PSH presents to the ER c/o recurrent epigastric abdominal pain x5-6 days, started on wed or thurs per patient but she did not want to come to the ER over the weekend, states pain is worse than usual, described as "sharp" constant with radiation to the back, 10/10 in severity with no alleviating or known triggering factors. Associated with nausea, denies vomiting.  Patient was here last month for similar complaints, HIDA was negative and pain improved, was supposed to follow up with Surgeon in the office for elective cholecystectomy however she states that surgeon did not accept her insurance.   Admitted with sonographic evidence of Cholecystitis.    OVERNIGHT EVENTS:  Awake and Comfortable.    Vital Signs Last 24 Hrs  T(C): 37 (06 Aug 2019 11:32), Max: 37.3 (06 Aug 2019 05:17)  T(F): 98.6 (06 Aug 2019 11:32), Max: 99.1 (06 Aug 2019 05:17)  HR: 89 (06 Aug 2019 11:32) (84 - 94)  BP: 130/58 (06 Aug 2019 11:32) (107/59 - 140/72)  BP(mean): --  RR: 18 (06 Aug 2019 11:32) (18 - 18)  SpO2: 99% (06 Aug 2019 11:32) (90% - 99%)    PHYSICAL EXAM:  GEN:         Awake, responsive and comfortable.  HEENT:    Normal.    RESP:       no wheezing.  CVS:          Regular rate and rhythm.   ABD:         Soft, non-tender, non-distended;     MEDICATIONS  (STANDING):  atorvastatin 10 milliGRAM(s) Oral at bedtime  dextrose 5%. 1000 milliLiter(s) (50 mL/Hr) IV Continuous <Continuous>  dextrose 50% Injectable 12.5 Gram(s) IV Push once  dextrose 50% Injectable 25 Gram(s) IV Push once  dextrose 50% Injectable 25 Gram(s) IV Push once  escitalopram 20 milliGRAM(s) Oral daily  heparin  Injectable 5000 Unit(s) SubCutaneous every 8 hours  insulin lispro (HumaLOG) corrective regimen sliding scale   SubCutaneous three times a day before meals  insulin lispro (HumaLOG) corrective regimen sliding scale   SubCutaneous at bedtime  lactated ringers. 1000 milliLiter(s) (125 mL/Hr) IV Continuous <Continuous>  levothyroxine 150 MICROGram(s) Oral daily  losartan 50 milliGRAM(s) Oral daily  methylPREDNISolone sodium succinate Injectable 20 milliGRAM(s) IV Push every 8 hours  montelukast 10 milliGRAM(s) Oral at bedtime  nystatin Cream 1 Application(s) Topical two times a day  piperacillin/tazobactam IVPB.. 3.375 Gram(s) IV Intermittent every 8 hours    MEDICATIONS  (PRN):  ALBUTerol/ipratropium for Nebulization 3 milliLiter(s) Nebulizer every 6 hours PRN Shortness of Breath and/or Wheezing  clonazePAM  Tablet 1 milliGRAM(s) Oral three times a day PRN anxiety  dextrose 40% Gel 15 Gram(s) Oral once PRN Blood Glucose LESS THAN 70 milliGRAM(s)/deciliter  glucagon  Injectable 1 milliGRAM(s) IntraMuscular once PRN Glucose LESS THAN 70 milligrams/deciliter  HYDROmorphone  Injectable 1 milliGRAM(s) IV Push every 6 hours PRN Severe Pain (7 - 10)  morphine  - Injectable 4 milliGRAM(s) IV Push every 4 hours PRN Moderate Pain (4 - 6)    LABS:                        13.2   13.38 )-----------( 262      ( 06 Aug 2019 06:31 )             40.4     08-06    138  |  103  |  11  ----------------------------<  198<H>  4.6   |  28  |  0.82    Ca    8.9      06 Aug 2019 06:31  Mg     2.3     08-05    TPro  7.5  /  Alb  3.2<L>  /  TBili  0.4  /  DBili  x   /  AST  18  /  ALT  20  /  AlkPhos  109  08-06    PT/INR - ( 05 Aug 2019 10:56 )   PT: 11.1 sec;   INR: 0.99 ratio      PTT - ( 05 Aug 2019 10:56 )  PTT:35.9 sec  08-06 @ 07:49  pH: 7.38  pCO2: 50  pO2: 52  SaO2: 85    ASSESSMENT AND PLAN:  ·	Acute Cholecystitis.  ·	Abdominal pain.  ·	Leukocytosis.  ·	COPD.  ·	Chronic hypoxic hypercarbic Respiratory failure.  ·	Tobacco abuse.  ·	Obesity.  ·	Suspect MEENU.  ·	HTN.  ·	DM.  ·	Hypothyroidism.  ·	Anxiety.    ABG show chronic hypoxic hypercarbic Respiratory failure, is on home O2.  Continue O2, steroids and antibiotics.  Smoking cessation and weight reduction certainly will help.  PFT, sleep study out pt.  Acceptable risk for planned cholecystectomy.

## 2019-08-06 NOTE — CONSULT NOTE ADULT - SUBJECTIVE AND OBJECTIVE BOX
HPI:  HPI:  60yo Female with PMH Anxiety, COPD, DM, HTN, Hypothyroidism, active smoker, cholelithiasis and no PSH presents to the ER c/o recurrent epigastric abdominal pain x5-6 days, started on wed or thurs per patient but she did not want to come to the ER over the weekend, states pain is worse than usual, described as "sharp" constant with radiation to the back, 10/10 in severity with no alleviating or known triggering factors. Associated with nausea, denies vomiting.    Patient was here last month for similar complaints, HIDA was negative and pain improved, was supposed to follow up with Surgeon in the office for elective cholecystectomy however she states that surgeon did not accept her insurance. (05 Aug 2019 12:14)      Chief Complaint:  Patient is a 59y old  Female who presents with a chief complaint of abdominal pain (06 Aug 2019 13:33)      Review of Systems:    General:  No wt loss, fevers, chills, night sweats  Eyes:  Good vision, no reported pain  ENT:  No sore throat, pain, runny nose, dysphagia  CV:  No pain, palpitations, hypo/hypertension  Resp:  No dyspnea, cough, tachypnea, wheezing  GI: nausea, vomiting, diarrhea,         Social History/Family History  SOCHX:   tobacco,  -  alcohol    FMHX: FA/MO  - contributory       Discussed with:  PMD, Family    Physical Exam:    Vital Signs:  Vital Signs Last 24 Hrs  T(C): 36.6 (06 Aug 2019 17:48), Max: 37.3 (06 Aug 2019 05:17)  T(F): 97.9 (06 Aug 2019 17:48), Max: 99.1 (06 Aug 2019 05:17)  HR: 91 (06 Aug 2019 17:48) (84 - 94)  BP: 154/83 (06 Aug 2019 17:48) (130/58 - 154/83)  BP(mean): --  RR: 18 (06 Aug 2019 17:48) (18 - 18)  SpO2: 90% (06 Aug 2019 17:48) (90% - 99%)  Daily     Daily Weight in k.3 (06 Aug 2019 05:17)  I&O's Summary    05 Aug 2019 07:01  -  06 Aug 2019 07:00  --------------------------------------------------------  IN: 200 mL / OUT: 0 mL / NET: 200 mL          Chest:  Full & symmetric excursion, no increased effort, breath sounds clear  Cardiovascular:  Regular rhythm, S1, S2, no murmur/rub/S3/S4, no carotid/femoral/abdominal bruit, radial/pedal pulses 2+, no edema  Abdomen:  Soft, non-tender, non-distended, normoactive bowel sounds, no HSM      Laboratory:                          13.2   13.38 )-----------( 262      ( 06 Aug 2019 06:31 )             40.4     08-    138  |  103  |  11  ----------------------------<  198<H>  4.6   |  28  |  0.82    Ca    8.9      06 Aug 2019 06:31  Mg     2.3     -    TPro  7.5  /  Alb  3.2<L>  /  TBili  0.4  /  DBili  x   /  AST  18  /  ALT  20  /  AlkPhos  109  08-    ABG - ( 06 Aug 2019 07:49 )  pH, Arterial: x     pH, Blood: 7.38  /  pCO2: 50    /  pO2: 52    / HCO3: 28    / Base Excess: 2.9   /  SaO2: 85            CAPILLARY BLOOD GLUCOSE      POCT Blood Glucose.: 192 mg/dL (06 Aug 2019 16:45)  POCT Blood Glucose.: 208 mg/dL (06 Aug 2019 11:47)  POCT Blood Glucose.: 92 mg/dL (05 Aug 2019 23:06)    LIVER FUNCTIONS - ( 06 Aug 2019 06:31 )  Alb: 3.2 g/dL / Pro: 7.5 gm/dL / ALK PHOS: 109 U/L / ALT: 20 U/L / AST: 18 U/L / GGT: x           PT/INR - ( 05 Aug 2019 10:56 )   PT: 11.1 sec;   INR: 0.99 ratio         PTT - ( 05 Aug 2019 10:56 )  PTT:35.9 sec        Assessment:  I was asked to evaluate this patient for possible presurgical evaluation for cholecystectomy.	  the chart and labs and vital signs are all reviewed.	  the patient's past medical history as noted.  the patient has no significant risk factors for coronary artery disease.  review of the echocardiogram reveals a preliminary assessment of a normal ejection fraction	  the patient is considered acceptable risk for this type of surgical procedure

## 2019-08-06 NOTE — PROGRESS NOTE ADULT - SUBJECTIVE AND OBJECTIVE BOX
INTERVAL HPI/OVERNIGHT EVENTS: patient  admitted  to  my  service  by  hospitalist  as per  [facility]/VS protocol    58yo Female with PMH Anxiety, COPD, DM, HTN, Hypothyroidism, active smoker, cholelithiasis and no PSH presents to the ER c/o recurrent epigastric abdominal pain x5-6 days, started on wed or thurs per patient but she did not want to come to the ER over the weekend, states pain is worse than usual, described as "sharp" constant with radiation to the back, 10/10 in severity with no alleviating or known triggering factors. Associated with nausea, denies vomiting.  evaluated  in  ER  found  to  have  cholecystitis  had  surgical  evlaution admitted  for  further w/u  and  treatment    REVIEW OF SYSTEMS:  CONSTITUTIONAL:  abdominal  pain    NECK: No pain or stiffnes  RESPIRATORY: No SOB   CARDIOVASCULAR: No chest pain, palpitations, dizziness,   GASTROINTESTINAL:  abdominal pain. No, vomiting,  +  nausea  NEUROLOGICAL: No headaches, no  blurry  vision no  dizziness  SKIN: No itching,   MUSCULOSKELETAL: No pain    MEDICATION:  ALBUTerol/ipratropium for Nebulization 3 milliLiter(s) Nebulizer every 6 hours PRN  atorvastatin 10 milliGRAM(s) Oral at bedtime  clonazePAM  Tablet 1 milliGRAM(s) Oral three times a day PRN  escitalopram 20 milliGRAM(s) Oral daily  heparin  Injectable 5000 Unit(s) SubCutaneous every 8 hours  lactated ringers. 1000 milliLiter(s) IV Continuous <Continuous>  levothyroxine 150 MICROGram(s) Oral daily  losartan 50 milliGRAM(s) Oral daily  methylPREDNISolone sodium succinate Injectable 20 milliGRAM(s) IV Push every 8 hours  montelukast 10 milliGRAM(s) Oral at bedtime  morphine  - Injectable 4 milliGRAM(s) IV Push every 4 hours PRN  piperacillin/tazobactam IVPB.. 3.375 Gram(s) IV Intermittent every 8 hours    Vital Signs Last 24 Hrs  T(C): 37.3 (06 Aug 2019 05:17), Max: 37.3 (06 Aug 2019 05:17)  T(F): 99.1 (06 Aug 2019 05:17), Max: 99.1 (06 Aug 2019 05:17)  HR: 94 (06 Aug 2019 05:17) (84 - 94)  BP: 140/72 (06 Aug 2019 05:17) (107/59 - 155/84)  BP(mean): --  RR: 18 (06 Aug 2019 05:17) (16 - 18)  SpO2: 93% (06 Aug 2019 05:17) (90% - 97%)    PHYSICAL EXAM:  GENERAL: NAD, well-groomed, well-developed  EYES:  conjunctiva and sclera clear  ENMT:  Moist mucous membranes,   NECK: Supple, No JVD, Normal thyroid  NERVOUS SYSTEM:  Alert oriented   no  focal  deficits;   CHEST/LUNG: Clear    HEART: Regular rate and rhythm; No murmurs, rubs, or gallops  ABDOMEN: Soft, mild  tenderness epigastric RUQ   areas, Nondistended; Bowel sounds present  EXTREMITIES:  no  edema no  tenderness  SKIN: No rashes   LABS:                        13.2   13.38 )-----------( 262      ( 06 Aug 2019 06:31 )             40.4     08-06    138  |  103  |  11  ----------------------------<  198<H>  4.6   |  28  |  0.82    Ca    8.9      06 Aug 2019 06:31  Mg     2.3     08-05    TPro  7.5  /  Alb  3.2<L>  /  TBili  0.4  /  DBili  x   /  AST  18  /  ALT  20  /  AlkPhos  109  08-06    PT/INR - ( 05 Aug 2019 10:56 )   PT: 11.1 sec;   INR: 0.99 ratio         PTT - ( 05 Aug 2019 10:56 )  PTT:35.9 sec    CAPILLARY BLOOD GLUCOSE      POCT Blood Glucose.: 92 mg/dL (05 Aug 2019 23:06)  POCT Blood Glucose.: 79 mg/dL (05 Aug 2019 18:33)      RADIOLOGY & ADDITIONAL TESTS:    Imaging reports  Personally Reviewed:  [x ] YES  [ ] NO    Consultant(s) Notes Reviewed:  [ x] YES  [ ] NO    Care Discussed with Consultants/Other Providers [x ] YES  [ ] NO  oblem/Plan - 1:  ·  Problem: Acute cholecystitis.  Plan: antibiotics   surgery Follow up.      Problem/Plan - 2:  ·  Problem: Type 2 diabetes mellitus without complication, without long-term current use of insulin.  Plan: riss.      Problem/Plan - 3:  ·  Problem: Essential hypertension.  Plan: continue losartan     Problem/Plan - 4:  ·  Problem: Acquired hypothyroidism.  Plan: continue synthroid  COPD  duoneb O2 pulmonarty  eval     Problem/Plan - 5:  ·  Problem: Anxiety.  Plan: continue clonazepam   check  TTE  cardilology  evlauation for  optimiziation for  cholecystctomy

## 2019-08-06 NOTE — PROGRESS NOTE ADULT - SUBJECTIVE AND OBJECTIVE BOX
INTERVAL HPI/OVERNIGHT EVENTS:  Pt. seen and examined at bedside in no acute distress. Abdominal pain well controlled. Denies N/V.   Denies fever/chills, chest pain, dyspnea, cough, dizziness.     Vital Signs Last 24 Hrs  T(C): 37.3 (06 Aug 2019 05:17), Max: 37.3 (06 Aug 2019 05:17)  T(F): 99.1 (06 Aug 2019 05:17), Max: 99.1 (06 Aug 2019 05:17)  HR: 94 (06 Aug 2019 05:17) (84 - 94)  BP: 140/72 (06 Aug 2019 05:17) (107/59 - 155/84)  RR: 18 (06 Aug 2019 05:17) (16 - 18)  SpO2: 93% (06 Aug 2019 05:17) (90% - 97%)    PHYSICAL EXAM:    GENERAL: NAD  CHEST/LUNG: Clear to ausculation, bilaterally   HEART: S1S2  ABDOMEN: Morbidly obese, non distended, +BS, soft, nontender, no guarding  EXTREMITIES:  calf soft, non tender b/l. 2+ distal pulses b/l.     I&O's Detail    05 Aug 2019 07:01  -  06 Aug 2019 07:00  --------------------------------------------------------  IN:    IV PiggyBack: 200 mL  Total IN: 200 mL    OUT:  Total OUT: 0 mL    Total NET: 200 mL      LABS:                        13.2   13.38 )-----------( 262      ( 06 Aug 2019 06:31 )             40.4     08-06    138  |  103  |  11  ----------------------------<  198<H>  4.6   |  28  |  0.82    Ca    8.9      06 Aug 2019 06:31  Mg     2.3     08-05    TPro  7.5  /  Alb  3.2<L>  /  TBili  0.4  /  DBili  x   /  AST  18  /  ALT  20  /  AlkPhos  109  08-06    PT/INR - ( 05 Aug 2019 10:56 )   PT: 11.1 sec;   INR: 0.99 ratio         PTT - ( 05 Aug 2019 10:56 )  PTT:35.9 sec    A/P: 60YO Female with PMH Anxiety, COPD, Active smoker, HTN, HLD, DM, cholelithiasis presents to the ER c/o Epigastric abdominal pain x5-6 days associated with nausea, Admitted with Cholelithiasis, acute cholecystitis.  LFTs normal, Leukocytosis improving (13)    -Zosyn  -IV hydration  -Pain management  -Appreciate pulm consult: acceptable risk for cholecystectomy   -OR planning this admission for Lap yuri once Medically optimized and pulm and Cardio cleared for procedure. If patient acutely decompensates or clinically worsening, will need perc yuri  -F/u labs  -medical management and supportive care, DVT ppx  -Discussed with Dr. Rios

## 2019-08-07 ENCOUNTER — TRANSCRIPTION ENCOUNTER (OUTPATIENT)
Age: 59
End: 2019-08-07

## 2019-08-07 LAB
ALBUMIN SERPL ELPH-MCNC: 3.5 G/DL — SIGNIFICANT CHANGE UP (ref 3.3–5)
ALP SERPL-CCNC: 100 U/L — SIGNIFICANT CHANGE UP (ref 40–120)
ALT FLD-CCNC: 18 U/L — SIGNIFICANT CHANGE UP (ref 12–78)
ANION GAP SERPL CALC-SCNC: 8 MMOL/L — SIGNIFICANT CHANGE UP (ref 5–17)
AST SERPL-CCNC: 10 U/L — LOW (ref 15–37)
BILIRUB DIRECT SERPL-MCNC: 0.14 MG/DL — SIGNIFICANT CHANGE UP (ref 0.05–0.2)
BILIRUB INDIRECT FLD-MCNC: 0.3 MG/DL — SIGNIFICANT CHANGE UP (ref 0.2–1)
BILIRUB SERPL-MCNC: 0.4 MG/DL — SIGNIFICANT CHANGE UP (ref 0.2–1.2)
BUN SERPL-MCNC: 16 MG/DL — SIGNIFICANT CHANGE UP (ref 7–23)
CALCIUM SERPL-MCNC: 9.2 MG/DL — SIGNIFICANT CHANGE UP (ref 8.5–10.1)
CHLORIDE SERPL-SCNC: 101 MMOL/L — SIGNIFICANT CHANGE UP (ref 96–108)
CO2 SERPL-SCNC: 28 MMOL/L — SIGNIFICANT CHANGE UP (ref 22–31)
CREAT SERPL-MCNC: 0.81 MG/DL — SIGNIFICANT CHANGE UP (ref 0.5–1.3)
GLUCOSE BLDC GLUCOMTR-MCNC: 219 MG/DL — HIGH (ref 70–99)
GLUCOSE BLDC GLUCOMTR-MCNC: 225 MG/DL — HIGH (ref 70–99)
GLUCOSE BLDC GLUCOMTR-MCNC: 233 MG/DL — HIGH (ref 70–99)
GLUCOSE BLDC GLUCOMTR-MCNC: 333 MG/DL — HIGH (ref 70–99)
GLUCOSE SERPL-MCNC: 212 MG/DL — HIGH (ref 70–99)
HCT VFR BLD CALC: 42.9 % — SIGNIFICANT CHANGE UP (ref 34.5–45)
HGB BLD-MCNC: 14.1 G/DL — SIGNIFICANT CHANGE UP (ref 11.5–15.5)
MAGNESIUM SERPL-MCNC: 2.8 MG/DL — HIGH (ref 1.6–2.6)
MCHC RBC-ENTMCNC: 28.8 PG — SIGNIFICANT CHANGE UP (ref 27–34)
MCHC RBC-ENTMCNC: 32.9 GM/DL — SIGNIFICANT CHANGE UP (ref 32–36)
MCV RBC AUTO: 87.6 FL — SIGNIFICANT CHANGE UP (ref 80–100)
NRBC # BLD: 0 /100 WBCS — SIGNIFICANT CHANGE UP (ref 0–0)
PHOSPHATE SERPL-MCNC: 3 MG/DL — SIGNIFICANT CHANGE UP (ref 2.5–4.5)
PLATELET # BLD AUTO: 293 K/UL — SIGNIFICANT CHANGE UP (ref 150–400)
POTASSIUM SERPL-MCNC: 4.2 MMOL/L — SIGNIFICANT CHANGE UP (ref 3.5–5.3)
POTASSIUM SERPL-SCNC: 4.2 MMOL/L — SIGNIFICANT CHANGE UP (ref 3.5–5.3)
PROT SERPL-MCNC: 8.1 GM/DL — SIGNIFICANT CHANGE UP (ref 6–8.3)
RBC # BLD: 4.9 M/UL — SIGNIFICANT CHANGE UP (ref 3.8–5.2)
RBC # FLD: 12.5 % — SIGNIFICANT CHANGE UP (ref 10.3–14.5)
SODIUM SERPL-SCNC: 137 MMOL/L — SIGNIFICANT CHANGE UP (ref 135–145)
WBC # BLD: 17.81 K/UL — HIGH (ref 3.8–10.5)
WBC # FLD AUTO: 17.81 K/UL — HIGH (ref 3.8–10.5)

## 2019-08-07 RX ORDER — HYDROXYZINE HCL 10 MG
50 TABLET ORAL THREE TIMES A DAY
Refills: 0 | Status: DISCONTINUED | OUTPATIENT
Start: 2019-08-07 | End: 2019-08-07

## 2019-08-07 RX ORDER — HYDROXYZINE HCL 10 MG
50 TABLET ORAL THREE TIMES A DAY
Refills: 0 | Status: DISCONTINUED | OUTPATIENT
Start: 2019-08-07 | End: 2019-08-08

## 2019-08-07 RX ADMIN — SODIUM CHLORIDE 125 MILLILITER(S): 9 INJECTION, SOLUTION INTRAVENOUS at 22:04

## 2019-08-07 RX ADMIN — Medication 50 MILLIGRAM(S): at 17:57

## 2019-08-07 RX ADMIN — Medication 2: at 08:10

## 2019-08-07 RX ADMIN — MORPHINE SULFATE 4 MILLIGRAM(S): 50 CAPSULE, EXTENDED RELEASE ORAL at 03:11

## 2019-08-07 RX ADMIN — HEPARIN SODIUM 5000 UNIT(S): 5000 INJECTION INTRAVENOUS; SUBCUTANEOUS at 05:25

## 2019-08-07 RX ADMIN — MORPHINE SULFATE 4 MILLIGRAM(S): 50 CAPSULE, EXTENDED RELEASE ORAL at 21:58

## 2019-08-07 RX ADMIN — HEPARIN SODIUM 5000 UNIT(S): 5000 INJECTION INTRAVENOUS; SUBCUTANEOUS at 14:09

## 2019-08-07 RX ADMIN — MORPHINE SULFATE 4 MILLIGRAM(S): 50 CAPSULE, EXTENDED RELEASE ORAL at 22:10

## 2019-08-07 RX ADMIN — Medication 2: at 11:52

## 2019-08-07 RX ADMIN — ATORVASTATIN CALCIUM 10 MILLIGRAM(S): 80 TABLET, FILM COATED ORAL at 21:50

## 2019-08-07 RX ADMIN — Medication 150 MICROGRAM(S): at 05:25

## 2019-08-07 RX ADMIN — NYSTATIN CREAM 1 APPLICATION(S): 100000 CREAM TOPICAL at 05:25

## 2019-08-07 RX ADMIN — MONTELUKAST 10 MILLIGRAM(S): 4 TABLET, CHEWABLE ORAL at 21:50

## 2019-08-07 RX ADMIN — Medication 20 MILLIGRAM(S): at 14:08

## 2019-08-07 RX ADMIN — MORPHINE SULFATE 4 MILLIGRAM(S): 50 CAPSULE, EXTENDED RELEASE ORAL at 16:49

## 2019-08-07 RX ADMIN — NYSTATIN CREAM 1 APPLICATION(S): 100000 CREAM TOPICAL at 17:47

## 2019-08-07 RX ADMIN — Medication 1 MILLIGRAM(S): at 20:45

## 2019-08-07 RX ADMIN — Medication 20 MILLIGRAM(S): at 05:24

## 2019-08-07 RX ADMIN — ESCITALOPRAM OXALATE 20 MILLIGRAM(S): 10 TABLET, FILM COATED ORAL at 11:52

## 2019-08-07 RX ADMIN — PIPERACILLIN AND TAZOBACTAM 25 GRAM(S): 4; .5 INJECTION, POWDER, LYOPHILIZED, FOR SOLUTION INTRAVENOUS at 21:58

## 2019-08-07 RX ADMIN — Medication 1 MILLIGRAM(S): at 14:09

## 2019-08-07 RX ADMIN — PIPERACILLIN AND TAZOBACTAM 25 GRAM(S): 4; .5 INJECTION, POWDER, LYOPHILIZED, FOR SOLUTION INTRAVENOUS at 05:23

## 2019-08-07 RX ADMIN — Medication 2: at 16:36

## 2019-08-07 RX ADMIN — Medication 20 MILLIGRAM(S): at 21:51

## 2019-08-07 RX ADMIN — LOSARTAN POTASSIUM 50 MILLIGRAM(S): 100 TABLET, FILM COATED ORAL at 05:25

## 2019-08-07 RX ADMIN — MORPHINE SULFATE 4 MILLIGRAM(S): 50 CAPSULE, EXTENDED RELEASE ORAL at 03:31

## 2019-08-07 RX ADMIN — SODIUM CHLORIDE 125 MILLILITER(S): 9 INJECTION, SOLUTION INTRAVENOUS at 05:23

## 2019-08-07 RX ADMIN — MORPHINE SULFATE 4 MILLIGRAM(S): 50 CAPSULE, EXTENDED RELEASE ORAL at 16:34

## 2019-08-07 RX ADMIN — HEPARIN SODIUM 5000 UNIT(S): 5000 INJECTION INTRAVENOUS; SUBCUTANEOUS at 21:50

## 2019-08-07 RX ADMIN — PIPERACILLIN AND TAZOBACTAM 25 GRAM(S): 4; .5 INJECTION, POWDER, LYOPHILIZED, FOR SOLUTION INTRAVENOUS at 14:09

## 2019-08-07 NOTE — CHART NOTE - NSCHARTNOTEFT_GEN_A_CORE
Pt wants "something more than liquids".  Contacted Dr. Rios. He said ok to start a low fat diet.  He said he needs to arrange surgery with Dr. Michaud. She is tentatively scheduled for Friday.

## 2019-08-07 NOTE — PROGRESS NOTE ADULT - SUBJECTIVE AND OBJECTIVE BOX
58yo Female with PMH Anxiety, COPD, DM, HTN, Hypothyroidism, active smoker, cholelithiasis and no PSH presents to the ER c/o recurrent epigastric abdominal pain x5-6 days, started on wed or thurs per patient but she did not want to come to the ER over the weekend, states pain is worse than usual, described as "sharp" constant with radiation to the back, 10/10 in severity with no alleviating or known triggering factors. Associated with nausea, denies vomiting.    Patient was here last month for similar complaints, HIDA was negative and pain improved, was supposed to follow up with Surgeon in the office for elective cholecystectomy however she states that surgeon did not accept her insurance. (05 Aug 2019 12:14)      Chief Complaint:  Patient is a 59y old  Female who presents with a chief complaint of abdominal pain (06 Aug 2019 13:33)      Review of Systems:    General:  No wt loss, fevers, chills, night sweats  Eyes:  Good vision, no reported pain  ENT:  No sore throat, pain, runny nose, dysphagia  CV:  No pain, palpitations, hypo/hypertension  Resp:  No dyspnea, cough, tachypnea, wheezing  GI: nausea, vomiting, diarrhea,         Social History/Family History  SOCHX:   tobacco,  -  alcohol    FMHX: FA/MO  - contributory       Discussed with:  PMD, Family    Physical Exam:    Vital Signs:  Vital Signs Last 24 Hrs  T(C): 36.6 (06 Aug 2019 17:48), Max: 37.3 (06 Aug 2019 05:17)  T(F): 97.9 (06 Aug 2019 17:48), Max: 99.1 (06 Aug 2019 05:17)  HR: 91 (06 Aug 2019 17:48) (84 - 94)  BP: 154/83 (06 Aug 2019 17:48) (130/58 - 154/83)  BP(mean): --  RR: 18 (06 Aug 2019 17:48) (18 - 18)  SpO2: 90% (06 Aug 2019 17:48) (90% - 99%)  Daily     Daily Weight in k.3 (06 Aug 2019 05:17)  I&O's Summary    05 Aug 2019 07:01  -  06 Aug 2019 07:00  --------------------------------------------------------  IN: 200 mL / OUT: 0 mL / NET: 200 mL          Chest:  Full & symmetric excursion, no increased effort, breath sounds clear  Cardiovascular:  Regular rhythm, S1, S2, no murmur/rub/S3/S4, no carotid/femoral/abdominal bruit, radial/pedal pulses 2+, no edema  Abdomen:  Soft, non-tender, non-distended, normoactive bowel sounds, no HSM      Laboratory:                          13.2   13.38 )-----------( 262      ( 06 Aug 2019 06:31 )             40.4     08-    138  |  103  |  11  ----------------------------<  198<H>  4.6   |  28  |  0.82    Ca    8.9      06 Aug 2019 06:31  Mg     2.3         TPro  7.5  /  Alb  3.2<L>  /  TBili  0.4  /  DBili  x   /  AST  18  /  ALT  20  /  AlkPhos  109  -    ABG - ( 06 Aug 2019 07:49 )  pH, Arterial: x     pH, Blood: 7.38  /  pCO2: 50    /  pO2: 52    / HCO3: 28    / Base Excess: 2.9   /  SaO2: 85            CAPILLARY BLOOD GLUCOSE      POCT Blood Glucose.: 192 mg/dL (06 Aug 2019 16:45)  POCT Blood Glucose.: 208 mg/dL (06 Aug 2019 11:47)  POCT Blood Glucose.: 92 mg/dL (05 Aug 2019 23:06)    LIVER FUNCTIONS - ( 06 Aug 2019 06:31 )  Alb: 3.2 g/dL / Pro: 7.5 gm/dL / ALK PHOS: 109 U/L / ALT: 20 U/L / AST: 18 U/L / GGT: x           PT/INR - ( 05 Aug 2019 10:56 )   PT: 11.1 sec;   INR: 0.99 ratio         PTT - ( 05 Aug 2019 10:56 )  PTT:35.9 sec        Assessment:  I was asked to evaluate this patient for possible presurgical evaluation for cholecystectomy.	  the chart and labs and vital signs are all reviewed.	  the patient's past medical history as noted.  the patient has no significant risk factors for coronary artery disease.  review of the echocardiogram reveals a normal ejection fraction	  the cardiovascular risk assessment is considered acceptable risk for this type of surgical procedure, surgery may proceed.

## 2019-08-07 NOTE — PROGRESS NOTE ADULT - SUBJECTIVE AND OBJECTIVE BOX
INTERVAL HPI:   58yo Female with  Anxiety, COPD has O2 at home, DM, HTN, Hypothyroidism, Active smoker since age 15, Cholelithiasis and no PSH presents to the ER c/o recurrent epigastric abdominal pain x5-6 days, started on wed or thurs per patient but she did not want to come to the ER over the weekend, states pain is worse than usual, described as "sharp" constant with radiation to the back, 10/10 in severity with no alleviating or known triggering factors. Associated with nausea, denies vomiting.  Patient was here last month for similar complaints, HIDA was negative and pain improved, was supposed to follow up with Surgeon in the office for elective cholecystectomy however she states that surgeon did not accept her insurance.   Admitted with sonographic evidence of Cholecystitis.    OVERNIGHT EVENTS:  Awake and comfortable.    Vital Signs Last 24 Hrs  T(C): 36.6 (07 Aug 2019 11:29), Max: 36.8 (07 Aug 2019 05:41)  T(F): 97.8 (07 Aug 2019 11:29), Max: 98.2 (07 Aug 2019 05:41)  HR: 98 (07 Aug 2019 11:29) (91 - 98)  BP: 130/78 (07 Aug 2019 11:29) (130/78 - 157/70)  BP(mean): --  RR: 17 (07 Aug 2019 11:29) (17 - 18)  SpO2: 96% (07 Aug 2019 11:29) (90% - 96%)    PHYSICAL EXAM:  GEN:         Awake, responsive and comfortable.  HEENT:      Normal.    RESP:         no wheezing.  CVS:           Regular rate and rhythm.   ABD:         Soft, non-tender, non-distended;     MEDICATIONS  (STANDING):  atorvastatin 10 milliGRAM(s) Oral at bedtime  dextrose 5%. 1000 milliLiter(s) (50 mL/Hr) IV Continuous <Continuous>  dextrose 50% Injectable 12.5 Gram(s) IV Push once  dextrose 50% Injectable 25 Gram(s) IV Push once  dextrose 50% Injectable 25 Gram(s) IV Push once  escitalopram 20 milliGRAM(s) Oral daily  heparin  Injectable 5000 Unit(s) SubCutaneous every 8 hours  insulin lispro (HumaLOG) corrective regimen sliding scale   SubCutaneous three times a day before meals  insulin lispro (HumaLOG) corrective regimen sliding scale   SubCutaneous at bedtime  lactated ringers. 1000 milliLiter(s) (125 mL/Hr) IV Continuous <Continuous>  levothyroxine 150 MICROGram(s) Oral daily  losartan 50 milliGRAM(s) Oral daily  methylPREDNISolone sodium succinate Injectable 20 milliGRAM(s) IV Push every 8 hours  montelukast 10 milliGRAM(s) Oral at bedtime  nystatin Cream 1 Application(s) Topical two times a day  piperacillin/tazobactam IVPB.. 3.375 Gram(s) IV Intermittent every 8 hours    MEDICATIONS  (PRN):  ALBUTerol/ipratropium for Nebulization 3 milliLiter(s) Nebulizer every 6 hours PRN Shortness of Breath and/or Wheezing  clonazePAM  Tablet 1 milliGRAM(s) Oral three times a day PRN anxiety  dextrose 40% Gel 15 Gram(s) Oral once PRN Blood Glucose LESS THAN 70 milliGRAM(s)/deciliter  glucagon  Injectable 1 milliGRAM(s) IntraMuscular once PRN Glucose LESS THAN 70 milligrams/deciliter  HYDROmorphone  Injectable 1 milliGRAM(s) IV Push every 6 hours PRN Severe Pain (7 - 10)  morphine  - Injectable 4 milliGRAM(s) IV Push every 4 hours PRN Moderate Pain (4 - 6)    LABS:                        14.1   17.81 )-----------( 293      ( 07 Aug 2019 08:07 )             42.9     08-07    137  |  101  |  16  ----------------------------<  212<H>  4.2   |  28  |  0.81    Ca    9.2      07 Aug 2019 08:07  Phos  3.0     08-07  Mg     2.8     08-07    TPro  8.1  /  Alb  3.5  /  TBili  0.4  /  DBili  .14  /  AST  10<L>  /  ALT  18  /  AlkPhos  100  08-07    08-06 @ 07:49  pH: 7.38  pCO2: 50  pO2: 52  SaO2: 85    ASSESSMENT AND PLAN:  ·	Acute Cholecystitis.  ·	Abdominal pain.  ·	Leukocytosis.  ·	COPD.  ·	Chronic hypoxic hypercarbic Respiratory failure.  ·	Tobacco abuse.  ·	Obesity.  ·	Suspect MEENU.  ·	HTN.  ·	DM.  ·	Hypothyroidism.  ·	Anxiety.    Continue O2, steroids and antibiotics.  Smoking cessation and weight reduction certainly will help.  PFT, sleep study out pt.  Acceptable risk for planned cholecystectomy.

## 2019-08-07 NOTE — PROGRESS NOTE ADULT - SUBJECTIVE AND OBJECTIVE BOX
INTERVAL HPI/OVERNIGHT EVENTS:        REVIEW OF SYSTEMS:  CONSTITUTIONAL:   episodic  abd  pains  now also rt  lower  abdomen  NECK: No pain or stiffnes  RESPIRATORY: No SOB   CARDIOVASCULAR: No chest pain, palpitations, dizziness,   GASTROINTESTINAL: abdominal pain. No nausea, vomiting,   NEUROLOGICAL: No headaches, no  blurry  vision no  dizziness  SKIN: No itching,   MUSCULOSKELETAL: No pain    MEDICATION:  ALBUTerol/ipratropium for Nebulization 3 milliLiter(s) Nebulizer every 6 hours PRN  atorvastatin 10 milliGRAM(s) Oral at bedtime  clonazePAM  Tablet 1 milliGRAM(s) Oral three times a day PRN  dextrose 40% Gel 15 Gram(s) Oral once PRN  dextrose 5%. 1000 milliLiter(s) IV Continuous <Continuous>  dextrose 50% Injectable 12.5 Gram(s) IV Push once  dextrose 50% Injectable 25 Gram(s) IV Push once  dextrose 50% Injectable 25 Gram(s) IV Push once  escitalopram 20 milliGRAM(s) Oral daily  glucagon  Injectable 1 milliGRAM(s) IntraMuscular once PRN  heparin  Injectable 5000 Unit(s) SubCutaneous every 8 hours  HYDROmorphone  Injectable 1 milliGRAM(s) IV Push every 6 hours PRN  insulin lispro (HumaLOG) corrective regimen sliding scale   SubCutaneous three times a day before meals  insulin lispro (HumaLOG) corrective regimen sliding scale   SubCutaneous at bedtime  lactated ringers. 1000 milliLiter(s) IV Continuous <Continuous>  levothyroxine 150 MICROGram(s) Oral daily  losartan 50 milliGRAM(s) Oral daily  methylPREDNISolone sodium succinate Injectable 20 milliGRAM(s) IV Push every 8 hours  montelukast 10 milliGRAM(s) Oral at bedtime  morphine  - Injectable 4 milliGRAM(s) IV Push every 4 hours PRN  nystatin Cream 1 Application(s) Topical two times a day  piperacillin/tazobactam IVPB.. 3.375 Gram(s) IV Intermittent every 8 hours    Vital Signs Last 24 Hrs  T(C): 36.8 (07 Aug 2019 05:41), Max: 37 (06 Aug 2019 11:32)  T(F): 98.2 (07 Aug 2019 05:41), Max: 98.6 (06 Aug 2019 11:32)  HR: 94 (07 Aug 2019 05:41) (89 - 97)  BP: 156/93 (07 Aug 2019 05:41) (130/58 - 157/70)  BP(mean): --  RR: 18 (07 Aug 2019 05:41) (18 - 18)  SpO2: 90% (07 Aug 2019 05:41) (90% - 99%)    PHYSICAL EXAM:  GENERAL: NAD, well-groomed, well-developed  EYES:  conjunctiva and sclera clear  ENMT:  Moist mucous membranes,   NECK: Supple, No JVD, Normal thyroid  NERVOUS SYSTEM:  Alert oriented   no  focal  deficits;   CHEST/LUNG: Clear    HEART: Regular rate and rhythm; No murmurs, rubs, or gallops  ABDOMEN: Soft, tenderness  RUQ  RLQ  , Nondistended; Bowel sounds present  EXTREMITIES:  no  edema no  tenderness  SKIN: No rashes   LABS:                        14.1   17.81 )-----------( 293      ( 07 Aug 2019 08:07 )             42.9     08-07    137  |  101  |  16  ----------------------------<  212<H>  4.2   |  28  |  0.81    Ca    9.2      07 Aug 2019 08:07  Phos  3.0     08-07  Mg     2.8     08-07    TPro  8.1  /  Alb  3.5  /  TBili  0.4  /  DBili  .14  /  AST  10<L>  /  ALT  18  /  AlkPhos  100  08-07    PT/INR - ( 05 Aug 2019 10:56 )   PT: 11.1 sec;   INR: 0.99 ratio         PTT - ( 05 Aug 2019 10:56 )  PTT:35.9 sec    CAPILLARY BLOOD GLUCOSE      POCT Blood Glucose.: 233 mg/dL (07 Aug 2019 08:05)  POCT Blood Glucose.: 195 mg/dL (06 Aug 2019 21:36)  POCT Blood Glucose.: 192 mg/dL (06 Aug 2019 16:45)  POCT Blood Glucose.: 208 mg/dL (06 Aug 2019 11:47)      RADIOLOGY & ADDITIONAL TESTS:    Imaging reports  Personally Reviewed:  [x] YES  [ ] NO    Consultant(s) Notes Reviewed:  [x ] YES  [ ] NO    Care Discussed with Consultants/Other Providers [x ] YES  [ ] NO  oblem/Plan - 1:  ·  Problem: Acute cholecystitis.  Plan: antibiotics   surgery Follow up.      Problem/Plan - 2:  ·  Problem: Type 2 diabetes mellitus without complication, without long-term current use of insulin.  Plan: riss.      Problem/Plan - 3:  ·  Problem: Essential hypertension.  Plan: continue losartan     Problem/Plan - 4:  ·  Problem: Acquired hypothyroidism.  Plan: continue synthroid  COPD  duoneb O2 pulmonarty  eval     Problem/Plan - 5:  ·  Problem: Anxiety.  Plan: continue clonazepam   check  TTE  cardilology  evlauation for  optimiziation for  cholecystctomy  PULMONARY  AND  CARDIOLOGY  EVALAUTIONS  NOTED    NO  MEDICAL  COUNTERINDICATIONS  FOR  CHOLECYSTECTOMY

## 2019-08-07 NOTE — PROGRESS NOTE ADULT - SUBJECTIVE AND OBJECTIVE BOX
INTERVAL HPI/OVERNIGHT EVENTS:  Pt. seen and examined at bedside resting comfortably. Complains of persistent epigastric and RUQ abdominal pain, controlled with medications. Denies n/v.   Tolerating diet, patient states she is "starving" and wants to eat.   Denies fever/chills, chest pain, dyspnea, cough, dizziness.     Vital Signs Last 24 Hrs  T(C): 36.6 (07 Aug 2019 11:29), Max: 36.8 (07 Aug 2019 05:41)  T(F): 97.8 (07 Aug 2019 11:29), Max: 98.2 (07 Aug 2019 05:41)  HR: 98 (07 Aug 2019 11:29) (91 - 98)  BP: 130/78 (07 Aug 2019 11:29) (130/78 - 157/70)  RR: 17 (07 Aug 2019 11:29) (17 - 18)  SpO2: 96% (07 Aug 2019 11:29) (90% - 96%)    PHYSICAL EXAM:    GENERAL: NAD  CHEST/LUNG: Clear to ausculation, bilaterally   HEART: S1S2  ABDOMEN: non distended, +BS, soft, +mild RUQ and epigastric tenderness, no guarding  EXTREMITIES:  calf soft, non tender b/l. 2+ distal pulses b/l.     I&O's Detail    06 Aug 2019 07:01  -  07 Aug 2019 07:00  --------------------------------------------------------  IN:    lactated ringers.: 1500 mL    Solution: 200 mL  Total IN: 1700 mL    OUT:    Stool: 1 mL  Total OUT: 1 mL    Total NET: 1699 mL      LABS:                        14.1   17.81 )-----------( 293      ( 07 Aug 2019 08:07 )             42.9     08-07    137  |  101  |  16  ----------------------------<  212<H>  4.2   |  28  |  0.81    Ca    9.2      07 Aug 2019 08:07  Phos  3.0     08-07  Mg     2.8     08-07    TPro  8.1  /  Alb  3.5  /  TBili  0.4  /  DBili  .14  /  AST  10<L>  /  ALT  18  /  AlkPhos  100  08-07      A/P: 60YO Female with PMH Anxiety, COPD, Active smoker, HTN, HLD, DM, cholelithiasis presents to the ER c/o Epigastric abdominal pain x5-6 days associated with nausea, Admitted with Cholelithiasis, acute cholecystitis.  LFTs normal, Leukocytosis worsening     -Zosyn  -IV hydration, NPO pMN  -Lap yuri planning by Dr. Michaud Thursday 4:30 pm pending cardio clearance   -Pain management  -Appreciate pulm consult: acceptable risk for cholecystectomy   -medical clearance in chart  -preop labs  -medical management and supportive care, DVT ppx  -Discussed with Dr. Rios and Dr Michaud

## 2019-08-08 ENCOUNTER — RESULT REVIEW (OUTPATIENT)
Age: 59
End: 2019-08-08

## 2019-08-08 LAB
ALBUMIN SERPL ELPH-MCNC: 3.6 G/DL — SIGNIFICANT CHANGE UP (ref 3.3–5)
ALP SERPL-CCNC: 88 U/L — SIGNIFICANT CHANGE UP (ref 40–120)
ALT FLD-CCNC: 28 U/L — SIGNIFICANT CHANGE UP (ref 12–78)
ANION GAP SERPL CALC-SCNC: 7 MMOL/L — SIGNIFICANT CHANGE UP (ref 5–17)
ANION GAP SERPL CALC-SCNC: 8 MMOL/L — SIGNIFICANT CHANGE UP (ref 5–17)
APTT BLD: 29.5 SEC — SIGNIFICANT CHANGE UP (ref 28.5–37)
AST SERPL-CCNC: 28 U/L — SIGNIFICANT CHANGE UP (ref 15–37)
BASE EXCESS BLDA CALC-SCNC: 2 MMOL/L — SIGNIFICANT CHANGE UP (ref -2–2)
BASOPHILS # BLD AUTO: 0.07 K/UL — SIGNIFICANT CHANGE UP (ref 0–0.2)
BASOPHILS NFR BLD AUTO: 0.3 % — SIGNIFICANT CHANGE UP (ref 0–2)
BILIRUB SERPL-MCNC: 0.6 MG/DL — SIGNIFICANT CHANGE UP (ref 0.2–1.2)
BLD GP AB SCN SERPL QL: SIGNIFICANT CHANGE UP
BLOOD GAS COMMENTS: SIGNIFICANT CHANGE UP
BLOOD GAS COMMENTS: SIGNIFICANT CHANGE UP
BLOOD GAS SOURCE: SIGNIFICANT CHANGE UP
BUN SERPL-MCNC: 17 MG/DL — SIGNIFICANT CHANGE UP (ref 7–23)
BUN SERPL-MCNC: 21 MG/DL — SIGNIFICANT CHANGE UP (ref 7–23)
CALCIUM SERPL-MCNC: 8.9 MG/DL — SIGNIFICANT CHANGE UP (ref 8.5–10.1)
CALCIUM SERPL-MCNC: 8.9 MG/DL — SIGNIFICANT CHANGE UP (ref 8.5–10.1)
CHLORIDE SERPL-SCNC: 100 MMOL/L — SIGNIFICANT CHANGE UP (ref 96–108)
CHLORIDE SERPL-SCNC: 103 MMOL/L — SIGNIFICANT CHANGE UP (ref 96–108)
CO2 SERPL-SCNC: 27 MMOL/L — SIGNIFICANT CHANGE UP (ref 22–31)
CO2 SERPL-SCNC: 28 MMOL/L — SIGNIFICANT CHANGE UP (ref 22–31)
CREAT SERPL-MCNC: 0.78 MG/DL — SIGNIFICANT CHANGE UP (ref 0.5–1.3)
CREAT SERPL-MCNC: 0.91 MG/DL — SIGNIFICANT CHANGE UP (ref 0.5–1.3)
EOSINOPHIL # BLD AUTO: 0 K/UL — SIGNIFICANT CHANGE UP (ref 0–0.5)
EOSINOPHIL NFR BLD AUTO: 0 % — SIGNIFICANT CHANGE UP (ref 0–6)
GLUCOSE BLDC GLUCOMTR-MCNC: 190 MG/DL — HIGH (ref 70–99)
GLUCOSE BLDC GLUCOMTR-MCNC: 190 MG/DL — HIGH (ref 70–99)
GLUCOSE BLDC GLUCOMTR-MCNC: 227 MG/DL — HIGH (ref 70–99)
GLUCOSE BLDC GLUCOMTR-MCNC: 251 MG/DL — HIGH (ref 70–99)
GLUCOSE BLDC GLUCOMTR-MCNC: 259 MG/DL — HIGH (ref 70–99)
GLUCOSE SERPL-MCNC: 183 MG/DL — HIGH (ref 70–99)
GLUCOSE SERPL-MCNC: 272 MG/DL — HIGH (ref 70–99)
HCO3 BLDA-SCNC: 28 MMOL/L — SIGNIFICANT CHANGE UP (ref 21–29)
HCT VFR BLD CALC: 40.4 % — SIGNIFICANT CHANGE UP (ref 34.5–45)
HCT VFR BLD CALC: 41.6 % — SIGNIFICANT CHANGE UP (ref 34.5–45)
HGB BLD-MCNC: 13.3 G/DL — SIGNIFICANT CHANGE UP (ref 11.5–15.5)
HGB BLD-MCNC: 13.8 G/DL — SIGNIFICANT CHANGE UP (ref 11.5–15.5)
HOROWITZ INDEX BLDA+IHG-RTO: 60 — SIGNIFICANT CHANGE UP
IMM GRANULOCYTES NFR BLD AUTO: 1.9 % — HIGH (ref 0–1.5)
INR BLD: 1.07 RATIO — SIGNIFICANT CHANGE UP (ref 0.88–1.16)
LYMPHOCYTES # BLD AUTO: 1.36 K/UL — SIGNIFICANT CHANGE UP (ref 1–3.3)
LYMPHOCYTES # BLD AUTO: 6.1 % — LOW (ref 13–44)
MAGNESIUM SERPL-MCNC: 2.3 MG/DL — SIGNIFICANT CHANGE UP (ref 1.6–2.6)
MAGNESIUM SERPL-MCNC: 2.4 MG/DL — SIGNIFICANT CHANGE UP (ref 1.6–2.6)
MCHC RBC-ENTMCNC: 28.8 PG — SIGNIFICANT CHANGE UP (ref 27–34)
MCHC RBC-ENTMCNC: 29.3 PG — SIGNIFICANT CHANGE UP (ref 27–34)
MCHC RBC-ENTMCNC: 32.9 GM/DL — SIGNIFICANT CHANGE UP (ref 32–36)
MCHC RBC-ENTMCNC: 33.2 GM/DL — SIGNIFICANT CHANGE UP (ref 32–36)
MCV RBC AUTO: 87.4 FL — SIGNIFICANT CHANGE UP (ref 80–100)
MCV RBC AUTO: 88.3 FL — SIGNIFICANT CHANGE UP (ref 80–100)
MONOCYTES # BLD AUTO: 0.6 K/UL — SIGNIFICANT CHANGE UP (ref 0–0.9)
MONOCYTES NFR BLD AUTO: 2.7 % — SIGNIFICANT CHANGE UP (ref 2–14)
NEUTROPHILS # BLD AUTO: 19.76 K/UL — HIGH (ref 1.8–7.4)
NEUTROPHILS NFR BLD AUTO: 89 % — HIGH (ref 43–77)
NRBC # BLD: 0 /100 WBCS — SIGNIFICANT CHANGE UP (ref 0–0)
NRBC # BLD: 0 /100 WBCS — SIGNIFICANT CHANGE UP (ref 0–0)
PCO2 BLDA: 52 MMHG — HIGH (ref 32–46)
PH BLD: 7.35 — SIGNIFICANT CHANGE UP (ref 7.35–7.45)
PHOSPHATE SERPL-MCNC: 3.2 MG/DL — SIGNIFICANT CHANGE UP (ref 2.5–4.5)
PHOSPHATE SERPL-MCNC: 4.1 MG/DL — SIGNIFICANT CHANGE UP (ref 2.5–4.5)
PLATELET # BLD AUTO: 288 K/UL — SIGNIFICANT CHANGE UP (ref 150–400)
PLATELET # BLD AUTO: 291 K/UL — SIGNIFICANT CHANGE UP (ref 150–400)
PO2 BLDA: 79 MMHG — SIGNIFICANT CHANGE UP (ref 74–108)
POTASSIUM SERPL-MCNC: 3.6 MMOL/L — SIGNIFICANT CHANGE UP (ref 3.5–5.3)
POTASSIUM SERPL-MCNC: 4 MMOL/L — SIGNIFICANT CHANGE UP (ref 3.5–5.3)
POTASSIUM SERPL-SCNC: 3.6 MMOL/L — SIGNIFICANT CHANGE UP (ref 3.5–5.3)
POTASSIUM SERPL-SCNC: 4 MMOL/L — SIGNIFICANT CHANGE UP (ref 3.5–5.3)
PROT SERPL-MCNC: 7.7 GM/DL — SIGNIFICANT CHANGE UP (ref 6–8.3)
PROTHROM AB SERPL-ACNC: 12 SEC — SIGNIFICANT CHANGE UP (ref 10–12.9)
RBC # BLD: 4.62 M/UL — SIGNIFICANT CHANGE UP (ref 3.8–5.2)
RBC # BLD: 4.71 M/UL — SIGNIFICANT CHANGE UP (ref 3.8–5.2)
RBC # FLD: 12.5 % — SIGNIFICANT CHANGE UP (ref 10.3–14.5)
RBC # FLD: 12.6 % — SIGNIFICANT CHANGE UP (ref 10.3–14.5)
SAO2 % BLDA: 94 % — SIGNIFICANT CHANGE UP (ref 92–96)
SODIUM SERPL-SCNC: 135 MMOL/L — SIGNIFICANT CHANGE UP (ref 135–145)
SODIUM SERPL-SCNC: 138 MMOL/L — SIGNIFICANT CHANGE UP (ref 135–145)
WBC # BLD: 17.73 K/UL — HIGH (ref 3.8–10.5)
WBC # BLD: 22.22 K/UL — HIGH (ref 3.8–10.5)
WBC # FLD AUTO: 17.73 K/UL — HIGH (ref 3.8–10.5)
WBC # FLD AUTO: 22.22 K/UL — HIGH (ref 3.8–10.5)

## 2019-08-08 PROCEDURE — 88304 TISSUE EXAM BY PATHOLOGIST: CPT | Mod: 26

## 2019-08-08 PROCEDURE — 47562 LAPAROSCOPIC CHOLECYSTECTOMY: CPT

## 2019-08-08 PROCEDURE — 99222 1ST HOSP IP/OBS MODERATE 55: CPT | Mod: 57

## 2019-08-08 PROCEDURE — 47562 LAPAROSCOPIC CHOLECYSTECTOMY: CPT | Mod: AS

## 2019-08-08 RX ORDER — SODIUM CHLORIDE 9 MG/ML
1000 INJECTION, SOLUTION INTRAVENOUS
Refills: 0 | Status: DISCONTINUED | OUTPATIENT
Start: 2019-08-08 | End: 2019-08-09

## 2019-08-08 RX ORDER — INSULIN LISPRO 100/ML
VIAL (ML) SUBCUTANEOUS EVERY 6 HOURS
Refills: 0 | Status: DISCONTINUED | OUTPATIENT
Start: 2019-08-08 | End: 2019-08-08

## 2019-08-08 RX ORDER — INSULIN LISPRO 100/ML
VIAL (ML) SUBCUTANEOUS
Refills: 0 | Status: DISCONTINUED | OUTPATIENT
Start: 2019-08-08 | End: 2019-08-13

## 2019-08-08 RX ORDER — DEXTROSE 50 % IN WATER 50 %
12.5 SYRINGE (ML) INTRAVENOUS ONCE
Refills: 0 | Status: DISCONTINUED | OUTPATIENT
Start: 2019-08-08 | End: 2019-08-13

## 2019-08-08 RX ORDER — MORPHINE SULFATE 50 MG/1
2 CAPSULE, EXTENDED RELEASE ORAL EVERY 4 HOURS
Refills: 0 | Status: DISCONTINUED | OUTPATIENT
Start: 2019-08-08 | End: 2019-08-08

## 2019-08-08 RX ORDER — SODIUM CHLORIDE 9 MG/ML
1000 INJECTION, SOLUTION INTRAVENOUS
Refills: 0 | Status: DISCONTINUED | OUTPATIENT
Start: 2019-08-08 | End: 2019-08-13

## 2019-08-08 RX ORDER — FENTANYL CITRATE 50 UG/ML
0.5 INJECTION INTRAVENOUS
Qty: 2500 | Refills: 0 | Status: DISCONTINUED | OUTPATIENT
Start: 2019-08-08 | End: 2019-08-08

## 2019-08-08 RX ORDER — HYDROXYZINE HCL 10 MG
50 TABLET ORAL
Refills: 0 | Status: DISCONTINUED | OUTPATIENT
Start: 2019-08-08 | End: 2019-08-13

## 2019-08-08 RX ORDER — DEXTROSE 50 % IN WATER 50 %
15 SYRINGE (ML) INTRAVENOUS ONCE
Refills: 0 | Status: DISCONTINUED | OUTPATIENT
Start: 2019-08-08 | End: 2019-08-13

## 2019-08-08 RX ORDER — PIPERACILLIN AND TAZOBACTAM 4; .5 G/20ML; G/20ML
3.38 INJECTION, POWDER, LYOPHILIZED, FOR SOLUTION INTRAVENOUS EVERY 8 HOURS
Refills: 0 | Status: COMPLETED | OUTPATIENT
Start: 2019-08-08 | End: 2019-08-09

## 2019-08-08 RX ORDER — INSULIN LISPRO 100/ML
VIAL (ML) SUBCUTANEOUS
Refills: 0 | Status: DISCONTINUED | OUTPATIENT
Start: 2019-08-08 | End: 2019-08-08

## 2019-08-08 RX ORDER — SODIUM CHLORIDE 9 MG/ML
1000 INJECTION, SOLUTION INTRAVENOUS
Refills: 0 | Status: DISCONTINUED | OUTPATIENT
Start: 2019-08-08 | End: 2019-08-08

## 2019-08-08 RX ORDER — CHLORHEXIDINE GLUCONATE 213 G/1000ML
15 SOLUTION TOPICAL EVERY 12 HOURS
Refills: 0 | Status: DISCONTINUED | OUTPATIENT
Start: 2019-08-08 | End: 2019-08-08

## 2019-08-08 RX ORDER — FENTANYL CITRATE 50 UG/ML
100 INJECTION INTRAVENOUS EVERY 6 HOURS
Refills: 0 | Status: DISCONTINUED | OUTPATIENT
Start: 2019-08-08 | End: 2019-08-08

## 2019-08-08 RX ORDER — CHLORHEXIDINE GLUCONATE 213 G/1000ML
1 SOLUTION TOPICAL
Refills: 0 | Status: DISCONTINUED | OUTPATIENT
Start: 2019-08-08 | End: 2019-08-13

## 2019-08-08 RX ORDER — ONDANSETRON 8 MG/1
4 TABLET, FILM COATED ORAL EVERY 6 HOURS
Refills: 0 | Status: DISCONTINUED | OUTPATIENT
Start: 2019-08-08 | End: 2019-08-13

## 2019-08-08 RX ORDER — HYDROXYZINE HCL 10 MG
25 TABLET ORAL
Refills: 0 | Status: DISCONTINUED | OUTPATIENT
Start: 2019-08-08 | End: 2019-08-08

## 2019-08-08 RX ORDER — GLUCAGON INJECTION, SOLUTION 0.5 MG/.1ML
1 INJECTION, SOLUTION SUBCUTANEOUS ONCE
Refills: 0 | Status: DISCONTINUED | OUTPATIENT
Start: 2019-08-08 | End: 2019-08-13

## 2019-08-08 RX ORDER — DEXTROSE 50 % IN WATER 50 %
25 SYRINGE (ML) INTRAVENOUS ONCE
Refills: 0 | Status: DISCONTINUED | OUTPATIENT
Start: 2019-08-08 | End: 2019-08-13

## 2019-08-08 RX ORDER — INSULIN LISPRO 100/ML
VIAL (ML) SUBCUTANEOUS AT BEDTIME
Refills: 0 | Status: DISCONTINUED | OUTPATIENT
Start: 2019-08-08 | End: 2019-08-13

## 2019-08-08 RX ORDER — DIPHENHYDRAMINE HCL 50 MG
50 CAPSULE ORAL ONCE
Refills: 0 | Status: DISCONTINUED | OUTPATIENT
Start: 2019-08-08 | End: 2019-08-08

## 2019-08-08 RX ADMIN — MORPHINE SULFATE 4 MILLIGRAM(S): 50 CAPSULE, EXTENDED RELEASE ORAL at 02:28

## 2019-08-08 RX ADMIN — Medication 2: at 13:14

## 2019-08-08 RX ADMIN — Medication 150 MICROGRAM(S): at 06:13

## 2019-08-08 RX ADMIN — Medication 50 MILLIGRAM(S): at 04:23

## 2019-08-08 RX ADMIN — HYDROMORPHONE HYDROCHLORIDE 1 MILLIGRAM(S): 2 INJECTION INTRAMUSCULAR; INTRAVENOUS; SUBCUTANEOUS at 22:33

## 2019-08-08 RX ADMIN — MORPHINE SULFATE 4 MILLIGRAM(S): 50 CAPSULE, EXTENDED RELEASE ORAL at 21:15

## 2019-08-08 RX ADMIN — Medication 50 MILLIGRAM(S): at 07:49

## 2019-08-08 RX ADMIN — Medication 20 MILLIGRAM(S): at 06:14

## 2019-08-08 RX ADMIN — MORPHINE SULFATE 4 MILLIGRAM(S): 50 CAPSULE, EXTENDED RELEASE ORAL at 20:51

## 2019-08-08 RX ADMIN — PIPERACILLIN AND TAZOBACTAM 25 GRAM(S): 4; .5 INJECTION, POWDER, LYOPHILIZED, FOR SOLUTION INTRAVENOUS at 06:16

## 2019-08-08 RX ADMIN — ESCITALOPRAM OXALATE 20 MILLIGRAM(S): 10 TABLET, FILM COATED ORAL at 11:05

## 2019-08-08 RX ADMIN — PIPERACILLIN AND TAZOBACTAM 25 GRAM(S): 4; .5 INJECTION, POWDER, LYOPHILIZED, FOR SOLUTION INTRAVENOUS at 13:13

## 2019-08-08 RX ADMIN — FENTANYL CITRATE 100 MICROGRAM(S): 50 INJECTION INTRAVENOUS at 20:18

## 2019-08-08 RX ADMIN — Medication 20 MILLIGRAM(S): at 22:33

## 2019-08-08 RX ADMIN — Medication 50 MILLIGRAM(S): at 14:39

## 2019-08-08 RX ADMIN — Medication 20 MILLIGRAM(S): at 13:14

## 2019-08-08 RX ADMIN — MORPHINE SULFATE 4 MILLIGRAM(S): 50 CAPSULE, EXTENDED RELEASE ORAL at 02:06

## 2019-08-08 RX ADMIN — PIPERACILLIN AND TAZOBACTAM 25 GRAM(S): 4; .5 INJECTION, POWDER, LYOPHILIZED, FOR SOLUTION INTRAVENOUS at 22:34

## 2019-08-08 RX ADMIN — HYDROMORPHONE HYDROCHLORIDE 1 MILLIGRAM(S): 2 INJECTION INTRAMUSCULAR; INTRAVENOUS; SUBCUTANEOUS at 11:25

## 2019-08-08 RX ADMIN — SODIUM CHLORIDE 125 MILLILITER(S): 9 INJECTION, SOLUTION INTRAVENOUS at 13:50

## 2019-08-08 RX ADMIN — HYDROMORPHONE HYDROCHLORIDE 1 MILLIGRAM(S): 2 INJECTION INTRAMUSCULAR; INTRAVENOUS; SUBCUTANEOUS at 11:06

## 2019-08-08 RX ADMIN — LOSARTAN POTASSIUM 50 MILLIGRAM(S): 100 TABLET, FILM COATED ORAL at 06:13

## 2019-08-08 RX ADMIN — HEPARIN SODIUM 5000 UNIT(S): 5000 INJECTION INTRAVENOUS; SUBCUTANEOUS at 22:33

## 2019-08-08 RX ADMIN — HYDROMORPHONE HYDROCHLORIDE 1 MILLIGRAM(S): 2 INJECTION INTRAMUSCULAR; INTRAVENOUS; SUBCUTANEOUS at 22:48

## 2019-08-08 RX ADMIN — HEPARIN SODIUM 5000 UNIT(S): 5000 INJECTION INTRAVENOUS; SUBCUTANEOUS at 13:13

## 2019-08-08 RX ADMIN — Medication 1: at 22:37

## 2019-08-08 RX ADMIN — NYSTATIN CREAM 1 APPLICATION(S): 100000 CREAM TOPICAL at 06:17

## 2019-08-08 NOTE — PROGRESS NOTE ADULT - SUBJECTIVE AND OBJECTIVE BOX
58yo Female with PMH Anxiety, COPD, DM, HTN, Hypothyroidism, active smoker, cholelithiasis and no PSH presents to the ER c/o recurrent epigastric abdominal pain x5-6 days, started on wed or thurs per patient but she did not want to come to the ER over the weekend, states pain is worse than usual, described as "sharp" constant with radiation to the back, 10/10 in severity with no alleviating or known triggering factors. Associated with nausea, denies vomiting.    Patient was here last month for similar complaints, HIDA was negative and pain improved, was supposed to follow up with Surgeon in the office for elective cholecystectomy however she states that surgeon did not accept her insurance. (05 Aug 2019 12:14)      Chief Complaint:  Patient is a 59y old  Female who presents with a chief complaint of abdominal pain (06 Aug 2019 13:33)      Review of Systems:    General:  No wt loss, fevers, chills, night sweats  Eyes:  Good vision, no reported pain  ENT:  No sore throat, pain, runny nose, dysphagia  CV:  No pain, palpitations, hypo/hypertension  Resp:  No dyspnea, cough, tachypnea, wheezing  GI: nausea, vomiting, diarrhea,         Social History/Family History  SOCHX:   tobacco,  -  alcohol    FMHX: FA/MO  - contributory       Discussed with:  PMD, Family    Physical Exam:    Vital Signs:  Vital Signs Last 24 Hrs  T(C): 36.6 (06 Aug 2019 17:48), Max: 37.3 (06 Aug 2019 05:17)  T(F): 97.9 (06 Aug 2019 17:48), Max: 99.1 (06 Aug 2019 05:17)  HR: 91 (06 Aug 2019 17:48) (84 - 94)  BP: 154/83 (06 Aug 2019 17:48) (130/58 - 154/83)  BP(mean): --  RR: 18 (06 Aug 2019 17:48) (18 - 18)  SpO2: 90% (06 Aug 2019 17:48) (90% - 99%)  Daily     Daily Weight in k.3 (06 Aug 2019 05:17)  I&O's Summary    05 Aug 2019 07:01  -  06 Aug 2019 07:00  --------------------------------------------------------  IN: 200 mL / OUT: 0 mL / NET: 200 mL          Chest:  Full & symmetric excursion, no increased effort, breath sounds clear  Cardiovascular:  Regular rhythm, S1, S2, no murmur/rub/S3/S4, no carotid/femoral/abdominal bruit, radial/pedal pulses 2+, no edema  Abdomen:  Soft, non-tender, non-distended, normoactive bowel sounds, no HSM      Laboratory:                          13.2   13.38 )-----------( 262      ( 06 Aug 2019 06:31 )             40.4     08-    138  |  103  |  11  ----------------------------<  198<H>  4.6   |  28  |  0.82    Ca    8.9      06 Aug 2019 06:31  Mg     2.3         TPro  7.5  /  Alb  3.2<L>  /  TBili  0.4  /  DBili  x   /  AST  18  /  ALT  20  /  AlkPhos  109  -    ABG - ( 06 Aug 2019 07:49 )  pH, Arterial: x     pH, Blood: 7.38  /  pCO2: 50    /  pO2: 52    / HCO3: 28    / Base Excess: 2.9   /  SaO2: 85            CAPILLARY BLOOD GLUCOSE      POCT Blood Glucose.: 192 mg/dL (06 Aug 2019 16:45)  POCT Blood Glucose.: 208 mg/dL (06 Aug 2019 11:47)  POCT Blood Glucose.: 92 mg/dL (05 Aug 2019 23:06)    LIVER FUNCTIONS - ( 06 Aug 2019 06:31 )  Alb: 3.2 g/dL / Pro: 7.5 gm/dL / ALK PHOS: 109 U/L / ALT: 20 U/L / AST: 18 U/L / GGT: x           PT/INR - ( 05 Aug 2019 10:56 )   PT: 11.1 sec;   INR: 0.99 ratio         PTT - ( 05 Aug 2019 10:56 )  PTT:35.9 sec        Assessment:  I was asked to evaluate this patient for possible presurgical evaluation for cholecystectomy.	  the chart and labs and vital signs are all reviewed.	  the patient's past medical history as noted.  the patient has no significant risk factors for coronary artery disease.  review of the echocardiogram reveals a normal ejection fraction	  the cardiovascular risk assessment is considered acceptable risk for this type of surgical procedure, surgery may proceed.  For OR today

## 2019-08-08 NOTE — CHART NOTE - NSCHARTNOTEFT_GEN_A_CORE
Upon Nutritional Assessment by the Registered Dietitian your patient was determined to meet criteria / has evidence of the following diagnosis/diagnoses:          [ ]  Mild Protein Calorie Malnutrition        [ ]  Moderate Protein Calorie Malnutrition        [ ] Severe Protein Calorie Malnutrition        [ ] Unspecified Protein Calorie Malnutrition        [ ] Underweight / BMI <19        [x ] Morbid Obesity / BMI > 40      Findings as based on:  •  Comprehensive nutrition assessment and consultation  •  Calorie counts (nutrient intake analysis)  •  Food acceptance and intake status from observations by staff  •  Follow up  •  Patient education  •  Intervention secondary to interdisciplinary rounds  •   concerns      Treatment:    The following diet has been recommended:  Adv po diet when medically feasible Dash/TLC/CCHO with snack    PROVIDER Section:     By signing this assessment you are acknowledging and agree with the diagnosis/diagnoses assigned by the Registered Dietitian    Comments:

## 2019-08-08 NOTE — PROGRESS NOTE ADULT - SUBJECTIVE AND OBJECTIVE BOX
Team Surgery Preop Note    Patient is a 59y old  Female who presents with a chief complaint of abdominal pain (08 Aug 2019 09:13)    Diagnosis: Acute cholecystitis   Procedure: laparoscopic cholecystectomy, possible open  Surgeon: Dr. Rios / Dr. Michaud                          13.3   17.73 )-----------( 288      ( 08 Aug 2019 07:47 )             40.4     08-08    138  |  103  |  17  ----------------------------<  183<H>  4.0   |  27  |  0.78    Ca    8.9      08 Aug 2019 07:47  Phos  3.2     08-08  Mg     2.3     08-08    TPro  8.1  /  Alb  3.5  /  TBili  0.4  /  DBili  .14  /  AST  10<L>  /  ALT  18  /  AlkPhos  100  08-07    PT/INR - ( 08 Aug 2019 07:47 )   PT: 12.0 sec;   INR: 1.07 ratio         PTT - ( 08 Aug 2019 07:47 )  PTT:29.5 sec    A/P: 59F admitted with acute yuri, cholelithiasis scheduled for OR at 2:30 pm    [X] Zosyn  [X ] Type & Screen  [X ] CBC  [X ] BMP  [X ] PT/PTT/INR  [X ] Urinalysis  [X ] Chest X-ray  [ X] EKG  [X ] NPO/IVF  [X ] Consent  [X ] Clearance: cardiac, pulm and medical clearance in chart  [X ] Added on to OR Schedule: scheduled for 2:30 pm

## 2019-08-08 NOTE — DIETITIAN INITIAL EVALUATION ADULT. - PERTINENT LABORATORY DATA
08-07 Na 137   Glu 212   K+ 4.2   Cr 0.81   BUN 16   Phos n/a   Alb 3.5   PAB n/a   Hgb 13.3 g/dL Hct 40.4 % HgA1C n/a     24Hr FS:190 mg/dL  333 mg/dL  225 mg/dL  219 mg/dL

## 2019-08-08 NOTE — DIETITIAN INITIAL EVALUATION ADULT. - OTHER INFO
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 Pt with hx DM type 2 manages BG levels with Januvia 10mg daily & SMBF daily; fasting JI=125-016do/dl.  Pt qualifies for food as health program; referral given & program explained.   Diet hx; Breakfast; yorgurt, toast, AJ or diet CJ Lunch; Turkey s/w, string beans & sugar free applesauce, or pizza Dinner; turky burgers, beef or chicken    Pt NPO today pending Lap yuri. Pt reports last BM x 1(8/5).    Provided diabetic diet instruction & weight loss instruction and Diabetes type 2 nutrition therapy & weight loss tips & ambulatory nutrition services information

## 2019-08-08 NOTE — PROGRESS NOTE ADULT - SUBJECTIVE AND OBJECTIVE BOX
60yo Female with PMH Anxiety, COPD, DM, HTN, Hypothyroidism, active smoker, cholelithiasis and no PSH presents to the ER c/o recurrent epigastric abdominal pain x5-6 days, started on wed or thurs per patient but she did not want to come to the ER over the weekend, states pain is worse than usual, described as "sharp" constant with radiation to the back, 10/10 in severity with no alleviating or known triggering factors. Associated with nausea, denies vomiting.    Patient was here last month for similar complaints, HIDA was negative and pain improved, was supposed to follow up with Surgeon in the office for elective cholecystectomy however she states that surgeon did not accept her insurance. (05 Aug 2019 12:14)      Chief Complaint:  Patient is a 59y old  Female who presents with a chief complaint of abdominal pain (06 Aug 2019 13:33)      Review of Systems:    General:  No wt loss, fevers, chills, night sweats  Eyes:  Good vision, no reported pain  ENT:  No sore throat, pain, runny nose, dysphagia  CV:  No pain, palpitations, hypo/hypertension  Resp:  No dyspnea, cough, tachypnea, wheezing  GI: nausea, vomiting, diarrhea,         Social History/Family History  SOCHX:   tobacco,  -  alcohol    FMHX: FA/MO  - contributory       Discussed with:  PMD, Family    Physical Exam:    Vital Signs:  Vital Signs Last 24 Hrs  T(C): 36.6 (06 Aug 2019 17:48), Max: 37.3 (06 Aug 2019 05:17)  T(F): 97.9 (06 Aug 2019 17:48), Max: 99.1 (06 Aug 2019 05:17)  HR: 91 (06 Aug 2019 17:48) (84 - 94)  BP: 154/83 (06 Aug 2019 17:48) (130/58 - 154/83)  BP(mean): --  RR: 18 (06 Aug 2019 17:48) (18 - 18)  SpO2: 90% (06 Aug 2019 17:48) (90% - 99%)  Daily     Daily Weight in k.3 (06 Aug 2019 05:17)  I&O's Summary    05 Aug 2019 07:01  -  06 Aug 2019 07:00  --------------------------------------------------------  IN: 200 mL / OUT: 0 mL / NET: 200 mL          Chest:  Full & symmetric excursion, no increased effort, breath sounds clear  Cardiovascular:  Regular rhythm, S1, S2, no murmur/rub/S3/S4, no carotid/femoral/abdominal bruit, radial/pedal pulses 2+, no edema  Abdomen:  Soft, non-tender, non-distended, normoactive bowel sounds, no HSM      Laboratory:                          13.2   13.38 )-----------( 262      ( 06 Aug 2019 06:31 )             40.4     08-    138  |  103  |  11  ----------------------------<  198<H>  4.6   |  28  |  0.82    Ca    8.9      06 Aug 2019 06:31  Mg     2.3         TPro  7.5  /  Alb  3.2<L>  /  TBili  0.4  /  DBili  x   /  AST  18  /  ALT  20  /  AlkPhos  109  -    ABG - ( 06 Aug 2019 07:49 )  pH, Arterial: x     pH, Blood: 7.38  /  pCO2: 50    /  pO2: 52    / HCO3: 28    / Base Excess: 2.9   /  SaO2: 85            CAPILLARY BLOOD GLUCOSE      POCT Blood Glucose.: 192 mg/dL (06 Aug 2019 16:45)  POCT Blood Glucose.: 208 mg/dL (06 Aug 2019 11:47)  POCT Blood Glucose.: 92 mg/dL (05 Aug 2019 23:06)    LIVER FUNCTIONS - ( 06 Aug 2019 06:31 )  Alb: 3.2 g/dL / Pro: 7.5 gm/dL / ALK PHOS: 109 U/L / ALT: 20 U/L / AST: 18 U/L / GGT: x           PT/INR - ( 05 Aug 2019 10:56 )   PT: 11.1 sec;   INR: 0.99 ratio         PTT - ( 05 Aug 2019 10:56 )  PTT:35.9 sec        Assessment:  I was asked to evaluate this patient for possible presurgical evaluation for cholecystectomy.	  the chart and labs and vital signs are all reviewed.	  the patient's past medical history as noted.  the patient has no significant risk factors for coronary artery disease.  review of the echocardiogram reveals a normal ejection fraction

## 2019-08-08 NOTE — PROGRESS NOTE ADULT - ATTENDING COMMENTS
Ms. Freeman was seen and examined with YAMINI Lema this morning. She has signs and symptoms of acute cholecystitis, and I suspect that the infection is advanced. She continues to have abdominal pain, this morning on the right upper and lower quadrants radiating to the back. I have discussed the options with the patient, and reviewed both non-operative and operative treatment methods.  I have reviewed the risks and benefits of both approaches, and have discussed the possible complications associated with the surgical procedure.  She is aware that there is a risk of infection, bleeding, injury to surrounding structures and that there may be the need for additional surgery in the future.  I have recommended that we proceed with laparoscopic cholecystectomy. I appreciate the cardiac, pulmonary, and medical risk stratifications by our colleagues.

## 2019-08-08 NOTE — DIETITIAN INITIAL EVALUATION ADULT. - PERTINENT MEDS FT
ALBUTerol/ipratropium for Nebulization PRN  atorvastatin  clonazePAM  Tablet PRN  dextrose 40% Gel PRN  dextrose 50% Injectable  dextrose 50% Injectable  dextrose 50% Injectable  escitalopram  glucagon  Injectable PRN  heparin  Injectable  HYDROmorphone  Injectable PRN  hydrOXYzine hydrochloride PRN  insulin lispro (HumaLOG) corrective regimen sliding scale  lactated ringers.  levothyroxine  losartan  methylPREDNISolone sodium succinate Injectable  montelukast  morphine  - Injectable PRN  nystatin Cream  piperacillin/tazobactam IVPB..

## 2019-08-08 NOTE — PROGRESS NOTE ADULT - SUBJECTIVE AND OBJECTIVE BOX
Surgical PA post-op evaluation    Patient seen and examined bedside, recently extubated, resting comfortably.  Complains of pain at surgical site that is relieved with pain medication.  Patient also complains of dry throat and hoarseness.   Vargas draining well at bedside.  Patient has not ambulated yet.  Patient is requesting food.   Denies nausea, vomiting, chest pain, SOB.    Vital Signs Last 24 Hrs  T(C): 36.9 (08 Aug 2019 19:40), Max: 36.9 (08 Aug 2019 19:40)  T(F): 98.4 (08 Aug 2019 19:40), Max: 98.4 (08 Aug 2019 19:40)  HR: 87 (08 Aug 2019 23:00) (64 - 89)  BP: 138/76 (08 Aug 2019 23:00) (135/74 - 217/122)  BP(mean): 91 (08 Aug 2019 23:00) (87 - 144)  RR: 25 (08 Aug 2019 23:00) (13 - 29)  SpO2: 89% (08 Aug 2019 23:00) (88% - 97%)  I&O's Summary    07 Aug 2019 07:01  -  08 Aug 2019 07:00  --------------------------------------------------------  IN: 3300 mL / OUT: 2 mL / NET: 3298 mL    08 Aug 2019 07:01  -  08 Aug 2019 23:11  --------------------------------------------------------  IN: 0 mL / OUT: 525 mL / NET: -525 mL      PHYSICAL EXAM:    GENERAL: Alert & Oriented X3, NAD, well-groomed, well-developed  CHEST/LUNG: Clear to percussion bilaterally; No rales, rhonchi, wheezing, or rubs  HEART: Regular rate and rhythm; No murmurs, rubs, or gallops  ABDOMEN: Obese, nondistended, +Bowel sounds, soft, appropriate incisional tenderness.   : Vargas indwelling, with clear urine output, ~75cc/hr.   EXTREMITIES:  No calf tenderness, No clubbing, cyanosis, or edema    LABS:                        13.8   22.22 )-----------( 291      ( 08 Aug 2019 21:43 )             41.6     08-08    135  |  100  |  21  ----------------------------<  272<H>  3.6   |  28  |  0.91    Ca    8.9      08 Aug 2019 21:43  Phos  4.1     08-08  Mg     2.4     08-08    TPro  7.7  /  Alb  3.6  /  TBili  0.6  /  DBili  x   /  AST  28  /  ALT  28  /  AlkPhos  88  08-08    PT/INR - ( 08 Aug 2019 07:47 )   PT: 12.0 sec;   INR: 1.07 ratio      PTT - ( 08 Aug 2019 07:47 )  PTT:29.5 sec        Assessment: 60 y/o Female s/p lap yuri POD #0. Stable.     Plan:  -Clear liquids  -pain management   -continue DVT/GI prophylaxis  -OOB, Ambulating  -ecourage incentive spirometer use  -continue local wound care  -antibiotics per protocol  -f/u labs  -d/c vargas in AM  -possible transfer to floor in AM  -continue medical management.

## 2019-08-08 NOTE — PROGRESS NOTE ADULT - SUBJECTIVE AND OBJECTIVE BOX
INTERVAL HPI/OVERNIGHT EVENTS:        REVIEW OF SYSTEMS:  CONSTITUTIONAL:  abd pain    NECK: No pain or stiffnes  RESPIRATORY: No SOB   CARDIOVASCULAR: No chest pain, palpitations, dizziness,   GASTROINTESTINAL: No abdominal pain. No nausea, vomiting,   NEUROLOGICAL: No headaches, no  blurry  vision no  dizziness  SKIN: No itching,   MUSCULOSKELETAL: No pain    MEDICATION:  ALBUTerol/ipratropium for Nebulization 3 milliLiter(s) Nebulizer every 6 hours PRN  atorvastatin 10 milliGRAM(s) Oral at bedtime  clonazePAM  Tablet 1 milliGRAM(s) Oral three times a day PRN  dextrose 40% Gel 15 Gram(s) Oral once PRN  dextrose 5%. 1000 milliLiter(s) IV Continuous <Continuous>  dextrose 50% Injectable 12.5 Gram(s) IV Push once  dextrose 50% Injectable 25 Gram(s) IV Push once  dextrose 50% Injectable 25 Gram(s) IV Push once  escitalopram 20 milliGRAM(s) Oral daily  glucagon  Injectable 1 milliGRAM(s) IntraMuscular once PRN  heparin  Injectable 5000 Unit(s) SubCutaneous every 8 hours  HYDROmorphone  Injectable 1 milliGRAM(s) IV Push every 6 hours PRN  hydrOXYzine hydrochloride 50 milliGRAM(s) Oral three times a day PRN  insulin lispro (HumaLOG) corrective regimen sliding scale   SubCutaneous three times a day before meals  insulin lispro (HumaLOG) corrective regimen sliding scale   SubCutaneous at bedtime  lactated ringers. 1000 milliLiter(s) IV Continuous <Continuous>  levothyroxine 150 MICROGram(s) Oral daily  losartan 50 milliGRAM(s) Oral daily  methylPREDNISolone sodium succinate Injectable 20 milliGRAM(s) IV Push every 8 hours  montelukast 10 milliGRAM(s) Oral at bedtime  morphine  - Injectable 4 milliGRAM(s) IV Push every 4 hours PRN  nystatin Cream 1 Application(s) Topical two times a day  piperacillin/tazobactam IVPB.. 3.375 Gram(s) IV Intermittent every 8 hours    Vital Signs Last 24 Hrs  T(C): 36.1 (08 Aug 2019 05:25), Max: 36.6 (07 Aug 2019 11:29)  T(F): 97 (08 Aug 2019 05:25), Max: 97.9 (07 Aug 2019 23:10)  HR: 72 (08 Aug 2019 05:25) (72 - 98)  BP: 141/54 (08 Aug 2019 05:25) (130/78 - 145/84)  BP(mean): --  RR: 18 (08 Aug 2019 05:25) (17 - 18)  SpO2: 100% (07 Aug 2019 23:10) (96% - 100%)    PHYSICAL EXAM:  GENERAL: NAD, well-groomed, well-developed  EYES:  conjunctiva and sclera clear  ENMT:  Moist mucous membranes,   NECK: Supple, No JVD, Normal thyroid  NERVOUS SYSTEM:  Alert oriented   no  focal  deficits;   CHEST/LUNG: Clear    HEART: Regular rate and rhythm; No murmurs, rubs, or gallops  ABDOMEN: Soft, tenderness  RUQ  , Nondistended; Bowel sounds present  EXTREMITIES:  no  edema no  tenderness  SKIN: No rashes   LABS:                        13.3   17.73 )-----------( 288      ( 08 Aug 2019 07:47 )             40.4     08-07    137  |  101  |  16  ----------------------------<  212<H>  4.2   |  28  |  0.81    Ca    9.2      07 Aug 2019 08:07  Phos  3.0     08-07  Mg     2.8     08-07    TPro  8.1  /  Alb  3.5  /  TBili  0.4  /  DBili  .14  /  AST  10<L>  /  ALT  18  /  AlkPhos  100  08-07        CAPILLARY BLOOD GLUCOSE      POCT Blood Glucose.: 190 mg/dL (08 Aug 2019 07:46)  POCT Blood Glucose.: 333 mg/dL (07 Aug 2019 21:48)  POCT Blood Glucose.: 225 mg/dL (07 Aug 2019 16:33)  POCT Blood Glucose.: 219 mg/dL (07 Aug 2019 11:15)  POCT Blood Glucose.: 233 mg/dL (07 Aug 2019 08:05)      RADIOLOGY & ADDITIONAL TESTS:    Imaging reports  Personally Reviewed:  [ x] YES  [ ] NO    Consultant(s) Notes Reviewed:  [x ] YES  [ ] NO    Care Discussed with Consultants/Other Providers [ x] YES  [ ] NO  oblem/Plan - 1:  ·  Problem: Acute cholecystitis.  Plan: antibiotics   surgery Follow up.      Problem/Plan - 2:  ·  Problem: Type 2 diabetes mellitus without complication, without long-term current use of insulin.  Plan: riss.      Problem/Plan - 3:  ·  Problem: Essential hypertension.  Plan: continue losartan     Problem/Plan - 4:  ·  Problem: Acquired hypothyroidism.  Plan: continue synthroid  COPD  duoneb O2 pulmonarty  eval     Problem/Plan - 5:  ·  Problem: Anxiety.  Plan: continue clonazepam   check  TTE  cardilology  evlauation for  optimiziation for  cholecystctomy  PULMONARY  AND  CARDIOLOGY  EVALAUTIONS  NOTED    NO  MEDICAL  COUNTERINDICATIONS  FOR  CHOLECYSTECTOMY

## 2019-08-08 NOTE — PROGRESS NOTE ADULT - SUBJECTIVE AND OBJECTIVE BOX
INTERVAL HPI:  58yo Female with  Anxiety, COPD has O2 at home, DM, HTN, Hypothyroidism, Active smoker since age 15, Cholelithiasis and no PSH presents to the ER c/o recurrent epigastric abdominal pain x5-6 days, started on wed or thurs per patient but she did not want to come to the ER over the weekend, states pain is worse than usual, described as "sharp" constant with radiation to the back, 10/10 in severity with no alleviating or known triggering factors. Associated with nausea, denies vomiting.  Patient was here last month for similar complaints, HIDA was negative and pain improved, was supposed to follow up with Surgeon in the office for elective cholecystectomy however she states that surgeon did not accept her insurance.   Admitted with sonographic evidence of Cholecystitis.    OVERNIGHT EVENTS:  Comfortable in bed.    Vital Signs Last 24 Hrs  T(C): 36.1 (08 Aug 2019 05:25), Max: 36.6 (07 Aug 2019 11:29)  T(F): 97 (08 Aug 2019 05:25), Max: 97.9 (07 Aug 2019 23:10)  HR: 72 (08 Aug 2019 05:25) (72 - 98)  BP: 141/54 (08 Aug 2019 05:25) (130/78 - 145/84)  BP(mean): --  RR: 18 (08 Aug 2019 05:25) (17 - 18)  SpO2: 100% (07 Aug 2019 23:10) (96% - 100%)    PHYSICAL EXAM:  GEN:         Awake, responsive and comfortable.  HEENT:    Normal.    RESP:       no wheezing.  CVS:          Regular rate and rhythm.   ABD:         Soft, non-tender, non-distended;     MEDICATIONS  (STANDING):  atorvastatin 10 milliGRAM(s) Oral at bedtime  dextrose 50% Injectable 12.5 Gram(s) IV Push once  dextrose 50% Injectable 25 Gram(s) IV Push once  dextrose 50% Injectable 25 Gram(s) IV Push once  escitalopram 20 milliGRAM(s) Oral daily  heparin  Injectable 5000 Unit(s) SubCutaneous every 8 hours  insulin lispro (HumaLOG) corrective regimen sliding scale   SubCutaneous at bedtime  lactated ringers. 1000 milliLiter(s) (125 mL/Hr) IV Continuous <Continuous>  levothyroxine 150 MICROGram(s) Oral daily  losartan 50 milliGRAM(s) Oral daily  methylPREDNISolone sodium succinate Injectable 20 milliGRAM(s) IV Push every 8 hours  montelukast 10 milliGRAM(s) Oral at bedtime  nystatin Cream 1 Application(s) Topical two times a day  piperacillin/tazobactam IVPB.. 3.375 Gram(s) IV Intermittent every 8 hours    MEDICATIONS  (PRN):  ALBUTerol/ipratropium for Nebulization 3 milliLiter(s) Nebulizer every 6 hours PRN Shortness of Breath and/or Wheezing  clonazePAM  Tablet 1 milliGRAM(s) Oral three times a day PRN anxiety  dextrose 40% Gel 15 Gram(s) Oral once PRN Blood Glucose LESS THAN 70 milliGRAM(s)/deciliter  glucagon  Injectable 1 milliGRAM(s) IntraMuscular once PRN Glucose LESS THAN 70 milligrams/deciliter  HYDROmorphone  Injectable 1 milliGRAM(s) IV Push every 6 hours PRN Severe Pain (7 - 10)  hydrOXYzine hydrochloride 50 milliGRAM(s) Oral three times a day PRN Itching  morphine  - Injectable 4 milliGRAM(s) IV Push every 4 hours PRN Moderate Pain (4 - 6)    LABS:                        13.3   17.73 )-----------( 288      ( 08 Aug 2019 07:47 )             40.4     08-08    138  |  103  |  17  ----------------------------<  183<H>  4.0   |  27  |  0.78    Ca    8.9      08 Aug 2019 07:47  Phos  3.2     08-08  Mg     2.3     08-08    TPro  8.1  /  Alb  3.5  /  TBili  0.4  /  DBili  .14  /  AST  10<L>  /  ALT  18  /  AlkPhos  100  08-07    PT/INR - ( 08 Aug 2019 07:47 )   PT: 12.0 sec;   INR: 1.07 ratio      PTT - ( 08 Aug 2019 07:47 )  PTT:29.5 sec  08-06 @ 07:49  pH: 7.38  pCO2: 50  pO2: 52  SaO2: 85    ASSESSMENT AND PLAN:  ·	Acute Cholecystitis.  ·	Abdominal pain.  ·	Leukocytosis.  ·	COPD.  ·	Chronic hypoxic hypercarbic Respiratory failure.  ·	Tobacco abuse.  ·	Obesity.  ·	Suspect MEENU.  ·	HTN.  ·	DM.  ·	Hypothyroidism.  ·	Anxiety.    Planned for Cholecystectomy today.  Continue O2, steroids and antibiotics.  Smoking cessation and weight reduction certainly will help.  PFT, sleep study out pt.  Acceptable risk for planned cholecystectomy.

## 2019-08-09 LAB
ALBUMIN SERPL ELPH-MCNC: 3.4 G/DL — SIGNIFICANT CHANGE UP (ref 3.3–5)
ALP SERPL-CCNC: 83 U/L — SIGNIFICANT CHANGE UP (ref 40–120)
ALT FLD-CCNC: 29 U/L — SIGNIFICANT CHANGE UP (ref 12–78)
ANION GAP SERPL CALC-SCNC: 9 MMOL/L — SIGNIFICANT CHANGE UP (ref 5–17)
AST SERPL-CCNC: 22 U/L — SIGNIFICANT CHANGE UP (ref 15–37)
BASOPHILS # BLD AUTO: 0.05 K/UL — SIGNIFICANT CHANGE UP (ref 0–0.2)
BASOPHILS NFR BLD AUTO: 0.2 % — SIGNIFICANT CHANGE UP (ref 0–2)
BILIRUB SERPL-MCNC: 0.3 MG/DL — SIGNIFICANT CHANGE UP (ref 0.2–1.2)
BUN SERPL-MCNC: 20 MG/DL — SIGNIFICANT CHANGE UP (ref 7–23)
CALCIUM SERPL-MCNC: 8.6 MG/DL — SIGNIFICANT CHANGE UP (ref 8.5–10.1)
CHLORIDE SERPL-SCNC: 100 MMOL/L — SIGNIFICANT CHANGE UP (ref 96–108)
CO2 SERPL-SCNC: 28 MMOL/L — SIGNIFICANT CHANGE UP (ref 22–31)
CREAT SERPL-MCNC: 1.05 MG/DL — SIGNIFICANT CHANGE UP (ref 0.5–1.3)
EOSINOPHIL # BLD AUTO: 0 K/UL — SIGNIFICANT CHANGE UP (ref 0–0.5)
EOSINOPHIL NFR BLD AUTO: 0 % — SIGNIFICANT CHANGE UP (ref 0–6)
GLUCOSE BLDC GLUCOMTR-MCNC: 197 MG/DL — HIGH (ref 70–99)
GLUCOSE BLDC GLUCOMTR-MCNC: 215 MG/DL — HIGH (ref 70–99)
GLUCOSE BLDC GLUCOMTR-MCNC: 245 MG/DL — HIGH (ref 70–99)
GLUCOSE BLDC GLUCOMTR-MCNC: 266 MG/DL — HIGH (ref 70–99)
GLUCOSE BLDC GLUCOMTR-MCNC: 276 MG/DL — HIGH (ref 70–99)
GLUCOSE SERPL-MCNC: 235 MG/DL — HIGH (ref 70–99)
HCT VFR BLD CALC: 40.8 % — SIGNIFICANT CHANGE UP (ref 34.5–45)
HGB BLD-MCNC: 13.1 G/DL — SIGNIFICANT CHANGE UP (ref 11.5–15.5)
IMM GRANULOCYTES NFR BLD AUTO: 1.3 % — SIGNIFICANT CHANGE UP (ref 0–1.5)
LYMPHOCYTES # BLD AUTO: 1.46 K/UL — SIGNIFICANT CHANGE UP (ref 1–3.3)
LYMPHOCYTES # BLD AUTO: 6.2 % — LOW (ref 13–44)
MAGNESIUM SERPL-MCNC: 2.4 MG/DL — SIGNIFICANT CHANGE UP (ref 1.6–2.6)
MCHC RBC-ENTMCNC: 28.7 PG — SIGNIFICANT CHANGE UP (ref 27–34)
MCHC RBC-ENTMCNC: 32.1 GM/DL — SIGNIFICANT CHANGE UP (ref 32–36)
MCV RBC AUTO: 89.3 FL — SIGNIFICANT CHANGE UP (ref 80–100)
MONOCYTES # BLD AUTO: 1 K/UL — HIGH (ref 0–0.9)
MONOCYTES NFR BLD AUTO: 4.2 % — SIGNIFICANT CHANGE UP (ref 2–14)
NEUTROPHILS # BLD AUTO: 20.74 K/UL — HIGH (ref 1.8–7.4)
NEUTROPHILS NFR BLD AUTO: 88.1 % — HIGH (ref 43–77)
NRBC # BLD: 0 /100 WBCS — SIGNIFICANT CHANGE UP (ref 0–0)
PHOSPHATE SERPL-MCNC: 4.3 MG/DL — SIGNIFICANT CHANGE UP (ref 2.5–4.5)
PLATELET # BLD AUTO: 285 K/UL — SIGNIFICANT CHANGE UP (ref 150–400)
POTASSIUM SERPL-MCNC: 4 MMOL/L — SIGNIFICANT CHANGE UP (ref 3.5–5.3)
POTASSIUM SERPL-SCNC: 4 MMOL/L — SIGNIFICANT CHANGE UP (ref 3.5–5.3)
PROT SERPL-MCNC: 7.3 GM/DL — SIGNIFICANT CHANGE UP (ref 6–8.3)
RBC # BLD: 4.57 M/UL — SIGNIFICANT CHANGE UP (ref 3.8–5.2)
RBC # FLD: 13 % — SIGNIFICANT CHANGE UP (ref 10.3–14.5)
SODIUM SERPL-SCNC: 137 MMOL/L — SIGNIFICANT CHANGE UP (ref 135–145)
WBC # BLD: 23.56 K/UL — HIGH (ref 3.8–10.5)
WBC # FLD AUTO: 23.56 K/UL — HIGH (ref 3.8–10.5)

## 2019-08-09 PROCEDURE — 99406 BEHAV CHNG SMOKING 3-10 MIN: CPT

## 2019-08-09 PROCEDURE — 99233 SBSQ HOSP IP/OBS HIGH 50: CPT

## 2019-08-09 PROCEDURE — 47562 LAPAROSCOPIC CHOLECYSTECTOMY: CPT | Mod: AS

## 2019-08-09 RX ORDER — PIPERACILLIN AND TAZOBACTAM 4; .5 G/20ML; G/20ML
375 INJECTION, POWDER, LYOPHILIZED, FOR SOLUTION INTRAVENOUS EVERY 8 HOURS
Refills: 0 | Status: DISCONTINUED | OUTPATIENT
Start: 2019-08-09 | End: 2019-08-09

## 2019-08-09 RX ORDER — INSULIN GLARGINE 100 [IU]/ML
25 INJECTION, SOLUTION SUBCUTANEOUS EVERY MORNING
Refills: 0 | Status: DISCONTINUED | OUTPATIENT
Start: 2019-08-09 | End: 2019-08-11

## 2019-08-09 RX ORDER — PIPERACILLIN AND TAZOBACTAM 4; .5 G/20ML; G/20ML
3.38 INJECTION, POWDER, LYOPHILIZED, FOR SOLUTION INTRAVENOUS EVERY 8 HOURS
Refills: 0 | Status: DISCONTINUED | OUTPATIENT
Start: 2019-08-09 | End: 2019-08-09

## 2019-08-09 RX ORDER — NICOTINE POLACRILEX 2 MG
1 GUM BUCCAL DAILY
Refills: 0 | Status: DISCONTINUED | OUTPATIENT
Start: 2019-08-09 | End: 2019-08-13

## 2019-08-09 RX ORDER — PIPERACILLIN AND TAZOBACTAM 4; .5 G/20ML; G/20ML
3.38 INJECTION, POWDER, LYOPHILIZED, FOR SOLUTION INTRAVENOUS ONCE
Refills: 0 | Status: DISCONTINUED | OUTPATIENT
Start: 2019-08-09 | End: 2019-08-09

## 2019-08-09 RX ADMIN — MORPHINE SULFATE 4 MILLIGRAM(S): 50 CAPSULE, EXTENDED RELEASE ORAL at 18:10

## 2019-08-09 RX ADMIN — Medication 1 MILLIGRAM(S): at 17:04

## 2019-08-09 RX ADMIN — Medication 2: at 07:44

## 2019-08-09 RX ADMIN — HYDROMORPHONE HYDROCHLORIDE 1 MILLIGRAM(S): 2 INJECTION INTRAMUSCULAR; INTRAVENOUS; SUBCUTANEOUS at 10:35

## 2019-08-09 RX ADMIN — Medication 20 MILLIGRAM(S): at 06:21

## 2019-08-09 RX ADMIN — MORPHINE SULFATE 4 MILLIGRAM(S): 50 CAPSULE, EXTENDED RELEASE ORAL at 01:20

## 2019-08-09 RX ADMIN — ESCITALOPRAM OXALATE 20 MILLIGRAM(S): 10 TABLET, FILM COATED ORAL at 13:18

## 2019-08-09 RX ADMIN — HYDROMORPHONE HYDROCHLORIDE 1 MILLIGRAM(S): 2 INJECTION INTRAMUSCULAR; INTRAVENOUS; SUBCUTANEOUS at 20:02

## 2019-08-09 RX ADMIN — Medication 20 MILLIGRAM(S): at 13:18

## 2019-08-09 RX ADMIN — LOSARTAN POTASSIUM 50 MILLIGRAM(S): 100 TABLET, FILM COATED ORAL at 06:34

## 2019-08-09 RX ADMIN — MORPHINE SULFATE 4 MILLIGRAM(S): 50 CAPSULE, EXTENDED RELEASE ORAL at 14:00

## 2019-08-09 RX ADMIN — Medication 4: at 16:07

## 2019-08-09 RX ADMIN — INSULIN GLARGINE 25 UNIT(S): 100 INJECTION, SOLUTION SUBCUTANEOUS at 13:55

## 2019-08-09 RX ADMIN — MONTELUKAST 10 MILLIGRAM(S): 4 TABLET, CHEWABLE ORAL at 21:32

## 2019-08-09 RX ADMIN — MORPHINE SULFATE 4 MILLIGRAM(S): 50 CAPSULE, EXTENDED RELEASE ORAL at 14:15

## 2019-08-09 RX ADMIN — CHLORHEXIDINE GLUCONATE 1 APPLICATION(S): 213 SOLUTION TOPICAL at 06:22

## 2019-08-09 RX ADMIN — Medication 1 MILLIGRAM(S): at 22:23

## 2019-08-09 RX ADMIN — NYSTATIN CREAM 1 APPLICATION(S): 100000 CREAM TOPICAL at 17:40

## 2019-08-09 RX ADMIN — MORPHINE SULFATE 4 MILLIGRAM(S): 50 CAPSULE, EXTENDED RELEASE ORAL at 01:06

## 2019-08-09 RX ADMIN — HEPARIN SODIUM 5000 UNIT(S): 5000 INJECTION INTRAVENOUS; SUBCUTANEOUS at 13:18

## 2019-08-09 RX ADMIN — MORPHINE SULFATE 4 MILLIGRAM(S): 50 CAPSULE, EXTENDED RELEASE ORAL at 06:36

## 2019-08-09 RX ADMIN — Medication 50 MILLIGRAM(S): at 22:47

## 2019-08-09 RX ADMIN — HEPARIN SODIUM 5000 UNIT(S): 5000 INJECTION INTRAVENOUS; SUBCUTANEOUS at 06:21

## 2019-08-09 RX ADMIN — Medication 20 MILLIGRAM(S): at 21:32

## 2019-08-09 RX ADMIN — Medication 6: at 11:52

## 2019-08-09 RX ADMIN — MORPHINE SULFATE 4 MILLIGRAM(S): 50 CAPSULE, EXTENDED RELEASE ORAL at 19:02

## 2019-08-09 RX ADMIN — Medication 1: at 21:35

## 2019-08-09 RX ADMIN — Medication 150 MICROGRAM(S): at 06:34

## 2019-08-09 RX ADMIN — HYDROMORPHONE HYDROCHLORIDE 1 MILLIGRAM(S): 2 INJECTION INTRAMUSCULAR; INTRAVENOUS; SUBCUTANEOUS at 19:11

## 2019-08-09 RX ADMIN — PIPERACILLIN AND TAZOBACTAM 25 GRAM(S): 4; .5 INJECTION, POWDER, LYOPHILIZED, FOR SOLUTION INTRAVENOUS at 06:21

## 2019-08-09 RX ADMIN — PIPERACILLIN AND TAZOBACTAM 25 GRAM(S): 4; .5 INJECTION, POWDER, LYOPHILIZED, FOR SOLUTION INTRAVENOUS at 14:03

## 2019-08-09 RX ADMIN — MORPHINE SULFATE 4 MILLIGRAM(S): 50 CAPSULE, EXTENDED RELEASE ORAL at 06:21

## 2019-08-09 RX ADMIN — HEPARIN SODIUM 5000 UNIT(S): 5000 INJECTION INTRAVENOUS; SUBCUTANEOUS at 21:32

## 2019-08-09 RX ADMIN — HYDROMORPHONE HYDROCHLORIDE 1 MILLIGRAM(S): 2 INJECTION INTRAMUSCULAR; INTRAVENOUS; SUBCUTANEOUS at 10:20

## 2019-08-09 RX ADMIN — ONDANSETRON 4 MILLIGRAM(S): 8 TABLET, FILM COATED ORAL at 22:23

## 2019-08-09 RX ADMIN — ATORVASTATIN CALCIUM 10 MILLIGRAM(S): 80 TABLET, FILM COATED ORAL at 21:33

## 2019-08-09 RX ADMIN — Medication 1 MILLIGRAM(S): at 08:50

## 2019-08-09 RX ADMIN — Medication 1 PATCH: at 13:18

## 2019-08-09 NOTE — PROGRESS NOTE ADULT - SUBJECTIVE AND OBJECTIVE BOX
S/P lap yuri POD # 1  Patient seen and examined at bedside resting comfortably.  Complains of pain at surgical site that is relieved with pain medication.  No BM and flatus unknown.   Knight draining well at bedside.  Patient has not ambulated yet.  Patient is requesting something to drink.   Denies nausea, vomiting, chest pain, dyspnea, cough.      Vital Signs Last 24 Hrs  T(C): 36.8 (09 Aug 2019 04:55), Max: 37.1 (08 Aug 2019 23:28)  T(F): 98.2 (09 Aug 2019 04:55), Max: 98.7 (08 Aug 2019 23:28)  HR: 70 (09 Aug 2019 06:30) (58 - 93)  BP: 136/71 (09 Aug 2019 06:30) (107/86 - 217/122)  BP(mean): 86 (09 Aug 2019 06:30) (75 - 144)  RR: 15 (09 Aug 2019 06:30) (10 - 29)  SpO2: 92% (09 Aug 2019 06:30) (88% - 97%)    I&O's Summary    07 Aug 2019 07:01  -  08 Aug 2019 07:00  --------------------------------------------------------  IN: 3300 mL / OUT: 2 mL / NET: 3298 mL    08 Aug 2019 07:01  -  09 Aug 2019 06:48  --------------------------------------------------------  IN: 0 mL / OUT: 1160 mL / NET: -1160 mL      PHYSICAL EXAM:    GENERAL: Alert & Oriented X3,NAD, well-groomed, well-developed  CHEST/LUNG: Clear to percussion bilaterally; No rales, rhonchi, wheezing, or rubs  HEART: Regular rate and rhythm; No murmurs, rubs, or gallops  ABDOMEN: Obese, non-distended, +Bowel sounds, Soft, appropriate incisional tenderness.  : Knight indwelling, with clear urine output, 1160cc/24hr.    EXTREMITIES:  No calf tenderness, No clubbing, cyanosis, or edema      LABS:                        13.1   23.56 )-----------( 285      ( 09 Aug 2019 04:36 )             40.8     08-09    137  |  100  |  20  ----------------------------<  235<H>  4.0   |  28  |  1.05    Ca    8.6      09 Aug 2019 04:36  Phos  4.3     08-09  Mg     2.4     08-09    TPro  7.3  /  Alb  3.4  /  TBili  0.3  /  DBili  x   /  AST  22  /  ALT  29  /  AlkPhos  83  08-09    PT/INR - ( 08 Aug 2019 07:47 )   PT: 12.0 sec;   INR: 1.07 ratio      PTT - ( 08 Aug 2019 07:47 )  PTT:29.5 sec      Assessment: 58 y/o Female s/p laparoscopic cholecystectomy POD #1.    Plan:  -clear liquids.  -continue DVT/GI prophylaxis.  -OOB, Ambulating and pain management prn.   -encourage incentive spirometer  -continue local wound care  -antibiotics per ID  -f/u labs  -planning transfer to floor.  -continue medical management Surgical PA Progress Note:    S/P lap yuri POD # 1  Patient seen and examined at bedside resting comfortably.  Complains of pain at surgical site that is relieved with pain medication.  No BM and flatus unknown.   Knight draining well at bedside.  Patient has not ambulated yet.  Patient is requesting something to drink.   Denies nausea, vomiting, chest pain, dyspnea, cough.      Vital Signs Last 24 Hrs  T(C): 36.8 (09 Aug 2019 04:55), Max: 37.1 (08 Aug 2019 23:28)  T(F): 98.2 (09 Aug 2019 04:55), Max: 98.7 (08 Aug 2019 23:28)  HR: 70 (09 Aug 2019 06:30) (58 - 93)  BP: 136/71 (09 Aug 2019 06:30) (107/86 - 217/122)  BP(mean): 86 (09 Aug 2019 06:30) (75 - 144)  RR: 15 (09 Aug 2019 06:30) (10 - 29)  SpO2: 92% (09 Aug 2019 06:30) (88% - 97%)    I&O's Summary    07 Aug 2019 07:01  -  08 Aug 2019 07:00  --------------------------------------------------------  IN: 3300 mL / OUT: 2 mL / NET: 3298 mL    08 Aug 2019 07:01  -  09 Aug 2019 06:48  --------------------------------------------------------  IN: 0 mL / OUT: 1160 mL / NET: -1160 mL      PHYSICAL EXAM:    GENERAL: Alert & Oriented X3,NAD, well-groomed, well-developed  CHEST/LUNG: Clear to percussion bilaterally; No rales, rhonchi, wheezing, or rubs  HEART: Regular rate and rhythm; No murmurs, rubs, or gallops  ABDOMEN: Obese, non-distended, +Bowel sounds, Soft, appropriate incisional tenderness.  : Knight indwelling, with clear urine output, 1160cc/24hr.    EXTREMITIES:  No calf tenderness, No clubbing, cyanosis, or edema      LABS:                        13.1   23.56 )-----------( 285      ( 09 Aug 2019 04:36 )             40.8     08-09    137  |  100  |  20  ----------------------------<  235<H>  4.0   |  28  |  1.05    Ca    8.6      09 Aug 2019 04:36  Phos  4.3     08-09  Mg     2.4     08-09    TPro  7.3  /  Alb  3.4  /  TBili  0.3  /  DBili  x   /  AST  22  /  ALT  29  /  AlkPhos  83  08-09    PT/INR - ( 08 Aug 2019 07:47 )   PT: 12.0 sec;   INR: 1.07 ratio      PTT - ( 08 Aug 2019 07:47 )  PTT:29.5 sec      Assessment: 60 y/o Female s/p laparoscopic cholecystectomy POD #1.    Plan:  -clear liquids.  -continue DVT/GI prophylaxis.  -OOB, Ambulating and pain management prn.   -encourage incentive spirometer  -continue local wound care  -f/u labs  -planning transfer to floor.  -continue medical management Surgical PA Progress Note:    S/P lap yuri POD # 1  Patient seen and examined at bedside resting comfortably.  Complains of pain at surgical site that is relieved with pain medication.  No BM and flatus unknown.   Vargas draining well at bedside.  Patient has not ambulated yet.  Patient is requesting something to drink.   Denies nausea, vomiting, chest pain, dyspnea, cough.      Vital Signs Last 24 Hrs  T(C): 36.8 (09 Aug 2019 04:55), Max: 37.1 (08 Aug 2019 23:28)  T(F): 98.2 (09 Aug 2019 04:55), Max: 98.7 (08 Aug 2019 23:28)  HR: 70 (09 Aug 2019 06:30) (58 - 93)  BP: 136/71 (09 Aug 2019 06:30) (107/86 - 217/122)  BP(mean): 86 (09 Aug 2019 06:30) (75 - 144)  RR: 15 (09 Aug 2019 06:30) (10 - 29)  SpO2: 92% (09 Aug 2019 06:30) (88% - 97%)    I&O's Summary    07 Aug 2019 07:01  -  08 Aug 2019 07:00  --------------------------------------------------------  IN: 3300 mL / OUT: 2 mL / NET: 3298 mL    08 Aug 2019 07:01  -  09 Aug 2019 06:48  --------------------------------------------------------  IN: 0 mL / OUT: 1160 mL / NET: -1160 mL      PHYSICAL EXAM:    GENERAL: Alert & Oriented X3,NAD, well-groomed, well-developed  CHEST/LUNG: Clear to percussion bilaterally; No rales, rhonchi, wheezing, or rubs  HEART: Regular rate and rhythm; No murmurs, rubs, or gallops  ABDOMEN: Obese, non-distended, +Bowel sounds, Soft, appropriate incisional tenderness.  : Vargas indwelling, with clear urine output, 1160cc/24hr.    EXTREMITIES:  No calf tenderness, No clubbing, cyanosis, or edema      LABS:                        13.1   23.56 )-----------( 285      ( 09 Aug 2019 04:36 )             40.8     08-09    137  |  100  |  20  ----------------------------<  235<H>  4.0   |  28  |  1.05    Ca    8.6      09 Aug 2019 04:36  Phos  4.3     08-09  Mg     2.4     08-09    TPro  7.3  /  Alb  3.4  /  TBili  0.3  /  DBili  x   /  AST  22  /  ALT  29  /  AlkPhos  83  08-09    PT/INR - ( 08 Aug 2019 07:47 )   PT: 12.0 sec;   INR: 1.07 ratio      PTT - ( 08 Aug 2019 07:47 )  PTT:29.5 sec      Assessment: 60 y/o Female s/p laparoscopic cholecystectomy POD #1.    Plan:  - Start clear liquid diet  - d/c vargas  -continue DVT/GI prophylaxis.  -OOB, Ambulating and pain management prn.   -encourage incentive spirometer  -continue local wound care  - cont abx   -transfer to med/surg floor  - will discuss with surgical attending

## 2019-08-09 NOTE — PHYSICAL THERAPY INITIAL EVALUATION ADULT - PERTINENT HX OF CURRENT PROBLEM, REHAB EVAL
60yo Female with PMH Anxiety, COPD, DM, HTN, Hypothyroidism, active smoker, cholelithiasis and no PSH presents to the ER c/o recurrent epigastric abdominal pain x5-6 days, started on wed or thurs per patient but she did not want to come to the ER over the weekend, states pain is worse than usual, described as "sharp" constant with radiation to the back, 10/10 in severity with no alleviating or known triggering factors.

## 2019-08-09 NOTE — CONSULT NOTE ADULT - SUBJECTIVE AND OBJECTIVE BOX
Patient is a 59y old  Female who presents with a chief complaint of abdominal pain (09 Aug 2019 17:04)      HPI:  58yo Female with PMH Anxiety, COPD, DM, HTN, Hypothyroidism, active smoker, cholelithiasis and no PSH presents to the ER c/o recurrent epigastric abdominal pain x5-6 days, started on wed or thurs per patient but she did not want to come to the ER over the weekend, states pain is worse than usual, described as "sharp" constant with radiation to the back, 10/10 in severity with no alleviating or known triggering factors. Associated with nausea, denies vomiting.    Patient was here last month for similar complaints, HIDA was negative and pain improved, was supposed to follow up with Surgeon in the office for elective cholecystectomy however she states that surgeon did not accept her insurance. (05 Aug 2019 12:14)  Admitted with sonographic evidence of Cholecystitis.  08/08/19: Laparoscopic Cholecystectomy    endocrine called for glucose control      PAST MEDICAL & SURGICAL HISTORY:  DM (diabetes mellitus)  HTN (hypertension)  Smoker  Emphysema lung  Hypothyroid  Agoraphobia  COPD (chronic obstructive pulmonary disease)  Anxiety  No significant past surgical history      Diabetes History: dm2 x2 years on metformin, januvia, basaglar 29units bid and trulicity (recently switched to victoza) at home, no known complications of DM    FAMILY HISTORY:noncontributory        Social History:neg    Allergies    No Known Allergies    Intolerances        MEDICATIONS  (STANDING):  atorvastatin 10 milliGRAM(s) Oral at bedtime  chlorhexidine 4% Liquid 1 Application(s) Topical <User Schedule>  dextrose 5%. 1000 milliLiter(s) (50 mL/Hr) IV Continuous <Continuous>  dextrose 50% Injectable 12.5 Gram(s) IV Push once  dextrose 50% Injectable 25 Gram(s) IV Push once  dextrose 50% Injectable 25 Gram(s) IV Push once  escitalopram 20 milliGRAM(s) Oral daily  heparin  Injectable 5000 Unit(s) SubCutaneous every 8 hours  insulin glargine Injectable (LANTUS) 25 Unit(s) SubCutaneous every morning  insulin lispro (HumaLOG) corrective regimen sliding scale   SubCutaneous three times a day before meals  insulin lispro (HumaLOG) corrective regimen sliding scale   SubCutaneous at bedtime  levothyroxine 150 MICROGram(s) Oral daily  losartan 50 milliGRAM(s) Oral daily  methylPREDNISolone sodium succinate Injectable 20 milliGRAM(s) IV Push every 8 hours  montelukast 10 milliGRAM(s) Oral at bedtime  nicotine - 21 mG/24Hr(s) Patch 1 patch Transdermal daily  nystatin Cream 1 Application(s) Topical two times a day    MEDICATIONS  (PRN):  ALBUTerol/ipratropium for Nebulization 3 milliLiter(s) Nebulizer every 6 hours PRN Shortness of Breath and/or Wheezing  clonazePAM  Tablet 1 milliGRAM(s) Oral three times a day PRN anxiety  dextrose 40% Gel 15 Gram(s) Oral once PRN Blood Glucose LESS THAN 70 milliGRAM(s)/deciliter  glucagon  Injectable 1 milliGRAM(s) IntraMuscular once PRN Glucose LESS THAN 70 milligrams/deciliter  HYDROmorphone  Injectable 1 milliGRAM(s) IV Push every 6 hours PRN Severe Pain (7 - 10)  hydrOXYzine hydrochloride 50 milliGRAM(s) Oral four times a day PRN Itching  morphine  - Injectable 4 milliGRAM(s) IV Push every 4 hours PRN Moderate Pain (4 - 6)  ondansetron Injectable 4 milliGRAM(s) IV Push every 6 hours PRN Nausea      REVIEW OF SYSTEMS:  CONSTITUTIONAL:  as per HPI  CARDIOVASCULAR:  No pressure, squeezing, strangling, tightness, heaviness or aching about the chest, neck, axilla or epigastrium.  RESPIRATORY:  No cough, shortness of breath, PND or orthopnea.  ENDOCRINE:  No heat or cold intolerance, polyuria or polydipsia. abnormal weight gain or loss, oral thrush      T(C): 36.6 (08-09-19 @ 15:38), Max: 37.1 (08-08-19 @ 23:28)  HR: 73 (08-09-19 @ 16:00) (58 - 101)  BP: 120/70 (08-09-19 @ 16:00) (107/86 - 217/122)  RR: 18 (08-09-19 @ 16:00) (10 - 29)  SpO2: 92% (08-09-19 @ 16:00) (85% - 97%)  Wt(kg): --    PHYSICAL EXAM:  GENERAL: NAD, well-groomed, well-developed  HEAD:  Atraumatic, Normocephalic  EYES: Econjunctiva and sclera clear  ENMT: No tonsillar erythema, exudates, or enlargement; Moist mucous membranes, Good dentition, No lesions  CHEST/LUNG: Clear to percussion bilaterally; No rales, rhonchi, wheezing, or rubs  HEART: Regular rate and rhythm; No murmurs, rubs, or gallops      CAPILLARY BLOOD GLUCOSE      POCT Blood Glucose.: 215 mg/dL (09 Aug 2019 16:07)  POCT Blood Glucose.: 245 mg/dL (09 Aug 2019 13:53)  POCT Blood Glucose.: 266 mg/dL (09 Aug 2019 11:52)  POCT Blood Glucose.: 197 mg/dL (09 Aug 2019 07:40)  POCT Blood Glucose.: 251 mg/dL (08 Aug 2019 22:36)  POCT Blood Glucose.: 259 mg/dL (08 Aug 2019 19:50)                            13.1   23.56 )-----------( 285      ( 09 Aug 2019 04:36 )             40.8       CMP:  08-09 @ 04:36  SGPT 29  Albumin 3.4   Alk Phos 83   Anion Gap 9   SGOT 22   Total Bili 0.3   BUN 20   Calcium Total 8.6   CO2 28   Chloride 100   Creatinine 1.05   eGFR if AA 67   eGFR if non AA 58   Glucose 235   Potassium 4.0   Protein 7.3   Sodium 137      Thyroid Function Tests:      Diabetes Tests:     Parathyroids:     Adrenals:       Radiology:

## 2019-08-09 NOTE — PROGRESS NOTE ADULT - SUBJECTIVE AND OBJECTIVE BOX
INTERVAL HPI/OVERNIGHT EVENTS:  s/p  lap yuri      REVIEW OF SYSTEMS:  CONSTITUTIONAL:  fatigue    NECK: No pain or stiffnes  RESPIRATORY: No SOB   CARDIOVASCULAR: No chest pain, palpitations, dizziness,   GASTROINTESTINAL: No abdominal pain. No nausea, vomiting,   NEUROLOGICAL: No headaches, no  blurry  vision no  dizziness  SKIN: No itching,   MUSCULOSKELETAL: No pain    MEDICATION:  ALBUTerol/ipratropium for Nebulization 3 milliLiter(s) Nebulizer every 6 hours PRN  atorvastatin 10 milliGRAM(s) Oral at bedtime  chlorhexidine 4% Liquid 1 Application(s) Topical <User Schedule>  clonazePAM  Tablet 1 milliGRAM(s) Oral three times a day PRN  dextrose 40% Gel 15 Gram(s) Oral once PRN  dextrose 5%. 1000 milliLiter(s) IV Continuous <Continuous>  dextrose 50% Injectable 12.5 Gram(s) IV Push once  dextrose 50% Injectable 25 Gram(s) IV Push once  dextrose 50% Injectable 25 Gram(s) IV Push once  escitalopram 20 milliGRAM(s) Oral daily  glucagon  Injectable 1 milliGRAM(s) IntraMuscular once PRN  heparin  Injectable 5000 Unit(s) SubCutaneous every 8 hours  HYDROmorphone  Injectable 1 milliGRAM(s) IV Push every 6 hours PRN  hydrOXYzine hydrochloride 50 milliGRAM(s) Oral four times a day PRN  insulin lispro (HumaLOG) corrective regimen sliding scale   SubCutaneous three times a day before meals  insulin lispro (HumaLOG) corrective regimen sliding scale   SubCutaneous at bedtime  levothyroxine 150 MICROGram(s) Oral daily  losartan 50 milliGRAM(s) Oral daily  methylPREDNISolone sodium succinate Injectable 20 milliGRAM(s) IV Push every 8 hours  montelukast 10 milliGRAM(s) Oral at bedtime  morphine  - Injectable 4 milliGRAM(s) IV Push every 4 hours PRN  nystatin Cream 1 Application(s) Topical two times a day  ondansetron Injectable 4 milliGRAM(s) IV Push every 6 hours PRN    Vital Signs Last 24 Hrs  T(C): 36.8 (09 Aug 2019 07:39), Max: 37.1 (08 Aug 2019 23:28)  T(F): 98.3 (09 Aug 2019 07:39), Max: 98.7 (08 Aug 2019 23:28)  HR: 88 (09 Aug 2019 09:00) (58 - 93)  BP: 125/79 (09 Aug 2019 09:00) (107/86 - 217/122)  BP(mean): 95 (09 Aug 2019 09:00) (75 - 144)  RR: 22 (09 Aug 2019 09:00) (10 - 29)  SpO2: 94% (09 Aug 2019 09:03) (88% - 97%)    PHYSICAL EXAM:  GENERAL: NAD, well-groomed, well-developed  EYES:  conjunctiva and sclera clear  ENMT:  Moist mucous membranes,   NECK: Supple, No JVD, Normal thyroid  NERVOUS SYSTEM:  Alert oriented   no  focal  deficits;   CHEST/LUNG: Clear    HEART: Regular rate and rhythm; No murmurs, rubs, or gallops  ABDOMEN: Soft,  mild  diffuse  tenderness +  BS  EXTREMITIES:  no  edema no  tenderness  SKIN: No rashes   LABS:                        13.1   23.56 )-----------( 285      ( 09 Aug 2019 04:36 )             40.8     08-09    137  |  100  |  20  ----------------------------<  235<H>  4.0   |  28  |  1.05    Ca    8.6      09 Aug 2019 04:36  Phos  4.3     08-09  Mg     2.4     08-09    TPro  7.3  /  Alb  3.4  /  TBili  0.3  /  DBili  x   /  AST  22  /  ALT  29  /  AlkPhos  83  08-09    PT/INR - ( 08 Aug 2019 07:47 )   PT: 12.0 sec;   INR: 1.07 ratio         PTT - ( 08 Aug 2019 07:47 )  PTT:29.5 sec    CAPILLARY BLOOD GLUCOSE      POCT Blood Glucose.: 197 mg/dL (09 Aug 2019 07:40)  POCT Blood Glucose.: 251 mg/dL (08 Aug 2019 22:36)  POCT Blood Glucose.: 259 mg/dL (08 Aug 2019 19:50)  POCT Blood Glucose.: 190 mg/dL (08 Aug 2019 13:11)  POCT Blood Glucose.: 227 mg/dL (08 Aug 2019 11:31)      RADIOLOGY & ADDITIONAL TESTS:    Imaging reports  Personally Reviewed:  [x ] YES  [ ] NO    Consultant(s) Notes Reviewed:  [ x] YES  [ ] NO    Care Discussed with Consultants/Other Providers [ x] YES  [ ] NO  oblem/Plan - 1:  ·  Problem: Acute cholecystitis. s/p  Lap yuri   Plan: antibiotics pain  managemnt  diet  activity  as per  surgery  surgery Follow up.      Problem/Plan - 2:  ·  Problem: Type 2 diabetes mellitus without complication, without long-term current use of insulin.  Plan: riss.      Problem/Plan - 3:  ·  Problem: Essential hypertension.  Plan: continue losartan     Problem/Plan - 4:  ·  Problem: Acquired hypothyroidism.  Plan: continue synthroid  COPD  duoneb O2     Problem/Plan - 5:  ·  Problem: Anxiety.  Plan: continue clonazepam

## 2019-08-09 NOTE — CHART NOTE - NSCHARTNOTEFT_GEN_A_CORE
This is a 67yr old  F  current smoker. Smokes  1.5 ppd   Discussed risks and advised to quit smoking. Discussed cessation strategies.   Patient ready/not ready/declined smoking cessation.   Nicotine patch accepted  at this time  Smoking cessation counseling given (more than 3 min less than 10 min)  Referred to smoking cessation program. Patient will schedule appt on own when ready. .

## 2019-08-09 NOTE — PROGRESS NOTE ADULT - SUBJECTIVE AND OBJECTIVE BOX
INTERVAL HPI:  58yo Female with  Anxiety, COPD has O2 at home, DM, HTN, Hypothyroidism, Active smoker since age 15, Cholelithiasis and no PSH presents to the ER c/o recurrent epigastric abdominal pain x5-6 days, started on wed or thurs per patient but she did not want to come to the ER over the weekend, states pain is worse than usual, described as "sharp" constant with radiation to the back, 10/10 in severity with no alleviating or known triggering factors. Associated with nausea, denies vomiting.  Patient was here last month for similar complaints, HIDA was negative and pain improved, was supposed to follow up with Surgeon in the office for elective cholecystectomy however she states that surgeon did not accept her insurance.   Admitted with sonographic evidence of Cholecystitis.  08/08/19: Laparoscopic Cholecystectomy    OVERNIGHT EVENTS:  Was sent to ICU, on vent, post Lap Cholecystectomy. Got extubated.    Vital Signs Last 24 Hrs  T(C): 36.7 (09 Aug 2019 11:25), Max: 37.1 (08 Aug 2019 23:28)  T(F): 98.1 (09 Aug 2019 11:25), Max: 98.7 (08 Aug 2019 23:28)  HR: 70 (09 Aug 2019 10:00) (58 - 93)  BP: 129/65 (09 Aug 2019 10:00) (107/86 - 217/122)  BP(mean): 85 (09 Aug 2019 10:00) (75 - 144)  RR: 13 (09 Aug 2019 10:00) (10 - 29)  SpO2: 90% (09 Aug 2019 10:00) (88% - 97%)    Mode: CPAP with PS  FiO2: 35  PEEP: 5  PS: 5    PHYSICAL EXAM:  GEN:         Awake, responsive and comfortable.  HEENT:    Normal.    RESP:        no wheezing.  CVS:          Regular rate and rhythm.   ABD:         Soft, non-tender, non-distended;     MEDICATIONS  (STANDING):  atorvastatin 10 milliGRAM(s) Oral at bedtime  chlorhexidine 4% Liquid 1 Application(s) Topical <User Schedule>  dextrose 5%. 1000 milliLiter(s) (50 mL/Hr) IV Continuous <Continuous>  dextrose 50% Injectable 12.5 Gram(s) IV Push once  dextrose 50% Injectable 25 Gram(s) IV Push once  dextrose 50% Injectable 25 Gram(s) IV Push once  escitalopram 20 milliGRAM(s) Oral daily  heparin  Injectable 5000 Unit(s) SubCutaneous every 8 hours  insulin lispro (HumaLOG) corrective regimen sliding scale   SubCutaneous three times a day before meals  insulin lispro (HumaLOG) corrective regimen sliding scale   SubCutaneous at bedtime  levothyroxine 150 MICROGram(s) Oral daily  losartan 50 milliGRAM(s) Oral daily  methylPREDNISolone sodium succinate Injectable 20 milliGRAM(s) IV Push every 8 hours  montelukast 10 milliGRAM(s) Oral at bedtime  nicotine - 21 mG/24Hr(s) Patch 1 patch Transdermal daily  nystatin Cream 1 Application(s) Topical two times a day    MEDICATIONS  (PRN):  ALBUTerol/ipratropium for Nebulization 3 milliLiter(s) Nebulizer every 6 hours PRN Shortness of Breath and/or Wheezing  clonazePAM  Tablet 1 milliGRAM(s) Oral three times a day PRN anxiety  dextrose 40% Gel 15 Gram(s) Oral once PRN Blood Glucose LESS THAN 70 milliGRAM(s)/deciliter  glucagon  Injectable 1 milliGRAM(s) IntraMuscular once PRN Glucose LESS THAN 70 milligrams/deciliter  HYDROmorphone  Injectable 1 milliGRAM(s) IV Push every 6 hours PRN Severe Pain (7 - 10)  hydrOXYzine hydrochloride 50 milliGRAM(s) Oral four times a day PRN Itching  morphine  - Injectable 4 milliGRAM(s) IV Push every 4 hours PRN Moderate Pain (4 - 6)  ondansetron Injectable 4 milliGRAM(s) IV Push every 6 hours PRN Nausea    LABS:                        13.1   23.56 )-----------( 285      ( 09 Aug 2019 04:36 )             40.8     08-09    137  |  100  |  20  ----------------------------<  235<H>  4.0   |  28  |  1.05    Ca    8.6      09 Aug 2019 04:36  Phos  4.3     08-09  Mg     2.4     08-09    TPro  7.3  /  Alb  3.4  /  TBili  0.3  /  DBili  x   /  AST  22  /  ALT  29  /  AlkPhos  83  08-09    PT/INR - ( 08 Aug 2019 07:47 )   PT: 12.0 sec;   INR: 1.07 ratio      PTT - ( 08 Aug 2019 07:47 )  PTT:29.5 sec  08-08 @ 20:31  pH: 7.35  pCO2: 52  pO2: 79  SaO2: 94  08-06 @ 07:49  pH: 7.38  pCO2: 50  pO2: 52  SaO2: 85    ASSESSMENT AND PLAN:  ·	Acute Cholecystitis S/P Lap Cholecystectomy  ·	Abdominal pain.  ·	Leukocytosis.  ·	COPD.  ·	Chronic hypoxic hypercarbic Respiratory failure.  ·	Tobacco abuse.  ·	Obesity.  ·	Suspect MEENU.  ·	HTN.  ·	DM.  ·	Hypothyroidism.  ·	Anxiety.    For transfer out of ICU.  Continue O2, nebulizer.  Taper steroids.

## 2019-08-09 NOTE — PHYSICAL THERAPY INITIAL EVALUATION ADULT - GAIT TRAINING, PT EVAL
pt will be able to ambulate >1200 feet with appropriate device for return to community ambulation x4 weeks

## 2019-08-09 NOTE — PHYSICAL THERAPY INITIAL EVALUATION ADULT - GENERAL OBSERVATIONS, REHAB EVAL
Pt encountered supine, alert and oriented x4, iv, cardiac monitor, nasal canula in place, dressings to abdomen, NAD. Pt is s/p lap yuri secondary to acute cholecystitis, extubated yesterday, now on nasal canula.

## 2019-08-09 NOTE — PROGRESS NOTE ADULT - SUBJECTIVE AND OBJECTIVE BOX
HPI:  Pt is a 60 yo F active smoker with h/o COPD, HTN, DM, Hypothyroidism, cholelithiasis and anxiety d/o. Pt was here last month abd pain, HIDA was negative and pain improved; pt was supposed to follow up with Surgeon  for elective cholecystectomy however she states that Surgeon did not accept her insurance. Pt now returns (8/5) to the ER c/o recurrent epigastric abdominal pain x5-6 days with radiation to the back. Admitting dx: Cholelithiasis, acute cholecystitis. Pt remained symptomatic and taken to the OR (8/8); s/p lap yuri 2 to acute calculous cholecystitis. Post-op pt left intubated and transferred to the ICU. Pt was extubated in the ICU        ## Labs:  CBC:                        13.1   23.56 )-----------( 285      ( 09 Aug 2019 04:36 )             40.8     Chem:  08-09    137  |  100  |  20  ----------------------------<  235<H>  4.0   |  28  |  1.05    Ca    8.6      09 Aug 2019 04:36  Phos  4.3     08-09  Mg     2.4     08-09    TPro  7.3  /  Alb  3.4  /  TBili  0.3  /  DBili  x   /  AST  22  /  ALT  29  /  AlkPhos  83  08-09    Coags:  PT/INR - ( 08 Aug 2019 07:47 )   PT: 12.0 sec;   INR: 1.07 ratio         PTT - ( 08 Aug 2019 07:47 )  PTT:29.5 sec        ## Imaging:    ## Medications:    losartan 50 milliGRAM(s) Oral daily    ALBUTerol/ipratropium for Nebulization 3 milliLiter(s) Nebulizer every 6 hours PRN  montelukast 10 milliGRAM(s) Oral at bedtime    atorvastatin 10 milliGRAM(s) Oral at bedtime  dextrose 40% Gel 15 Gram(s) Oral once PRN  dextrose 50% Injectable 12.5 Gram(s) IV Push once  dextrose 50% Injectable 25 Gram(s) IV Push once  dextrose 50% Injectable 25 Gram(s) IV Push once  glucagon  Injectable 1 milliGRAM(s) IntraMuscular once PRN  insulin lispro (HumaLOG) corrective regimen sliding scale   SubCutaneous three times a day before meals  insulin lispro (HumaLOG) corrective regimen sliding scale   SubCutaneous at bedtime  levothyroxine 150 MICROGram(s) Oral daily  methylPREDNISolone sodium succinate Injectable 20 milliGRAM(s) IV Push every 8 hours    heparin  Injectable 5000 Unit(s) SubCutaneous every 8 hours      clonazePAM  Tablet 1 milliGRAM(s) Oral three times a day PRN  escitalopram 20 milliGRAM(s) Oral daily  HYDROmorphone  Injectable 1 milliGRAM(s) IV Push every 6 hours PRN  hydrOXYzine hydrochloride 50 milliGRAM(s) Oral four times a day PRN  morphine  - Injectable 4 milliGRAM(s) IV Push every 4 hours PRN  ondansetron Injectable 4 milliGRAM(s) IV Push every 6 hours PRN      ## Vitals:  T(C): 36.8 (08-09-19 @ 07:39), Max: 37.1 (08-08-19 @ 23:28)  HR: 88 (08-09-19 @ 09:00) (58 - 93)  BP: 125/79 (08-09-19 @ 09:00) (107/86 - 217/122)  BP(mean): 95 (08-09-19 @ 09:00) (75 - 144)  RR: 22 (08-09-19 @ 09:00) (10 - 29)  SpO2: 94% (08-09-19 @ 09:03) (88% - 97%)  Wt(kg): --  Vent: Mode: CPAP with PS, RR (patient): 15, FiO2: 35, PEEP: 5, PS: 5  ABG: ABG - ( 08 Aug 2019 20:31 )  pH, Arterial: x     pH, Blood: 7.35  /  pCO2: 52    /  pO2: 79    / HCO3: 28    / Base Excess: 2.0   /  SaO2: 94                    08-08 @ 07:01  -  08-09 @ 07:00  --------------------------------------------------------  IN: 0 mL / OUT: 1160 mL / NET: -1160 mL    08-09 @ 07:01  -  08-09 @ 10:47  --------------------------------------------------------  IN: 0 mL / OUT: 200 mL / NET: -200 mL          ## P/E:  Gen: sitting comfortably in chair in no apparent distress  Lungs: CTA  Heart: RRR  Abd: Soft/+BS  Ext: No edema  Neuro: Awake/alert    CENTRAL LINE: [ ] YES [ ] NO  LOCATION:   DATE INSERTED:  REMOVE: [ ] YES [ ] NO      HARVEY: [ ] YES [ ] NO    DATE INSERTED:  REMOVE:  [ ] YES [ ] NO      A-LINE:  [ ] YES [ ] NO  LOCATION:   DATE INSERTED:  REMOVE:  [ ] YES [ ] NO  EXPLAIN:    CODE STATUS: [x] full code  [ ] DNR  [ ] DNI  [ ] MOLST  Goals of care discussion: [ ] yes

## 2019-08-09 NOTE — PHYSICAL THERAPY INITIAL EVALUATION ADULT - ADDITIONAL COMMENTS
Pt is R hand dominant, wears magnifying glasses, uses a rolling walker, has home O2 (does not use unless she has to), does not drive. Pt ambulates short distances in the community. Pt has a walk in shower, with regular toilet, no previous history of falls. Pt lives in a basement with 12 steps down with 1 handrail.

## 2019-08-09 NOTE — CHART NOTE - NSCHARTNOTEFT_GEN_A_CORE
HPI    Pt is a 60 yo F active smoker with h/o COPD, HTN, DM, Hypothyroidism, cholelithiasis and anxiety d/o. Pt was here last month abd pain, HIDA was negative and pain improved; pt was supposed to follow up with Surgeon  for elective cholecystectomy however she states that Surgeon did not accept her insurance. Pt now returns (8/5) to the ER c/o recurrent epigastric abdominal pain x5-6 days with radiation to the back. Admitting dx: Cholelithiasis, acute cholecystitis. Pt remained symptomatic and taken to the OR (8/8); s/p lap yuri 2 to acute calculous cholecystitis. Post-op pt left intubated and transferred to the ICU. Pt was extubated in the ICU on 8/8/19. Today patient OOB ambulating in room. PT ordered. Psych: Continue pt's psych meds. Patient active smoker smokes 1 1/2 packs per day. Smoking cessation discussed. Offered and accepted nicotine patch.   Patient seen and examined by ICU attending and stable for transfer to medicine service.     Report given to Dr. Espinoza. PMD    Jeannette Cowan, NP-C  critical care

## 2019-08-09 NOTE — PROGRESS NOTE ADULT - SUBJECTIVE AND OBJECTIVE BOX
60yo Female with PMH Anxiety, COPD, DM, HTN, Hypothyroidism, active smoker, cholelithiasis and no PSH presents to the ER c/o recurrent epigastric abdominal pain x5-6 days, started on wed or thurs per patient but she did not want to come to the ER over the weekend, states pain is worse than usual, described as "sharp" constant with radiation to the back, 10/10 in severity with no alleviating or known triggering factors. Associated with nausea, denies vomiting.    Patient was here last month for similar complaints, HIDA was negative and pain improved, was supposed to follow up with Surgeon in the office for elective cholecystectomy however she states that surgeon did not accept her insurance. (05 Aug 2019 12:14)      Chief Complaint:  Patient is a 59y old  Female who presents with a chief complaint of abdominal pain (06 Aug 2019 13:33)      Review of Systems:    General:  No wt loss, fevers, chills, night sweats  Eyes:  Good vision, no reported pain  ENT:  No sore throat, pain, runny nose, dysphagia  CV:  No pain, palpitations, hypo/hypertension  Resp:  No dyspnea, cough, tachypnea, wheezing  GI: nausea, vomiting, diarrhea,         Social History/Family History  SOCHX:   tobacco,  -  alcohol    FMHX: FA/MO  - contributory       Discussed with:  PMD, Family    Physical Exam:    Vital Signs:  Vital Signs Last 24 Hrs  T(C): 36.6 (06 Aug 2019 17:48), Max: 37.3 (06 Aug 2019 05:17)  T(F): 97.9 (06 Aug 2019 17:48), Max: 99.1 (06 Aug 2019 05:17)  HR: 91 (06 Aug 2019 17:48) (84 - 94)  BP: 154/83 (06 Aug 2019 17:48) (130/58 - 154/83)  BP(mean): --  RR: 18 (06 Aug 2019 17:48) (18 - 18)  SpO2: 90% (06 Aug 2019 17:48) (90% - 99%)  Daily     Daily Weight in k.3 (06 Aug 2019 05:17)  I&O's Summary    05 Aug 2019 07:01  -  06 Aug 2019 07:00  --------------------------------------------------------  IN: 200 mL / OUT: 0 mL / NET: 200 mL          Chest:  Full & symmetric excursion, no increased effort, breath sounds clear  Cardiovascular:  Regular rhythm, S1, S2, no murmur/rub/S3/S4, no carotid/femoral/abdominal bruit, radial/pedal pulses 2+, no edema  Abdomen:  Soft, non-tender, non-distended, normoactive bowel sounds, no HSM      Laboratory:                          13.2   13.38 )-----------( 262      ( 06 Aug 2019 06:31 )             40.4     08-    138  |  103  |  11  ----------------------------<  198<H>  4.6   |  28  |  0.82    Ca    8.9      06 Aug 2019 06:31  Mg     2.3         TPro  7.5  /  Alb  3.2<L>  /  TBili  0.4  /  DBili  x   /  AST  18  /  ALT  20  /  AlkPhos  109  -    ABG - ( 06 Aug 2019 07:49 )  pH, Arterial: x     pH, Blood: 7.38  /  pCO2: 50    /  pO2: 52    / HCO3: 28    / Base Excess: 2.9   /  SaO2: 85            CAPILLARY BLOOD GLUCOSE      POCT Blood Glucose.: 192 mg/dL (06 Aug 2019 16:45)  POCT Blood Glucose.: 208 mg/dL (06 Aug 2019 11:47)  POCT Blood Glucose.: 92 mg/dL (05 Aug 2019 23:06)    LIVER FUNCTIONS - ( 06 Aug 2019 06:31 )  Alb: 3.2 g/dL / Pro: 7.5 gm/dL / ALK PHOS: 109 U/L / ALT: 20 U/L / AST: 18 U/L / GGT: x           PT/INR - ( 05 Aug 2019 10:56 )   PT: 11.1 sec;   INR: 0.99 ratio         PTT - ( 05 Aug 2019 10:56 )  PTT:35.9 sec        Assessment:  I was asked to evaluate this patient for possible presurgical evaluation for cholecystectomy.	  the chart and labs and vital signs are all reviewed.	  the patient's past medical history as noted.  the patient has no significant risk factors for coronary artery disease.  review of the echocardiogram reveals a normal ejection fraction	  the cardiovascular risk assessment is considered acceptable risk for this type of surgical procedure, surgery may proceed.  Post lap yuri

## 2019-08-09 NOTE — CONSULT NOTE ADULT - SUBJECTIVE AND OBJECTIVE BOX
HPI:  60 y/o white female with hx of DM, HTN, COPD with home O2 via NC prn, Hypothyroidism, Anxiety, admitted via the ER on 8/5 with          Allergies :    No Known Allergies    Intolerances        Social History:  Tobacco: + tob use  Alcohol: negative  Recent Travel: none  No pets at home  Lives at home with her son and his father      MEDICATIONS  (STANDING):  atorvastatin 10 milliGRAM(s) Oral at bedtime  chlorhexidine 4% Liquid 1 Application(s) Topical <User Schedule>  dextrose 5%. 1000 milliLiter(s) (50 mL/Hr) IV Continuous <Continuous>  dextrose 50% Injectable 12.5 Gram(s) IV Push once  dextrose 50% Injectable 25 Gram(s) IV Push once  dextrose 50% Injectable 25 Gram(s) IV Push once  escitalopram 20 milliGRAM(s) Oral daily  heparin  Injectable 5000 Unit(s) SubCutaneous every 8 hours  insulin glargine Injectable (LANTUS) 25 Unit(s) SubCutaneous every morning  insulin lispro (HumaLOG) corrective regimen sliding scale   SubCutaneous three times a day before meals  insulin lispro (HumaLOG) corrective regimen sliding scale   SubCutaneous at bedtime  levothyroxine 150 MICROGram(s) Oral daily  losartan 50 milliGRAM(s) Oral daily  methylPREDNISolone sodium succinate Injectable 20 milliGRAM(s) IV Push every 8 hours  montelukast 10 milliGRAM(s) Oral at bedtime  nicotine - 21 mG/24Hr(s) Patch 1 patch Transdermal daily  nystatin Cream 1 Application(s) Topical two times a day    MEDICATIONS  (PRN):  ALBUTerol/ipratropium for Nebulization 3 milliLiter(s) Nebulizer every 6 hours PRN Shortness of Breath and/or Wheezing  clonazePAM  Tablet 1 milliGRAM(s) Oral three times a day PRN anxiety  dextrose 40% Gel 15 Gram(s) Oral once PRN Blood Glucose LESS THAN 70 milliGRAM(s)/deciliter  glucagon  Injectable 1 milliGRAM(s) IntraMuscular once PRN Glucose LESS THAN 70 milligrams/deciliter  HYDROmorphone  Injectable 1 milliGRAM(s) IV Push every 6 hours PRN Severe Pain (7 - 10)  hydrOXYzine hydrochloride 50 milliGRAM(s) Oral four times a day PRN Itching  morphine  - Injectable 4 milliGRAM(s) IV Push every 4 hours PRN Moderate Pain (4 - 6)  ondansetron Injectable 4 milliGRAM(s) IV Push every 6 hours PRN Nausea        LABS:  CBC Full  -  ( 09 Aug 2019 04:36 )  WBC Count : 23.56 K/uL  RBC Count : 4.57 M/uL  Hemoglobin : 13.1 g/dL  Hematocrit : 40.8 %  Platelet Count - Automated : 285 K/uL  Mean Cell Volume : 89.3 fl  Mean Cell Hemoglobin : 28.7 pg  Mean Cell Hemoglobin Concentration : 32.1 gm/dL  Auto Neutrophil # : 20.74 K/uL  Auto Lymphocyte # : 1.46 K/uL  Auto Monocyte # : 1.00 K/uL  Auto Eosinophil # : 0.00 K/uL  Auto Basophil # : 0.05 K/uL  Auto Neutrophil % : 88.1 %  Auto Lymphocyte % : 6.2 %  Auto Monocyte % : 4.2 %  Auto Eosinophil % : 0.0 %  Auto Basophil % : 0.2 %    08-09    137  |  100  |  20  ----------------------------<  235<H>  4.0   |  28  |  1.05    Ca    8.6      09 Aug 2019 04:36  Phos  4.3     08-09  Mg     2.4     08-09    TPro  7.3  /  Alb  3.4  /  TBili  0.3  /  DBili  x   /  AST  22  /  ALT  29  /  AlkPhos  83  08-09    LIVER FUNCTIONS - ( 09 Aug 2019 04:36 )  Alb: 3.4 g/dL / Pro: 7.3 gm/dL / ALK PHOS: 83 U/L / ALT: 29 U/L / AST: 22 U/L / GGT: x           PT/INR - ( 08 Aug 2019 07:47 )   PT: 12.0 sec;   INR: 1.07 ratio         PTT - ( 08 Aug 2019 07:47 )  PTT:29.5 sec    ABG - ( 08 Aug 2019 20:31 )  pH, Arterial: x     pH, Blood: 7.35  /  pCO2: 52    /  pO2: 79    / HCO3: 28    / Base Excess: 2.0   /  SaO2: 94          Lipase, Serum: 77 U/L (08-06 @ 06:31)    Lipase, Serum: 223 U/L (08-05 @ 08:02)      Surgical Pathology - pending            Radiology:  < from: US Abdomen Complete (08.05.19 @ 09:51) >  INTERPRETATION:  CLINICAL INFORMATION: Right upper quadrant abdominal pain    COMPARISON: 7/11/2019 and CT dated 6/19/2019    TECHNIQUE: Sonography of the abdomen.     FINDINGS:    Liver: Increased echogenicity.    Bile ducts: Borderline dilated. Common bile duct measures 8 mm.     Gallbladder: Cholelithiasis and sludge. Wall thickening. Positive   sonographic Gil's sign noted by technologist.    Pancreas: Obscured by bowel gas.    Spleen: 9.8 cm. Within normal limits.    Right kidney: 10.5 cm. No hydronephrosis.    Left kidney: 10.6 cm.  No hydronephrosis.    Ascites: None.    Aorta and IVC: Visualized portions are within normal limits.    Incidental 1.2 x 1.7 cm urinary bladder nodule.    IMPRESSION:     Fatty liver.  Cholelithiasis with sonographic evidence of acute cholecystitis.  Borderline biliary ductal dilatation.  1.7 cm urinary bladder nodule. Direct visualization with cystoscopy   recommended for further assessment.               Vital Signs Last 24 Hrs  T(C): 36.6 (09 Aug 2019 15:38), Max: 37.1 (08 Aug 2019 23:28)  T(F): 97.9 (09 Aug 2019 15:38), Max: 98.7 (08 Aug 2019 23:28)  HR: 73 (09 Aug 2019 16:00) (58 - 101)  BP: 120/70 (09 Aug 2019 16:00) (107/86 - 217/122)  BP(mean): 83 (09 Aug 2019 16:00) (74 - 144)  RR: 18 (09 Aug 2019 16:00) (10 - 29)  SpO2: 92% (09 Aug 2019 16:00) (85% - 97%)  Supplemental O2:  Mode: CPAP with PS  FiO2: 35  PEEP: 5  PS: 5    PHYSICAL EXAM    General: 60 y/o white female, awake, alert, with NCO2 in place, pleasant and cooperative, but c/o abdominal pain and feeling anxious, but in NAD at present  HEENT: conj pink, sclerae anicteric, PERRLA, no oral lesions noted, missing several teeth  Neck: supple, no nodes noted  Heart: RR  Lungs: decreased BS at the bases otherwise clear bilaterally now  Abdomen: soft, BS+, tender to palpation in the RLQ and Rt mid abdomen, no masses or HS-megaly detected, no rebound noted                   3 small bandages over surgical sites with no surrounding erythema noted  No CVA or Spinal tenderness elicited to palpation  Extremities: no edema LE's                    no calf tenderness to palpation  Skin: warm, dry, no rash noted          I&O's Summary :    08 Aug 2019 07:01  -  09 Aug 2019 07:00  --------------------------------------------------------  IN: 0 mL / OUT: 1160 mL / NET: -1160 mL    09 Aug 2019 07:01  -  09 Aug 2019 17:06  --------------------------------------------------------  IN: 100 mL / OUT: 650 mL / NET: -550 mL        Impression:    Suggestions: HPI:  58 y/o white female with hx of DM, HTN, COPD with home O2 via NC prn, Hypothyroidism, Anxiety, admitted via the ER on 8/5 with c/o severe abdominal pain with radiation to her back and associated nausea which was worsening over the prior several; days.  No c/o vomiting and no reported fevers or chills.  Patient had previously been at [facility]  in the ER on 6/18 with c/o abdominal pain and CT of the Abdomen at that time revealed GB Stone with sludge and then she was admitted from 7/11 - 7/15 with c/o abdominal pain and w/u at that time with HIDA scan was done and reported negative for Acute Cholecystitis and patient was subsequently d/c'ed to home.  W/u in the ER on this admission with Abdominal Sono revealed changes consistent with Acute Cholecystitis as well as a urinary bladder nodule.  Patient was begun on ab rx with Zosyn and subsequently underwent Lap Cholecystectomy on 8/8 and was admitted to the CCU post-op.  Patient presently c/o abdominal pain but no c/o N/V and no fever or chills reported.  Some SOB but relieved with nebulizer rx and is using NC O2 now.  No c/o difficulty voiding or diarrhea.  Mild sore throat secondary to intubation for her surgery but no difficulty swallowing.      Allergies :    No Known Allergies    Intolerances        Social History:  Tobacco: + tob use  Alcohol: negative  Recent Travel: none  No pets at home  Lives at home with her son and his father ( her significant other)      MEDICATIONS  (STANDING):  atorvastatin 10 milliGRAM(s) Oral at bedtime  chlorhexidine 4% Liquid 1 Application(s) Topical <User Schedule>  dextrose 5%. 1000 milliLiter(s) (50 mL/Hr) IV Continuous <Continuous>  dextrose 50% Injectable 12.5 Gram(s) IV Push once  dextrose 50% Injectable 25 Gram(s) IV Push once  dextrose 50% Injectable 25 Gram(s) IV Push once  escitalopram 20 milliGRAM(s) Oral daily  heparin  Injectable 5000 Unit(s) SubCutaneous every 8 hours  insulin glargine Injectable (LANTUS) 25 Unit(s) SubCutaneous every morning  insulin lispro (HumaLOG) corrective regimen sliding scale   SubCutaneous three times a day before meals  insulin lispro (HumaLOG) corrective regimen sliding scale   SubCutaneous at bedtime  levothyroxine 150 MICROGram(s) Oral daily  losartan 50 milliGRAM(s) Oral daily  methylPREDNISolone sodium succinate Injectable 20 milliGRAM(s) IV Push every 8 hours  montelukast 10 milliGRAM(s) Oral at bedtime  nicotine - 21 mG/24Hr(s) Patch 1 patch Transdermal daily  nystatin Cream 1 Application(s) Topical two times a day    MEDICATIONS  (PRN):  ALBUTerol/ipratropium for Nebulization 3 milliLiter(s) Nebulizer every 6 hours PRN Shortness of Breath and/or Wheezing  clonazePAM  Tablet 1 milliGRAM(s) Oral three times a day PRN anxiety  dextrose 40% Gel 15 Gram(s) Oral once PRN Blood Glucose LESS THAN 70 milliGRAM(s)/deciliter  glucagon  Injectable 1 milliGRAM(s) IntraMuscular once PRN Glucose LESS THAN 70 milligrams/deciliter  HYDROmorphone  Injectable 1 milliGRAM(s) IV Push every 6 hours PRN Severe Pain (7 - 10)  hydrOXYzine hydrochloride 50 milliGRAM(s) Oral four times a day PRN Itching  morphine  - Injectable 4 milliGRAM(s) IV Push every 4 hours PRN Moderate Pain (4 - 6)  ondansetron Injectable 4 milliGRAM(s) IV Push every 6 hours PRN Nausea        LABS:  CBC Full  -  ( 09 Aug 2019 04:36 )  WBC Count : 23.56 K/uL  RBC Count : 4.57 M/uL  Hemoglobin : 13.1 g/dL  Hematocrit : 40.8 %  Platelet Count - Automated : 285 K/uL  Mean Cell Volume : 89.3 fl  Mean Cell Hemoglobin : 28.7 pg  Mean Cell Hemoglobin Concentration : 32.1 gm/dL  Auto Neutrophil # : 20.74 K/uL  Auto Lymphocyte # : 1.46 K/uL  Auto Monocyte # : 1.00 K/uL  Auto Eosinophil # : 0.00 K/uL  Auto Basophil # : 0.05 K/uL  Auto Neutrophil % : 88.1 %  Auto Lymphocyte % : 6.2 %  Auto Monocyte % : 4.2 %  Auto Eosinophil % : 0.0 %  Auto Basophil % : 0.2 %    08-09    137  |  100  |  20  ----------------------------<  235<H>  4.0   |  28  |  1.05    Ca    8.6      09 Aug 2019 04:36  Phos  4.3     08-09  Mg     2.4     08-09    TPro  7.3  /  Alb  3.4  /  TBili  0.3  /  DBili  x   /  AST  22  /  ALT  29  /  AlkPhos  83  08-09    LIVER FUNCTIONS - ( 09 Aug 2019 04:36 )  Alb: 3.4 g/dL / Pro: 7.3 gm/dL / ALK PHOS: 83 U/L / ALT: 29 U/L / AST: 22 U/L / GGT: x           PT/INR - ( 08 Aug 2019 07:47 )   PT: 12.0 sec;   INR: 1.07 ratio         PTT - ( 08 Aug 2019 07:47 )  PTT:29.5 sec    ABG - ( 08 Aug 2019 20:31 )  pH, Arterial: x     pH, Blood: 7.35  /  pCO2: 52    /  pO2: 79    / HCO3: 28    / Base Excess: 2.0   /  SaO2: 94          Lipase, Serum: 77 U/L (08-06 @ 06:31)    Lipase, Serum: 223 U/L (08-05 @ 08:02)      Surgical Pathology - pending            Radiology:  < from: US Abdomen Complete (08.05.19 @ 09:51) >  INTERPRETATION:  CLINICAL INFORMATION: Right upper quadrant abdominal pain    COMPARISON: 7/11/2019 and CT dated 6/19/2019    TECHNIQUE: Sonography of the abdomen.     FINDINGS:    Liver: Increased echogenicity.    Bile ducts: Borderline dilated. Common bile duct measures 8 mm.     Gallbladder: Cholelithiasis and sludge. Wall thickening. Positive   sonographic Gil's sign noted by technologist.    Pancreas: Obscured by bowel gas.    Spleen: 9.8 cm. Within normal limits.    Right kidney: 10.5 cm. No hydronephrosis.    Left kidney: 10.6 cm.  No hydronephrosis.    Ascites: None.    Aorta and IVC: Visualized portions are within normal limits.    Incidental 1.2 x 1.7 cm urinary bladder nodule.    IMPRESSION:     Fatty liver.  Cholelithiasis with sonographic evidence of acute cholecystitis.  Borderline biliary ductal dilatation.  1.7 cm urinary bladder nodule. Direct visualization with cystoscopy   recommended for further assessment.               Vital Signs Last 24 Hrs  T(C): 36.6 (09 Aug 2019 15:38), Max: 37.1 (08 Aug 2019 23:28)  T(F): 97.9 (09 Aug 2019 15:38), Max: 98.7 (08 Aug 2019 23:28)  HR: 73 (09 Aug 2019 16:00) (58 - 101)  BP: 120/70 (09 Aug 2019 16:00) (107/86 - 217/122)  BP(mean): 83 (09 Aug 2019 16:00) (74 - 144)  RR: 18 (09 Aug 2019 16:00) (10 - 29)  SpO2: 92% (09 Aug 2019 16:00) (85% - 97%)  Supplemental O2:  Mode: CPAP with PS  FiO2: 35  PEEP: 5  PS: 5    PHYSICAL EXAM    General: 58 y/o white female, awake, alert, with NCO2 in place, pleasant and cooperative, but c/o abdominal pain and feeling anxious, but in NAD at present  HEENT: conj pink, sclerae anicteric, PERRLA, no oral lesions noted, missing several teeth  Neck: supple, no nodes noted  Heart: RR  Lungs: decreased BS at the bases otherwise clear bilaterally now  Abdomen: soft, BS+, tender to palpation in the RLQ and Rt mid abdomen, no masses or HS-megaly detected, no rebound noted                   3 small bandages over surgical sites with no surrounding erythema noted  No CVA or Spinal tenderness elicited to palpation  Extremities: no edema LE's                    no calf tenderness to palpation  Skin: warm, dry, no rash noted          I&O's Summary :    08 Aug 2019 07:01  -  09 Aug 2019 07:00  --------------------------------------------------------  IN: 0 mL / OUT: 1160 mL / NET: -1160 mL    09 Aug 2019 07:01  -  09 Aug 2019 17:06  --------------------------------------------------------  IN: 100 mL / OUT: 650 mL / NET: -550 mL        Impression:  58 y/o white female with hx of HTN, DM, Hypothyroidism, COPD on home O2 prn and nebulizers, Anxiety, found to have Cholelithiasis on recent w/u for abdominal pain initially on 6/18, with admission from 7/11-7/15 for abdominal pain and now re-admitted on 8/5 with c/o worsening abdominal pain with radiation to her back and found to have  Sepsis due to Acute Cholecystitis - now s/p Lap Cholecystectomy on 8/8.  Note WBC's remain elevated  - ? contributed to by Steroid Rx ?    Suggestions:  Will therefore continue ab rx with Zosyn and follow-up temps and labs.  Await Surgical Pathology and ? Cx from the OR ( no prior Cx's done ).  Consider taper steroids in next few days if Respiratory status remains stable.  Consider Urology evaluation in view of 1.2 x 1.7 cm  Urinary Bladder nodule noted on initial Sono.  Discussed with patient and her friend at bedside.  Thanks, chelsie follow with you.

## 2019-08-10 LAB
ALBUMIN SERPL ELPH-MCNC: 3.5 G/DL — SIGNIFICANT CHANGE UP (ref 3.3–5)
ALP SERPL-CCNC: 86 U/L — SIGNIFICANT CHANGE UP (ref 40–120)
ALT FLD-CCNC: 27 U/L — SIGNIFICANT CHANGE UP (ref 12–78)
ANION GAP SERPL CALC-SCNC: 5 MMOL/L — SIGNIFICANT CHANGE UP (ref 5–17)
AST SERPL-CCNC: 16 U/L — SIGNIFICANT CHANGE UP (ref 15–37)
BILIRUB SERPL-MCNC: 0.3 MG/DL — SIGNIFICANT CHANGE UP (ref 0.2–1.2)
BUN SERPL-MCNC: 19 MG/DL — SIGNIFICANT CHANGE UP (ref 7–23)
CALCIUM SERPL-MCNC: 8.8 MG/DL — SIGNIFICANT CHANGE UP (ref 8.5–10.1)
CHLORIDE SERPL-SCNC: 100 MMOL/L — SIGNIFICANT CHANGE UP (ref 96–108)
CO2 SERPL-SCNC: 32 MMOL/L — HIGH (ref 22–31)
CREAT SERPL-MCNC: 0.8 MG/DL — SIGNIFICANT CHANGE UP (ref 0.5–1.3)
GLUCOSE BLDC GLUCOMTR-MCNC: 109 MG/DL — HIGH (ref 70–99)
GLUCOSE BLDC GLUCOMTR-MCNC: 239 MG/DL — HIGH (ref 70–99)
GLUCOSE BLDC GLUCOMTR-MCNC: 247 MG/DL — HIGH (ref 70–99)
GLUCOSE BLDC GLUCOMTR-MCNC: 276 MG/DL — HIGH (ref 70–99)
GLUCOSE BLDC GLUCOMTR-MCNC: 277 MG/DL — HIGH (ref 70–99)
GLUCOSE SERPL-MCNC: 226 MG/DL — HIGH (ref 70–99)
HCT VFR BLD CALC: 41.5 % — SIGNIFICANT CHANGE UP (ref 34.5–45)
HGB BLD-MCNC: 13.3 G/DL — SIGNIFICANT CHANGE UP (ref 11.5–15.5)
MAGNESIUM SERPL-MCNC: 2.5 MG/DL — SIGNIFICANT CHANGE UP (ref 1.6–2.6)
MCHC RBC-ENTMCNC: 28.6 PG — SIGNIFICANT CHANGE UP (ref 27–34)
MCHC RBC-ENTMCNC: 32 GM/DL — SIGNIFICANT CHANGE UP (ref 32–36)
MCV RBC AUTO: 89.2 FL — SIGNIFICANT CHANGE UP (ref 80–100)
NRBC # BLD: 0 /100 WBCS — SIGNIFICANT CHANGE UP (ref 0–0)
PHOSPHATE SERPL-MCNC: 2.9 MG/DL — SIGNIFICANT CHANGE UP (ref 2.5–4.5)
PLATELET # BLD AUTO: 292 K/UL — SIGNIFICANT CHANGE UP (ref 150–400)
POTASSIUM SERPL-MCNC: 4.2 MMOL/L — SIGNIFICANT CHANGE UP (ref 3.5–5.3)
POTASSIUM SERPL-SCNC: 4.2 MMOL/L — SIGNIFICANT CHANGE UP (ref 3.5–5.3)
PROT SERPL-MCNC: 7.6 GM/DL — SIGNIFICANT CHANGE UP (ref 6–8.3)
RBC # BLD: 4.65 M/UL — SIGNIFICANT CHANGE UP (ref 3.8–5.2)
RBC # FLD: 12.8 % — SIGNIFICANT CHANGE UP (ref 10.3–14.5)
SODIUM SERPL-SCNC: 137 MMOL/L — SIGNIFICANT CHANGE UP (ref 135–145)
WBC # BLD: 19.41 K/UL — HIGH (ref 3.8–10.5)
WBC # FLD AUTO: 19.41 K/UL — HIGH (ref 3.8–10.5)

## 2019-08-10 RX ORDER — PIPERACILLIN AND TAZOBACTAM 4; .5 G/20ML; G/20ML
3.38 INJECTION, POWDER, LYOPHILIZED, FOR SOLUTION INTRAVENOUS EVERY 8 HOURS
Refills: 0 | Status: DISCONTINUED | OUTPATIENT
Start: 2019-08-10 | End: 2019-08-13

## 2019-08-10 RX ORDER — INSULIN LISPRO 100/ML
4 VIAL (ML) SUBCUTANEOUS
Refills: 0 | Status: DISCONTINUED | OUTPATIENT
Start: 2019-08-10 | End: 2019-08-13

## 2019-08-10 RX ADMIN — HYDROMORPHONE HYDROCHLORIDE 1 MILLIGRAM(S): 2 INJECTION INTRAMUSCULAR; INTRAVENOUS; SUBCUTANEOUS at 03:50

## 2019-08-10 RX ADMIN — CHLORHEXIDINE GLUCONATE 1 APPLICATION(S): 213 SOLUTION TOPICAL at 07:43

## 2019-08-10 RX ADMIN — Medication 20 MILLIGRAM(S): at 21:33

## 2019-08-10 RX ADMIN — HYDROMORPHONE HYDROCHLORIDE 1 MILLIGRAM(S): 2 INJECTION INTRAMUSCULAR; INTRAVENOUS; SUBCUTANEOUS at 10:40

## 2019-08-10 RX ADMIN — Medication 1 MILLIGRAM(S): at 16:02

## 2019-08-10 RX ADMIN — MORPHINE SULFATE 4 MILLIGRAM(S): 50 CAPSULE, EXTENDED RELEASE ORAL at 01:20

## 2019-08-10 RX ADMIN — Medication 4 UNIT(S): at 12:25

## 2019-08-10 RX ADMIN — Medication 4 UNIT(S): at 17:01

## 2019-08-10 RX ADMIN — HYDROMORPHONE HYDROCHLORIDE 1 MILLIGRAM(S): 2 INJECTION INTRAMUSCULAR; INTRAVENOUS; SUBCUTANEOUS at 10:28

## 2019-08-10 RX ADMIN — ESCITALOPRAM OXALATE 20 MILLIGRAM(S): 10 TABLET, FILM COATED ORAL at 12:25

## 2019-08-10 RX ADMIN — HEPARIN SODIUM 5000 UNIT(S): 5000 INJECTION INTRAVENOUS; SUBCUTANEOUS at 21:33

## 2019-08-10 RX ADMIN — ATORVASTATIN CALCIUM 10 MILLIGRAM(S): 80 TABLET, FILM COATED ORAL at 21:33

## 2019-08-10 RX ADMIN — MORPHINE SULFATE 4 MILLIGRAM(S): 50 CAPSULE, EXTENDED RELEASE ORAL at 01:03

## 2019-08-10 RX ADMIN — HEPARIN SODIUM 5000 UNIT(S): 5000 INJECTION INTRAVENOUS; SUBCUTANEOUS at 05:19

## 2019-08-10 RX ADMIN — Medication 3 MILLILITER(S): at 16:57

## 2019-08-10 RX ADMIN — Medication 50 MILLIGRAM(S): at 21:32

## 2019-08-10 RX ADMIN — Medication 20 MILLIGRAM(S): at 05:19

## 2019-08-10 RX ADMIN — HEPARIN SODIUM 5000 UNIT(S): 5000 INJECTION INTRAVENOUS; SUBCUTANEOUS at 13:20

## 2019-08-10 RX ADMIN — LOSARTAN POTASSIUM 50 MILLIGRAM(S): 100 TABLET, FILM COATED ORAL at 05:19

## 2019-08-10 RX ADMIN — Medication 50 MILLIGRAM(S): at 13:20

## 2019-08-10 RX ADMIN — HYDROMORPHONE HYDROCHLORIDE 1 MILLIGRAM(S): 2 INJECTION INTRAMUSCULAR; INTRAVENOUS; SUBCUTANEOUS at 17:15

## 2019-08-10 RX ADMIN — Medication 20 MILLIGRAM(S): at 13:21

## 2019-08-10 RX ADMIN — Medication 150 MICROGRAM(S): at 05:19

## 2019-08-10 RX ADMIN — Medication 50 MILLIGRAM(S): at 07:41

## 2019-08-10 RX ADMIN — Medication 1: at 21:34

## 2019-08-10 RX ADMIN — Medication 50 MILLIGRAM(S): at 01:05

## 2019-08-10 RX ADMIN — HYDROMORPHONE HYDROCHLORIDE 1 MILLIGRAM(S): 2 INJECTION INTRAMUSCULAR; INTRAVENOUS; SUBCUTANEOUS at 17:03

## 2019-08-10 RX ADMIN — NYSTATIN CREAM 1 APPLICATION(S): 100000 CREAM TOPICAL at 17:01

## 2019-08-10 RX ADMIN — HYDROMORPHONE HYDROCHLORIDE 1 MILLIGRAM(S): 2 INJECTION INTRAMUSCULAR; INTRAVENOUS; SUBCUTANEOUS at 04:12

## 2019-08-10 RX ADMIN — INSULIN GLARGINE 25 UNIT(S): 100 INJECTION, SOLUTION SUBCUTANEOUS at 07:42

## 2019-08-10 RX ADMIN — Medication 6: at 12:25

## 2019-08-10 RX ADMIN — PIPERACILLIN AND TAZOBACTAM 25 GRAM(S): 4; .5 INJECTION, POWDER, LYOPHILIZED, FOR SOLUTION INTRAVENOUS at 21:34

## 2019-08-10 RX ADMIN — MONTELUKAST 10 MILLIGRAM(S): 4 TABLET, CHEWABLE ORAL at 21:33

## 2019-08-10 RX ADMIN — Medication 1 MILLIGRAM(S): at 21:38

## 2019-08-10 RX ADMIN — Medication 4: at 07:41

## 2019-08-10 NOTE — PROGRESS NOTE ADULT - SUBJECTIVE AND OBJECTIVE BOX
INTERVAL HPI/OVERNIGHT EVENTS:        REVIEW OF SYSTEMS:  CONSTITUTIONAL:  c/o  abd  pain  no  vomting  appetite  good    NECK: No pain or stiffnes  RESPIRATORY: No SOB   CARDIOVASCULAR: No chest pain, palpitations, dizziness,   GASTROINTESTINAL:  abd  pain. No nausea, vomiting,   NEUROLOGICAL: No headaches, no  blurry  vision no  dizziness  SKIN: No itching,   MUSCULOSKELETAL: No pain    MEDICATION:  ALBUTerol/ipratropium for Nebulization 3 milliLiter(s) Nebulizer every 6 hours PRN  atorvastatin 10 milliGRAM(s) Oral at bedtime  chlorhexidine 4% Liquid 1 Application(s) Topical <User Schedule>  clonazePAM  Tablet 1 milliGRAM(s) Oral three times a day PRN  dextrose 40% Gel 15 Gram(s) Oral once PRN  dextrose 5%. 1000 milliLiter(s) IV Continuous <Continuous>  dextrose 50% Injectable 12.5 Gram(s) IV Push once  dextrose 50% Injectable 25 Gram(s) IV Push once  dextrose 50% Injectable 25 Gram(s) IV Push once  escitalopram 20 milliGRAM(s) Oral daily  glucagon  Injectable 1 milliGRAM(s) IntraMuscular once PRN  heparin  Injectable 5000 Unit(s) SubCutaneous every 8 hours  HYDROmorphone  Injectable 1 milliGRAM(s) IV Push every 6 hours PRN  hydrOXYzine hydrochloride 50 milliGRAM(s) Oral four times a day PRN  insulin glargine Injectable (LANTUS) 25 Unit(s) SubCutaneous every morning  insulin lispro (HumaLOG) corrective regimen sliding scale   SubCutaneous three times a day before meals  insulin lispro (HumaLOG) corrective regimen sliding scale   SubCutaneous at bedtime  levothyroxine 150 MICROGram(s) Oral daily  losartan 50 milliGRAM(s) Oral daily  methylPREDNISolone sodium succinate Injectable 20 milliGRAM(s) IV Push every 8 hours  montelukast 10 milliGRAM(s) Oral at bedtime  morphine  - Injectable 4 milliGRAM(s) IV Push every 4 hours PRN  nicotine - 21 mG/24Hr(s) Patch 1 patch Transdermal daily  nystatin Cream 1 Application(s) Topical two times a day  ondansetron Injectable 4 milliGRAM(s) IV Push every 6 hours PRN    Vital Signs Last 24 Hrs  T(C): 37.1 (10 Aug 2019 05:25), Max: 37.3 (10 Aug 2019 00:05)  T(F): 98.8 (10 Aug 2019 05:25), Max: 99.2 (10 Aug 2019 00:05)  HR: 65 (10 Aug 2019 05:25) (63 - 101)  BP: 136/83 (10 Aug 2019 05:25) (120/70 - 158/95)  BP(mean): 83 (09 Aug 2019 16:00) (74 - 111)  RR: 18 (10 Aug 2019 05:25) (11 - 19)  SpO2: 95% (10 Aug 2019 05:25) (85% - 98%)    PHYSICAL EXAM:  GENERAL: NAD, well-groomed, well-developed  EYES:  conjunctiva and sclera clear  ENMT:  Moist mucous membranes,   NECK: Supple, No JVD, Normal thyroid  NERVOUS SYSTEM:  Alert oriented   no  focal  deficits;   CHEST/LUNG: Clear    HEART: Regular rate and rhythm; No murmurs, rubs, or gallops  ABDOMEN: Soft, mild  diffuse  tendernesss , Nondistended; Bowel sounds present  EXTREMITIES:  no  edema no  tenderness  SKIN: No rashes   LABS:                        13.3   19.41 )-----------( 292      ( 10 Aug 2019 07:18 )             41.5     08-10    137  |  100  |  19  ----------------------------<  226<H>  4.2   |  32<H>  |  0.80    Ca    8.8      10 Aug 2019 07:18  Phos  2.9     08-10  Mg     2.5     08-10    TPro  7.6  /  Alb  3.5  /  TBili  0.3  /  DBili  x   /  AST  16  /  ALT  27  /  AlkPhos  86  08-10        CAPILLARY BLOOD GLUCOSE      POCT Blood Glucose.: 239 mg/dL (10 Aug 2019 07:30)  POCT Blood Glucose.: 276 mg/dL (09 Aug 2019 21:34)  POCT Blood Glucose.: 215 mg/dL (09 Aug 2019 16:07)  POCT Blood Glucose.: 245 mg/dL (09 Aug 2019 13:53)  POCT Blood Glucose.: 266 mg/dL (09 Aug 2019 11:52)      RADIOLOGY & ADDITIONAL TESTS:    Imaging reports  Personally Reviewed:  [ x] YES  [ ] NO    Consultant(s) Notes Reviewed:  [x ] YES  [ ] NO    Care Discussed with Consultants/Other Providers [ x] YES  [ ] NO  ·  Problem: Acute cholecystitis. s/p  Lap yuri   Plan: antibiotics pain  managemnt  diet  activity  as per  surgery  surgery Follow up.      Problem/Plan - 2:  ·  Problem: Type 2 diabetes mellitus without complication, without long-term current use of insulin.  Plan: riss.      Problem/Plan - 3:  ·  Problem: Essential hypertension.  Plan: continue losartan     Problem/Plan - 4:  ·  Problem: Acquired hypothyroidism.  Plan: continue synthroid  COPD  duoneb O2     Problem/Plan - 5:  ·  Problem: Anxiety.  Plan: continue clonazepam   tobacco  abuse  nicotine  patcc

## 2019-08-10 NOTE — CHART NOTE - NSCHARTNOTEFT_GEN_A_CORE
13.3   19.41 )-----------( 292      ( 10 Aug 2019 07:18 )             41.5   08-09    137  |  100  |  20  ----------------------------<  235<H>  4.0   |  28  |  1.05    Ca    8.6      09 Aug 2019 04:36  Phos  4.3     08-09  Mg     2.4     08-09    TPro  7.3  /  Alb  3.4  /  TBili  0.3  /  DBili  x   /  AST  22  /  ALT  29  /  AlkPhos  83  08-09    Morning labs reviewed and electrolytes replaced as needed, repeat labs in am.   Leukocytosis likely secondary to Steroids. 13.3   19.41 )-----------( 292      ( 10 Aug 2019 07:18 )             41.5   08-09    137  |  100  |  20  ----------------------------<  235<H>  4.0   |  28  |  1.05    Ca    8.6      09 Aug 2019 04:36  Phos  4.3     08-09  Mg     2.4     08-09    TPro  7.3  /  Alb  3.4  /  TBili  0.3  /  DBili  x   /  AST  22  /  ALT  29  /  AlkPhos  83  08-09    Morning labs reviewed and electrolytes replaced as needed, repeat labs in am.   Leukocytosis likely secondary to Steroids.  Stable from Surgical point of view for discharge. Will sign off case.

## 2019-08-10 NOTE — PROGRESS NOTE ADULT - SUBJECTIVE AND OBJECTIVE BOX
TMAX - 99.2    On day # 5 Zosyn now    Vital Signs Last 24 Hrs  T(C): 36.8 (10 Aug 2019 11:40), Max: 37.3 (10 Aug 2019 00:05)  T(F): 98.3 (10 Aug 2019 11:40), Max: 99.2 (10 Aug 2019 00:05)  HR: 56 (10 Aug 2019 17:03) (56 - 85)  BP: 144/72 (10 Aug 2019 11:40) (129/56 - 144/72)  BP(mean): --  RR: 17 (10 Aug 2019 11:40) (17 - 18)  SpO2: 96% (10 Aug 2019 17:03) (92% - 98%)  Supplemental O2:  on NCO2 now      Awake, alert, c/o lower abdominal pain, but no N/V/D now.  Also c/o cough with thick black mucous production and c/o wheezing and tightness in her chest now.  Has not had any nebulizer treatments today and asking for one now.  No c/o fever/ chills.  Claims she ambulated in the hallway earlier and asking for a regular diet in place of pureed food she is receiving.  Remains on Solumedrol 20mg IVP q 8hrs.      PHYSICAL EXAM  General:  awake, alert, lying on her side in bed now c/o lower abdominal pain but in NAD, cooperative and pleasant and with NCO2 in place now  HEENT:  conj pink, sclerae anicteric, PERRLA, no oral lesions noted  Neck:  supple, no nodes noted  Heart:  RR  Lungs:  decreased BS throughout  Abdomen:  BS+, soft, tender to palpation across the lower abdomen, but no rebound                   3 surgical sites clean with no surrounding erythema - some dried blood at the umbilical site  No CVA or Spinal tenderness to palpation  Extremities:  no edema LE's                     no calf tenderness noted  Skin:  warm, dry, no rash noted           few ecchymoses of the abdominal wall from SQ Heparin administration      I&O's Summary :    09 Aug 2019 07:01  -  10 Aug 2019 07:00  --------------------------------------------------------  IN: 600 mL / OUT: 650 mL / NET: -50 mL    10 Aug 2019 07:01  -  10 Aug 2019 17:21  --------------------------------------------------------  IN: 1000 mL / OUT: 0 mL / NET: 1000 mL        LABS:  CBC Full  -  ( 10 Aug 2019 07:18 )  WBC Count : 19.41 K/uL  RBC Count : 4.65 M/uL  Hemoglobin : 13.3 g/dL  Hematocrit : 41.5 %  Platelet Count - Automated : 292 K/uL  Mean Cell Volume : 89.2 fl  Mean Cell Hemoglobin : 28.6 pg  Mean Cell Hemoglobin Concentration : 32.0 gm/dL  Auto Neutrophil # : x  Auto Lymphocyte # : x  Auto Monocyte # : x  Auto Eosinophil # : x  Auto Basophil # : x  Auto Neutrophil % : x  Auto Lymphocyte % : x  Auto Monocyte % : x  Auto Eosinophil % : x  Auto Basophil % : x    08-10    137  |  100  |  19  ----------------------------<  226<H>  4.2   |  32<H>  |  0.80    Ca    8.8      10 Aug 2019 07:18  Phos  2.9     08-10  Mg     2.5     08-10    TPro  7.6  /  Alb  3.5  /  TBili  0.3  /  DBili  x   /  AST  16  /  ALT  27  /  AlkPhos  86  08-10    LIVER FUNCTIONS - ( 10 Aug 2019 07:18 )  Alb: 3.5 g/dL / Pro: 7.6 gm/dL / ALK PHOS: 86 U/L / ALT: 27 U/L / AST: 16 U/L / GGT: x             ABG - ( 08 Aug 2019 20:31 )  pH, Arterial: x     pH, Blood: 7.35  /  pCO2: 52    /  pO2: 79    / HCO3: 28    / Base Excess: 2.0   /  SaO2: 94          Lipase, Serum: 77 U/L (08-06 @ 06:31)      Lipase, Serum: 223 U/L (08-05 @ 08:02)        Radiology:   < from: US Abdomen Complete (08.05.19 @ 09:51) >  PROCEDURE DATE:  08/05/2019          INTERPRETATION:  CLINICAL INFORMATION: Right upper quadrant abdominal pain    COMPARISON: 7/11/2019 and CT dated 6/19/2019    TECHNIQUE: Sonography of the abdomen.     FINDINGS:    Liver: Increased echogenicity.    Bile ducts: Borderline dilated. Common bile duct measures 8 mm.     Gallbladder: Cholelithiasis and sludge. Wall thickening. Positive   sonographic Gil's sign noted by technologist.    Pancreas: Obscured by bowel gas.    Spleen: 9.8 cm. Within normal limits.    Right kidney: 10.5 cm. No hydronephrosis.    Left kidney: 10.6 cm.  No hydronephrosis.    Ascites: None.    Aorta and IVC: Visualized portions are within normal limits.    Incidental 1.2 x 1.7 cm urinary bladder nodule.    IMPRESSION:     Fatty liver.  Cholelithiasis with sonographic evidence of acute cholecystitis.  Borderline biliary ductal dilatation.  1.7 cm urinary bladder nodule. Direct visualization with cystoscopy   recommended for further assessment.          Impression:  Remains afebrile on present ab rx with Zosyn for continued rx of Sepsis due to Acute Cholecystitis, s/p Lap Cholecystectomy.  WBC's remain elevated - ? secondary to continued rx with Steroids.  Also noted initial Sono report with 1.7cm bladder nodule seen  - ? etiology?     Suggestions:  Will continue rx with Zosyn and await Surgical Pathology - ? any Cx obtained from GB ? -  Will request CXR as well in view of c/o cough and wheezing and requested nebulizer rx to be given.  ? Taper steroids in next few days.  Consider Urology evaluation regarding bladder nodule noted on Sono.  Follow-up temps and labs.  Discussed with patient at bedside - will adjust diet as patient will not eat pureed food and is able to eat solid food without difficulty.

## 2019-08-10 NOTE — PROGRESS NOTE ADULT - SUBJECTIVE AND OBJECTIVE BOX
INTERVAL HPI:  60yo Female with  Anxiety, COPD has O2 at home, DM, HTN, Hypothyroidism, Active smoker since age 15, Cholelithiasis and no PSH presents to the ER c/o recurrent epigastric abdominal pain x5-6 days, started on wed or thurs per patient but she did not want to come to the ER over the weekend, states pain is worse than usual, described as "sharp" constant with radiation to the back, 10/10 in severity with no alleviating or known triggering factors. Associated with nausea, denies vomiting.  Patient was here last month for similar complaints, HIDA was negative and pain improved, was supposed to follow up with Surgeon in the office for elective cholecystectomy however she states that surgeon did not accept her insurance.   Admitted with sonographic evidence of Cholecystitis.  08/08/19: Laparoscopic Cholecystectomy    OVERNIGHT EVENTS:  Out of ICU. Awake and comfortable.    Vital Signs Last 24 Hrs  T(C): 36.8 (10 Aug 2019 17:34), Max: 37.3 (10 Aug 2019 00:05)  T(F): 98.3 (10 Aug 2019 17:34), Max: 99.2 (10 Aug 2019 00:05)  HR: 86 (10 Aug 2019 17:34) (56 - 86)  BP: 130/75 (10 Aug 2019 17:34) (129/56 - 144/72)  BP(mean): --  RR: 17 (10 Aug 2019 17:34) (17 - 18)  SpO2: 96% (10 Aug 2019 17:34) (95% - 98%)    PHYSICAL EXAM:  GEN:         Awake, responsive and comfortable.  HEENT:    Normal.    RESP:        no wheezing.  CVS:          Regular rate and rhythm.   ABD:         Soft, non-tender, non-distended;     MEDICATIONS  (STANDING):  atorvastatin 10 milliGRAM(s) Oral at bedtime  chlorhexidine 4% Liquid 1 Application(s) Topical <User Schedule>  dextrose 5%. 1000 milliLiter(s) (50 mL/Hr) IV Continuous <Continuous>  dextrose 50% Injectable 12.5 Gram(s) IV Push once  dextrose 50% Injectable 25 Gram(s) IV Push once  dextrose 50% Injectable 25 Gram(s) IV Push once  escitalopram 20 milliGRAM(s) Oral daily  heparin  Injectable 5000 Unit(s) SubCutaneous every 8 hours  insulin glargine Injectable (LANTUS) 25 Unit(s) SubCutaneous every morning  insulin lispro (HumaLOG) corrective regimen sliding scale   SubCutaneous three times a day before meals  insulin lispro (HumaLOG) corrective regimen sliding scale   SubCutaneous at bedtime  insulin lispro Injectable (HumaLOG) 4 Unit(s) SubCutaneous three times a day before meals  levothyroxine 150 MICROGram(s) Oral daily  losartan 50 milliGRAM(s) Oral daily  methylPREDNISolone sodium succinate Injectable 20 milliGRAM(s) IV Push every 8 hours  montelukast 10 milliGRAM(s) Oral at bedtime  nicotine - 21 mG/24Hr(s) Patch 1 patch Transdermal daily  nystatin Cream 1 Application(s) Topical two times a day  piperacillin/tazobactam IVPB.. 3.375 Gram(s) IV Intermittent every 8 hours    MEDICATIONS  (PRN):  ALBUTerol/ipratropium for Nebulization 3 milliLiter(s) Nebulizer every 6 hours PRN Shortness of Breath and/or Wheezing  clonazePAM  Tablet 1 milliGRAM(s) Oral three times a day PRN anxiety  dextrose 40% Gel 15 Gram(s) Oral once PRN Blood Glucose LESS THAN 70 milliGRAM(s)/deciliter  glucagon  Injectable 1 milliGRAM(s) IntraMuscular once PRN Glucose LESS THAN 70 milligrams/deciliter  HYDROmorphone  Injectable 1 milliGRAM(s) IV Push every 6 hours PRN Severe Pain (7 - 10)  hydrOXYzine hydrochloride 50 milliGRAM(s) Oral four times a day PRN Itching  morphine  - Injectable 4 milliGRAM(s) IV Push every 4 hours PRN Moderate Pain (4 - 6)  ondansetron Injectable 4 milliGRAM(s) IV Push every 6 hours PRN Nausea    LABS:                        13.3   19.41 )-----------( 292      ( 10 Aug 2019 07:18 )             41.5     08-10    137  |  100  |  19  ----------------------------<  226<H>  4.2   |  32<H>  |  0.80    Ca    8.8      10 Aug 2019 07:18  Phos  2.9     08-10  Mg     2.5     08-10    TPro  7.6  /  Alb  3.5  /  TBili  0.3  /  DBili  x   /  AST  16  /  ALT  27  /  AlkPhos  86  08-10    08-08 @ 20:31  pH: 7.35  pCO2: 52  pO2: 79  SaO2: 94  08-06 @ 07:49  pH: 7.38  pCO2: 50  pO2: 52  SaO2: 85    ASSESSMENT AND PLAN:  ·	Acute Cholecystitis S/P Lap Cholecystectomy  ·	Abdominal pain.  ·	Leukocytosis.  ·	COPD.  ·	Chronic hypoxic hypercarbic Respiratory failure.  ·	Tobacco abuse.  ·	Obesity.  ·	Suspect MEENU.  ·	HTN.  ·	DM.  ·	Hypothyroidism.  ·	Anxiety.    Continue O2, nebulizer.  Taper steroids.  Smoking cessation.  Discussed with  at bed side.

## 2019-08-10 NOTE — PROGRESS NOTE ADULT - SUBJECTIVE AND OBJECTIVE BOX
Postoperative Day #: 2  Patient seen and examined at bedside resting comfortably. No acute issues overnight.  +flatus, no BM, Abdominal pain adequately controlled.  Denies nausea and vomiting. Tolerating diet.  Denies chest pain, dyspnea, cough.    T(F): 98.8 (08-10-19 @ 05:25), Max: 99.2 (08-10-19 @ 00:05)  HR: 65 (08-10-19 @ 05:25) (63 - 101)  BP: 136/83 (08-10-19 @ 05:25) (120/70 - 158/95)  RR: 18 (08-10-19 @ 05:25) (11 - 27)  SpO2: 95% (08-10-19 @ 05:25) (85% - 98%)  Wt(kg): --  CAPILLARY BLOOD GLUCOSE      POCT Blood Glucose.: 276 mg/dL (09 Aug 2019 21:34)  POCT Blood Glucose.: 215 mg/dL (09 Aug 2019 16:07)  POCT Blood Glucose.: 245 mg/dL (09 Aug 2019 13:53)  POCT Blood Glucose.: 266 mg/dL (09 Aug 2019 11:52)  POCT Blood Glucose.: 197 mg/dL (09 Aug 2019 07:40)      PHYSICAL EXAM:    GENERAL: Alert & Oriented X3,NAD, well-groomed, well-developed  CHEST/LUNG: Clear to percussion bilaterally; No rales, rhonchi, wheezing, or rubs  HEART: Regular rate and rhythm; No murmurs, rubs, or gallops  ABDOMEN: Obese, non-distended, +Bowel sounds, Soft, appropriate incisional tenderness. Incision with dressings c/d/i  EXTREMITIES:  No calf tenderness, No clubbing, cyanosis, or edema    LABS:                        13.1   23.56 )-----------( 285      ( 09 Aug 2019 04:36 )             40.8     08-09    137  |  100  |  20  ----------------------------<  235<H>  4.0   |  28  |  1.05    Ca    8.6      09 Aug 2019 04:36  Phos  4.3     08-09  Mg     2.4     08-09    TPro  7.3  /  Alb  3.4  /  TBili  0.3  /  DBili  x   /  AST  22  /  ALT  29  /  AlkPhos  83  08-09    PT/INR - ( 08 Aug 2019 07:47 )   PT: 12.0 sec;   INR: 1.07 ratio         PTT - ( 08 Aug 2019 07:47 )  PTT:29.5 sec  I&O's Detail    09 Aug 2019 07:01  -  10 Aug 2019 07:00  --------------------------------------------------------  IN:    Oral Fluid: 500 mL    Solution: 100 mL  Total IN: 600 mL    OUT:    Indwelling Catheter - Urethral: 200 mL    Voided: 450 mL  Total OUT: 650 mL    Total NET: -50 mL        Assessment: 60 y/o Female s/p laparoscopic cholecystectomy POD #2    Plan:  - cont lowfat diet  -continue DVT/GI prophylaxis.  -OOB, Ambulating and pain management prn.   -encourage incentive spirometer  -continue local wound care  - cont medical management  - will discuss with surgical attending

## 2019-08-10 NOTE — PROGRESS NOTE ADULT - SUBJECTIVE AND OBJECTIVE BOX
Patient is a 59y old  Female who presents with a chief complaint of abdominal pain (10 Aug 2019 09:23)      INTERVAL HPI/OVERNIGHT EVENTS:  pt with no complaints  eating well, good appetite    MEDICATIONS  (STANDING):  atorvastatin 10 milliGRAM(s) Oral at bedtime  chlorhexidine 4% Liquid 1 Application(s) Topical <User Schedule>  dextrose 5%. 1000 milliLiter(s) (50 mL/Hr) IV Continuous <Continuous>  dextrose 50% Injectable 12.5 Gram(s) IV Push once  dextrose 50% Injectable 25 Gram(s) IV Push once  dextrose 50% Injectable 25 Gram(s) IV Push once  escitalopram 20 milliGRAM(s) Oral daily  heparin  Injectable 5000 Unit(s) SubCutaneous every 8 hours  insulin glargine Injectable (LANTUS) 25 Unit(s) SubCutaneous every morning  insulin lispro (HumaLOG) corrective regimen sliding scale   SubCutaneous three times a day before meals  insulin lispro (HumaLOG) corrective regimen sliding scale   SubCutaneous at bedtime  insulin lispro Injectable (HumaLOG) 4 Unit(s) SubCutaneous three times a day before meals  levothyroxine 150 MICROGram(s) Oral daily  losartan 50 milliGRAM(s) Oral daily  methylPREDNISolone sodium succinate Injectable 20 milliGRAM(s) IV Push every 8 hours  montelukast 10 milliGRAM(s) Oral at bedtime  nicotine - 21 mG/24Hr(s) Patch 1 patch Transdermal daily  nystatin Cream 1 Application(s) Topical two times a day    MEDICATIONS  (PRN):  ALBUTerol/ipratropium for Nebulization 3 milliLiter(s) Nebulizer every 6 hours PRN Shortness of Breath and/or Wheezing  clonazePAM  Tablet 1 milliGRAM(s) Oral three times a day PRN anxiety  dextrose 40% Gel 15 Gram(s) Oral once PRN Blood Glucose LESS THAN 70 milliGRAM(s)/deciliter  glucagon  Injectable 1 milliGRAM(s) IntraMuscular once PRN Glucose LESS THAN 70 milligrams/deciliter  HYDROmorphone  Injectable 1 milliGRAM(s) IV Push every 6 hours PRN Severe Pain (7 - 10)  hydrOXYzine hydrochloride 50 milliGRAM(s) Oral four times a day PRN Itching  morphine  - Injectable 4 milliGRAM(s) IV Push every 4 hours PRN Moderate Pain (4 - 6)  ondansetron Injectable 4 milliGRAM(s) IV Push every 6 hours PRN Nausea      REVIEW OF SYSTEMS:  CONSTITUTIONAL: No fever, weight loss, or fatigue  RESPIRATORY: No cough, wheezing, chills or hemoptysis; No shortness of breath  CARDIOVASCULAR: No chest pain, palpitations, dizziness, or leg swelling  GASTROINTESTINAL: No abdominal or epigastric pain. No nausea, vomiting, or hematemesis; No diarrhea or constipation. No melena or hematochezia.  ENDOCRINE: No heat or cold intolerance; No hair loss      Vital Signs Last 24 Hrs  T(C): 37.1 (10 Aug 2019 05:25), Max: 37.3 (10 Aug 2019 00:05)  T(F): 98.8 (10 Aug 2019 05:25), Max: 99.2 (10 Aug 2019 00:05)  HR: 65 (10 Aug 2019 05:25) (63 - 101)  BP: 136/83 (10 Aug 2019 05:25) (120/70 - 158/95)  BP(mean): 83 (09 Aug 2019 16:00) (74 - 111)  RR: 18 (10 Aug 2019 05:25) (11 - 19)  SpO2: 95% (10 Aug 2019 05:25) (85% - 98%)    PHYSICAL EXAM:  GENERAL: NAD, well-groomed, well-developed        LABS:                        13.3   19.41 )-----------( 292      ( 10 Aug 2019 07:18 )             41.5     08-10    137  |  100  |  19  ----------------------------<  226<H>  4.2   |  32<H>  |  0.80    Ca    8.8      10 Aug 2019 07:18  Phos  2.9     08-10  Mg     2.5     08-10    TPro  7.6  /  Alb  3.5  /  TBili  0.3  /  DBili  x   /  AST  16  /  ALT  27  /  AlkPhos  86  08-10        CAPILLARY BLOOD GLUCOSE      POCT Blood Glucose.: 239 mg/dL (10 Aug 2019 07:30)  POCT Blood Glucose.: 276 mg/dL (09 Aug 2019 21:34)  POCT Blood Glucose.: 215 mg/dL (09 Aug 2019 16:07)  POCT Blood Glucose.: 245 mg/dL (09 Aug 2019 13:53)  POCT Blood Glucose.: 266 mg/dL (09 Aug 2019 11:52)    Lipid panel:       ABG - ( 08 Aug 2019 20:31 )  pH, Arterial: x     pH, Blood: 7.35  /  pCO2: 52    /  pO2: 79    / HCO3: 28    / Base Excess: 2.0   /  SaO2: 94                      RADIOLOGY & ADDITIONAL TESTS:

## 2019-08-10 NOTE — PROGRESS NOTE ADULT - SUBJECTIVE AND OBJECTIVE BOX
58yo Female with PMH Anxiety, COPD, DM, HTN, Hypothyroidism, active smoker, cholelithiasis and no PSH presents to the ER c/o recurrent epigastric abdominal pain x5-6 days, started on wed or thurs per patient but she did not want to come to the ER over the weekend, states pain is worse than usual, described as "sharp" constant with radiation to the back, 10/10 in severity with no alleviating or known triggering factors. Associated with nausea, denies vomiting.    Patient was here last month for similar complaints, HIDA was negative and pain improved, was supposed to follow up with Surgeon in the office for elective cholecystectomy however she states that surgeon did not accept her insurance. (05 Aug 2019 12:14)      Chief Complaint:  Patient is a 59y old  Female who presents with a chief complaint of abdominal pain (06 Aug 2019 13:33)      Review of Systems:    General:  No wt loss, fevers, chills, night sweats  Eyes:  Good vision, no reported pain  ENT:  No sore throat, pain, runny nose, dysphagia  CV:  No pain, palpitations, hypo/hypertension  Resp:  No dyspnea, cough, tachypnea, wheezing  GI: nausea, vomiting, diarrhea,         Social History/Family History  SOCHX:   tobacco,  -  alcohol    FMHX: FA/MO  - contributory       Discussed with:  PMD, Family    Physical Exam:    Vital Signs:  Vital Signs Last 24 Hrs  T(C): 36.6 (06 Aug 2019 17:48), Max: 37.3 (06 Aug 2019 05:17)  T(F): 97.9 (06 Aug 2019 17:48), Max: 99.1 (06 Aug 2019 05:17)  HR: 91 (06 Aug 2019 17:48) (84 - 94)  BP: 154/83 (06 Aug 2019 17:48) (130/58 - 154/83)  BP(mean): --  RR: 18 (06 Aug 2019 17:48) (18 - 18)  SpO2: 90% (06 Aug 2019 17:48) (90% - 99%)  Daily     Daily Weight in k.3 (06 Aug 2019 05:17)  I&O's Summary    05 Aug 2019 07:01  -  06 Aug 2019 07:00  --------------------------------------------------------  IN: 200 mL / OUT: 0 mL / NET: 200 mL          Chest:  Full & symmetric excursion, no increased effort, breath sounds clear  Cardiovascular:  Regular rhythm, S1, S2, no murmur/rub/S3/S4, no carotid/femoral/abdominal bruit, radial/pedal pulses 2+, no edema  Abdomen:  Soft, non-tender, non-distended, normoactive bowel sounds, no HSM      Laboratory:                          13.2   13.38 )-----------( 262      ( 06 Aug 2019 06:31 )             40.4     08-    138  |  103  |  11  ----------------------------<  198<H>  4.6   |  28  |  0.82    Ca    8.9      06 Aug 2019 06:31  Mg     2.3         TPro  7.5  /  Alb  3.2<L>  /  TBili  0.4  /  DBili  x   /  AST  18  /  ALT  20  /  AlkPhos  109  -    ABG - ( 06 Aug 2019 07:49 )  pH, Arterial: x     pH, Blood: 7.38  /  pCO2: 50    /  pO2: 52    / HCO3: 28    / Base Excess: 2.9   /  SaO2: 85            CAPILLARY BLOOD GLUCOSE      POCT Blood Glucose.: 192 mg/dL (06 Aug 2019 16:45)  POCT Blood Glucose.: 208 mg/dL (06 Aug 2019 11:47)  POCT Blood Glucose.: 92 mg/dL (05 Aug 2019 23:06)    LIVER FUNCTIONS - ( 06 Aug 2019 06:31 )  Alb: 3.2 g/dL / Pro: 7.5 gm/dL / ALK PHOS: 109 U/L / ALT: 20 U/L / AST: 18 U/L / GGT: x           PT/INR - ( 05 Aug 2019 10:56 )   PT: 11.1 sec;   INR: 0.99 ratio         PTT - ( 05 Aug 2019 10:56 )  PTT:35.9 sec        Assessment:  I was asked to evaluate this patient for possible presurgical evaluation for cholecystectomy.	  the chart and labs and vital signs are all reviewed.	  the patient's past medical history as noted.  the patient has no significant risk factors for coronary artery disease.  review of the echocardiogram reveals a normal ejection fraction	  the cardiovascular risk assessment is considered acceptable risk for this type of surgical procedure, surgery may proceed.  Post lap yuri

## 2019-08-11 LAB
ALBUMIN SERPL ELPH-MCNC: 3.2 G/DL — LOW (ref 3.3–5)
ALP SERPL-CCNC: 84 U/L — SIGNIFICANT CHANGE UP (ref 40–120)
ALT FLD-CCNC: 44 U/L — SIGNIFICANT CHANGE UP (ref 12–78)
ANION GAP SERPL CALC-SCNC: 9 MMOL/L — SIGNIFICANT CHANGE UP (ref 5–17)
AST SERPL-CCNC: 25 U/L — SIGNIFICANT CHANGE UP (ref 15–37)
BASOPHILS # BLD AUTO: 0.06 K/UL — SIGNIFICANT CHANGE UP (ref 0–0.2)
BASOPHILS NFR BLD AUTO: 0.3 % — SIGNIFICANT CHANGE UP (ref 0–2)
BILIRUB SERPL-MCNC: 0.3 MG/DL — SIGNIFICANT CHANGE UP (ref 0.2–1.2)
BUN SERPL-MCNC: 20 MG/DL — SIGNIFICANT CHANGE UP (ref 7–23)
CALCIUM SERPL-MCNC: 8.8 MG/DL — SIGNIFICANT CHANGE UP (ref 8.5–10.1)
CHLORIDE SERPL-SCNC: 100 MMOL/L — SIGNIFICANT CHANGE UP (ref 96–108)
CO2 SERPL-SCNC: 30 MMOL/L — SIGNIFICANT CHANGE UP (ref 22–31)
CREAT SERPL-MCNC: 0.94 MG/DL — SIGNIFICANT CHANGE UP (ref 0.5–1.3)
CRP SERPL-MCNC: 1.29 MG/DL — HIGH (ref 0–0.4)
EOSINOPHIL # BLD AUTO: 0 K/UL — SIGNIFICANT CHANGE UP (ref 0–0.5)
EOSINOPHIL NFR BLD AUTO: 0 % — SIGNIFICANT CHANGE UP (ref 0–6)
ERYTHROCYTE [SEDIMENTATION RATE] IN BLOOD: 18 MM/HR — SIGNIFICANT CHANGE UP (ref 0–20)
GLUCOSE BLDC GLUCOMTR-MCNC: 208 MG/DL — HIGH (ref 70–99)
GLUCOSE BLDC GLUCOMTR-MCNC: 208 MG/DL — HIGH (ref 70–99)
GLUCOSE BLDC GLUCOMTR-MCNC: 262 MG/DL — HIGH (ref 70–99)
GLUCOSE BLDC GLUCOMTR-MCNC: 275 MG/DL — HIGH (ref 70–99)
GLUCOSE SERPL-MCNC: 265 MG/DL — HIGH (ref 70–99)
HCT VFR BLD CALC: 39.8 % — SIGNIFICANT CHANGE UP (ref 34.5–45)
HGB BLD-MCNC: 12.9 G/DL — SIGNIFICANT CHANGE UP (ref 11.5–15.5)
IMM GRANULOCYTES NFR BLD AUTO: 1.8 % — HIGH (ref 0–1.5)
LYMPHOCYTES # BLD AUTO: 15.5 % — SIGNIFICANT CHANGE UP (ref 13–44)
LYMPHOCYTES # BLD AUTO: 2.92 K/UL — SIGNIFICANT CHANGE UP (ref 1–3.3)
MCHC RBC-ENTMCNC: 28.9 PG — SIGNIFICANT CHANGE UP (ref 27–34)
MCHC RBC-ENTMCNC: 32.4 GM/DL — SIGNIFICANT CHANGE UP (ref 32–36)
MCV RBC AUTO: 89.2 FL — SIGNIFICANT CHANGE UP (ref 80–100)
MONOCYTES # BLD AUTO: 0.94 K/UL — HIGH (ref 0–0.9)
MONOCYTES NFR BLD AUTO: 5 % — SIGNIFICANT CHANGE UP (ref 2–14)
NEUTROPHILS # BLD AUTO: 14.59 K/UL — HIGH (ref 1.8–7.4)
NEUTROPHILS NFR BLD AUTO: 77.4 % — HIGH (ref 43–77)
NRBC # BLD: 0 /100 WBCS — SIGNIFICANT CHANGE UP (ref 0–0)
PLATELET # BLD AUTO: 265 K/UL — SIGNIFICANT CHANGE UP (ref 150–400)
POTASSIUM SERPL-MCNC: 4.4 MMOL/L — SIGNIFICANT CHANGE UP (ref 3.5–5.3)
POTASSIUM SERPL-SCNC: 4.4 MMOL/L — SIGNIFICANT CHANGE UP (ref 3.5–5.3)
PROT SERPL-MCNC: 7.3 GM/DL — SIGNIFICANT CHANGE UP (ref 6–8.3)
RBC # BLD: 4.46 M/UL — SIGNIFICANT CHANGE UP (ref 3.8–5.2)
RBC # FLD: 12.7 % — SIGNIFICANT CHANGE UP (ref 10.3–14.5)
SODIUM SERPL-SCNC: 139 MMOL/L — SIGNIFICANT CHANGE UP (ref 135–145)
WBC # BLD: 18.85 K/UL — HIGH (ref 3.8–10.5)
WBC # FLD AUTO: 18.85 K/UL — HIGH (ref 3.8–10.5)

## 2019-08-11 PROCEDURE — 71045 X-RAY EXAM CHEST 1 VIEW: CPT | Mod: 26

## 2019-08-11 RX ORDER — INSULIN GLARGINE 100 [IU]/ML
5 INJECTION, SOLUTION SUBCUTANEOUS ONCE
Refills: 0 | Status: COMPLETED | OUTPATIENT
Start: 2019-08-11 | End: 2019-08-11

## 2019-08-11 RX ORDER — INSULIN GLARGINE 100 [IU]/ML
30 INJECTION, SOLUTION SUBCUTANEOUS EVERY MORNING
Refills: 0 | Status: DISCONTINUED | OUTPATIENT
Start: 2019-08-12 | End: 2019-08-13

## 2019-08-11 RX ADMIN — INSULIN GLARGINE 25 UNIT(S): 100 INJECTION, SOLUTION SUBCUTANEOUS at 08:49

## 2019-08-11 RX ADMIN — Medication 4: at 13:07

## 2019-08-11 RX ADMIN — HEPARIN SODIUM 5000 UNIT(S): 5000 INJECTION INTRAVENOUS; SUBCUTANEOUS at 22:22

## 2019-08-11 RX ADMIN — MORPHINE SULFATE 4 MILLIGRAM(S): 50 CAPSULE, EXTENDED RELEASE ORAL at 18:46

## 2019-08-11 RX ADMIN — HEPARIN SODIUM 5000 UNIT(S): 5000 INJECTION INTRAVENOUS; SUBCUTANEOUS at 06:20

## 2019-08-11 RX ADMIN — MORPHINE SULFATE 4 MILLIGRAM(S): 50 CAPSULE, EXTENDED RELEASE ORAL at 13:00

## 2019-08-11 RX ADMIN — Medication 4 UNIT(S): at 08:29

## 2019-08-11 RX ADMIN — HYDROMORPHONE HYDROCHLORIDE 1 MILLIGRAM(S): 2 INJECTION INTRAMUSCULAR; INTRAVENOUS; SUBCUTANEOUS at 03:15

## 2019-08-11 RX ADMIN — HEPARIN SODIUM 5000 UNIT(S): 5000 INJECTION INTRAVENOUS; SUBCUTANEOUS at 13:16

## 2019-08-11 RX ADMIN — NYSTATIN CREAM 1 APPLICATION(S): 100000 CREAM TOPICAL at 06:21

## 2019-08-11 RX ADMIN — MORPHINE SULFATE 4 MILLIGRAM(S): 50 CAPSULE, EXTENDED RELEASE ORAL at 12:51

## 2019-08-11 RX ADMIN — NYSTATIN CREAM 1 APPLICATION(S): 100000 CREAM TOPICAL at 18:41

## 2019-08-11 RX ADMIN — Medication 20 MILLIGRAM(S): at 06:20

## 2019-08-11 RX ADMIN — CHLORHEXIDINE GLUCONATE 1 APPLICATION(S): 213 SOLUTION TOPICAL at 08:28

## 2019-08-11 RX ADMIN — PIPERACILLIN AND TAZOBACTAM 25 GRAM(S): 4; .5 INJECTION, POWDER, LYOPHILIZED, FOR SOLUTION INTRAVENOUS at 22:23

## 2019-08-11 RX ADMIN — ATORVASTATIN CALCIUM 10 MILLIGRAM(S): 80 TABLET, FILM COATED ORAL at 22:21

## 2019-08-11 RX ADMIN — Medication 1 MILLIGRAM(S): at 22:25

## 2019-08-11 RX ADMIN — Medication 4 UNIT(S): at 18:29

## 2019-08-11 RX ADMIN — MONTELUKAST 10 MILLIGRAM(S): 4 TABLET, CHEWABLE ORAL at 22:23

## 2019-08-11 RX ADMIN — PIPERACILLIN AND TAZOBACTAM 25 GRAM(S): 4; .5 INJECTION, POWDER, LYOPHILIZED, FOR SOLUTION INTRAVENOUS at 06:21

## 2019-08-11 RX ADMIN — Medication 20 MILLIGRAM(S): at 18:31

## 2019-08-11 RX ADMIN — ESCITALOPRAM OXALATE 20 MILLIGRAM(S): 10 TABLET, FILM COATED ORAL at 13:16

## 2019-08-11 RX ADMIN — Medication 6: at 08:29

## 2019-08-11 RX ADMIN — PIPERACILLIN AND TAZOBACTAM 25 GRAM(S): 4; .5 INJECTION, POWDER, LYOPHILIZED, FOR SOLUTION INTRAVENOUS at 15:27

## 2019-08-11 RX ADMIN — Medication 150 MICROGRAM(S): at 06:19

## 2019-08-11 RX ADMIN — MORPHINE SULFATE 4 MILLIGRAM(S): 50 CAPSULE, EXTENDED RELEASE ORAL at 23:10

## 2019-08-11 RX ADMIN — MORPHINE SULFATE 4 MILLIGRAM(S): 50 CAPSULE, EXTENDED RELEASE ORAL at 18:34

## 2019-08-11 RX ADMIN — LOSARTAN POTASSIUM 50 MILLIGRAM(S): 100 TABLET, FILM COATED ORAL at 06:19

## 2019-08-11 RX ADMIN — HYDROMORPHONE HYDROCHLORIDE 1 MILLIGRAM(S): 2 INJECTION INTRAMUSCULAR; INTRAVENOUS; SUBCUTANEOUS at 03:30

## 2019-08-11 RX ADMIN — Medication 4 UNIT(S): at 13:07

## 2019-08-11 RX ADMIN — Medication 20 MILLIGRAM(S): at 15:26

## 2019-08-11 RX ADMIN — INSULIN GLARGINE 5 UNIT(S): 100 INJECTION, SOLUTION SUBCUTANEOUS at 13:04

## 2019-08-11 RX ADMIN — Medication 6: at 18:30

## 2019-08-11 NOTE — PROGRESS NOTE ADULT - SUBJECTIVE AND OBJECTIVE BOX
Patient is a 59y old  Female who presents with a chief complaint of abdominal pain (10 Aug 2019 21:53)      INTERVAL HPI/OVERNIGHT EVENTS:  pt with no complaints  eating well, good appetite    MEDICATIONS  (STANDING):  atorvastatin 10 milliGRAM(s) Oral at bedtime  chlorhexidine 4% Liquid 1 Application(s) Topical <User Schedule>  dextrose 5%. 1000 milliLiter(s) (50 mL/Hr) IV Continuous <Continuous>  dextrose 50% Injectable 12.5 Gram(s) IV Push once  dextrose 50% Injectable 25 Gram(s) IV Push once  dextrose 50% Injectable 25 Gram(s) IV Push once  escitalopram 20 milliGRAM(s) Oral daily  heparin  Injectable 5000 Unit(s) SubCutaneous every 8 hours  insulin glargine Injectable (LANTUS) 5 Unit(s) SubCutaneous once  insulin lispro (HumaLOG) corrective regimen sliding scale   SubCutaneous three times a day before meals  insulin lispro (HumaLOG) corrective regimen sliding scale   SubCutaneous at bedtime  insulin lispro Injectable (HumaLOG) 4 Unit(s) SubCutaneous three times a day before meals  levothyroxine 150 MICROGram(s) Oral daily  losartan 50 milliGRAM(s) Oral daily  methylPREDNISolone sodium succinate Injectable 20 milliGRAM(s) IV Push every 8 hours  montelukast 10 milliGRAM(s) Oral at bedtime  nicotine - 21 mG/24Hr(s) Patch 1 patch Transdermal daily  nystatin Cream 1 Application(s) Topical two times a day  piperacillin/tazobactam IVPB.. 3.375 Gram(s) IV Intermittent every 8 hours    MEDICATIONS  (PRN):  ALBUTerol/ipratropium for Nebulization 3 milliLiter(s) Nebulizer every 6 hours PRN Shortness of Breath and/or Wheezing  clonazePAM  Tablet 1 milliGRAM(s) Oral three times a day PRN anxiety  dextrose 40% Gel 15 Gram(s) Oral once PRN Blood Glucose LESS THAN 70 milliGRAM(s)/deciliter  glucagon  Injectable 1 milliGRAM(s) IntraMuscular once PRN Glucose LESS THAN 70 milligrams/deciliter  HYDROmorphone  Injectable 1 milliGRAM(s) IV Push every 6 hours PRN Severe Pain (7 - 10)  hydrOXYzine hydrochloride 50 milliGRAM(s) Oral four times a day PRN Itching  morphine  - Injectable 4 milliGRAM(s) IV Push every 4 hours PRN Moderate Pain (4 - 6)  ondansetron Injectable 4 milliGRAM(s) IV Push every 6 hours PRN Nausea      REVIEW OF SYSTEMS:  CONSTITUTIONAL: No fever, weight loss, or fatigue  RESPIRATORY: No cough, wheezing, chills or hemoptysis; No shortness of breath  CARDIOVASCULAR: No chest pain, palpitations, dizziness, or leg swelling  GASTROINTESTINAL: No abdominal or epigastric pain. No nausea, vomiting, or hematemesis; No diarrhea or constipation. No melena or hematochezia.  ENDOCRINE: No heat or cold intolerance; No hair loss      Vital Signs Last 24 Hrs  T(C): 36.9 (11 Aug 2019 04:31), Max: 36.9 (11 Aug 2019 04:31)  T(F): 98.5 (11 Aug 2019 04:31), Max: 98.5 (11 Aug 2019 04:31)  HR: 68 (10 Aug 2019 23:33) (56 - 86)  BP: 151/73 (11 Aug 2019 04:31) (130/75 - 151/73)  BP(mean): --  RR: 17 (11 Aug 2019 04:31) (17 - 17)  SpO2: 94% (11 Aug 2019 04:31) (94% - 96%)    PHYSICAL EXAM:  GENERAL: NAD, well-groomed, well-developed        LABS:                        12.9   18.85 )-----------( 265      ( 11 Aug 2019 07:44 )             39.8     08-11    139  |  100  |  20  ----------------------------<  265<H>  4.4   |  30  |  0.94    Ca    8.8      11 Aug 2019 07:44  Phos  2.9     08-10  Mg     2.5     08-10    TPro  7.3  /  Alb  3.2<L>  /  TBili  0.3  /  DBili  x   /  AST  25  /  ALT  44  /  AlkPhos  84  08-11        CAPILLARY BLOOD GLUCOSE      POCT Blood Glucose.: 208 mg/dL (11 Aug 2019 11:15)  POCT Blood Glucose.: 275 mg/dL (11 Aug 2019 07:57)  POCT Blood Glucose.: 277 mg/dL (10 Aug 2019 21:08)  POCT Blood Glucose.: 109 mg/dL (10 Aug 2019 16:06)  POCT Blood Glucose.: 276 mg/dL (10 Aug 2019 12:23)    Lipid panel:               RADIOLOGY & ADDITIONAL TESTS:

## 2019-08-11 NOTE — PROGRESS NOTE ADULT - SUBJECTIVE AND OBJECTIVE BOX
INTERVAL HPI/OVERNIGHT EVENTS:        REVIEW OF SYSTEMS:  CONSTITUTIONAL:  no  complaints    NECK: No pain or stiffnes  RESPIRATORY: No SOB   CARDIOVASCULAR: No chest pain, palpitations, dizziness,   GASTROINTESTINAL: No abdominal pain. No nausea, vomiting,   NEUROLOGICAL: No headaches, no  blurry  vision no  dizziness  SKIN: No itching,   MUSCULOSKELETAL:  abd  pain appetite  good    MEDICATION:  ALBUTerol/ipratropium for Nebulization 3 milliLiter(s) Nebulizer every 6 hours PRN  atorvastatin 10 milliGRAM(s) Oral at bedtime  chlorhexidine 4% Liquid 1 Application(s) Topical <User Schedule>  clonazePAM  Tablet 1 milliGRAM(s) Oral three times a day PRN  dextrose 40% Gel 15 Gram(s) Oral once PRN  dextrose 5%. 1000 milliLiter(s) IV Continuous <Continuous>  dextrose 50% Injectable 12.5 Gram(s) IV Push once  dextrose 50% Injectable 25 Gram(s) IV Push once  dextrose 50% Injectable 25 Gram(s) IV Push once  escitalopram 20 milliGRAM(s) Oral daily  glucagon  Injectable 1 milliGRAM(s) IntraMuscular once PRN  heparin  Injectable 5000 Unit(s) SubCutaneous every 8 hours  HYDROmorphone  Injectable 1 milliGRAM(s) IV Push every 6 hours PRN  hydrOXYzine hydrochloride 50 milliGRAM(s) Oral four times a day PRN  insulin lispro (HumaLOG) corrective regimen sliding scale   SubCutaneous three times a day before meals  insulin lispro (HumaLOG) corrective regimen sliding scale   SubCutaneous at bedtime  insulin lispro Injectable (HumaLOG) 4 Unit(s) SubCutaneous three times a day before meals  levothyroxine 150 MICROGram(s) Oral daily  losartan 50 milliGRAM(s) Oral daily  methylPREDNISolone sodium succinate Injectable 20 milliGRAM(s) IV Push every 8 hours  montelukast 10 milliGRAM(s) Oral at bedtime  morphine  - Injectable 4 milliGRAM(s) IV Push every 4 hours PRN  nicotine - 21 mG/24Hr(s) Patch 1 patch Transdermal daily  nystatin Cream 1 Application(s) Topical two times a day  ondansetron Injectable 4 milliGRAM(s) IV Push every 6 hours PRN  piperacillin/tazobactam IVPB.. 3.375 Gram(s) IV Intermittent every 8 hours    Vital Signs Last 24 Hrs  T(C): 36.2 (11 Aug 2019 11:53), Max: 36.9 (11 Aug 2019 04:31)  T(F): 97.2 (11 Aug 2019 11:53), Max: 98.5 (11 Aug 2019 04:31)  HR: 84 (11 Aug 2019 11:53) (56 - 86)  BP: 138/69 (11 Aug 2019 11:53) (130/75 - 162/73)  BP(mean): --  RR: 18 (11 Aug 2019 11:53) (17 - 18)  SpO2: 90% (11 Aug 2019 11:53) (90% - 96%)    PHYSICAL EXAM:  GENERAL: NAD, well-groomed, well-developed  EYES:  conjunctiva and sclera clear  ENMT:  Moist mucous membranes,   NECK: Supple, No JVD, Normal thyroid  NERVOUS SYSTEM:  Alert oriented   no  focal  deficits;   CHEST/LUNG: Clear    HEART: Regular rate and rhythm; No murmurs, rubs, or gallops  ABDOMEN: Soft, mild diffuse no  guarding no  rebound, Nondistended; Bowel sounds present  EXTREMITIES:  no  edema no  tenderness  SKIN: No rashes   LABS:                        12.9   18.85 )-----------( 265      ( 11 Aug 2019 07:44 )             39.8     08-11    139  |  100  |  20  ----------------------------<  265<H>  4.4   |  30  |  0.94    Ca    8.8      11 Aug 2019 07:44  Phos  2.9     08-10  Mg     2.5     08-10    TPro  7.3  /  Alb  3.2<L>  /  TBili  0.3  /  DBili  x   /  AST  25  /  ALT  44  /  AlkPhos  84  08-11        CAPILLARY BLOOD GLUCOSE      POCT Blood Glucose.: 208 mg/dL (11 Aug 2019 11:15)  POCT Blood Glucose.: 275 mg/dL (11 Aug 2019 07:57)  POCT Blood Glucose.: 277 mg/dL (10 Aug 2019 21:08)  POCT Blood Glucose.: 109 mg/dL (10 Aug 2019 16:06)      RADIOLOGY & ADDITIONAL TESTS:    Imaging reports  Personally Reviewed:  [ x] YES  [ ] NO    Consultant(s) Notes Reviewed:  [x ] YES  [ ] NO    Care Discussed with Consultants/Other Providers [x ] YES  [ ] NO  ·  Problem: Acute cholecystitis. s/p  Lap yuri   Plan: antibiotics pain  managemnt  diet  activity  as per  surgery  surgery Follow up.      Problem/Plan - 2:  ·  Problem: Type 2 diabetes mellitus without complication, without long-term current use of insulin.  Plan: riss.      Problem/Plan - 3:  ·  Problem: Essential hypertension.  Plan: continue losartan     Problem/Plan - 4:  ·  Problem: Acquired hypothyroidism.  Plan: continue synthroid  COPD  duoneb O2     Problem/Plan - 5:  ·  Problem: Anxiety.  Plan: continue clonazepam   tobacco  abuse  nicotine  patch

## 2019-08-11 NOTE — PROGRESS NOTE ADULT - SUBJECTIVE AND OBJECTIVE BOX
INTERVAL HPI:  60yo Female with  Anxiety, COPD has O2 at home, DM, HTN, Hypothyroidism, Active smoker since age 15, Cholelithiasis and no PSH presents to the ER c/o recurrent epigastric abdominal pain x5-6 days, started on wed or thurs per patient but she did not want to come to the ER over the weekend, states pain is worse than usual, described as "sharp" constant with radiation to the back, 10/10 in severity with no alleviating or known triggering factors. Associated with nausea, denies vomiting.  Patient was here last month for similar complaints, HIDA was negative and pain improved, was supposed to follow up with Surgeon in the office for elective cholecystectomy however she states that surgeon did not accept her insurance.   Admitted with sonographic evidence of Cholecystitis.  08/08/19: Laparoscopic Cholecystectomy    OVERNIGHT EVENTS:  Resting comfortably.    Vital Signs Last 24 Hrs  T(C): 36.2 (11 Aug 2019 11:53), Max: 36.9 (11 Aug 2019 04:31)  T(F): 97.2 (11 Aug 2019 11:53), Max: 98.5 (11 Aug 2019 04:31)  HR: 84 (11 Aug 2019 11:53) (56 - 86)  BP: 138/69 (11 Aug 2019 11:53) (130/75 - 162/73)  BP(mean): --  RR: 18 (11 Aug 2019 11:53) (17 - 18)  SpO2: 90% (11 Aug 2019 11:53) (90% - 96%)    PHYSICAL EXAM:  GEN:        Resting, comfortable.  HEENT:    Normal.    RESP:       no wheezing.  CVS:          Regular rate and rhythm.   ABD:         Soft, non-tender, non-distended;     MEDICATIONS  (STANDING):  atorvastatin 10 milliGRAM(s) Oral at bedtime  chlorhexidine 4% Liquid 1 Application(s) Topical <User Schedule>  dextrose 5%. 1000 milliLiter(s) (50 mL/Hr) IV Continuous <Continuous>  dextrose 50% Injectable 12.5 Gram(s) IV Push once  dextrose 50% Injectable 25 Gram(s) IV Push once  dextrose 50% Injectable 25 Gram(s) IV Push once  escitalopram 20 milliGRAM(s) Oral daily  heparin  Injectable 5000 Unit(s) SubCutaneous every 8 hours  insulin lispro (HumaLOG) corrective regimen sliding scale   SubCutaneous three times a day before meals  insulin lispro (HumaLOG) corrective regimen sliding scale   SubCutaneous at bedtime  insulin lispro Injectable (HumaLOG) 4 Unit(s) SubCutaneous three times a day before meals  levothyroxine 150 MICROGram(s) Oral daily  losartan 50 milliGRAM(s) Oral daily  methylPREDNISolone sodium succinate Injectable 20 milliGRAM(s) IV Push every 8 hours  montelukast 10 milliGRAM(s) Oral at bedtime  nicotine - 21 mG/24Hr(s) Patch 1 patch Transdermal daily  nystatin Cream 1 Application(s) Topical two times a day  piperacillin/tazobactam IVPB.. 3.375 Gram(s) IV Intermittent every 8 hours    MEDICATIONS  (PRN):  ALBUTerol/ipratropium for Nebulization 3 milliLiter(s) Nebulizer every 6 hours PRN Shortness of Breath and/or Wheezing  clonazePAM  Tablet 1 milliGRAM(s) Oral three times a day PRN anxiety  dextrose 40% Gel 15 Gram(s) Oral once PRN Blood Glucose LESS THAN 70 milliGRAM(s)/deciliter  glucagon  Injectable 1 milliGRAM(s) IntraMuscular once PRN Glucose LESS THAN 70 milligrams/deciliter  HYDROmorphone  Injectable 1 milliGRAM(s) IV Push every 6 hours PRN Severe Pain (7 - 10)  hydrOXYzine hydrochloride 50 milliGRAM(s) Oral four times a day PRN Itching  morphine  - Injectable 4 milliGRAM(s) IV Push every 4 hours PRN Moderate Pain (4 - 6)  ondansetron Injectable 4 milliGRAM(s) IV Push every 6 hours PRN Nausea    LABS:                        12.9   18.85 )-----------( 265      ( 11 Aug 2019 07:44 )             39.8     08-11    139  |  100  |  20  ----------------------------<  265<H>  4.4   |  30  |  0.94    Ca    8.8      11 Aug 2019 07:44  Phos  2.9     08-10  Mg     2.5     08-10    TPro  7.3  /  Alb  3.2<L>  /  TBili  0.3  /  DBili  x   /  AST  25  /  ALT  44  /  AlkPhos  84  08-11    08-08 @ 20:31  pH: 7.35  pCO2: 52  pO2: 79  SaO2: 94  08-06 @ 07:49  pH: 7.38  pCO2: 50  pO2: 52  SaO2: 85    ASSESSMENT AND PLAN:  ·	Acute Cholecystitis S/P Lap Cholecystectomy  ·	Abdominal pain.  ·	Leukocytosis.  ·	COPD.  ·	Chronic hypoxic hypercarbic Respiratory failure.  ·	Tobacco abuse.  ·	Obesity.  ·	Suspect MEENU.  ·	HTN.  ·	DM.  ·	Hypothyroidism.  ·	Anxiety.    Continue O2, nebulizer.  Taper steroids, add Symbicort.  Smoking cessation.

## 2019-08-11 NOTE — PROGRESS NOTE ADULT - SUBJECTIVE AND OBJECTIVE BOX
58yo Female with PMH Anxiety, COPD, DM, HTN, Hypothyroidism, active smoker, cholelithiasis and no PSH presents to the ER c/o recurrent epigastric abdominal pain x5-6 days, started on wed or thurs per patient but she did not want to come to the ER over the weekend, states pain is worse than usual, described as "sharp" constant with radiation to the back, 10/10 in severity with no alleviating or known triggering factors. Associated with nausea, denies vomiting.    Patient was here last month for similar complaints, HIDA was negative and pain improved, was supposed to follow up with Surgeon in the office for elective cholecystectomy however she states that surgeon did not accept her insurance. (05 Aug 2019 12:14)      Chief Complaint:  Patient is a 59y old  Female who presents with a chief complaint of abdominal pain (06 Aug 2019 13:33)      Review of Systems:    General:  No wt loss, fevers, chills, night sweats  Eyes:  Good vision, no reported pain  ENT:  No sore throat, pain, runny nose, dysphagia  CV:  No pain, palpitations, hypo/hypertension  Resp:  No dyspnea, cough, tachypnea, wheezing  GI: nausea, vomiting, diarrhea,         Social History/Family History  SOCHX:   tobacco,  -  alcohol    FMHX: FA/MO  - contributory       Discussed with:  PMD, Family    Physical Exam:    Vital Signs:  Vital Signs Last 24 Hrs  T(C): 36.6 (06 Aug 2019 17:48), Max: 37.3 (06 Aug 2019 05:17)  T(F): 97.9 (06 Aug 2019 17:48), Max: 99.1 (06 Aug 2019 05:17)  HR: 91 (06 Aug 2019 17:48) (84 - 94)  BP: 154/83 (06 Aug 2019 17:48) (130/58 - 154/83)  BP(mean): --  RR: 18 (06 Aug 2019 17:48) (18 - 18)  SpO2: 90% (06 Aug 2019 17:48) (90% - 99%)  Daily     Daily Weight in k.3 (06 Aug 2019 05:17)  I&O's Summary    05 Aug 2019 07:01  -  06 Aug 2019 07:00  --------------------------------------------------------  IN: 200 mL / OUT: 0 mL / NET: 200 mL          Chest:  Full & symmetric excursion, no increased effort, breath sounds clear  Cardiovascular:  Regular rhythm, S1, S2, no murmur/rub/S3/S4, no carotid/femoral/abdominal bruit, radial/pedal pulses 2+, no edema  Abdomen:  Soft, non-tender, non-distended, normoactive bowel sounds, no HSM      Laboratory:                          13.2   13.38 )-----------( 262      ( 06 Aug 2019 06:31 )             40.4     08-    138  |  103  |  11  ----------------------------<  198<H>  4.6   |  28  |  0.82    Ca    8.9      06 Aug 2019 06:31  Mg     2.3     -    TPro  7.5  /  Alb  3.2<L>  /  TBili  0.4  /  DBili  x   /  AST  18  /  ALT  20  /  AlkPhos  109  08-    ABG - ( 06 Aug 2019 07:49 )  pH, Arterial: x     pH, Blood: 7.38  /  pCO2: 50    /  pO2: 52    / HCO3: 28    / Base Excess: 2.9   /  SaO2: 85            CAPILLARY BLOOD GLUCOSE      POCT Blood Glucose.: 192 mg/dL (06 Aug 2019 16:45)  POCT Blood Glucose.: 208 mg/dL (06 Aug 2019 11:47)  POCT Blood Glucose.: 92 mg/dL (05 Aug 2019 23:06)    LIVER FUNCTIONS - ( 06 Aug 2019 06:31 )  Alb: 3.2 g/dL / Pro: 7.5 gm/dL / ALK PHOS: 109 U/L / ALT: 20 U/L / AST: 18 U/L / GGT: x           PT/INR - ( 05 Aug 2019 10:56 )   PT: 11.1 sec;   INR: 0.99 ratio         PTT - ( 05 Aug 2019 10:56 )  PTT:35.9 sec        Assessment:	  the chart and labs and vital signs are all reviewed.	  the patient's past medical history as noted.  the patient has no significant risk factors for coronary artery disease.  review of the echocardiogram reveals a normal ejection fraction	  Post lap yuri  Taper steroids

## 2019-08-12 LAB
ALBUMIN SERPL ELPH-MCNC: 3.4 G/DL — SIGNIFICANT CHANGE UP (ref 3.3–5)
ALP SERPL-CCNC: 82 U/L — SIGNIFICANT CHANGE UP (ref 40–120)
ALT FLD-CCNC: 54 U/L — SIGNIFICANT CHANGE UP (ref 12–78)
ANION GAP SERPL CALC-SCNC: 4 MMOL/L — LOW (ref 5–17)
AST SERPL-CCNC: 15 U/L — SIGNIFICANT CHANGE UP (ref 15–37)
BILIRUB SERPL-MCNC: 0.6 MG/DL — SIGNIFICANT CHANGE UP (ref 0.2–1.2)
BUN SERPL-MCNC: 23 MG/DL — SIGNIFICANT CHANGE UP (ref 7–23)
CALCIUM SERPL-MCNC: 9.2 MG/DL — SIGNIFICANT CHANGE UP (ref 8.5–10.1)
CHLORIDE SERPL-SCNC: 98 MMOL/L — SIGNIFICANT CHANGE UP (ref 96–108)
CO2 SERPL-SCNC: 36 MMOL/L — HIGH (ref 22–31)
CREAT SERPL-MCNC: 0.9 MG/DL — SIGNIFICANT CHANGE UP (ref 0.5–1.3)
GLUCOSE BLDC GLUCOMTR-MCNC: 242 MG/DL — HIGH (ref 70–99)
GLUCOSE BLDC GLUCOMTR-MCNC: 262 MG/DL — HIGH (ref 70–99)
GLUCOSE BLDC GLUCOMTR-MCNC: 294 MG/DL — HIGH (ref 70–99)
GLUCOSE BLDC GLUCOMTR-MCNC: 312 MG/DL — HIGH (ref 70–99)
GLUCOSE SERPL-MCNC: 241 MG/DL — HIGH (ref 70–99)
HCT VFR BLD CALC: 41.8 % — SIGNIFICANT CHANGE UP (ref 34.5–45)
HGB BLD-MCNC: 13.4 G/DL — SIGNIFICANT CHANGE UP (ref 11.5–15.5)
MCHC RBC-ENTMCNC: 28.5 PG — SIGNIFICANT CHANGE UP (ref 27–34)
MCHC RBC-ENTMCNC: 32.1 GM/DL — SIGNIFICANT CHANGE UP (ref 32–36)
MCV RBC AUTO: 88.7 FL — SIGNIFICANT CHANGE UP (ref 80–100)
NRBC # BLD: 0 /100 WBCS — SIGNIFICANT CHANGE UP (ref 0–0)
PLATELET # BLD AUTO: 279 K/UL — SIGNIFICANT CHANGE UP (ref 150–400)
POTASSIUM SERPL-MCNC: 4.8 MMOL/L — SIGNIFICANT CHANGE UP (ref 3.5–5.3)
POTASSIUM SERPL-SCNC: 4.8 MMOL/L — SIGNIFICANT CHANGE UP (ref 3.5–5.3)
PROT SERPL-MCNC: 7.7 GM/DL — SIGNIFICANT CHANGE UP (ref 6–8.3)
RBC # BLD: 4.71 M/UL — SIGNIFICANT CHANGE UP (ref 3.8–5.2)
RBC # FLD: 12.8 % — SIGNIFICANT CHANGE UP (ref 10.3–14.5)
SODIUM SERPL-SCNC: 138 MMOL/L — SIGNIFICANT CHANGE UP (ref 135–145)
SURGICAL PATHOLOGY STUDY: SIGNIFICANT CHANGE UP
WBC # BLD: 22.45 K/UL — HIGH (ref 3.8–10.5)
WBC # FLD AUTO: 22.45 K/UL — HIGH (ref 3.8–10.5)

## 2019-08-12 RX ORDER — BUDESONIDE AND FORMOTEROL FUMARATE DIHYDRATE 160; 4.5 UG/1; UG/1
2 AEROSOL RESPIRATORY (INHALATION)
Refills: 0 | Status: DISCONTINUED | OUTPATIENT
Start: 2019-08-12 | End: 2019-08-13

## 2019-08-12 RX ORDER — LANOLIN ALCOHOL/MO/W.PET/CERES
3 CREAM (GRAM) TOPICAL ONCE
Refills: 0 | Status: COMPLETED | OUTPATIENT
Start: 2019-08-12 | End: 2019-08-12

## 2019-08-12 RX ADMIN — ATORVASTATIN CALCIUM 10 MILLIGRAM(S): 80 TABLET, FILM COATED ORAL at 22:15

## 2019-08-12 RX ADMIN — Medication 1: at 22:14

## 2019-08-12 RX ADMIN — Medication 50 MILLIGRAM(S): at 13:01

## 2019-08-12 RX ADMIN — HYDROMORPHONE HYDROCHLORIDE 1 MILLIGRAM(S): 2 INJECTION INTRAMUSCULAR; INTRAVENOUS; SUBCUTANEOUS at 19:51

## 2019-08-12 RX ADMIN — LOSARTAN POTASSIUM 50 MILLIGRAM(S): 100 TABLET, FILM COATED ORAL at 05:51

## 2019-08-12 RX ADMIN — HEPARIN SODIUM 5000 UNIT(S): 5000 INJECTION INTRAVENOUS; SUBCUTANEOUS at 22:15

## 2019-08-12 RX ADMIN — MORPHINE SULFATE 4 MILLIGRAM(S): 50 CAPSULE, EXTENDED RELEASE ORAL at 04:19

## 2019-08-12 RX ADMIN — MONTELUKAST 10 MILLIGRAM(S): 4 TABLET, CHEWABLE ORAL at 22:16

## 2019-08-12 RX ADMIN — Medication 20 MILLIGRAM(S): at 18:22

## 2019-08-12 RX ADMIN — HYDROMORPHONE HYDROCHLORIDE 1 MILLIGRAM(S): 2 INJECTION INTRAMUSCULAR; INTRAVENOUS; SUBCUTANEOUS at 11:38

## 2019-08-12 RX ADMIN — Medication 150 MICROGRAM(S): at 05:51

## 2019-08-12 RX ADMIN — NYSTATIN CREAM 1 APPLICATION(S): 100000 CREAM TOPICAL at 18:22

## 2019-08-12 RX ADMIN — PIPERACILLIN AND TAZOBACTAM 25 GRAM(S): 4; .5 INJECTION, POWDER, LYOPHILIZED, FOR SOLUTION INTRAVENOUS at 13:28

## 2019-08-12 RX ADMIN — Medication 1 MILLIGRAM(S): at 22:54

## 2019-08-12 RX ADMIN — Medication 8: at 18:21

## 2019-08-12 RX ADMIN — Medication 4 UNIT(S): at 18:21

## 2019-08-12 RX ADMIN — Medication 30 MILLIGRAM(S): at 11:31

## 2019-08-12 RX ADMIN — ESCITALOPRAM OXALATE 20 MILLIGRAM(S): 10 TABLET, FILM COATED ORAL at 11:31

## 2019-08-12 RX ADMIN — HEPARIN SODIUM 5000 UNIT(S): 5000 INJECTION INTRAVENOUS; SUBCUTANEOUS at 05:51

## 2019-08-12 RX ADMIN — Medication 6: at 11:32

## 2019-08-12 RX ADMIN — PIPERACILLIN AND TAZOBACTAM 25 GRAM(S): 4; .5 INJECTION, POWDER, LYOPHILIZED, FOR SOLUTION INTRAVENOUS at 05:52

## 2019-08-12 RX ADMIN — PIPERACILLIN AND TAZOBACTAM 25 GRAM(S): 4; .5 INJECTION, POWDER, LYOPHILIZED, FOR SOLUTION INTRAVENOUS at 22:15

## 2019-08-12 RX ADMIN — HYDROMORPHONE HYDROCHLORIDE 1 MILLIGRAM(S): 2 INJECTION INTRAMUSCULAR; INTRAVENOUS; SUBCUTANEOUS at 20:06

## 2019-08-12 RX ADMIN — Medication 4: at 08:33

## 2019-08-12 RX ADMIN — Medication 50 MILLIGRAM(S): at 22:54

## 2019-08-12 RX ADMIN — Medication 3 MILLIGRAM(S): at 22:15

## 2019-08-12 RX ADMIN — Medication 20 MILLIGRAM(S): at 05:50

## 2019-08-12 RX ADMIN — MORPHINE SULFATE 4 MILLIGRAM(S): 50 CAPSULE, EXTENDED RELEASE ORAL at 00:04

## 2019-08-12 RX ADMIN — Medication 50 MILLIGRAM(S): at 08:34

## 2019-08-12 RX ADMIN — Medication 4 UNIT(S): at 08:34

## 2019-08-12 RX ADMIN — HEPARIN SODIUM 5000 UNIT(S): 5000 INJECTION INTRAVENOUS; SUBCUTANEOUS at 13:01

## 2019-08-12 RX ADMIN — BUDESONIDE AND FORMOTEROL FUMARATE DIHYDRATE 2 PUFF(S): 160; 4.5 AEROSOL RESPIRATORY (INHALATION) at 18:22

## 2019-08-12 RX ADMIN — Medication 4 UNIT(S): at 11:32

## 2019-08-12 RX ADMIN — INSULIN GLARGINE 30 UNIT(S): 100 INJECTION, SOLUTION SUBCUTANEOUS at 08:33

## 2019-08-12 RX ADMIN — Medication 1 MILLIGRAM(S): at 08:39

## 2019-08-12 RX ADMIN — CHLORHEXIDINE GLUCONATE 1 APPLICATION(S): 213 SOLUTION TOPICAL at 08:34

## 2019-08-12 NOTE — PROGRESS NOTE ADULT - SUBJECTIVE AND OBJECTIVE BOX
INTERVAL HPI:   58yo Female with  Anxiety, COPD has O2 at home, DM, HTN, Hypothyroidism, Active smoker since age 15, Cholelithiasis and no PSH presents to the ER c/o recurrent epigastric abdominal pain x5-6 days, started on wed or thurs per patient but she did not want to come to the ER over the weekend, states pain is worse than usual, described as "sharp" constant with radiation to the back, 10/10 in severity with no alleviating or known triggering factors. Associated with nausea, denies vomiting.  Patient was here last month for similar complaints, HIDA was negative and pain improved, was supposed to follow up with Surgeon in the office for elective cholecystectomy however she states that surgeon did not accept her insurance.   Admitted with sonographic evidence of Cholecystitis.  08/08/19: Laparoscopic Cholecystectomy    OVERNIGHT EVENTS:  Comfortable, feels better.    Vital Signs Last 24 Hrs  T(C): 37.1 (12 Aug 2019 12:42), Max: 37.1 (12 Aug 2019 12:42)  T(F): 98.7 (12 Aug 2019 12:42), Max: 98.7 (12 Aug 2019 12:42)  HR: 56 (12 Aug 2019 16:15) (55 - 73)  BP: 136/63 (12 Aug 2019 16:15) (136/63 - 166/67)  BP(mean): --  RR: 19 (12 Aug 2019 16:15) (17 - 20)  SpO2: 92% (12 Aug 2019 16:15) (92% - 98%)    PHYSICAL EXAM:  GEN:         Awake, responsive and comfortable.  HEENT:    Normal.    RESP:        no wheezing.  CVS:          Regular rate and rhythm.   ABD:         Soft, non-tender, non-distended;     MEDICATIONS  (STANDING):  atorvastatin 10 milliGRAM(s) Oral at bedtime  chlorhexidine 4% Liquid 1 Application(s) Topical <User Schedule>  dextrose 5%. 1000 milliLiter(s) (50 mL/Hr) IV Continuous <Continuous>  dextrose 50% Injectable 12.5 Gram(s) IV Push once  dextrose 50% Injectable 25 Gram(s) IV Push once  dextrose 50% Injectable 25 Gram(s) IV Push once  escitalopram 20 milliGRAM(s) Oral daily  heparin  Injectable 5000 Unit(s) SubCutaneous every 8 hours  insulin glargine Injectable (LANTUS) 30 Unit(s) SubCutaneous every morning  insulin lispro (HumaLOG) corrective regimen sliding scale   SubCutaneous three times a day before meals  insulin lispro (HumaLOG) corrective regimen sliding scale   SubCutaneous at bedtime  insulin lispro Injectable (HumaLOG) 4 Unit(s) SubCutaneous three times a day before meals  levothyroxine 150 MICROGram(s) Oral daily  losartan 50 milliGRAM(s) Oral daily  montelukast 10 milliGRAM(s) Oral at bedtime  nicotine - 21 mG/24Hr(s) Patch 1 patch Transdermal daily  nystatin Cream 1 Application(s) Topical two times a day  piperacillin/tazobactam IVPB.. 3.375 Gram(s) IV Intermittent every 8 hours  predniSONE   Tablet 30 milliGRAM(s) Oral daily    MEDICATIONS  (PRN):  ALBUTerol/ipratropium for Nebulization 3 milliLiter(s) Nebulizer every 6 hours PRN Shortness of Breath and/or Wheezing  clonazePAM  Tablet 1 milliGRAM(s) Oral three times a day PRN anxiety  dextrose 40% Gel 15 Gram(s) Oral once PRN Blood Glucose LESS THAN 70 milliGRAM(s)/deciliter  glucagon  Injectable 1 milliGRAM(s) IntraMuscular once PRN Glucose LESS THAN 70 milligrams/deciliter  HYDROmorphone  Injectable 1 milliGRAM(s) IV Push every 6 hours PRN Severe Pain (7 - 10)  hydrOXYzine hydrochloride 50 milliGRAM(s) Oral four times a day PRN Itching  melatonin 3 milliGRAM(s) Oral once PRN Insomnia  morphine  - Injectable 4 milliGRAM(s) IV Push every 4 hours PRN Moderate Pain (4 - 6)  ondansetron Injectable 4 milliGRAM(s) IV Push every 6 hours PRN Nausea    LABS:                        13.4   22.45 )-----------( 279      ( 12 Aug 2019 07:28 )             41.8     08-12    138  |  98  |  23  ----------------------------<  241<H>  4.8   |  36<H>  |  0.90    Ca    9.2      12 Aug 2019 07:28    TPro  7.7  /  Alb  3.4  /  TBili  0.6  /  DBili  x   /  AST  15  /  ALT  54  /  AlkPhos  82  08-12    08-08 @ 20:31  pH: 7.35  pCO2: 52  pO2: 79  SaO2: 94  08-06 @ 07:49  pH: 7.38  pCO2: 50  pO2: 52  SaO2: 85  ASSESSMENT AND PLAN:  ·	Acute Cholecystitis S/P Lap Cholecystectomy  ·	Abdominal pain.  ·	Leukocytosis.  ·	COPD.  ·	Chronic hypoxic hypercarbic Respiratory failure.  ·	Tobacco abuse.  ·	Obesity.  ·	Suspect MEENU.  ·	HTN.  ·	DM.  ·	Hypothyroidism.  ·	Anxiety.    Will add Symbicort and DC steroids.  Nebulizer as needed.  Incentive spirometry.

## 2019-08-12 NOTE — PROGRESS NOTE ADULT - SUBJECTIVE AND OBJECTIVE BOX
60yo Female with PMH Anxiety, COPD, DM, HTN, Hypothyroidism, active smoker, cholelithiasis and no PSH presents to the ER c/o recurrent epigastric abdominal pain x5-6 days, started on wed or thurs per patient but she did not want to come to the ER over the weekend, states pain is worse than usual, described as "sharp" constant with radiation to the back, 10/10 in severity with no alleviating or known triggering factors. Associated with nausea, denies vomiting.    Patient was here last month for similar complaints, HIDA was negative and pain improved, was supposed to follow up with Surgeon in the office for elective cholecystectomy however she states that surgeon did not accept her insurance. (05 Aug 2019 12:14)      Chief Complaint:  Patient is a 59y old  Female who presents with a chief complaint of abdominal pain (06 Aug 2019 13:33)      Review of Systems:    General:  No wt loss, fevers, chills, night sweats  Eyes:  Good vision, no reported pain  ENT:  No sore throat, pain, runny nose, dysphagia  CV:  No pain, palpitations, hypo/hypertension  Resp:  No dyspnea, cough, tachypnea, wheezing  GI: nausea, vomiting, diarrhea,         Social History/Family History  SOCHX:   tobacco,  -  alcohol    FMHX: FA/MO  - contributory       Discussed with:  PMD, Family    Physical Exam:    Vital Signs:  Vital Signs Last 24 Hrs  T(C): 36.6 (06 Aug 2019 17:48), Max: 37.3 (06 Aug 2019 05:17)  T(F): 97.9 (06 Aug 2019 17:48), Max: 99.1 (06 Aug 2019 05:17)  HR: 91 (06 Aug 2019 17:48) (84 - 94)  BP: 154/83 (06 Aug 2019 17:48) (130/58 - 154/83)  BP(mean): --  RR: 18 (06 Aug 2019 17:48) (18 - 18)  SpO2: 90% (06 Aug 2019 17:48) (90% - 99%)  Daily     Daily Weight in k.3 (06 Aug 2019 05:17)  I&O's Summary    05 Aug 2019 07:01  -  06 Aug 2019 07:00  --------------------------------------------------------  IN: 200 mL / OUT: 0 mL / NET: 200 mL          Chest:  Full & symmetric excursion, no increased effort, breath sounds clear  Cardiovascular:  Regular rhythm, S1, S2, no murmur/rub/S3/S4, no carotid/femoral/abdominal bruit, radial/pedal pulses 2+, no edema  Abdomen:  Soft, non-tender, non-distended, normoactive bowel sounds, no HSM      Laboratory:                          13.2   13.38 )-----------( 262      ( 06 Aug 2019 06:31 )             40.4     08-    138  |  103  |  11  ----------------------------<  198<H>  4.6   |  28  |  0.82    Ca    8.9      06 Aug 2019 06:31  Mg     2.3     -    TPro  7.5  /  Alb  3.2<L>  /  TBili  0.4  /  DBili  x   /  AST  18  /  ALT  20  /  AlkPhos  109  08-    ABG - ( 06 Aug 2019 07:49 )  pH, Arterial: x     pH, Blood: 7.38  /  pCO2: 50    /  pO2: 52    / HCO3: 28    / Base Excess: 2.9   /  SaO2: 85            CAPILLARY BLOOD GLUCOSE      POCT Blood Glucose.: 192 mg/dL (06 Aug 2019 16:45)  POCT Blood Glucose.: 208 mg/dL (06 Aug 2019 11:47)  POCT Blood Glucose.: 92 mg/dL (05 Aug 2019 23:06)    LIVER FUNCTIONS - ( 06 Aug 2019 06:31 )  Alb: 3.2 g/dL / Pro: 7.5 gm/dL / ALK PHOS: 109 U/L / ALT: 20 U/L / AST: 18 U/L / GGT: x           PT/INR - ( 05 Aug 2019 10:56 )   PT: 11.1 sec;   INR: 0.99 ratio         PTT - ( 05 Aug 2019 10:56 )  PTT:35.9 sec        Assessment:	  the chart and labs and vital signs are all reviewed.	  the patient's past medical history as noted.  the patient has no significant risk factors for coronary artery disease.  review of the echocardiogram reveals a normal ejection fraction	  Post lap yuri  Taper steroids

## 2019-08-12 NOTE — PROGRESS NOTE ADULT - SUBJECTIVE AND OBJECTIVE BOX
INTERVAL HPI/OVERNIGHT EVENTS:        REVIEW OF SYSTEMS:  CONSTITUTIONAL:  feels  better  no  ABD  pain    NECK: No pain or stiffnes  RESPIRATORY: No SOB   CARDIOVASCULAR: No chest pain, palpitations, dizziness,   GASTROINTESTINAL: No abdominal pain. No nausea, vomiting,   NEUROLOGICAL: No headaches, no  blurry  vision no  dizziness  SKIN: No itching,   MUSCULOSKELETAL: No pain    MEDICATION:  ALBUTerol/ipratropium for Nebulization 3 milliLiter(s) Nebulizer every 6 hours PRN  atorvastatin 10 milliGRAM(s) Oral at bedtime  chlorhexidine 4% Liquid 1 Application(s) Topical <User Schedule>  clonazePAM  Tablet 1 milliGRAM(s) Oral three times a day PRN  dextrose 40% Gel 15 Gram(s) Oral once PRN  dextrose 5%. 1000 milliLiter(s) IV Continuous <Continuous>  dextrose 50% Injectable 12.5 Gram(s) IV Push once  dextrose 50% Injectable 25 Gram(s) IV Push once  dextrose 50% Injectable 25 Gram(s) IV Push once  escitalopram 20 milliGRAM(s) Oral daily  glucagon  Injectable 1 milliGRAM(s) IntraMuscular once PRN  heparin  Injectable 5000 Unit(s) SubCutaneous every 8 hours  HYDROmorphone  Injectable 1 milliGRAM(s) IV Push every 6 hours PRN  hydrOXYzine hydrochloride 50 milliGRAM(s) Oral four times a day PRN  insulin glargine Injectable (LANTUS) 30 Unit(s) SubCutaneous every morning  insulin lispro (HumaLOG) corrective regimen sliding scale   SubCutaneous three times a day before meals  insulin lispro (HumaLOG) corrective regimen sliding scale   SubCutaneous at bedtime  insulin lispro Injectable (HumaLOG) 4 Unit(s) SubCutaneous three times a day before meals  levothyroxine 150 MICROGram(s) Oral daily  losartan 50 milliGRAM(s) Oral daily  melatonin 3 milliGRAM(s) Oral once PRN  methylPREDNISolone sodium succinate Injectable 20 milliGRAM(s) IV Push every 12 hours  montelukast 10 milliGRAM(s) Oral at bedtime  morphine  - Injectable 4 milliGRAM(s) IV Push every 4 hours PRN  nicotine - 21 mG/24Hr(s) Patch 1 patch Transdermal daily  nystatin Cream 1 Application(s) Topical two times a day  ondansetron Injectable 4 milliGRAM(s) IV Push every 6 hours PRN  piperacillin/tazobactam IVPB.. 3.375 Gram(s) IV Intermittent every 8 hours    Vital Signs Last 24 Hrs  T(C): 36.3 (12 Aug 2019 04:46), Max: 36.8 (12 Aug 2019 00:23)  T(F): 97.4 (12 Aug 2019 04:46), Max: 98.3 (12 Aug 2019 00:23)  HR: 55 (12 Aug 2019 04:46) (55 - 84)  BP: 161/63 (12 Aug 2019 04:46) (137/71 - 162/73)  BP(mean): --  RR: 18 (12 Aug 2019 04:46) (18 - 20)  SpO2: 97% (12 Aug 2019 04:46) (90% - 97%)    PHYSICAL EXAM:  GENERAL: NAD, well-groomed, well-developed  EYES:  conjunctiva and sclera clear  ENMT:  Moist mucous membranes,   NECK: Supple, No JVD, Normal thyroid  NERVOUS SYSTEM:  Alert oriented   no  focal  deficits;   CHEST/LUNG: Clear    HEART: Regular rate and rhythm; No murmurs, rubs, or gallops  ABDOMEN: Soft, Nontender, Nondistended; Bowel sounds present  EXTREMITIES:  no  edema no  tenderness  SKIN: No rashes   LABS:                        13.4   22.45 )-----------( 279      ( 12 Aug 2019 07:28 )             41.8     08-12    138  |  98  |  23  ----------------------------<  241<H>  4.8   |  36<H>  |  0.90    Ca    9.2      12 Aug 2019 07:28    TPro  7.7  /  Alb  3.4  /  TBili  0.6  /  DBili  x   /  AST  15  /  ALT  54  /  AlkPhos  82  08-12        CAPILLARY BLOOD GLUCOSE      POCT Blood Glucose.: 242 mg/dL (12 Aug 2019 07:36)  POCT Blood Glucose.: 208 mg/dL (11 Aug 2019 22:31)  POCT Blood Glucose.: 262 mg/dL (11 Aug 2019 17:31)  POCT Blood Glucose.: 208 mg/dL (11 Aug 2019 11:15)      RADIOLOGY & ADDITIONAL TESTS:    Imaging reports  Personally Reviewed:  [ x] YES  [ ] NO    Consultant(s) Notes Reviewed:  [ x] YES  [ ] NO    Care Discussed with Consultants/Other Providers [x ] YES  [ ] NO  ·  Problem: Acute cholecystitis. s/p  Lap yuri   Plan: antibiotics pain  managemnt  diet  activity  as per  surgery  surgery Follow up.      Problem/Plan - 2:  ·  Problem: Type 2 diabetes mellitus without complication, without long-term current use of insulin.  Plan: riss.      Problem/Plan - 3:  ·  Problem: Essential hypertension.  Plan: continue losartan     Problem/Plan - 4:  ·  Problem: Acquired hypothyroidism.  Plan: continue synthroid  COPD  duoneb O2     Problem/Plan - 5:  ·  Problem: Anxiety.  Plan: continue clonazepam   tobacco  abuse  nicotine  patch  LEUCOCYTOSIS  CHRONC +  STEROIDS   CHANGE  TO  ORAL  STEROIDS  AND  TAPER  DISCHARGE  HOME IN  AM

## 2019-08-12 NOTE — PROGRESS NOTE ADULT - ASSESSMENT
dm2 with hyperglycemia  ha1c 6.5%
Pt is a 58 yo F active smoker with h/o COPD, HTN, DM, Hypothyroidism, cholelithiasis and anxiety d/o. Pt was here last month abd pain, HIDA was negative and pain improved; pt was supposed to follow up with Surgeon  for elective cholecystectomy however she states that Surgeon did not accept her insurance. Pt now returns (8/5) to the ER c/o recurrent epigastric abdominal pain x5-6 days with radiation to the back. Admitting dx: Cholelithiasis, acute cholecystitis. Pt remained symptomatic and taken to the OR (8/8); s/p lap yuri 2 to acute calculous cholecystitis. Post-op pt left intubated and transferred to the ICU. Pt was extubated in the ICU    Resp: IS/ Supplemental 02 + Steroids + Nebs  ID: Off Abx ?  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
steroid induced hyperglycemia
dm2 with hyperglycemia  ha1c 6.5%

## 2019-08-12 NOTE — PROGRESS NOTE ADULT - SUBJECTIVE AND OBJECTIVE BOX
Patient is a 59y old  Female who presents with a chief complaint of abdominal pain (12 Aug 2019 20:55)      Interval History: steroid induced hyperglycemia   finger sticks are in 250 range   on Prednisone 20 mg with taper   on Lantus 30 units and prandial lispro 4 units     MEDICATIONS  (STANDING):  atorvastatin 10 milliGRAM(s) Oral at bedtime  buDESOnide 160 MICROgram(s)/formoterol 4.5 MICROgram(s) Inhaler 2 Puff(s) Inhalation two times a day  chlorhexidine 4% Liquid 1 Application(s) Topical <User Schedule>  dextrose 5%. 1000 milliLiter(s) (50 mL/Hr) IV Continuous <Continuous>  dextrose 50% Injectable 12.5 Gram(s) IV Push once  dextrose 50% Injectable 25 Gram(s) IV Push once  dextrose 50% Injectable 25 Gram(s) IV Push once  escitalopram 20 milliGRAM(s) Oral daily  heparin  Injectable 5000 Unit(s) SubCutaneous every 8 hours  insulin glargine Injectable (LANTUS) 30 Unit(s) SubCutaneous every morning  insulin lispro (HumaLOG) corrective regimen sliding scale   SubCutaneous three times a day before meals  insulin lispro (HumaLOG) corrective regimen sliding scale   SubCutaneous at bedtime  insulin lispro Injectable (HumaLOG) 4 Unit(s) SubCutaneous three times a day before meals  levothyroxine 150 MICROGram(s) Oral daily  losartan 50 milliGRAM(s) Oral daily  montelukast 10 milliGRAM(s) Oral at bedtime  nicotine - 21 mG/24Hr(s) Patch 1 patch Transdermal daily  nystatin Cream 1 Application(s) Topical two times a day  piperacillin/tazobactam IVPB.. 3.375 Gram(s) IV Intermittent every 8 hours  predniSONE   Tablet 20 milliGRAM(s) Oral daily    MEDICATIONS  (PRN):  ALBUTerol/ipratropium for Nebulization 3 milliLiter(s) Nebulizer every 6 hours PRN Shortness of Breath and/or Wheezing  clonazePAM  Tablet 1 milliGRAM(s) Oral three times a day PRN anxiety  dextrose 40% Gel 15 Gram(s) Oral once PRN Blood Glucose LESS THAN 70 milliGRAM(s)/deciliter  glucagon  Injectable 1 milliGRAM(s) IntraMuscular once PRN Glucose LESS THAN 70 milligrams/deciliter  HYDROmorphone  Injectable 1 milliGRAM(s) IV Push every 6 hours PRN Severe Pain (7 - 10)  hydrOXYzine hydrochloride 50 milliGRAM(s) Oral four times a day PRN Itching  melatonin 3 milliGRAM(s) Oral once PRN Insomnia  morphine  - Injectable 4 milliGRAM(s) IV Push every 4 hours PRN Moderate Pain (4 - 6)  ondansetron Injectable 4 milliGRAM(s) IV Push every 6 hours PRN Nausea      Allergies    No Known Allergies    Intolerances        REVIEW OF SYSTEMS:  CONSTITUTIONAL: no changes  EYES: No eye pain, visual disturbances, or discharge  ENMT:  No difficulty hearing, No sinus or throat pain  NECK: No pain or stiffness  RESPIRATORY shortness of breath  CARDIOVASCULAR: No chest pain, palpitations or leg swelling  GASTROINTESTINAL: No abdominal or epigastric pain. No nausea, vomiting, or hematemesis; No diarrhea or constipation. No melena or hematochezia.  GENITOURINARY: No dysuria, frequency, hematuria, or incontinence  NEUROLOGICAL: No headaches, memory loss, loss of strength, numbness, or tremors  SKIN: No itching, burning, rashes, or lesions   ENDOCRINE: No heat or cold intolerance; No hair loss  MUSCULOSKELETAL: No joint pain or swelling; No muscle, back, or extremity pain  PSYCHIATRIC: No depression, anxiety, mood swings, or difficulty sleeping  HEME/LYMPH: No easy bruising, or bleeding gums  ALLERY AND IMMUNOLOGIC: No hives or eczema    Vital Signs Last 24 Hrs  T(C): 36.4 (12 Aug 2019 17:30), Max: 37.1 (12 Aug 2019 12:42)  T(F): 97.5 (12 Aug 2019 17:30), Max: 98.7 (12 Aug 2019 12:42)  HR: 63 (12 Aug 2019 17:30) (55 - 73)  BP: 138/61 (12 Aug 2019 17:30) (136/63 - 166/67)  BP(mean): --  RR: 18 (12 Aug 2019 17:30) (17 - 19)  SpO2: 97% (12 Aug 2019 17:30) (92% - 98%)    PHYSICAL EXAM:  GENERAL:   HEAD: Atraumatic, Normocephalic  EYES: PERRLA, conjunctiva and sclera clear  ENMT: No tonsillar erythema, exudates, or enlargement; Moist mucous membranes, Good dentition, No lesions  NECK: Supple, No JVD, Normal thyroid  NERVOUS SYSTEM:  Alert & Oriented X3, Good concentration; Motor Strength 5/5 B/L upper and lower extremities  CHEST/LUNG:  rales, rhonchi, wheezing,  HEART: Regular rate and rhythm; No murmurs, rubs, or gallops  ABDOMEN: Soft, Nontender, Nondistended; Bowel sounds present  EXTREMITIES:  2+ Peripheral Pulses, no edema  SKIN: No rashes or lesions    LABS:        CAPILLARY BLOOD GLUCOSE      POCT Blood Glucose.: 312 mg/dL (12 Aug 2019 17:51)  POCT Blood Glucose.: 262 mg/dL (12 Aug 2019 11:00)  POCT Blood Glucose.: 242 mg/dL (12 Aug 2019 07:36)  POCT Blood Glucose.: 208 mg/dL (11 Aug 2019 22:31)    Lipid panel:           Thyroid:  Diabetes Tests:  Parathyroid Panel:  Adrenals:  RADIOLOGY & ADDITIONAL TESTS:    Imaging Personally Reviewed:  [ ] YES  [ ] NO    Consultant(s) Notes Reviewed:  [ ] YES  [ ] NO    Care Discussed with Consultants/Other Providers [ ] YES  [ ] NO

## 2019-08-12 NOTE — PROGRESS NOTE ADULT - SUBJECTIVE AND OBJECTIVE BOX
TMAX - 98.7    On day # 7 Zosyn     Vital Signs Last 24 Hrs  T(C): 37.1 (12 Aug 2019 12:42), Max: 37.1 (12 Aug 2019 12:42)  T(F): 98.7 (12 Aug 2019 12:42), Max: 98.7 (12 Aug 2019 12:42)  HR: 56 (12 Aug 2019 16:15) (55 - 73)  BP: 136/63 (12 Aug 2019 16:15) (136/63 - 166/67)  BP(mean): --  RR: 19 (12 Aug 2019 16:15) (17 - 20)  SpO2: 92% (12 Aug 2019 16:15) (92% - 98%)  Supplemental O2:  presently on RA      Awake, alert, feeling a little better with some decreased abdominal pain although still with pain at the incision sites.  Tolerating her diet now.  No c/o SOB or wheezing presently and not using her NC O2 now.  Has not requested the nebulizer again since her rx on 8/10 - patient reminded that she can ask for the treatment if she feels SOB/ wheezing.  Noted Solumedrol decreased to q12hrs. yesterday and now changed to Prednisone 30mg/day.      PHYSICAL EXAM  General:  awake, alert, lying inbed in semi-recumbant position, pleasant and cooperative, not using NC O2 now, in NAD  HEENT: conj pink, sclerae anicteric, PERRLA, no oral lesions noted   Neck:  supple, no nodes noted  Heart:  RR  Lungs:  decreased BS at the bases otherwise clear and improved air entry  Abdomen:  BS+, soft, mild tenderness at incision sites- healing with steri-strips in place and some dried blood at the sites but no surrounding erythema  No CVA or Spinal tenderness to palpation  Extremities: no edema LE's                    no calf tenderness elicited to palpation   Skin:  warm, dry, no rash noted      I&O's Summary :    12 Aug 2019 07:01  -  12 Aug 2019 16:24  --------------------------------------------------------  IN: 700 mL / OUT: 0 mL / NET: 700 mL        LABS:  CBC Full  -  ( 12 Aug 2019 07:28 )  WBC Count : 22.45 K/uL  RBC Count : 4.71 M/uL  Hemoglobin : 13.4 g/dL  Hematocrit : 41.8 %  Platelet Count - Automated : 279 K/uL  Mean Cell Volume : 88.7 fl  Mean Cell Hemoglobin : 28.5 pg  Mean Cell Hemoglobin Concentration : 32.1 gm/dL  Auto Neutrophil # : x  Auto Lymphocyte # : x  Auto Monocyte # : x  Auto Eosinophil # : x  Auto Basophil # : x  Auto Neutrophil % : x  Auto Lymphocyte % : x  Auto Monocyte % : x  Auto Eosinophil % : x  Auto Basophil % : x    08-12    138  |  98  |  23  ----------------------------<  241<H>  4.8   |  36<H>  |  0.90    Ca    9.2      12 Aug 2019 07:28    TPro  7.7  /  Alb  3.4  /  TBili  0.6  /  DBili  x   /  AST  15  /  ALT  54  /  AlkPhos  82  08-12    LIVER FUNCTIONS - ( 12 Aug 2019 07:28 )  Alb: 3.4 g/dL / Pro: 7.7 gm/dL / ALK PHOS: 82 U/L / ALT: 54 U/L / AST: 15 U/L / GGT: x             Sedimentation Rate, Erythrocyte: 18 mm/hr (08-11 @ 07:44)    CRP - 1.29 ( 8/11/19 )      Lipase, Serum: 77 U/L (08-06 @ 06:31)          Surgical Pathology Report (08.08.19 @ 18:50)    Surgical Pathology Report:   ACCESSION No:  [id number]    JOZEF CRAIN                       1    Surgical Final Report    Final Diagnosis  Gallbladder, laparoscopic cholecystectomy:  - Acute and chronic cholecystitis  - Cholelithiasis    Verified by: Azael Escalera MD  (Electronic Signature)  Reported on: 08/12/19 11:12 EDT, [address] [place] [address]  _________________________________________________________________    Clinical History  Acute cholecystitis  Operation/procedure; laparoscopic cholecystectomy    Specimen(s) Submitted  1     Gallbladder    Gross Description  The specimen is received in formalin and the specimen container  is labeled as: Gallbladder. It consists of a focally  open/disrupted (at neck area) congested and edematous appearing  gallbladder which measures 8.8 x 4.0 cm. The cystic duct is not  grossly recognizable.  Margin of resection is a staple closed,  inked blue. The serosa is patchy congested/hemorrhagic, inked  green. The hepatic surface is ragged, inked black.  The wall  appears edematous, measuring 0.7 cm in thickness. The mucosa is  congested and patchy denuded.  The lumen contains hemorrhagic  "sludge" and a 3.9 cm calculus.  Also received in the same  container is a 1.7 cm multifaceted calculus.  Representative  sections submitted including margins of resection.  Two  cassettes.  In addition to other data that may appear on the specimen  container, the label has been inspected to confirm the presence  of the patient's name and date of birth  SM  08/09/2019 16:56          Radiology:   CXR - < from: Xray Chest 1 View- PORTABLE-Routine (08.11.19 @ 08:07) >  NTERPRETATION:  PROCEDURE: AP view of the chest.    CLINICAL INFORMATION: Cough and wheeze.    COMPARISON: 7/12/2019.    FINDINGS:    Lungs: There are no lung consolidations. Basilar atelectasis is noted.  Heart: The heart is normal in size.  Mediastinum: The mediastinum is within normal limits.    IMPRESSION:    No lung consolidations.            Impression:   Clinically improving slowly on present ab rx with Zosyn for continued rx of Acute Cholecystitis, s/p Lap Cholecystectomy with Surgical Pathology now resulted as Acute and Chronic Cholecystitis with Cholelithiasis ( 2 Stones found on path ).  CXR report noted with no consolidation seen, but patient still with leukocytosis - probably contributed to by steroid rx.   Apparently no Cx's were obtained prior to Surgery or from the OR.      Suggestions:  Will therefore continue current ab rx with Zosyn and follow-up temps and labs closely.  Discussed in detail with patient at bedside.

## 2019-08-13 ENCOUNTER — TRANSCRIPTION ENCOUNTER (OUTPATIENT)
Age: 59
End: 2019-08-13

## 2019-08-13 VITALS
DIASTOLIC BLOOD PRESSURE: 59 MMHG | SYSTOLIC BLOOD PRESSURE: 146 MMHG | HEART RATE: 65 BPM | OXYGEN SATURATION: 94 % | TEMPERATURE: 99 F

## 2019-08-13 DIAGNOSIS — J44.9 CHRONIC OBSTRUCTIVE PULMONARY DISEASE, UNSPECIFIED: ICD-10-CM

## 2019-08-13 LAB
ALBUMIN SERPL ELPH-MCNC: 3.3 G/DL — SIGNIFICANT CHANGE UP (ref 3.3–5)
ALP SERPL-CCNC: 74 U/L — SIGNIFICANT CHANGE UP (ref 40–120)
ALT FLD-CCNC: 39 U/L — SIGNIFICANT CHANGE UP (ref 12–78)
ANION GAP SERPL CALC-SCNC: 9 MMOL/L — SIGNIFICANT CHANGE UP (ref 5–17)
AST SERPL-CCNC: 7 U/L — LOW (ref 15–37)
BASOPHILS # BLD AUTO: 0 K/UL — SIGNIFICANT CHANGE UP (ref 0–0.2)
BASOPHILS NFR BLD AUTO: 0 % — SIGNIFICANT CHANGE UP (ref 0–2)
BILIRUB SERPL-MCNC: 0.6 MG/DL — SIGNIFICANT CHANGE UP (ref 0.2–1.2)
BUN SERPL-MCNC: 30 MG/DL — HIGH (ref 7–23)
CALCIUM SERPL-MCNC: 8.7 MG/DL — SIGNIFICANT CHANGE UP (ref 8.5–10.1)
CHLORIDE SERPL-SCNC: 96 MMOL/L — SIGNIFICANT CHANGE UP (ref 96–108)
CO2 SERPL-SCNC: 30 MMOL/L — SIGNIFICANT CHANGE UP (ref 22–31)
CREAT SERPL-MCNC: 1.13 MG/DL — SIGNIFICANT CHANGE UP (ref 0.5–1.3)
EOSINOPHIL # BLD AUTO: 0 K/UL — SIGNIFICANT CHANGE UP (ref 0–0.5)
EOSINOPHIL NFR BLD AUTO: 0 % — SIGNIFICANT CHANGE UP (ref 0–6)
GLUCOSE BLDC GLUCOMTR-MCNC: 250 MG/DL — HIGH (ref 70–99)
GLUCOSE SERPL-MCNC: 230 MG/DL — HIGH (ref 70–99)
HCT VFR BLD CALC: 41.6 % — SIGNIFICANT CHANGE UP (ref 34.5–45)
HGB BLD-MCNC: 13.7 G/DL — SIGNIFICANT CHANGE UP (ref 11.5–15.5)
LYMPHOCYTES # BLD AUTO: 17 % — SIGNIFICANT CHANGE UP (ref 13–44)
LYMPHOCYTES # BLD AUTO: 3.95 K/UL — HIGH (ref 1–3.3)
MCHC RBC-ENTMCNC: 28.5 PG — SIGNIFICANT CHANGE UP (ref 27–34)
MCHC RBC-ENTMCNC: 32.9 GM/DL — SIGNIFICANT CHANGE UP (ref 32–36)
MCV RBC AUTO: 86.5 FL — SIGNIFICANT CHANGE UP (ref 80–100)
MONOCYTES # BLD AUTO: 0.23 K/UL — SIGNIFICANT CHANGE UP (ref 0–0.9)
MONOCYTES NFR BLD AUTO: 1 % — LOW (ref 2–14)
NEUTROPHILS # BLD AUTO: 17.89 K/UL — HIGH (ref 1.8–7.4)
NEUTROPHILS NFR BLD AUTO: 74 % — SIGNIFICANT CHANGE UP (ref 43–77)
NRBC # BLD: SIGNIFICANT CHANGE UP /100 WBCS (ref 0–0)
PLATELET # BLD AUTO: 302 K/UL — SIGNIFICANT CHANGE UP (ref 150–400)
POTASSIUM SERPL-MCNC: 4 MMOL/L — SIGNIFICANT CHANGE UP (ref 3.5–5.3)
POTASSIUM SERPL-SCNC: 4 MMOL/L — SIGNIFICANT CHANGE UP (ref 3.5–5.3)
PROT SERPL-MCNC: 7.2 GM/DL — SIGNIFICANT CHANGE UP (ref 6–8.3)
RBC # BLD: 4.81 M/UL — SIGNIFICANT CHANGE UP (ref 3.8–5.2)
RBC # FLD: 12.9 % — SIGNIFICANT CHANGE UP (ref 10.3–14.5)
SODIUM SERPL-SCNC: 135 MMOL/L — SIGNIFICANT CHANGE UP (ref 135–145)
WBC # BLD: 23.23 K/UL — HIGH (ref 3.8–10.5)
WBC # FLD AUTO: 23.23 K/UL — HIGH (ref 3.8–10.5)

## 2019-08-13 RX ORDER — IBUPROFEN 200 MG
1 TABLET ORAL
Qty: 0 | Refills: 0 | DISCHARGE

## 2019-08-13 RX ORDER — CLONAZEPAM 1 MG
0 TABLET ORAL
Qty: 0 | Refills: 0 | DISCHARGE

## 2019-08-13 RX ORDER — NICOTINE POLACRILEX 2 MG
1 GUM BUCCAL
Qty: 0 | Refills: 0 | DISCHARGE
Start: 2019-08-13

## 2019-08-13 RX ADMIN — HYDROMORPHONE HYDROCHLORIDE 1 MILLIGRAM(S): 2 INJECTION INTRAMUSCULAR; INTRAVENOUS; SUBCUTANEOUS at 02:59

## 2019-08-13 RX ADMIN — LOSARTAN POTASSIUM 50 MILLIGRAM(S): 100 TABLET, FILM COATED ORAL at 10:13

## 2019-08-13 RX ADMIN — Medication 4: at 08:13

## 2019-08-13 RX ADMIN — PIPERACILLIN AND TAZOBACTAM 25 GRAM(S): 4; .5 INJECTION, POWDER, LYOPHILIZED, FOR SOLUTION INTRAVENOUS at 05:39

## 2019-08-13 RX ADMIN — HYDROMORPHONE HYDROCHLORIDE 1 MILLIGRAM(S): 2 INJECTION INTRAMUSCULAR; INTRAVENOUS; SUBCUTANEOUS at 08:45

## 2019-08-13 RX ADMIN — NYSTATIN CREAM 1 APPLICATION(S): 100000 CREAM TOPICAL at 05:40

## 2019-08-13 RX ADMIN — Medication 4 UNIT(S): at 08:14

## 2019-08-13 RX ADMIN — INSULIN GLARGINE 30 UNIT(S): 100 INJECTION, SOLUTION SUBCUTANEOUS at 08:14

## 2019-08-13 RX ADMIN — ONDANSETRON 4 MILLIGRAM(S): 8 TABLET, FILM COATED ORAL at 00:19

## 2019-08-13 RX ADMIN — BUDESONIDE AND FORMOTEROL FUMARATE DIHYDRATE 2 PUFF(S): 160; 4.5 AEROSOL RESPIRATORY (INHALATION) at 05:38

## 2019-08-13 RX ADMIN — CHLORHEXIDINE GLUCONATE 1 APPLICATION(S): 213 SOLUTION TOPICAL at 08:14

## 2019-08-13 RX ADMIN — HYDROMORPHONE HYDROCHLORIDE 1 MILLIGRAM(S): 2 INJECTION INTRAMUSCULAR; INTRAVENOUS; SUBCUTANEOUS at 09:00

## 2019-08-13 RX ADMIN — HYDROMORPHONE HYDROCHLORIDE 1 MILLIGRAM(S): 2 INJECTION INTRAMUSCULAR; INTRAVENOUS; SUBCUTANEOUS at 03:14

## 2019-08-13 RX ADMIN — Medication 20 MILLIGRAM(S): at 05:39

## 2019-08-13 RX ADMIN — HEPARIN SODIUM 5000 UNIT(S): 5000 INJECTION INTRAVENOUS; SUBCUTANEOUS at 05:38

## 2019-08-13 RX ADMIN — Medication 150 MICROGRAM(S): at 05:39

## 2019-08-13 NOTE — PROGRESS NOTE ADULT - PROVIDER SPECIALTY LIST ADULT
Cardiology
Endocrinology
Infectious Disease
Internal Medicine
Pulmonology
Surgery
Endocrinology
Infectious Disease
Internal Medicine
Surgery
Surgery
Critical Care
Endocrinology

## 2019-08-13 NOTE — PROGRESS NOTE ADULT - SUBJECTIVE AND OBJECTIVE BOX
INTERVAL HPI/OVERNIGHT EVENTS:        REVIEW OF SYSTEMS:  CONSTITUTIONAL: feels  well  no  complaints  wants  to  go  home    NECK: No pain or stiffnes  RESPIRATORY: No SOB   CARDIOVASCULAR: No chest pain, palpitations, dizziness,   GASTROINTESTINAL: No abdominal pain. No nausea, vomiting,   NEUROLOGICAL: No headaches, no  blurry  vision no  dizziness  SKIN: No itching,   MUSCULOSKELETAL:   episodic  back  pain    MEDICATION:  ALBUTerol/ipratropium for Nebulization 3 milliLiter(s) Nebulizer every 6 hours PRN  atorvastatin 10 milliGRAM(s) Oral at bedtime  buDESOnide 160 MICROgram(s)/formoterol 4.5 MICROgram(s) Inhaler 2 Puff(s) Inhalation two times a day  chlorhexidine 4% Liquid 1 Application(s) Topical <User Schedule>  dextrose 40% Gel 15 Gram(s) Oral once PRN  dextrose 5%. 1000 milliLiter(s) IV Continuous <Continuous>  dextrose 50% Injectable 12.5 Gram(s) IV Push once  dextrose 50% Injectable 25 Gram(s) IV Push once  dextrose 50% Injectable 25 Gram(s) IV Push once  escitalopram 20 milliGRAM(s) Oral daily  glucagon  Injectable 1 milliGRAM(s) IntraMuscular once PRN  heparin  Injectable 5000 Unit(s) SubCutaneous every 8 hours  HYDROmorphone  Injectable 1 milliGRAM(s) IV Push every 6 hours PRN  hydrOXYzine hydrochloride 50 milliGRAM(s) Oral four times a day PRN  insulin glargine Injectable (LANTUS) 30 Unit(s) SubCutaneous every morning  insulin lispro (HumaLOG) corrective regimen sliding scale   SubCutaneous three times a day before meals  insulin lispro (HumaLOG) corrective regimen sliding scale   SubCutaneous at bedtime  insulin lispro Injectable (HumaLOG) 4 Unit(s) SubCutaneous three times a day before meals  levothyroxine 150 MICROGram(s) Oral daily  losartan 50 milliGRAM(s) Oral daily  montelukast 10 milliGRAM(s) Oral at bedtime  nicotine - 21 mG/24Hr(s) Patch 1 patch Transdermal daily  nystatin Cream 1 Application(s) Topical two times a day  ondansetron Injectable 4 milliGRAM(s) IV Push every 6 hours PRN  piperacillin/tazobactam IVPB.. 3.375 Gram(s) IV Intermittent every 8 hours  predniSONE   Tablet 20 milliGRAM(s) Oral daily    Vital Signs Last 24 Hrs  T(C): 37.1 (13 Aug 2019 04:57), Max: 37.1 (12 Aug 2019 12:42)  T(F): 98.8 (13 Aug 2019 04:57), Max: 98.8 (13 Aug 2019 04:57)  HR: 55 (13 Aug 2019 04:57) (55 - 63)  BP: 124/63 (13 Aug 2019 04:57) (124/63 - 166/67)  BP(mean): --  RR: 18 (13 Aug 2019 04:57) (17 - 19)  SpO2: 92% (13 Aug 2019 04:57) (92% - 98%)    PHYSICAL EXAM:  GENERAL: NAD, well-groomed, well-developed  EYES:  conjunctiva and sclera clear  ENMT:  Moist mucous membranes,   NECK: Supple, No JVD, Normal thyroid  NERVOUS SYSTEM:  Alert oriented   no  focal  deficits;   CHEST/LUNG: Clear    HEART: Regular rate and rhythm; No murmurs, rubs, or gallops  ABDOMEN: Soft, Nontender, Nondistended; Bowel sounds present  EXTREMITIES:  no  edema no  tenderness  SKIN: No rashes   LABS:                        13.7   23.23 )-----------( 302      ( 13 Aug 2019 08:25 )             41.6     08-13    135  |  96  |  30<H>  ----------------------------<  230<H>  4.0   |  30  |  1.13    Ca    8.7      13 Aug 2019 08:25    TPro  7.2  /  Alb  3.3  /  TBili  0.6  /  DBili  x   /  AST  7<L>  /  ALT  39  /  AlkPhos  74  08-13        CAPILLARY BLOOD GLUCOSE      POCT Blood Glucose.: 250 mg/dL (13 Aug 2019 07:25)  POCT Blood Glucose.: 294 mg/dL (12 Aug 2019 22:03)  POCT Blood Glucose.: 312 mg/dL (12 Aug 2019 17:51)  POCT Blood Glucose.: 262 mg/dL (12 Aug 2019 11:00)      RADIOLOGY & ADDITIONAL TESTS:    Imaging reports  Personally Reviewed:  [x ] YES  [ ] NO    Consultant(s) Notes Reviewed:  [ x] YES  [ ] NO    Care Discussed with Consultants/Other Providers [ x] YES  [ ] NO  ·  Problem: Acute cholecystitis. s/p  Lap yuri   Plan: discharge  home  f/u  with  surgery     Problem/Plan - 2:  ·  Problem: Type 2 diabetes mellitus without complication, without long-term current use of insulin.  Plan: riss.      Problem/Plan - 3:  ·  Problem: Essential hypertension.  Plan: continue losartan     Problem/Plan - 4:  ·  Problem: Acquired hypothyroidism.  Plan: continue synthroid  COPD  duoneb O2     Problem/Plan - 5:  ·  Problem: Anxiety.  Plan: continue clonazepam   tobacco  abuse  nicotine  patch  LEUCOCYTOSIS  CHRONC +  STEROIDS   CHANGE  TO  ORAL  STEROIDS  AND  TAPER  DISCHARGE  HOME

## 2019-08-13 NOTE — DISCHARGE NOTE PROVIDER - CARE PROVIDERS DIRECT ADDRESSES
[email or web address]_Unknown

## 2019-08-13 NOTE — DISCHARGE NOTE NURSING/CASE MANAGEMENT/SOCIAL WORK - NSDCDPATPORTLINK_GEN_ALL_CORE
You can access the [facility] Patient Portal, offered by [facility], by registering with the following website: [email or web address]

## 2019-08-13 NOTE — DISCHARGE NOTE PROVIDER - NSDCCPCAREPLAN_GEN_ALL_CORE_FT
PRINCIPAL DISCHARGE DIAGNOSIS  Diagnosis: Acute cholecystitis  Assessment and Plan of Treatment:       SECONDARY DISCHARGE DIAGNOSES  Diagnosis: Status post laparoscopic cholecystectomy  Assessment and Plan of Treatment:

## 2019-08-13 NOTE — DISCHARGE NOTE PROVIDER - HOSPITAL COURSE
patient  underwent  Lap yuri  monitored  in  ICU  post  op  good  clinical  improvement given  AB  post  OP  2/2  leucocytosis  for  suspected  infection     patient  comfortable  c/e  episodic  chronic  back  pain ambulating freely  appetite good  sleeping  well      patient  with  chronic  leucocytosis  exacerbated  by  steroid  administration  for  COPD      discharged  home   to  continue  Augmentin  empirically  to  follow  in  office  surgery  and  pulmonary

## 2019-08-13 NOTE — DISCHARGE NOTE PROVIDER - CARE PROVIDER_API CALL
Florentino Espinoza)  [place] [address]  Phone: [phone]  Fax: [phone]  Follow Up Time:     Samir Rios)  Surgery  Critical Care  [address] [place] [address], [address]  [place] [address]  Phone: [phone]  Fax: [phone]  Follow Up Time:

## 2019-08-13 NOTE — DISCHARGE NOTE NURSING/CASE MANAGEMENT/SOCIAL WORK - NSDCPEWEB_GEN_ALL_CORE
[place] website --- [email or web address] website --- [email or web address]

## 2019-08-13 NOTE — PROGRESS NOTE ADULT - REASON FOR ADMISSION
abdominal pain

## 2019-08-13 NOTE — DISCHARGE NOTE NURSING/CASE MANAGEMENT/SOCIAL WORK - NSDCPEEMAIL_GEN_ALL_CORE
[facility] for [facility] email [email or web address]

## 2019-08-15 DIAGNOSIS — I10 ESSENTIAL (PRIMARY) HYPERTENSION: ICD-10-CM

## 2019-08-15 DIAGNOSIS — E11.9 TYPE 2 DIABETES MELLITUS WITHOUT COMPLICATIONS: ICD-10-CM

## 2019-08-15 DIAGNOSIS — E03.9 HYPOTHYROIDISM, UNSPECIFIED: ICD-10-CM

## 2019-08-15 DIAGNOSIS — E66.01 MORBID (SEVERE) OBESITY DUE TO EXCESS CALORIES: ICD-10-CM

## 2019-08-15 DIAGNOSIS — Z79.52 LONG TERM (CURRENT) USE OF SYSTEMIC STEROIDS: ICD-10-CM

## 2019-08-15 DIAGNOSIS — J44.9 CHRONIC OBSTRUCTIVE PULMONARY DISEASE, UNSPECIFIED: ICD-10-CM

## 2019-08-15 DIAGNOSIS — D72.829 ELEVATED WHITE BLOOD CELL COUNT, UNSPECIFIED: ICD-10-CM

## 2019-08-15 DIAGNOSIS — F17.210 NICOTINE DEPENDENCE, CIGARETTES, UNCOMPLICATED: ICD-10-CM

## 2019-08-15 DIAGNOSIS — Z79.899 OTHER LONG TERM (CURRENT) DRUG THERAPY: ICD-10-CM

## 2019-08-15 DIAGNOSIS — K80.00 CALCULUS OF GALLBLADDER WITH ACUTE CHOLECYSTITIS WITHOUT OBSTRUCTION: ICD-10-CM

## 2019-08-15 DIAGNOSIS — Z79.1 LONG TERM (CURRENT) USE OF NON-STEROIDAL ANTI-INFLAMMATORIES (NSAID): ICD-10-CM

## 2019-08-15 DIAGNOSIS — T38.0X5A ADVERSE EFFECT OF GLUCOCORTICOIDS AND SYNTHETIC ANALOGUES, INITIAL ENCOUNTER: ICD-10-CM

## 2019-08-15 DIAGNOSIS — F40.00 AGORAPHOBIA, UNSPECIFIED: ICD-10-CM

## 2019-08-15 DIAGNOSIS — Z79.84 LONG TERM (CURRENT) USE OF ORAL HYPOGLYCEMIC DRUGS: ICD-10-CM

## 2019-08-15 DIAGNOSIS — F41.9 ANXIETY DISORDER, UNSPECIFIED: ICD-10-CM

## 2019-08-15 DIAGNOSIS — K80.12 CALCULUS OF GALLBLADDER WITH ACUTE AND CHRONIC CHOLECYSTITIS WITHOUT OBSTRUCTION: ICD-10-CM

## 2019-08-15 RX ORDER — OXYCODONE HYDROCHLORIDE 5 MG/1
1 TABLET ORAL
Qty: 12 | Refills: 0
Start: 2019-08-15 | End: 2019-08-17

## 2019-08-17 ENCOUNTER — MESSAGE (OUTPATIENT)
Age: 59
End: 2019-08-17

## 2019-10-14 NOTE — PATIENT PROFILE ADULT - PRIMARY SOURCE OF SUPPORT/COMFORT
Refer to \"For your Well Being\" medial branch block, pain diary and sedation as discussed.   child(joyce)/significant other

## 2019-12-13 ENCOUNTER — EMERGENCY (EMERGENCY)
Facility: HOSPITAL | Age: 59
LOS: 0 days | Discharge: HOME | End: 2019-12-13
Attending: EMERGENCY MEDICINE
Payer: MEDICAID

## 2019-12-13 VITALS
TEMPERATURE: 99 F | DIASTOLIC BLOOD PRESSURE: 85 MMHG | RESPIRATION RATE: 19 BRPM | SYSTOLIC BLOOD PRESSURE: 104 MMHG | OXYGEN SATURATION: 91 % | HEART RATE: 100 BPM

## 2019-12-13 VITALS
WEIGHT: 259.93 LBS | HEIGHT: 61 IN | SYSTOLIC BLOOD PRESSURE: 121 MMHG | HEART RATE: 102 BPM | DIASTOLIC BLOOD PRESSURE: 69 MMHG | RESPIRATION RATE: 18 BRPM | TEMPERATURE: 99 F | OXYGEN SATURATION: 100 %

## 2019-12-13 DIAGNOSIS — E03.9 HYPOTHYROIDISM, UNSPECIFIED: ICD-10-CM
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
## 2019-12-13 DIAGNOSIS — R42 DIZZINESS AND GIDDINESS: ICD-10-CM

## 2019-12-13 DIAGNOSIS — T40.2X5A ADVERSE EFFECT OF OTHER OPIOIDS, INITIAL ENCOUNTER: ICD-10-CM

## 2019-12-13 DIAGNOSIS — I10 ESSENTIAL (PRIMARY) HYPERTENSION: ICD-10-CM

## 2019-12-13 DIAGNOSIS — F17.210 NICOTINE DEPENDENCE, CIGARETTES, UNCOMPLICATED: ICD-10-CM

## 2019-12-13 LAB
ALBUMIN SERPL ELPH-MCNC: 3.3 G/DL — SIGNIFICANT CHANGE UP (ref 3.3–5)
ALP SERPL-CCNC: 114 U/L — SIGNIFICANT CHANGE UP (ref 40–120)
ALT FLD-CCNC: 18 U/L — SIGNIFICANT CHANGE UP (ref 12–78)
ANION GAP SERPL CALC-SCNC: 2 MMOL/L — LOW (ref 5–17)
APTT BLD: 31.1 SEC — SIGNIFICANT CHANGE UP (ref 27.5–36.3)
AST SERPL-CCNC: 21 U/L — SIGNIFICANT CHANGE UP (ref 15–37)
BASOPHILS # BLD AUTO: 0.09 K/UL — SIGNIFICANT CHANGE UP (ref 0–0.2)
BASOPHILS NFR BLD AUTO: 0.6 % — SIGNIFICANT CHANGE UP (ref 0–2)
BILIRUB SERPL-MCNC: 0.5 MG/DL — SIGNIFICANT CHANGE UP (ref 0.2–1.2)
BUN SERPL-MCNC: 15 MG/DL — SIGNIFICANT CHANGE UP (ref 7–23)
CALCIUM SERPL-MCNC: 8.7 MG/DL — SIGNIFICANT CHANGE UP (ref 8.5–10.1)
CHLORIDE SERPL-SCNC: 104 MMOL/L — SIGNIFICANT CHANGE UP (ref 96–108)
CK MB BLD-MCNC: 1.4 % — SIGNIFICANT CHANGE UP (ref 0–3.5)
CK MB CFR SERPL CALC: 2.4 NG/ML — SIGNIFICANT CHANGE UP (ref 0.5–3.6)
CK SERPL-CCNC: 173 U/L — SIGNIFICANT CHANGE UP (ref 26–192)
CO2 SERPL-SCNC: 30 MMOL/L — SIGNIFICANT CHANGE UP (ref 22–31)
CREAT SERPL-MCNC: 0.88 MG/DL — SIGNIFICANT CHANGE UP (ref 0.5–1.3)
EOSINOPHIL # BLD AUTO: 0.38 K/UL — SIGNIFICANT CHANGE UP (ref 0–0.5)
EOSINOPHIL NFR BLD AUTO: 2.6 % — SIGNIFICANT CHANGE UP (ref 0–6)
GLUCOSE BLDC GLUCOMTR-MCNC: 265 MG/DL — HIGH (ref 70–99)
GLUCOSE SERPL-MCNC: 203 MG/DL — HIGH (ref 70–99)
HCT VFR BLD CALC: 36.6 % — SIGNIFICANT CHANGE UP (ref 34.5–45)
HGB BLD-MCNC: 11.7 G/DL — SIGNIFICANT CHANGE UP (ref 11.5–15.5)
IMM GRANULOCYTES NFR BLD AUTO: 1 % — SIGNIFICANT CHANGE UP (ref 0–1.5)
INR BLD: 1.1 RATIO — SIGNIFICANT CHANGE UP (ref 0.88–1.16)
LYMPHOCYTES # BLD AUTO: 21.8 % — SIGNIFICANT CHANGE UP (ref 13–44)
LYMPHOCYTES # BLD AUTO: 3.17 K/UL — SIGNIFICANT CHANGE UP (ref 1–3.3)
MCHC RBC-ENTMCNC: 29.4 PG — SIGNIFICANT CHANGE UP (ref 27–34)
MCHC RBC-ENTMCNC: 32 GM/DL — SIGNIFICANT CHANGE UP (ref 32–36)
MCV RBC AUTO: 92 FL — SIGNIFICANT CHANGE UP (ref 80–100)
MONOCYTES # BLD AUTO: 0.89 K/UL — SIGNIFICANT CHANGE UP (ref 0–0.9)
MONOCYTES NFR BLD AUTO: 6.1 % — SIGNIFICANT CHANGE UP (ref 2–14)
NEUTROPHILS # BLD AUTO: 9.89 K/UL — HIGH (ref 1.8–7.4)
NEUTROPHILS NFR BLD AUTO: 67.9 % — SIGNIFICANT CHANGE UP (ref 43–77)
NRBC # BLD: 0 /100 WBCS — SIGNIFICANT CHANGE UP (ref 0–0)
PLATELET # BLD AUTO: 275 K/UL — SIGNIFICANT CHANGE UP (ref 150–400)
POTASSIUM SERPL-MCNC: 4.6 MMOL/L — SIGNIFICANT CHANGE UP (ref 3.5–5.3)
POTASSIUM SERPL-SCNC: 4.6 MMOL/L — SIGNIFICANT CHANGE UP (ref 3.5–5.3)
PROT SERPL-MCNC: 7.6 GM/DL — SIGNIFICANT CHANGE UP (ref 6–8.3)
PROTHROM AB SERPL-ACNC: 12.4 SEC — SIGNIFICANT CHANGE UP (ref 10–12.9)
RBC # BLD: 3.98 M/UL — SIGNIFICANT CHANGE UP (ref 3.8–5.2)
RBC # FLD: 14.7 % — HIGH (ref 10.3–14.5)
SODIUM SERPL-SCNC: 136 MMOL/L — SIGNIFICANT CHANGE UP (ref 135–145)
TROPONIN I SERPL-MCNC: <.015 NG/ML — SIGNIFICANT CHANGE UP (ref 0.01–0.04)
WBC # BLD: 14.57 K/UL — HIGH (ref 3.8–10.5)
WBC # FLD AUTO: 14.57 K/UL — HIGH (ref 3.8–10.5)

## 2019-12-13 PROCEDURE — 70450 CT HEAD/BRAIN W/O DYE: CPT | Mod: 26

## 2019-12-13 PROCEDURE — 93010 ELECTROCARDIOGRAM REPORT: CPT

## 2019-12-13 PROCEDURE — 99284 EMERGENCY DEPT VISIT MOD MDM: CPT

## 2019-12-13 RX ORDER — MECLIZINE HCL 12.5 MG
1 TABLET ORAL
Qty: 20 | Refills: 0
Start: 2019-12-13 | End: 2019-12-22

## 2019-12-13 RX ORDER — SODIUM CHLORIDE 9 MG/ML
1000 INJECTION INTRAMUSCULAR; INTRAVENOUS; SUBCUTANEOUS ONCE
Refills: 0 | Status: COMPLETED | OUTPATIENT
Start: 2019-12-13 | End: 2019-12-13

## 2019-12-13 RX ORDER — MECLIZINE HCL 12.5 MG
25 TABLET ORAL ONCE
Refills: 0 | Status: COMPLETED | OUTPATIENT
Start: 2019-12-13 | End: 2019-12-13

## 2019-12-13 RX ADMIN — SODIUM CHLORIDE 1000 MILLILITER(S): 9 INJECTION INTRAMUSCULAR; INTRAVENOUS; SUBCUTANEOUS at 12:52

## 2019-12-13 RX ADMIN — Medication 25 MILLIGRAM(S): at 13:21

## 2019-12-13 NOTE — ED ADULT NURSE NOTE - NSIMPLEMENTINTERV_GEN_ALL_ED
Implemented All Fall Risk Interventions:  [place] to call system. Call bell, personal items and telephone within reach. Instruct patient to call for assistance. Room bathroom lighting operational. Non-slip footwear when patient is off stretcher. Physically safe environment: no spills, clutter or unnecessary equipment. Stretcher in lowest position, wheels locked, appropriate side rails in place. Provide visual cue, wrist band, yellow gown, etc. Monitor gait and stability. Monitor for mental status changes and reorient to person, place, and time. Review medications for side effects contributing to fall risk. Reinforce activity limits and safety measures with patient and family.

## 2019-12-13 NOTE — ED ADULT TRIAGE NOTE - CHIEF COMPLAINT QUOTE
Pt with PMH of DM, HTN, Cholesterol, chronic obstructive pulmonary disease, complaining of dizziness and blurred vision for 2 days, pt on 2L NC at home, fs done

## 2019-12-13 NOTE — ED PROVIDER NOTE - PATIENT PORTAL LINK FT
You can access the FollowMyHealth Patient Portal offered by [facility] by registering at the following website: [email or web address]. By joining [employer]’s FollowMyHealth portal, you will also be able to view your health information using other applications (apps) compatible with our system.

## 2019-12-13 NOTE — ED ADULT NURSE NOTE - OBJECTIVE STATEMENT
pt received c/o dizziness and falling down while walking with walker. was prescribed percocet for toothache.  pt reports decrease urine output for the last two days.

## 2019-12-13 NOTE — ED ADULT NURSE REASSESSMENT NOTE - NS ED NURSE REASSESS COMMENT FT1
Pt discharge with follow up care instructions, verbalize understanding. Pt discharge from ED in stable condition

## 2019-12-13 NOTE — ED PROVIDER NOTE - OBJECTIVE STATEMENT
60 yo female with history of smoking, copd presents with falls for 2-3 days at home and difficulty walking. Patient states that she was given "oxycodone with acetaminophen" by dentist for dental pain. Patient denies chest pain, sob, nausea, vomiting, abdominal pain, fever, chills, rash.

## 2019-12-13 NOTE — ED ADULT NURSE NOTE - ISOLATION TYPE:
HPI     Last eye exam was 1/4/18 with Dr. Jones.  Patient states didn't fill glasses rx after last exam. Squinting to read   small print but not using OTC readers. Was getting a headache during OCT   test today.   Patient denies diplopia, flashes/floaters, and pain.      Last edited by Ling Huffman on 1/24/2019  9:57 AM. (History)            Assessment /Plan     For exam results, see Encounter Report.    Open angle with borderline findings and low glaucoma risk in both eyes  -     [facility] Visual Field - OU - Extended - Both Eyes  -     OCT - Optic Nerve    Congenital cataract of right eye    Essential hypertension    Presbyopia            1.  Due to increased c/d ratio L>R.  All testing normal OU.  Family history of glaucoma-mother.  Continue to monitor yearly with hvf and oct.  2.  Currently not affecting vision.  Monitor.  3.  No retinopathy--monitor yearly.  BP control.    4.  Reading rx given.          RTC 1 year for routine exam.                                        None

## 2019-12-13 NOTE — ED PROVIDER NOTE - PMH
Agoraphobia    Anxiety    COPD (chronic obstructive pulmonary disease)    DM (diabetes mellitus)    Emphysema lung    HTN (hypertension)    Hypothyroid    Smoker Agoraphobia    Anxiety    COPD (chronic obstructive pulmonary disease)    DM (diabetes mellitus)    Emphysema lung    HTN (hypertension)    Hypothyroid    Obesity    Smoker

## 2019-12-18 ENCOUNTER — EMERGENCY (EMERGENCY)
Facility: HOSPITAL | Age: 59
LOS: 0 days | Discharge: HOME | End: 2019-12-19
Attending: EMERGENCY MEDICINE
Payer: MEDICAID

## 2019-12-18 VITALS
RESPIRATION RATE: 18 BRPM | DIASTOLIC BLOOD PRESSURE: 96 MMHG | TEMPERATURE: 99 F | SYSTOLIC BLOOD PRESSURE: 140 MMHG | WEIGHT: 250 LBS | HEART RATE: 96 BPM | OXYGEN SATURATION: 96 % | HEIGHT: 61 IN

## 2019-12-18 DIAGNOSIS — E11.9 TYPE 2 DIABETES MELLITUS WITHOUT COMPLICATIONS: ICD-10-CM

## 2019-12-18 DIAGNOSIS — R05 COUGH: ICD-10-CM

## 2019-12-18 DIAGNOSIS — J44.9 CHRONIC OBSTRUCTIVE PULMONARY DISEASE, UNSPECIFIED: ICD-10-CM

## 2019-12-18 DIAGNOSIS — E03.9 HYPOTHYROIDISM, UNSPECIFIED: ICD-10-CM

## 2019-12-18 DIAGNOSIS — F41.9 ANXIETY DISORDER, UNSPECIFIED: ICD-10-CM

## 2019-12-18 DIAGNOSIS — J43.9 EMPHYSEMA, UNSPECIFIED: ICD-10-CM

## 2019-12-18 DIAGNOSIS — F40.00 AGORAPHOBIA, UNSPECIFIED: ICD-10-CM

## 2019-12-18 DIAGNOSIS — R42 DIZZINESS AND GIDDINESS: ICD-10-CM

## 2019-12-18 DIAGNOSIS — H92.09 OTALGIA, UNSPECIFIED EAR: ICD-10-CM

## 2019-12-18 DIAGNOSIS — E66.9 OBESITY, UNSPECIFIED: ICD-10-CM

## 2019-12-18 DIAGNOSIS — I10 ESSENTIAL (PRIMARY) HYPERTENSION: ICD-10-CM

## 2019-12-18 DIAGNOSIS — F17.210 NICOTINE DEPENDENCE, CIGARETTES, UNCOMPLICATED: ICD-10-CM

## 2019-12-18 LAB
ALBUMIN SERPL ELPH-MCNC: 3.2 G/DL — LOW (ref 3.3–5)
ALP SERPL-CCNC: 114 U/L — SIGNIFICANT CHANGE UP (ref 40–120)
ALT FLD-CCNC: 29 U/L — SIGNIFICANT CHANGE UP (ref 12–78)
ANION GAP SERPL CALC-SCNC: 5 MMOL/L — SIGNIFICANT CHANGE UP (ref 5–17)
APPEARANCE UR: CLEAR — SIGNIFICANT CHANGE UP
APTT BLD: 33.2 SEC — SIGNIFICANT CHANGE UP (ref 28.5–37)
AST SERPL-CCNC: 38 U/L — HIGH (ref 15–37)
BASE EXCESS BLDA CALC-SCNC: 1.7 MMOL/L — SIGNIFICANT CHANGE UP (ref -2–2)
BASOPHILS # BLD AUTO: 0.08 K/UL — SIGNIFICANT CHANGE UP (ref 0–0.2)
BASOPHILS NFR BLD AUTO: 0.5 % — SIGNIFICANT CHANGE UP (ref 0–2)
BILIRUB SERPL-MCNC: 0.6 MG/DL — SIGNIFICANT CHANGE UP (ref 0.2–1.2)
BILIRUB UR-MCNC: NEGATIVE — SIGNIFICANT CHANGE UP
BLOOD GAS COMMENTS: SIGNIFICANT CHANGE UP
BLOOD GAS SOURCE: SIGNIFICANT CHANGE UP
BUN SERPL-MCNC: 10 MG/DL — SIGNIFICANT CHANGE UP (ref 7–23)
CALCIUM SERPL-MCNC: 8.8 MG/DL — SIGNIFICANT CHANGE UP (ref 8.5–10.1)
CHLORIDE SERPL-SCNC: 106 MMOL/L — SIGNIFICANT CHANGE UP (ref 96–108)
CO2 SERPL-SCNC: 27 MMOL/L — SIGNIFICANT CHANGE UP (ref 22–31)
COLOR SPEC: YELLOW — SIGNIFICANT CHANGE UP
CREAT SERPL-MCNC: 0.87 MG/DL — SIGNIFICANT CHANGE UP (ref 0.5–1.3)
DIFF PNL FLD: NEGATIVE — SIGNIFICANT CHANGE UP
EOSINOPHIL # BLD AUTO: 0.23 K/UL — SIGNIFICANT CHANGE UP (ref 0–0.5)
EOSINOPHIL NFR BLD AUTO: 1.5 % — SIGNIFICANT CHANGE UP (ref 0–6)
FLU A RESULT: SIGNIFICANT CHANGE UP
FLU A RESULT: SIGNIFICANT CHANGE UP
FLUAV AG NPH QL: SIGNIFICANT CHANGE UP
FLUBV AG NPH QL: SIGNIFICANT CHANGE UP
GLUCOSE SERPL-MCNC: 85 MG/DL — SIGNIFICANT CHANGE UP (ref 70–99)
GLUCOSE UR QL: NEGATIVE MG/DL — SIGNIFICANT CHANGE UP
HCO3 BLDA-SCNC: 27 MMOL/L — SIGNIFICANT CHANGE UP (ref 21–29)
HCT VFR BLD CALC: 35.9 % — SIGNIFICANT CHANGE UP (ref 34.5–45)
HGB BLD-MCNC: 11.4 G/DL — LOW (ref 11.5–15.5)
HOROWITZ INDEX BLDA+IHG-RTO: 0.44 — SIGNIFICANT CHANGE UP
IMM GRANULOCYTES NFR BLD AUTO: 1 % — SIGNIFICANT CHANGE UP (ref 0–1.5)
INR BLD: 1.19 RATIO — HIGH (ref 0.88–1.16)
KETONES UR-MCNC: NEGATIVE — SIGNIFICANT CHANGE UP
LEUKOCYTE ESTERASE UR-ACNC: NEGATIVE — SIGNIFICANT CHANGE UP
LYMPHOCYTES # BLD AUTO: 18.8 % — SIGNIFICANT CHANGE UP (ref 13–44)
LYMPHOCYTES # BLD AUTO: 2.81 K/UL — SIGNIFICANT CHANGE UP (ref 1–3.3)
MCHC RBC-ENTMCNC: 29 PG — SIGNIFICANT CHANGE UP (ref 27–34)
MCHC RBC-ENTMCNC: 31.8 GM/DL — LOW (ref 32–36)
MCV RBC AUTO: 91.3 FL — SIGNIFICANT CHANGE UP (ref 80–100)
MONOCYTES # BLD AUTO: 0.7 K/UL — SIGNIFICANT CHANGE UP (ref 0–0.9)
MONOCYTES NFR BLD AUTO: 4.7 % — SIGNIFICANT CHANGE UP (ref 2–14)
NEUTROPHILS # BLD AUTO: 10.94 K/UL — HIGH (ref 1.8–7.4)
NEUTROPHILS NFR BLD AUTO: 73.5 % — SIGNIFICANT CHANGE UP (ref 43–77)
NITRITE UR-MCNC: NEGATIVE — SIGNIFICANT CHANGE UP
NRBC # BLD: 0 /100 WBCS — SIGNIFICANT CHANGE UP (ref 0–0)
PCO2 BLDA: 49 MMHG — HIGH (ref 32–46)
PH BLD: 7.36 — SIGNIFICANT CHANGE UP (ref 7.35–7.45)
PH UR: 6 — SIGNIFICANT CHANGE UP (ref 5–8)
PLATELET # BLD AUTO: 242 K/UL — SIGNIFICANT CHANGE UP (ref 150–400)
PO2 BLDA: 100 MMHG — SIGNIFICANT CHANGE UP (ref 74–108)
POTASSIUM SERPL-MCNC: 4.7 MMOL/L — SIGNIFICANT CHANGE UP (ref 3.5–5.3)
POTASSIUM SERPL-SCNC: 4.7 MMOL/L — SIGNIFICANT CHANGE UP (ref 3.5–5.3)
PROT SERPL-MCNC: 7.6 GM/DL — SIGNIFICANT CHANGE UP (ref 6–8.3)
PROT UR-MCNC: NEGATIVE MG/DL — SIGNIFICANT CHANGE UP
PROTHROM AB SERPL-ACNC: 13.4 SEC — HIGH (ref 10–12.9)
RBC # BLD: 3.93 M/UL — SIGNIFICANT CHANGE UP (ref 3.8–5.2)
RBC # FLD: 14.9 % — HIGH (ref 10.3–14.5)
RSV RESULT: SIGNIFICANT CHANGE UP
RSV RNA RESP QL NAA+PROBE: SIGNIFICANT CHANGE UP
SAO2 % BLDA: 98 % — HIGH (ref 92–96)
SODIUM SERPL-SCNC: 138 MMOL/L — SIGNIFICANT CHANGE UP (ref 135–145)
SP GR SPEC: 1.01 — SIGNIFICANT CHANGE UP (ref 1.01–1.02)
UROBILINOGEN FLD QL: NEGATIVE MG/DL — SIGNIFICANT CHANGE UP
WBC # BLD: 14.91 K/UL — HIGH (ref 3.8–10.5)
WBC # FLD AUTO: 14.91 K/UL — HIGH (ref 3.8–10.5)

## 2019-12-18 PROCEDURE — 99284 EMERGENCY DEPT VISIT MOD MDM: CPT

## 2019-12-18 PROCEDURE — 93010 ELECTROCARDIOGRAM REPORT: CPT

## 2019-12-18 PROCEDURE — 71045 X-RAY EXAM CHEST 1 VIEW: CPT | Mod: 26

## 2019-12-18 PROCEDURE — 70450 CT HEAD/BRAIN W/O DYE: CPT | Mod: 26

## 2019-12-18 RX ORDER — CLONAZEPAM 1 MG
1 TABLET ORAL ONCE
Refills: 0 | Status: DISCONTINUED | OUTPATIENT
Start: 2019-12-18 | End: 2019-12-18

## 2019-12-18 RX ORDER — SODIUM CHLORIDE 9 MG/ML
1000 INJECTION INTRAMUSCULAR; INTRAVENOUS; SUBCUTANEOUS ONCE
Refills: 0 | Status: COMPLETED | OUTPATIENT
Start: 2019-12-18 | End: 2019-12-18

## 2019-12-18 RX ORDER — MECLIZINE HCL 12.5 MG
25 TABLET ORAL ONCE
Refills: 0 | Status: COMPLETED | OUTPATIENT
Start: 2019-12-18 | End: 2019-12-18

## 2019-12-18 RX ADMIN — SODIUM CHLORIDE 1000 MILLILITER(S): 9 INJECTION INTRAMUSCULAR; INTRAVENOUS; SUBCUTANEOUS at 21:57

## 2019-12-18 RX ADMIN — Medication 25 MILLIGRAM(S): at 21:06

## 2019-12-18 RX ADMIN — Medication 1 MILLIGRAM(S): at 21:07

## 2019-12-18 NOTE — ED PROVIDER NOTE - PATIENT PORTAL LINK FT
You can access the FollowMyHealth Patient Portal offered by [facility] by registering at the following website: [email or web address]. By joining [employer]’s FollowMyHealth portal, you will also be able to view your health information using other applications (apps) compatible with our system.

## 2019-12-18 NOTE — ED ADULT NURSE NOTE - OBJECTIVE STATEMENT
Patient c/o dizziness, blurry vision , right ear pain, left ear "pops". PMH COPD, DM. HTN, Anxiety, Hypothyroid. Patient c/o dizziness, blurry vision , right ear pain, left ear "pops" and headache. PMH COPD, DM. HTN, Anxiety, Hypothyroid.

## 2019-12-18 NOTE — ED PROVIDER NOTE - PHYSICAL EXAMINATION
Gen: Alert, NAD  Head: NC, AT   Eyes: PERRL, EOMI, normal lids/conjunctiva  ENT: normal hearing, patent oropharynx without erythema/exudate, uvula midline  Neck: supple, no tenderness, Trachea midline  Pulm: Bilateral BS, normal resp effort, no wheeze/stridor/retractions  CV: RRR, no M/R/G, 2+ radial and dp pulses bl, no edema  Abd: soft, NT/ND, +BS, no hepatosplenomegaly  Mskel: extremities x4 with normal ROM and no joint effusions. no ctl spine ttp.   Skin: no rash, no bruising   Neuro: AAOx3, no sensory/motor deficits, CN 2-12 intact Gen: Alert, mild distress  Head: NC, AT   Eyes: PERRL, EOMI, normal lids/conjunctiva  ENT: normal hearing, patent oropharynx without erythema/exudate, uvula midline  Neck: supple, no tenderness, Trachea midline  Pulm: Bilateral BS, slightly increased resp effort, no wheeze/stridor/retractions  CV: RRR, no M/R/G, 2+ radial and dp pulses bl, no edema  Abd: soft, NT/ND, +BS, no hepatosplenomegaly  Mskel: extremities x4 with normal ROM and no joint effusions. no ctl spine ttp.   Skin: no rash, no bruising   Neuro: AAOx3, no sensory/motor deficits, CN 2-12 intact, coordination intact

## 2019-12-18 NOTE — ED PROVIDER NOTE - CLINICAL SUMMARY MEDICAL DECISION MAKING FREE TEXT BOX
Patient p/w dizziness from prior visit, also with URI symptoms and cough.  VSS.  Lab values reviewed, there are no values which require acute intervention.  CXR without focal consolidation.  Patient feeling much improved today, declines admission, ambulating without dizziness, wants to go home, requests meclizine for home.  For cough, patient requesting promethazine, will also cover with doxy since she has COPD and reports bad cough.  Discussed results and outcome of today's visit with the patient.  Patient advised to please follow up with another healthcare provider within the next 24 hours and return to the Emergency Department for worsening symptoms or any other concerns.  Patient advised that their doctor may call  to follow up on the specific results of the tests performed today in the emergency department.   Patient appears well on discharge. Patient p/w dizziness from prior visit, also with URI symptoms and cough.  VSS.  Neuro exam normal.  Lab values reviewed, there are no values which require acute intervention.  CXR without focal consolidation.  Patient feeling much improved today, declines admission, moving around without dizziness, wants to go home, requests meclizine for home.  For cough, patient requesting promethazine, will also cover with doxy since she has COPD and reports bad cough.  Discussed results and outcome of today's visit with the patient.  Patient advised to please follow up with another healthcare provider within the next 24 hours and return to the Emergency Department for worsening symptoms or any other concerns.  Patient advised that their doctor may call  to follow up on the specific results of the tests performed today in the emergency department.   Patient appears well on discharge.

## 2019-12-18 NOTE — ED PROVIDER NOTE - OBJECTIVE STATEMENT
Pertinent PMH/PSH/FHx/SHx and Review of Systems contained within: Pertinent PMH/PSH/FHx/SHx and Review of Systems contained within:  Pt is a 59 year old female w/PMHx of COPD (non-compliant w/oxygen), DM, HTN, anxiety, hx cholecystectomy, who presents to the ED today for dizziness, cough, ear pain, headache and inability to ambulate. Pt reports a 'pounding headache' and states her cough is worse than COPD cough.  Describes her dizziness as room is spinning sensation. Pt also c/o her 'eyes crossing'. Pt was seen x2 days ago for dizziness, did not have current flu symptoms, and given Meclizine w/no improvement. Denies sore throat or CP. Pt smokes 3cigs/day, denies EtOH/other illicit substance use. Pertinent PMH/PSH/FHx/SHx and Review of Systems contained within:  Pt is a 59 year old female w/PMHx of COPD (non-compliant w/oxygen), DM, HTN, anxiety, hx cholecystectomy, who presents to the ED today for dizziness, cough, ear pain, headache and inability to ambulate. Pt reports a 'pounding headache' and states her cough is worse than COPD cough.  Describes her dizziness as room is spinning sensation. Pt also c/o her 'eyes crossing'. Pt was seen x2 days ago for dizziness, did not have current flu symptoms, and given Meclizine w/ only mild improvement. Denies sore throat or CP. Pt smokes 3cigs/day, denies EtOH/other illicit substance use. Pertinent PMH/PSH/FHx/SHx and Review of Systems contained within:  Pt is a 59 year old female w/PMHx of COPD (non-compliant w/oxygen), DM, HTN, anxiety, hx cholecystectomy, who presents to the ED today for dizziness, cough, ear pain, headache, unfocused vision, and inability to ambulate. Pt reports a 'pounding headache' and states her cough is worse than COPD cough.  Describes her dizziness as room is spinning sensation. Pt also c/o her 'eyes crossing'. Pt was seen x2 days ago for dizziness, did not have current flu symptoms, and given Meclizine w/ only mild improvement. Denies sore throat or CP. Pt smokes 3cigs/day, denies EtOH/other illicit substance use.

## 2019-12-18 NOTE — ED ADULT NURSE NOTE - PMH
Agoraphobia    Anxiety    COPD (chronic obstructive pulmonary disease)    DM (diabetes mellitus)    Emphysema lung    HTN (hypertension)    Hypothyroid    Obesity    Smoker

## 2019-12-18 NOTE — ED ADULT NURSE NOTE - CHIEF COMPLAINT QUOTE
Pt BIba. Pt c/o dizziness, blurry vision ,earache, and cold symptoms. H/O COPD, MVP, DM. AS per EMS PT NOT COMPLIANT WITH O2 AT HOME

## 2019-12-19 VITALS
RESPIRATION RATE: 19 BRPM | OXYGEN SATURATION: 93 % | DIASTOLIC BLOOD PRESSURE: 84 MMHG | SYSTOLIC BLOOD PRESSURE: 158 MMHG | HEART RATE: 96 BPM

## 2019-12-19 PROBLEM — E66.9 OBESITY, UNSPECIFIED: Chronic | Status: ACTIVE | Noted: 2019-12-13

## 2019-12-19 RX ORDER — MECLIZINE HCL 12.5 MG
1 TABLET ORAL
Qty: 15 | Refills: 0
Start: 2019-12-19 | End: 2019-12-23

## 2019-12-19 RX ORDER — ACETAMINOPHEN 500 MG
650 TABLET ORAL ONCE
Refills: 0 | Status: COMPLETED | OUTPATIENT
Start: 2019-12-19 | End: 2019-12-19

## 2019-12-19 RX ORDER — DEXTROMETHORPHAN HYDROBROMIDE AND PROMETHAZINE HYDROCHLORIDE 15; 6.25 MG/5ML; MG/5ML
5 SYRUP ORAL
Qty: 140 | Refills: 0
Start: 2019-12-19 | End: 2019-12-25

## 2019-12-19 RX ADMIN — Medication 100 MILLIGRAM(S): at 02:15

## 2019-12-19 RX ADMIN — Medication 110 MILLIGRAM(S): at 01:33

## 2019-12-20 LAB
CULTURE RESULTS: SIGNIFICANT CHANGE UP
SPECIMEN SOURCE: SIGNIFICANT CHANGE UP

## 2020-03-02 NOTE — ED PROVIDER NOTE - CONDUCTED A DETAILED DISCUSSION WITH PATIENT AND/OR GUARDIAN REGARDING, MDM
From: Rob Lombardo  To: Ceferino Holland MD  Sent: 3/2/2020 10:59 AM CST  Subject: Lab Test or Test Related Question    This message is being sent by Kaye Lombardo on behalf of Rob Lombardo    Did Dr Holland ok lab tests only today for Rob Chavez and Kavita since they have all tested positive for strep throat and [facility]   lab results/radiology results

## 2020-04-07 NOTE — DIETITIAN INITIAL EVALUATION ADULT. - DATE OF WEIGHT PRIOR TO ADM
Spoke with patient to convert their upcoming appointment with Dr. Vanegas to a Telephone Visit - patient declined Video option. Patient also wished to have their appointment moved to 3 PM due to another appointment they have in the morning.      Waleska Head, EMT  
06-Nov-2018

## 2020-05-02 ENCOUNTER — EMERGENCY (EMERGENCY)
Facility: HOSPITAL | Age: 60
LOS: 0 days | Discharge: HOME | End: 2020-05-02
Attending: EMERGENCY MEDICINE
Payer: MEDICAID

## 2020-05-02 VITALS
WEIGHT: 259.93 LBS | OXYGEN SATURATION: 88 % | HEART RATE: 102 BPM | HEIGHT: 61 IN | TEMPERATURE: 99 F | SYSTOLIC BLOOD PRESSURE: 131 MMHG | DIASTOLIC BLOOD PRESSURE: 89 MMHG | RESPIRATION RATE: 18 BRPM

## 2020-05-02 VITALS
TEMPERATURE: 98 F | DIASTOLIC BLOOD PRESSURE: 82 MMHG | SYSTOLIC BLOOD PRESSURE: 128 MMHG | RESPIRATION RATE: 19 BRPM | HEART RATE: 98 BPM | OXYGEN SATURATION: 93 %

## 2020-05-02 DIAGNOSIS — Z79.84 LONG TERM (CURRENT) USE OF ORAL HYPOGLYCEMIC DRUGS: ICD-10-CM

## 2020-05-02 DIAGNOSIS — E11.9 TYPE 2 DIABETES MELLITUS WITHOUT COMPLICATIONS: ICD-10-CM

## 2020-05-02 DIAGNOSIS — J43.9 EMPHYSEMA, UNSPECIFIED: ICD-10-CM

## 2020-05-02 DIAGNOSIS — E03.9 HYPOTHYROIDISM, UNSPECIFIED: ICD-10-CM

## 2020-05-02 DIAGNOSIS — F41.9 ANXIETY DISORDER, UNSPECIFIED: ICD-10-CM

## 2020-05-02 DIAGNOSIS — R31.9 HEMATURIA, UNSPECIFIED: ICD-10-CM

## 2020-05-02 DIAGNOSIS — F17.200 NICOTINE DEPENDENCE, UNSPECIFIED, UNCOMPLICATED: ICD-10-CM

## 2020-05-02 DIAGNOSIS — N32.89 OTHER SPECIFIED DISORDERS OF BLADDER: ICD-10-CM

## 2020-05-02 DIAGNOSIS — I10 ESSENTIAL (PRIMARY) HYPERTENSION: ICD-10-CM

## 2020-05-02 DIAGNOSIS — R10.9 UNSPECIFIED ABDOMINAL PAIN: ICD-10-CM

## 2020-05-02 DIAGNOSIS — N83.202 UNSPECIFIED OVARIAN CYST, LEFT SIDE: ICD-10-CM

## 2020-05-02 DIAGNOSIS — Z99.81 DEPENDENCE ON SUPPLEMENTAL OXYGEN: ICD-10-CM

## 2020-05-02 DIAGNOSIS — E66.01 MORBID (SEVERE) OBESITY DUE TO EXCESS CALORIES: ICD-10-CM

## 2020-05-02 DIAGNOSIS — F40.00 AGORAPHOBIA, UNSPECIFIED: ICD-10-CM

## 2020-05-02 LAB
ALBUMIN SERPL ELPH-MCNC: 3.9 G/DL — SIGNIFICANT CHANGE UP (ref 3.3–5)
ALP SERPL-CCNC: 118 U/L — SIGNIFICANT CHANGE UP (ref 40–120)
ALT FLD-CCNC: 19 U/L — SIGNIFICANT CHANGE UP (ref 12–78)
ANION GAP SERPL CALC-SCNC: 6 MMOL/L — SIGNIFICANT CHANGE UP (ref 5–17)
APPEARANCE UR: ABNORMAL
AST SERPL-CCNC: 18 U/L — SIGNIFICANT CHANGE UP (ref 15–37)
BACTERIA # UR AUTO: ABNORMAL
BASOPHILS # BLD AUTO: 0.11 K/UL — SIGNIFICANT CHANGE UP (ref 0–0.2)
BASOPHILS NFR BLD AUTO: 0.6 % — SIGNIFICANT CHANGE UP (ref 0–2)
BILIRUB SERPL-MCNC: 0.4 MG/DL — SIGNIFICANT CHANGE UP (ref 0.2–1.2)
BILIRUB UR-MCNC: NEGATIVE — SIGNIFICANT CHANGE UP
BUN SERPL-MCNC: 16 MG/DL — SIGNIFICANT CHANGE UP (ref 7–23)
CALCIUM SERPL-MCNC: 8.9 MG/DL — SIGNIFICANT CHANGE UP (ref 8.5–10.1)
CHLORIDE SERPL-SCNC: 105 MMOL/L — SIGNIFICANT CHANGE UP (ref 96–108)
CO2 SERPL-SCNC: 29 MMOL/L — SIGNIFICANT CHANGE UP (ref 22–31)
COLOR SPEC: YELLOW — SIGNIFICANT CHANGE UP
CREAT SERPL-MCNC: 0.89 MG/DL — SIGNIFICANT CHANGE UP (ref 0.5–1.3)
DIFF PNL FLD: ABNORMAL
EOSINOPHIL # BLD AUTO: 0.57 K/UL — HIGH (ref 0–0.5)
EOSINOPHIL NFR BLD AUTO: 3 % — SIGNIFICANT CHANGE UP (ref 0–6)
EPI CELLS # UR: SIGNIFICANT CHANGE UP
GLUCOSE SERPL-MCNC: 245 MG/DL — HIGH (ref 70–99)
GLUCOSE UR QL: NEGATIVE MG/DL — SIGNIFICANT CHANGE UP
HCT VFR BLD CALC: 44 % — SIGNIFICANT CHANGE UP (ref 34.5–45)
HGB BLD-MCNC: 14.7 G/DL — SIGNIFICANT CHANGE UP (ref 11.5–15.5)
IMM GRANULOCYTES NFR BLD AUTO: 1.1 % — SIGNIFICANT CHANGE UP (ref 0–1.5)
KETONES UR-MCNC: ABNORMAL
LEUKOCYTE ESTERASE UR-ACNC: NEGATIVE — SIGNIFICANT CHANGE UP
LIDOCAIN IGE QN: 115 U/L — SIGNIFICANT CHANGE UP (ref 73–393)
LYMPHOCYTES # BLD AUTO: 16.5 % — SIGNIFICANT CHANGE UP (ref 13–44)
LYMPHOCYTES # BLD AUTO: 3.16 K/UL — SIGNIFICANT CHANGE UP (ref 1–3.3)
MCHC RBC-ENTMCNC: 29.7 PG — SIGNIFICANT CHANGE UP (ref 27–34)
MCHC RBC-ENTMCNC: 33.4 GM/DL — SIGNIFICANT CHANGE UP (ref 32–36)
MCV RBC AUTO: 88.9 FL — SIGNIFICANT CHANGE UP (ref 80–100)
MONOCYTES # BLD AUTO: 0.74 K/UL — SIGNIFICANT CHANGE UP (ref 0–0.9)
MONOCYTES NFR BLD AUTO: 3.9 % — SIGNIFICANT CHANGE UP (ref 2–14)
NEUTROPHILS # BLD AUTO: 14.31 K/UL — HIGH (ref 1.8–7.4)
NEUTROPHILS NFR BLD AUTO: 74.9 % — SIGNIFICANT CHANGE UP (ref 43–77)
NITRITE UR-MCNC: NEGATIVE — SIGNIFICANT CHANGE UP
NRBC # BLD: 0 /100 WBCS — SIGNIFICANT CHANGE UP (ref 0–0)
PH UR: 5 — SIGNIFICANT CHANGE UP (ref 5–8)
PLATELET # BLD AUTO: 311 K/UL — SIGNIFICANT CHANGE UP (ref 150–400)
POTASSIUM SERPL-MCNC: 4.3 MMOL/L — SIGNIFICANT CHANGE UP (ref 3.5–5.3)
POTASSIUM SERPL-SCNC: 4.3 MMOL/L — SIGNIFICANT CHANGE UP (ref 3.5–5.3)
PROT SERPL-MCNC: 9.1 GM/DL — HIGH (ref 6–8.3)
PROT UR-MCNC: 100 MG/DL
RBC # BLD: 4.95 M/UL — SIGNIFICANT CHANGE UP (ref 3.8–5.2)
RBC # FLD: 15.2 % — HIGH (ref 10.3–14.5)
RBC CASTS # UR COMP ASSIST: ABNORMAL /HPF (ref 0–4)
SODIUM SERPL-SCNC: 140 MMOL/L — SIGNIFICANT CHANGE UP (ref 135–145)
SP GR SPEC: 1.02 — SIGNIFICANT CHANGE UP (ref 1.01–1.02)
UROBILINOGEN FLD QL: NEGATIVE MG/DL — SIGNIFICANT CHANGE UP
WBC # BLD: 19.1 K/UL — HIGH (ref 3.8–10.5)
WBC # FLD AUTO: 19.1 K/UL — HIGH (ref 3.8–10.5)
WBC UR QL: SIGNIFICANT CHANGE UP

## 2020-05-02 PROCEDURE — 99285 EMERGENCY DEPT VISIT HI MDM: CPT

## 2020-05-02 PROCEDURE — 74176 CT ABD & PELVIS W/O CONTRAST: CPT | Mod: 26

## 2020-05-02 RX ORDER — MORPHINE SULFATE 50 MG/1
4 CAPSULE, EXTENDED RELEASE ORAL ONCE
Refills: 0 | Status: DISCONTINUED | OUTPATIENT
Start: 2020-05-02 | End: 2020-05-02

## 2020-05-02 RX ORDER — CEFTRIAXONE 500 MG/1
1000 INJECTION, POWDER, FOR SOLUTION INTRAMUSCULAR; INTRAVENOUS ONCE
Refills: 0 | Status: COMPLETED | OUTPATIENT
Start: 2020-05-02 | End: 2020-05-02

## 2020-05-02 RX ORDER — OXYCODONE AND ACETAMINOPHEN 5; 325 MG/1; MG/1
1 TABLET ORAL
Qty: 12 | Refills: 0
Start: 2020-05-02 | End: 2020-05-04

## 2020-05-02 RX ORDER — CEFUROXIME AXETIL 250 MG
1 TABLET ORAL
Qty: 20 | Refills: 0
Start: 2020-05-02 | End: 2020-05-11

## 2020-05-02 RX ORDER — KETOROLAC TROMETHAMINE 30 MG/ML
15 SYRINGE (ML) INJECTION ONCE
Refills: 0 | Status: DISCONTINUED | OUTPATIENT
Start: 2020-05-02 | End: 2020-05-02

## 2020-05-02 RX ORDER — ONDANSETRON 8 MG/1
4 TABLET, FILM COATED ORAL ONCE
Refills: 0 | Status: COMPLETED | OUTPATIENT
Start: 2020-05-02 | End: 2020-05-02

## 2020-05-02 RX ORDER — IBUPROFEN 200 MG
1 TABLET ORAL
Qty: 30 | Refills: 0
Start: 2020-05-02 | End: 2020-05-11

## 2020-05-02 RX ADMIN — MORPHINE SULFATE 4 MILLIGRAM(S): 50 CAPSULE, EXTENDED RELEASE ORAL at 13:54

## 2020-05-02 RX ADMIN — ONDANSETRON 4 MILLIGRAM(S): 8 TABLET, FILM COATED ORAL at 13:55

## 2020-05-02 RX ADMIN — Medication 15 MILLIGRAM(S): at 13:55

## 2020-05-02 RX ADMIN — CEFTRIAXONE 100 MILLIGRAM(S): 500 INJECTION, POWDER, FOR SOLUTION INTRAMUSCULAR; INTRAVENOUS at 15:49

## 2020-05-02 NOTE — ED PROVIDER NOTE - PROGRESS NOTE DETAILS
Pt is afebrile, not anemic, labs and ct noted, dw urologist who will see in office and arrange for TURP. Pt at risk for bladder ca/smoker. She will follow up with her GYN. Precautions reviewed.

## 2020-05-02 NOTE — ED PROVIDER NOTE - NSFOLLOWUPCLINICS_GEN_ALL_ED_FT
[facility]  Urology  [address] [place] [address]  Phone: [phone]  Fax:   Follow Up Time:

## 2020-05-02 NOTE — ED PROVIDER NOTE - CARE PLAN
Principal Discharge DX:	Hematuria  Secondary Diagnosis:	Cyst of left ovary  Secondary Diagnosis:	Bladder mass

## 2020-05-02 NOTE — ED PROVIDER NOTE - CARE PROVIDER_API CALL
Jorge A Alvarado)  Urology  [address] [facility], [address] [place] [address]  Phone: [phone]  Fax: [phone]  Follow Up Time:

## 2020-05-02 NOTE — ED ADULT TRIAGE NOTE - CHIEF COMPLAINT QUOTE
hematuria and vaginal pain, told to go to the hospital as per urgent care today + clots in the urine, as per patient always low 02 sat secondary to COPD, uses 02 PRN at home

## 2020-05-02 NOTE — ED PROVIDER NOTE - PATIENT PORTAL LINK FT
You can access the FollowMyHealth Patient Portal offered by [facility] by registering at the following website: [email or web address]. By joining [employer]’s FollowMyHealth portal, you will also be able to view your health information using other applications (apps) compatible with our system.

## 2020-05-02 NOTE — ED ADULT NURSE NOTE - NSIMPLEMENTINTERV_GEN_ALL_ED
Implemented All Universal Safety Interventions:  [place] to call system. Call bell, personal items and telephone within reach. Instruct patient to call for assistance. Room bathroom lighting operational. Non-slip footwear when patient is off stretcher. Physically safe environment: no spills, clutter or unnecessary equipment. Stretcher in lowest position, wheels locked, appropriate side rails in place.

## 2020-05-02 NOTE — ED PROVIDER NOTE - CLINICAL SUMMARY MEDICAL DECISION MAKING FREE TEXT BOX
Pt with hematuria and urinary symptoms. Will check labs, give pain medications, hydrate, and get scan.

## 2020-05-02 NOTE — ED PROVIDER NOTE - OBJECTIVE STATEMENT
59 y/o obese F Hx of COPD, DM, HTN, current smoker presents with complains of right sided flank pain radiating to the groin with hematuria, urgency, dysuria, and frequency since yesterday. pt states pain comes and goes. no fevers/chills, no V/D, 61 y/o obese F Hx of COPD on home O2(normal sat is 88 without O2), DM, HTN, current smoker presents with complains of right sided flank pain radiating to the groin with hematuria, urgency, dysuria, and frequency since yesterday. pt states pain comes and goes. no fevers/chills, no V/D,

## 2020-05-02 NOTE — ED PROVIDER NOTE - CARE PROVIDERS DIRECT ADDRESSES
[email or web address]

## 2020-05-03 LAB
CULTURE RESULTS: NO GROWTH — SIGNIFICANT CHANGE UP
SPECIMEN SOURCE: SIGNIFICANT CHANGE UP

## 2020-05-03 RX ORDER — OXYCODONE AND ACETAMINOPHEN 5; 325 MG/1; MG/1
1 TABLET ORAL
Qty: 12 | Refills: 0
Start: 2020-05-03 | End: 2020-05-05

## 2020-05-03 NOTE — ED POST DISCHARGE NOTE - REASON FOR FOLLOW-UP
Other Pt called states that she did not get the pain meds, pharm did not receive Erx. Called [facility] [phone], only received 2 eRx, motrin and abx. Percocet re-sent.

## 2020-05-04 ENCOUNTER — EMERGENCY (EMERGENCY)
Facility: HOSPITAL | Age: 60
LOS: 0 days | Discharge: HOME | End: 2020-05-04
Attending: EMERGENCY MEDICINE
Payer: MEDICAID

## 2020-05-04 VITALS
DIASTOLIC BLOOD PRESSURE: 54 MMHG | HEART RATE: 94 BPM | TEMPERATURE: 98 F | WEIGHT: 259.93 LBS | HEIGHT: 61 IN | OXYGEN SATURATION: 90 % | RESPIRATION RATE: 20 BRPM | SYSTOLIC BLOOD PRESSURE: 124 MMHG

## 2020-05-04 VITALS
SYSTOLIC BLOOD PRESSURE: 105 MMHG | OXYGEN SATURATION: 97 % | TEMPERATURE: 98 F | DIASTOLIC BLOOD PRESSURE: 53 MMHG | HEART RATE: 83 BPM

## 2020-05-04 PROCEDURE — 99284 EMERGENCY DEPT VISIT MOD MDM: CPT

## 2020-05-04 RX ORDER — OXYCODONE AND ACETAMINOPHEN 5; 325 MG/1; MG/1
1 TABLET ORAL ONCE
Refills: 0 | Status: DISCONTINUED | OUTPATIENT
Start: 2020-05-04 | End: 2020-05-04

## 2020-05-04 RX ADMIN — OXYCODONE AND ACETAMINOPHEN 1 TABLET(S): 5; 325 TABLET ORAL at 07:00

## 2020-05-04 NOTE — ED ADULT NURSE REASSESSMENT NOTE - NS ED NURSE REASSESS COMMENT FT1
22FR vargas inserted, attached to R-leg,  pt draining 600ml urine
Awaiting official dispo; report to day KAILEE Knight patently draining blood-tinged with small clots. Pt medicated for breakthrough a/c pain, pt resting in NAD will sign off..............................SERGIO BERNARD.
Pt presents with acute urinary retention, #16fr indwelling catheter placed by triage RN per ERMD orders. Pt recently seen in ED for hematuria in the past 48hrs, ERMD at bedside will follow....................................................SERGIO BERNARD
Patient pending leg and Dc paper.

## 2020-05-04 NOTE — ED ADULT TRIAGE NOTE - CHIEF COMPLAINT QUOTE
BIBA,  pt unable to urinate since yesterday.  pt states she was here yesterday for same and was straight cath.  pt on 3L/NC at home.  pt denies covid/contacts

## 2020-05-04 NOTE — ED PROVIDER NOTE - PHYSICAL EXAMINATION
Gen: Alert, Well appearing. NAD    Head: NC, AT, PERRL, normal lids/conjunctiva   ENT: patent oropharynx without erythema/exudate, uvula midline  Neck: supple, no tenderness/meningismus  Pulm: Bilateral clear BS, normal resp effort  CV: RRR, no M/R/G, +dist pulses   Abd: soft, + suprpaubic distension, ND, +BS, no guarding/rebound tenderness  Mskel: no edema/erythema/cyanosis   Skin: no rash, no bruising  Neuro: AAOx3, no sensory/motor deficits, CN 2-12 intact

## 2020-05-04 NOTE — ED PROVIDER NOTE - CARE PROVIDER_API CALL
Jorge A Alvarado)  Urology  [address] [facility], [address] [place] [address]  Phone: [phone]  Fax: [phone]  Follow Up Time:

## 2020-05-04 NOTE — ED PROVIDER NOTE - PATIENT PORTAL LINK FT
You can access the FollowMyHealth Patient Portal offered by [facility] by registering at the following website: [email or web address]. By joining [employer]’s FollowMyHealth portal, you will also be able to view your health information using other applications (apps) compatible with our system.

## 2020-05-04 NOTE — ED PROVIDER NOTE - CLINICAL SUMMARY MEDICAL DECISION MAKING FREE TEXT BOX
vargas placed with 600cc UOP, mild small blood clots but mostly straw color urine. pain improved dc with leg bag. advise fu with urology.

## 2020-05-04 NOTE — ED PROVIDER NOTE - NSFOLLOWUPCLINICS_GEN_ALL_ED_FT
[facility] - Urology  Urology  [address] [facility], 3rd & 4th [place] [address]  Phone: [phone]  Fax:   Follow Up Time:

## 2020-05-05 DIAGNOSIS — Z79.1 LONG TERM (CURRENT) USE OF NON-STEROIDAL ANTI-INFLAMMATORIES (NSAID): ICD-10-CM

## 2020-05-05 DIAGNOSIS — J44.9 CHRONIC OBSTRUCTIVE PULMONARY DISEASE, UNSPECIFIED: ICD-10-CM

## 2020-05-05 DIAGNOSIS — R33.9 RETENTION OF URINE, UNSPECIFIED: ICD-10-CM

## 2020-05-05 DIAGNOSIS — Z99.81 DEPENDENCE ON SUPPLEMENTAL OXYGEN: ICD-10-CM

## 2020-05-05 DIAGNOSIS — I10 ESSENTIAL (PRIMARY) HYPERTENSION: ICD-10-CM

## 2020-05-05 DIAGNOSIS — Z79.84 LONG TERM (CURRENT) USE OF ORAL HYPOGLYCEMIC DRUGS: ICD-10-CM

## 2020-05-05 DIAGNOSIS — E11.9 TYPE 2 DIABETES MELLITUS WITHOUT COMPLICATIONS: ICD-10-CM

## 2020-05-06 ENCOUNTER — APPOINTMENT (OUTPATIENT)
Dept: UROLOGY | Facility: CLINIC | Age: 60
End: 2020-05-06
Payer: MEDICAID

## 2020-05-06 VITALS
TEMPERATURE: 99.1 F | HEIGHT: 62 IN | DIASTOLIC BLOOD PRESSURE: 74 MMHG | SYSTOLIC BLOOD PRESSURE: 124 MMHG | BODY MASS INDEX: 53.92 KG/M2 | WEIGHT: 293 LBS | HEART RATE: 95 BPM

## 2020-05-06 DIAGNOSIS — R33.9 RETENTION OF URINE, UNSPECIFIED: ICD-10-CM

## 2020-05-06 DIAGNOSIS — I10 ESSENTIAL (PRIMARY) HYPERTENSION: ICD-10-CM

## 2020-05-06 DIAGNOSIS — E11.9 TYPE 2 DIABETES MELLITUS W/OUT COMPLICATIONS: ICD-10-CM

## 2020-05-06 DIAGNOSIS — R31.9 HEMATURIA, UNSPECIFIED: ICD-10-CM

## 2020-05-06 DIAGNOSIS — F17.210 NICOTINE DEPENDENCE, CIGARETTES, UNCOMPLICATED: ICD-10-CM

## 2020-05-06 DIAGNOSIS — J44.9 CHRONIC OBSTRUCTIVE PULMONARY DISEASE, UNSPECIFIED: ICD-10-CM

## 2020-05-06 DIAGNOSIS — E11.40 TYPE 2 DIABETES MELLITUS WITH DIABETIC NEUROPATHY, UNSPECIFIED: ICD-10-CM

## 2020-05-06 DIAGNOSIS — F41.0 PANIC DISORDER [EPISODIC PAROXYSMAL ANXIETY]: ICD-10-CM

## 2020-05-06 DIAGNOSIS — N32.89 OTHER SPECIFIED DISORDERS OF BLADDER: ICD-10-CM

## 2020-05-06 DIAGNOSIS — E78.5 HYPERLIPIDEMIA, UNSPECIFIED: ICD-10-CM

## 2020-05-06 DIAGNOSIS — E66.9 OBESITY, UNSPECIFIED: ICD-10-CM

## 2020-05-06 PROCEDURE — 99205 OFFICE O/P NEW HI 60 MIN: CPT

## 2020-05-06 RX ORDER — ATORVASTATIN CALCIUM 10 MG/1
10 TABLET, FILM COATED ORAL
Refills: 0 | Status: ACTIVE | COMMUNITY

## 2020-05-06 RX ORDER — BREXPIPRAZOLE 0.5 MG/1
0.5 TABLET ORAL
Refills: 0 | Status: ACTIVE | COMMUNITY

## 2020-05-06 RX ORDER — CLONAZEPAM 1 MG/1
1 TABLET ORAL
Refills: 0 | Status: ACTIVE | COMMUNITY

## 2020-05-06 RX ORDER — MONTELUKAST SODIUM 10 MG/1
10 TABLET, FILM COATED ORAL
Refills: 0 | Status: ACTIVE | COMMUNITY

## 2020-05-06 RX ORDER — GABAPENTIN 600 MG/1
600 TABLET, COATED ORAL
Refills: 0 | Status: ACTIVE | COMMUNITY

## 2020-05-06 RX ORDER — LOSARTAN POTASSIUM 50 MG/1
50 TABLET, FILM COATED ORAL
Refills: 0 | Status: ACTIVE | COMMUNITY

## 2020-05-06 RX ORDER — INSULIN GLARGINE 100 [IU]/ML
100 INJECTION, SOLUTION SUBCUTANEOUS
Refills: 0 | Status: ACTIVE | COMMUNITY

## 2020-05-06 RX ORDER — PROMETHAZINE HYDROCHLORIDE 50 MG/1
TABLET ORAL
Refills: 0 | Status: ACTIVE | COMMUNITY

## 2020-05-06 RX ORDER — ALBUTEROL SULFATE 5 MG/ML
SOLUTION, NON-ORAL INHALATION
Refills: 0 | Status: ACTIVE | COMMUNITY

## 2020-05-06 RX ORDER — SITAGLIPTIN 100 MG/1
100 TABLET, FILM COATED ORAL
Refills: 0 | Status: ACTIVE | COMMUNITY

## 2020-05-06 RX ORDER — IBUPROFEN 600 MG/1
600 TABLET, FILM COATED ORAL
Refills: 0 | Status: ACTIVE | COMMUNITY

## 2020-05-06 RX ORDER — OXYCODONE 5 MG/1
5 TABLET ORAL
Qty: 10 | Refills: 0 | Status: DISCONTINUED | COMMUNITY
Start: 2018-12-28 | End: 2020-05-06

## 2020-05-06 RX ORDER — UMECLIDINIUM BROMIDE AND VILANTEROL TRIFENATATE 62.5; 25 UG/1; UG/1
POWDER RESPIRATORY (INHALATION)
Refills: 0 | Status: ACTIVE | COMMUNITY

## 2020-05-06 RX ORDER — CEFUROXIME AXETIL 250 MG/1
250 TABLET ORAL
Refills: 0 | Status: ACTIVE | COMMUNITY

## 2020-05-06 RX ORDER — HYDRALAZINE HYDROCHLORIDE 50 MG/1
50 TABLET ORAL
Refills: 0 | Status: ACTIVE | COMMUNITY

## 2020-05-06 NOTE — LETTER BODY
[Dear  ___] : Dear  [unfilled], [Consult Letter:] : I had the pleasure of evaluating your patient, [unfilled]. [Please see my note below.] : Please see my note below. [Consult Closing:] : Thank you very much for allowing me to participate in the care of this patient.  If you have any questions, please do not hesitate to contact me. [Sincerely,] : Sincerely, [FreeTextEntry3] : Jorge A Alvarado MD

## 2020-05-06 NOTE — ADDENDUM
[FreeTextEntry1] : I, Jocelyn Innocent, acted solely as a scribe for Dr. Jorge A Alvarado on this date, 05/06/2020.\par \par All medical record entries made by the Scribe were at my Dr. Jorge A Alvarado direction and personally dictated by me on, 05/06/2020. I have reviewed the chart and agree that the record accurately reflects my personal performance of the history, physical exam, assessment and plan. I have also personally directed, reviewed and agreed with the chart.\par

## 2020-05-06 NOTE — REVIEW OF SYSTEMS
[Feeling Tired] : feeling tired [Wheezing] : wheezing [Cough] : cough [Urine Infection (bladder/kidney)] : bladder/kidney infection [Pain during urination] : pain during urination [Blood in urine that you can see] : blood visible in urine [Strong urge to urinate] : strong urge to urinate [Strain or push to urinate] : strain or push to urinate [Joint Pain] : joint pain [Difficulty Walking] : difficulty walking [Negative] : Heme/Lymph

## 2020-05-06 NOTE — HISTORY OF PRESENT ILLNESS
[FreeTextEntry1] : 59 y/o female present for initial visit for hematuria. Pt presented to ER of [facility] VS 05/02/2020 for right flank pain, hematuria, dysuria and urgency. CT Renal stone hunt- Kidneys/Ureters: No hydroureteronehprosis or calculi. Bladder: 1.5 cm right posterior bladder mass and 1.1 cm left posterior bladder mass. Suspicious of malignancy. Onset was 2 days ago. Pt describes pain as sharp. Pt c/o of hematuria, dysuria, urgency and straining. \par \par catheter irrigated. no clots.\par urine was clear. TOV  \par Knight catheter removed today. \par \par

## 2020-05-06 NOTE — PHYSICAL EXAM
[General Appearance - Well Developed] : well developed [Normal Appearance] : normal appearance [Well Groomed] : well groomed [General Appearance - In No Acute Distress] : no acute distress [Edema] : no peripheral edema [Respiration, Rhythm And Depth] : normal respiratory rhythm and effort [Exaggerated Use Of Accessory Muscles For Inspiration] : no accessory muscle use [Abdomen Soft] : soft [Abdomen Tenderness] : non-tender [Costovertebral Angle Tenderness] : no ~M costovertebral angle tenderness [Urinary Bladder Findings] : the bladder was normal on palpation [Normal Station and Gait] : the gait and station were normal for the patient's age [] : no rash [No Focal Deficits] : no focal deficits [Oriented To Time, Place, And Person] : oriented to person, place, and time [Affect] : the affect was normal [Mood] : the mood was normal [Not Anxious] : not anxious [No Palpable Adenopathy] : no palpable adenopathy [FreeTextEntry1] : has Knight catheter, clear urine.

## 2020-05-14 ENCOUNTER — APPOINTMENT (OUTPATIENT)
Dept: UROLOGY | Facility: CLINIC | Age: 60
End: 2020-05-14

## 2020-05-18 ENCOUNTER — APPOINTMENT (OUTPATIENT)
Dept: UROLOGY | Facility: CLINIC | Age: 60
End: 2020-05-18

## 2020-07-06 ENCOUNTER — EMERGENCY (EMERGENCY)
Facility: HOSPITAL | Age: 60
LOS: 0 days | Discharge: HOME | End: 2020-07-06
Attending: EMERGENCY MEDICINE
Payer: MEDICAID

## 2020-07-06 VITALS
OXYGEN SATURATION: 94 % | TEMPERATURE: 98 F | DIASTOLIC BLOOD PRESSURE: 55 MMHG | HEART RATE: 100 BPM | RESPIRATION RATE: 18 BRPM | SYSTOLIC BLOOD PRESSURE: 136 MMHG

## 2020-07-06 VITALS
HEIGHT: 63 IN | TEMPERATURE: 98 F | DIASTOLIC BLOOD PRESSURE: 102 MMHG | WEIGHT: 250 LBS | RESPIRATION RATE: 23 BRPM | OXYGEN SATURATION: 99 % | SYSTOLIC BLOOD PRESSURE: 162 MMHG

## 2020-07-06 DIAGNOSIS — E66.9 OBESITY, UNSPECIFIED: ICD-10-CM

## 2020-07-06 DIAGNOSIS — R11.2 NAUSEA WITH VOMITING, UNSPECIFIED: ICD-10-CM

## 2020-07-06 DIAGNOSIS — R05 COUGH: ICD-10-CM

## 2020-07-06 DIAGNOSIS — I10 ESSENTIAL (PRIMARY) HYPERTENSION: ICD-10-CM

## 2020-07-06 DIAGNOSIS — R06.02 SHORTNESS OF BREATH: ICD-10-CM

## 2020-07-06 DIAGNOSIS — E11.9 TYPE 2 DIABETES MELLITUS WITHOUT COMPLICATIONS: ICD-10-CM

## 2020-07-06 DIAGNOSIS — R52 PAIN, UNSPECIFIED: ICD-10-CM

## 2020-07-06 DIAGNOSIS — C67.9 MALIGNANT NEOPLASM OF BLADDER, UNSPECIFIED: ICD-10-CM

## 2020-07-06 LAB
ALBUMIN SERPL ELPH-MCNC: 3.9 G/DL — SIGNIFICANT CHANGE UP (ref 3.3–5)
ALP SERPL-CCNC: 102 U/L — SIGNIFICANT CHANGE UP (ref 40–120)
ALT FLD-CCNC: 20 U/L — SIGNIFICANT CHANGE UP (ref 12–78)
ANION GAP SERPL CALC-SCNC: 8 MMOL/L — SIGNIFICANT CHANGE UP (ref 5–17)
APPEARANCE UR: CLEAR — SIGNIFICANT CHANGE UP
AST SERPL-CCNC: 18 U/L — SIGNIFICANT CHANGE UP (ref 15–37)
BACTERIA # UR AUTO: ABNORMAL
BASE EXCESS BLDA CALC-SCNC: 1.5 MMOL/L — SIGNIFICANT CHANGE UP (ref -2–2)
BASOPHILS # BLD AUTO: 0.12 K/UL — SIGNIFICANT CHANGE UP (ref 0–0.2)
BASOPHILS NFR BLD AUTO: 0.6 % — SIGNIFICANT CHANGE UP (ref 0–2)
BILIRUB SERPL-MCNC: 0.4 MG/DL — SIGNIFICANT CHANGE UP (ref 0.2–1.2)
BILIRUB UR-MCNC: NEGATIVE — SIGNIFICANT CHANGE UP
BLOOD GAS COMMENTS: SIGNIFICANT CHANGE UP
BLOOD GAS COMMENTS: SIGNIFICANT CHANGE UP
BLOOD GAS SOURCE: SIGNIFICANT CHANGE UP
BUN SERPL-MCNC: 18 MG/DL — SIGNIFICANT CHANGE UP (ref 7–23)
CALCIUM SERPL-MCNC: 9.3 MG/DL — SIGNIFICANT CHANGE UP (ref 8.5–10.1)
CHLORIDE SERPL-SCNC: 99 MMOL/L — SIGNIFICANT CHANGE UP (ref 96–108)
CO2 SERPL-SCNC: 29 MMOL/L — SIGNIFICANT CHANGE UP (ref 22–31)
COLOR SPEC: YELLOW — SIGNIFICANT CHANGE UP
CREAT SERPL-MCNC: 1.2 MG/DL — SIGNIFICANT CHANGE UP (ref 0.5–1.3)
D DIMER BLD IA.RAPID-MCNC: 186 NG/ML DDU — SIGNIFICANT CHANGE UP
DIFF PNL FLD: ABNORMAL
EOSINOPHIL # BLD AUTO: 0.43 K/UL — SIGNIFICANT CHANGE UP (ref 0–0.5)
EOSINOPHIL NFR BLD AUTO: 2.2 % — SIGNIFICANT CHANGE UP (ref 0–6)
EPI CELLS # UR: ABNORMAL
GLUCOSE SERPL-MCNC: 188 MG/DL — HIGH (ref 70–99)
GLUCOSE UR QL: NEGATIVE MG/DL — SIGNIFICANT CHANGE UP
HCO3 BLDA-SCNC: 27 MMOL/L — SIGNIFICANT CHANGE UP (ref 21–29)
HCT VFR BLD CALC: 41.8 % — SIGNIFICANT CHANGE UP (ref 34.5–45)
HGB BLD-MCNC: 14 G/DL — SIGNIFICANT CHANGE UP (ref 11.5–15.5)
HOROWITZ INDEX BLDA+IHG-RTO: 28 — SIGNIFICANT CHANGE UP
IMM GRANULOCYTES NFR BLD AUTO: 0.6 % — SIGNIFICANT CHANGE UP (ref 0–1.5)
KETONES UR-MCNC: NEGATIVE — SIGNIFICANT CHANGE UP
LEUKOCYTE ESTERASE UR-ACNC: ABNORMAL
LYMPHOCYTES # BLD AUTO: 16.8 % — SIGNIFICANT CHANGE UP (ref 13–44)
LYMPHOCYTES # BLD AUTO: 3.21 K/UL — SIGNIFICANT CHANGE UP (ref 1–3.3)
MAGNESIUM SERPL-MCNC: 2.1 MG/DL — SIGNIFICANT CHANGE UP (ref 1.6–2.6)
MCHC RBC-ENTMCNC: 29.8 PG — SIGNIFICANT CHANGE UP (ref 27–34)
MCHC RBC-ENTMCNC: 33.5 GM/DL — SIGNIFICANT CHANGE UP (ref 32–36)
MCV RBC AUTO: 88.9 FL — SIGNIFICANT CHANGE UP (ref 80–100)
MONOCYTES # BLD AUTO: 1.08 K/UL — HIGH (ref 0–0.9)
MONOCYTES NFR BLD AUTO: 5.6 % — SIGNIFICANT CHANGE UP (ref 2–14)
NEUTROPHILS # BLD AUTO: 14.18 K/UL — HIGH (ref 1.8–7.4)
NEUTROPHILS NFR BLD AUTO: 74.2 % — SIGNIFICANT CHANGE UP (ref 43–77)
NITRITE UR-MCNC: NEGATIVE — SIGNIFICANT CHANGE UP
NRBC # BLD: 0 /100 WBCS — SIGNIFICANT CHANGE UP (ref 0–0)
NT-PROBNP SERPL-SCNC: 31 PG/ML — SIGNIFICANT CHANGE UP (ref 0–125)
PCO2 BLDA: 47 MMHG — HIGH (ref 32–46)
PH BLD: 7.38 — SIGNIFICANT CHANGE UP (ref 7.35–7.45)
PH UR: 5 — SIGNIFICANT CHANGE UP (ref 5–8)
PLATELET # BLD AUTO: 277 K/UL — SIGNIFICANT CHANGE UP (ref 150–400)
PO2 BLDA: 71 MMHG — LOW (ref 74–108)
POTASSIUM SERPL-MCNC: 4.1 MMOL/L — SIGNIFICANT CHANGE UP (ref 3.5–5.3)
POTASSIUM SERPL-SCNC: 4.1 MMOL/L — SIGNIFICANT CHANGE UP (ref 3.5–5.3)
PROT SERPL-MCNC: 8.2 GM/DL — SIGNIFICANT CHANGE UP (ref 6–8.3)
PROT UR-MCNC: 15 MG/DL
RBC # BLD: 4.7 M/UL — SIGNIFICANT CHANGE UP (ref 3.8–5.2)
RBC # FLD: 15.2 % — HIGH (ref 10.3–14.5)
RBC CASTS # UR COMP ASSIST: SIGNIFICANT CHANGE UP /HPF (ref 0–4)
SAO2 % BLDA: 94 % — SIGNIFICANT CHANGE UP (ref 92–96)
SARS-COV-2 RNA SPEC QL NAA+PROBE: SIGNIFICANT CHANGE UP
SODIUM SERPL-SCNC: 136 MMOL/L — SIGNIFICANT CHANGE UP (ref 135–145)
SP GR SPEC: 1 — LOW (ref 1.01–1.02)
TROPONIN I SERPL-MCNC: <.015 NG/ML — SIGNIFICANT CHANGE UP (ref 0.01–0.04)
UROBILINOGEN FLD QL: NEGATIVE MG/DL — SIGNIFICANT CHANGE UP
WBC # BLD: 19.14 K/UL — HIGH (ref 3.8–10.5)
WBC # FLD AUTO: 19.14 K/UL — HIGH (ref 3.8–10.5)
WBC UR QL: SIGNIFICANT CHANGE UP

## 2020-07-06 PROCEDURE — 93010 ELECTROCARDIOGRAM REPORT: CPT

## 2020-07-06 PROCEDURE — 71045 X-RAY EXAM CHEST 1 VIEW: CPT | Mod: 26

## 2020-07-06 PROCEDURE — 71275 CT ANGIOGRAPHY CHEST: CPT | Mod: 26

## 2020-07-06 PROCEDURE — 74177 CT ABD & PELVIS W/CONTRAST: CPT | Mod: 26

## 2020-07-06 PROCEDURE — 99285 EMERGENCY DEPT VISIT HI MDM: CPT

## 2020-07-06 RX ORDER — NICOTINE POLACRILEX 2 MG
1 GUM BUCCAL
Qty: 7 | Refills: 0
Start: 2020-07-06 | End: 2020-07-12

## 2020-07-06 RX ORDER — MORPHINE SULFATE 50 MG/1
4 CAPSULE, EXTENDED RELEASE ORAL ONCE
Refills: 0 | Status: DISCONTINUED | OUTPATIENT
Start: 2020-07-06 | End: 2020-07-06

## 2020-07-06 RX ORDER — OXYCODONE HYDROCHLORIDE 5 MG/1
1 TABLET ORAL
Qty: 6 | Refills: 0
Start: 2020-07-06 | End: 2020-07-07

## 2020-07-06 RX ORDER — OXYCODONE HYDROCHLORIDE 5 MG/1
1 TABLET ORAL
Qty: 4 | Refills: 0
Start: 2020-07-06 | End: 2020-07-06

## 2020-07-06 RX ORDER — CYCLOBENZAPRINE HYDROCHLORIDE 10 MG/1
1 TABLET, FILM COATED ORAL
Qty: 12 | Refills: 0
Start: 2020-07-06 | End: 2020-07-09

## 2020-07-06 RX ADMIN — MORPHINE SULFATE 4 MILLIGRAM(S): 50 CAPSULE, EXTENDED RELEASE ORAL at 13:23

## 2020-07-06 NOTE — ED ADULT TRIAGE NOTE - CHIEF COMPLAINT QUOTE
Ems states, "Pt c/o sob and dizziness  for 2 days worse today , denies chest pain, fevers and chills, hx copd and pt hypertensive in field " MD Woodward called to bedside with MICHA Tlobert

## 2020-07-06 NOTE — ED PROVIDER NOTE - PATIENT PORTAL LINK FT
You can access the FollowMyHealth Patient Portal offered by [facility] by registering at the following website: [email or web address]. By joining [email or web address]’s FollowMyHealth portal, you will also be able to view your health information using other applications (apps) compatible with our system.

## 2020-07-06 NOTE — ED PROVIDER NOTE - CLINICAL SUMMARY MEDICAL DECISION MAKING FREE TEXT BOX
Lab values do not require emergent intervention. CT scan demonstrates no acute pathology. Pt feeling improved, no sob, no pain, ambulating without significant sob. dc with analgesia and advise fu with urology.

## 2020-07-06 NOTE — ED PROVIDER NOTE - OBJECTIVE STATEMENT
59yo obese female with pmh copd on home O2, DM, HTN, bladder CA, presents with sob and cough x 2 days. Pt also reports NV and some left sided pain where her "mass is" and body aches. no fever, cp, constipation/dysuria, diarrhea     No fever/chills, No photophobia/eye pain/changes in vision, No ear pain/sore throat/dysphagia, No chest pain/palpitations,+SOB/cough, no wheeze/stridor, + abdominal pain, + N/V, no D, no dysuria/frequency/discharge, No neck/back pain, no rash, no changes in neurological status/function. 59yo obese female with pmh copd on home O2, DM, HTN, bladder/ovarian CA pending surgery, presents with sob and cough x 2 days. Pt also reports NV and some left sided pain where her "mass is" and body aches. no fever, cp, constipation/dysuria, diarrhea     No fever/chills, No photophobia/eye pain/changes in vision, No ear pain/sore throat/dysphagia, No chest pain/palpitations,+SOB/cough, no wheeze/stridor, + abdominal pain, + N/V, no D, no dysuria/frequency/discharge, No neck/back pain, no rash, no changes in neurological status/function.

## 2020-07-06 NOTE — ED ADULT NURSE NOTE - CHIEF COMPLAINT QUOTE
Ems states, "Pt c/o sob and dizziness  for 2 days worse today , denies chest pain, fevers and chills, hx copd and pt hypertensive in field " MD Woodward called to bedside with MICHA Tolbert

## 2020-07-06 NOTE — ED ADULT NURSE NOTE - OBJECTIVE STATEMENT
Pt is a 60YOMw ho is here for shortness of breath, generalized aches and pains in her body, pt states she has a hx of chronic obstructive pulmonary disease, she called her MD and he said to come to the hospital for evaluation, pt has had a cough with nausea and vomiting and some left sided flank pain, pt denies any fevers, chills,

## 2020-07-16 NOTE — PHYSICAL THERAPY INITIAL EVALUATION ADULT - MD/RN NOTIFIED
no Complex Repair And Transposition Flap Text: The defect edges were debeveled with a #15 scalpel blade.  The primary defect was closed partially with a complex linear closure.  Given the location of the remaining defect, shape of the defect and the proximity to free margins a transposition flap was deemed most appropriate for complete closure of the defect.  Using a sterile surgical marker, an appropriate advancement flap was drawn incorporating the defect and placing the expected incisions within the relaxed skin tension lines where possible.    The area thus outlined was incised deep to adipose tissue with a #15 scalpel blade.  The skin margins were undermined to an appropriate distance in all directions utilizing iris scissors.

## 2020-07-28 ENCOUNTER — EMERGENCY (EMERGENCY)
Facility: HOSPITAL | Age: 60
LOS: 0 days | Discharge: HOME | End: 2020-07-29
Attending: EMERGENCY MEDICINE
Payer: MEDICAID

## 2020-07-28 VITALS
SYSTOLIC BLOOD PRESSURE: 171 MMHG | DIASTOLIC BLOOD PRESSURE: 100 MMHG | RESPIRATION RATE: 17 BRPM | HEART RATE: 82 BPM | WEIGHT: 164.91 LBS | HEIGHT: 61 IN | OXYGEN SATURATION: 100 % | TEMPERATURE: 98 F

## 2020-07-28 DIAGNOSIS — Z90.49 ACQUIRED ABSENCE OF OTHER SPECIFIED PARTS OF DIGESTIVE TRACT: Chronic | ICD-10-CM

## 2020-07-28 LAB
ALBUMIN SERPL ELPH-MCNC: 3.4 G/DL — SIGNIFICANT CHANGE UP (ref 3.3–5)
ALP SERPL-CCNC: 87 U/L — SIGNIFICANT CHANGE UP (ref 40–120)
ALT FLD-CCNC: 15 U/L — SIGNIFICANT CHANGE UP (ref 12–78)
AMYLASE P1 CFR SERPL: 38 U/L — SIGNIFICANT CHANGE UP (ref 25–115)
ANION GAP SERPL CALC-SCNC: 5 MMOL/L — SIGNIFICANT CHANGE UP (ref 5–17)
APPEARANCE UR: CLEAR — SIGNIFICANT CHANGE UP
APTT BLD: 37.4 SEC — HIGH (ref 27.5–35.5)
AST SERPL-CCNC: 13 U/L — LOW (ref 15–37)
BACTERIA # UR AUTO: ABNORMAL
BASOPHILS # BLD AUTO: 0.07 K/UL — SIGNIFICANT CHANGE UP (ref 0–0.2)
BASOPHILS NFR BLD AUTO: 0.5 % — SIGNIFICANT CHANGE UP (ref 0–2)
BILIRUB SERPL-MCNC: 0.3 MG/DL — SIGNIFICANT CHANGE UP (ref 0.2–1.2)
BILIRUB UR-MCNC: NEGATIVE — SIGNIFICANT CHANGE UP
BUN SERPL-MCNC: 27 MG/DL — HIGH (ref 7–23)
CALCIUM SERPL-MCNC: 8.6 MG/DL — SIGNIFICANT CHANGE UP (ref 8.5–10.1)
CHLORIDE SERPL-SCNC: 100 MMOL/L — SIGNIFICANT CHANGE UP (ref 96–108)
CO2 SERPL-SCNC: 30 MMOL/L — SIGNIFICANT CHANGE UP (ref 22–31)
COLOR SPEC: YELLOW — SIGNIFICANT CHANGE UP
CREAT SERPL-MCNC: 1.17 MG/DL — SIGNIFICANT CHANGE UP (ref 0.5–1.3)
DIFF PNL FLD: ABNORMAL
EOSINOPHIL # BLD AUTO: 0.67 K/UL — HIGH (ref 0–0.5)
EOSINOPHIL NFR BLD AUTO: 4.4 % — SIGNIFICANT CHANGE UP (ref 0–6)
EPI CELLS # UR: SIGNIFICANT CHANGE UP
GLUCOSE SERPL-MCNC: 112 MG/DL — HIGH (ref 70–99)
GLUCOSE UR QL: NEGATIVE MG/DL — SIGNIFICANT CHANGE UP
HCT VFR BLD CALC: 37.7 % — SIGNIFICANT CHANGE UP (ref 34.5–45)
HGB BLD-MCNC: 12.6 G/DL — SIGNIFICANT CHANGE UP (ref 11.5–15.5)
IMM GRANULOCYTES NFR BLD AUTO: 0.8 % — SIGNIFICANT CHANGE UP (ref 0–1.5)
INR BLD: 1.16 RATIO — SIGNIFICANT CHANGE UP (ref 0.88–1.16)
KETONES UR-MCNC: NEGATIVE — SIGNIFICANT CHANGE UP
LACTATE SERPL-SCNC: 0.8 MMOL/L — SIGNIFICANT CHANGE UP (ref 0.7–2)
LEUKOCYTE ESTERASE UR-ACNC: ABNORMAL
LIDOCAIN IGE QN: 136 U/L — SIGNIFICANT CHANGE UP (ref 73–393)
LYMPHOCYTES # BLD AUTO: 21.4 % — SIGNIFICANT CHANGE UP (ref 13–44)
LYMPHOCYTES # BLD AUTO: 3.28 K/UL — SIGNIFICANT CHANGE UP (ref 1–3.3)
MCHC RBC-ENTMCNC: 29.9 PG — SIGNIFICANT CHANGE UP (ref 27–34)
MCHC RBC-ENTMCNC: 33.4 GM/DL — SIGNIFICANT CHANGE UP (ref 32–36)
MCV RBC AUTO: 89.3 FL — SIGNIFICANT CHANGE UP (ref 80–100)
MONOCYTES # BLD AUTO: 0.86 K/UL — SIGNIFICANT CHANGE UP (ref 0–0.9)
MONOCYTES NFR BLD AUTO: 5.6 % — SIGNIFICANT CHANGE UP (ref 2–14)
NEUTROPHILS # BLD AUTO: 10.35 K/UL — HIGH (ref 1.8–7.4)
NEUTROPHILS NFR BLD AUTO: 67.3 % — SIGNIFICANT CHANGE UP (ref 43–77)
NITRITE UR-MCNC: NEGATIVE — SIGNIFICANT CHANGE UP
NRBC # BLD: 0 /100 WBCS — SIGNIFICANT CHANGE UP (ref 0–0)
PH UR: 5 — SIGNIFICANT CHANGE UP (ref 5–8)
PLATELET # BLD AUTO: 267 K/UL — SIGNIFICANT CHANGE UP (ref 150–400)
POTASSIUM SERPL-MCNC: 4.4 MMOL/L — SIGNIFICANT CHANGE UP (ref 3.5–5.3)
POTASSIUM SERPL-SCNC: 4.4 MMOL/L — SIGNIFICANT CHANGE UP (ref 3.5–5.3)
PROT SERPL-MCNC: 7.7 GM/DL — SIGNIFICANT CHANGE UP (ref 6–8.3)
PROT UR-MCNC: NEGATIVE MG/DL — SIGNIFICANT CHANGE UP
PROTHROM AB SERPL-ACNC: 12.8 SEC — SIGNIFICANT CHANGE UP (ref 10.6–13.6)
RBC # BLD: 4.22 M/UL — SIGNIFICANT CHANGE UP (ref 3.8–5.2)
RBC # FLD: 14.3 % — SIGNIFICANT CHANGE UP (ref 10.3–14.5)
RBC CASTS # UR COMP ASSIST: ABNORMAL /HPF (ref 0–4)
SODIUM SERPL-SCNC: 135 MMOL/L — SIGNIFICANT CHANGE UP (ref 135–145)
SP GR SPEC: 1.01 — SIGNIFICANT CHANGE UP (ref 1.01–1.02)
UROBILINOGEN FLD QL: NEGATIVE MG/DL — SIGNIFICANT CHANGE UP
WBC # BLD: 15.36 K/UL — HIGH (ref 3.8–10.5)
WBC # FLD AUTO: 15.36 K/UL — HIGH (ref 3.8–10.5)
WBC UR QL: SIGNIFICANT CHANGE UP

## 2020-07-28 PROCEDURE — 74174 CTA ABD&PLVS W/CONTRAST: CPT | Mod: 26

## 2020-07-28 PROCEDURE — 99285 EMERGENCY DEPT VISIT HI MDM: CPT

## 2020-07-28 PROCEDURE — 93010 ELECTROCARDIOGRAM REPORT: CPT

## 2020-07-28 PROCEDURE — 71275 CT ANGIOGRAPHY CHEST: CPT | Mod: 26

## 2020-07-28 PROCEDURE — 71045 X-RAY EXAM CHEST 1 VIEW: CPT | Mod: 26

## 2020-07-28 RX ORDER — SODIUM CHLORIDE 9 MG/ML
1000 INJECTION INTRAMUSCULAR; INTRAVENOUS; SUBCUTANEOUS ONCE
Refills: 0 | Status: COMPLETED | OUTPATIENT
Start: 2020-07-28 | End: 2020-07-28

## 2020-07-28 RX ORDER — MORPHINE SULFATE 50 MG/1
4 CAPSULE, EXTENDED RELEASE ORAL ONCE
Refills: 0 | Status: DISCONTINUED | OUTPATIENT
Start: 2020-07-28 | End: 2020-07-28

## 2020-07-28 RX ADMIN — MORPHINE SULFATE 4 MILLIGRAM(S): 50 CAPSULE, EXTENDED RELEASE ORAL at 22:35

## 2020-07-28 RX ADMIN — SODIUM CHLORIDE 1000 MILLILITER(S): 9 INJECTION INTRAMUSCULAR; INTRAVENOUS; SUBCUTANEOUS at 22:35

## 2020-07-28 NOTE — ED ADULT NURSE NOTE - OBJECTIVE STATEMENT
60 year old female c/o non-radiating mid-abd pain x 2 days. Reports as sharp and constant. Denies n/v/change in bowel habits/fever/chills/disuria. Abd is soft, non-distended. Mildly tender to periumbilical area on palpation.

## 2020-07-28 NOTE — ED PROVIDER NOTE - CLINICAL SUMMARY MEDICAL DECISION MAKING FREE TEXT BOX
Patient p/w abdominal pain.  VSS.  Labs with mild luekocytosis, seen before.  CT findings negative for dissection or other acute pathology, no entrapped hernia visible.  Patient is ambulating in ER and eating food from vending machine.  Gen surg consulted.  Patient ok to dc, reports good follow up with her own physicians.  Requests pain meds at home.  Discussed results and outcome of today's visit with the patient.  Patient advised to please follow up with another healthcare provider within the next 24 hours and return to the Emergency Department for worsening symptoms or any other concerns.  Patient advised that their doctor may call  to follow up on the specific results of the tests performed today in the emergency department.   Patient appears well on discharge. Patient p/w abdominal pain.  VSS.  Labs with mild luekocytosis, improved from before.  CT findings negative for dissection or other acute pathology, no entrapped hernia visible.  Patient is ambulating in ER and eating food from vending machine.  Gen surg consulted.  Patient ok to dc, reports good follow up with her own physicians.  Requests pain meds at home.  Discussed results and outcome of today's visit with the patient.  Patient advised to please follow up with another healthcare provider within the next 24 hours and return to the Emergency Department for worsening symptoms or any other concerns.  Patient advised that their doctor may call  to follow up on the specific results of the tests performed today in the emergency department.   Patient appears well on discharge.

## 2020-07-28 NOTE — ED ADULT TRIAGE NOTE - PAIN RATING/NUMBER SCALE (0-10): ACTIVITY
Nephrology Progress Note    Patient Dayana Caruso   MRN -  [id number]   Acct # - [de-identified]      - 1964    47 y.o. Admit Date: 4/10/2019  Hospital Day: 2  [facility]  Date of evaluation -  2019    Subjective:   CC: did not feel good  Denies sob. Room air   Good UOP  \"I feel better\"  BP Range: Systolic (20ODL), OPC:725 , Min:137 , REINALDO:471      Diastolic (95ADS), LPD:17, Min:64, Max:90    Objective:   VITALS:  BP (!) 160/72   Pulse 72   Temp 97.6 °F (36.4 °C) (Oral)   Resp 18   Ht 5' 6\" (1.676 m)   Wt 127 lb 4.8 oz (57.7 kg)   SpO2 94%   BMI 20.55 kg/m²    Patient Vitals for the past 24 hrs:   BP Temp Temp src Pulse Resp SpO2 Height Weight   19 0759 (!) 160/72 97.6 °F (36.4 °C) Oral 72 18 94 % -- --   19 0325 (!) 149/74 97.4 °F (36.3 °C) Oral 72 18 97 % 5' 6\" (1.676 m) 127 lb 4.8 oz (57.7 kg)   19 2327 138/66 -- -- -- -- -- -- --   19 2222 (!) 144/80 -- -- -- -- -- -- --   19 (!) 182/90 97.8 °F (36.6 °C) Oral 65 20 98 % -- --   19 1542 137/64 98.3 °F (36.8 °C) Oral 64 -- 90 % -- --   19 1146 (!) 154/68 97.5 °F (36.4 °C) Oral 58 -- 91 % -- --   19 0949 (!) 166/71 98 °F (36.7 °C) Axillary 61 18 94 % -- --     2 L/min    Intake/Output Summary (Last 24 hours) at 2019 0931  Last data filed at 2019 0325  Gross per 24 hour   Intake 1520 ml   Output 1675 ml   Net -155 ml       Admission weight: 129 lb 8 oz (58.7 kg)  Patient Vitals for the past 96 hrs (Last 3 readings):   Weight   19 0325 127 lb 4.8 oz (57.7 kg)   19 0353 126 lb 14.4 oz (57.6 kg)   04/10/19 1900 129 lb 8 oz (58.7 kg)     Wt Readings from Last 3 Encounters:   19 127 lb 4.8 oz (57.7 kg)   04/10/19 135 lb (61.2 kg)   19 126 lb (57.2 kg)     Body mass index is 20.55 kg/m². EXAM:  CONSTITUTIONAL:  No acute distress. Lying comfortable in bed. Pleasant  HEENT:  Head is normocephalic, Extraocular movement intact. Neck is supple.  Voice is clear.  CARDIOVASCULAR:  S1, S2  regular rate and rhythm. RESPIRATORY: Clear to ausculation bilaterally. Equal breath sounds. No wheezes. No shortness of breath noted at rest.  ABDOMEN: soft, non tender  NEUROLOGICAL: Patient is alert and oriented to person, place, and time. Recent and remote memory intact. Thought is coherant. SKIN: no rash, No significant bruises on exposed surfaces  MUSCULOSKELETAL: Movement is coordinated. Moves all extremities   EXTREMITIES: Distal lower extremity temp is warm, trace lower extremity edema. PSYCHIATRIC: mood and affect appropriate. Medications:   Med reviewed  Scheduled Meds:   bumetanide  1 mg Oral BID    isosorbide mononitrate  60 mg Oral Daily    ferrous sulfate  325 mg Oral TID WC    iron sucrose  300 mg Intravenous Q48H    darbepoetin eri-polysorbate  100 mcg Subcutaneous Once    dexamethasone  4 mg Intravenous Q8H    levothyroxine  200 mcg Oral Daily    insulin lispro  0-6 Units Subcutaneous TID WC    insulin lispro  0-3 Units Subcutaneous Nightly    insulin glargine  13 Units Subcutaneous Nightly    aspirin  81 mg Oral Daily    atorvastatin  80 mg Oral Daily    clopidogrel  75 mg Oral Daily    hydrALAZINE  50 mg Oral TID    metoprolol tartrate  50 mg Oral BID    pantoprazole  40 mg Oral BID AC    sodium chloride flush  10 mL Intravenous 2 times per day    heparin (porcine)  5,000 Units Subcutaneous 3 times per day       Labs:   Labs reviewed  Recent Labs     04/10/19  2059 04/11/19  0810 04/12/19  0357   * 137 135   K 6.1* 5.3* 4.9    105 101   CO2 22* 22* 19*   BUN 36* 34* 39*   CREATININE 3.0* 3.0* 3.6*   LABGLOM 22* 22* 18*   GLUCOSE 223* 227* 343*   CALCIUM 8.9 8.0* 8.8     Recent Labs     04/10/19  0104 04/10/19  2059   WBC 5.8 5.6   RBC 4.03* 3.83*   HGB 8.4* 8.0*   HCT 30.2* 28.7*    246          ASSESSMENT:  1. Acute Kidney Injury 2nd to diuresis. Agree with decreasing Bumex dose.   2. Chronic Kidney Disease Stage IV 2nd to DM and HTN  3. Diabetes Mellitus Type II with nephrosclerosis with long term use of insulin, A1C 7.3%. 4. Essential Hypertension with nephrosclerosis   5. Hyperkalemia: resolved  6. Metabolic acidosis 2nd to CKD   7. Hypothyroidism, . Repeat . PO levothyroxine per Primary  8. Fluid overload likely multifactorial including hypothyroidism. Noted EF 55-60%, per echo, 11/24/18.    9. Anemia of chronic disease, and iron deficiency. Venofer 300 mg q48hr x 3. Was getting Aranesp 100 mcg q2 weeks at [facility] prior to admission. Will give Aranesp 150 mcg after 2nd dose of Venofer tomorrow. Discussed with Dr Edwin Churchill and with pt. Pt wants to continue his out pt Anemia management regimen per Dr Edwin Churchill. BMP in AM  Active Problems:    CHF (congestive heart failure) (HCC)    CKD (chronic kidney disease) stage 4, GFR 15-29 ml/min (HCC)    Coronary artery disease involving native coronary artery of native heart with angina pectoris (HCC)    Anemia associated with chronic renal failure    Type 1 diabetes mellitus with hyperglycemia (HCC)    Anemia in chronic kidney disease    Encephalopathy  Resolved Problems:    * No resolved hospital problems.  PAOLA Sanchez - CNP 9:31 AM 4/12/2019 10

## 2020-07-28 NOTE — ED PROVIDER NOTE - PHYSICAL EXAMINATION
Gen: Alert, morbidly obese, distressed  Head: NC, AT, EOMI, normal lids/conjunctiva  ENT: normal hearing, patent oropharynx without erythema/exudate, uvula midline  Neck: +supple, no tenderness/meningismus/JVD, +Trachea midline  Pulm: Bilateral BS, normal resp effort, no wheeze/stridor/retractions  CV: RRR, no M/R/G, +dist pulses  Abd: soft, +midabdomimnal tenderness, large body habitus, Negative Rew signs, +BS, no palpable masses  Mskel: no edema/erythema/cyanosis  Skin: no rash, warm/dry  Neuro: AAOx3, no apparent sensory/motor deficits, coordination intact

## 2020-07-28 NOTE — ED PROVIDER NOTE - OBJECTIVE STATEMENT
Pertinent PMH/PSH/FHx/SHx and Review of Systems contained within:  Patient presents to the ED for abdominal pain.  Patient reports sharp abdominal pain in mid abdomen, now radiating and going up her back.  She was seen in urgent care yesterday and was placed on amoxicillin for fever and sore throat, denies cough, chest pain, dyspnea.  She denies assc n//v/d/constipation/melena/or urinary sx.  She also complains of pain radiating to her legs, no increased swelling.  +Smoker, denies use of other illict drugs or h/o alchol abuse    ROS: No fever/chills, No headache/photophobia/eye pain/changes in vision, No ear pain/sore throat/dysphagia, No chest pain/palpitations, no SOB/cough/wheeze/stridor, No N/V/D/melena, no dysuria/frequency/discharge, No neck pain, no rash, no changes in neurological status/function. Pertinent PMH/PSH/FHx/SHx and Review of Systems contained within:  Patient presents to the ED for abdominal pain.  Patient reports sharp abdominal pain in mid abdomen, now radiating and going up her back.  She was seen in urgent care yesterday and was placed on amoxicillin for fever and sore throat, denies cough, chest pain, dyspnea.  She denies assc n//v/d/constipation/melena/or urinary sx.  She also complains of pain radiating to her thighs, no increased swelling, no calf pain.  +Smoker, denies use of other illict drugs or h/o alchol abuse    ROS: No fever/chills, No headache/photophobia/eye pain/changes in vision, No ear pain/sore throat/dysphagia, No chest pain/palpitations, no SOB/cough/wheeze/stridor, No N/V/D/melena, no dysuria/frequency/discharge, No neck pain, no rash, no changes in neurological status/function.

## 2020-07-28 NOTE — ED PROVIDER NOTE - PATIENT PORTAL LINK FT
You can access the FollowMyHealth Patient Portal offered by [facility] by registering at the following website: [email or web address]. By joining [employer]’s FollowMyHealth portal, you will also be able to view your health information using other applications (apps) compatible with our system.

## 2020-07-29 VITALS
DIASTOLIC BLOOD PRESSURE: 65 MMHG | HEART RATE: 89 BPM | RESPIRATION RATE: 20 BRPM | TEMPERATURE: 98 F | SYSTOLIC BLOOD PRESSURE: 136 MMHG | OXYGEN SATURATION: 95 %

## 2020-07-29 DIAGNOSIS — E11.9 TYPE 2 DIABETES MELLITUS WITHOUT COMPLICATIONS: ICD-10-CM

## 2020-07-29 DIAGNOSIS — R10.9 UNSPECIFIED ABDOMINAL PAIN: ICD-10-CM

## 2020-07-29 DIAGNOSIS — E03.9 HYPOTHYROIDISM, UNSPECIFIED: ICD-10-CM

## 2020-07-29 DIAGNOSIS — E66.9 OBESITY, UNSPECIFIED: ICD-10-CM

## 2020-07-29 DIAGNOSIS — Z79.84 LONG TERM (CURRENT) USE OF ORAL HYPOGLYCEMIC DRUGS: ICD-10-CM

## 2020-07-29 DIAGNOSIS — F41.9 ANXIETY DISORDER, UNSPECIFIED: ICD-10-CM

## 2020-07-29 DIAGNOSIS — J44.9 CHRONIC OBSTRUCTIVE PULMONARY DISEASE, UNSPECIFIED: ICD-10-CM

## 2020-07-29 DIAGNOSIS — F40.00 AGORAPHOBIA, UNSPECIFIED: ICD-10-CM

## 2020-07-29 DIAGNOSIS — I10 ESSENTIAL (PRIMARY) HYPERTENSION: ICD-10-CM

## 2020-07-29 DIAGNOSIS — Z87.891 PERSONAL HISTORY OF NICOTINE DEPENDENCE: ICD-10-CM

## 2020-07-29 DIAGNOSIS — Z79.1 LONG TERM (CURRENT) USE OF NON-STEROIDAL ANTI-INFLAMMATORIES (NSAID): ICD-10-CM

## 2020-07-29 LAB — SARS-COV-2 RNA SPEC QL NAA+PROBE: SIGNIFICANT CHANGE UP

## 2020-07-29 RX ORDER — FAMOTIDINE 10 MG/ML
20 INJECTION INTRAVENOUS ONCE
Refills: 0 | Status: COMPLETED | OUTPATIENT
Start: 2020-07-29 | End: 2020-07-29

## 2020-07-29 RX ORDER — OMEPRAZOLE 10 MG/1
1 CAPSULE, DELAYED RELEASE ORAL
Qty: 30 | Refills: 0
Start: 2020-07-29 | End: 2020-08-27

## 2020-07-29 RX ADMIN — Medication 30 MILLILITER(S): at 02:09

## 2020-07-29 RX ADMIN — FAMOTIDINE 20 MILLIGRAM(S): 10 INJECTION INTRAVENOUS at 02:09

## 2020-07-29 NOTE — CONSULT NOTE ADULT - SUBJECTIVE AND OBJECTIVE BOX
GENERAL SURGERY CONSULT NOTE    Patient is a 60y old female who presents with a chief complaint of abdominal pain.     HPI: Patient is a 59 y/o female with PMH of COPD on home O2, DM, HTN, hypothyroidism, morbid obesity, anxiety, bladder/ovarian CA pending surgery, smoker and PSH of cholecystectomy in 2019 presenting to the ED for abdominal pain. Pain began 2 days ago and is described as constant, sharp, pain located in periumbilical region with radiation to upper back. Pain increases with oral intake, however, she tolerates liquids well. Patient reports few small BMs per day, which is normal for her. Patient was seen in ED on  with similar abdominal pain. She experienced some chills today, but no fever. She was seen in urgent care yesterday and was placed on amoxicillin for fever and sore throat, denies cough, chest pain, dyspnea.   Patient has never had a colonoscopy. Pending bladder/ovarian CA surgery - awaiting medical optimization.   Patient requesting to be discharged home from the ER with 3 day supply of pain medication.  Patient denies fever, nausea, vomiting, constipation, diarrhea, dysuria, hematuria, melena, hematochezia, chest pain, shortness of breath, dizziness, cough.     PAST MEDICAL & SURGICAL HISTORY:  Obesity  DM (diabetes mellitus)  HTN (hypertension)  Smoker  Emphysema lung  Hypothyroid  Agoraphobia  COPD (chronic obstructive pulmonary disease)  Anxiety  S/P cholecystectomy in 2019      MEDICATIONS  (STANDING):  aluminum hydroxide/magnesium hydroxide/simethicone Suspension 30 milliLiter(s) Oral Once  famotidine Injectable 20 milliGRAM(s) IV Push once    MEDICATIONS  (PRN):      Allergies: No Known Allergies    SOCIAL HISTORY: Smoker, denies alcohol/illicit drug use.          FAMILY HISTORY: No pertinent family history.     Vital Signs Last 24 Hrs  T(C): 36.7 (2020 21:08), Max: 36.7 (2020 21:08)  T(F): 98 (2020 21:08), Max: 98 (2020 21:08)  HR: 82 (2020 21:08) (82 - 82)  BP: 171/100 (2020 21:08) (171/100 - 171/100)  RR: 17 (2020 21:08) (17 - 17)  SpO2: 100% (2020 21:08) (100% - 100%)    GEN: Alert, oriented, NAD  HEENT: normocephalic, atraumatic, conjunctiva clear, EOMI  LUNGS: CTA b/l  HEART: S1S2 RRR  ADBOMEN: obese, +BS, soft, nontender, no guarding, reducible umbilical hernia noted  Extremities: no calf tenderness or swelling b/l      LABS:                        12.6   15.36 )-----------( 267      ( 2020 22:46 )             37.7         135  |  100  |  27<H>  ----------------------------<  112<H>  4.4   |  30  |  1.17    Ca    8.6      2020 22:46    TPro  7.7  /  Alb  3.4  /  TBili  0.3  /  DBili  x   /  AST  13<L>  /  ALT  15  /  AlkPhos  87      PT/INR - ( 2020 22:46 )   PT: 12.8 sec;   INR: 1.16 ratio         PTT - ( 2020 22:46 )  PTT:37.4 sec  Urinalysis Basic - ( 2020 22:22 )    Color: Yellow / Appearance: Clear / S.015 / pH: x  Gluc: x / Ketone: Negative  / Bili: Negative / Urobili: Negative mg/dL   Blood: x / Protein: Negative mg/dL / Nitrite: Negative   Leuk Esterase: Trace / RBC: 3-5 /HPF / WBC 0-2   Sq Epi: x / Non Sq Epi: Few / Bacteria: Few      RADIOLOGY & ADDITIONAL STUDIES:    < from: CT Angio Abdomen and Pelvis w/ IV Cont (20 @ 23:47) >  ABDOMEN AND PELVIS:  LIVER: Hepatomegaly and hepatic steatosis.  BILE DUCTS: Normal caliber.  GALLBLADDER: Postcholecystectomy.  SPLEEN: Within normal limits.  PANCREAS: Within normal limits.  ADRENALS: Mild thickening and nodularity of the left adrenal gland, unchanged.  KIDNEYS/URETERS: Kidneys enhance symmetrically without hydronephrosis.    BLADDER: Within normal limits.  REPRODUCTIVE ORGANS: Uterus and right adnexa are unremarkable. Left adnexal cyst measuring up to 5 cm, unchanged.    BOWEL: No bowel obstruction. Appendix is normal.  PERITONEUM: No ascites, pneumoperitoneum, or loculated collection. No mesenteric lymphadenopathy.  VESSELS: Normal caliber abdominal aorta. No abdominal aortic dissection flap. Atherosclerotic calcifications of the aortoiliac tree. Patent celiac artery, renal arteries, SMA, and JARVIS.  RETROPERITONEUM/LYMPH NODES: No lymphadenopathy. No retroperitoneal hematoma.  ABDOMINAL WALL: Large fat-containing umbilical hernia.  BONES: Mild degenerative changes of the spine.    IMPRESSION:  No aortic aneurysm or dissection.    < end of copied text > GENERAL SURGERY CONSULT NOTE    Patient is a 60y old female who presents with a chief complaint of abdominal pain.     HPI: Patient is a 59 y/o female with PMH of COPD on home O2, DM, HTN, hypothyroidism, morbid obesity, anxiety, bladder/ovarian CA pending surgery, smoker and PSH of cholecystectomy in 2019 presenting to the ED for abdominal pain. Pain began 2 days ago and is described as constant, sharp, pain located in periumbilical region with radiation to upper back. Pain increases with oral intake, however, she tolerates liquids well. Patient reports few small BMs per day, which is normal for her. Patient was seen in ED on  with similar abdominal pain. She experienced some chills today, but no fever. She was seen in urgent care yesterday and was placed on amoxicillin for fever and sore throat, denies cough, chest pain, dyspnea.   Patient has never had a colonoscopy. Pending bladder/ovarian CA surgery - awaiting medical optimization.   Patient requesting to be discharged home from the ER with a 3 day supply of pain medication.  Patient denies fever, nausea, vomiting, constipation, diarrhea, dysuria, hematuria, melena, hematochezia, chest pain, shortness of breath, dizziness, cough.     REVIEW OF SYSTEMS:  CONSTITUTIONAL: No fever, weight loss, or fatigue  EYES: No eye pain, visual disturbances, discharge  ENMT:  No difficulty hearing, tinnitus, vertigo; No sinus or throat pain  NECK: No pain or stiffness  BREASTS: No pain, masses, or nipple discharge  RESPIRATORY: No cough, wheezing, chills or hemoptysis; No shortness of breath  CARDIOVASCULAR: No chest pain, palpitations, dizziness, or leg swelling  GASTROINTESTINAL: No abdominal or epigastric pain. No nausea, vomiting, or hematemesis; No diarrhea or constipation. No melena or hematochezia.  GENITOURINARY: No dysuria, frequency, hematuria, or incontinence  NEUROLOGICAL: No headaches, memory loss, loss of strength, numbness, or tremors  SKIN: No itching, burning, rashes, or lesions   LYMPH NODES: No enlarged glands  ENDOCRINE: No heat or cold intolerance; No hair loss  MUSCULOSKELETAL: No joint pain or swelling; No muscle, back, or extremity pain  PSYCHIATRIC: No depression, anxiety, mood swings, or difficulty sleeping  HEME/LYMPH: No easy bruising, or bleeding gums  ALLERY AND IMMUNOLOGIC: No hives or eczema     PAST MEDICAL & SURGICAL HISTORY:  Obesity  DM (diabetes mellitus)  HTN (hypertension)  Smoker  Emphysema lung  Hypothyroid  Agoraphobia  COPD (chronic obstructive pulmonary disease)  Anxiety  S/P cholecystectomy in 2019    MEDICATIONS  (STANDING):  aluminum hydroxide/magnesium hydroxide/simethicone Suspension 30 milliLiter(s) Oral Once  famotidine Injectable 20 milliGRAM(s) IV Push once    Allergies: No Known Allergies    SOCIAL HISTORY: Smoker, denies alcohol/illicit drug use.          FAMILY HISTORY: No pertinent family history.     Vital Signs Last 24 Hrs  T(C): 36.7 (2020 21:08), Max: 36.7 (2020 21:08)  T(F): 98 (2020 21:08), Max: 98 (2020 21:08)  HR: 82 (2020 21:08) (82 - 82)  BP: 171/100 (2020 21:08) (171/100 - 171/100)  RR: 17 (2020 21:08) (17 - 17)  SpO2: 100% (2020 21:08) (100% - 100%)    GEN: Alert, oriented, NAD  HEENT: normocephalic, atraumatic, conjunctiva clear, EOMI  LUNGS: CTA b/l  HEART: S1S2 RRR  ADBOMEN: obese, +BS, soft, nontender, no guarding, reducible umbilical hernia noted  Extremities: no calf tenderness or swelling b/l      LABS:                        12.6   15.36 )-----------( 267      ( 2020 22:46 )             37.7     07-    135  |  100  |  27<H>  ----------------------------<  112<H>  4.4   |  30  |  1.17    Ca    8.6      2020 22:46    TPro  7.7  /  Alb  3.4  /  TBili  0.3  /  DBili  x   /  AST  13<L>  /  ALT  15  /  AlkPhos  87  07-28    PT/INR - ( 2020 22:46 )   PT: 12.8 sec;   INR: 1.16 ratio         PTT - ( 2020 22:46 )  PTT:37.4 sec  Urinalysis Basic - ( 2020 22:22 )    Color: Yellow / Appearance: Clear / S.015 / pH: x  Gluc: x / Ketone: Negative  / Bili: Negative / Urobili: Negative mg/dL   Blood: x / Protein: Negative mg/dL / Nitrite: Negative   Leuk Esterase: Trace / RBC: 3-5 /HPF / WBC 0-2   Sq Epi: x / Non Sq Epi: Few / Bacteria: Few      RADIOLOGY & ADDITIONAL STUDIES:    < from: CT Angio Abdomen and Pelvis w/ IV Cont (20 @ 23:47) >  ABDOMEN AND PELVIS:  LIVER: Hepatomegaly and hepatic steatosis.  BILE DUCTS: Normal caliber.  GALLBLADDER: Postcholecystectomy.  SPLEEN: Within normal limits.  PANCREAS: Within normal limits.  ADRENALS: Mild thickening and nodularity of the left adrenal gland, unchanged.  KIDNEYS/URETERS: Kidneys enhance symmetrically without hydronephrosis.    BLADDER: Within normal limits.  REPRODUCTIVE ORGANS: Uterus and right adnexa are unremarkable. Left adnexal cyst measuring up to 5 cm, unchanged.    BOWEL: No bowel obstruction. Appendix is normal.  PERITONEUM: No ascites, pneumoperitoneum, or loculated collection. No mesenteric lymphadenopathy.  VESSELS: Normal caliber abdominal aorta. No abdominal aortic dissection flap. Atherosclerotic calcifications of the aortoiliac tree. Patent celiac artery, renal arteries, SMA, and JARVIS.  RETROPERITONEUM/LYMPH NODES: No lymphadenopathy. No retroperitoneal hematoma.  ABDOMINAL WALL: Large fat-containing umbilical hernia.  BONES: Mild degenerative changes of the spine.    IMPRESSION:  No aortic aneurysm or dissection.    < end of copied text >

## 2020-07-29 NOTE — CONSULT NOTE ADULT - ASSESSMENT
Assessment: Patient is a 61 y/o female with PMH of COPD on home O2, DM, HTN, hypothyroidism, morbid obesity, anxiety, bladder/ovarian CA pending surgery, smoker and PSH of cholecystectomy in 2019 presenting to the ED for abdominal pain. Leukocytosis (15.36).  CT Angio Abdomen and Pelvis w/ IV Cont shows large fat-containing umbilical hernia.    Plan: Assessment: Patient is a 59 y/o female with PMH of COPD on home O2, DM, HTN, hypothyroidism, morbid obesity, anxiety, bladder/ovarian CA pending surgery, smoker and PSH of cholecystectomy in 2019 presenting to the ED for abdominal pain. Leukocytosis (15.36).  CT Angio Abdomen and Pelvis w/ IV Cont shows large fat-containing umbilical hernia.    Plan:  No acute surgical intervention. Patient tolerated regular diet in the ER and states that she would like to go home.  Management per ER.  Will discuss with attending. Assessment: Patient is a 61 y/o female with PMH of COPD on home O2, DM, HTN, hypothyroidism, morbid obesity, anxiety, bladder/ovarian CA pending surgery, smoker and PSH of cholecystectomy in 2019 presenting to the ED for abdominal pain. Leukocytosis (15.36).  CT Angio Abdomen and Pelvis w/ IV Cont shows large fat-containing umbilical hernia.    Plan:  No acute surgical intervention. Patient tolerated regular diet in the ER and states that she would like to go home.  Outpatient follow up with patient's physicians.  Management per ER.  Will discuss with attending. Assessment: Patient is a 61 y/o female with PMH of COPD on home O2, DM, HTN, hypothyroidism, morbid obesity, anxiety, bladder/ovarian CA pending surgery, smoker and PSH of cholecystectomy in 2019 presenting to the ED for abdominal pain. Leukocytosis (15.36).  CT Angio Abdomen and Pelvis w/ IV Cont shows large fat-containing umbilical hernia.    Plan:  No acute surgical intervention. Umbilical hernia is reducible and patient is without pain/tenderness. Patient tolerated regular diet in the ER and states that she would like to go home.  Outpatient follow up with patient's physicians.  Management per ER.  Will discuss with attending.

## 2020-07-31 LAB
CULTURE RESULTS: SIGNIFICANT CHANGE UP
SPECIMEN SOURCE: SIGNIFICANT CHANGE UP

## 2020-09-15 ENCOUNTER — EMERGENCY (EMERGENCY)
Facility: HOSPITAL | Age: 60
LOS: 0 days | Discharge: HOME | End: 2020-09-15
Attending: EMERGENCY MEDICINE
Payer: MEDICAID

## 2020-09-15 VITALS
DIASTOLIC BLOOD PRESSURE: 99 MMHG | HEIGHT: 61 IN | OXYGEN SATURATION: 98 % | WEIGHT: 169.09 LBS | HEART RATE: 86 BPM | TEMPERATURE: 98 F | SYSTOLIC BLOOD PRESSURE: 173 MMHG | RESPIRATION RATE: 20 BRPM

## 2020-09-15 DIAGNOSIS — Z90.49 ACQUIRED ABSENCE OF OTHER SPECIFIED PARTS OF DIGESTIVE TRACT: Chronic | ICD-10-CM

## 2020-09-15 LAB
ALBUMIN SERPL ELPH-MCNC: 3.7 G/DL — SIGNIFICANT CHANGE UP (ref 3.3–5)
ALP SERPL-CCNC: 134 U/L — HIGH (ref 40–120)
ALT FLD-CCNC: 17 U/L — SIGNIFICANT CHANGE UP (ref 12–78)
ANION GAP SERPL CALC-SCNC: 8 MMOL/L — SIGNIFICANT CHANGE UP (ref 5–17)
AST SERPL-CCNC: 16 U/L — SIGNIFICANT CHANGE UP (ref 15–37)
BASOPHILS # BLD AUTO: 0 K/UL — SIGNIFICANT CHANGE UP (ref 0–0.2)
BASOPHILS NFR BLD AUTO: 0 % — SIGNIFICANT CHANGE UP (ref 0–2)
BILIRUB SERPL-MCNC: 0.3 MG/DL — SIGNIFICANT CHANGE UP (ref 0.2–1.2)
BUN SERPL-MCNC: 20 MG/DL — SIGNIFICANT CHANGE UP (ref 7–23)
CALCIUM SERPL-MCNC: 9 MG/DL — SIGNIFICANT CHANGE UP (ref 8.5–10.1)
CHLORIDE SERPL-SCNC: 102 MMOL/L — SIGNIFICANT CHANGE UP (ref 96–108)
CO2 SERPL-SCNC: 26 MMOL/L — SIGNIFICANT CHANGE UP (ref 22–31)
CREAT SERPL-MCNC: 0.93 MG/DL — SIGNIFICANT CHANGE UP (ref 0.5–1.3)
EOSINOPHIL # BLD AUTO: 0.75 K/UL — HIGH (ref 0–0.5)
EOSINOPHIL NFR BLD AUTO: 4 % — SIGNIFICANT CHANGE UP (ref 0–6)
GLUCOSE SERPL-MCNC: 158 MG/DL — HIGH (ref 70–99)
HCT VFR BLD CALC: 39.4 % — SIGNIFICANT CHANGE UP (ref 34.5–45)
HGB BLD-MCNC: 13.2 G/DL — SIGNIFICANT CHANGE UP (ref 11.5–15.5)
LYMPHOCYTES # BLD AUTO: 28 % — SIGNIFICANT CHANGE UP (ref 13–44)
LYMPHOCYTES # BLD AUTO: 5.24 K/UL — HIGH (ref 1–3.3)
MAGNESIUM SERPL-MCNC: 2.6 MG/DL — SIGNIFICANT CHANGE UP (ref 1.6–2.6)
MANUAL SMEAR VERIFICATION: SIGNIFICANT CHANGE UP
MCHC RBC-ENTMCNC: 30.3 PG — SIGNIFICANT CHANGE UP (ref 27–34)
MCHC RBC-ENTMCNC: 33.5 GM/DL — SIGNIFICANT CHANGE UP (ref 32–36)
MCV RBC AUTO: 90.4 FL — SIGNIFICANT CHANGE UP (ref 80–100)
MONOCYTES # BLD AUTO: 0.37 K/UL — SIGNIFICANT CHANGE UP (ref 0–0.9)
MONOCYTES NFR BLD AUTO: 2 % — SIGNIFICANT CHANGE UP (ref 2–14)
NEUTROPHILS # BLD AUTO: 11.6 K/UL — HIGH (ref 1.8–7.4)
NEUTROPHILS NFR BLD AUTO: 58 % — SIGNIFICANT CHANGE UP (ref 43–77)
NEUTS BAND # BLD: 4 % — SIGNIFICANT CHANGE UP (ref 0–8)
NRBC # BLD: 0 /100 — SIGNIFICANT CHANGE UP (ref 0–0)
NRBC # BLD: SIGNIFICANT CHANGE UP /100 WBCS (ref 0–0)
PLAT MORPH BLD: NORMAL — SIGNIFICANT CHANGE UP
PLATELET # BLD AUTO: 315 K/UL — SIGNIFICANT CHANGE UP (ref 150–400)
POTASSIUM SERPL-MCNC: 4 MMOL/L — SIGNIFICANT CHANGE UP (ref 3.5–5.3)
POTASSIUM SERPL-SCNC: 4 MMOL/L — SIGNIFICANT CHANGE UP (ref 3.5–5.3)
PROT SERPL-MCNC: 8.5 GM/DL — HIGH (ref 6–8.3)
RBC # BLD: 4.36 M/UL — SIGNIFICANT CHANGE UP (ref 3.8–5.2)
RBC # FLD: 14.7 % — HIGH (ref 10.3–14.5)
RBC BLD AUTO: NORMAL — SIGNIFICANT CHANGE UP
SODIUM SERPL-SCNC: 136 MMOL/L — SIGNIFICANT CHANGE UP (ref 135–145)
VARIANT LYMPHS # BLD: 4 % — SIGNIFICANT CHANGE UP (ref 0–6)
WBC # BLD: 18.71 K/UL — HIGH (ref 3.8–10.5)
WBC # FLD AUTO: 18.71 K/UL — HIGH (ref 3.8–10.5)

## 2020-09-15 PROCEDURE — 99285 EMERGENCY DEPT VISIT HI MDM: CPT

## 2020-09-15 PROCEDURE — 70450 CT HEAD/BRAIN W/O DYE: CPT | Mod: 26

## 2020-09-15 RX ORDER — BUTALBITAL/ASPIRIN/CAFFEINE 50-325-40
1 TABLET ORAL
Qty: 21 | Refills: 0
Start: 2020-09-15 | End: 2020-09-21

## 2020-09-15 RX ORDER — DEXAMETHASONE 0.5 MG/5ML
15 ELIXIR ORAL ONCE
Refills: 0 | Status: COMPLETED | OUTPATIENT
Start: 2020-09-15 | End: 2020-09-15

## 2020-09-15 RX ORDER — METOCLOPRAMIDE HCL 10 MG
10 TABLET ORAL ONCE
Refills: 0 | Status: COMPLETED | OUTPATIENT
Start: 2020-09-15 | End: 2020-09-15

## 2020-09-15 RX ORDER — ACETAMINOPHEN 500 MG
1000 TABLET ORAL ONCE
Refills: 0 | Status: COMPLETED | OUTPATIENT
Start: 2020-09-15 | End: 2020-09-15

## 2020-09-15 RX ORDER — KETOROLAC TROMETHAMINE 30 MG/ML
30 SYRINGE (ML) INJECTION ONCE
Refills: 0 | Status: DISCONTINUED | OUTPATIENT
Start: 2020-09-15 | End: 2020-09-15

## 2020-09-15 RX ADMIN — Medication 30 MILLIGRAM(S): at 05:48

## 2020-09-15 RX ADMIN — Medication 107.5 MILLIGRAM(S): at 04:29

## 2020-09-15 RX ADMIN — Medication 10 MILLIGRAM(S): at 04:02

## 2020-09-15 RX ADMIN — Medication 400 MILLIGRAM(S): at 04:29

## 2020-09-15 NOTE — ED PROVIDER NOTE - OBJECTIVE STATEMENT
60F hx of obesity, asthma on home o2 and garrison presents with garrison. pt notes 2 weeks of ha. feels throbbing in the front and back of the head. no trauma. dxed as sinus infection by doctor, completed course of doxycycline started on 9/8. Also finished a course of Augmentin towards the end of august. patient notes no relief in symptoms with ongoing throbbing ha. ros neg for vision loss, rhinorrhea, fever, cp, sob, rash, bleeding, vomiting, numbness, weakness, dysuria. when ambulance picked up patient, she was given a neb for audible wheezing but patient denies having shortness of breath

## 2020-09-15 NOTE — ED PROVIDER NOTE - PATIENT PORTAL LINK FT
You can access the FollowMyHealth Patient Portal offered by [facility] by registering at the following website: [email or web address]. By joining [employer]’s FollowMyHealth portal, you will also be able to view your health information using other applications (apps) compatible with our system.

## 2020-09-15 NOTE — ED ADULT TRIAGE NOTE - CHIEF COMPLAINT QUOTE
BIBA,  pt c/o headache (constant) x 2 weeks.  currently being treated for sinus infection. pt completed abx therapy yesterday.  per EMS, pt had wheezing at home and home O2 was not being used.

## 2020-09-15 NOTE — ED PROVIDER NOTE - CLINICAL SUMMARY MEDICAL DECISION MAKING FREE TEXT BOX
pt pw garrison, intractable. sinus-like headache. pt pw garrison, intractable. sinus-like headache.   ct negative for sinus infection. no abx indicated. no intracranial mass or bleeding. pt feeling better. will dc home pt pw garrison, intractable. sinus-like headache. wbc chronically elevated   ct negative for sinus infection. no abx indicated. no intracranial mass or bleeding. pt feeling better. will dc home

## 2020-12-10 NOTE — ED ADULT TRIAGE NOTE - WEIGHT IN LBS
Onset: 0400 this morning  Location/description: Temperature of 103 temporal this morning and throughout the day. Receiving ibuprofen 5 ml today and tylenol this morning. Taking fluids well and having wet diapers. Decreased appetitive for food.Pt has ear infection and on antibiotic since Friday. Pt has been exposed to her grandmother who has COVID-19, who lives with pt. Pulling on ears.  Associated Symptoms: see above  What improves/worsens symptoms: tylenol and ibuprofen drop temperature./nothing  Symptom specific medications: see above  Intake and Output: WNL  Activity level: WNL  Temperature (route and time): see above  Weight:   Wt Readings from Last 1 Encounters:   12/04/20 13.5 kg (63 %, Z= 0.33)*     * Growth percentiles are based on CDC (Girls, 2-20 Years) data.        Recent Care: 12/04/20: OV: Dr. Pike: OM  Did the patient have a positive coronavirus screening?: Yes, if it is necessary to send patient to a healthcare facility (L&D, ER, Urgent Care, etc.), call and notify facility. DO NOT schedule any face-to-face appointments (clinic visits, lab, etc.)      PLAN:  See/Call Provider within 24 hours   Continue with tylenol and ibuprofen. Cool pack to outer ear 20 minutes prn.     Caller agrees to follow recommendations.    Reason for Disposition  • [1] Symptoms of COVID-19 AND [2] within 14 days of close contact with diagnosed or suspected COVID-19 patient  • Earache or ear discharge also present    Protocols used: CORONAVIRUS (COVID-19) EXPOSURE-P-AH, CORONAVIRUS (COVID-19) DIAGNOSED OR [id number]      
Regarding: [place] 2yr fever 100.3  ----- Message from Susan Godoyson sent at 12/7/2020  8:04 PM CST -----  Patient Name: Gunnar Andujar    Full Name of Provider seen for current symptoms:Alaina Geller MD    Pregnant (If Yes, how long?):na     Symptoms: fever 100.3 ( forehead)    Do you or any of your household members have the following symptoms:  Fever >100.0#F or >38.0#C: No    New or worsening cough, shortness of breath, sore throat, congestion, or runny nose: No    New onset of nausea, vomiting or diarrhea: No    New onset of loss of taste or smell, chills, repeated shaking with chills, muscle pain, or headache: No    Have you, a household member, or another person you have been in contact with tested positive for COVID-19 in the last 14 days?: Yes, date of exposure: 12/05/2020    Call Back #:[phone]    Call Center Account #:[id number]    Which State are you currently located in? (enter State name in Summary field): [place]     Please update the Demographics section with the patients permanent resident address     Emergent COVID-19 Symptoms requiring Nurse Triage:  Trouble breathing, Persistent pain or pressure in the chest, New confusion, Inability to wake or stay awake, Bluish lips or face      
Spoke with the mother this am, patient is doing well with no further fever.  She is eating well and acting normally.  Mom has no questions or concerns at this time.   I asked the mother to call as needed, she is aware of when to call.     
Spoke with the mother this am.  Baby is acting well, temp 99.8 this am.  She is drinking but not eating much.  Discussed coming in for recheck but mom prefers to observe for now.  I told mom this was ok but if baby is lethargic, irritable, taking no fluids or has a persistent temp elevation the baby should be seen.  Discussed that we see these patients in a 4-5 PM time slot daily and discussed that patient would need to be seen by the MD assigned for that day.  Mom voiced understanding.    
259.9

## 2021-01-01 NOTE — ED ADULT TRIAGE NOTE - NS ED TRIAGE AVPU SCALE
Alert-The patient is alert, awake and responds to voice. The patient is oriented to time, place, and person. The triage nurse is able to obtain subjective information.
3

## 2021-01-08 NOTE — ED ADULT NURSE NOTE - CAS EDN DISCHARGE INTERVENTIONS
IV discontinued, cath removed intact Interpolation Flap Text: A decision was made to reconstruct the defect utilizing an interpolation axial flap and a staged reconstruction.  A telfa template was made of the defect.  This telfa template was then used to outline the interpolation flap.  The donor area for the pedicle flap was then injected with anesthesia.  The flap was excised through the skin and subcutaneous tissue down to the layer of the underlying musculature.  The interpolation flap was carefully excised within this deep plane to maintain its blood supply.  The edges of the donor site were undermined.   The donor site was closed in a primary fashion.  The pedicle was then rotated into position and sutured.  Once the tube was sutured into place, adequate blood supply was confirmed with blanching and refill.  The pedicle was then wrapped with xeroform gauze and dressed appropriately with a telfa and gauze bandage to ensure continued blood supply and protect the attached pedicle.

## 2021-01-12 NOTE — ED ADULT NURSE REASSESSMENT NOTE - GENERAL PATIENT STATE
Care Management    Pt with improved mentation. Per neuro, continue to monitor for 1-2 more days to see if mentation continues to improve and pt can participate in speech therapy and swallow study.     PC following and continues with ongoing goals of care discussions   comfortable appearance

## 2021-02-15 ENCOUNTER — EMERGENCY (EMERGENCY)
Facility: HOSPITAL | Age: 61
LOS: 1 days | Discharge: HOME | End: 2021-02-15
Attending: STUDENT IN AN ORGANIZED HEALTH CARE EDUCATION/TRAINING PROGRAM | Admitting: INTERNAL MEDICINE
Payer: MEDICAID

## 2021-02-15 VITALS
RESPIRATION RATE: 23 BRPM | HEART RATE: 84 BPM | TEMPERATURE: 98 F | OXYGEN SATURATION: 98 % | SYSTOLIC BLOOD PRESSURE: 174 MMHG | WEIGHT: 259.93 LBS | HEIGHT: 61 IN | DIASTOLIC BLOOD PRESSURE: 136 MMHG

## 2021-02-15 VITALS
TEMPERATURE: 98 F | OXYGEN SATURATION: 96 % | SYSTOLIC BLOOD PRESSURE: 133 MMHG | DIASTOLIC BLOOD PRESSURE: 81 MMHG | HEART RATE: 89 BPM | RESPIRATION RATE: 20 BRPM

## 2021-02-15 DIAGNOSIS — Z90.49 ACQUIRED ABSENCE OF OTHER SPECIFIED PARTS OF DIGESTIVE TRACT: Chronic | ICD-10-CM

## 2021-02-15 LAB
ALBUMIN SERPL ELPH-MCNC: 3.8 G/DL — SIGNIFICANT CHANGE UP (ref 3.3–5)
ALP SERPL-CCNC: 132 U/L — HIGH (ref 40–120)
ALT FLD-CCNC: 18 U/L — SIGNIFICANT CHANGE UP (ref 12–78)
ANION GAP SERPL CALC-SCNC: 6 MMOL/L — SIGNIFICANT CHANGE UP (ref 5–17)
APTT BLD: 31.7 SEC — SIGNIFICANT CHANGE UP (ref 27.5–35.5)
AST SERPL-CCNC: 7 U/L — LOW (ref 15–37)
BILIRUB SERPL-MCNC: 0.4 MG/DL — SIGNIFICANT CHANGE UP (ref 0.2–1.2)
BUN SERPL-MCNC: 19 MG/DL — SIGNIFICANT CHANGE UP (ref 7–23)
CALCIUM SERPL-MCNC: 8.6 MG/DL — SIGNIFICANT CHANGE UP (ref 8.5–10.1)
CHLORIDE SERPL-SCNC: 99 MMOL/L — SIGNIFICANT CHANGE UP (ref 96–108)
CO2 SERPL-SCNC: 29 MMOL/L — SIGNIFICANT CHANGE UP (ref 22–31)
CREAT SERPL-MCNC: 1.05 MG/DL — SIGNIFICANT CHANGE UP (ref 0.5–1.3)
FLUAV AG NPH QL: SIGNIFICANT CHANGE UP
FLUBV AG NPH QL: SIGNIFICANT CHANGE UP
GLUCOSE BLDC GLUCOMTR-MCNC: 445 MG/DL — HIGH (ref 70–99)
GLUCOSE SERPL-MCNC: 437 MG/DL — HIGH (ref 70–99)
HCT VFR BLD CALC: 37.2 % — SIGNIFICANT CHANGE UP (ref 34.5–45)
HGB BLD-MCNC: 12.5 G/DL — SIGNIFICANT CHANGE UP (ref 11.5–15.5)
INR BLD: 0.96 RATIO — SIGNIFICANT CHANGE UP (ref 0.88–1.16)
MCHC RBC-ENTMCNC: 29.1 PG — SIGNIFICANT CHANGE UP (ref 27–34)
MCHC RBC-ENTMCNC: 33.6 GM/DL — SIGNIFICANT CHANGE UP (ref 32–36)
MCV RBC AUTO: 86.5 FL — SIGNIFICANT CHANGE UP (ref 80–100)
NRBC # BLD: 0 /100 WBCS — SIGNIFICANT CHANGE UP (ref 0–0)
NT-PROBNP SERPL-SCNC: 209 PG/ML — HIGH (ref 0–125)
PLATELET # BLD AUTO: 279 K/UL — SIGNIFICANT CHANGE UP (ref 150–400)
POTASSIUM SERPL-MCNC: 4.6 MMOL/L — SIGNIFICANT CHANGE UP (ref 3.5–5.3)
POTASSIUM SERPL-SCNC: 4.6 MMOL/L — SIGNIFICANT CHANGE UP (ref 3.5–5.3)
PROT SERPL-MCNC: 7.6 GM/DL — SIGNIFICANT CHANGE UP (ref 6–8.3)
PROTHROM AB SERPL-ACNC: 11.2 SEC — SIGNIFICANT CHANGE UP (ref 10.6–13.6)
RBC # BLD: 4.3 M/UL — SIGNIFICANT CHANGE UP (ref 3.8–5.2)
RBC # FLD: 15.3 % — HIGH (ref 10.3–14.5)
SARS-COV-2 RNA SPEC QL NAA+PROBE: SIGNIFICANT CHANGE UP
SODIUM SERPL-SCNC: 134 MMOL/L — LOW (ref 135–145)
TROPONIN I SERPL-MCNC: <.015 NG/ML — SIGNIFICANT CHANGE UP (ref 0.01–0.04)
WBC # BLD: 17.04 K/UL — HIGH (ref 3.8–10.5)
WBC # FLD AUTO: 17.04 K/UL — HIGH (ref 3.8–10.5)

## 2021-02-15 PROCEDURE — 71045 X-RAY EXAM CHEST 1 VIEW: CPT | Mod: 26

## 2021-02-15 PROCEDURE — 74177 CT ABD & PELVIS W/CONTRAST: CPT | Mod: 26,MA

## 2021-02-15 PROCEDURE — 99285 EMERGENCY DEPT VISIT HI MDM: CPT

## 2021-02-15 PROCEDURE — 93010 ELECTROCARDIOGRAM REPORT: CPT

## 2021-02-15 PROCEDURE — 71275 CT ANGIOGRAPHY CHEST: CPT | Mod: 26,MA

## 2021-02-15 PROCEDURE — 70491 CT SOFT TISSUE NECK W/DYE: CPT | Mod: 26,MA

## 2021-02-15 RX ORDER — INSULIN LISPRO 100/ML
VIAL (ML) SUBCUTANEOUS AT BEDTIME
Refills: 0 | Status: DISCONTINUED | OUTPATIENT
Start: 2021-02-15 | End: 2021-02-18

## 2021-02-15 RX ORDER — SODIUM CHLORIDE 9 MG/ML
1000 INJECTION, SOLUTION INTRAVENOUS
Refills: 0 | Status: DISCONTINUED | OUTPATIENT
Start: 2021-02-15 | End: 2021-02-18

## 2021-02-15 RX ORDER — DEXTROSE 50 % IN WATER 50 %
15 SYRINGE (ML) INTRAVENOUS ONCE
Refills: 0 | Status: DISCONTINUED | OUTPATIENT
Start: 2021-02-15 | End: 2021-02-18

## 2021-02-15 RX ORDER — ESCITALOPRAM OXALATE 10 MG/1
20 TABLET, FILM COATED ORAL DAILY
Refills: 0 | Status: DISCONTINUED | OUTPATIENT
Start: 2021-02-15 | End: 2021-02-18

## 2021-02-15 RX ORDER — MORPHINE SULFATE 50 MG/1
4 CAPSULE, EXTENDED RELEASE ORAL ONCE
Refills: 0 | Status: DISCONTINUED | OUTPATIENT
Start: 2021-02-15 | End: 2021-02-15

## 2021-02-15 RX ORDER — AZITHROMYCIN 500 MG/1
1 TABLET, FILM COATED ORAL
Qty: 6 | Refills: 0
Start: 2021-02-15

## 2021-02-15 RX ORDER — CYCLOBENZAPRINE HYDROCHLORIDE 10 MG/1
5 TABLET, FILM COATED ORAL THREE TIMES A DAY
Refills: 0 | Status: DISCONTINUED | OUTPATIENT
Start: 2021-02-15 | End: 2021-02-18

## 2021-02-15 RX ORDER — INSULIN LISPRO 100/ML
VIAL (ML) SUBCUTANEOUS
Refills: 0 | Status: DISCONTINUED | OUTPATIENT
Start: 2021-02-15 | End: 2021-02-18

## 2021-02-15 RX ORDER — SODIUM CHLORIDE 9 MG/ML
1000 INJECTION INTRAMUSCULAR; INTRAVENOUS; SUBCUTANEOUS ONCE
Refills: 0 | Status: COMPLETED | OUTPATIENT
Start: 2021-02-15 | End: 2021-02-15

## 2021-02-15 RX ORDER — ACETAMINOPHEN WITH CODEINE 300MG-30MG
1 TABLET ORAL
Qty: 20 | Refills: 0
Start: 2021-02-15

## 2021-02-15 RX ORDER — GLUCAGON INJECTION, SOLUTION 0.5 MG/.1ML
1 INJECTION, SOLUTION SUBCUTANEOUS ONCE
Refills: 0 | Status: DISCONTINUED | OUTPATIENT
Start: 2021-02-15 | End: 2021-02-18

## 2021-02-15 RX ORDER — ESCITALOPRAM OXALATE 10 MG/1
20 TABLET, FILM COATED ORAL DAILY
Refills: 0 | Status: DISCONTINUED | OUTPATIENT
Start: 2021-02-15 | End: 2021-02-15

## 2021-02-15 RX ORDER — DEXTROSE 50 % IN WATER 50 %
25 SYRINGE (ML) INTRAVENOUS ONCE
Refills: 0 | Status: DISCONTINUED | OUTPATIENT
Start: 2021-02-15 | End: 2021-02-18

## 2021-02-15 RX ORDER — MONTELUKAST 4 MG/1
10 TABLET, CHEWABLE ORAL AT BEDTIME
Refills: 0 | Status: DISCONTINUED | OUTPATIENT
Start: 2021-02-15 | End: 2021-02-18

## 2021-02-15 RX ORDER — ATORVASTATIN CALCIUM 80 MG/1
10 TABLET, FILM COATED ORAL AT BEDTIME
Refills: 0 | Status: DISCONTINUED | OUTPATIENT
Start: 2021-02-15 | End: 2021-02-18

## 2021-02-15 RX ORDER — DEXTROSE 50 % IN WATER 50 %
12.5 SYRINGE (ML) INTRAVENOUS ONCE
Refills: 0 | Status: DISCONTINUED | OUTPATIENT
Start: 2021-02-15 | End: 2021-02-18

## 2021-02-15 RX ORDER — LEVOTHYROXINE SODIUM 125 MCG
150 TABLET ORAL DAILY
Refills: 0 | Status: DISCONTINUED | OUTPATIENT
Start: 2021-02-15 | End: 2021-02-18

## 2021-02-15 RX ORDER — PANTOPRAZOLE SODIUM 20 MG/1
40 TABLET, DELAYED RELEASE ORAL
Refills: 0 | Status: DISCONTINUED | OUTPATIENT
Start: 2021-02-15 | End: 2021-02-18

## 2021-02-15 RX ADMIN — SODIUM CHLORIDE 1000 MILLILITER(S): 9 INJECTION INTRAMUSCULAR; INTRAVENOUS; SUBCUTANEOUS at 09:08

## 2021-02-15 RX ADMIN — MORPHINE SULFATE 4 MILLIGRAM(S): 50 CAPSULE, EXTENDED RELEASE ORAL at 09:08

## 2021-02-15 NOTE — ED ADULT TRIAGE NOTE - CHIEF COMPLAINT QUOTE
ems states , " pt with sob x 2 days, ciugh and back pain , sat on room air 85, on 2l 98%, hx copd, denies fever"

## 2021-02-15 NOTE — ED PROVIDER NOTE - PATIENT PORTAL LINK FT
You can access the FollowMyHealth Patient Portal offered by [facility] by registering at the following website: [email or web address]. By joining [employer]’s FollowMyHealth portal, you will also be able to view your health information using other applications (apps) compatible with our system.

## 2021-02-15 NOTE — ED PROVIDER NOTE - PROGRESS NOTE DETAILS
patient iniaitially admitted. patient then refuses admission, she has o2 avaiable at [facility] and would prefer to follow with her OP pulmonologist. will empirically treat for possible PNA with azithromycin. retunr precautions given

## 2021-02-15 NOTE — ED PROVIDER NOTE - MUSCULOSKELETAL, MLM
Spine appears normal, range of motion is not limited, no muscle or joint tenderness. no leg swelling

## 2021-02-15 NOTE — H&P ADULT - NSICDXPASTMEDICALHX_GEN_ALL_CORE_FT
PAST MEDICAL HISTORY:  Agoraphobia     Anxiety     COPD (chronic obstructive pulmonary disease)     DM (diabetes mellitus)     Emphysema lung     HTN (hypertension)     Hypothyroid     Obesity     Smoker

## 2021-02-15 NOTE — ED PROVIDER NOTE - ENMT, MLM
Airway patent, Nasal mucosa clear. Mouth with normal mucosa, multiple vesicular lesions on erythematous base, no uvular deviation. neck with significant right sided TTP

## 2021-02-15 NOTE — ED PROVIDER NOTE - OBJECTIVE STATEMENT
61f pmhx COPD on home O2, HTN, DM. for 1 month has been feeliing unwell. progressively becoming more sob. and having coughing. no fevers. having worseing back pain, though she has chronic back pain and has been spending more time in bed due to her feeling unwell. she has been having throat pain as well and has therefore not been taking her medication as it hurts to swallow.

## 2021-02-15 NOTE — H&P ADULT - NSHPPHYSICALEXAM_GEN_ALL_CORE
ICU Vital Signs Last 24 Hrs  T(C): --  T(F): --  HR: 84 (15 Feb 2021 07:25) (84 - 84)  BP: 174/136 (15 Feb 2021 07:25) (174/136 - 174/136)  BP(mean): --  ABP: --  ABP(mean): --  RR: 23 (15 Feb 2021 07:25) (23 - 23)  SpO2: 98% (15 Feb 2021 07:25) (98% - 98%)  GENERAL: NAD well-developed  HEAD:  Atraumatic, Normocephalic  EYES: EOMI, PERRLA, conjunctiva and sclera clear  ENMT: No tonsillar erythema, exudates, or enlargement; Moist mucous membranes, Good dentition, No lesions  NECK: Supple, No JVD, Normal thyroid  NERVOUS SYSTEM:  Alert & Oriented X3, Good concentration; Motor Strength 5/5 B/L upper and lower extremities; DTRs 2+ intact and symmetric  CHEST/LUNG: Clear to percussion bilaterally; No rales, rhonchi, wheezing, or rubs  HEART: Regular rate and rhythm; No murmurs, rubs, or gallops  ABDOMEN: Soft, Nontender, Nondistended; Bowel sounds present  EXTREMITIES:  2+ Peripheral Pulses, No clubbing, cyanosis, or edema  LYMPH: No lymphadenopathy   SKIN: No rashes or lesions

## 2021-02-15 NOTE — H&P ADULT - HISTORY OF PRESENT ILLNESS
: 61f pmhx COPD on home O2, HTN, DM. for 1 month has been feeliing unwell. progressively becoming more sob. and having coughing. no fevers. having worseing back pain, though she has chronic back pain and has been spending more time in bed due to her feeling unwell. she has been having throat pain as well and has therefore not been taking her medication as it hurts to swallow.

## 2021-02-15 NOTE — ED PROVIDER NOTE - CLINICAL SUMMARY MEDICAL DECISION MAKING FREE TEXT BOX
patient lethargic appearing; will evalaute for intrabdominal infection as abdo pain, evaluate for causes of worseing sob - infewctious, cardiac, PE. lungs CTAB. throat with vesicles, possible herpangina, will send strep culture. will require admission for increasing hypoxia and failure to thrive

## 2021-02-16 LAB
SARS-COV-2 IGG SERPL QL IA: NEGATIVE — SIGNIFICANT CHANGE UP
SARS-COV-2 IGM SERPL IA-ACNC: <0.1 INDEX — SIGNIFICANT CHANGE UP

## 2021-02-17 ENCOUNTER — INPATIENT (INPATIENT)
Facility: HOSPITAL | Age: 61
LOS: 0 days | Discharge: HOME | End: 2021-02-18
Attending: INTERNAL MEDICINE | Admitting: INTERNAL MEDICINE
Payer: MEDICAID

## 2021-02-17 VITALS
TEMPERATURE: 99 F | OXYGEN SATURATION: 95 % | HEART RATE: 98 BPM | HEIGHT: 61 IN | SYSTOLIC BLOOD PRESSURE: 133 MMHG | DIASTOLIC BLOOD PRESSURE: 80 MMHG | RESPIRATION RATE: 18 BRPM | WEIGHT: 259.93 LBS

## 2021-02-17 DIAGNOSIS — E03.9 HYPOTHYROIDISM, UNSPECIFIED: ICD-10-CM

## 2021-02-17 DIAGNOSIS — E11.9 TYPE 2 DIABETES MELLITUS WITHOUT COMPLICATIONS: ICD-10-CM

## 2021-02-17 DIAGNOSIS — I10 ESSENTIAL (PRIMARY) HYPERTENSION: ICD-10-CM

## 2021-02-17 DIAGNOSIS — K13.70 UNSPECIFIED LESIONS OF ORAL MUCOSA: ICD-10-CM

## 2021-02-17 DIAGNOSIS — R31.9 HEMATURIA, UNSPECIFIED: ICD-10-CM

## 2021-02-17 DIAGNOSIS — Z90.49 ACQUIRED ABSENCE OF OTHER SPECIFIED PARTS OF DIGESTIVE TRACT: Chronic | ICD-10-CM

## 2021-02-17 DIAGNOSIS — F32.9 MAJOR DEPRESSIVE DISORDER, SINGLE EPISODE, UNSPECIFIED: ICD-10-CM

## 2021-02-17 DIAGNOSIS — L30.4 ERYTHEMA INTERTRIGO: ICD-10-CM

## 2021-02-17 DIAGNOSIS — Z29.9 ENCOUNTER FOR PROPHYLACTIC MEASURES, UNSPECIFIED: ICD-10-CM

## 2021-02-17 DIAGNOSIS — J44.9 CHRONIC OBSTRUCTIVE PULMONARY DISEASE, UNSPECIFIED: ICD-10-CM

## 2021-02-17 LAB
ALBUMIN SERPL ELPH-MCNC: 3.4 G/DL — SIGNIFICANT CHANGE UP (ref 3.3–5)
ALP SERPL-CCNC: 103 U/L — SIGNIFICANT CHANGE UP (ref 40–120)
ALT FLD-CCNC: 14 U/L — SIGNIFICANT CHANGE UP (ref 12–78)
ANION GAP SERPL CALC-SCNC: 6 MMOL/L — SIGNIFICANT CHANGE UP (ref 5–17)
ANISOCYTOSIS BLD QL: SLIGHT — SIGNIFICANT CHANGE UP
APPEARANCE UR: ABNORMAL
APTT BLD: 32.2 SEC — SIGNIFICANT CHANGE UP (ref 27.5–35.5)
AST SERPL-CCNC: 4 U/L — LOW (ref 15–37)
BACTERIA # UR AUTO: ABNORMAL
BASOPHILS # BLD AUTO: 0.22 K/UL — HIGH (ref 0–0.2)
BASOPHILS NFR BLD AUTO: 1 % — SIGNIFICANT CHANGE UP (ref 0–2)
BILIRUB SERPL-MCNC: 0.3 MG/DL — SIGNIFICANT CHANGE UP (ref 0.2–1.2)
BILIRUB UR-MCNC: NEGATIVE — SIGNIFICANT CHANGE UP
BUN SERPL-MCNC: 26 MG/DL — HIGH (ref 7–23)
CALCIUM SERPL-MCNC: 8.5 MG/DL — SIGNIFICANT CHANGE UP (ref 8.5–10.1)
CHLORIDE SERPL-SCNC: 99 MMOL/L — SIGNIFICANT CHANGE UP (ref 96–108)
CO2 SERPL-SCNC: 31 MMOL/L — SIGNIFICANT CHANGE UP (ref 22–31)
COLOR SPEC: YELLOW — SIGNIFICANT CHANGE UP
CREAT SERPL-MCNC: 0.99 MG/DL — SIGNIFICANT CHANGE UP (ref 0.5–1.3)
DIFF PNL FLD: ABNORMAL
EOSINOPHIL # BLD AUTO: 0.22 K/UL — SIGNIFICANT CHANGE UP (ref 0–0.5)
EOSINOPHIL NFR BLD AUTO: 1 % — SIGNIFICANT CHANGE UP (ref 0–6)
EPI CELLS # UR: ABNORMAL
GLUCOSE SERPL-MCNC: 230 MG/DL — HIGH (ref 70–99)
GLUCOSE UR QL: NEGATIVE MG/DL — SIGNIFICANT CHANGE UP
HCT VFR BLD CALC: 39.7 % — SIGNIFICANT CHANGE UP (ref 34.5–45)
HGB BLD-MCNC: 13.1 G/DL — SIGNIFICANT CHANGE UP (ref 11.5–15.5)
INR BLD: 0.98 RATIO — SIGNIFICANT CHANGE UP (ref 0.88–1.16)
KETONES UR-MCNC: ABNORMAL
LEUKOCYTE ESTERASE UR-ACNC: ABNORMAL
LYMPHOCYTES # BLD AUTO: 20 % — SIGNIFICANT CHANGE UP (ref 13–44)
LYMPHOCYTES # BLD AUTO: 4.47 K/UL — HIGH (ref 1–3.3)
MANUAL SMEAR VERIFICATION: SIGNIFICANT CHANGE UP
MCHC RBC-ENTMCNC: 29.3 PG — SIGNIFICANT CHANGE UP (ref 27–34)
MCHC RBC-ENTMCNC: 33 GM/DL — SIGNIFICANT CHANGE UP (ref 32–36)
MCV RBC AUTO: 88.8 FL — SIGNIFICANT CHANGE UP (ref 80–100)
MONOCYTES # BLD AUTO: 1.34 K/UL — HIGH (ref 0–0.9)
MONOCYTES NFR BLD AUTO: 6 % — SIGNIFICANT CHANGE UP (ref 2–14)
NEUTROPHILS # BLD AUTO: 14.74 K/UL — HIGH (ref 1.8–7.4)
NEUTROPHILS NFR BLD AUTO: 61 % — SIGNIFICANT CHANGE UP (ref 43–77)
NEUTS BAND # BLD: 5 % — SIGNIFICANT CHANGE UP (ref 0–8)
NITRITE UR-MCNC: NEGATIVE — SIGNIFICANT CHANGE UP
NRBC # BLD: 0 /100 — SIGNIFICANT CHANGE UP (ref 0–0)
NRBC # BLD: SIGNIFICANT CHANGE UP /100 WBCS (ref 0–0)
PH UR: 5 — SIGNIFICANT CHANGE UP (ref 5–8)
PLAT MORPH BLD: NORMAL — SIGNIFICANT CHANGE UP
PLATELET # BLD AUTO: 316 K/UL — SIGNIFICANT CHANGE UP (ref 150–400)
POTASSIUM SERPL-MCNC: 3.9 MMOL/L — SIGNIFICANT CHANGE UP (ref 3.5–5.3)
POTASSIUM SERPL-SCNC: 3.9 MMOL/L — SIGNIFICANT CHANGE UP (ref 3.5–5.3)
PROT SERPL-MCNC: 6.7 GM/DL — SIGNIFICANT CHANGE UP (ref 6–8.3)
PROT UR-MCNC: 30 MG/DL
PROTHROM AB SERPL-ACNC: 11.4 SEC — SIGNIFICANT CHANGE UP (ref 10.6–13.6)
RAPID RVP RESULT: SIGNIFICANT CHANGE UP
RBC # BLD: 4.47 M/UL — SIGNIFICANT CHANGE UP (ref 3.8–5.2)
RBC # FLD: 15.7 % — HIGH (ref 10.3–14.5)
RBC BLD AUTO: SIGNIFICANT CHANGE UP
RBC CASTS # UR COMP ASSIST: >50 /HPF (ref 0–4)
SARS-COV-2 RNA SPEC QL NAA+PROBE: SIGNIFICANT CHANGE UP
SODIUM SERPL-SCNC: 136 MMOL/L — SIGNIFICANT CHANGE UP (ref 135–145)
SP GR SPEC: 1.02 — SIGNIFICANT CHANGE UP (ref 1.01–1.02)
UROBILINOGEN FLD QL: 1 MG/DL
VARIANT LYMPHS # BLD: 6 % — SIGNIFICANT CHANGE UP (ref 0–6)
WBC # BLD: 22.34 K/UL — HIGH (ref 3.8–10.5)
WBC # FLD AUTO: 22.34 K/UL — HIGH (ref 3.8–10.5)
WBC UR QL: ABNORMAL

## 2021-02-17 PROCEDURE — 99222 1ST HOSP IP/OBS MODERATE 55: CPT

## 2021-02-17 PROCEDURE — 71045 X-RAY EXAM CHEST 1 VIEW: CPT | Mod: 26

## 2021-02-17 PROCEDURE — 72193 CT PELVIS W/DYE: CPT | Mod: 26,MC

## 2021-02-17 PROCEDURE — 93010 ELECTROCARDIOGRAM REPORT: CPT

## 2021-02-17 PROCEDURE — 99285 EMERGENCY DEPT VISIT HI MDM: CPT | Mod: 25

## 2021-02-17 PROCEDURE — 56405 I&D VULVA/PERINEAL ABSCESS: CPT

## 2021-02-17 RX ORDER — SODIUM CHLORIDE 9 MG/ML
1000 INJECTION, SOLUTION INTRAVENOUS
Refills: 0 | Status: DISCONTINUED | OUTPATIENT
Start: 2021-02-17 | End: 2021-02-18

## 2021-02-17 RX ORDER — LOSARTAN POTASSIUM 100 MG/1
50 TABLET, FILM COATED ORAL DAILY
Refills: 0 | Status: DISCONTINUED | OUTPATIENT
Start: 2021-02-17 | End: 2021-02-18

## 2021-02-17 RX ORDER — NYSTATIN CREAM 100000 [USP'U]/G
1 CREAM TOPICAL
Refills: 0 | Status: DISCONTINUED | OUTPATIENT
Start: 2021-02-17 | End: 2021-02-18

## 2021-02-17 RX ORDER — ALBUTEROL 90 UG/1
1 AEROSOL, METERED ORAL EVERY 4 HOURS
Refills: 0 | Status: DISCONTINUED | OUTPATIENT
Start: 2021-02-17 | End: 2021-02-18

## 2021-02-17 RX ORDER — MORPHINE SULFATE 50 MG/1
2 CAPSULE, EXTENDED RELEASE ORAL ONCE
Refills: 0 | Status: DISCONTINUED | OUTPATIENT
Start: 2021-02-17 | End: 2021-02-17

## 2021-02-17 RX ORDER — DEXTROSE 50 % IN WATER 50 %
15 SYRINGE (ML) INTRAVENOUS ONCE
Refills: 0 | Status: DISCONTINUED | OUTPATIENT
Start: 2021-02-17 | End: 2021-02-18

## 2021-02-17 RX ORDER — IPRATROPIUM/ALBUTEROL SULFATE 18-103MCG
3 AEROSOL WITH ADAPTER (GRAM) INHALATION EVERY 6 HOURS
Refills: 0 | Status: DISCONTINUED | OUTPATIENT
Start: 2021-02-17 | End: 2021-02-18

## 2021-02-17 RX ORDER — HEPARIN SODIUM 5000 [USP'U]/ML
5000 INJECTION INTRAVENOUS; SUBCUTANEOUS EVERY 12 HOURS
Refills: 0 | Status: DISCONTINUED | OUTPATIENT
Start: 2021-02-17 | End: 2021-02-18

## 2021-02-17 RX ORDER — INSULIN LISPRO 100/ML
VIAL (ML) SUBCUTANEOUS
Refills: 0 | Status: DISCONTINUED | OUTPATIENT
Start: 2021-02-17 | End: 2021-02-18

## 2021-02-17 RX ORDER — GLUCAGON INJECTION, SOLUTION 0.5 MG/.1ML
1 INJECTION, SOLUTION SUBCUTANEOUS ONCE
Refills: 0 | Status: DISCONTINUED | OUTPATIENT
Start: 2021-02-17 | End: 2021-02-18

## 2021-02-17 RX ORDER — DEXTROSE 50 % IN WATER 50 %
12.5 SYRINGE (ML) INTRAVENOUS ONCE
Refills: 0 | Status: DISCONTINUED | OUTPATIENT
Start: 2021-02-17 | End: 2021-02-18

## 2021-02-17 RX ORDER — NYSTATIN 500MM UNIT
500000 POWDER (EA) MISCELLANEOUS
Refills: 0 | Status: DISCONTINUED | OUTPATIENT
Start: 2021-02-17 | End: 2021-02-18

## 2021-02-17 RX ORDER — LEVOTHYROXINE SODIUM 125 MCG
150 TABLET ORAL DAILY
Refills: 0 | Status: DISCONTINUED | OUTPATIENT
Start: 2021-02-17 | End: 2021-02-18

## 2021-02-17 RX ORDER — PANTOPRAZOLE SODIUM 20 MG/1
40 TABLET, DELAYED RELEASE ORAL
Refills: 0 | Status: DISCONTINUED | OUTPATIENT
Start: 2021-02-17 | End: 2021-02-18

## 2021-02-17 RX ORDER — TIOTROPIUM BROMIDE 18 UG/1
1 CAPSULE ORAL; RESPIRATORY (INHALATION) DAILY
Refills: 0 | Status: DISCONTINUED | OUTPATIENT
Start: 2021-02-17 | End: 2021-02-18

## 2021-02-17 RX ORDER — DEXTROSE 50 % IN WATER 50 %
25 SYRINGE (ML) INTRAVENOUS ONCE
Refills: 0 | Status: DISCONTINUED | OUTPATIENT
Start: 2021-02-17 | End: 2021-02-18

## 2021-02-17 RX ORDER — AZITHROMYCIN 500 MG/1
500 TABLET, FILM COATED ORAL ONCE
Refills: 0 | Status: COMPLETED | OUTPATIENT
Start: 2021-02-17 | End: 2021-02-17

## 2021-02-17 RX ORDER — ATORVASTATIN CALCIUM 80 MG/1
10 TABLET, FILM COATED ORAL AT BEDTIME
Refills: 0 | Status: DISCONTINUED | OUTPATIENT
Start: 2021-02-17 | End: 2021-02-18

## 2021-02-17 RX ADMIN — Medication 125 MILLIGRAM(S): at 17:46

## 2021-02-17 RX ADMIN — NYSTATIN CREAM 1 APPLICATION(S): 100000 CREAM TOPICAL at 23:26

## 2021-02-17 RX ADMIN — AZITHROMYCIN 255 MILLIGRAM(S): 500 TABLET, FILM COATED ORAL at 17:45

## 2021-02-17 RX ADMIN — Medication 500000 UNIT(S): at 23:26

## 2021-02-17 RX ADMIN — ATORVASTATIN CALCIUM 10 MILLIGRAM(S): 80 TABLET, FILM COATED ORAL at 23:26

## 2021-02-17 RX ADMIN — MORPHINE SULFATE 2 MILLIGRAM(S): 50 CAPSULE, EXTENDED RELEASE ORAL at 17:46

## 2021-02-17 NOTE — H&P ADULT - HISTORY OF PRESENT ILLNESS
Patient is a 61F with a PMH of COPD on 2L home O2, HTN, DM who presents to the ED with multiple complaints.  Patient states that she has had worsening pain in her throat for the past month.  Patient states that her pain is so severe that she has not been able to eat or swallow any of her PO medications.  Presented to ED 2 days ago with similar complaints but refused admission.  Patient states that she uses multiple inhalers at home but does not rinse her mouth afterwards.  Patient also complains of vaginal lesion that developed yesterday.  Reports that she has a swelling that is erythematous and painful to touch.  States that she had a similar lesion about a year ago.  Patient underwent an I&D of the lesion in the ED.  CT pelvis shows no abscess formation.  Vitals stable, labs show leukocytosis.  Will admit to med surg.

## 2021-02-17 NOTE — ED ADULT TRIAGE NOTE - CHIEF COMPLAINT QUOTE
pt c/o sob denies chest pain seen her 2 days ago for same symptoms. Pt have hx of COPD, speaking in full sentences at triage. Pt c/o pain to labia states she have abscess that needs to be cut

## 2021-02-17 NOTE — ED PROVIDER NOTE - PROGRESS NOTE DETAILS
POCUS soft tissue w/ small fluid collection, I&D performed, minimal purulent drainage. Area still w/ fluctuance to touch. Deeper look w/ POCUS ? complex fluid collection 2-3cm in depth, unable to reach w/ scalpel. Pt reports hx similar abscess other side of mons req admission x 1wk, IV abx. Will obtain CT imaging w/ contrast to better assess area.

## 2021-02-17 NOTE — ED ADULT NURSE NOTE - ED STAT RN HANDOFF DETAILS
report given to dru bernard RN on 1D, pt in stable condition. safety maintained. covid swab completed and sent to lab. yordy currently in progress, !D nurse made aware.

## 2021-02-17 NOTE — H&P ADULT - PROBLEM SELECTOR PLAN 1
Oral thrush vs herpangina  Will start nystatin swish and swallow  Able to tolerate liquids, Swallow eval in am  Will start liquid diet, advance as tolerated

## 2021-02-17 NOTE — H&P ADULT - PROBLEM SELECTOR PLAN 3
Hematuria on UA, with mild WBCs.  Follow cultures  If negative, consider outpatient endometrial Ca workup

## 2021-02-17 NOTE — ED ADULT NURSE NOTE - OBJECTIVE STATEMENT
Patient received at bed 9. Patient is A&Ox3. Patient is complaining of SOB for multiple days, patient denies chest pain. Patient reports that she has a cough. Patient reports that she is having difficulty taking medications PO because she has significant pain in her throat. Patient is also complaining of difficulty urinating and painful urination due to an abscess by her urethra. Patient reports HTN, DM, high cholesterol and COPD.

## 2021-02-17 NOTE — ED PROVIDER NOTE - CLINICAL SUMMARY MEDICAL DECISION MAKING FREE TEXT BOX
60 yo F w/PMH of COPD on home O2, HTN, DM presents to the ED for inability to tolerate PO at home as well as abscess formation. Plan: repeat CBC, CMP, EKG, chest x-ray, US abscess and if significant will perform I&D, consider readmission for IV medications as pt unable to tolerate PO at home. 62 yo F w/ PMH of COPD on home O2, HTN, DM presents to the ED for inability to tolerate PO at home as well as abscess formation. Plan: Repeat CBC, CMP, EKG, chest x-ray, US abscess and if significant will perform I&D, consider readmission for IV medications as pt unable to tolerate PO at home. + worsening leukocytosis to 22, unclear whether d/t steroids vs infectious. Pt care signed out to incoming team. Pending CT. Anticipate admission.

## 2021-02-17 NOTE — H&P ADULT - NSHPPHYSICALEXAM_GEN_ALL_CORE
Physical exam:  General: patient in no acute distress, resting comfortably  Head:  Atraumatic, Normocephalic  Eyes: EOMI, PERRLA, clear sclera  Mouth: tongue appears mildly erythematous with white material on the back of her tongue, red vesicles on the L corner of lips  Neck: Supple, thyroid nontender, non enlarged  Cardio: S1/S2 +ve, regular rate and rhythm, no M/G/R  Resp: clear to ausculation bilaterally, no rales or wheezes  GI: abdomen soft, nontender, non distended, no guarding, BS +ve x 4  Ext: no significant pedal edema  Neuro: CN 2-12 intact, no significant motor or sensory deficits.  Skin: swelling superior to the mons pubis, s/p incision and drainage

## 2021-02-17 NOTE — ED PROVIDER NOTE - PHYSICAL EXAMINATION
GEN: Awake, alert, interactive, NAD.  HEAD AND NECK: NC/AT. Airway patent. Neck supple.   EYES:  Clear b/l. EOMI. PERRL.   ENT: Moist mucus membranes.   CARDIAC: Regular rate, regular rhythm. No evident pedal edema.    RESP/CHEST: Normal respiratory effort with no use of accessory muscles or retractions. Clear throughout on auscultation.  ABD: soft, non-distended, non-tender. No rebound, no guarding.   BACK: No midline spinal TTP. No CVAT.   EXTREMITIES: Moving all extremities with no apparent deformities.   SKIN: Warm, dry, intact normal color. No rash. Fluctuant tender mass to left sided mons pubis c/w abscess, positive overlying erythema.   NEURO: AOx3, CN II-XII grossly intact, no focal deficits.   PSYCH: Appropriate mood and affect. GEN: Awake, alert, interactive, NAD.  HEAD AND NECK: NC/AT. Airway patent. Neck supple.   EYES:  Clear b/l. EOMI. PERRL.   ENT: Moist mucus membranes. ? vesicles visible hard palate.   CARDIAC: Regular rate, regular rhythm. No evident pedal edema.    RESP/CHEST: Normal respiratory effort with no use of accessory muscles or retractions. Clear throughout on auscultation.  ABD: soft, non-distended, non-tender. No rebound, no guarding.   BACK: No midline spinal TTP. No CVAT.   EXTREMITIES: Moving all extremities with no apparent deformities.   SKIN: Warm, dry, intact normal color. No rash. Fluctuant tender mass to left sided mons pubis c/w abscess, + overlying erythema.   NEURO: AOx3, CN II-XII grossly intact, no focal deficits.   PSYCH: Appropriate mood and affect.

## 2021-02-17 NOTE — H&P ADULT - NSHPLABSRESULTS_GEN_ALL_CORE
Recent Vitals  T(C): 36.7 (21 @ 20:53), Max: 37.2 (21 @ 16:21)  HR: 95 (21 @ 20:53) (67 - 98)  BP: 125/84 (21 @ 20:53) (110/78 - 136/85)  RR: 18 (21 @ 20:53) (18 - 18)  SpO2: 97% (21 @ 20:53) (95% - 97%)                        13.1   22.34 )-----------( 316      ( 2021 17:13 )             39.7         136  |  99  |  26<H>  ----------------------------<  230<H>  3.9   |  31  |  0.99    Ca    8.5      2021 17:13    TPro  6.7  /  Alb  3.4  /  TBili  0.3  /  DBili  x   /  AST  4<L>  /  ALT  14  /  AlkPhos  103  -17    PT/INR - ( 2021 17:13 )   PT: 11.4 sec;   INR: 0.98 ratio         PTT - ( 2021 17:13 )  PTT:32.2 sec  LIVER FUNCTIONS - ( 2021 17:13 )  Alb: 3.4 g/dL / Pro: 6.7 gm/dL / ALK PHOS: 103 U/L / ALT: 14 U/L / AST: 4 U/L / GGT: x           Urinalysis Basic - ( 2021 18:09 )    Color: Yellow / Appearance: very cloudy / S.020 / pH: x  Gluc: x / Ketone: Trace  / Bili: Negative / Urobili: 1 mg/dL   Blood: x / Protein: 30 mg/dL / Nitrite: Negative   Leuk Esterase: Moderate / RBC: >50 /HPF / WBC 11-25   Sq Epi: x / Non Sq Epi: Many / Bacteria: Few        Home Medications:  Anoro Ellipta 62.5 mcg-25 mcg/inh inhalation powder: 1 puff(s) inhaled once a day (2021 16:38)  Basaglar KwikPen 100 units/mL subcutaneous solution: 30 unit(s) subcutaneous 2 times a day (15 Sep 2020 04:10)  escitalopram 20 mg oral tablet: 1 tab(s) orally once a day (04 May 2020 08:26)  hydrOXYzine hydrochloride 50 mg oral tablet: 1 tab(s) orally every 8 hours, As needed, Itching (04 May 2020 08:26)  Januvia 100 mg oral tablet: 100 milligram(s) orally once a day (04 May 2020 08:26)  levothyroxine 150 mcg (0.15 mg) oral tablet: 1 tab(s) orally once a day (04 May 2020 08:26)

## 2021-02-17 NOTE — H&P ADULT - ASSESSMENT
Patient is a 61F with a PMH of COPD on 2L home O2, HTn, DM who presents to the ED with multiple complaints.  Patient states that she has had worsening pain in her throat for the past month.  Patient states that her pain is so severe that she has not been able to eat or swollow any of her PO medications.  Presented to ED 2 days ago with similar complaints but refused admission.  Patient states that she uses multiple inhalers at home but does not rinse her mouth afterwards.  Patient also complains of vaginal lesion that developed yesterday.  Reports that she has a swelling that is erythematous and painful to touch.  States that she had a similar lesion about a year ago.  Patient underwent an I&D of the lesion in the ED.  CT pelvis shows no abscess formation.  Vitals stable, labs show leukocytosis.  Will admit to med surg.

## 2021-02-17 NOTE — H&P ADULT - NSHPREVIEWOFSYSTEMS_GEN_ALL_CORE
Constitutional: no fever, chills, night sweats  Ears: no hearing changes or ear pain,   Nose: no nasal congestion, sinus pain, or rhinorrhea  Cardio: no chest pain, orthopnea, edema, or palpitations  Resp: no dyspnea, cough, wheezing  GI: no nausea, vomiting, diarrhea, constipation, hematochezia, or melena  : no dysuria, urinary frequency, hematuria  MSK: no back pain, neck pain  Neuro: no weakness, dizziness, lightheadedness, syncope   Heme/Lymph: no bruising or bleeding

## 2021-02-17 NOTE — ED PROVIDER NOTE - OBJECTIVE STATEMENT
60 yo F w/PMH COPD on home O2, HTN, DM presents to the ED BIBEMS due to painful vaginal lesion as well as continued mouth pain, inability to eat or drink or tolerate PO medications. Pt seen in ED x2 days ago due to x1 month of worsening SOB and mouth pain. Pt found to have leokocytosis to 17, questionable herpangina on HENT exam, culture pending. Pt was admitted but states she left. Pt reports vaginal growth grew since last night and is very painful. Pt states she should be on steroids, pain medications and antibiotics but hasn't been able to take PO meds.    PMH as above. PSH cholecystectomy. NKDA. Meds as listed. 60 yo F w/ PMH COPD on home O2, HTN, DM presents to the ED BIBEMS due to painful vaginal lesion as well as continued mouth pain, inability to eat or drink or tolerate PO medications. Pt seen in ED x2 days ago due to x1 month of worsening malaise. Pt found to have leukocytosis to 17, questionable herpangina on HENT exam, culture pending. Pt was admitted but states she left. Pt reports vaginal growth grew since last night and is very painful. Pt states she should be on steroids, pain medications and antibiotics but hasn't been able to take PO meds.    PMH as above. PSH cholecystectomy. NKDA. Meds as listed.

## 2021-02-18 ENCOUNTER — TRANSCRIPTION ENCOUNTER (OUTPATIENT)
Age: 61
End: 2021-02-18

## 2021-02-18 VITALS
TEMPERATURE: 98 F | DIASTOLIC BLOOD PRESSURE: 81 MMHG | SYSTOLIC BLOOD PRESSURE: 119 MMHG | RESPIRATION RATE: 18 BRPM | HEART RATE: 81 BPM | OXYGEN SATURATION: 92 %

## 2021-02-18 DIAGNOSIS — R06.02 SHORTNESS OF BREATH: ICD-10-CM

## 2021-02-18 DIAGNOSIS — Z99.81 DEPENDENCE ON SUPPLEMENTAL OXYGEN: ICD-10-CM

## 2021-02-18 DIAGNOSIS — E66.9 OBESITY, UNSPECIFIED: ICD-10-CM

## 2021-02-18 DIAGNOSIS — J43.9 EMPHYSEMA, UNSPECIFIED: ICD-10-CM

## 2021-02-18 DIAGNOSIS — I10 ESSENTIAL (PRIMARY) HYPERTENSION: ICD-10-CM

## 2021-02-18 DIAGNOSIS — E11.9 TYPE 2 DIABETES MELLITUS WITHOUT COMPLICATIONS: ICD-10-CM

## 2021-02-18 DIAGNOSIS — E03.9 HYPOTHYROIDISM, UNSPECIFIED: ICD-10-CM

## 2021-02-18 DIAGNOSIS — F41.9 ANXIETY DISORDER, UNSPECIFIED: ICD-10-CM

## 2021-02-18 DIAGNOSIS — Z79.4 LONG TERM (CURRENT) USE OF INSULIN: ICD-10-CM

## 2021-02-18 DIAGNOSIS — R09.02 HYPOXEMIA: ICD-10-CM

## 2021-02-18 DIAGNOSIS — R13.10 DYSPHAGIA, UNSPECIFIED: ICD-10-CM

## 2021-02-18 DIAGNOSIS — Z87.891 PERSONAL HISTORY OF NICOTINE DEPENDENCE: ICD-10-CM

## 2021-02-18 DIAGNOSIS — D72.829 ELEVATED WHITE BLOOD CELL COUNT, UNSPECIFIED: ICD-10-CM

## 2021-02-18 DIAGNOSIS — Z79.1 LONG TERM (CURRENT) USE OF NON-STEROIDAL ANTI-INFLAMMATORIES (NSAID): ICD-10-CM

## 2021-02-18 DIAGNOSIS — R07.0 PAIN IN THROAT: ICD-10-CM

## 2021-02-18 DIAGNOSIS — F40.00 AGORAPHOBIA, UNSPECIFIED: ICD-10-CM

## 2021-02-18 LAB
A1C WITH ESTIMATED AVERAGE GLUCOSE RESULT: 10.2 % — HIGH (ref 4–5.6)
ALBUMIN SERPL ELPH-MCNC: 3.4 G/DL — SIGNIFICANT CHANGE UP (ref 3.3–5)
ALP SERPL-CCNC: 100 U/L — SIGNIFICANT CHANGE UP (ref 40–120)
ALT FLD-CCNC: 14 U/L — SIGNIFICANT CHANGE UP (ref 12–78)
ANION GAP SERPL CALC-SCNC: 5 MMOL/L — SIGNIFICANT CHANGE UP (ref 5–17)
AST SERPL-CCNC: 7 U/L — LOW (ref 15–37)
BILIRUB SERPL-MCNC: 0.4 MG/DL — SIGNIFICANT CHANGE UP (ref 0.2–1.2)
BUN SERPL-MCNC: 19 MG/DL — SIGNIFICANT CHANGE UP (ref 7–23)
CALCIUM SERPL-MCNC: 8.5 MG/DL — SIGNIFICANT CHANGE UP (ref 8.5–10.1)
CHLORIDE SERPL-SCNC: 98 MMOL/L — SIGNIFICANT CHANGE UP (ref 96–108)
CO2 SERPL-SCNC: 31 MMOL/L — SIGNIFICANT CHANGE UP (ref 22–31)
CREAT SERPL-MCNC: 0.79 MG/DL — SIGNIFICANT CHANGE UP (ref 0.5–1.3)
ESTIMATED AVERAGE GLUCOSE: 246 MG/DL — HIGH (ref 68–114)
GLUCOSE BLDC GLUCOMTR-MCNC: 234 MG/DL — HIGH (ref 70–99)
GLUCOSE BLDC GLUCOMTR-MCNC: 285 MG/DL — HIGH (ref 70–99)
GLUCOSE BLDC GLUCOMTR-MCNC: 299 MG/DL — HIGH (ref 70–99)
GLUCOSE BLDC GLUCOMTR-MCNC: 350 MG/DL — HIGH (ref 70–99)
GLUCOSE SERPL-MCNC: 291 MG/DL — HIGH (ref 70–99)
HCT VFR BLD CALC: 40 % — SIGNIFICANT CHANGE UP (ref 34.5–45)
HGB BLD-MCNC: 13.2 G/DL — SIGNIFICANT CHANGE UP (ref 11.5–15.5)
MCHC RBC-ENTMCNC: 28.6 PG — SIGNIFICANT CHANGE UP (ref 27–34)
MCHC RBC-ENTMCNC: 33 GM/DL — SIGNIFICANT CHANGE UP (ref 32–36)
MCV RBC AUTO: 86.6 FL — SIGNIFICANT CHANGE UP (ref 80–100)
NRBC # BLD: 0 /100 WBCS — SIGNIFICANT CHANGE UP (ref 0–0)
PLATELET # BLD AUTO: 327 K/UL — SIGNIFICANT CHANGE UP (ref 150–400)
POTASSIUM SERPL-MCNC: 4.1 MMOL/L — SIGNIFICANT CHANGE UP (ref 3.5–5.3)
POTASSIUM SERPL-SCNC: 4.1 MMOL/L — SIGNIFICANT CHANGE UP (ref 3.5–5.3)
PROT SERPL-MCNC: 6.9 GM/DL — SIGNIFICANT CHANGE UP (ref 6–8.3)
RBC # BLD: 4.62 M/UL — SIGNIFICANT CHANGE UP (ref 3.8–5.2)
RBC # FLD: 15.6 % — HIGH (ref 10.3–14.5)
SARS-COV-2 IGG SERPL QL IA: NEGATIVE — SIGNIFICANT CHANGE UP
SARS-COV-2 IGM SERPL IA-ACNC: 0.07 INDEX — SIGNIFICANT CHANGE UP
SODIUM SERPL-SCNC: 134 MMOL/L — LOW (ref 135–145)
WBC # BLD: 20.9 K/UL — HIGH (ref 3.8–10.5)
WBC # FLD AUTO: 20.9 K/UL — HIGH (ref 3.8–10.5)

## 2021-02-18 PROCEDURE — 99239 HOSP IP/OBS DSCHRG MGMT >30: CPT

## 2021-02-18 RX ORDER — FLUCONAZOLE 150 MG/1
400 TABLET ORAL EVERY 24 HOURS
Refills: 0 | Status: DISCONTINUED | OUTPATIENT
Start: 2021-02-19 | End: 2021-02-18

## 2021-02-18 RX ORDER — IBUPROFEN 200 MG
1 TABLET ORAL
Qty: 15 | Refills: 0
Start: 2021-02-18 | End: 2021-02-22

## 2021-02-18 RX ORDER — AZTREONAM 2 G
1 VIAL (EA) INJECTION
Qty: 20 | Refills: 0
Start: 2021-02-18 | End: 2021-02-27

## 2021-02-18 RX ORDER — MORPHINE SULFATE 50 MG/1
2 CAPSULE, EXTENDED RELEASE ORAL ONCE
Refills: 0 | Status: DISCONTINUED | OUTPATIENT
Start: 2021-02-18 | End: 2021-02-18

## 2021-02-18 RX ORDER — TIOTROPIUM BROMIDE 18 UG/1
1 CAPSULE ORAL; RESPIRATORY (INHALATION)
Qty: 0 | Refills: 0 | DISCHARGE
Start: 2021-02-18

## 2021-02-18 RX ORDER — FLUCONAZOLE 150 MG/1
2 TABLET ORAL
Qty: 42 | Refills: 0
Start: 2021-02-18 | End: 2021-03-10

## 2021-02-18 RX ORDER — FLUCONAZOLE 150 MG/1
400 TABLET ORAL ONCE
Refills: 0 | Status: DISCONTINUED | OUTPATIENT
Start: 2021-02-18 | End: 2021-02-18

## 2021-02-18 RX ORDER — FLUCONAZOLE 150 MG/1
TABLET ORAL
Refills: 0 | Status: DISCONTINUED | OUTPATIENT
Start: 2021-02-18 | End: 2021-02-18

## 2021-02-18 RX ORDER — INFLUENZA VIRUS VACCINE 15; 15; 15; 15 UG/.5ML; UG/.5ML; UG/.5ML; UG/.5ML
0.5 SUSPENSION INTRAMUSCULAR ONCE
Refills: 0 | Status: DISCONTINUED | OUTPATIENT
Start: 2021-02-18 | End: 2021-02-18

## 2021-02-18 RX ADMIN — Medication 3: at 11:08

## 2021-02-18 RX ADMIN — MORPHINE SULFATE 2 MILLIGRAM(S): 50 CAPSULE, EXTENDED RELEASE ORAL at 04:05

## 2021-02-18 RX ADMIN — Medication 3: at 07:53

## 2021-02-18 RX ADMIN — HEPARIN SODIUM 5000 UNIT(S): 5000 INJECTION INTRAVENOUS; SUBCUTANEOUS at 06:13

## 2021-02-18 RX ADMIN — LOSARTAN POTASSIUM 50 MILLIGRAM(S): 100 TABLET, FILM COATED ORAL at 06:12

## 2021-02-18 RX ADMIN — Medication 150 MICROGRAM(S): at 06:13

## 2021-02-18 RX ADMIN — HEPARIN SODIUM 5000 UNIT(S): 5000 INJECTION INTRAVENOUS; SUBCUTANEOUS at 16:57

## 2021-02-18 RX ADMIN — PANTOPRAZOLE SODIUM 40 MILLIGRAM(S): 20 TABLET, DELAYED RELEASE ORAL at 07:53

## 2021-02-18 RX ADMIN — Medication 500000 UNIT(S): at 06:13

## 2021-02-18 RX ADMIN — Medication 2: at 15:59

## 2021-02-18 RX ADMIN — NYSTATIN CREAM 1 APPLICATION(S): 100000 CREAM TOPICAL at 06:13

## 2021-02-18 NOTE — SWALLOW BEDSIDE ASSESSMENT ADULT - SLP GENERAL OBSERVATIONS
pt seen bedside alert and oriented x4. pt engaged in conversation with SLP, she verbalized wants and was able to follow directions. noted good speech intelligibility with dysphonia.

## 2021-02-18 NOTE — SWALLOW BEDSIDE ASSESSMENT ADULT - H & P REVIEW
Patient is a 61F with a PMH of COPD on 2L home O2, HTN, DM who presents to the ED with multiple complaints.  Patient states that she has had worsening pain in her throat for the past month.  Patient states that her pain is so severe that she has not been able to eat or swallow any of her PO medications.  Presented to ED 2 days ago with similar complaints but refused admission.  Patient states that she uses multiple inhalers at home but does not rinse her mouth afterwards.  Patient also complains of vaginal lesion that developed yesterday.  Reports that she has a swelling that is erythematous and painful to touch.  States that she had a similar lesion about a year ago.  Patient underwent an I&D of the lesion in the ED.  CT pelvis shows no abscess formation.  Vitals stable, labs show leukocytosis.  Will admit to med surg./yes

## 2021-02-18 NOTE — DISCHARGE NOTE PROVIDER - HOSPITAL COURSE
60 yo F with a history of Emphysema/COPD on 2L home O2, Agoraphobia/Anxiety, HTN, DM II, Morbid Obesity w/ BMI 49.6, Hypothyroidism & Chronicle leukocytosis who presented to the ED with oral pain and a Furuncles near the mons pubis. An I&D was performed in the ED w/ minimal purulent drainage. CT showed left anterior pelvic wall inflammation and edema w/ pelvic lymphadenopathy but no collection. Physical exam revealed thrush and she admitted to using steroid inhalers at home w/o rinsing her mouth afterwards. Today she reported tolerating PO intake and was discharged on Diflucan, as well as Bactrim and Augmentin. Her UA revealed Hematuria & she was told to follow up w/ her PMD for an outpatient work up.    Discharge time: 43 minutes

## 2021-02-18 NOTE — SWALLOW BEDSIDE ASSESSMENT ADULT - SWALLOW EVAL: PATIENT/FAMILY GOALS STATEMENT
pt stated "I can eat regular food" "like a hamburger". pt c/o swish/swallow "it's disgusting"- SLP encouraged pt to use it

## 2021-02-18 NOTE — DISCHARGE NOTE PROVIDER - NSDCMRMEDTOKEN_GEN_ALL_CORE_FT
albuterol 2.5 mg/3 mL (0.083%) inhalation solution: 3 milliliter(s) by nebulizer every 6 hours, As Needed  albuterol 90 mcg/inh inhalation aerosol: 2 puff(s) inhaled 3 times a day, As Needed   amoxicillin-clavulanate 875 mg-125 mg oral tablet: 1 tab(s) orally every 12 hours   Anoro Ellipta 62.5 mcg-25 mcg/inh inhalation powder: 1 puff(s) inhaled once a day  atorvastatin 10 mg oral tablet: 1 tab(s) orally once a day (at bedtime)  Bactrim  mg-160 mg oral tablet: 1 milligram(s) orally every 12 hours   Basaglar KwikPen 100 units/mL subcutaneous solution: 30 unit(s) subcutaneous 2 times a day  butalbital/aspirin/caffeine 50 mg-325 mg-40 mg oral capsule: 1 cap(s) orally every 8 hours, As Needed -for headache MDD:3 tabs  cyclobenzaprine 7.5 mg oral tablet: 1 tab(s) orally 3 times a day PRN for pain  Diflucan 200 mg oral tablet: 2 tab(s) orally once a day   escitalopram 20 mg oral tablet: 1 tab(s) orally once a day  hydrOXYzine hydrochloride 50 mg oral tablet: 1 tab(s) orally every 8 hours, As needed, Itching  ibuprofen 600 mg oral tablet: 1 tab(s) orally every 8 hours, As Needed -for muscle spasm - for moderate pain. Take on full stomach MDD:3 tabs/day   ipratropium-albuterol 0.5 mg-2.5 mg/3 mLinhalation solution: 3 milliliter(s) by nebulizer 3 times a day   Januvia 100 mg oral tablet: 100 milligram(s) orally once a day  levothyroxine 150 mcg (0.15 mg) oral tablet: 1 tab(s) orally once a day  losartan 50 mg oral tablet: 1 tab(s) orally once a day  meclizine 25 mg oral tablet: 1 tab(s) orally 3 times a day for 3 days and then only as needed for dizziness  montelukast 10 mg oral tablet: 1 tab(s) orally once a day (at bedtime)   nicotine 7 mg/24 hr transdermal film, extended release: 1 patch transdermally once a day   nystatin 100,000 units/g topical cream: 1 application topically 2 times a day  omeprazole 40 mg oral delayed release capsule: 1 cap(s) orally once a day   Tessalon Perles 100 mg oral capsule: 1 cap(s) orally every 8 hours, As Needed -for cough MDD:3  tiotropium 18 mcg inhalation capsule: 1 cap(s) inhaled once a day

## 2021-02-18 NOTE — SWALLOW BEDSIDE ASSESSMENT ADULT - SLP PERTINENT HISTORY OF CURRENT PROBLEM
pt denied difficulty with eating and swallowing- she reported burning with orange juice. pt stated that she remembered I use my inhaler a lot", and "I don't rinse my mouth".

## 2021-02-18 NOTE — DISCHARGE NOTE NURSING/CASE MANAGEMENT/SOCIAL WORK - PATIENT PORTAL LINK FT
You can access the FollowMyHealth Patient Portal offered by [facility] by registering at the following website: [email or web address]. By joining [facility]’s FollowMyHealth portal, you will also be able to view your health information using other applications (apps) compatible with our system.

## 2021-02-19 LAB
CULTURE RESULTS: SIGNIFICANT CHANGE UP
SPECIMEN SOURCE: SIGNIFICANT CHANGE UP

## 2021-02-19 NOTE — PROGRESS NOTE ADULT - REASON FOR ADMISSION
Referred by: Real Mcginnis MD; Medical Diagnosis (from order):    Diagnosis Information      Diagnosis    719.41 (ICD-9-CM) - M25.511 (ICD-10-CM) - Right shoulder pain, unspecified chronicity                Physical Therapy -  Daily Treatment Note    Visit:  4     SUBJECTIVE                                                                                                             Patient reports that her left shoulder is popping a lot but does not necessarily hurt.  The right crunches at night.  She does feel her shoulder stronger.        OBJECTIVE                                                                                                                        TREATMENT                                                                                                                  Therapeutic Exercise:  UBE seat 2 level 1 x 6 minutes retro   Shoulder ER/IR isometric in supine x 10 with 10 second holds  TRX rows x 20  TRX horizontal abduction x 15  Scapular retraction with shoulder ER using yellow TB x 20    Manual Therapy:  Glenohumeral joint mobilizations into inferior direction, grade I,III, 2x10 (B)  Glenohumeral joint mobilizations into posterior direction, grade I,III, 2x10 (B)  Glenohumeral joint traction mobilization, grade I,III 2x10 (B)          Skilled input: verbal instruction/cues, tactile instruction/cues and posture correction    Writer verbally educated and received verbal consent for hand placement, positioning of patient, and techniques to be performed today from patient for clothing adjustments for techniques and hand placement and palpation for techniques as described above and how they are pertinent to the patient's plan of care.    Home Exercise Program: Access Code: [email or web address]          URL: [email or web address]       Date: 01/28/2021   Prepared by: Gi Rivera      Exercises  Seated Scapular Retraction - 10 reps - 3 sets - 1x daily - 7x weekly  Isometric Shoulder External 
epigastric pain
Rotation at Wall - 10 reps - 1 sets - 5 seconds hold - 2x daily - 7x weekly     ASSESSMENT                                                                                                             Patient did have difficulty coordinating scapular musculature this session.  Patient will benefit from continued strengthening     Patient Education:   Results of above outlined education: Verbalizes understanding, Demonstrates understanding and Needs reinforcement      PLAN                                                                                                                           Suggestions for next session as indicated: Progress per plan of care, continue strengthening           Procedures and total treatment time documented Time Entry flowsheet.  
epigastric pain

## 2021-02-23 DIAGNOSIS — F40.00 AGORAPHOBIA, UNSPECIFIED: ICD-10-CM

## 2021-02-23 DIAGNOSIS — L30.4 ERYTHEMA INTERTRIGO: ICD-10-CM

## 2021-02-23 DIAGNOSIS — B08.5 ENTEROVIRAL VESICULAR PHARYNGITIS: ICD-10-CM

## 2021-02-23 DIAGNOSIS — I10 ESSENTIAL (PRIMARY) HYPERTENSION: ICD-10-CM

## 2021-02-23 DIAGNOSIS — F41.9 ANXIETY DISORDER, UNSPECIFIED: ICD-10-CM

## 2021-02-23 DIAGNOSIS — E11.9 TYPE 2 DIABETES MELLITUS WITHOUT COMPLICATIONS: ICD-10-CM

## 2021-02-23 DIAGNOSIS — E03.9 HYPOTHYROIDISM, UNSPECIFIED: ICD-10-CM

## 2021-02-23 DIAGNOSIS — E66.01 MORBID (SEVERE) OBESITY DUE TO EXCESS CALORIES: ICD-10-CM

## 2021-02-23 DIAGNOSIS — F17.210 NICOTINE DEPENDENCE, CIGARETTES, UNCOMPLICATED: ICD-10-CM

## 2021-02-23 DIAGNOSIS — R31.9 HEMATURIA, UNSPECIFIED: ICD-10-CM

## 2021-02-23 DIAGNOSIS — Z99.81 DEPENDENCE ON SUPPLEMENTAL OXYGEN: ICD-10-CM

## 2021-02-23 DIAGNOSIS — J43.9 EMPHYSEMA, UNSPECIFIED: ICD-10-CM

## 2021-02-24 NOTE — ED ADULT TRIAGE NOTE - CHIEF COMPLAINT QUOTE
Patient BIBA: patient reports "Right lower abdominal pain for and few weeks. I saw my own doctor but it is still there." Patient also reports productive cough "last few days." + smoker. Denies fever, chills, nausea , vomiting or diarrhea. Patient with no overt or covert signs of pain: rates pain 10/10
shave my prostate

## 2021-02-27 NOTE — ED PROCEDURE NOTE - PROCEDURE ADDITIONAL DETAILS
I&D performed abscess L mons pubis under POCUS guidance. Area cleansed, anesthetic w/ lidocaine 1%, incision w/ #11 blade, minimal pus / bloody discharge. Pt tolerated well.

## 2021-03-07 NOTE — ED ADULT NURSE NOTE - NS PRO PASSIVE SMOKE EXP
Orthopedic Consult      Patient: Latanya Olsen    Date of Admission: 3/6/2021 10:51 AM    YOB: 1939    Medical Record Number: [id number]    Attending Physician: Issac Garcia MD    Consulting Physician: Issac Garcia MD      Chief Complaints: Hip pain      History of Present Illness: 81 y.o. female admitted to [facility] to services of Issac Garcia MD with what sounds like bilateral hip pain left worse than right and some radiating pain into the left leg.  Is been going on about 6 weeks with no history of trauma.  She was due to see Dr. Shoemaker in the office for hip pain in about a month.  She has had knee replacements done in the past by him and has had hip surgery by Dr. Marquis Nowak a year ago.  No numbness tingling or weakness.  She resides independently.      Allergies:   Allergies   Allergen Reactions   • Doxycycline Anaphylaxis     Facial swelling, shortness of air, dizzines       Medications:   Home Medications:  No current facility-administered medications on file prior to encounter.     Current Outpatient Medications on File Prior to Encounter   Medication Sig   • cyclobenzaprine (FLEXERIL) 10 MG tablet Take 1 tablet by mouth 2 (Two) Times a Day As Needed for Muscle Spasms.   • esomeprazole (NexIUM) 40 MG capsule Take 40 mg by mouth every morning before breakfast.   • furosemide (LASIX) 40 MG tablet TAKE ONE TABLET BY MOUTH EVERY MORNING FOR FLUID   • HYDROcodone-acetaminophen (NORCO) 5-325 MG per tablet Take 1 tablet by mouth Every 6 (Six) Hours As Needed for Moderate Pain .   • ipratropium-albuterol (DUO-NEB) 0.5-2.5 mg/mL nebulizer USE 1 VIAL IN NEBULIZER 2 TIMES DAILY. Generic: DUONEB   • KLOR-CON 20 MEQ CR tablet    • lactulose (CHRONULAC) 10 GM/15ML solution TAKE 15 ML BY MOUTH 2 3 TIMES A DAY AS NEEDED FOR CONSTIPATION   • levothyroxine (SYNTHROID, LEVOTHROID) 88 MCG tablet Take 88 mcg by mouth Daily. Takes daily except Sundays (takes 6 days a week)   •  linaclotide (LINZESS) 145 MCG capsule capsule Take 145 mcg by mouth Every Morning Before Breakfast.   • methylPREDNISolone (MEDROL) 4 MG dose pack Take as directed on package instructions.   • metOLazone (ZAROXOLYN) 5 MG tablet TAKE 1 TABLET BY MOUTH ON M/W/F AS DIRECTED   • pramipexole (MIRAPEX) 1.5 MG tablet Take 1.5 mg by mouth every night at bedtime.   • SYMBICORT 160-4.5 MCG/ACT inhaler    • Tiadylt  MG 24 hr capsule TAKE ONE CAPSULE BY MOUTH DAILY FOR BLOOD PRESSURE   • traMADol (ULTRAM) 50 MG tablet TAKE 1 TABLET BY MOUTH 3 TIMES A DAY AS NEEDED FOR PAIN   • valsartan-hydrochlorothiazide (DIOVAN-HCT) 320-25 MG per tablet Take 1 tablet by mouth Daily.     Current Medications:  Scheduled Meds:budesonide-formoterol, 2 puff, Inhalation, BID - RT  dilTIAZem CD, 120 mg, Oral, Q24H  levothyroxine, 88 mcg, Oral, Q AM  pantoprazole, 40 mg, Oral, QAM  pramipexole, 1.5 mg, Oral, Nightly      Continuous Infusions:sodium chloride, 75 mL/hr, Last Rate: 75 mL/hr (03/07/21 1002)      PRN Meds:.•  acetaminophen **OR** acetaminophen **OR** acetaminophen  •  cyclobenzaprine  •  HYDROcodone-acetaminophen  •  ipratropium-albuterol  •  lactulose  •  ondansetron  •  [COMPLETED] Insert peripheral IV **AND** sodium chloride    Past Medical History:   Diagnosis Date   • Anemia    • Arthritis    • Atrial fibrillation (CMS/HCC)    • Constipation    • COPD (chronic obstructive pulmonary disease) (CMS/HCC)    • Diverticulosis    • GERD (gastroesophageal reflux disease)    • Hx of degenerative disc disease    • Hyperlipidemia    • Hypertension    • Hypokalemia    • Hypothyroidism    • Knee swelling    • Lung cancer (CMS/HCC)     history   • Renal disorder    • Restless leg syndrome    • Sleep apnea with use of continuous positive airway pressure (CPAP)      Past Surgical History:   Procedure Laterality Date   • BUNIONECTOMY Bilateral    • CATARACT EXTRACTION     • CHOLECYSTECTOMY     • COLONOSCOPY     • ENDOSCOPY N/A 6/24/2016     Procedure: ESOPHAGOGASTRODUODENOSCOPY  with biopsies;  Surgeon: Vno Hernández MD;  Location:  [facility] OR;  Service:    • LUNG LOBECTOMY Left     lower lobe   • REPLACEMENT TOTAL KNEE Left    • THYROID BIOPSY     • TOTAL HIP ARTHROPLASTY Right 6/1/2019    Procedure: BIPOLAR HIP CEMENTED POSTERIOR;  Surgeon: Ashley Crenshaw MD;  Location: [facility] MAIN OR;  Service: Orthopedics     Social History     Occupational History   • Not on file   Tobacco Use   • Smoking status: Never Smoker   • Smokeless tobacco: Never Used   Substance and Sexual Activity   • Alcohol use: No   • Drug use: Defer   • Sexual activity: Defer      Social History     Social History Narrative   • Not on file     Family History   Problem Relation Age of Onset   • Heart attack Mother    • Coronary artery disease Mother    • Hypertension Mother    • Heart attack Sister    • Colon cancer Neg Hx    • Colon polyps Neg Hx    • Esophageal cancer Neg Hx          Review of Systems:     Constitutional:  Denies fever, shaking or chills   Eyes:  Denies change in visual acuity   HEENT:  Denies nasal congestion or sore throat   Respiratory:  Denies cough or shortness of breath   Cardiovascular:  Denies chest pain or edema  Endocrine: Denies tremors, palpitations, intolerance of heat or cold, polyuria, polydipsia.  GI:  Denies abdominal pain, nausea, vomiting, bloody stools or diarrhea  :  Denies frequency, urgency, incontinence, retention, or nocturia.  Musculoskeletal:  Denies numbness tingling or loss of motor function except as above  Integument:  Denies rash, lesion or ulceration   Neurologic:  Denies headache or focal weakness, deficits  Heme:  Denies epistaxis, spontaneous or excessive bleeding, epistaxis, hematuria, melena, fatigue, enlarged or tender lymph nodes.      All other pertinent positives and negatives as noted above in HPI.    Physical Exam: 81 y.o. female    General:  Awake, alert. No acute distress.      Head/Neck:   Normocephalic, atraumatic.  Conjunctiva and sclera clear.  Hearing adequate for the exam.  Neck is supple with normal ROM.    Psych:  Affect and demeanor appropriate.    CV:  Regular rate and rhythm.  Hemodynamically stable.    Lungs:  Good chest expansion, breathing unlabored.    Abdomen:  Soft.  Non-tender, non-distended.    Extremities:       She answers questions appropriately awake alert oriented.  Mild tenderness over the sacrum bilaterally and to motion about the hip good strength in the legs bilaterally.  Reflexes are absent sensations intact no evidence of clonus.  Lower extremities are us moderately edematous with pitting 2-3+ edema and venous stasis dermatitis.      Diagnostic Tests:    Admission on 03/06/2021   Component Date Value Ref Range Status   • Glucose 03/06/2021 106* 65 - 99 mg/dL Final   • BUN 03/06/2021 58* 8 - 23 mg/dL Final   • Creatinine 03/06/2021 1.83* 0.57 - 1.00 mg/dL Final   • Sodium 03/06/2021 135* 136 - 145 mmol/L Final   • Potassium 03/06/2021 4.2  3.5 - 5.2 mmol/L Final   • Chloride 03/06/2021 99  98 - 107 mmol/L Final   • CO2 03/06/2021 26.7  22.0 - 29.0 mmol/L Final   • Calcium 03/06/2021 9.0  8.6 - 10.5 mg/dL Final   • Total Protein 03/06/2021 6.6  6.0 - 8.5 g/dL Final   • Albumin 03/06/2021 4.00  3.50 - 5.20 g/dL Final   • ALT (SGPT) 03/06/2021 17  1 - 33 U/L Final   • AST (SGOT) 03/06/2021 10  1 - 32 U/L Final   • Alkaline Phosphatase 03/06/2021 214* 39 - 117 U/L Final   • Total Bilirubin 03/06/2021 0.3  0.0 - 1.2 mg/dL Final   • eGFR Non African Amer 03/06/2021 26* >60 mL/min/1.73 Final   • Globulin 03/06/2021 2.6  gm/dL Final   • A/G Ratio 03/06/2021 1.5  g/dL Final   • BUN/Creatinine Ratio 03/06/2021 31.7* 7.0 - 25.0 Final   • Anion Gap 03/06/2021 9.3  5.0 - 15.0 mmol/L Final   • QT Interval 03/06/2021 379  ms Final   • Troponin T 03/06/2021 <0.010  0.000 - 0.030 ng/mL Final   • Color, UA 03/06/2021 Yellow  Yellow, Straw Final   • Appearance, UA 03/06/2021 Clear  Clear  Final   • pH, UA 03/06/2021 5.5  5.0 - 8.0 Final   • Specific Gravity, UA 03/06/2021 1.015  1.005 - 1.030 Final   • Glucose, UA 03/06/2021 Negative  Negative Final   • Ketones, UA 03/06/2021 Negative  Negative Final   • Bilirubin, UA 03/06/2021 Negative  Negative Final   • Blood, UA 03/06/2021 Negative  Negative Final   • Protein, UA 03/06/2021 Negative  Negative Final   • Leuk Esterase, UA 03/06/2021 Negative  Negative Final   • Nitrite, UA 03/06/2021 Negative  Negative Final   • Urobilinogen, UA 03/06/2021 0.2 E.U./dL  0.2 - 1.0 E.U./dL Final   • ADENOVIRUS, PCR 03/06/2021 Not Detected  Not Detected Final   • Coronavirus 229E 03/06/2021 Not Detected  Not Detected Final   • Coronavirus HKU1 03/06/2021 Not Detected  Not Detected Final   • Coronavirus NL63 03/06/2021 Not Detected  Not Detected Final   • Coronavirus OC43 03/06/2021 Not Detected  Not Detected Final   • COVID19 03/06/2021 Not Detected  Not Detected - Ref. Range Final   • Human Metapneumovirus 03/06/2021 Not Detected  Not Detected Final   • Human Rhinovirus/Enterovirus 03/06/2021 Not Detected  Not Detected Final   • Influenza A PCR 03/06/2021 Not Detected  Not Detected Final   • Influenza B PCR 03/06/2021 Not Detected  Not Detected Final   • Parainfluenza Virus 1 03/06/2021 Not Detected  Not Detected Final   • Parainfluenza Virus 2 03/06/2021 Not Detected  Not Detected Final   • Parainfluenza Virus 3 03/06/2021 Not Detected  Not Detected Final   • Parainfluenza Virus 4 03/06/2021 Not Detected  Not Detected Final   • RSV, PCR 03/06/2021 Not Detected  Not Detected Final   • Bordetella pertussis pcr 03/06/2021 Not Detected  Not Detected Final   • Bordetella parapertussis PCR 03/06/2021 Not Detected  Not Detected Final   • Chlamydophila pneumoniae PCR 03/06/2021 Not Detected  Not Detected Final   • Mycoplasma pneumo by PCR 03/06/2021 Not Detected  Not Detected Final   • WBC 03/06/2021 11.70* 3.40 - 10.80 10*3/mm3 Final   • RBC 03/06/2021 3.87  3.77 - 5.28  10*6/mm3 Final   • Hemoglobin 03/06/2021 11.4* 12.0 - 15.9 g/dL Final   • Hematocrit 03/06/2021 34.6  34.0 - 46.6 % Final   • MCV 03/06/2021 89.4  79.0 - 97.0 fL Final   • MCH 03/06/2021 29.5  26.6 - 33.0 pg Final   • MCHC 03/06/2021 32.9  31.5 - 35.7 g/dL Final   • RDW 03/06/2021 14.4  12.3 - 15.4 % Final   • RDW-SD 03/06/2021 46.4  37.0 - 54.0 fl Final   • MPV 03/06/2021 9.6  6.0 - 12.0 fL Final   • Platelets 03/06/2021 188  140 - 450 10*3/mm3 Final   • Neutrophil % 03/06/2021 74.2  42.7 - 76.0 % Final   • Lymphocyte % 03/06/2021 11.9* 19.6 - 45.3 % Final   • Monocyte % 03/06/2021 7.8  5.0 - 12.0 % Final   • Eosinophil % 03/06/2021 1.7  0.3 - 6.2 % Final   • Basophil % 03/06/2021 0.3  0.0 - 1.5 % Final   • Immature Grans % 03/06/2021 4.1* 0.0 - 0.5 % Final   • Neutrophils, Absolute 03/06/2021 8.68* 1.70 - 7.00 10*3/mm3 Final   • Lymphocytes, Absolute 03/06/2021 1.39  0.70 - 3.10 10*3/mm3 Final   • Monocytes, Absolute 03/06/2021 0.91* 0.10 - 0.90 10*3/mm3 Final   • Eosinophils, Absolute 03/06/2021 0.20  0.00 - 0.40 10*3/mm3 Final   • Basophils, Absolute 03/06/2021 0.04  0.00 - 0.20 10*3/mm3 Final   • Immature Grans, Absolute 03/06/2021 0.48* 0.00 - 0.05 10*3/mm3 Final   • nRBC 03/06/2021 0.1  0.0 - 0.2 /100 WBC Final   • Creatine Kinase 03/06/2021 26  20 - 180 U/L Final   • Color, UA 03/06/2021 Yellow  Yellow, Straw Final   • Appearance, UA 03/06/2021 Clear  Clear Final   • pH, UA 03/06/2021 6.0  5.0 - 8.0 Final   • Specific Gravity, UA 03/06/2021 1.015  1.005 - 1.030 Final   • Glucose, UA 03/06/2021 Negative  Negative Final   • Ketones, UA 03/06/2021 Negative  Negative Final   • Bilirubin, UA 03/06/2021 Negative  Negative Final   • Blood, UA 03/06/2021 Negative  Negative Final   • Protein, UA 03/06/2021 Negative  Negative Final   • Leuk Esterase, UA 03/06/2021 Trace* Negative Final   • Nitrite, UA 03/06/2021 Negative  Negative Final   • Urobilinogen, UA 03/06/2021 0.2 E.U./dL  0.2 - 1.0 E.U./dL Final   • RBC, UA  03/06/2021 0-2  None Seen, 0-2 /HPF Final   • WBC, UA 03/06/2021 0-2  None Seen, 0-2 /HPF Final   • Bacteria, UA 03/06/2021 None Seen  None Seen /HPF Final   • Squamous Epithelial Cells, UA 03/06/2021 0-2  None Seen, 0-2 /HPF Final   • Hyaline Casts, UA 03/06/2021 0-2  None Seen /LPF Final   • Methodology 03/06/2021 Automated Microscopy   Final   • Glucose 03/07/2021 103* 65 - 99 mg/dL Final   • BUN 03/07/2021 41* 8 - 23 mg/dL Final   • Creatinine 03/07/2021 1.38* 0.57 - 1.00 mg/dL Final   • Sodium 03/07/2021 136  136 - 145 mmol/L Final   • Potassium 03/07/2021 4.0  3.5 - 5.2 mmol/L Final   • Chloride 03/07/2021 102  98 - 107 mmol/L Final   • CO2 03/07/2021 25.1  22.0 - 29.0 mmol/L Final   • Calcium 03/07/2021 8.5* 8.6 - 10.5 mg/dL Final   • eGFR Non African Amer 03/07/2021 37* >60 mL/min/1.73 Final   • BUN/Creatinine Ratio 03/07/2021 29.7* 7.0 - 25.0 Final   • Anion Gap 03/07/2021 8.9  5.0 - 15.0 mmol/L Final   • WBC 03/07/2021 8.26  3.40 - 10.80 10*3/mm3 Final   • RBC 03/07/2021 3.65* 3.77 - 5.28 10*6/mm3 Final   • Hemoglobin 03/07/2021 10.5* 12.0 - 15.9 g/dL Final   • Hematocrit 03/07/2021 32.0* 34.0 - 46.6 % Final   • MCV 03/07/2021 87.7  79.0 - 97.0 fL Final   • MCH 03/07/2021 28.8  26.6 - 33.0 pg Final   • MCHC 03/07/2021 32.8  31.5 - 35.7 g/dL Final   • RDW 03/07/2021 14.4  12.3 - 15.4 % Final   • RDW-SD 03/07/2021 44.9  37.0 - 54.0 fl Final   • MPV 03/07/2021 9.7  6.0 - 12.0 fL Final   • Platelets 03/07/2021 170  140 - 450 10*3/mm3 Final   • Neutrophil % 03/07/2021 70.5  42.7 - 76.0 % Final   • Lymphocyte % 03/07/2021 12.7* 19.6 - 45.3 % Final   • Monocyte % 03/07/2021 9.0  5.0 - 12.0 % Final   • Eosinophil % 03/07/2021 3.4  0.3 - 6.2 % Final   • Basophil % 03/07/2021 0.4  0.0 - 1.5 % Final   • Immature Grans % 03/07/2021 4.0* 0.0 - 0.5 % Final   • Neutrophils, Absolute 03/07/2021 5.83  1.70 - 7.00 10*3/mm3 Final   • Lymphocytes, Absolute 03/07/2021 1.05  0.70 - 3.10 10*3/mm3 Final   • Monocytes, Absolute  03/07/2021 0.74  0.10 - 0.90 10*3/mm3 Final   • Eosinophils, Absolute 03/07/2021 0.28  0.00 - 0.40 10*3/mm3 Final   • Basophils, Absolute 03/07/2021 0.03  0.00 - 0.20 10*3/mm3 Final   • Immature Grans, Absolute 03/07/2021 0.33* 0.00 - 0.05 10*3/mm3 Final   • nRBC 03/07/2021 0.0  0.0 - 0.2 /100 WBC Final     Lab Results (last 24 hours)     Procedure Component Value Units Date/Time    Basic Metabolic Panel [[id number]]  (Abnormal) Collected: 03/07/21 0607    Specimen: Blood Updated: 03/07/21 0704     Glucose 103 mg/dL      BUN 41 mg/dL      Creatinine 1.38 mg/dL      Sodium 136 mmol/L      Potassium 4.0 mmol/L      Chloride 102 mmol/L      CO2 25.1 mmol/L      Calcium 8.5 mg/dL      eGFR Non African Amer 37 mL/min/1.73      BUN/Creatinine Ratio 29.7     Anion Gap 8.9 mmol/L     Narrative:      GFR Normal >60  Chronic Kidney Disease <60  Kidney Failure <15      CBC Auto Differential [[id number]]  (Abnormal) Collected: 03/07/21 0607    Specimen: Blood Updated: 03/07/21 0641     WBC 8.26 10*3/mm3      RBC 3.65 10*6/mm3      Hemoglobin 10.5 g/dL      Hematocrit 32.0 %      MCV 87.7 fL      MCH 28.8 pg      MCHC 32.8 g/dL      RDW 14.4 %      RDW-SD 44.9 fl      MPV 9.7 fL      Platelets 170 10*3/mm3      Neutrophil % 70.5 %      Lymphocyte % 12.7 %      Monocyte % 9.0 %      Eosinophil % 3.4 %      Basophil % 0.4 %      Immature Grans % 4.0 %      Neutrophils, Absolute 5.83 10*3/mm3      Lymphocytes, Absolute 1.05 10*3/mm3      Monocytes, Absolute 0.74 10*3/mm3      Eosinophils, Absolute 0.28 10*3/mm3      Basophils, Absolute 0.03 10*3/mm3      Immature Grans, Absolute 0.33 10*3/mm3      nRBC 0.0 /100 WBC           Imaging: CT scan of the lumbar spine suggest least moderate stenosis at L4-5 on soft tissue windows and then on bony windows I am looking at bilateral sacral alar fractures and some early degree of healing.  These are nondisplaced the radiologist says the 2 mm displaced.  Okay then.    Assessment: Bilateral  sacral alar fractures and lumbar spinal stenosis.    Plan: Sacral fractures take 3 or 4 months to quit hurting and least 4 months to heal.  She is about assisted there.  She can be mobilized onto a walker but will likely require some period of personal care or nursing home placement.  I do not think rehab per se is the answer it with the degree of pain she is in.  I think we could help the radiating pain with some epidural injections and I will order these.  I do not see anything I can do to actively help at this point but did offer to see her in the office in 6 weeks.    Date: 3/7/2021    Elgin Lake MD    CC: Issac Garcia MD   No

## 2021-03-10 ENCOUNTER — EMERGENCY (EMERGENCY)
Facility: HOSPITAL | Age: 61
LOS: 0 days | Discharge: HOME | End: 2021-03-10
Attending: EMERGENCY MEDICINE
Payer: MEDICAID

## 2021-03-10 VITALS
WEIGHT: 293 LBS | DIASTOLIC BLOOD PRESSURE: 73 MMHG | SYSTOLIC BLOOD PRESSURE: 120 MMHG | RESPIRATION RATE: 22 BRPM | HEIGHT: 61 IN | OXYGEN SATURATION: 94 % | HEART RATE: 105 BPM | TEMPERATURE: 98 F

## 2021-03-10 DIAGNOSIS — Z90.49 ACQUIRED ABSENCE OF OTHER SPECIFIED PARTS OF DIGESTIVE TRACT: Chronic | ICD-10-CM

## 2021-03-10 LAB
ALBUMIN SERPL ELPH-MCNC: 3.4 G/DL — SIGNIFICANT CHANGE UP (ref 3.3–5)
ALP SERPL-CCNC: 102 U/L — SIGNIFICANT CHANGE UP (ref 40–120)
ALT FLD-CCNC: 22 U/L — SIGNIFICANT CHANGE UP (ref 12–78)
ANION GAP SERPL CALC-SCNC: 5 MMOL/L — SIGNIFICANT CHANGE UP (ref 5–17)
AST SERPL-CCNC: 19 U/L — SIGNIFICANT CHANGE UP (ref 15–37)
BASE EXCESS BLDA CALC-SCNC: 3.9 MMOL/L — HIGH (ref -2–2)
BASOPHILS # BLD AUTO: 0.06 K/UL — SIGNIFICANT CHANGE UP (ref 0–0.2)
BASOPHILS NFR BLD AUTO: 0.5 % — SIGNIFICANT CHANGE UP (ref 0–2)
BILIRUB SERPL-MCNC: 0.5 MG/DL — SIGNIFICANT CHANGE UP (ref 0.2–1.2)
BLOOD GAS COMMENTS: SIGNIFICANT CHANGE UP
BLOOD GAS COMMENTS: SIGNIFICANT CHANGE UP
BLOOD GAS SOURCE: SIGNIFICANT CHANGE UP
BUN SERPL-MCNC: 12 MG/DL — SIGNIFICANT CHANGE UP (ref 7–23)
CALCIUM SERPL-MCNC: 8.6 MG/DL — SIGNIFICANT CHANGE UP (ref 8.5–10.1)
CHLORIDE SERPL-SCNC: 102 MMOL/L — SIGNIFICANT CHANGE UP (ref 96–108)
CO2 SERPL-SCNC: 30 MMOL/L — SIGNIFICANT CHANGE UP (ref 22–31)
CREAT SERPL-MCNC: 0.85 MG/DL — SIGNIFICANT CHANGE UP (ref 0.5–1.3)
EOSINOPHIL # BLD AUTO: 0.45 K/UL — SIGNIFICANT CHANGE UP (ref 0–0.5)
EOSINOPHIL NFR BLD AUTO: 4 % — SIGNIFICANT CHANGE UP (ref 0–6)
FLUAV AG NPH QL: SIGNIFICANT CHANGE UP
FLUBV AG NPH QL: SIGNIFICANT CHANGE UP
GLUCOSE SERPL-MCNC: 349 MG/DL — HIGH (ref 70–99)
HCO3 BLDA-SCNC: 29 MMOL/L — SIGNIFICANT CHANGE UP (ref 21–29)
HCT VFR BLD CALC: 40 % — SIGNIFICANT CHANGE UP (ref 34.5–45)
HGB BLD-MCNC: 12.8 G/DL — SIGNIFICANT CHANGE UP (ref 11.5–15.5)
HOROWITZ INDEX BLDA+IHG-RTO: 0.36 — SIGNIFICANT CHANGE UP
IMM GRANULOCYTES NFR BLD AUTO: 0.9 % — SIGNIFICANT CHANGE UP (ref 0–1.5)
LYMPHOCYTES # BLD AUTO: 2.48 K/UL — SIGNIFICANT CHANGE UP (ref 1–3.3)
LYMPHOCYTES # BLD AUTO: 22 % — SIGNIFICANT CHANGE UP (ref 13–44)
MCHC RBC-ENTMCNC: 29 PG — SIGNIFICANT CHANGE UP (ref 27–34)
MCHC RBC-ENTMCNC: 32 GM/DL — SIGNIFICANT CHANGE UP (ref 32–36)
MCV RBC AUTO: 90.5 FL — SIGNIFICANT CHANGE UP (ref 80–100)
MONOCYTES # BLD AUTO: 0.46 K/UL — SIGNIFICANT CHANGE UP (ref 0–0.9)
MONOCYTES NFR BLD AUTO: 4.1 % — SIGNIFICANT CHANGE UP (ref 2–14)
NEUTROPHILS # BLD AUTO: 7.71 K/UL — HIGH (ref 1.8–7.4)
NEUTROPHILS NFR BLD AUTO: 68.5 % — SIGNIFICANT CHANGE UP (ref 43–77)
NRBC # BLD: 0 /100 WBCS — SIGNIFICANT CHANGE UP (ref 0–0)
NT-PROBNP SERPL-SCNC: 87 PG/ML — SIGNIFICANT CHANGE UP (ref 0–125)
PCO2 BLDA: 50 MMHG — HIGH (ref 32–46)
PH BLD: 7.39 — SIGNIFICANT CHANGE UP (ref 7.35–7.45)
PLATELET # BLD AUTO: 239 K/UL — SIGNIFICANT CHANGE UP (ref 150–400)
PO2 BLDA: 64 MMHG — LOW (ref 74–108)
POTASSIUM SERPL-MCNC: 4.9 MMOL/L — SIGNIFICANT CHANGE UP (ref 3.5–5.3)
POTASSIUM SERPL-SCNC: 4.9 MMOL/L — SIGNIFICANT CHANGE UP (ref 3.5–5.3)
PROT SERPL-MCNC: 7.5 GM/DL — SIGNIFICANT CHANGE UP (ref 6–8.3)
RBC # BLD: 4.42 M/UL — SIGNIFICANT CHANGE UP (ref 3.8–5.2)
RBC # FLD: 15.9 % — HIGH (ref 10.3–14.5)
SAO2 % BLDA: 94 % — SIGNIFICANT CHANGE UP (ref 92–96)
SARS-COV-2 RNA SPEC QL NAA+PROBE: SIGNIFICANT CHANGE UP
SODIUM SERPL-SCNC: 137 MMOL/L — SIGNIFICANT CHANGE UP (ref 135–145)
TROPONIN I SERPL-MCNC: <.015 NG/ML — SIGNIFICANT CHANGE UP (ref 0.01–0.04)
WBC # BLD: 11.26 K/UL — HIGH (ref 3.8–10.5)
WBC # FLD AUTO: 11.26 K/UL — HIGH (ref 3.8–10.5)

## 2021-03-10 PROCEDURE — 99285 EMERGENCY DEPT VISIT HI MDM: CPT

## 2021-03-10 PROCEDURE — 71045 X-RAY EXAM CHEST 1 VIEW: CPT | Mod: 26

## 2021-03-10 PROCEDURE — 93010 ELECTROCARDIOGRAM REPORT: CPT

## 2021-03-10 RX ORDER — ALBUTEROL 90 UG/1
2 AEROSOL, METERED ORAL
Qty: 1 | Refills: 0
Start: 2021-03-10 | End: 2021-03-16

## 2021-03-10 RX ORDER — IBUPROFEN 200 MG
1 TABLET ORAL
Qty: 28 | Refills: 0
Start: 2021-03-10 | End: 2021-03-16

## 2021-03-10 NOTE — ED ADULT NURSE NOTE - OBJECTIVE STATEMENT
Pt c/o SOB x3d. Pt has hx of COPD and states that this feels like her last COPD exacerbation. Pt is on 4-5L o2 at home. Pt received decadron in route by EMS w/relief Pt able to come off of NRB, onto NC 4L, o2 sat 96%. PT denies any CP, fever.

## 2021-03-10 NOTE — ED PROVIDER NOTE - CLINICAL SUMMARY MEDICAL DECISION MAKING FREE TEXT BOX
DDx: COPD exacerbation, r/o pneumonia, no CP to suggest ACS.  Plan: CBC, CMP, troponin, BNP, chest x-ray, ABG and reassess.

## 2021-03-10 NOTE — ED ADULT NURSE NOTE - CHIEF COMPLAINT QUOTE
pt biba from home c/o COPD exacerbation  started  last nigh  took albuterol w/o relief hx COPD, DM  emphysema  HLD  bladder CA in remission

## 2021-03-10 NOTE — ED ADULT TRIAGE NOTE - CHIEF COMPLAINT QUOTE
pt biba from home c/o COPD exacerbation  started  last nigh  took albuterol w/o relief hx COPD, DM  emphysema  HLD  bladder CA in remission Pt biba from home with nebs treatment in progress c/o COPD exacerbation  started  last night  took albuterol w/o relief hx COPD, DM  emphysema  HDL  bladder CA in remission

## 2021-03-10 NOTE — ED ADULT NURSE NOTE - NSIMPLEMENTINTERV_GEN_ALL_ED
Implemented All Universal Safety Interventions:  [place] to call system. Call bell, personal items and telephone within reach. Instruct patient to call for assistance. Room bathroom lighting operational. Non-slip footwear when patient is off stretcher. Physically safe environment: no spills, clutter or unnecessary equipment. Stretcher in lowest position, wheels locked, appropriate side rails in place.

## 2021-03-10 NOTE — ED PROVIDER NOTE - OBJECTIVE STATEMENT
Pt is a 61 year old female w/PMH of DM (on insulin), HTN, COPD presents to the ED BIBEMS for SOB for the last day. Pt has a chronic cough. On EMS arrival gave x10 of decadron and x2 rounds of nebs, pt now feeling better. Denies fever/chills, CP, abdominal pain or N/V/D. No known COVID exposure. Pt is a 61 year old female w/PMH of DM (on insulin), HTN, COPD presents to the ED BIBEMS for SOB for the last day. Pt has a chronic cough. On EMS arrival gave x10mg of decadron and x2 rounds of nebs, pt now feeling better. Denies fever/chills, CP, abdominal pain or N/V/D. No known COVID exposure.

## 2021-03-10 NOTE — ED PROVIDER NOTE - PATIENT PORTAL LINK FT
You can access the FollowMyHealth Patient Portal offered by [facility] by registering at the following website: [email or web address]. By joining [employer]’s FollowMyHealth portal, you will also be able to view your health information using other applications (apps) compatible with our system.

## 2021-03-11 DIAGNOSIS — E11.9 TYPE 2 DIABETES MELLITUS WITHOUT COMPLICATIONS: ICD-10-CM

## 2021-03-11 DIAGNOSIS — R05 COUGH: ICD-10-CM

## 2021-03-11 DIAGNOSIS — R06.02 SHORTNESS OF BREATH: ICD-10-CM

## 2021-03-11 DIAGNOSIS — Z20.822 CONTACT WITH AND (SUSPECTED) EXPOSURE TO COVID-19: ICD-10-CM

## 2021-03-11 DIAGNOSIS — I10 ESSENTIAL (PRIMARY) HYPERTENSION: ICD-10-CM

## 2021-03-11 DIAGNOSIS — Z79.1 LONG TERM (CURRENT) USE OF NON-STEROIDAL ANTI-INFLAMMATORIES (NSAID): ICD-10-CM

## 2021-03-11 DIAGNOSIS — E03.9 HYPOTHYROIDISM, UNSPECIFIED: ICD-10-CM

## 2021-03-11 DIAGNOSIS — E66.9 OBESITY, UNSPECIFIED: ICD-10-CM

## 2021-03-11 DIAGNOSIS — F40.00 AGORAPHOBIA, UNSPECIFIED: ICD-10-CM

## 2021-03-11 DIAGNOSIS — Z79.84 LONG TERM (CURRENT) USE OF ORAL HYPOGLYCEMIC DRUGS: ICD-10-CM

## 2021-03-11 DIAGNOSIS — Z87.891 PERSONAL HISTORY OF NICOTINE DEPENDENCE: ICD-10-CM

## 2021-03-11 DIAGNOSIS — J43.9 EMPHYSEMA, UNSPECIFIED: ICD-10-CM

## 2021-03-17 ENCOUNTER — INPATIENT (INPATIENT)
Facility: HOSPITAL | Age: 61
LOS: 6 days | Discharge: NURSING, REHAB OR LONG-TERM CARE | End: 2021-03-24
Attending: INTERNAL MEDICINE | Admitting: INTERNAL MEDICINE
Payer: MEDICAID

## 2021-03-17 VITALS
SYSTOLIC BLOOD PRESSURE: 110 MMHG | OXYGEN SATURATION: 91 % | DIASTOLIC BLOOD PRESSURE: 60 MMHG | RESPIRATION RATE: 20 BRPM | HEART RATE: 76 BPM | TEMPERATURE: 98 F | WEIGHT: 279.11 LBS | HEIGHT: 61 IN

## 2021-03-17 DIAGNOSIS — Z90.49 ACQUIRED ABSENCE OF OTHER SPECIFIED PARTS OF DIGESTIVE TRACT: Chronic | ICD-10-CM

## 2021-03-17 LAB
ALBUMIN SERPL ELPH-MCNC: 3.6 G/DL — SIGNIFICANT CHANGE UP (ref 3.3–5)
ALP SERPL-CCNC: 83 U/L — SIGNIFICANT CHANGE UP (ref 40–120)
ALT FLD-CCNC: 33 U/L — SIGNIFICANT CHANGE UP (ref 12–78)
ANION GAP SERPL CALC-SCNC: 11 MMOL/L — SIGNIFICANT CHANGE UP (ref 5–17)
APPEARANCE UR: CLEAR — SIGNIFICANT CHANGE UP
APTT BLD: 33.6 SEC — SIGNIFICANT CHANGE UP (ref 27.5–35.5)
AST SERPL-CCNC: 88 U/L — HIGH (ref 15–37)
BACTERIA # UR AUTO: ABNORMAL
BASOPHILS # BLD AUTO: 0.1 K/UL — SIGNIFICANT CHANGE UP (ref 0–0.2)
BASOPHILS NFR BLD AUTO: 0.5 % — SIGNIFICANT CHANGE UP (ref 0–2)
BILIRUB SERPL-MCNC: 0.5 MG/DL — SIGNIFICANT CHANGE UP (ref 0.2–1.2)
BILIRUB UR-MCNC: NEGATIVE — SIGNIFICANT CHANGE UP
BUN SERPL-MCNC: 47 MG/DL — HIGH (ref 7–23)
CALCIUM SERPL-MCNC: 9.4 MG/DL — SIGNIFICANT CHANGE UP (ref 8.5–10.1)
CHLORIDE SERPL-SCNC: 95 MMOL/L — LOW (ref 96–108)
CK SERPL-CCNC: 2127 U/L — HIGH (ref 26–192)
CO2 SERPL-SCNC: 26 MMOL/L — SIGNIFICANT CHANGE UP (ref 22–31)
COLOR SPEC: YELLOW — SIGNIFICANT CHANGE UP
CREAT SERPL-MCNC: 3.45 MG/DL — HIGH (ref 0.5–1.3)
DIFF PNL FLD: ABNORMAL
EOSINOPHIL # BLD AUTO: 0.75 K/UL — HIGH (ref 0–0.5)
EOSINOPHIL NFR BLD AUTO: 3.7 % — SIGNIFICANT CHANGE UP (ref 0–6)
EPI CELLS # UR: SIGNIFICANT CHANGE UP
FLUAV AG NPH QL: SIGNIFICANT CHANGE UP
FLUBV AG NPH QL: SIGNIFICANT CHANGE UP
GLUCOSE BLDC GLUCOMTR-MCNC: 130 MG/DL — HIGH (ref 70–99)
GLUCOSE BLDC GLUCOMTR-MCNC: 67 MG/DL — LOW (ref 70–99)
GLUCOSE BLDC GLUCOMTR-MCNC: 68 MG/DL — LOW (ref 70–99)
GLUCOSE BLDC GLUCOMTR-MCNC: 68 MG/DL — LOW (ref 70–99)
GLUCOSE BLDC GLUCOMTR-MCNC: 75 MG/DL — SIGNIFICANT CHANGE UP (ref 70–99)
GLUCOSE SERPL-MCNC: 75 MG/DL — SIGNIFICANT CHANGE UP (ref 70–99)
GLUCOSE UR QL: 50 MG/DL
HCT VFR BLD CALC: 41.1 % — SIGNIFICANT CHANGE UP (ref 34.5–45)
HGB BLD-MCNC: 13.3 G/DL — SIGNIFICANT CHANGE UP (ref 11.5–15.5)
HYALINE CASTS # UR AUTO: ABNORMAL /LPF
IMM GRANULOCYTES NFR BLD AUTO: 0.8 % — SIGNIFICANT CHANGE UP (ref 0–1.5)
INR BLD: 1 RATIO — SIGNIFICANT CHANGE UP (ref 0.88–1.16)
KETONES UR-MCNC: NEGATIVE — SIGNIFICANT CHANGE UP
LEUKOCYTE ESTERASE UR-ACNC: NEGATIVE — SIGNIFICANT CHANGE UP
LYMPHOCYTES # BLD AUTO: 27.2 % — SIGNIFICANT CHANGE UP (ref 13–44)
LYMPHOCYTES # BLD AUTO: 5.51 K/UL — HIGH (ref 1–3.3)
MAGNESIUM SERPL-MCNC: 2.6 MG/DL — SIGNIFICANT CHANGE UP (ref 1.6–2.6)
MCHC RBC-ENTMCNC: 29.4 PG — SIGNIFICANT CHANGE UP (ref 27–34)
MCHC RBC-ENTMCNC: 32.4 GM/DL — SIGNIFICANT CHANGE UP (ref 32–36)
MCV RBC AUTO: 90.7 FL — SIGNIFICANT CHANGE UP (ref 80–100)
MONOCYTES # BLD AUTO: 1.24 K/UL — HIGH (ref 0–0.9)
MONOCYTES NFR BLD AUTO: 6.1 % — SIGNIFICANT CHANGE UP (ref 2–14)
NEUTROPHILS # BLD AUTO: 12.51 K/UL — HIGH (ref 1.8–7.4)
NEUTROPHILS NFR BLD AUTO: 61.7 % — SIGNIFICANT CHANGE UP (ref 43–77)
NITRITE UR-MCNC: NEGATIVE — SIGNIFICANT CHANGE UP
NRBC # BLD: 0 /100 WBCS — SIGNIFICANT CHANGE UP (ref 0–0)
PH UR: 5 — SIGNIFICANT CHANGE UP (ref 5–8)
PLATELET # BLD AUTO: 337 K/UL — SIGNIFICANT CHANGE UP (ref 150–400)
POTASSIUM SERPL-MCNC: 4.7 MMOL/L — SIGNIFICANT CHANGE UP (ref 3.5–5.3)
POTASSIUM SERPL-SCNC: 4.7 MMOL/L — SIGNIFICANT CHANGE UP (ref 3.5–5.3)
PROT SERPL-MCNC: 7.2 GM/DL — SIGNIFICANT CHANGE UP (ref 6–8.3)
PROT UR-MCNC: 30 MG/DL
PROTHROM AB SERPL-ACNC: 11.6 SEC — SIGNIFICANT CHANGE UP (ref 10.6–13.6)
RBC # BLD: 4.53 M/UL — SIGNIFICANT CHANGE UP (ref 3.8–5.2)
RBC # FLD: 16.4 % — HIGH (ref 10.3–14.5)
RBC CASTS # UR COMP ASSIST: ABNORMAL /HPF (ref 0–4)
SARS-COV-2 RNA SPEC QL NAA+PROBE: SIGNIFICANT CHANGE UP
SODIUM SERPL-SCNC: 132 MMOL/L — LOW (ref 135–145)
SP GR SPEC: 1.02 — SIGNIFICANT CHANGE UP (ref 1.01–1.02)
TROPONIN I SERPL-MCNC: <.015 NG/ML — SIGNIFICANT CHANGE UP (ref 0.01–0.04)
UROBILINOGEN FLD QL: NEGATIVE MG/DL — SIGNIFICANT CHANGE UP
WBC # BLD: 20.27 K/UL — HIGH (ref 3.8–10.5)
WBC # FLD AUTO: 20.27 K/UL — HIGH (ref 3.8–10.5)
WBC UR QL: ABNORMAL

## 2021-03-17 PROCEDURE — 71045 X-RAY EXAM CHEST 1 VIEW: CPT | Mod: 26

## 2021-03-17 PROCEDURE — 70551 MRI BRAIN STEM W/O DYE: CPT | Mod: 26,MA

## 2021-03-17 PROCEDURE — 93010 ELECTROCARDIOGRAM REPORT: CPT

## 2021-03-17 PROCEDURE — 99223 1ST HOSP IP/OBS HIGH 75: CPT

## 2021-03-17 PROCEDURE — 99285 EMERGENCY DEPT VISIT HI MDM: CPT

## 2021-03-17 PROCEDURE — 76775 US EXAM ABDO BACK WALL LIM: CPT | Mod: 26

## 2021-03-17 PROCEDURE — 70450 CT HEAD/BRAIN W/O DYE: CPT | Mod: 26,MA

## 2021-03-17 RX ORDER — INSULIN GLARGINE 100 [IU]/ML
40 INJECTION, SOLUTION SUBCUTANEOUS AT BEDTIME
Refills: 0 | Status: DISCONTINUED | OUTPATIENT
Start: 2021-03-17 | End: 2021-03-19

## 2021-03-17 RX ORDER — ATORVASTATIN CALCIUM 80 MG/1
10 TABLET, FILM COATED ORAL AT BEDTIME
Refills: 0 | Status: DISCONTINUED | OUTPATIENT
Start: 2021-03-17 | End: 2021-03-24

## 2021-03-17 RX ORDER — SODIUM CHLORIDE 9 MG/ML
1000 INJECTION, SOLUTION INTRAVENOUS ONCE
Refills: 0 | Status: COMPLETED | OUTPATIENT
Start: 2021-03-17 | End: 2021-03-17

## 2021-03-17 RX ORDER — IPRATROPIUM/ALBUTEROL SULFATE 18-103MCG
3 AEROSOL WITH ADAPTER (GRAM) INHALATION EVERY 6 HOURS
Refills: 0 | Status: DISCONTINUED | OUTPATIENT
Start: 2021-03-17 | End: 2021-03-24

## 2021-03-17 RX ORDER — MONTELUKAST 4 MG/1
10 TABLET, CHEWABLE ORAL DAILY
Refills: 0 | Status: DISCONTINUED | OUTPATIENT
Start: 2021-03-17 | End: 2021-03-24

## 2021-03-17 RX ORDER — SODIUM CHLORIDE 9 MG/ML
1000 INJECTION, SOLUTION INTRAVENOUS
Refills: 0 | Status: DISCONTINUED | OUTPATIENT
Start: 2021-03-17 | End: 2021-03-24

## 2021-03-17 RX ORDER — INFLUENZA VIRUS VACCINE 15; 15; 15; 15 UG/.5ML; UG/.5ML; UG/.5ML; UG/.5ML
0.5 SUSPENSION INTRAMUSCULAR ONCE
Refills: 0 | Status: DISCONTINUED | OUTPATIENT
Start: 2021-03-17 | End: 2021-03-24

## 2021-03-17 RX ORDER — LEVOTHYROXINE SODIUM 125 MCG
150 TABLET ORAL DAILY
Refills: 0 | Status: DISCONTINUED | OUTPATIENT
Start: 2021-03-17 | End: 2021-03-24

## 2021-03-17 RX ORDER — ESCITALOPRAM OXALATE 10 MG/1
20 TABLET, FILM COATED ORAL DAILY
Refills: 0 | Status: DISCONTINUED | OUTPATIENT
Start: 2021-03-17 | End: 2021-03-24

## 2021-03-17 RX ORDER — KETOROLAC TROMETHAMINE 30 MG/ML
30 SYRINGE (ML) INJECTION ONCE
Refills: 0 | Status: DISCONTINUED | OUTPATIENT
Start: 2021-03-17 | End: 2021-03-17

## 2021-03-17 RX ORDER — BUDESONIDE AND FORMOTEROL FUMARATE DIHYDRATE 160; 4.5 UG/1; UG/1
2 AEROSOL RESPIRATORY (INHALATION)
Refills: 0 | Status: DISCONTINUED | OUTPATIENT
Start: 2021-03-17 | End: 2021-03-24

## 2021-03-17 RX ORDER — TIOTROPIUM BROMIDE 18 UG/1
1 CAPSULE ORAL; RESPIRATORY (INHALATION) DAILY
Refills: 0 | Status: DISCONTINUED | OUTPATIENT
Start: 2021-03-17 | End: 2021-03-24

## 2021-03-17 RX ORDER — LOSARTAN POTASSIUM 100 MG/1
50 TABLET, FILM COATED ORAL DAILY
Refills: 0 | Status: DISCONTINUED | OUTPATIENT
Start: 2021-03-17 | End: 2021-03-18

## 2021-03-17 RX ADMIN — SODIUM CHLORIDE 1000 MILLILITER(S): 9 INJECTION, SOLUTION INTRAVENOUS at 23:23

## 2021-03-17 RX ADMIN — Medication 30 MILLIGRAM(S): at 17:37

## 2021-03-17 RX ADMIN — ATORVASTATIN CALCIUM 10 MILLIGRAM(S): 80 TABLET, FILM COATED ORAL at 23:01

## 2021-03-17 RX ADMIN — SODIUM CHLORIDE 1000 MILLILITER(S): 9 INJECTION, SOLUTION INTRAVENOUS at 19:00

## 2021-03-17 RX ADMIN — Medication 1 MILLIGRAM(S): at 17:35

## 2021-03-17 NOTE — H&P ADULT - NSICDXPASTMEDICALHX_GEN_ALL_CORE_FT
PAST MEDICAL HISTORY:  Agoraphobia     Anxiety     Bladder cancer     COPD (chronic obstructive pulmonary disease)     DM (diabetes mellitus)     Emphysema lung     HTN (hypertension)     Hypothyroid     Obesity     Smoker

## 2021-03-17 NOTE — ED ADULT NURSE NOTE - OBJECTIVE STATEMENT
Patient states she fell on her bottom, unwitnessed, onto cement because she states she has no balance. Patient states she has cramps all over her body. States that she has blurry vision for the past few days and that the back of her eyes hurt. States her left hand feels numb.

## 2021-03-17 NOTE — H&P ADULT - NSHPLABSRESULTS_GEN_ALL_CORE
LABS:                        13.3   20.27 )-----------( 337      ( 17 Mar 2021 17:34 )             41.1     03-17    132<L>  |  95<L>  |  47<H>  ----------------------------<  75  4.7   |  26  |  3.45<H>    Ca    9.4      17 Mar 2021 17:34  Mg     2.6     03-17    TPro  7.2  /  Alb  3.6  /  TBili  0.5  /  DBili  x   /  AST  88<H>  /  ALT  33  /  AlkPhos  83  03-17    PT/INR - ( 17 Mar 2021 17:34 )   PT: 11.6 sec;   INR: 1.00 ratio         PTT - ( 17 Mar 2021 17:34 )  PTT:33.6 sec        RADIOLOGY & ADDITIONAL TESTS:

## 2021-03-17 NOTE — H&P ADULT - ASSESSMENT
61 year old female PMH htn, dm, hld, copd, bladder ca pw s/p multiple falls. non compliant with meds. felt a loss of balance since that time. symptoms are worse when she sits up abruptly. Also notes diffuse muscle pain and blurred vision with pressure behind eyes. also notes left hand numbness and tingling. some nausea without vomiting. no cp, sob, rash, bleeding, dysuria, vaginal discharge, rhinorrhea. active smoker.  Admitted for VESTA with increased CPK.     Renal :  61 year old female PMH htn, dm, hld, copd, bladder ca pw s/p multiple falls. non compliant with meds. felt a loss of balance since that time. symptoms are worse when she sits up abruptly. Also notes diffuse muscle pain and blurred vision with pressure behind eyes. also notes left hand numbness and tingling. some nausea without vomiting. no cp, sob, rash, bleeding, dysuria, vaginal discharge, rhinorrhea. active smoker.  Admitted for VESTA with increased CPK.     Renal: VESTA likely from rhabdomylysis. IVF LR at 100/h after fluid bolus. CPK and SMA-7 in AM. Renal sonogram was done this evening.     Pulmonary: Continue Symbicort bid and Spiriva daily with Duonebs and Singular. CXR is clear.     DM: Add Lantus 40 units at night, add Admelog as needed. Continue Synthyroid 150 mcg/day, TSH in AM.     PT eval given recent falls.       Spoke to Dr. Espinoza today, he will follow patient  in AM.

## 2021-03-17 NOTE — ED PROVIDER NOTE - OBJECTIVE STATEMENT
61f hx htn, dm, hld, copd, bladder ca pw s/p multiple falls. non compliant with meds. felt a loss of balance since that time. symptoms are worse when she sits up abruptly. also notes diffuse muscle pain and blurred vision with pressure behind eyes. also notes left hand numbness and tingling. some nausea without vomiting. no cp, sob, rash, bleeding, dysuria, vaginal discharge, rhinorrhea. active smoker

## 2021-03-17 NOTE — ED ADULT NURSE NOTE - PMH
Agoraphobia    Anxiety    Bladder cancer    COPD (chronic obstructive pulmonary disease)    DM (diabetes mellitus)    Emphysema lung    HTN (hypertension)    Hypothyroid    Obesity    Smoker

## 2021-03-17 NOTE — ED ADULT TRIAGE NOTE - CHIEF COMPLAINT QUOTE
60 y/o female with PMH of HTN, T2DM, HLD, & COPD, BIBA with c/c of muscle pain, dizziness and falls. Pt was seen today by [facility] Paramedics and sent for evaluation due to having multiple falls yesterday and 1 fall today due to dizziness and muscle weakness. pt denies any LOC, blood thinner use,

## 2021-03-17 NOTE — ED PROVIDER NOTE - CLINICAL SUMMARY MEDICAL DECISION MAKING FREE TEXT BOX
I read ekg as nsr rate 85, no st elevation or depression, qtc 437, narrow qrs, normal axis, I read ekg as nsr rate 85, no st elevation or depression, qtc 437, narrow qrs, normal axis,  labs show renal failure. needs fluids, sono, renal. case dw dr hernandez

## 2021-03-17 NOTE — CONSULT NOTE ADULT - SUBJECTIVE AND OBJECTIVE BOX
[facility] NEPHROLOGY SERVICES, [facility]  NEPHROLOGY AND HYPERTENSION  [address]  [address]  [place] [address]  [phone]    MD GIANNI RICO MD ANDREY GONCHARUK, MD MADHU KORRAPATI, MD YELENA ROSENBERG, MD BINNY KOSHY, MD CHRISTOPHER CAPUTO, MD EDWARD BOVER, MD      Information from chart:  · HPI Objective Statement: 61f hx htn, dm, hld, copd, bladder ca pw s/p multiple falls. non compliant with meds. felt a loss of balance since that time. symptoms are worse when she sits up abruptly. also notes diffuse muscle pain and blurred vision with pressure behind eyes. also notes left hand numbness and tingling. some nausea without vomiting. no cp, sob, rash, bleeding, dysuria, vaginal discharge, rhinorrhea. active smoker  ·    Patient with multiple falls;   Noted Cr 3.47;   Poor po intake;   Denies gross hematuria. Hx of bladder cancer August 2020, post tumor resection     PAST MEDICAL & SURGICAL HISTORY:  Bladder cancer    Obesity    DM (diabetes mellitus)    HTN (hypertension)    Smoker    Emphysema lung    Hypothyroid    Agoraphobia    COPD (chronic obstructive pulmonary disease)    Anxiety    S/P cholecystectomy      FAMILY HISTORY:    Allergies    No Known Allergies    Intolerances      Home Medications:  Anoro Ellipta 62.5 mcg-25 mcg/inh inhalation powder: 1 puff(s) inhaled once a day (17 Mar 2021 15:43)  Basaglar KwikPen 100 units/mL subcutaneous solution: 30 unit(s) subcutaneous 2 times a day (15 Sep 2020 04:10)  escitalopram 20 mg oral tablet: 1 tab(s) orally once a day (04 May 2020 08:26)  hydrOXYzine hydrochloride 50 mg oral tablet: 1 tab(s) orally every 8 hours, As needed, Itching (04 May 2020 08:26)  Januvia 100 mg oral tablet: 100 milligram(s) orally once a day (04 May 2020 08:26)  levothyroxine 150 mcg (0.15 mg) oral tablet: 1 tab(s) orally once a day (04 May 2020 08:26)  tiotropium 18 mcg inhalation capsule: 1 cap(s) inhaled once a day (18 Feb 2021 18:36)    MEDICATIONS  (STANDING):  lactated ringers Bolus 1000 milliLiter(s) IV Bolus once  lactated ringers Bolus 1000 milliLiter(s) IV Bolus once    MEDICATIONS  (PRN):    Vital Signs Last 24 Hrs  T(C): 36.7 (17 Mar 2021 15:46), Max: 36.7 (17 Mar 2021 15:46)  T(F): 98.1 (17 Mar 2021 15:46), Max: 98.1 (17 Mar 2021 15:46)  HR: 88 (17 Mar 2021 15:46) (76 - 88)  BP: 130/68 (17 Mar 2021 17:55) (109/64 - 130/68)  BP(mean): --  RR: 18 (17 Mar 2021 15:46) (18 - 20)  SpO2: 92% (17 Mar 2021 15:46) (91% - 92%)    Daily Height in cm: 154.94 (17 Mar 2021 15:17)    Daily     CAPILLARY BLOOD GLUCOSE      POCT Blood Glucose.: 130 mg/dL (17 Mar 2021 15:21)    PHYSICAL EXAM:      T(C): 36.7 (03-17-21 @ 15:46), Max: 36.7 (03-17-21 @ 15:46)  HR: 88 (03-17-21 @ 15:46) (76 - 88)  BP: 130/68 (03-17-21 @ 17:55) (109/64 - 130/68)  RR: 18 (03-17-21 @ 15:46) (18 - 20)  SpO2: 92% (03-17-21 @ 15:46) (91% - 92%)  Wt(kg): --  Lungs clear  Heart S1S2  Abd soft NT ND  Extremities:   tr edema              03-17    132<L>  |  95<L>  |  47<H>  ----------------------------<  75  4.7   |  26  |  3.45<H>    Ca    9.4      17 Mar 2021 17:34  Mg     2.6     03-17    TPro  7.2  /  Alb  3.6  /  TBili  0.5  /  DBili  x   /  AST  88<H>  /  ALT  33  /  AlkPhos  83  03-17                          13.3   20.27 )-----------( 337      ( 17 Mar 2021 17:34 )             41.1     Creatinine Trend: 3.45<--, 0.85<--, 0.79<--, 0.99<--          Assessment   VESTA, DDX pre renal azotemia, r/o rhabdomyolysis related ATN, post renal factors related to bladder malignancy.   Leukocytosis;     Plan  IVF bolus and maintenance   CT abd pelvis; follow CPK  Pan culture  Further recommendations pending course.      Drew Ctotrell MD

## 2021-03-17 NOTE — ED ADULT NURSE NOTE - CHIEF COMPLAINT QUOTE
62 y/o female with PMH of HTN, T2DM, HLD, & COPD, BIBA with c/c of muscle pain, dizziness and falls. Pt was seen today by [facility] Paramedics and sent for evaluation due to having multiple falls yesterday and 1 fall today due to dizziness and muscle weakness. pt denies any LOC, blood thinner use,

## 2021-03-17 NOTE — H&P ADULT - NSHPPHYSICALEXAM_GEN_ALL_CORE
PHYSICAL EXAMINATION:  Vital Signs Last 24 Hrs  T(C): 36.7 (17 Mar 2021 15:46), Max: 36.7 (17 Mar 2021 15:46)  T(F): 98.1 (17 Mar 2021 15:46), Max: 98.1 (17 Mar 2021 15:46)  HR: 88 (17 Mar 2021 15:46) (76 - 88)  BP: 130/68 (17 Mar 2021 17:55) (109/64 - 130/68)  BP(mean): --  RR: 18 (17 Mar 2021 15:46) (18 - 20)  SpO2: 92% (17 Mar 2021 15:46) (91% - 92%)  CAPILLARY BLOOD GLUCOSE      POCT Blood Glucose.: 130 mg/dL (17 Mar 2021 15:21)      GENERAL: NAD, well-groomed, well-developed  HEAD:  atraumatic, normocephalic  EYES: sclera anicteric  ENMT: mucous membranes moist  NECK: supple, No JVD  CHEST/LUNG: clear to auscultation bilaterally; no rales, rhonchi, or wheezing b/l  HEART: normal S1, S2  ABDOMEN: BS+, soft, ND, NT   EXTREMITIES:  pulses palpable; no clubbing, cyanosis, or edema b/l LEs  NEURO: awake, alert, interactive; moves all extremities  SKIN: no rashes or lesions PHYSICAL EXAMINATION:  Vital Signs Last 24 Hrs  T(C): 36.7 (17 Mar 2021 15:46), Max: 36.7 (17 Mar 2021 15:46)  T(F): 98.1 (17 Mar 2021 15:46), Max: 98.1 (17 Mar 2021 15:46)  HR: 88 (17 Mar 2021 15:46) (76 - 88)  BP: 130/68 (17 Mar 2021 17:55) (109/64 - 130/68)  BP(mean): --  RR: 18 (17 Mar 2021 15:46) (18 - 20)  SpO2: 92% (17 Mar 2021 15:46) (91% - 92%)  CAPILLARY BLOOD GLUCOSE      POCT Blood Glucose.: 130 mg/dL (17 Mar 2021 15:21)      GENERAL: NAD, well-groomed, well-developed  seen at ultrasound, sleeping comfortable, no CP or SOB  HEAD:  atraumatic, normocephalic  EYES: sclera anicteric  ENMT: mucous membranes moist  NECK: supple, No JVD  CHEST/LUNG: clear to auscultation bilaterally; no rales, rhonchi, or wheezing b/l  HEART: normal S1, S2  ABDOMEN: BS+, soft, ND, NT   EXTREMITIES:  pulses palpable; no clubbing, cyanosis, or edema b/l LEs  NEURO: awake, alert, interactive; moves all extremities  SKIN: no rashes or lesions

## 2021-03-17 NOTE — H&P ADULT - HISTORY OF PRESENT ILLNESS
61 year old female PMH htn, dm, hld, copd, bladder ca pw s/p multiple falls. non compliant with meds. felt a loss of balance since that time. symptoms are worse when she sits up abruptly. Also notes diffuse muscle pain and blurred vision with pressure behind eyes. also notes left hand numbness and tingling. some nausea without vomiting. no cp, sob, rash, bleeding, dysuria, vaginal discharge, rhinorrhea. active smoker.     Admitted for VESTA with increased CPK.

## 2021-03-18 LAB
ALBUMIN SERPL ELPH-MCNC: 3 G/DL — LOW (ref 3.3–5)
ALP SERPL-CCNC: 77 U/L — SIGNIFICANT CHANGE UP (ref 40–120)
ALT FLD-CCNC: 27 U/L — SIGNIFICANT CHANGE UP (ref 12–78)
ANION GAP SERPL CALC-SCNC: 10 MMOL/L — SIGNIFICANT CHANGE UP (ref 5–17)
AST SERPL-CCNC: 58 U/L — HIGH (ref 15–37)
BASOPHILS # BLD AUTO: 0.05 K/UL — SIGNIFICANT CHANGE UP (ref 0–0.2)
BASOPHILS NFR BLD AUTO: 0.3 % — SIGNIFICANT CHANGE UP (ref 0–2)
BILIRUB SERPL-MCNC: 0.4 MG/DL — SIGNIFICANT CHANGE UP (ref 0.2–1.2)
BUN SERPL-MCNC: 42 MG/DL — HIGH (ref 7–23)
CALCIUM SERPL-MCNC: 8.8 MG/DL — SIGNIFICANT CHANGE UP (ref 8.5–10.1)
CHLORIDE SERPL-SCNC: 99 MMOL/L — SIGNIFICANT CHANGE UP (ref 96–108)
CK SERPL-CCNC: 1040 U/L — HIGH (ref 26–192)
CO2 SERPL-SCNC: 26 MMOL/L — SIGNIFICANT CHANGE UP (ref 22–31)
COVID-19 SPIKE DOMAIN AB INTERP: NEGATIVE — SIGNIFICANT CHANGE UP
COVID-19 SPIKE DOMAIN ANTIBODY RESULT: 0.4 U/ML — SIGNIFICANT CHANGE UP
CREAT SERPL-MCNC: 2.56 MG/DL — HIGH (ref 0.5–1.3)
CULTURE RESULTS: NO GROWTH — SIGNIFICANT CHANGE UP
EOSINOPHIL # BLD AUTO: 0.53 K/UL — HIGH (ref 0–0.5)
EOSINOPHIL NFR BLD AUTO: 3.3 % — SIGNIFICANT CHANGE UP (ref 0–6)
GLUCOSE BLDC GLUCOMTR-MCNC: 106 MG/DL — HIGH (ref 70–99)
GLUCOSE BLDC GLUCOMTR-MCNC: 141 MG/DL — HIGH (ref 70–99)
GLUCOSE BLDC GLUCOMTR-MCNC: 254 MG/DL — HIGH (ref 70–99)
GLUCOSE BLDC GLUCOMTR-MCNC: 270 MG/DL — HIGH (ref 70–99)
GLUCOSE BLDC GLUCOMTR-MCNC: 372 MG/DL — HIGH (ref 70–99)
GLUCOSE SERPL-MCNC: 95 MG/DL — SIGNIFICANT CHANGE UP (ref 70–99)
HCT VFR BLD CALC: 39.2 % — SIGNIFICANT CHANGE UP (ref 34.5–45)
HGB BLD-MCNC: 12.5 G/DL — SIGNIFICANT CHANGE UP (ref 11.5–15.5)
IMM GRANULOCYTES NFR BLD AUTO: 0.8 % — SIGNIFICANT CHANGE UP (ref 0–1.5)
LYMPHOCYTES # BLD AUTO: 19.2 % — SIGNIFICANT CHANGE UP (ref 13–44)
LYMPHOCYTES # BLD AUTO: 3.08 K/UL — SIGNIFICANT CHANGE UP (ref 1–3.3)
MCHC RBC-ENTMCNC: 29.3 PG — SIGNIFICANT CHANGE UP (ref 27–34)
MCHC RBC-ENTMCNC: 31.9 GM/DL — LOW (ref 32–36)
MCV RBC AUTO: 92 FL — SIGNIFICANT CHANGE UP (ref 80–100)
MONOCYTES # BLD AUTO: 1.01 K/UL — HIGH (ref 0–0.9)
MONOCYTES NFR BLD AUTO: 6.3 % — SIGNIFICANT CHANGE UP (ref 2–14)
NEUTROPHILS # BLD AUTO: 11.22 K/UL — HIGH (ref 1.8–7.4)
NEUTROPHILS NFR BLD AUTO: 70.1 % — SIGNIFICANT CHANGE UP (ref 43–77)
NRBC # BLD: 0 /100 WBCS — SIGNIFICANT CHANGE UP (ref 0–0)
PLATELET # BLD AUTO: 263 K/UL — SIGNIFICANT CHANGE UP (ref 150–400)
POTASSIUM SERPL-MCNC: 3.8 MMOL/L — SIGNIFICANT CHANGE UP (ref 3.5–5.3)
POTASSIUM SERPL-SCNC: 3.8 MMOL/L — SIGNIFICANT CHANGE UP (ref 3.5–5.3)
PROT SERPL-MCNC: 6.1 GM/DL — SIGNIFICANT CHANGE UP (ref 6–8.3)
RBC # BLD: 4.26 M/UL — SIGNIFICANT CHANGE UP (ref 3.8–5.2)
RBC # FLD: 16.6 % — HIGH (ref 10.3–14.5)
SARS-COV-2 IGG+IGM SERPL QL IA: 0.4 U/ML — SIGNIFICANT CHANGE UP
SARS-COV-2 IGG+IGM SERPL QL IA: NEGATIVE — SIGNIFICANT CHANGE UP
SODIUM SERPL-SCNC: 135 MMOL/L — SIGNIFICANT CHANGE UP (ref 135–145)
SPECIMEN SOURCE: SIGNIFICANT CHANGE UP
TSH SERPL-MCNC: 22.2 UIU/ML — HIGH (ref 0.36–3.74)
WBC # BLD: 16.02 K/UL — HIGH (ref 3.8–10.5)
WBC # FLD AUTO: 16.02 K/UL — HIGH (ref 3.8–10.5)

## 2021-03-18 RX ORDER — SODIUM CHLORIDE 9 MG/ML
1000 INJECTION, SOLUTION INTRAVENOUS
Refills: 0 | Status: DISCONTINUED | OUTPATIENT
Start: 2021-03-18 | End: 2021-03-24

## 2021-03-18 RX ORDER — DEXTROSE 50 % IN WATER 50 %
15 SYRINGE (ML) INTRAVENOUS ONCE
Refills: 0 | Status: DISCONTINUED | OUTPATIENT
Start: 2021-03-18 | End: 2021-03-24

## 2021-03-18 RX ORDER — DEXTROSE 50 % IN WATER 50 %
12.5 SYRINGE (ML) INTRAVENOUS ONCE
Refills: 0 | Status: DISCONTINUED | OUTPATIENT
Start: 2021-03-18 | End: 2021-03-24

## 2021-03-18 RX ORDER — INSULIN LISPRO 100/ML
VIAL (ML) SUBCUTANEOUS
Refills: 0 | Status: DISCONTINUED | OUTPATIENT
Start: 2021-03-18 | End: 2021-03-24

## 2021-03-18 RX ORDER — CLONAZEPAM 1 MG
1 TABLET ORAL
Refills: 0 | Status: DISCONTINUED | OUTPATIENT
Start: 2021-03-18 | End: 2021-03-24

## 2021-03-18 RX ORDER — INSULIN LISPRO 100/ML
VIAL (ML) SUBCUTANEOUS AT BEDTIME
Refills: 0 | Status: DISCONTINUED | OUTPATIENT
Start: 2021-03-18 | End: 2021-03-24

## 2021-03-18 RX ORDER — DEXTROSE 50 % IN WATER 50 %
25 SYRINGE (ML) INTRAVENOUS ONCE
Refills: 0 | Status: DISCONTINUED | OUTPATIENT
Start: 2021-03-18 | End: 2021-03-24

## 2021-03-18 RX ORDER — TRAMADOL HYDROCHLORIDE 50 MG/1
25 TABLET ORAL ONCE
Refills: 0 | Status: DISCONTINUED | OUTPATIENT
Start: 2021-03-18 | End: 2021-03-18

## 2021-03-18 RX ORDER — GLUCAGON INJECTION, SOLUTION 0.5 MG/.1ML
1 INJECTION, SOLUTION SUBCUTANEOUS ONCE
Refills: 0 | Status: DISCONTINUED | OUTPATIENT
Start: 2021-03-18 | End: 2021-03-24

## 2021-03-18 RX ORDER — INSULIN GLARGINE 100 [IU]/ML
20 INJECTION, SOLUTION SUBCUTANEOUS AT BEDTIME
Refills: 0 | Status: DISCONTINUED | OUTPATIENT
Start: 2021-03-18 | End: 2021-03-24

## 2021-03-18 RX ORDER — NYSTATIN CREAM 100000 [USP'U]/G
1 CREAM TOPICAL
Refills: 0 | Status: DISCONTINUED | OUTPATIENT
Start: 2021-03-18 | End: 2021-03-24

## 2021-03-18 RX ADMIN — Medication 1 MILLIGRAM(S): at 23:32

## 2021-03-18 RX ADMIN — ESCITALOPRAM OXALATE 20 MILLIGRAM(S): 10 TABLET, FILM COATED ORAL at 12:31

## 2021-03-18 RX ADMIN — Medication 3 MILLILITER(S): at 00:45

## 2021-03-18 RX ADMIN — INSULIN GLARGINE 20 UNIT(S): 100 INJECTION, SOLUTION SUBCUTANEOUS at 23:14

## 2021-03-18 RX ADMIN — SODIUM CHLORIDE 100 MILLILITER(S): 9 INJECTION, SOLUTION INTRAVENOUS at 23:34

## 2021-03-18 RX ADMIN — NYSTATIN CREAM 1 APPLICATION(S): 100000 CREAM TOPICAL at 17:48

## 2021-03-18 RX ADMIN — Medication 2: at 23:14

## 2021-03-18 RX ADMIN — Medication 1 MILLIGRAM(S): at 17:46

## 2021-03-18 RX ADMIN — BUDESONIDE AND FORMOTEROL FUMARATE DIHYDRATE 2 PUFF(S): 160; 4.5 AEROSOL RESPIRATORY (INHALATION) at 17:47

## 2021-03-18 RX ADMIN — Medication 3 MILLILITER(S): at 11:07

## 2021-03-18 RX ADMIN — Medication 1 MILLIGRAM(S): at 12:31

## 2021-03-18 RX ADMIN — TRAMADOL HYDROCHLORIDE 25 MILLIGRAM(S): 50 TABLET ORAL at 14:30

## 2021-03-18 RX ADMIN — SODIUM CHLORIDE 100 MILLILITER(S): 9 INJECTION, SOLUTION INTRAVENOUS at 00:26

## 2021-03-18 RX ADMIN — Medication 10: at 16:36

## 2021-03-18 RX ADMIN — ATORVASTATIN CALCIUM 10 MILLIGRAM(S): 80 TABLET, FILM COATED ORAL at 23:14

## 2021-03-18 RX ADMIN — MONTELUKAST 10 MILLIGRAM(S): 4 TABLET, CHEWABLE ORAL at 12:31

## 2021-03-18 RX ADMIN — TIOTROPIUM BROMIDE 1 CAPSULE(S): 18 CAPSULE ORAL; RESPIRATORY (INHALATION) at 12:31

## 2021-03-18 RX ADMIN — LOSARTAN POTASSIUM 50 MILLIGRAM(S): 100 TABLET, FILM COATED ORAL at 05:09

## 2021-03-18 RX ADMIN — Medication 3 MILLILITER(S): at 17:10

## 2021-03-18 RX ADMIN — BUDESONIDE AND FORMOTEROL FUMARATE DIHYDRATE 2 PUFF(S): 160; 4.5 AEROSOL RESPIRATORY (INHALATION) at 05:09

## 2021-03-18 RX ADMIN — Medication 150 MICROGRAM(S): at 05:09

## 2021-03-18 RX ADMIN — SODIUM CHLORIDE 100 MILLILITER(S): 9 INJECTION, SOLUTION INTRAVENOUS at 12:28

## 2021-03-18 RX ADMIN — Medication 3 MILLILITER(S): at 05:21

## 2021-03-18 RX ADMIN — TRAMADOL HYDROCHLORIDE 25 MILLIGRAM(S): 50 TABLET ORAL at 15:30

## 2021-03-18 NOTE — PHYSICAL THERAPY INITIAL EVALUATION ADULT - ADDITIONAL COMMENTS
Pt lives in basement apartment with full flight with 1 handrail. Once inside there are no further steps to negotiate. Pt has begun to ambulate with a rolling walker recently, has no other dme. Pt uses 3L nasal canula, mostly household ambulator. Pt with a (+) history of falls.

## 2021-03-18 NOTE — PHYSICAL THERAPY INITIAL EVALUATION ADULT - GENERAL OBSERVATIONS, REHAB EVAL
Pt encountered supine, alert and oriented x4, 3L supplemental oxygen  via nasal canula and primafit in place, NAD.

## 2021-03-18 NOTE — PHYSICAL THERAPY INITIAL EVALUATION ADULT - GAIT TRAINING, PT EVAL
pt will be able to ambulate >500 feet with appropriate device for return to short distances into community x6 weeks

## 2021-03-18 NOTE — PHYSICAL THERAPY INITIAL EVALUATION ADULT - PERTINENT HX OF CURRENT PROBLEM, REHAB EVAL
61 year old female PMH htn, dm, hld, copd, bladder ca pw s/p multiple falls. non compliant with meds. felt a loss of balance since that time. symptoms are worse when she sits up abruptly. Also notes diffuse muscle pain and blurred vision with pressure behind eyes. also notes left hand numbness and tingling. some nausea without vomiting. no cp, sob, rash, bleeding, dysuria, vaginal discharge, rhinorrhea. active smoker. Admitted for VESTA with increased CPK. imaging negative for acute changes.

## 2021-03-19 LAB
ALBUMIN SERPL ELPH-MCNC: 2.9 G/DL — LOW (ref 3.3–5)
ALP SERPL-CCNC: 70 U/L — SIGNIFICANT CHANGE UP (ref 40–120)
ALT FLD-CCNC: 22 U/L — SIGNIFICANT CHANGE UP (ref 12–78)
ANION GAP SERPL CALC-SCNC: 4 MMOL/L — LOW (ref 5–17)
AST SERPL-CCNC: 29 U/L — SIGNIFICANT CHANGE UP (ref 15–37)
BILIRUB SERPL-MCNC: 0.4 MG/DL — SIGNIFICANT CHANGE UP (ref 0.2–1.2)
BUN SERPL-MCNC: 18 MG/DL — SIGNIFICANT CHANGE UP (ref 7–23)
CALCIUM SERPL-MCNC: 8.5 MG/DL — SIGNIFICANT CHANGE UP (ref 8.5–10.1)
CHLORIDE SERPL-SCNC: 104 MMOL/L — SIGNIFICANT CHANGE UP (ref 96–108)
CK SERPL-CCNC: 469 U/L — HIGH (ref 26–192)
CO2 SERPL-SCNC: 30 MMOL/L — SIGNIFICANT CHANGE UP (ref 22–31)
CREAT SERPL-MCNC: 1.05 MG/DL — SIGNIFICANT CHANGE UP (ref 0.5–1.3)
GLUCOSE BLDC GLUCOMTR-MCNC: 183 MG/DL — HIGH (ref 70–99)
GLUCOSE BLDC GLUCOMTR-MCNC: 186 MG/DL — HIGH (ref 70–99)
GLUCOSE BLDC GLUCOMTR-MCNC: 215 MG/DL — HIGH (ref 70–99)
GLUCOSE BLDC GLUCOMTR-MCNC: 268 MG/DL — HIGH (ref 70–99)
GLUCOSE SERPL-MCNC: 219 MG/DL — HIGH (ref 70–99)
HCT VFR BLD CALC: 35.4 % — SIGNIFICANT CHANGE UP (ref 34.5–45)
HGB BLD-MCNC: 11.5 G/DL — SIGNIFICANT CHANGE UP (ref 11.5–15.5)
MCHC RBC-ENTMCNC: 29.6 PG — SIGNIFICANT CHANGE UP (ref 27–34)
MCHC RBC-ENTMCNC: 32.5 GM/DL — SIGNIFICANT CHANGE UP (ref 32–36)
MCV RBC AUTO: 91.2 FL — SIGNIFICANT CHANGE UP (ref 80–100)
NRBC # BLD: 0 /100 WBCS — SIGNIFICANT CHANGE UP (ref 0–0)
PLATELET # BLD AUTO: 260 K/UL — SIGNIFICANT CHANGE UP (ref 150–400)
POTASSIUM SERPL-MCNC: 4.2 MMOL/L — SIGNIFICANT CHANGE UP (ref 3.5–5.3)
POTASSIUM SERPL-SCNC: 4.2 MMOL/L — SIGNIFICANT CHANGE UP (ref 3.5–5.3)
PROT SERPL-MCNC: 6.2 GM/DL — SIGNIFICANT CHANGE UP (ref 6–8.3)
RBC # BLD: 3.88 M/UL — SIGNIFICANT CHANGE UP (ref 3.8–5.2)
RBC # FLD: 17 % — HIGH (ref 10.3–14.5)
SODIUM SERPL-SCNC: 138 MMOL/L — SIGNIFICANT CHANGE UP (ref 135–145)
WBC # BLD: 14.11 K/UL — HIGH (ref 3.8–10.5)
WBC # FLD AUTO: 14.11 K/UL — HIGH (ref 3.8–10.5)

## 2021-03-19 RX ORDER — ACETAMINOPHEN 500 MG
650 TABLET ORAL EVERY 6 HOURS
Refills: 0 | Status: DISCONTINUED | OUTPATIENT
Start: 2021-03-19 | End: 2021-03-24

## 2021-03-19 RX ADMIN — Medication 3 MILLILITER(S): at 00:37

## 2021-03-19 RX ADMIN — Medication 1 MILLIGRAM(S): at 17:10

## 2021-03-19 RX ADMIN — BUDESONIDE AND FORMOTEROL FUMARATE DIHYDRATE 2 PUFF(S): 160; 4.5 AEROSOL RESPIRATORY (INHALATION) at 06:01

## 2021-03-19 RX ADMIN — NYSTATIN CREAM 1 APPLICATION(S): 100000 CREAM TOPICAL at 17:11

## 2021-03-19 RX ADMIN — Medication 3 MILLILITER(S): at 11:23

## 2021-03-19 RX ADMIN — INSULIN GLARGINE 20 UNIT(S): 100 INJECTION, SOLUTION SUBCUTANEOUS at 21:26

## 2021-03-19 RX ADMIN — Medication 2: at 17:11

## 2021-03-19 RX ADMIN — Medication 2: at 21:27

## 2021-03-19 RX ADMIN — Medication 150 MICROGRAM(S): at 05:41

## 2021-03-19 RX ADMIN — MONTELUKAST 10 MILLIGRAM(S): 4 TABLET, CHEWABLE ORAL at 12:01

## 2021-03-19 RX ADMIN — Medication 3 MILLILITER(S): at 23:35

## 2021-03-19 RX ADMIN — Medication 650 MILLIGRAM(S): at 22:00

## 2021-03-19 RX ADMIN — Medication 3 MILLILITER(S): at 17:04

## 2021-03-19 RX ADMIN — Medication 650 MILLIGRAM(S): at 20:59

## 2021-03-19 RX ADMIN — Medication 1 MILLIGRAM(S): at 23:49

## 2021-03-19 RX ADMIN — Medication 1 MILLIGRAM(S): at 05:40

## 2021-03-19 RX ADMIN — BUDESONIDE AND FORMOTEROL FUMARATE DIHYDRATE 2 PUFF(S): 160; 4.5 AEROSOL RESPIRATORY (INHALATION) at 17:11

## 2021-03-19 RX ADMIN — Medication 3 MILLILITER(S): at 05:40

## 2021-03-19 RX ADMIN — NYSTATIN CREAM 1 APPLICATION(S): 100000 CREAM TOPICAL at 05:40

## 2021-03-19 RX ADMIN — ATORVASTATIN CALCIUM 10 MILLIGRAM(S): 80 TABLET, FILM COATED ORAL at 21:26

## 2021-03-19 RX ADMIN — Medication 1 MILLIGRAM(S): at 12:05

## 2021-03-19 RX ADMIN — TIOTROPIUM BROMIDE 1 CAPSULE(S): 18 CAPSULE ORAL; RESPIRATORY (INHALATION) at 12:01

## 2021-03-19 RX ADMIN — Medication 4: at 12:00

## 2021-03-19 RX ADMIN — ESCITALOPRAM OXALATE 20 MILLIGRAM(S): 10 TABLET, FILM COATED ORAL at 12:01

## 2021-03-19 RX ADMIN — Medication 2: at 08:18

## 2021-03-20 LAB
ANION GAP SERPL CALC-SCNC: 4 MMOL/L — LOW (ref 5–17)
BUN SERPL-MCNC: 9 MG/DL — SIGNIFICANT CHANGE UP (ref 7–23)
CALCIUM SERPL-MCNC: 8.6 MG/DL — SIGNIFICANT CHANGE UP (ref 8.5–10.1)
CHLORIDE SERPL-SCNC: 104 MMOL/L — SIGNIFICANT CHANGE UP (ref 96–108)
CK SERPL-CCNC: 219 U/L — HIGH (ref 26–192)
CO2 SERPL-SCNC: 34 MMOL/L — HIGH (ref 22–31)
CREAT SERPL-MCNC: 0.78 MG/DL — SIGNIFICANT CHANGE UP (ref 0.5–1.3)
GLUCOSE BLDC GLUCOMTR-MCNC: 191 MG/DL — HIGH (ref 70–99)
GLUCOSE BLDC GLUCOMTR-MCNC: 226 MG/DL — HIGH (ref 70–99)
GLUCOSE BLDC GLUCOMTR-MCNC: 242 MG/DL — HIGH (ref 70–99)
GLUCOSE BLDC GLUCOMTR-MCNC: 246 MG/DL — HIGH (ref 70–99)
GLUCOSE SERPL-MCNC: 183 MG/DL — HIGH (ref 70–99)
HCT VFR BLD CALC: 34.5 % — SIGNIFICANT CHANGE UP (ref 34.5–45)
HGB BLD-MCNC: 11 G/DL — LOW (ref 11.5–15.5)
MCHC RBC-ENTMCNC: 29.4 PG — SIGNIFICANT CHANGE UP (ref 27–34)
MCHC RBC-ENTMCNC: 31.9 GM/DL — LOW (ref 32–36)
MCV RBC AUTO: 92.2 FL — SIGNIFICANT CHANGE UP (ref 80–100)
NRBC # BLD: 0 /100 WBCS — SIGNIFICANT CHANGE UP (ref 0–0)
PLATELET # BLD AUTO: 245 K/UL — SIGNIFICANT CHANGE UP (ref 150–400)
POTASSIUM SERPL-MCNC: 3.8 MMOL/L — SIGNIFICANT CHANGE UP (ref 3.5–5.3)
POTASSIUM SERPL-SCNC: 3.8 MMOL/L — SIGNIFICANT CHANGE UP (ref 3.5–5.3)
RBC # BLD: 3.74 M/UL — LOW (ref 3.8–5.2)
RBC # FLD: 16.4 % — HIGH (ref 10.3–14.5)
SODIUM SERPL-SCNC: 142 MMOL/L — SIGNIFICANT CHANGE UP (ref 135–145)
WBC # BLD: 11.71 K/UL — HIGH (ref 3.8–10.5)
WBC # FLD AUTO: 11.71 K/UL — HIGH (ref 3.8–10.5)

## 2021-03-20 RX ORDER — ONDANSETRON 8 MG/1
4 TABLET, FILM COATED ORAL ONCE
Refills: 0 | Status: COMPLETED | OUTPATIENT
Start: 2021-03-20 | End: 2021-03-20

## 2021-03-20 RX ADMIN — Medication 1 MILLIGRAM(S): at 12:12

## 2021-03-20 RX ADMIN — Medication 4: at 15:58

## 2021-03-20 RX ADMIN — ESCITALOPRAM OXALATE 20 MILLIGRAM(S): 10 TABLET, FILM COATED ORAL at 12:12

## 2021-03-20 RX ADMIN — NYSTATIN CREAM 1 APPLICATION(S): 100000 CREAM TOPICAL at 17:17

## 2021-03-20 RX ADMIN — SODIUM CHLORIDE 100 MILLILITER(S): 9 INJECTION, SOLUTION INTRAVENOUS at 06:04

## 2021-03-20 RX ADMIN — Medication 150 MICROGRAM(S): at 06:03

## 2021-03-20 RX ADMIN — ATORVASTATIN CALCIUM 10 MILLIGRAM(S): 80 TABLET, FILM COATED ORAL at 21:45

## 2021-03-20 RX ADMIN — Medication 1 MILLIGRAM(S): at 06:03

## 2021-03-20 RX ADMIN — TIOTROPIUM BROMIDE 1 CAPSULE(S): 18 CAPSULE ORAL; RESPIRATORY (INHALATION) at 12:12

## 2021-03-20 RX ADMIN — Medication 650 MILLIGRAM(S): at 20:41

## 2021-03-20 RX ADMIN — Medication 3 MILLILITER(S): at 05:38

## 2021-03-20 RX ADMIN — SODIUM CHLORIDE 100 MILLILITER(S): 9 INJECTION, SOLUTION INTRAVENOUS at 12:20

## 2021-03-20 RX ADMIN — Medication 4: at 12:11

## 2021-03-20 RX ADMIN — Medication 650 MILLIGRAM(S): at 19:46

## 2021-03-20 RX ADMIN — BUDESONIDE AND FORMOTEROL FUMARATE DIHYDRATE 2 PUFF(S): 160; 4.5 AEROSOL RESPIRATORY (INHALATION) at 17:17

## 2021-03-20 RX ADMIN — Medication 650 MILLIGRAM(S): at 06:03

## 2021-03-20 RX ADMIN — INSULIN GLARGINE 20 UNIT(S): 100 INJECTION, SOLUTION SUBCUTANEOUS at 21:45

## 2021-03-20 RX ADMIN — MONTELUKAST 10 MILLIGRAM(S): 4 TABLET, CHEWABLE ORAL at 12:12

## 2021-03-20 RX ADMIN — Medication 2: at 08:20

## 2021-03-20 RX ADMIN — NYSTATIN CREAM 1 APPLICATION(S): 100000 CREAM TOPICAL at 06:04

## 2021-03-20 RX ADMIN — Medication 3 MILLILITER(S): at 17:01

## 2021-03-20 RX ADMIN — BUDESONIDE AND FORMOTEROL FUMARATE DIHYDRATE 2 PUFF(S): 160; 4.5 AEROSOL RESPIRATORY (INHALATION) at 06:04

## 2021-03-20 RX ADMIN — Medication 650 MILLIGRAM(S): at 08:22

## 2021-03-20 RX ADMIN — Medication 1 MILLIGRAM(S): at 23:41

## 2021-03-20 RX ADMIN — Medication 1 MILLIGRAM(S): at 17:17

## 2021-03-20 NOTE — DIETITIAN INITIAL EVALUATION ADULT. - PERTINENT LABORATORY DATA
03-19 Na138 mmol/L Glu 219 mg/dL<H> K+ 4.2 mmol/L Cr  1.05 mg/dL BUN 18 mg/dL 03-19 Alb 2.9 g/dL<L>  03-18 HbA1c 10.2% <H>  POCT Glucose (3/19) 268, 183, 215, 186.

## 2021-03-20 NOTE — DIETITIAN NUTRITION RISK NOTIFICATION - TREATMENT: THE FOLLOWING DIET HAS BEEN RECOMMENDED
Diet, Consistent Carbohydrate w/Evening Snack:   DASH/TLC {Sodium & Cholesterol Restricted} (03-20-21 @ 09:22) [Pending Verification By Attending]  Diet, Consistent Carbohydrate Renal/No Snacks (03-17-21 @ 19:34) [Active]

## 2021-03-20 NOTE — DIETITIAN INITIAL EVALUATION ADULT. - ETIOLOGY
physiological causes (T2DM) requiring careful timing & consistency of CHO; food & nutrition-related knowledge deficit. excessive energy intake and lack of physical activity

## 2021-03-20 NOTE — DIETITIAN INITIAL EVALUATION ADULT. - OTHER INFO
Pt adm w/VESTA. (Noted Renal labs, K-WNL since admission, BUN/Creatinine WNL- 3/19). Per chart pt w/non-compliance to meds. Met w/pt at bedside, pt reports good appetite and PO intake. Pt reports mild occasional nausea but denies vomiting/diarrhea/constipation. PTA pt reports good appetite. PTA pt was taking Prednisone (can cause hyperglycemia), Januvia, and Basaglar 30u x 2/day (pt reports being compliant w/same). Pt was not following any specific diets, states, "I know I am supposed to be on a diabetic diet". Pt was also not checking fingersticks, states, "may be once a day". PTA pt was living w/family & was doing food shopping & cooking herself. Pt reports UBW of ~255-259# and no recent wt changes. Per chart D/C plan likely is to EN. Diet education provided on Diabetes & Nutrition, Weight Management, Low Sodium diet and compliance encouraged. Discussed BG goals and healthy eating habits w/portion control for gradual wt loss as well as improved glycemic control. Pt seemed uninterested to diet education, accepted written handouts. Pt made aware RD remains available.

## 2021-03-20 NOTE — DIETITIAN INITIAL EVALUATION ADULT. - SIGNS/SYMPTOMS
BMI= 49.7 (Morbid Obesity). hyperglycemia, HbA1c of 10.2%, pt verbalizes incomplete knowledge of therapeutic diet.

## 2021-03-20 NOTE — DIETITIAN INITIAL EVALUATION ADULT. - PERTINENT MEDS FT
MEDICATIONS  (STANDING):  albuterol/ipratropium for Nebulization 3 milliLiter(s) Nebulizer every 6 hours  atorvastatin 10 milliGRAM(s) Oral at bedtime  budesonide 160 MICROgram(s)/formoterol 4.5 MICROgram(s) Inhaler 2 Puff(s) Inhalation two times a day  clonazePAM  Tablet 1 milliGRAM(s) Oral four times a day  dextrose 40% Gel 15 Gram(s) Oral once  dextrose 5%. 1000 milliLiter(s) (50 mL/Hr) IV Continuous <Continuous>  dextrose 5%. 1000 milliLiter(s) (100 mL/Hr) IV Continuous <Continuous>  dextrose 50% Injectable 25 Gram(s) IV Push once  dextrose 50% Injectable 12.5 Gram(s) IV Push once  dextrose 50% Injectable 25 Gram(s) IV Push once  escitalopram 20 milliGRAM(s) Oral daily  glucagon  Injectable 1 milliGRAM(s) IntraMuscular once  influenza   Vaccine 0.5 milliLiter(s) IntraMuscular once  insulin glargine Injectable (LANTUS) 20 Unit(s) SubCutaneous at bedtime  insulin lispro (ADMELOG) corrective regimen sliding scale   SubCutaneous three times a day before meals  insulin lispro (ADMELOG) corrective regimen sliding scale   SubCutaneous at bedtime  lactated ringers. 1000 milliLiter(s) (100 mL/Hr) IV Continuous <Continuous>  levothyroxine 150 MICROGram(s) Oral daily  montelukast 10 milliGRAM(s) Oral daily  nystatin Powder 1 Application(s) Topical two times a day  tiotropium 18 MICROgram(s) Capsule 1 Capsule(s) Inhalation daily    MEDICATIONS  (PRN):  acetaminophen   Tablet .. 650 milliGRAM(s) Oral every 6 hours PRN Temp greater or equal to 38C (100.4F), Mild Pain (1 - 3)

## 2021-03-21 LAB
ALBUMIN SERPL ELPH-MCNC: 2.9 G/DL — LOW (ref 3.3–5)
ALP SERPL-CCNC: 64 U/L — SIGNIFICANT CHANGE UP (ref 40–120)
ALT FLD-CCNC: 22 U/L — SIGNIFICANT CHANGE UP (ref 12–78)
ANION GAP SERPL CALC-SCNC: 3 MMOL/L — LOW (ref 5–17)
AST SERPL-CCNC: 17 U/L — SIGNIFICANT CHANGE UP (ref 15–37)
BILIRUB SERPL-MCNC: 0.5 MG/DL — SIGNIFICANT CHANGE UP (ref 0.2–1.2)
BUN SERPL-MCNC: 8 MG/DL — SIGNIFICANT CHANGE UP (ref 7–23)
CALCIUM SERPL-MCNC: 8.8 MG/DL — SIGNIFICANT CHANGE UP (ref 8.5–10.1)
CHLORIDE SERPL-SCNC: 102 MMOL/L — SIGNIFICANT CHANGE UP (ref 96–108)
CO2 SERPL-SCNC: 34 MMOL/L — HIGH (ref 22–31)
CREAT SERPL-MCNC: 0.76 MG/DL — SIGNIFICANT CHANGE UP (ref 0.5–1.3)
GLUCOSE BLDC GLUCOMTR-MCNC: 181 MG/DL — HIGH (ref 70–99)
GLUCOSE BLDC GLUCOMTR-MCNC: 231 MG/DL — HIGH (ref 70–99)
GLUCOSE BLDC GLUCOMTR-MCNC: 233 MG/DL — HIGH (ref 70–99)
GLUCOSE BLDC GLUCOMTR-MCNC: 293 MG/DL — HIGH (ref 70–99)
GLUCOSE SERPL-MCNC: 157 MG/DL — HIGH (ref 70–99)
HCT VFR BLD CALC: 36.1 % — SIGNIFICANT CHANGE UP (ref 34.5–45)
HGB BLD-MCNC: 11.2 G/DL — LOW (ref 11.5–15.5)
MCHC RBC-ENTMCNC: 29.6 PG — SIGNIFICANT CHANGE UP (ref 27–34)
MCHC RBC-ENTMCNC: 31 GM/DL — LOW (ref 32–36)
MCV RBC AUTO: 95.3 FL — SIGNIFICANT CHANGE UP (ref 80–100)
NRBC # BLD: 0 /100 WBCS — SIGNIFICANT CHANGE UP (ref 0–0)
PLATELET # BLD AUTO: 271 K/UL — SIGNIFICANT CHANGE UP (ref 150–400)
POTASSIUM SERPL-MCNC: 4 MMOL/L — SIGNIFICANT CHANGE UP (ref 3.5–5.3)
POTASSIUM SERPL-SCNC: 4 MMOL/L — SIGNIFICANT CHANGE UP (ref 3.5–5.3)
PROT SERPL-MCNC: 6.4 GM/DL — SIGNIFICANT CHANGE UP (ref 6–8.3)
RBC # BLD: 3.79 M/UL — LOW (ref 3.8–5.2)
RBC # FLD: 16 % — HIGH (ref 10.3–14.5)
SODIUM SERPL-SCNC: 139 MMOL/L — SIGNIFICANT CHANGE UP (ref 135–145)
WBC # BLD: 11.93 K/UL — HIGH (ref 3.8–10.5)
WBC # FLD AUTO: 11.93 K/UL — HIGH (ref 3.8–10.5)

## 2021-03-21 RX ORDER — TRAMADOL HYDROCHLORIDE 50 MG/1
50 TABLET ORAL THREE TIMES A DAY
Refills: 0 | Status: DISCONTINUED | OUTPATIENT
Start: 2021-03-21 | End: 2021-03-24

## 2021-03-21 RX ORDER — HYDROXYZINE HCL 10 MG
50 TABLET ORAL EVERY 8 HOURS
Refills: 0 | Status: DISCONTINUED | OUTPATIENT
Start: 2021-03-21 | End: 2021-03-24

## 2021-03-21 RX ADMIN — ESCITALOPRAM OXALATE 20 MILLIGRAM(S): 10 TABLET, FILM COATED ORAL at 12:09

## 2021-03-21 RX ADMIN — NYSTATIN CREAM 1 APPLICATION(S): 100000 CREAM TOPICAL at 05:53

## 2021-03-21 RX ADMIN — Medication 3 MILLILITER(S): at 17:06

## 2021-03-21 RX ADMIN — Medication 3 MILLILITER(S): at 11:00

## 2021-03-21 RX ADMIN — ATORVASTATIN CALCIUM 10 MILLIGRAM(S): 80 TABLET, FILM COATED ORAL at 21:50

## 2021-03-21 RX ADMIN — TRAMADOL HYDROCHLORIDE 50 MILLIGRAM(S): 50 TABLET ORAL at 03:01

## 2021-03-21 RX ADMIN — TRAMADOL HYDROCHLORIDE 50 MILLIGRAM(S): 50 TABLET ORAL at 14:17

## 2021-03-21 RX ADMIN — Medication 2: at 21:53

## 2021-03-21 RX ADMIN — INSULIN GLARGINE 20 UNIT(S): 100 INJECTION, SOLUTION SUBCUTANEOUS at 21:54

## 2021-03-21 RX ADMIN — TRAMADOL HYDROCHLORIDE 50 MILLIGRAM(S): 50 TABLET ORAL at 15:17

## 2021-03-21 RX ADMIN — Medication 650 MILLIGRAM(S): at 12:09

## 2021-03-21 RX ADMIN — ONDANSETRON 4 MILLIGRAM(S): 8 TABLET, FILM COATED ORAL at 00:09

## 2021-03-21 RX ADMIN — Medication 4: at 17:40

## 2021-03-21 RX ADMIN — Medication 2: at 08:08

## 2021-03-21 RX ADMIN — TIOTROPIUM BROMIDE 1 CAPSULE(S): 18 CAPSULE ORAL; RESPIRATORY (INHALATION) at 12:09

## 2021-03-21 RX ADMIN — Medication 50 MILLIGRAM(S): at 21:50

## 2021-03-21 RX ADMIN — MONTELUKAST 10 MILLIGRAM(S): 4 TABLET, CHEWABLE ORAL at 12:09

## 2021-03-21 RX ADMIN — Medication 150 MICROGRAM(S): at 05:53

## 2021-03-21 RX ADMIN — Medication 650 MILLIGRAM(S): at 13:09

## 2021-03-21 RX ADMIN — BUDESONIDE AND FORMOTEROL FUMARATE DIHYDRATE 2 PUFF(S): 160; 4.5 AEROSOL RESPIRATORY (INHALATION) at 17:41

## 2021-03-21 RX ADMIN — TRAMADOL HYDROCHLORIDE 50 MILLIGRAM(S): 50 TABLET ORAL at 04:19

## 2021-03-21 RX ADMIN — Medication 1 MILLIGRAM(S): at 17:40

## 2021-03-21 RX ADMIN — NYSTATIN CREAM 1 APPLICATION(S): 100000 CREAM TOPICAL at 17:42

## 2021-03-21 RX ADMIN — Medication 4: at 12:10

## 2021-03-21 RX ADMIN — Medication 1 MILLIGRAM(S): at 12:09

## 2021-03-21 RX ADMIN — Medication 1 MILLIGRAM(S): at 23:49

## 2021-03-21 RX ADMIN — SODIUM CHLORIDE 100 MILLILITER(S): 9 INJECTION, SOLUTION INTRAVENOUS at 00:09

## 2021-03-21 RX ADMIN — Medication 1 MILLIGRAM(S): at 05:53

## 2021-03-22 ENCOUNTER — TRANSCRIPTION ENCOUNTER (OUTPATIENT)
Age: 61
End: 2021-03-22

## 2021-03-22 LAB
ANION GAP SERPL CALC-SCNC: 3 MMOL/L — LOW (ref 5–17)
BUN SERPL-MCNC: 12 MG/DL — SIGNIFICANT CHANGE UP (ref 7–23)
CALCIUM SERPL-MCNC: 8.8 MG/DL — SIGNIFICANT CHANGE UP (ref 8.5–10.1)
CHLORIDE SERPL-SCNC: 102 MMOL/L — SIGNIFICANT CHANGE UP (ref 96–108)
CO2 SERPL-SCNC: 35 MMOL/L — HIGH (ref 22–31)
CREAT SERPL-MCNC: 0.93 MG/DL — SIGNIFICANT CHANGE UP (ref 0.5–1.3)
GLUCOSE BLDC GLUCOMTR-MCNC: 188 MG/DL — HIGH (ref 70–99)
GLUCOSE BLDC GLUCOMTR-MCNC: 224 MG/DL — HIGH (ref 70–99)
GLUCOSE BLDC GLUCOMTR-MCNC: 224 MG/DL — HIGH (ref 70–99)
GLUCOSE BLDC GLUCOMTR-MCNC: 288 MG/DL — HIGH (ref 70–99)
GLUCOSE SERPL-MCNC: 198 MG/DL — HIGH (ref 70–99)
HCT VFR BLD CALC: 35.9 % — SIGNIFICANT CHANGE UP (ref 34.5–45)
HGB BLD-MCNC: 11.2 G/DL — LOW (ref 11.5–15.5)
MCHC RBC-ENTMCNC: 29.8 PG — SIGNIFICANT CHANGE UP (ref 27–34)
MCHC RBC-ENTMCNC: 31.2 GM/DL — LOW (ref 32–36)
MCV RBC AUTO: 95.5 FL — SIGNIFICANT CHANGE UP (ref 80–100)
NRBC # BLD: 0 /100 WBCS — SIGNIFICANT CHANGE UP (ref 0–0)
PLATELET # BLD AUTO: 274 K/UL — SIGNIFICANT CHANGE UP (ref 150–400)
POTASSIUM SERPL-MCNC: 3.8 MMOL/L — SIGNIFICANT CHANGE UP (ref 3.5–5.3)
POTASSIUM SERPL-SCNC: 3.8 MMOL/L — SIGNIFICANT CHANGE UP (ref 3.5–5.3)
RBC # BLD: 3.76 M/UL — LOW (ref 3.8–5.2)
RBC # FLD: 15.8 % — HIGH (ref 10.3–14.5)
SODIUM SERPL-SCNC: 140 MMOL/L — SIGNIFICANT CHANGE UP (ref 135–145)
T4 FREE SERPL-MCNC: 1 NG/DL — SIGNIFICANT CHANGE UP (ref 0.9–1.8)
WBC # BLD: 12.04 K/UL — HIGH (ref 3.8–10.5)
WBC # FLD AUTO: 12.04 K/UL — HIGH (ref 3.8–10.5)

## 2021-03-22 RX ORDER — INSULIN LISPRO 100/ML
0 VIAL (ML) SUBCUTANEOUS
Qty: 0 | Refills: 0 | DISCHARGE
Start: 2021-03-22

## 2021-03-22 RX ORDER — BUDESONIDE AND FORMOTEROL FUMARATE DIHYDRATE 160; 4.5 UG/1; UG/1
0 AEROSOL RESPIRATORY (INHALATION)
Qty: 0 | Refills: 0 | DISCHARGE
Start: 2021-03-22

## 2021-03-22 RX ORDER — LANOLIN ALCOHOL/MO/W.PET/CERES
6 CREAM (GRAM) TOPICAL AT BEDTIME
Refills: 0 | Status: DISCONTINUED | OUTPATIENT
Start: 2021-03-22 | End: 2021-03-24

## 2021-03-22 RX ORDER — TIOTROPIUM BROMIDE 18 UG/1
1 CAPSULE ORAL; RESPIRATORY (INHALATION)
Qty: 0 | Refills: 0 | DISCHARGE
Start: 2021-03-22

## 2021-03-22 RX ADMIN — BUDESONIDE AND FORMOTEROL FUMARATE DIHYDRATE 2 PUFF(S): 160; 4.5 AEROSOL RESPIRATORY (INHALATION) at 18:57

## 2021-03-22 RX ADMIN — Medication 4: at 07:58

## 2021-03-22 RX ADMIN — Medication 6 MILLIGRAM(S): at 22:14

## 2021-03-22 RX ADMIN — Medication 3 MILLILITER(S): at 00:40

## 2021-03-22 RX ADMIN — MONTELUKAST 10 MILLIGRAM(S): 4 TABLET, CHEWABLE ORAL at 11:13

## 2021-03-22 RX ADMIN — SODIUM CHLORIDE 100 MILLILITER(S): 9 INJECTION, SOLUTION INTRAVENOUS at 11:17

## 2021-03-22 RX ADMIN — NYSTATIN CREAM 1 APPLICATION(S): 100000 CREAM TOPICAL at 05:32

## 2021-03-22 RX ADMIN — Medication 6: at 17:08

## 2021-03-22 RX ADMIN — Medication 1 MILLIGRAM(S): at 17:08

## 2021-03-22 RX ADMIN — ATORVASTATIN CALCIUM 10 MILLIGRAM(S): 80 TABLET, FILM COATED ORAL at 22:15

## 2021-03-22 RX ADMIN — TIOTROPIUM BROMIDE 1 CAPSULE(S): 18 CAPSULE ORAL; RESPIRATORY (INHALATION) at 11:14

## 2021-03-22 RX ADMIN — Medication 50 MILLIGRAM(S): at 22:16

## 2021-03-22 RX ADMIN — Medication 150 MICROGRAM(S): at 05:32

## 2021-03-22 RX ADMIN — Medication 2: at 11:12

## 2021-03-22 RX ADMIN — INSULIN GLARGINE 20 UNIT(S): 100 INJECTION, SOLUTION SUBCUTANEOUS at 22:15

## 2021-03-22 RX ADMIN — TRAMADOL HYDROCHLORIDE 50 MILLIGRAM(S): 50 TABLET ORAL at 02:15

## 2021-03-22 RX ADMIN — NYSTATIN CREAM 1 APPLICATION(S): 100000 CREAM TOPICAL at 17:07

## 2021-03-22 RX ADMIN — Medication 50 MILLIGRAM(S): at 12:21

## 2021-03-22 RX ADMIN — TRAMADOL HYDROCHLORIDE 50 MILLIGRAM(S): 50 TABLET ORAL at 03:31

## 2021-03-22 RX ADMIN — Medication 1 MILLIGRAM(S): at 05:32

## 2021-03-22 RX ADMIN — ESCITALOPRAM OXALATE 20 MILLIGRAM(S): 10 TABLET, FILM COATED ORAL at 11:13

## 2021-03-22 RX ADMIN — Medication 1 MILLIGRAM(S): at 11:13

## 2021-03-22 NOTE — DISCHARGE NOTE PROVIDER - DETAILS OF MALNUTRITION DIAGNOSIS/DIAGNOSES
This patient has been assessed with a concern for Malnutrition and was treated during this hospitalization for the following Nutrition diagnosis/diagnoses:     -  03/20/2021: Morbid obesity (BMI > 40)   This patient has been assessed with a concern for Malnutrition and was treated during this hospitalization for the following Nutrition diagnosis/diagnoses:     -  03/20/2021: Morbid obesity (BMI > 40)    This patient has been assessed with a concern for Malnutrition and was treated during this hospitalization for the following Nutrition diagnosis/diagnoses:     -  03/20/2021: Morbid obesity (BMI > 40)

## 2021-03-22 NOTE — DISCHARGE NOTE PROVIDER - NSDCCPCAREPLAN_GEN_ALL_CORE_FT
PRINCIPAL DISCHARGE DIAGNOSIS  Diagnosis: Acute renal failure, unspecified acute renal failure type  Assessment and Plan of Treatment:

## 2021-03-22 NOTE — DISCHARGE NOTE PROVIDER - HOSPITAL COURSE
61 year old female PMH htn, dm, hld, copd, bladder ca presents after multiple falls.  She has a history of non compliance with meds. Her symptoms are worse when she sits up abruptly. Also notes diffuse muscle pain and blurred vision with pressure behind eyes. also notes left hand numbness and tingling. some nausea without vomiting. no cp, sob, rash, bleeding, dysuria, vaginal discharge, rhinorrhea. Admitted for VESTA  VESTA likely from rhabdomylysis.  Renal  function normalized  CPK trending  to  normal.    Pulmonary: Continue Symbicort bid and Spiriva daily with Duonebs and Singular. CXR is clear.     DM: with neuropathy Add Lantus 40 units at night, add Admelog as needed. Continue Synthyroid 150 mcg/day, TSH in AM.       Patient seen and evaluated by Dr. Espinoza who [facility]

## 2021-03-22 NOTE — DISCHARGE NOTE PROVIDER - NSDCMRMEDTOKEN_GEN_ALL_CORE_FT
albuterol 2.5 mg/3 mL (0.083%) inhalation solution: 3 milliliter(s) by nebulizer every 6 hours, As Needed  albuterol 90 mcg/inh inhalation aerosol: 2 puff(s) inhaled 3 times a day, As Needed   albuterol 90 mcg/inh inhalation aerosol: 2 puff(s) inhaled 4 times a day, As Needed  Anoro Ellipta 62.5 mcg-25 mcg/inh inhalation powder: 1 puff(s) inhaled once a day  atorvastatin 10 mg oral tablet: 1 tab(s) orally once a day (at bedtime)  Basaglar KwikPen 100 units/mL subcutaneous solution: 30 unit(s) subcutaneous 2 times a day  budesonide-formoterol 160 mcg-4.5 mcg/inh inhalation aerosol:  inhaled   butalbital/aspirin/caffeine 50 mg-325 mg-40 mg oral capsule: 1 cap(s) orally every 8 hours, As Needed -for headache MDD:3 tabs  clonazePAM 1 mg oral tablet: 1 milligram(s) orally 4 times a day  cyclobenzaprine 7.5 mg oral tablet: 1 tab(s) orally 3 times a day PRN for pain  Diflucan 200 mg oral tablet: 2 tab(s) orally once a day   escitalopram 20 mg oral tablet: 1 tab(s) orally once a day  hydrOXYzine hydrochloride 50 mg oral tablet: 1 tab(s) orally every 8 hours, As needed, Itching   mg oral tablet: 1 tab(s) orally every 6 hours  ibuprofen 600 mg oral tablet: 1 tab(s) orally every 8 hours, As Needed -for muscle spasm - for moderate pain. Take on full stomach MDD:3 tabs/day   insulin lispro 100 units/mL injectable solution:  injectable - as needed   ipratropium-albuterol 0.5 mg-2.5 mg/3 mLinhalation solution: 3 milliliter(s) by nebulizer 3 times a day   Januvia 100 mg oral tablet: 100 milligram(s) orally once a day  levothyroxine 150 mcg (0.15 mg) oral tablet: 1 tab(s) orally once a day  losartan 50 mg oral tablet: 1 tab(s) orally once a day  meclizine 25 mg oral tablet: 1 tab(s) orally 3 times a day for 3 days and then only as needed for dizziness  montelukast 10 mg oral tablet: 1 tab(s) orally once a day (at bedtime)   nicotine 7 mg/24 hr transdermal film, extended release: 1 patch transdermally once a day   nystatin 100,000 units/g topical cream: 1 application topically 2 times a day  omeprazole 40 mg oral delayed release capsule: 1 cap(s) orally once a day   predniSONE 50 mg oral tablet: 1 tab(s) orally once a day  predniSONE 50 mg oral tablet: 1 tab(s) orally once a day  Tessalon Perles 100 mg oral capsule: 1 cap(s) orally every 8 hours, As Needed -for cough MDD:3  tiotropium 18 mcg inhalation capsule: 1 cap(s) inhaled once a day

## 2021-03-22 NOTE — DISCHARGE NOTE NURSING/CASE MANAGEMENT/SOCIAL WORK - PATIENT PORTAL LINK FT
You can access the FollowMyHealth Patient Portal offered by [facility] by registering at the following website: [email or web address]. By joining [email or web address]’s FollowMyHealth portal, you will also be able to view your health information using other applications (apps) compatible with our system. Ivana

## 2021-03-23 LAB
FLUAV AG NPH QL: SIGNIFICANT CHANGE UP
FLUBV AG NPH QL: SIGNIFICANT CHANGE UP
GLUCOSE BLDC GLUCOMTR-MCNC: 157 MG/DL — HIGH (ref 70–99)
GLUCOSE BLDC GLUCOMTR-MCNC: 195 MG/DL — HIGH (ref 70–99)
GLUCOSE BLDC GLUCOMTR-MCNC: 207 MG/DL — HIGH (ref 70–99)
GLUCOSE BLDC GLUCOMTR-MCNC: 266 MG/DL — HIGH (ref 70–99)
SARS-COV-2 RNA SPEC QL NAA+PROBE: SIGNIFICANT CHANGE UP

## 2021-03-23 RX ADMIN — Medication 650 MILLIGRAM(S): at 22:07

## 2021-03-23 RX ADMIN — INSULIN GLARGINE 20 UNIT(S): 100 INJECTION, SOLUTION SUBCUTANEOUS at 21:09

## 2021-03-23 RX ADMIN — Medication 4: at 11:44

## 2021-03-23 RX ADMIN — NYSTATIN CREAM 1 APPLICATION(S): 100000 CREAM TOPICAL at 17:30

## 2021-03-23 RX ADMIN — Medication 2: at 17:26

## 2021-03-23 RX ADMIN — Medication 1 MILLIGRAM(S): at 11:45

## 2021-03-23 RX ADMIN — Medication 2: at 21:08

## 2021-03-23 RX ADMIN — BUDESONIDE AND FORMOTEROL FUMARATE DIHYDRATE 2 PUFF(S): 160; 4.5 AEROSOL RESPIRATORY (INHALATION) at 17:27

## 2021-03-23 RX ADMIN — ESCITALOPRAM OXALATE 20 MILLIGRAM(S): 10 TABLET, FILM COATED ORAL at 11:45

## 2021-03-23 RX ADMIN — Medication 6 MILLIGRAM(S): at 21:05

## 2021-03-23 RX ADMIN — Medication 1 MILLIGRAM(S): at 17:27

## 2021-03-23 RX ADMIN — NYSTATIN CREAM 1 APPLICATION(S): 100000 CREAM TOPICAL at 05:21

## 2021-03-23 RX ADMIN — Medication 650 MILLIGRAM(S): at 21:07

## 2021-03-23 RX ADMIN — Medication 150 MICROGRAM(S): at 05:19

## 2021-03-23 RX ADMIN — MONTELUKAST 10 MILLIGRAM(S): 4 TABLET, CHEWABLE ORAL at 11:45

## 2021-03-23 RX ADMIN — Medication 1 MILLIGRAM(S): at 05:19

## 2021-03-23 RX ADMIN — Medication 1 MILLIGRAM(S): at 23:25

## 2021-03-23 RX ADMIN — ATORVASTATIN CALCIUM 10 MILLIGRAM(S): 80 TABLET, FILM COATED ORAL at 21:05

## 2021-03-23 RX ADMIN — Medication 2: at 08:18

## 2021-03-23 RX ADMIN — TIOTROPIUM BROMIDE 1 CAPSULE(S): 18 CAPSULE ORAL; RESPIRATORY (INHALATION) at 11:45

## 2021-03-24 VITALS
OXYGEN SATURATION: 94 % | SYSTOLIC BLOOD PRESSURE: 119 MMHG | HEART RATE: 83 BPM | TEMPERATURE: 99 F | RESPIRATION RATE: 20 BRPM | DIASTOLIC BLOOD PRESSURE: 63 MMHG

## 2021-03-24 LAB
GLUCOSE BLDC GLUCOMTR-MCNC: 152 MG/DL — HIGH (ref 70–99)
GLUCOSE BLDC GLUCOMTR-MCNC: 158 MG/DL — HIGH (ref 70–99)
GLUCOSE BLDC GLUCOMTR-MCNC: 251 MG/DL — HIGH (ref 70–99)

## 2021-03-24 RX ORDER — SIMETHICONE 80 MG/1
80 TABLET, CHEWABLE ORAL ONCE
Refills: 0 | Status: COMPLETED | OUTPATIENT
Start: 2021-03-24 | End: 2021-03-24

## 2021-03-24 RX ADMIN — Medication 6: at 17:17

## 2021-03-24 RX ADMIN — TIOTROPIUM BROMIDE 1 CAPSULE(S): 18 CAPSULE ORAL; RESPIRATORY (INHALATION) at 12:15

## 2021-03-24 RX ADMIN — NYSTATIN CREAM 1 APPLICATION(S): 100000 CREAM TOPICAL at 05:16

## 2021-03-24 RX ADMIN — Medication 150 MICROGRAM(S): at 05:16

## 2021-03-24 RX ADMIN — MONTELUKAST 10 MILLIGRAM(S): 4 TABLET, CHEWABLE ORAL at 12:15

## 2021-03-24 RX ADMIN — Medication 1 MILLIGRAM(S): at 05:17

## 2021-03-24 RX ADMIN — ESCITALOPRAM OXALATE 20 MILLIGRAM(S): 10 TABLET, FILM COATED ORAL at 12:19

## 2021-03-24 RX ADMIN — Medication 2: at 12:20

## 2021-03-24 RX ADMIN — NYSTATIN CREAM 1 APPLICATION(S): 100000 CREAM TOPICAL at 17:20

## 2021-03-24 RX ADMIN — Medication 1 MILLIGRAM(S): at 17:19

## 2021-03-24 RX ADMIN — Medication 2: at 08:01

## 2021-03-24 RX ADMIN — SIMETHICONE 80 MILLIGRAM(S): 80 TABLET, CHEWABLE ORAL at 12:15

## 2021-03-24 RX ADMIN — BUDESONIDE AND FORMOTEROL FUMARATE DIHYDRATE 2 PUFF(S): 160; 4.5 AEROSOL RESPIRATORY (INHALATION) at 05:17

## 2021-03-24 RX ADMIN — BUDESONIDE AND FORMOTEROL FUMARATE DIHYDRATE 2 PUFF(S): 160; 4.5 AEROSOL RESPIRATORY (INHALATION) at 17:20

## 2021-03-24 RX ADMIN — Medication 1 MILLIGRAM(S): at 12:15

## 2021-03-24 NOTE — PROGRESS NOTE ADULT - NUTRITIONAL ASSESSMENT
This patient has been assessed with a concern for Malnutrition and has been determined to have a diagnosis/diagnoses of Morbid obesity (BMI > 40).    This patient is being managed with:   Diet Consistent Carbohydrate w/Evening Snack-  DASH/TLC {Sodium & Cholesterol Restricted}  Entered: Mar 20 2021  9:22AM    
This patient has been assessed with a concern for Malnutrition and has been determined to have a diagnosis/diagnoses of Morbid obesity (BMI > 40).    This patient is being managed with:   Diet Consistent Carbohydrate Renal/No Snacks-  Entered: Mar 17 2021  7:34PM    The following pending diet order is being considered for treatment of Morbid obesity (BMI > 40):  Diet Consistent Carbohydrate w/Evening Snack-  DASH/TLC {Sodium & Cholesterol Restricted}  Entered: Mar 20 2021  9:22AM  
This patient has been assessed with a concern for Malnutrition and has been determined to have a diagnosis/diagnoses of Morbid obesity (BMI > 40).    This patient is being managed with:   Diet Consistent Carbohydrate w/Evening Snack-  DASH/TLC {Sodium & Cholesterol Restricted}  Entered: Mar 20 2021  9:22AM    

## 2021-03-24 NOTE — PROGRESS NOTE ADULT - PROVIDER SPECIALTY LIST ADULT
Internal Medicine
Nephrology

## 2021-03-24 NOTE — PROGRESS NOTE ADULT - SUBJECTIVE AND OBJECTIVE BOX
INTERVAL HPI/OVERNIGHT EVENTS:        REVIEW OF SYSTEMS:  CONSTITUTIONAL:  no  complaints      MEDICATION:  acetaminophen   Tablet .. 650 milliGRAM(s) Oral every 6 hours PRN  albuterol/ipratropium for Nebulization 3 milliLiter(s) Nebulizer every 6 hours  atorvastatin 10 milliGRAM(s) Oral at bedtime  budesonide 160 MICROgram(s)/formoterol 4.5 MICROgram(s) Inhaler 2 Puff(s) Inhalation two times a day  clonazePAM  Tablet 1 milliGRAM(s) Oral four times a day  dextrose 40% Gel 15 Gram(s) Oral once  dextrose 5%. 1000 milliLiter(s) IV Continuous <Continuous>  dextrose 5%. 1000 milliLiter(s) IV Continuous <Continuous>  dextrose 50% Injectable 12.5 Gram(s) IV Push once  dextrose 50% Injectable 25 Gram(s) IV Push once  dextrose 50% Injectable 25 Gram(s) IV Push once  escitalopram 20 milliGRAM(s) Oral daily  glucagon  Injectable 1 milliGRAM(s) IntraMuscular once  hydrOXYzine hydrochloride 50 milliGRAM(s) Oral every 8 hours PRN  influenza   Vaccine 0.5 milliLiter(s) IntraMuscular once  insulin glargine Injectable (LANTUS) 20 Unit(s) SubCutaneous at bedtime  insulin lispro (ADMELOG) corrective regimen sliding scale   SubCutaneous three times a day before meals  insulin lispro (ADMELOG) corrective regimen sliding scale   SubCutaneous at bedtime  lactated ringers. 1000 milliLiter(s) IV Continuous <Continuous>  levothyroxine 150 MICROGram(s) Oral daily  melatonin 6 milliGRAM(s) Oral at bedtime  montelukast 10 milliGRAM(s) Oral daily  nystatin Powder 1 Application(s) Topical two times a day  tiotropium 18 MICROgram(s) Capsule 1 Capsule(s) Inhalation daily  traMADol 50 milliGRAM(s) Oral three times a day PRN    Vital Signs Last 24 Hrs  T(C): 36.4 (23 Mar 2021 23:59), Max: 37.1 (23 Mar 2021 17:02)  T(F): 97.6 (23 Mar 2021 23:59), Max: 98.8 (23 Mar 2021 17:02)  HR: 74 (23 Mar 2021 23:59) (72 - 77)  BP: 133/69 (23 Mar 2021 23:59) (107/78 - 155/77)  BP(mean): --  RR: 18 (23 Mar 2021 23:59) (17 - 19)  SpO2: 95% (23 Mar 2021 23:59) (94% - 95%)    PHYSICAL EXAM:  GENERAL: NAD, morbidly  obese    NECK: Supple, No JVD  NERVOUS SYSTEM:  Alert oriented   no  focal  deficits;   CHEST/LUNG: Clear    HEART: Regular rate and rhythm; No murmurs, rubs, or gallops  ABDOMEN: Soft, Nontender, Nondistended; Bowel sounds present  EXTREMITIES:  no  edema no  tenderness  SKIN: No rashes   LABS:                        11.2   12.04 )-----------( 274      ( 22 Mar 2021 09:58 )             35.9     03-22    140  |  102  |  12  ----------------------------<  198<H>  3.8   |  35<H>  |  0.93    Ca    8.8      22 Mar 2021 09:58          CAPILLARY BLOOD GLUCOSE      POCT Blood Glucose.: 158 mg/dL (24 Mar 2021 07:48)  POCT Blood Glucose.: 266 mg/dL (23 Mar 2021 20:53)  POCT Blood Glucose.: 195 mg/dL (23 Mar 2021 15:29)  POCT Blood Glucose.: 207 mg/dL (23 Mar 2021 11:07)      RADIOLOGY & ADDITIONAL TESTS:    Imaging reports  Personally Reviewed:  [ ] YES  [ ] NO    Consultant(s) Notes Reviewed:  [x ] YES  [ ] NO    Care Discussed with Consultants/Other Providers [ x] YES  [ ] NO     rhabdomylysis.     renal  function normalized    increase  activity discharge  to  rehab    Pulmonary: Continue Symbicort bid and Spiriva daily with Duonebs and Singular. CXR is clear.     DM: with neuropathy Add Lantus 40 units at night, add Admelog as needed. Continue Synthyroid 150 mcg/day, TSH in AM.     HYPOTHYROIDISM  SYNTHROID  BACK  PAIN  2/2  OA  MORBID  OBESITY TRAMADOL  PT  WT  REDUCTION  MORBID  OBESITY  NUTRITION  EVALUATION  DIETARY  COUNSELING  INSOMNIA  MELATONIN
    INTERVAL HPI/OVERNIGHT EVENTS:    patient  admitted  to my  service  as  per  [facility]  VS  protocols  patient  well known  to  me  from  office  and  prior  admissions   61f hx htn, dm, hld, copd, bladder ca pw s/p multiple falls. non compliant with meds. nor  diet  felt a loss of balance since that time. symptoms are worse when she sits up abruptly. also notes diffuse muscle pain and blurred vision with pressure behind eyes. also notes left hand numbness and tingling. some nausea without vomiting. no cp, sob, rash, bleeding, dysuria, vaginal discharge, rhinorrhea. active smoker      REVIEW OF SYSTEMS:  CONSTITUTIONAL:  no  complaints    NECK: No pain or stiffnes  RESPIRATORY: No SOB   CARDIOVASCULAR: No chest pain, palpitations, dizziness,   GASTROINTESTINAL: No abdominal pain. No nausea, vomiting,   NEUROLOGICAL: No headaches, no  blurry  vision no  dizziness  SKIN: No itching,   MUSCULOSKELETAL: No pain    MEDICATION:  albuterol/ipratropium for Nebulization 3 milliLiter(s) Nebulizer every 6 hours  atorvastatin 10 milliGRAM(s) Oral at bedtime  budesonide 160 MICROgram(s)/formoterol 4.5 MICROgram(s) Inhaler 2 Puff(s) Inhalation two times a day  dextrose 40% Gel 15 Gram(s) Oral once  dextrose 5%. 1000 milliLiter(s) IV Continuous <Continuous>  dextrose 5%. 1000 milliLiter(s) IV Continuous <Continuous>  dextrose 50% Injectable 25 Gram(s) IV Push once  dextrose 50% Injectable 12.5 Gram(s) IV Push once  dextrose 50% Injectable 25 Gram(s) IV Push once  escitalopram 20 milliGRAM(s) Oral daily  glucagon  Injectable 1 milliGRAM(s) IntraMuscular once  influenza   Vaccine 0.5 milliLiter(s) IntraMuscular once  insulin glargine Injectable (LANTUS) 40 Unit(s) SubCutaneous at bedtime  insulin lispro (ADMELOG) corrective regimen sliding scale   SubCutaneous three times a day before meals  insulin lispro (ADMELOG) corrective regimen sliding scale   SubCutaneous at bedtime  lactated ringers. 1000 milliLiter(s) IV Continuous <Continuous>  levothyroxine 150 MICROGram(s) Oral daily  montelukast 10 milliGRAM(s) Oral daily  nystatin Powder 1 Application(s) Topical two times a day  tiotropium 18 MICROgram(s) Capsule 1 Capsule(s) Inhalation daily    Vital Signs Last 24 Hrs  T(C): 36.6 (18 Mar 2021 05:46), Max: 36.8 (17 Mar 2021 20:33)  T(F): 97.9 (18 Mar 2021 05:46), Max: 98.2 (17 Mar 2021 20:33)  HR: 75 (18 Mar 2021 05:46) (72 - 88)  BP: 108/70 (18 Mar 2021 05:46) (91/50 - 130/68)  BP(mean): --  RR: 19 (18 Mar 2021 05:46) (18 - 20)  SpO2: 95% (18 Mar 2021 05:46) (91% - 95%)    PHYSICAL EXAM:  GENERAL:   morbidly obese in nAD  HEENT : Conjuntivae  clear sclerae anicteric  NECK: Supple, No JVD, Normal thyroid  NERVOUS SYSTEM:  Alert oriented   no  focal  deficits;   CHEST/LUNG: Clear    HEART: Regular rate and rhythm; No murmurs, rubs, or gallops  ABDOMEN: Soft, Nontender, Nondistended; Bowel sounds present  EXTREMITIES:   mild  diffuse  tenderness lEs  SKIN: No rashes   LABS:                        13.3   20.27 )-----------( 337      ( 17 Mar 2021 17:34 )             41.1     03-17    132<L>  |  95<L>  |  47<H>  ----------------------------<  75  4.7   |  26  |  3.45<H>    Ca    9.4      17 Mar 2021 17:34  Mg     2.6     03-17    TPro  7.2  /  Alb  3.6  /  TBili  0.5  /  DBili  x   /  AST  88<H>  /  ALT  33  /  AlkPhos  83  03-17    PT/INR - ( 17 Mar 2021 17:34 )   PT: 11.6 sec;   INR: 1.00 ratio         PTT - ( 17 Mar 2021 17:34 )  PTT:33.6 sec  Urinalysis Basic - ( 17 Mar 2021 21:15 )    Color: Yellow / Appearance: Clear / S.020 / pH: x  Gluc: x / Ketone: Negative  / Bili: Negative / Urobili: Negative mg/dL   Blood: x / Protein: 30 mg/dL / Nitrite: Negative   Leuk Esterase: Negative / RBC: 3-5 /HPF / WBC 11-25   Sq Epi: x / Non Sq Epi: Few / Bacteria: Few      CAPILLARY BLOOD GLUCOSE      POCT Blood Glucose.: 106 mg/dL (18 Mar 2021 07:55)  POCT Blood Glucose.: 75 mg/dL (17 Mar 2021 23:39)  POCT Blood Glucose.: 68 mg/dL (17 Mar 2021 23:08)  POCT Blood Glucose.: 67 mg/dL (17 Mar 2021 22:57)  POCT Blood Glucose.: 68 mg/dL (17 Mar 2021 22:56)  POCT Blood Glucose.: 130 mg/dL (17 Mar 2021 15:21)      RADIOLOGY & ADDITIONAL TESTS:    Imaging reports  Personally Reviewed:  [ ] YES  [ ] NO    Consultant(s) Notes Reviewed:  [ x] YES  [ ] NO    Care Discussed with Consultants/Other Providers [x ] YES  [ ] NO  61 year old female PMH htn, dm, hld, copd, bladder ca pw s/p multiple falls. non compliant with meds. felt a loss of balance since that time. symptoms are worse when she sits up abruptly. Also notes diffuse muscle pain and blurred vision with pressure behind eyes. also notes left hand numbness and tingling. some nausea without vomiting. no cp, sob, rash, bleeding, dysuria, vaginal discharge, rhinorrhea. active smoker.  Admitted for VESTA with increased CPK.     Renal: VESTA likely from rhabdomylysis. IVF LR at 100/h after fluid bolus. CPK and SMA-7 in AM. Renal sonogram was done this evening.     Pulmonary: Continue Symbicort bid and Spiriva daily with Duonebs and Singular. CXR is clear.     DM: Add Lantus 40 units at night, add Admelog as needed. Continue Synthyroid 150 mcg/day, TSH in AM.     PT eval given recent falls.       Spoke to Dr. Espinoza today, he will follow patient  in AM.     61 year old female PMH htn, dm, hld, copd, bladder ca pw s/p multiple falls. non compliant with meds. felt a loss of balance since that time. symptoms are worse when she sits up abruptly. Also notes diffuse muscle pain and blurred vision with pressure behind eyes. also notes left hand numbness and tingling. some nausea without vomiting. no cp, sob, rash, bleeding, dysuria, vaginal discharge, rhinorrhea. active smoker.  Admitted for VESTA with increased CPK.     Renal: VESTA likely from rhabdomylysis. IVF LR at 100/h  monitor  labs  Renal sonogram     Pulmonary: Continue Symbicort bid and Spiriva daily with Duonebs and Singular. CXR is clear.     DM: Add Lantus 40 units at night, add Admelog as needed. Continue Synthyroid 150 mcg/day, TSH in AM.     HYPOTHYROIDISM  SYNTHROID  MORBID  OBESITY  NUTRITION  EVALUATION  DIETARY  COUNSELING    PT eval given recent falls.       
Subjective: c/o diffuse myalgia.       MEDICATIONS  (STANDING):  albuterol/ipratropium for Nebulization 3 milliLiter(s) Nebulizer every 6 hours  atorvastatin 10 milliGRAM(s) Oral at bedtime  budesonide 160 MICROgram(s)/formoterol 4.5 MICROgram(s) Inhaler 2 Puff(s) Inhalation two times a day  dextrose 40% Gel 15 Gram(s) Oral once  dextrose 5%. 1000 milliLiter(s) (50 mL/Hr) IV Continuous <Continuous>  dextrose 5%. 1000 milliLiter(s) (100 mL/Hr) IV Continuous <Continuous>  dextrose 50% Injectable 25 Gram(s) IV Push once  dextrose 50% Injectable 12.5 Gram(s) IV Push once  dextrose 50% Injectable 25 Gram(s) IV Push once  escitalopram 20 milliGRAM(s) Oral daily  glucagon  Injectable 1 milliGRAM(s) IntraMuscular once  influenza   Vaccine 0.5 milliLiter(s) IntraMuscular once  insulin glargine Injectable (LANTUS) 40 Unit(s) SubCutaneous at bedtime  insulin lispro (ADMELOG) corrective regimen sliding scale   SubCutaneous three times a day before meals  insulin lispro (ADMELOG) corrective regimen sliding scale   SubCutaneous at bedtime  lactated ringers. 1000 milliLiter(s) (100 mL/Hr) IV Continuous <Continuous>  levothyroxine 150 MICROGram(s) Oral daily  losartan 50 milliGRAM(s) Oral daily  montelukast 10 milliGRAM(s) Oral daily  nystatin Powder 1 Application(s) Topical two times a day  tiotropium 18 MICROgram(s) Capsule 1 Capsule(s) Inhalation daily    MEDICATIONS  (PRN):          T(C): 36.6 (21 @ 05:46), Max: 36.8 (21 @ 20:33)  HR: 75 (21 @ 05:46) (72 - 88)  BP: 108/70 (21 @ 05:46) (91/50 - 130/68)  RR: 19 (21 @ 05:46) (18 - 20)  SpO2: 95% (21 @ 05:46) (91% - 95%)  Wt(kg): --        I&O's Detail    17 Mar 2021 07:01  -  18 Mar 2021 07:00  --------------------------------------------------------  IN:  Total IN: 0 mL    OUT:    Indwelling Catheter - Urethral (mL): 1000 mL  Total OUT: 1000 mL    Total NET: -1000 mL               PHYSICAL EXAM:    GENERAL: anxious due to pain.   NECK: Supple, no inc in JVP  CHEST/LUNG: Clear  HEART: S1S2  ABDOMEN: Soft, Nontender, Nondistended; Bowel sounds present  EXTREMITIES:  no edema        LABS:  CBC Full  -  ( 17 Mar 2021 17:34 )  WBC Count : 20.27 K/uL  RBC Count : 4.53 M/uL  Hemoglobin : 13.3 g/dL  Hematocrit : 41.1 %  Platelet Count - Automated : 337 K/uL  Mean Cell Volume : 90.7 fl  Mean Cell Hemoglobin : 29.4 pg  Mean Cell Hemoglobin Concentration : 32.4 gm/dL  Auto Neutrophil # : 12.51 K/uL  Auto Lymphocyte # : 5.51 K/uL  Auto Monocyte # : 1.24 K/uL  Auto Eosinophil # : 0.75 K/uL  Auto Basophil # : 0.10 K/uL  Auto Neutrophil % : 61.7 %  Auto Lymphocyte % : 27.2 %  Auto Monocyte % : 6.1 %  Auto Eosinophil % : 3.7 %  Auto Basophil % : 0.5 %        132<L>  |  95<L>  |  47<H>  ----------------------------<  75  4.7   |  26  |  3.45<H>    Ca    9.4      17 Mar 2021 17:34  Mg     2.6     03-17    TPro  7.2  /  Alb  3.6  /  TBili  0.5  /  DBili  x   /  AST  88<H>  /  ALT  33  /  AlkPhos  83  03-17    PT/INR - ( 17 Mar 2021 17:34 )   PT: 11.6 sec;   INR: 1.00 ratio         PTT - ( 17 Mar 2021 17:34 )  PTT:33.6 sec  Urinalysis Basic - ( 17 Mar 2021 21:15 )    Color: Yellow / Appearance: Clear / S.020 / pH: x  Gluc: x / Ketone: Negative  / Bili: Negative / Urobili: Negative mg/dL   Blood: x / Protein: 30 mg/dL / Nitrite: Negative   Leuk Esterase: Negative / RBC: 3-5 /HPF / WBC 11-25   Sq Epi: x / Non Sq Epi: Few / Bacteria: Few          Assessment   VESTA, DDx per Dr Cottrell's consult. Renal US without hydro. ? pre-renal vs septic ATN  Rhabdo -- mild. Unlikely the cause of VESTA  Leukocytosis     Plan  D/c ARB  No NSAIDs please  Follow response to IVFs  Follow Cr, CPK trends    Thank you!          
    INTERVAL HPI/OVERNIGHT EVENTS:        REVIEW OF SYSTEMS:  CONSTITUTIONAL:   c/o  fatigue  NECK: No pain or stiffnes  RESPIRATORY: No SOB   CARDIOVASCULAR: No chest pain, palpitations, dizziness,   GASTROINTESTINAL: No abdominal pain. No nausea, vomiting,   NEUROLOGICAL: No headaches, no  blurry  vision no  dizziness  SKIN: No itching,   MUSCULOSKELETAL:   low  back pains    MEDICATION:  albuterol/ipratropium for Nebulization 3 milliLiter(s) Nebulizer every 6 hours  atorvastatin 10 milliGRAM(s) Oral at bedtime  budesonide 160 MICROgram(s)/formoterol 4.5 MICROgram(s) Inhaler 2 Puff(s) Inhalation two times a day  clonazePAM  Tablet 1 milliGRAM(s) Oral four times a day  dextrose 40% Gel 15 Gram(s) Oral once  dextrose 5%. 1000 milliLiter(s) IV Continuous <Continuous>  dextrose 5%. 1000 milliLiter(s) IV Continuous <Continuous>  dextrose 50% Injectable 25 Gram(s) IV Push once  dextrose 50% Injectable 12.5 Gram(s) IV Push once  dextrose 50% Injectable 25 Gram(s) IV Push once  escitalopram 20 milliGRAM(s) Oral daily  glucagon  Injectable 1 milliGRAM(s) IntraMuscular once  influenza   Vaccine 0.5 milliLiter(s) IntraMuscular once  insulin glargine Injectable (LANTUS) 20 Unit(s) SubCutaneous at bedtime  insulin lispro (ADMELOG) corrective regimen sliding scale   SubCutaneous three times a day before meals  insulin lispro (ADMELOG) corrective regimen sliding scale   SubCutaneous at bedtime  lactated ringers. 1000 milliLiter(s) IV Continuous <Continuous>  levothyroxine 150 MICROGram(s) Oral daily  montelukast 10 milliGRAM(s) Oral daily  nystatin Powder 1 Application(s) Topical two times a day  tiotropium 18 MICROgram(s) Capsule 1 Capsule(s) Inhalation daily    Vital Signs Last 24 Hrs  T(C): 37.3 (19 Mar 2021 10:53), Max: 37.5 (19 Mar 2021 06:02)  T(F): 99.1 (19 Mar 2021 10:53), Max: 99.5 (19 Mar 2021 06:02)  HR: 102 (19 Mar 2021 11:28) (82 - 112)  BP: 135/71 (19 Mar 2021 10:53) (102/62 - 137/73)  BP(mean): --  RR: 18 (19 Mar 2021 10:53) (18 - 18)  SpO2: 95% (19 Mar 2021 11:28) (91% - 95%)    PHYSICAL EXAM:  GENERAL: NAD, well-groomed, well-developed  HEENT : Conjuntivae  clear sclerae anicteric  NECK: Supple, No JVD, Normal thyroid  NERVOUS SYSTEM:  Alert oriented   no  focal  deficits;   CHEST/LUNG: Clear    HEART: Regular rate and rhythm; No murmurs, rubs, or gallops  ABDOMEN: Soft, Nontender, Nondistended; Bowel sounds present  EXTREMITIES:  no  edema no  tenderness  SKIN: No rashes   LABS:                        11.5   14.11 )-----------( 260      ( 19 Mar 2021 10:19 )             35.4     03-19    138  |  104  |  18  ----------------------------<  219<H>  4.2   |  30  |  1.05    Ca    8.5      19 Mar 2021 10:19  Mg     2.6     03-17    TPro  6.2  /  Alb  2.9<L>  /  TBili  0.4  /  DBili  x   /  AST  29  /  ALT  22  /  AlkPhos  70  03-19    PT/INR - ( 17 Mar 2021 17:34 )   PT: 11.6 sec;   INR: 1.00 ratio         PTT - ( 17 Mar 2021 17:34 )  PTT:33.6 sec  Urinalysis Basic - ( 17 Mar 2021 21:15 )    Color: Yellow / Appearance: Clear / S.020 / pH: x  Gluc: x / Ketone: Negative  / Bili: Negative / Urobili: Negative mg/dL   Blood: x / Protein: 30 mg/dL / Nitrite: Negative   Leuk Esterase: Negative / RBC: 3-5 /HPF / WBC 11-25   Sq Epi: x / Non Sq Epi: Few / Bacteria: Few      CAPILLARY BLOOD GLUCOSE      POCT Blood Glucose.: 215 mg/dL (19 Mar 2021 11:18)  POCT Blood Glucose.: 186 mg/dL (19 Mar 2021 07:37)  POCT Blood Glucose.: 254 mg/dL (18 Mar 2021 23:11)  POCT Blood Glucose.: 270 mg/dL (18 Mar 2021 21:21)  POCT Blood Glucose.: 372 mg/dL (18 Mar 2021 16:04)      RADIOLOGY & ADDITIONAL TESTS:    Imaging reports  Personally Reviewed:  [ ] YES  [ ] NO    Consultant(s) Notes Reviewed:  [ x] YES  [ ] NO    Care Discussed with Consultants/Other Providers [x ] YES  [ ] NO    Renal: VESTA likely from rhabdomylysis.     renal  function normalized  CPk  trending  to  normal  cont  ivf  increase  activity discharge  to  rehab    Pulmonary: Continue Symbicort bid and Spiriva daily with Duonebs and Singular. CXR is clear.     DM: Add Lantus 40 units at night, add Admelog as needed. Continue Synthyroid 150 mcg/day, TSH in AM.     HYPOTHYROIDISM  SYNTHROID  MORBID  OBESITY  NUTRITION  EVALUATION  DIETARY  COUNSELING
    INTERVAL HPI/OVERNIGHT EVENTS:        REVIEW OF SYSTEMS:  CONSTITUTIONAL:  fatigue  leg  apin back  pain    NECK: No pain or stiffnes  RESPIRATORY: No SOB   CARDIOVASCULAR: No chest pain, palpitations, dizziness,   GASTROINTESTINAL: No abdominal pain. No nausea, vomiting,   NEUROLOGICAL: No headaches, no  blurry  vision no  dizziness  SKIN: No itching,   MUSCULOSKELETAL:     MEDICATION:  acetaminophen   Tablet .. 650 milliGRAM(s) Oral every 6 hours PRN  albuterol/ipratropium for Nebulization 3 milliLiter(s) Nebulizer every 6 hours  atorvastatin 10 milliGRAM(s) Oral at bedtime  budesonide 160 MICROgram(s)/formoterol 4.5 MICROgram(s) Inhaler 2 Puff(s) Inhalation two times a day  clonazePAM  Tablet 1 milliGRAM(s) Oral four times a day  dextrose 40% Gel 15 Gram(s) Oral once  dextrose 5%. 1000 milliLiter(s) IV Continuous <Continuous>  dextrose 5%. 1000 milliLiter(s) IV Continuous <Continuous>  dextrose 50% Injectable 25 Gram(s) IV Push once  dextrose 50% Injectable 12.5 Gram(s) IV Push once  dextrose 50% Injectable 25 Gram(s) IV Push once  escitalopram 20 milliGRAM(s) Oral daily  glucagon  Injectable 1 milliGRAM(s) IntraMuscular once  influenza   Vaccine 0.5 milliLiter(s) IntraMuscular once  insulin glargine Injectable (LANTUS) 20 Unit(s) SubCutaneous at bedtime  insulin lispro (ADMELOG) corrective regimen sliding scale   SubCutaneous three times a day before meals  insulin lispro (ADMELOG) corrective regimen sliding scale   SubCutaneous at bedtime  lactated ringers. 1000 milliLiter(s) IV Continuous <Continuous>  levothyroxine 150 MICROGram(s) Oral daily  montelukast 10 milliGRAM(s) Oral daily  nystatin Powder 1 Application(s) Topical two times a day  tiotropium 18 MICROgram(s) Capsule 1 Capsule(s) Inhalation daily  traMADol 50 milliGRAM(s) Oral three times a day PRN    Vital Signs Last 24 Hrs  T(C): 37 (21 Mar 2021 05:38), Max: 37.1 (20 Mar 2021 17:30)  T(F): 98.6 (21 Mar 2021 05:38), Max: 98.8 (20 Mar 2021 17:30)  HR: 82 (21 Mar 2021 11:01) (73 - 91)  BP: 135/70 (21 Mar 2021 05:38) (110/70 - 135/70)  BP(mean): --  RR: 18 (21 Mar 2021 05:38) (18 - 19)  SpO2: 95% (21 Mar 2021 11:01) (95% - 97%)    PHYSICAL EXAM:  GENERAL: NAD, well-groomed, well-developed  HEENT : Conjuntivae  clear sclerae anicteric  NECK: Supple, No JVD, Normal thyroid  NERVOUS SYSTEM:  Alert oriented   no  focal  deficits;   CHEST/LUNG: Clear    HEART: Regular rate and rhythm; No murmurs, rubs, or gallops  ABDOMEN: Soft, Nontender, Nondistended; Bowel sounds present  EXTREMITIES:  diffuse   tenderness  BACK  mild  diffuse  tenderness  lumbar  area negative  leg  raisisng  SKIN: No rashes   LABS:                        11.2   11.93 )-----------( 271      ( 21 Mar 2021 08:14 )             36.1     03-21    139  |  102  |  8   ----------------------------<  157<H>  4.0   |  34<H>  |  0.76    Ca    8.8      21 Mar 2021 08:14    TPro  6.4  /  Alb  2.9<L>  /  TBili  0.5  /  DBili  x   /  AST  17  /  ALT  22  /  AlkPhos  64  03-21        CAPILLARY BLOOD GLUCOSE      POCT Blood Glucose.: 233 mg/dL (21 Mar 2021 11:23)  POCT Blood Glucose.: 181 mg/dL (21 Mar 2021 07:37)  POCT Blood Glucose.: 242 mg/dL (20 Mar 2021 21:25)  POCT Blood Glucose.: 246 mg/dL (20 Mar 2021 15:48)  POCT Blood Glucose.: 226 mg/dL (20 Mar 2021 12:01)      RADIOLOGY & ADDITIONAL TESTS:    Imaging reports  Personally Reviewed:  [ ] YES  [ ] NO    Consultant(s) Notes Reviewed:  [x ] YES  [ ] NO    Care Discussed with Consultants/Other Providers [x ] YES  [ ] NO  VESTA likely from rhabdomylysis.     renal  function normalized  CPk  trending  to  normal  cont  ivf  increase  activity discharge  to  rehab    Pulmonary: Continue Symbicort bid and Spiriva daily with Duonebs and Singular. CXR is clear.     DM: with neuropathy Add Lantus 40 units at night, add Admelog as needed. Continue Synthyroid 150 mcg/day, TSH in AM.     HYPOTHYROIDISM  SYNTHROID  BACK  PAIN  2/2  OA  MORBID  OBESITY TRAMADOL  PT  WT  REDUCTION  MORBID  OBESITY  NUTRITION  EVALUATION  DIETARY  COUNSELING  
    INTERVAL HPI/OVERNIGHT EVENTS:    AWAITING  DISCHARGE  TO  REHAB    REVIEW OF SYSTEMS:  CONSTITUTIONAL:  c/o  insomnia   NECK: No pain or stiffnes  RESPIRATORY: No SOB   CARDIOVASCULAR: No chest pain, palpitations, dizziness,   GASTROINTESTINAL: No abdominal pain. No nausea, vomiting,   NEUROLOGICAL: No headaches, no  blurry  vision no  dizziness  SKIN: No itching,   MUSCULOSKELETAL: No pain    MEDICATION:  acetaminophen   Tablet .. 650 milliGRAM(s) Oral every 6 hours PRN  albuterol/ipratropium for Nebulization 3 milliLiter(s) Nebulizer every 6 hours  atorvastatin 10 milliGRAM(s) Oral at bedtime  budesonide 160 MICROgram(s)/formoterol 4.5 MICROgram(s) Inhaler 2 Puff(s) Inhalation two times a day  clonazePAM  Tablet 1 milliGRAM(s) Oral four times a day  dextrose 40% Gel 15 Gram(s) Oral once  dextrose 5%. 1000 milliLiter(s) IV Continuous <Continuous>  dextrose 5%. 1000 milliLiter(s) IV Continuous <Continuous>  dextrose 50% Injectable 25 Gram(s) IV Push once  dextrose 50% Injectable 12.5 Gram(s) IV Push once  dextrose 50% Injectable 25 Gram(s) IV Push once  escitalopram 20 milliGRAM(s) Oral daily  glucagon  Injectable 1 milliGRAM(s) IntraMuscular once  hydrOXYzine hydrochloride 50 milliGRAM(s) Oral every 8 hours PRN  influenza   Vaccine 0.5 milliLiter(s) IntraMuscular once  insulin glargine Injectable (LANTUS) 20 Unit(s) SubCutaneous at bedtime  insulin lispro (ADMELOG) corrective regimen sliding scale   SubCutaneous three times a day before meals  insulin lispro (ADMELOG) corrective regimen sliding scale   SubCutaneous at bedtime  lactated ringers. 1000 milliLiter(s) IV Continuous <Continuous>  levothyroxine 150 MICROGram(s) Oral daily  montelukast 10 milliGRAM(s) Oral daily  nystatin Powder 1 Application(s) Topical two times a day  tiotropium 18 MICROgram(s) Capsule 1 Capsule(s) Inhalation daily  traMADol 50 milliGRAM(s) Oral three times a day PRN    Vital Signs Last 24 Hrs  T(C): 36.3 (22 Mar 2021 05:19), Max: 37.4 (21 Mar 2021 17:53)  T(F): 97.4 (22 Mar 2021 05:19), Max: 99.3 (21 Mar 2021 17:53)  HR: 75 (22 Mar 2021 05:19) (75 - 96)  BP: 123/82 (22 Mar 2021 05:19) (111/74 - 153/81)  BP(mean): --  RR: 18 (22 Mar 2021 05:19) (17 - 18)  SpO2: 96% (22 Mar 2021 05:19) (93% - 96%)    PHYSICAL EXAM:  GENERAL: NAD, well-groomed, well-developed  HEENT : Conjuntivae  clear sclerae anicteric  NECK: Supple, No JVD, Normal thyroid  NERVOUS SYSTEM:  Alert oriented   no  focal  deficits;   CHEST/LUNG: Clear    HEART: Regular rate and rhythm; No murmurs, rubs, or gallops  ABDOMEN: Soft, Nontender, Nondistended; Bowel sounds present  EXTREMITIES:  no  edema no  tenderness  SKIN: No rashes   LABS:                        11.2   11.93 )-----------( 271      ( 21 Mar 2021 08:14 )             36.1     03-21    139  |  102  |  8   ----------------------------<  157<H>  4.0   |  34<H>  |  0.76    Ca    8.8      21 Mar 2021 08:14    TPro  6.4  /  Alb  2.9<L>  /  TBili  0.5  /  DBili  x   /  AST  17  /  ALT  22  /  AlkPhos  64  03-21        CAPILLARY BLOOD GLUCOSE      POCT Blood Glucose.: 224 mg/dL (22 Mar 2021 07:53)  POCT Blood Glucose.: 293 mg/dL (21 Mar 2021 21:45)  POCT Blood Glucose.: 231 mg/dL (21 Mar 2021 16:20)  POCT Blood Glucose.: 233 mg/dL (21 Mar 2021 11:23)      RADIOLOGY & ADDITIONAL TESTS:    Imaging reports  Personally Reviewed:  [ ] YES  [ ] NO    Consultant(s) Notes Reviewed:  [x ] YES  [ ] NO    Care Discussed with Consultants/Other Providers [ x] YES  [ ] NO  VESTA likely from rhabdomylysis.     renal  function normalized  CPk  trending  to  normal  cont  ivf  increase  activity discharge  to  rehab    Pulmonary: Continue Symbicort bid and Spiriva daily with Duonebs and Singular. CXR is clear.     DM: with neuropathy Add Lantus 40 units at night, add Admelog as needed. Continue Synthyroid 150 mcg/day, TSH in AM.     HYPOTHYROIDISM  SYNTHROID  BACK  PAIN  2/2  OA  MORBID  OBESITY TRAMADOL  PT  WT  REDUCTION  MORBID  OBESITY  NUTRITION  EVALUATION  DIETARY  COUNSELING  INSOMNIA  MELATONIN
    INTERVAL HPI/OVERNIGHT EVENTS:    fever  thia  am    REVIEW OF SYSTEMS:  CONSTITUTIONAL:  no  complaints    NECK: No pain or stiffnes  RESPIRATORY: No SOB   CARDIOVASCULAR: No chest pain, palpitations, dizziness,   GASTROINTESTINAL: No abdominal pain. No nausea, vomiting,   NEUROLOGICAL: No headaches, no  blurry  vision no  dizziness  SKIN: No itching,   MUSCULOSKELETAL: No pain    MEDICATION:  acetaminophen   Tablet .. 650 milliGRAM(s) Oral every 6 hours PRN  albuterol/ipratropium for Nebulization 3 milliLiter(s) Nebulizer every 6 hours  atorvastatin 10 milliGRAM(s) Oral at bedtime  budesonide 160 MICROgram(s)/formoterol 4.5 MICROgram(s) Inhaler 2 Puff(s) Inhalation two times a day  clonazePAM  Tablet 1 milliGRAM(s) Oral four times a day  dextrose 40% Gel 15 Gram(s) Oral once  dextrose 5%. 1000 milliLiter(s) IV Continuous <Continuous>  dextrose 5%. 1000 milliLiter(s) IV Continuous <Continuous>  dextrose 50% Injectable 25 Gram(s) IV Push once  dextrose 50% Injectable 12.5 Gram(s) IV Push once  dextrose 50% Injectable 25 Gram(s) IV Push once  escitalopram 20 milliGRAM(s) Oral daily  glucagon  Injectable 1 milliGRAM(s) IntraMuscular once  influenza   Vaccine 0.5 milliLiter(s) IntraMuscular once  insulin glargine Injectable (LANTUS) 20 Unit(s) SubCutaneous at bedtime  insulin lispro (ADMELOG) corrective regimen sliding scale   SubCutaneous three times a day before meals  insulin lispro (ADMELOG) corrective regimen sliding scale   SubCutaneous at bedtime  lactated ringers. 1000 milliLiter(s) IV Continuous <Continuous>  levothyroxine 150 MICROGram(s) Oral daily  montelukast 10 milliGRAM(s) Oral daily  nystatin Powder 1 Application(s) Topical two times a day  tiotropium 18 MICROgram(s) Capsule 1 Capsule(s) Inhalation daily    Vital Signs Last 24 Hrs  T(C): 36.8 (20 Mar 2021 13:58), Max: 38.3 (20 Mar 2021 06:22)  T(F): 98.2 (20 Mar 2021 13:58), Max: 101 (20 Mar 2021 06:22)  HR: 91 (20 Mar 2021 13:58) (90 - 100)  BP: 125/75 (20 Mar 2021 13:58) (125/75 - 139/81)  BP(mean): --  RR: 19 (20 Mar 2021 13:58) (19 - 20)  SpO2: 97% (20 Mar 2021 13:58) (91% - 99%)    PHYSICAL EXAM:  GENERAL: NAD, well-groomed, well-developed  HEENT : Conjuntivae  clear sclerae anicteric  NECK: Supple, No JVD, Normal thyroid  NERVOUS SYSTEM:  Alert oriented   no  focal  deficits;   CHEST/LUNG: Clear    HEART: Regular rate and rhythm; No murmurs, rubs, or gallops  ABDOMEN: Soft, Nontender, Nondistended; Bowel sounds present  EXTREMITIES:  no  edema no  tenderness  SKIN: No rashes   LABS:                        11.0   11.71 )-----------( 245      ( 20 Mar 2021 11:08 )             34.5     03-20    142  |  104  |  9   ----------------------------<  183<H>  3.8   |  34<H>  |  0.78    Ca    8.6      20 Mar 2021 11:13    TPro  6.2  /  Alb  2.9<L>  /  TBili  0.4  /  DBili  x   /  AST  29  /  ALT  22  /  AlkPhos  70  03-19        CAPILLARY BLOOD GLUCOSE      POCT Blood Glucose.: 246 mg/dL (20 Mar 2021 15:48)  POCT Blood Glucose.: 226 mg/dL (20 Mar 2021 12:01)  POCT Blood Glucose.: 191 mg/dL (20 Mar 2021 08:05)  POCT Blood Glucose.: 268 mg/dL (19 Mar 2021 21:12)  POCT Blood Glucose.: 183 mg/dL (19 Mar 2021 16:13)      RADIOLOGY & ADDITIONAL TESTS:    Imaging reports  Personally Reviewed:  [ ] YES  [ ] NO    Consultant(s) Notes Reviewed:  [ x] YES  [ ] NO    Care Discussed with Consultants/Other Providers [x ] YES  [ ] NO  Renal: VESTA likely from rhabdomylysis.     renal  function normalized  CPk  trending  to  normal  cont  ivf  increase  activity discharge  to  rehab    Pulmonary: Continue Symbicort bid and Spiriva daily with Duonebs and Singular. CXR is clear.     DM: Add Lantus 40 units at night, add Admelog as needed. Continue Synthyroid 150 mcg/day, TSH in AM.     HYPOTHYROIDISM  SYNTHROID  MORBID  OBESITY  NUTRITION  EVALUATION  DIETARY  COUNSELING
    INTERVAL HPI/OVERNIGHT EVENTS:        REVIEW OF SYSTEMS:  CONSTITUTIONAL:    feels  well     NECK: No pain or stiffnes  RESPIRATORY: No SOB   CARDIOVASCULAR: No chest pain, palpitations, dizziness,   GASTROINTESTINAL: No abdominal pain. No nausea, vomiting,   NEUROLOGICAL: No headaches, no  blurry  vision no  dizziness  SKIN: No itching,   MUSCULOSKELETAL:   back  pain    MEDICATION:  acetaminophen   Tablet .. 650 milliGRAM(s) Oral every 6 hours PRN  albuterol/ipratropium for Nebulization 3 milliLiter(s) Nebulizer every 6 hours  atorvastatin 10 milliGRAM(s) Oral at bedtime  budesonide 160 MICROgram(s)/formoterol 4.5 MICROgram(s) Inhaler 2 Puff(s) Inhalation two times a day  clonazePAM  Tablet 1 milliGRAM(s) Oral four times a day  dextrose 40% Gel 15 Gram(s) Oral once  dextrose 5%. 1000 milliLiter(s) IV Continuous <Continuous>  dextrose 5%. 1000 milliLiter(s) IV Continuous <Continuous>  dextrose 50% Injectable 25 Gram(s) IV Push once  dextrose 50% Injectable 12.5 Gram(s) IV Push once  dextrose 50% Injectable 25 Gram(s) IV Push once  escitalopram 20 milliGRAM(s) Oral daily  glucagon  Injectable 1 milliGRAM(s) IntraMuscular once  hydrOXYzine hydrochloride 50 milliGRAM(s) Oral every 8 hours PRN  influenza   Vaccine 0.5 milliLiter(s) IntraMuscular once  insulin glargine Injectable (LANTUS) 20 Unit(s) SubCutaneous at bedtime  insulin lispro (ADMELOG) corrective regimen sliding scale   SubCutaneous three times a day before meals  insulin lispro (ADMELOG) corrective regimen sliding scale   SubCutaneous at bedtime  lactated ringers. 1000 milliLiter(s) IV Continuous <Continuous>  levothyroxine 150 MICROGram(s) Oral daily  melatonin 6 milliGRAM(s) Oral at bedtime  montelukast 10 milliGRAM(s) Oral daily  nystatin Powder 1 Application(s) Topical two times a day  tiotropium 18 MICROgram(s) Capsule 1 Capsule(s) Inhalation daily  traMADol 50 milliGRAM(s) Oral three times a day PRN    Vital Signs Last 24 Hrs  T(C): 36.4 (23 Mar 2021 06:00), Max: 36.6 (22 Mar 2021 23:30)  T(F): 97.6 (23 Mar 2021 06:00), Max: 97.9 (22 Mar 2021 23:30)  HR: 64 (23 Mar 2021 06:00) (58 - 97)  BP: 123/61 (23 Mar 2021 06:00) (112/72 - 145/82)  BP(mean): --  RR: 17 (23 Mar 2021 06:00) (17 - 19)  SpO2: 95% (23 Mar 2021 06:00) (93% - 97%)    PHYSICAL EXAM:  GENERAL: NAD, well-groomed, well-developed  HEENT : Conjuntivae  clear sclerae anicteric  NECK: Supple, No JVD, Normal thyroid  NERVOUS SYSTEM:  Alert oriented   no  focal  deficits;   CHEST/LUNG: Clear    HEART: Regular rate and rhythm; No murmurs, rubs, or gallops  ABDOMEN: Soft, Nontender, Nondistended; Bowel sounds present  EXTREMITIES:  no  edema no  tenderness  SKIN: No rashes   LABS:                        11.2   12.04 )-----------( 274      ( 22 Mar 2021 09:58 )             35.9     03-22    140  |  102  |  12  ----------------------------<  198<H>  3.8   |  35<H>  |  0.93    Ca    8.8      22 Mar 2021 09:58          CAPILLARY BLOOD GLUCOSE      POCT Blood Glucose.: 157 mg/dL (23 Mar 2021 07:59)  POCT Blood Glucose.: 224 mg/dL (22 Mar 2021 21:54)  POCT Blood Glucose.: 288 mg/dL (22 Mar 2021 16:01)  POCT Blood Glucose.: 188 mg/dL (22 Mar 2021 10:53)      RADIOLOGY & ADDITIONAL TESTS:    Imaging reports  Personally Reviewed:  [ ] YES  [ ] NO    Consultant(s) Notes Reviewed:  [ x] YES  [ ] NO    Care Discussed with Consultants/Other Providers [x ] YES  [ ] NO  VESTA likely from rhabdomylysis.     renal  function normalized  CPk  trending  to  normal   increase  activity discharge  to  rehab    Pulmonary: Continue Symbicort bid and Spiriva daily with Duonebs and Singular. CXR is clear.     DM: with neuropathy Add Lantus 40 units at night, add Admelog as needed. Continue Synthyroid 150 mcg/day, TSH in AM.     HYPOTHYROIDISM  SYNTHROID  BACK  PAIN  2/2  OA  MORBID  OBESITY TRAMADOL  PT  WT  REDUCTION  MORBID  OBESITY  NUTRITION  EVALUATION  DIETARY  COUNSELING  INSOMNIA  MELATONIN

## 2021-03-30 DIAGNOSIS — Z79.890 HORMONE REPLACEMENT THERAPY: ICD-10-CM

## 2021-03-30 DIAGNOSIS — R29.6 REPEATED FALLS: ICD-10-CM

## 2021-03-30 DIAGNOSIS — G47.00 INSOMNIA, UNSPECIFIED: ICD-10-CM

## 2021-03-30 DIAGNOSIS — Z79.84 LONG TERM (CURRENT) USE OF ORAL HYPOGLYCEMIC DRUGS: ICD-10-CM

## 2021-03-30 DIAGNOSIS — F41.9 ANXIETY DISORDER, UNSPECIFIED: ICD-10-CM

## 2021-03-30 DIAGNOSIS — E03.9 HYPOTHYROIDISM, UNSPECIFIED: ICD-10-CM

## 2021-03-30 DIAGNOSIS — F40.00 AGORAPHOBIA, UNSPECIFIED: ICD-10-CM

## 2021-03-30 DIAGNOSIS — J43.9 EMPHYSEMA, UNSPECIFIED: ICD-10-CM

## 2021-03-30 DIAGNOSIS — R20.0 ANESTHESIA OF SKIN: ICD-10-CM

## 2021-03-30 DIAGNOSIS — N17.9 ACUTE KIDNEY FAILURE, UNSPECIFIED: ICD-10-CM

## 2021-03-30 DIAGNOSIS — I10 ESSENTIAL (PRIMARY) HYPERTENSION: ICD-10-CM

## 2021-03-30 DIAGNOSIS — F17.210 NICOTINE DEPENDENCE, CIGARETTES, UNCOMPLICATED: ICD-10-CM

## 2021-03-30 DIAGNOSIS — Z91.19 PATIENT'S NONCOMPLIANCE WITH OTHER MEDICAL TREATMENT AND REGIMEN: ICD-10-CM

## 2021-03-30 DIAGNOSIS — R20.2 PARESTHESIA OF SKIN: ICD-10-CM

## 2021-03-30 DIAGNOSIS — Z79.899 OTHER LONG TERM (CURRENT) DRUG THERAPY: ICD-10-CM

## 2021-03-30 DIAGNOSIS — E66.01 MORBID (SEVERE) OBESITY DUE TO EXCESS CALORIES: ICD-10-CM

## 2021-03-30 DIAGNOSIS — M62.82 RHABDOMYOLYSIS: ICD-10-CM

## 2021-03-30 DIAGNOSIS — Z85.51 PERSONAL HISTORY OF MALIGNANT NEOPLASM OF BLADDER: ICD-10-CM

## 2021-03-30 DIAGNOSIS — Z79.52 LONG TERM (CURRENT) USE OF SYSTEMIC STEROIDS: ICD-10-CM

## 2021-03-30 DIAGNOSIS — M54.9 DORSALGIA, UNSPECIFIED: ICD-10-CM

## 2021-03-30 DIAGNOSIS — E11.40 TYPE 2 DIABETES MELLITUS WITH DIABETIC NEUROPATHY, UNSPECIFIED: ICD-10-CM

## 2021-04-06 NOTE — PROGRESS NOTE ADULT - PROBLEM SELECTOR PLAN 1
continue lantus 25units  add lispro 4units with meals   continue SOHAN with meals  will follow    upon d/c pt may resume home regimen
steroid induced hyperglycemia   once steroids are decreased expect better finger sticks   Continue with the same regimen while inpatient   while inpatient Finger Sticks should be in 140-180 range, preferably <160 which is difficult to achieve as the patient has increased insulin resistance and is on steroids
uncontrolled  increase lantus to 30units daily  continue  lispro 4units with meals   continue SOHAN with meals  will follow    upon d/c pt may resume home regimen
normal

## 2021-04-18 ENCOUNTER — EMERGENCY (EMERGENCY)
Facility: HOSPITAL | Age: 61
LOS: 0 days | Discharge: HOME | End: 2021-04-18
Attending: EMERGENCY MEDICINE
Payer: MEDICAID

## 2021-04-18 VITALS
SYSTOLIC BLOOD PRESSURE: 124 MMHG | DIASTOLIC BLOOD PRESSURE: 70 MMHG | HEART RATE: 82 BPM | OXYGEN SATURATION: 98 % | TEMPERATURE: 98 F | RESPIRATION RATE: 18 BRPM

## 2021-04-18 VITALS
SYSTOLIC BLOOD PRESSURE: 120 MMHG | DIASTOLIC BLOOD PRESSURE: 91 MMHG | HEIGHT: 61 IN | TEMPERATURE: 98 F | HEART RATE: 88 BPM | OXYGEN SATURATION: 99 % | RESPIRATION RATE: 18 BRPM

## 2021-04-18 DIAGNOSIS — Z79.4 LONG TERM (CURRENT) USE OF INSULIN: ICD-10-CM

## 2021-04-18 DIAGNOSIS — R52 PAIN, UNSPECIFIED: ICD-10-CM

## 2021-04-18 DIAGNOSIS — Z90.49 ACQUIRED ABSENCE OF OTHER SPECIFIED PARTS OF DIGESTIVE TRACT: Chronic | ICD-10-CM

## 2021-04-18 DIAGNOSIS — F41.9 ANXIETY DISORDER, UNSPECIFIED: ICD-10-CM

## 2021-04-18 DIAGNOSIS — J44.9 CHRONIC OBSTRUCTIVE PULMONARY DISEASE, UNSPECIFIED: ICD-10-CM

## 2021-04-18 DIAGNOSIS — Z79.52 LONG TERM (CURRENT) USE OF SYSTEMIC STEROIDS: ICD-10-CM

## 2021-04-18 DIAGNOSIS — R20.2 PARESTHESIA OF SKIN: ICD-10-CM

## 2021-04-18 DIAGNOSIS — Z79.1 LONG TERM (CURRENT) USE OF NON-STEROIDAL ANTI-INFLAMMATORIES (NSAID): ICD-10-CM

## 2021-04-18 DIAGNOSIS — I10 ESSENTIAL (PRIMARY) HYPERTENSION: ICD-10-CM

## 2021-04-18 DIAGNOSIS — E11.9 TYPE 2 DIABETES MELLITUS WITHOUT COMPLICATIONS: ICD-10-CM

## 2021-04-18 DIAGNOSIS — R20.0 ANESTHESIA OF SKIN: ICD-10-CM

## 2021-04-18 DIAGNOSIS — Z87.891 PERSONAL HISTORY OF NICOTINE DEPENDENCE: ICD-10-CM

## 2021-04-18 PROBLEM — C67.9 MALIGNANT NEOPLASM OF BLADDER, UNSPECIFIED: Chronic | Status: ACTIVE | Noted: 2021-03-17

## 2021-04-18 LAB
ALBUMIN SERPL ELPH-MCNC: 3.9 G/DL — SIGNIFICANT CHANGE UP (ref 3.3–5)
ALP SERPL-CCNC: 78 U/L — SIGNIFICANT CHANGE UP (ref 40–120)
ALT FLD-CCNC: 17 U/L — SIGNIFICANT CHANGE UP (ref 12–78)
ANION GAP SERPL CALC-SCNC: 4 MMOL/L — LOW (ref 5–17)
APPEARANCE UR: CLEAR — SIGNIFICANT CHANGE UP
AST SERPL-CCNC: 9 U/L — LOW (ref 15–37)
BASOPHILS # BLD AUTO: 0 K/UL — SIGNIFICANT CHANGE UP (ref 0–0.2)
BASOPHILS NFR BLD AUTO: 0 % — SIGNIFICANT CHANGE UP (ref 0–2)
BILIRUB SERPL-MCNC: 0.3 MG/DL — SIGNIFICANT CHANGE UP (ref 0.2–1.2)
BILIRUB UR-MCNC: NEGATIVE — SIGNIFICANT CHANGE UP
BUN SERPL-MCNC: 20 MG/DL — SIGNIFICANT CHANGE UP (ref 7–23)
CALCIUM SERPL-MCNC: 8.8 MG/DL — SIGNIFICANT CHANGE UP (ref 8.5–10.1)
CHLORIDE SERPL-SCNC: 103 MMOL/L — SIGNIFICANT CHANGE UP (ref 96–108)
CK SERPL-CCNC: 59 U/L — SIGNIFICANT CHANGE UP (ref 26–192)
CO2 SERPL-SCNC: 32 MMOL/L — HIGH (ref 22–31)
COLOR SPEC: YELLOW — SIGNIFICANT CHANGE UP
CREAT SERPL-MCNC: 0.96 MG/DL — SIGNIFICANT CHANGE UP (ref 0.5–1.3)
DIFF PNL FLD: NEGATIVE — SIGNIFICANT CHANGE UP
EOSINOPHIL # BLD AUTO: 1.12 K/UL — HIGH (ref 0–0.5)
EOSINOPHIL NFR BLD AUTO: 6 % — SIGNIFICANT CHANGE UP (ref 0–6)
EPI CELLS # UR: SIGNIFICANT CHANGE UP
GLUCOSE BLDC GLUCOMTR-MCNC: 103 MG/DL — HIGH (ref 70–99)
GLUCOSE SERPL-MCNC: 98 MG/DL — SIGNIFICANT CHANGE UP (ref 70–99)
GLUCOSE UR QL: NEGATIVE MG/DL — SIGNIFICANT CHANGE UP
HCT VFR BLD CALC: 37.5 % — SIGNIFICANT CHANGE UP (ref 34.5–45)
HGB BLD-MCNC: 11.9 G/DL — SIGNIFICANT CHANGE UP (ref 11.5–15.5)
KETONES UR-MCNC: NEGATIVE — SIGNIFICANT CHANGE UP
LEUKOCYTE ESTERASE UR-ACNC: ABNORMAL
LYMPHOCYTES # BLD AUTO: 30 % — SIGNIFICANT CHANGE UP (ref 13–44)
LYMPHOCYTES # BLD AUTO: 5.6 K/UL — HIGH (ref 1–3.3)
MANUAL SMEAR VERIFICATION: SIGNIFICANT CHANGE UP
MCHC RBC-ENTMCNC: 29.4 PG — SIGNIFICANT CHANGE UP (ref 27–34)
MCHC RBC-ENTMCNC: 31.7 GM/DL — LOW (ref 32–36)
MCV RBC AUTO: 92.6 FL — SIGNIFICANT CHANGE UP (ref 80–100)
MONOCYTES # BLD AUTO: 1.49 K/UL — HIGH (ref 0–0.9)
MONOCYTES NFR BLD AUTO: 8 % — SIGNIFICANT CHANGE UP (ref 2–14)
NEUTROPHILS # BLD AUTO: 9.7 K/UL — HIGH (ref 1.8–7.4)
NEUTROPHILS NFR BLD AUTO: 50 % — SIGNIFICANT CHANGE UP (ref 43–77)
NITRITE UR-MCNC: NEGATIVE — SIGNIFICANT CHANGE UP
NRBC # BLD: 0 /100 — SIGNIFICANT CHANGE UP (ref 0–0)
NRBC # BLD: SIGNIFICANT CHANGE UP /100 WBCS (ref 0–0)
PH UR: 5 — SIGNIFICANT CHANGE UP (ref 5–8)
PLAT MORPH BLD: NORMAL — SIGNIFICANT CHANGE UP
PLATELET # BLD AUTO: 282 K/UL — SIGNIFICANT CHANGE UP (ref 150–400)
POTASSIUM SERPL-MCNC: 4 MMOL/L — SIGNIFICANT CHANGE UP (ref 3.5–5.3)
POTASSIUM SERPL-SCNC: 4 MMOL/L — SIGNIFICANT CHANGE UP (ref 3.5–5.3)
PROT SERPL-MCNC: 7.3 GM/DL — SIGNIFICANT CHANGE UP (ref 6–8.3)
PROT UR-MCNC: NEGATIVE MG/DL — SIGNIFICANT CHANGE UP
RBC # BLD: 4.05 M/UL — SIGNIFICANT CHANGE UP (ref 3.8–5.2)
RBC # FLD: 14.6 % — HIGH (ref 10.3–14.5)
RBC BLD AUTO: NORMAL — SIGNIFICANT CHANGE UP
RBC CASTS # UR COMP ASSIST: SIGNIFICANT CHANGE UP /HPF (ref 0–4)
SMUDGE CELLS # BLD: PRESENT — SIGNIFICANT CHANGE UP
SODIUM SERPL-SCNC: 139 MMOL/L — SIGNIFICANT CHANGE UP (ref 135–145)
SP GR SPEC: 1.01 — SIGNIFICANT CHANGE UP (ref 1.01–1.02)
TROPONIN I SERPL-MCNC: <.015 NG/ML — SIGNIFICANT CHANGE UP (ref 0.01–0.04)
UROBILINOGEN FLD QL: NEGATIVE MG/DL — SIGNIFICANT CHANGE UP
VARIANT LYMPHS # BLD: 6 % — SIGNIFICANT CHANGE UP (ref 0–6)
WBC # BLD: 18.65 K/UL — HIGH (ref 3.8–10.5)
WBC # FLD AUTO: 18.65 K/UL — HIGH (ref 3.8–10.5)
WBC UR QL: ABNORMAL

## 2021-04-18 PROCEDURE — 99284 EMERGENCY DEPT VISIT MOD MDM: CPT

## 2021-04-18 RX ORDER — KETOROLAC TROMETHAMINE 30 MG/ML
15 SYRINGE (ML) INJECTION ONCE
Refills: 0 | Status: DISCONTINUED | OUTPATIENT
Start: 2021-04-18 | End: 2021-04-18

## 2021-04-18 RX ADMIN — Medication 15 MILLIGRAM(S): at 08:57

## 2021-04-18 RX ADMIN — Medication 15 MILLIGRAM(S): at 08:27

## 2021-04-18 NOTE — ED ADULT NURSE REASSESSMENT NOTE - NS ED NURSE REASSESS COMMENT FT1
Assuming patient's care for coverage. Pt ambulating to bathroom. No distress noted. Medications ordered as per MD. Given as per MAR. Report received from RN and patient informed during rounding. Will continue to monitor

## 2021-04-18 NOTE — ED ADULT NURSE NOTE - NSIMPLEMENTINTERV_GEN_ALL_ED
Implemented All Universal Safety Interventions:  [place] to call system. Call bell, personal items and telephone within reach. Instruct patient to call for assistance. Room bathroom lighting operational. Non-slip footwear when patient is off stretcher. Physically safe environment: no spills, clutter or unnecessary equipment. Stretcher in lowest position, wheels locked, appropriate side rails in place.

## 2021-04-18 NOTE — ED PROVIDER NOTE - CONSTITUTIONAL, MLM
normal... Well appearing, awake, alert, oriented to person, place, time/situation and in no apparent distress.  Obese.  Patient laying prone sleeping easily aroused

## 2021-04-18 NOTE — ED ADULT TRIAGE NOTE - CHIEF COMPLAINT QUOTE
pt complaining of pain and numbness to arms, legs, neck. history of diabetic neuropathy. states she thinks she is having a flare up.

## 2021-04-18 NOTE — ED PROVIDER NOTE - OBJECTIVE STATEMENT
This patient is a 61 year old woman hx of DM, HTN, COPD and This patient is a 61 year old woman hx of DM, HTN, COPD and anxiety who presents to the ER c/o a numbing feeling with pain throughout her entire body from her hair follicles throughout her entire body, upper and lower extremities bilaterally down to her feet and toes.  The symptoms have been going on for over 2 weeks.  She states that she has taken every over the counter medication for pain but cannot recall the name of any of the medications.  Patient states "its a diabetic neuropathy flare up."  She denies fever.  She reports chronic SOB unchanged but she admits that she does not wear her home throughout the day like she is supposed to.

## 2021-04-18 NOTE — ED ADULT NURSE NOTE - OBJECTIVE STATEMENT
The patient is a 61y Female C/C of generalized body aches that has gotten worst today and upon activity.

## 2021-04-18 NOTE — ED PROVIDER NOTE - CLINICAL SUMMARY MEDICAL DECISION MAKING FREE TEXT BOX
Patient with diffuse paresthesias and myalgias no distress.  Will check electrolytes and UA.  If no emergent/acute findings patient has strongly been advised to follow-up with her PMD.

## 2021-04-18 NOTE — ED PROVIDER NOTE - PROGRESS NOTE DETAILS
Patient reports that she is still on steroids chronically.  This is likely the cause of her elevated WBCs.

## 2021-04-18 NOTE — ED PROVIDER NOTE - PATIENT PORTAL LINK FT
You can access the FollowMyHealth Patient Portal offered by [facility] by registering at the following website: [email or web address]. By joining [facility]’s FollowMyHealth portal, you will also be able to view your health information using other applications (apps) compatible with our system.

## 2021-06-02 ENCOUNTER — INPATIENT (INPATIENT)
Facility: HOSPITAL | Age: 61
LOS: 2 days | Discharge: HOME | End: 2021-06-05
Attending: INTERNAL MEDICINE | Admitting: INTERNAL MEDICINE
Payer: MEDICAID

## 2021-06-02 VITALS
DIASTOLIC BLOOD PRESSURE: 80 MMHG | TEMPERATURE: 98 F | HEIGHT: 61 IN | WEIGHT: 250 LBS | HEART RATE: 114 BPM | SYSTOLIC BLOOD PRESSURE: 160 MMHG | RESPIRATION RATE: 20 BRPM | OXYGEN SATURATION: 98 %

## 2021-06-02 DIAGNOSIS — J44.9 CHRONIC OBSTRUCTIVE PULMONARY DISEASE, UNSPECIFIED: ICD-10-CM

## 2021-06-02 DIAGNOSIS — Z90.49 ACQUIRED ABSENCE OF OTHER SPECIFIED PARTS OF DIGESTIVE TRACT: Chronic | ICD-10-CM

## 2021-06-02 DIAGNOSIS — E66.01 MORBID (SEVERE) OBESITY DUE TO EXCESS CALORIES: ICD-10-CM

## 2021-06-02 DIAGNOSIS — E03.9 HYPOTHYROIDISM, UNSPECIFIED: ICD-10-CM

## 2021-06-02 DIAGNOSIS — E11.9 TYPE 2 DIABETES MELLITUS WITHOUT COMPLICATIONS: ICD-10-CM

## 2021-06-02 DIAGNOSIS — I10 ESSENTIAL (PRIMARY) HYPERTENSION: ICD-10-CM

## 2021-06-02 DIAGNOSIS — F41.9 ANXIETY DISORDER, UNSPECIFIED: ICD-10-CM

## 2021-06-02 DIAGNOSIS — Z72.0 TOBACCO USE: ICD-10-CM

## 2021-06-02 LAB
ALBUMIN SERPL ELPH-MCNC: 3.6 G/DL — SIGNIFICANT CHANGE UP (ref 3.3–5)
ALP SERPL-CCNC: 97 U/L — SIGNIFICANT CHANGE UP (ref 40–120)
ALT FLD-CCNC: 16 U/L — SIGNIFICANT CHANGE UP (ref 12–78)
ANION GAP SERPL CALC-SCNC: 9 MMOL/L — SIGNIFICANT CHANGE UP (ref 5–17)
AST SERPL-CCNC: 10 U/L — LOW (ref 15–37)
BASE EXCESS BLDA CALC-SCNC: 0.8 MMOL/L — SIGNIFICANT CHANGE UP (ref -2–2)
BASOPHILS # BLD AUTO: 0.15 K/UL — SIGNIFICANT CHANGE UP (ref 0–0.2)
BASOPHILS NFR BLD AUTO: 0.8 % — SIGNIFICANT CHANGE UP (ref 0–2)
BILIRUB SERPL-MCNC: 0.5 MG/DL — SIGNIFICANT CHANGE UP (ref 0.2–1.2)
BLOOD GAS COMMENTS: SIGNIFICANT CHANGE UP
BLOOD GAS COMMENTS: SIGNIFICANT CHANGE UP
BLOOD GAS SOURCE: SIGNIFICANT CHANGE UP
BUN SERPL-MCNC: 19 MG/DL — SIGNIFICANT CHANGE UP (ref 7–23)
CALCIUM SERPL-MCNC: 9 MG/DL — SIGNIFICANT CHANGE UP (ref 8.5–10.1)
CHLORIDE SERPL-SCNC: 100 MMOL/L — SIGNIFICANT CHANGE UP (ref 96–108)
CO2 SERPL-SCNC: 28 MMOL/L — SIGNIFICANT CHANGE UP (ref 22–31)
CREAT SERPL-MCNC: 1.07 MG/DL — SIGNIFICANT CHANGE UP (ref 0.5–1.3)
D DIMER BLD IA.RAPID-MCNC: <150 NG/ML DDU — SIGNIFICANT CHANGE UP
EOSINOPHIL # BLD AUTO: 0.77 K/UL — HIGH (ref 0–0.5)
EOSINOPHIL NFR BLD AUTO: 4.3 % — SIGNIFICANT CHANGE UP (ref 0–6)
FLUAV AG NPH QL: SIGNIFICANT CHANGE UP
FLUBV AG NPH QL: SIGNIFICANT CHANGE UP
GLUCOSE BLDC GLUCOMTR-MCNC: 323 MG/DL — HIGH (ref 70–99)
GLUCOSE SERPL-MCNC: 244 MG/DL — HIGH (ref 70–99)
HCO3 BLDA-SCNC: 26 MMOL/L — SIGNIFICANT CHANGE UP (ref 21–29)
HCT VFR BLD CALC: 38.3 % — SIGNIFICANT CHANGE UP (ref 34.5–45)
HGB BLD-MCNC: 12.6 G/DL — SIGNIFICANT CHANGE UP (ref 11.5–15.5)
HOROWITZ INDEX BLDA+IHG-RTO: 36 — SIGNIFICANT CHANGE UP
IMM GRANULOCYTES NFR BLD AUTO: 1.9 % — HIGH (ref 0–1.5)
LACTATE SERPL-SCNC: 1.7 MMOL/L — SIGNIFICANT CHANGE UP (ref 0.7–2)
LYMPHOCYTES # BLD AUTO: 19 % — SIGNIFICANT CHANGE UP (ref 13–44)
LYMPHOCYTES # BLD AUTO: 3.42 K/UL — HIGH (ref 1–3.3)
MCHC RBC-ENTMCNC: 29.5 PG — SIGNIFICANT CHANGE UP (ref 27–34)
MCHC RBC-ENTMCNC: 32.9 GM/DL — SIGNIFICANT CHANGE UP (ref 32–36)
MCV RBC AUTO: 89.7 FL — SIGNIFICANT CHANGE UP (ref 80–100)
MONOCYTES # BLD AUTO: 1.12 K/UL — HIGH (ref 0–0.9)
MONOCYTES NFR BLD AUTO: 6.2 % — SIGNIFICANT CHANGE UP (ref 2–14)
NEUTROPHILS # BLD AUTO: 12.23 K/UL — HIGH (ref 1.8–7.4)
NEUTROPHILS NFR BLD AUTO: 67.8 % — SIGNIFICANT CHANGE UP (ref 43–77)
NRBC # BLD: 0 /100 WBCS — SIGNIFICANT CHANGE UP (ref 0–0)
NT-PROBNP SERPL-SCNC: 50 PG/ML — SIGNIFICANT CHANGE UP (ref 0–125)
PCO2 BLDA: 43 MMHG — SIGNIFICANT CHANGE UP (ref 32–46)
PH BLD: 7.39 — SIGNIFICANT CHANGE UP (ref 7.35–7.45)
PLATELET # BLD AUTO: 336 K/UL — SIGNIFICANT CHANGE UP (ref 150–400)
PO2 BLDA: 115 MMHG — HIGH (ref 74–108)
POTASSIUM SERPL-MCNC: 3.8 MMOL/L — SIGNIFICANT CHANGE UP (ref 3.5–5.3)
POTASSIUM SERPL-SCNC: 3.8 MMOL/L — SIGNIFICANT CHANGE UP (ref 3.5–5.3)
PROT SERPL-MCNC: 7.6 GM/DL — SIGNIFICANT CHANGE UP (ref 6–8.3)
RBC # BLD: 4.27 M/UL — SIGNIFICANT CHANGE UP (ref 3.8–5.2)
RBC # FLD: 14 % — SIGNIFICANT CHANGE UP (ref 10.3–14.5)
SAO2 % BLDA: 98 % — HIGH (ref 92–96)
SARS-COV-2 RNA SPEC QL NAA+PROBE: SIGNIFICANT CHANGE UP
SODIUM SERPL-SCNC: 137 MMOL/L — SIGNIFICANT CHANGE UP (ref 135–145)
TROPONIN I SERPL-MCNC: <.015 NG/ML — SIGNIFICANT CHANGE UP (ref 0.01–0.04)
TSH SERPL-MCNC: 26.7 UIU/ML — HIGH (ref 0.36–3.74)
WBC # BLD: 18.03 K/UL — HIGH (ref 3.8–10.5)
WBC # FLD AUTO: 18.03 K/UL — HIGH (ref 3.8–10.5)

## 2021-06-02 PROCEDURE — 93010 ELECTROCARDIOGRAM REPORT: CPT

## 2021-06-02 PROCEDURE — 71045 X-RAY EXAM CHEST 1 VIEW: CPT | Mod: 26

## 2021-06-02 PROCEDURE — 99285 EMERGENCY DEPT VISIT HI MDM: CPT

## 2021-06-02 PROCEDURE — 71275 CT ANGIOGRAPHY CHEST: CPT | Mod: 26,MA

## 2021-06-02 RX ORDER — DEXTROSE 50 % IN WATER 50 %
15 SYRINGE (ML) INTRAVENOUS ONCE
Refills: 0 | Status: DISCONTINUED | OUTPATIENT
Start: 2021-06-02 | End: 2021-06-05

## 2021-06-02 RX ORDER — ALPRAZOLAM 0.25 MG
0.5 TABLET ORAL ONCE
Refills: 0 | Status: DISCONTINUED | OUTPATIENT
Start: 2021-06-02 | End: 2021-06-02

## 2021-06-02 RX ORDER — UMECLIDINIUM BROMIDE AND VILANTEROL TRIFENATATE 62.5; 25 UG/1; UG/1
1 POWDER RESPIRATORY (INHALATION)
Qty: 0 | Refills: 0 | DISCHARGE

## 2021-06-02 RX ORDER — SODIUM CHLORIDE 9 MG/ML
1000 INJECTION, SOLUTION INTRAVENOUS
Refills: 0 | Status: DISCONTINUED | OUTPATIENT
Start: 2021-06-02 | End: 2021-06-05

## 2021-06-02 RX ORDER — CLONAZEPAM 1 MG
1 TABLET ORAL
Qty: 0 | Refills: 0 | DISCHARGE

## 2021-06-02 RX ORDER — AZITHROMYCIN 500 MG/1
500 TABLET, FILM COATED ORAL ONCE
Refills: 0 | Status: COMPLETED | OUTPATIENT
Start: 2021-06-02 | End: 2021-06-02

## 2021-06-02 RX ORDER — ESCITALOPRAM OXALATE 10 MG/1
1 TABLET, FILM COATED ORAL
Qty: 0 | Refills: 0 | DISCHARGE

## 2021-06-02 RX ORDER — INSULIN GLARGINE 100 [IU]/ML
22 INJECTION, SOLUTION SUBCUTANEOUS AT BEDTIME
Refills: 0 | Status: DISCONTINUED | OUTPATIENT
Start: 2021-06-02 | End: 2021-06-05

## 2021-06-02 RX ORDER — LOSARTAN POTASSIUM 100 MG/1
50 TABLET, FILM COATED ORAL DAILY
Refills: 0 | Status: DISCONTINUED | OUTPATIENT
Start: 2021-06-02 | End: 2021-06-05

## 2021-06-02 RX ORDER — INSULIN LISPRO 100/ML
VIAL (ML) SUBCUTANEOUS
Refills: 0 | Status: DISCONTINUED | OUTPATIENT
Start: 2021-06-02 | End: 2021-06-05

## 2021-06-02 RX ORDER — PANTOPRAZOLE SODIUM 20 MG/1
40 TABLET, DELAYED RELEASE ORAL ONCE
Refills: 0 | Status: COMPLETED | OUTPATIENT
Start: 2021-06-02 | End: 2021-06-02

## 2021-06-02 RX ORDER — DEXTROSE 50 % IN WATER 50 %
12.5 SYRINGE (ML) INTRAVENOUS ONCE
Refills: 0 | Status: DISCONTINUED | OUTPATIENT
Start: 2021-06-02 | End: 2021-06-05

## 2021-06-02 RX ORDER — IPRATROPIUM/ALBUTEROL SULFATE 18-103MCG
3 AEROSOL WITH ADAPTER (GRAM) INHALATION EVERY 6 HOURS
Refills: 0 | Status: DISCONTINUED | OUTPATIENT
Start: 2021-06-02 | End: 2021-06-05

## 2021-06-02 RX ORDER — CEFTRIAXONE 500 MG/1
1000 INJECTION, POWDER, FOR SOLUTION INTRAMUSCULAR; INTRAVENOUS ONCE
Refills: 0 | Status: COMPLETED | OUTPATIENT
Start: 2021-06-02 | End: 2021-06-02

## 2021-06-02 RX ORDER — CEFTRIAXONE 500 MG/1
1000 INJECTION, POWDER, FOR SOLUTION INTRAMUSCULAR; INTRAVENOUS EVERY 24 HOURS
Refills: 0 | Status: DISCONTINUED | OUTPATIENT
Start: 2021-06-02 | End: 2021-06-05

## 2021-06-02 RX ORDER — IPRATROPIUM/ALBUTEROL SULFATE 18-103MCG
3 AEROSOL WITH ADAPTER (GRAM) INHALATION ONCE
Refills: 0 | Status: COMPLETED | OUTPATIENT
Start: 2021-06-02 | End: 2021-06-02

## 2021-06-02 RX ORDER — CLONAZEPAM 1 MG
1 TABLET ORAL THREE TIMES A DAY
Refills: 0 | Status: DISCONTINUED | OUTPATIENT
Start: 2021-06-02 | End: 2021-06-05

## 2021-06-02 RX ORDER — MONTELUKAST 4 MG/1
10 TABLET, CHEWABLE ORAL AT BEDTIME
Refills: 0 | Status: DISCONTINUED | OUTPATIENT
Start: 2021-06-02 | End: 2021-06-05

## 2021-06-02 RX ORDER — DEXTROSE 50 % IN WATER 50 %
25 SYRINGE (ML) INTRAVENOUS ONCE
Refills: 0 | Status: DISCONTINUED | OUTPATIENT
Start: 2021-06-02 | End: 2021-06-05

## 2021-06-02 RX ORDER — LEVOTHYROXINE SODIUM 125 MCG
150 TABLET ORAL DAILY
Refills: 0 | Status: DISCONTINUED | OUTPATIENT
Start: 2021-06-02 | End: 2021-06-05

## 2021-06-02 RX ORDER — SODIUM CHLORIDE 9 MG/ML
1000 INJECTION INTRAMUSCULAR; INTRAVENOUS; SUBCUTANEOUS ONCE
Refills: 0 | Status: COMPLETED | OUTPATIENT
Start: 2021-06-02 | End: 2021-06-02

## 2021-06-02 RX ORDER — GLUCAGON INJECTION, SOLUTION 0.5 MG/.1ML
1 INJECTION, SOLUTION SUBCUTANEOUS ONCE
Refills: 0 | Status: DISCONTINUED | OUTPATIENT
Start: 2021-06-02 | End: 2021-06-05

## 2021-06-02 RX ORDER — ENOXAPARIN SODIUM 100 MG/ML
40 INJECTION SUBCUTANEOUS DAILY
Refills: 0 | Status: DISCONTINUED | OUTPATIENT
Start: 2021-06-02 | End: 2021-06-05

## 2021-06-02 RX ORDER — NICOTINE POLACRILEX 2 MG
1 GUM BUCCAL DAILY
Refills: 0 | Status: DISCONTINUED | OUTPATIENT
Start: 2021-06-02 | End: 2021-06-05

## 2021-06-02 RX ORDER — ESCITALOPRAM OXALATE 10 MG/1
10 TABLET, FILM COATED ORAL DAILY
Refills: 0 | Status: DISCONTINUED | OUTPATIENT
Start: 2021-06-02 | End: 2021-06-05

## 2021-06-02 RX ORDER — CLONAZEPAM 1 MG
1 TABLET ORAL ONCE
Refills: 0 | Status: DISCONTINUED | OUTPATIENT
Start: 2021-06-02 | End: 2021-06-02

## 2021-06-02 RX ORDER — INSULIN LISPRO 100/ML
VIAL (ML) SUBCUTANEOUS AT BEDTIME
Refills: 0 | Status: DISCONTINUED | OUTPATIENT
Start: 2021-06-02 | End: 2021-06-05

## 2021-06-02 RX ADMIN — PANTOPRAZOLE SODIUM 40 MILLIGRAM(S): 20 TABLET, DELAYED RELEASE ORAL at 11:51

## 2021-06-02 RX ADMIN — CEFTRIAXONE 100 MILLIGRAM(S): 500 INJECTION, POWDER, FOR SOLUTION INTRAMUSCULAR; INTRAVENOUS at 13:17

## 2021-06-02 RX ADMIN — INSULIN GLARGINE 22 UNIT(S): 100 INJECTION, SOLUTION SUBCUTANEOUS at 21:35

## 2021-06-02 RX ADMIN — Medication 1 MILLIGRAM(S): at 11:19

## 2021-06-02 RX ADMIN — Medication 1 PATCH: at 21:35

## 2021-06-02 RX ADMIN — Medication 125 MILLIGRAM(S): at 11:55

## 2021-06-02 RX ADMIN — Medication 4: at 21:33

## 2021-06-02 RX ADMIN — Medication 40 MILLIGRAM(S): at 21:36

## 2021-06-02 RX ADMIN — ESCITALOPRAM OXALATE 10 MILLIGRAM(S): 10 TABLET, FILM COATED ORAL at 21:36

## 2021-06-02 RX ADMIN — Medication 3 MILLILITER(S): at 10:59

## 2021-06-02 RX ADMIN — SODIUM CHLORIDE 1000 MILLILITER(S): 9 INJECTION INTRAMUSCULAR; INTRAVENOUS; SUBCUTANEOUS at 09:21

## 2021-06-02 RX ADMIN — Medication 3 MILLILITER(S): at 11:05

## 2021-06-02 RX ADMIN — CEFTRIAXONE 100 MILLIGRAM(S): 500 INJECTION, POWDER, FOR SOLUTION INTRAMUSCULAR; INTRAVENOUS at 15:02

## 2021-06-02 RX ADMIN — MONTELUKAST 10 MILLIGRAM(S): 4 TABLET, CHEWABLE ORAL at 21:37

## 2021-06-02 RX ADMIN — AZITHROMYCIN 255 MILLIGRAM(S): 500 TABLET, FILM COATED ORAL at 13:50

## 2021-06-02 RX ADMIN — Medication 1 MILLIGRAM(S): at 21:36

## 2021-06-02 RX ADMIN — ENOXAPARIN SODIUM 40 MILLIGRAM(S): 100 INJECTION SUBCUTANEOUS at 21:34

## 2021-06-02 NOTE — H&P ADULT - HISTORY OF PRESENT ILLNESS
HPI Objective Statement: 61 years old female by ems c/o sob, palpitation and ran out of clonazepam Pt sts she has hx of copd and oxygen dependant 4 liters. Pt also sts she is taking augmentin for the last two days for pneumonia. Pt refuses covid vaccine. Pt denies headache, dizziness, blurred visions, light sensitivities, focal/distal weakness or numbness, chest pain, nausea, vomiting, fever, chills, abd pain, dysuria or irregular bowel movements. continues  to  smoke  1/2  ppd

## 2021-06-02 NOTE — PATIENT PROFILE ADULT - NSTOBACCOCESSATIONEDU1_GEN_A_NUR
PAIN Recognize danger situations.  For example, stress, drinking alcohol, urges to smoke, smoking cues, cigarette availability

## 2021-06-02 NOTE — ED ADULT NURSE NOTE - NS ED NURSE RECORD ANOTHER HT AND WT
Chief Complaints and History of Present Illnesses   Patient presents with     Annual Eye Exam     self-referred for Comprehensive Eye Exam     HPI    Affected eye(s):  Both   Symptoms:     No blurred vision   Dryness      Frequency:  Constant       Do you have eye pain now?:  No      Comments:  Eyes itch and are dry, right eye worse then left. Rosana feeling in AM. Vision gets blurry after looking at phone.     Using Systane PRN, Visine Allergy drop chante Terrell COT 11:04 AM October 13, 2018                 Yes

## 2021-06-02 NOTE — H&P ADULT - NSHPLABSRESULTS_GEN_ALL_CORE
LABS:                        12.6   18.03 )-----------( 336      ( 02 Jun 2021 09:21 )             38.3     06-02    137  |  100  |  19  ----------------------------<  244<H>  3.8   |  28  |  1.07    Ca    9.0      02 Jun 2021 09:21    TPro  7.6  /  Alb  3.6  /  TBili  0.5  /  DBili  x   /  AST  10<L>  /  ALT  16  /  AlkPhos  97  06-02

## 2021-06-02 NOTE — ED PROVIDER NOTE - ENMT, MLM
Airway patent, Nasal mucosa clear. Mouth with normal mucosa. Throat has no vesicles, no oropharyngeal exudates and uvula is midline. 4

## 2021-06-02 NOTE — ED ADULT NURSE NOTE - OBJECTIVE STATEMENT
61 year old, AAX4, female presents to the ED for SOB, states "withdrawal from klonopin and is jerking and screaming in the night, gasping for air", has not been taking klonopin for 2 weeks now because she was taking too much of it  PMH HTN, DM COPD

## 2021-06-02 NOTE — H&P ADULT - NSHPPHYSICALEXAM_GEN_ALL_CORE
PHYSICAL EXAM:    GENERAL: NAD, morbidly obese  HEAD:  Atraumatic, Normocephalic  EYES: EOMI, PERRLA, conjunctiva and sclera clear  ENMT: No tonsillar erythema, exudates, or enlargement; Moist mucous membranes, , No lesions  NECK: Supple, No JVD, Normal thyroid  NERVOUS SYSTEM:  Alert & Oriented X4, ; Motor Strength 5/5 B/L upper and lower extremities; DTRs 2+ intact and symmetric  CHEST/LUNG:   diffuse  bronchospam  HEART: Regular rate and rhythm; No murmurs, rubs, or gallops  ABDOMEN: Soft, Nontender, Nondistended; no  masses Bowel sounds present  EXTREMITIES:  + Peripheral Pulses, No clubbing, cyanosis, or edema  LYMPH: No lymphadenopathy noted   RECTAL: deferred   SKIN: No rashes or lesions

## 2021-06-02 NOTE — CONSULT NOTE ADULT - SUBJECTIVE AND OBJECTIVE BOX
Patient is a 61y old  Female who presents with a chief complaint of     HPI: 58yo Female with  Anxiety, Agoraphobia,  COPD has O2 at home, DM, HTN, Hypothyroidism, Active smoker since age 15, Obesity, MEENU( says insurance did not Cover CPAP0, Cholelithiasis, Bladder cancer,  Came to ED with SOB and palpitation, ran out of her clonazepam. Taking Augmentin for two days as was told by urgent care to have pneumonia.  Denies high fever, chills, cough or sputum production.    PAST MEDICAL & SURGICAL HISTORY:  Anxiety    COPD (chronic obstructive pulmonary disease)    Agoraphobia    Hypothyroid    Emphysema lung    Smoker    HTN (hypertension)    DM (diabetes mellitus)    Obesity    Bladder cancer    S/P cholecystectomy    SOCIAL HISTORY: BMI (kg/m2): 47.3 (06-02 @ 08:11). Active smoker    Allergies  No Known Allergies    MEDICATIONS  (STANDING):  cefTRIAXone   IVPB 1000 milliGRAM(s) IV Intermittent every 24 hours    MEDICATIONS  (PRN):    REVIEW OF SYSTEMS:    Constitutional:            No fever, weight loss or fatigue  HEENT:                         No difficulty hearing, tinnitus, vertigo; No sinus or throat pain  Respiratory:                 SOB  Cardiovascular:          palpitations  Gastrointestinal:        No abdominal or epigastric pain. No N/V/diarrhea or hematemesis  Genitourinary:            No dysuria, frequency, hematuria or incontinence  SKIN:                             no rash  Musculoskeletal:        No joint pain or swelling  Extremities:                No swelling  Neurological:              No headaches  Psychiatric:                 No depression, anxiety    Vital Signs Last 24 Hrs  T(C): 36.3 (02 Jun 2021 15:24), Max: 36.6 (02 Jun 2021 08:11)  T(F): 97.4 (02 Jun 2021 15:24), Max: 97.9 (02 Jun 2021 08:11)  HR: 75 (02 Jun 2021 15:24) (75 - 114)  BP: 130/79 (02 Jun 2021 15:24) (130/79 - 160/80)  BP(mean): --  RR: 18 (02 Jun 2021 15:24) (18 - 20)  SpO2: 98% (02 Jun 2021 15:24) (98% - 98%)    PHYSICAL EXAM:  GEN:         Awake, responsive and comfortable.  HEENT:    Normal.    RESP:       decreased air entry.  CVS:             Regular rate and rhythm.   ABD:           Obese   SKIN:           Warm and dry.  EXTR:            No clubbing, cyanosis or edema  CNS:              Intact sensory and motor function.  PSYCH:        cooperative, no anxiety or depression    LABS:                        12.6   18.03 )-----------( 336      ( 02 Jun 2021 09:21 )             38.3     06-02    137  |  100  |  19  ----------------------------<  244<H>  3.8   |  28  |  1.07    Ca    9.0      02 Jun 2021 09:21    TPro  7.6  /  Alb  3.6  /  TBili  0.5  /  DBili  x   /  AST  10<L>  /  ALT  16  /  AlkPhos  97  06-02    06-02 @ 08:50  pH: 7.39  pCO2: 43  pO2: 115  SaO2: 98    EKG: sinus    RADIOLOGY & ADDITIONAL STUDIES:  < from: CT Angio Chest PE Protocol w/ IV Cont (06.02.21 @ 10:24) >  EXAM:  CT ANGIO CHEST PULM [facility]                          PROCEDURE DATE:  06/02/2021      INTERPRETATION:  CLINICAL INFORMATION: Shortness of breath. Palpitations.    COMPARISON: CT angiogram chest 2/15/2021.    CONTRAST/COMPLICATIONS:  IV Contrast: Omnipaque 350  75 cc administered   25 cc discarded  Oral Contrast: NONE  Complications: None reported at time of study completion    PROCEDURE:  CT Angiography of the Chest.  Sagittal and coronal reformats were performed as well as 3D (MIP) reconstructions.    FINDINGS:    LUNGS AND AIRWAYS: PLEURA:  Emphysematous disease.  Focal atelectasis and/or scarring anteriorly within right middle and left lingular lobes.  No lobar lung consolidation.  The central airways are patent.    No pleural effusion or pneumothorax.    MEDIASTINUM AND ASHLIE: Subcentimeter short axis pretracheal and AP mediastinal lymph nodes.    VESSELS: There is no CT evidence for acute pulmonary embolism.  Atherosclerotic calcification of thoracic aorta and coronary arteries.    HEART: Heart size is normal. No pericardial effusion.    CHEST WALL AND LOWER NECK: Within normal limits.    VISUALIZED UPPER ABDOMEN: Within normal limits.    BONES: Degenerative changes spine.    IMPRESSION:    No acute pulmonary embolism demonstrated.    Emphysematous disease.    Other findings as discussed.    CARLOS CARDONA MD; Attending Radiologist  This document has been electronically signed. Jun 2 2021 11:15AM    ASSESSMENT AND PLAN:  ·	SOB.  ·	Acute COPD exacerbation.  ·	Chronic hypoxic Respiratory failure.  ·	Tobacco abuse.  ·	Obesity.  ·	MEENU  ·	Leukocytosis.  ·	HTN.  ·	DM.  ·	HLD.  ·	Hypothyroidism.  ·	Anxiety.    Continue O2, nebulizer and antibiotics.  Add Symbicort.  DVT prophylaxis.

## 2021-06-02 NOTE — ED ADULT NURSE NOTE - CHIEF COMPLAINT QUOTE
Stopped klonidine x 2 weeks, low Oxygen sat on RA noted by EMS, Anxiety, SOB and mouth pain      Not vaccinated

## 2021-06-02 NOTE — ED PROVIDER NOTE - OBJECTIVE STATEMENT
61 years old female by ems c/o sob, palpitation and ran out of clonazepam Pt sts she has hx of copd and oxygen dependant 4 liters. Pt also sts she is taking augmentin for the last two days for pneumonia. Pt refuses covid vaccine. Pt denies headache, dizziness, blurred visions, light sensitivities, focal/distal weakness or numbness, chest pain, nausea, vomiting, fever, chills, abd pain, dysuria or irregular bowel movements.

## 2021-06-03 LAB
A1C WITH ESTIMATED AVERAGE GLUCOSE RESULT: 8.6 % — HIGH (ref 4–5.6)
ALBUMIN SERPL ELPH-MCNC: 3.6 G/DL — SIGNIFICANT CHANGE UP (ref 3.3–5)
ALP SERPL-CCNC: 94 U/L — SIGNIFICANT CHANGE UP (ref 40–120)
ALT FLD-CCNC: 16 U/L — SIGNIFICANT CHANGE UP (ref 12–78)
ANION GAP SERPL CALC-SCNC: 5 MMOL/L — SIGNIFICANT CHANGE UP (ref 5–17)
AST SERPL-CCNC: 10 U/L — LOW (ref 15–37)
BILIRUB SERPL-MCNC: 0.4 MG/DL — SIGNIFICANT CHANGE UP (ref 0.2–1.2)
BUN SERPL-MCNC: 17 MG/DL — SIGNIFICANT CHANGE UP (ref 7–23)
CALCIUM SERPL-MCNC: 8.8 MG/DL — SIGNIFICANT CHANGE UP (ref 8.5–10.1)
CHLORIDE SERPL-SCNC: 100 MMOL/L — SIGNIFICANT CHANGE UP (ref 96–108)
CO2 SERPL-SCNC: 30 MMOL/L — SIGNIFICANT CHANGE UP (ref 22–31)
COVID-19 SPIKE DOMAIN AB INTERP: NEGATIVE — SIGNIFICANT CHANGE UP
COVID-19 SPIKE DOMAIN ANTIBODY RESULT: 0.4 U/ML — SIGNIFICANT CHANGE UP
CREAT SERPL-MCNC: 0.84 MG/DL — SIGNIFICANT CHANGE UP (ref 0.5–1.3)
ESTIMATED AVERAGE GLUCOSE: 200 MG/DL — HIGH (ref 68–114)
GLUCOSE BLDC GLUCOMTR-MCNC: 297 MG/DL — HIGH (ref 70–99)
GLUCOSE BLDC GLUCOMTR-MCNC: 325 MG/DL — HIGH (ref 70–99)
GLUCOSE BLDC GLUCOMTR-MCNC: 376 MG/DL — HIGH (ref 70–99)
GLUCOSE BLDC GLUCOMTR-MCNC: 405 MG/DL — HIGH (ref 70–99)
GLUCOSE BLDC GLUCOMTR-MCNC: 409 MG/DL — HIGH (ref 70–99)
GLUCOSE BLDC GLUCOMTR-MCNC: 435 MG/DL — HIGH (ref 70–99)
GLUCOSE SERPL-MCNC: 276 MG/DL — HIGH (ref 70–99)
HCT VFR BLD CALC: 37.8 % — SIGNIFICANT CHANGE UP (ref 34.5–45)
HGB BLD-MCNC: 12.4 G/DL — SIGNIFICANT CHANGE UP (ref 11.5–15.5)
MCHC RBC-ENTMCNC: 29.4 PG — SIGNIFICANT CHANGE UP (ref 27–34)
MCHC RBC-ENTMCNC: 32.8 GM/DL — SIGNIFICANT CHANGE UP (ref 32–36)
MCV RBC AUTO: 89.6 FL — SIGNIFICANT CHANGE UP (ref 80–100)
NRBC # BLD: 0 /100 WBCS — SIGNIFICANT CHANGE UP (ref 0–0)
PLATELET # BLD AUTO: 369 K/UL — SIGNIFICANT CHANGE UP (ref 150–400)
POTASSIUM SERPL-MCNC: 4.2 MMOL/L — SIGNIFICANT CHANGE UP (ref 3.5–5.3)
POTASSIUM SERPL-SCNC: 4.2 MMOL/L — SIGNIFICANT CHANGE UP (ref 3.5–5.3)
PROCALCITONIN SERPL-MCNC: 0.08 NG/ML — SIGNIFICANT CHANGE UP (ref 0.02–0.1)
PROT SERPL-MCNC: 7.8 GM/DL — SIGNIFICANT CHANGE UP (ref 6–8.3)
RBC # BLD: 4.22 M/UL — SIGNIFICANT CHANGE UP (ref 3.8–5.2)
RBC # FLD: 13.8 % — SIGNIFICANT CHANGE UP (ref 10.3–14.5)
SARS-COV-2 IGG+IGM SERPL QL IA: 0.4 U/ML — SIGNIFICANT CHANGE UP
SARS-COV-2 IGG+IGM SERPL QL IA: NEGATIVE — SIGNIFICANT CHANGE UP
SODIUM SERPL-SCNC: 135 MMOL/L — SIGNIFICANT CHANGE UP (ref 135–145)
T3 SERPL-MCNC: 92 NG/DL — SIGNIFICANT CHANGE UP (ref 80–200)
T4 AB SER-ACNC: 4.6 UG/DL — SIGNIFICANT CHANGE UP (ref 4.6–12)
WBC # BLD: 19.45 K/UL — HIGH (ref 3.8–10.5)
WBC # FLD AUTO: 19.45 K/UL — HIGH (ref 3.8–10.5)

## 2021-06-03 RX ORDER — BUDESONIDE AND FORMOTEROL FUMARATE DIHYDRATE 160; 4.5 UG/1; UG/1
2 AEROSOL RESPIRATORY (INHALATION)
Refills: 0 | Status: DISCONTINUED | OUTPATIENT
Start: 2021-06-03 | End: 2021-06-05

## 2021-06-03 RX ORDER — ACETAMINOPHEN 500 MG
650 TABLET ORAL EVERY 6 HOURS
Refills: 0 | Status: DISCONTINUED | OUTPATIENT
Start: 2021-06-03 | End: 2021-06-05

## 2021-06-03 RX ADMIN — BUDESONIDE AND FORMOTEROL FUMARATE DIHYDRATE 2 PUFF(S): 160; 4.5 AEROSOL RESPIRATORY (INHALATION) at 17:25

## 2021-06-03 RX ADMIN — ESCITALOPRAM OXALATE 10 MILLIGRAM(S): 10 TABLET, FILM COATED ORAL at 13:51

## 2021-06-03 RX ADMIN — Medication 40 MILLIGRAM(S): at 13:51

## 2021-06-03 RX ADMIN — Medication 1 MILLIGRAM(S): at 22:13

## 2021-06-03 RX ADMIN — Medication 1 MILLIGRAM(S): at 05:45

## 2021-06-03 RX ADMIN — Medication 12: at 17:25

## 2021-06-03 RX ADMIN — Medication 6: at 08:47

## 2021-06-03 RX ADMIN — INSULIN GLARGINE 22 UNIT(S): 100 INJECTION, SOLUTION SUBCUTANEOUS at 22:12

## 2021-06-03 RX ADMIN — Medication 3 MILLILITER(S): at 17:51

## 2021-06-03 RX ADMIN — Medication 40 MILLIGRAM(S): at 22:13

## 2021-06-03 RX ADMIN — Medication 3 MILLILITER(S): at 23:13

## 2021-06-03 RX ADMIN — MONTELUKAST 10 MILLIGRAM(S): 4 TABLET, CHEWABLE ORAL at 22:13

## 2021-06-03 RX ADMIN — Medication 3 MILLILITER(S): at 04:55

## 2021-06-03 RX ADMIN — Medication 650 MILLIGRAM(S): at 17:27

## 2021-06-03 RX ADMIN — Medication 650 MILLIGRAM(S): at 18:27

## 2021-06-03 RX ADMIN — Medication 1 MILLIGRAM(S): at 13:51

## 2021-06-03 RX ADMIN — ENOXAPARIN SODIUM 40 MILLIGRAM(S): 100 INJECTION SUBCUTANEOUS at 12:05

## 2021-06-03 RX ADMIN — Medication 1 PATCH: at 11:56

## 2021-06-03 RX ADMIN — Medication 40 MILLIGRAM(S): at 05:44

## 2021-06-03 RX ADMIN — CEFTRIAXONE 100 MILLIGRAM(S): 500 INJECTION, POWDER, FOR SOLUTION INTRAMUSCULAR; INTRAVENOUS at 16:41

## 2021-06-03 RX ADMIN — LOSARTAN POTASSIUM 50 MILLIGRAM(S): 100 TABLET, FILM COATED ORAL at 05:45

## 2021-06-03 RX ADMIN — Medication 1 PATCH: at 07:55

## 2021-06-03 RX ADMIN — Medication 1 PATCH: at 21:43

## 2021-06-03 RX ADMIN — Medication 1 PATCH: at 19:38

## 2021-06-03 RX ADMIN — Medication 8: at 22:13

## 2021-06-03 RX ADMIN — Medication 3 MILLILITER(S): at 11:39

## 2021-06-03 RX ADMIN — Medication 150 MICROGRAM(S): at 05:44

## 2021-06-03 RX ADMIN — Medication 10: at 12:06

## 2021-06-04 LAB
ANION GAP SERPL CALC-SCNC: 8 MMOL/L — SIGNIFICANT CHANGE UP (ref 5–17)
BUN SERPL-MCNC: 25 MG/DL — HIGH (ref 7–23)
CALCIUM SERPL-MCNC: 8.7 MG/DL — SIGNIFICANT CHANGE UP (ref 8.5–10.1)
CHLORIDE SERPL-SCNC: 102 MMOL/L — SIGNIFICANT CHANGE UP (ref 96–108)
CO2 SERPL-SCNC: 28 MMOL/L — SIGNIFICANT CHANGE UP (ref 22–31)
CREAT SERPL-MCNC: 1.07 MG/DL — SIGNIFICANT CHANGE UP (ref 0.5–1.3)
GLUCOSE BLDC GLUCOMTR-MCNC: 374 MG/DL — HIGH (ref 70–99)
GLUCOSE BLDC GLUCOMTR-MCNC: 410 MG/DL — HIGH (ref 70–99)
GLUCOSE BLDC GLUCOMTR-MCNC: 412 MG/DL — HIGH (ref 70–99)
GLUCOSE BLDC GLUCOMTR-MCNC: 425 MG/DL — HIGH (ref 70–99)
GLUCOSE BLDC GLUCOMTR-MCNC: 463 MG/DL — CRITICAL HIGH (ref 70–99)
GLUCOSE BLDC GLUCOMTR-MCNC: 471 MG/DL — CRITICAL HIGH (ref 70–99)
GLUCOSE SERPL-MCNC: 409 MG/DL — HIGH (ref 70–99)
HCT VFR BLD CALC: 37 % — SIGNIFICANT CHANGE UP (ref 34.5–45)
HGB BLD-MCNC: 11.6 G/DL — SIGNIFICANT CHANGE UP (ref 11.5–15.5)
MCHC RBC-ENTMCNC: 29 PG — SIGNIFICANT CHANGE UP (ref 27–34)
MCHC RBC-ENTMCNC: 31.4 GM/DL — LOW (ref 32–36)
MCV RBC AUTO: 92.5 FL — SIGNIFICANT CHANGE UP (ref 80–100)
NRBC # BLD: 0 /100 WBCS — SIGNIFICANT CHANGE UP (ref 0–0)
PLATELET # BLD AUTO: 318 K/UL — SIGNIFICANT CHANGE UP (ref 150–400)
POTASSIUM SERPL-MCNC: 4 MMOL/L — SIGNIFICANT CHANGE UP (ref 3.5–5.3)
POTASSIUM SERPL-SCNC: 4 MMOL/L — SIGNIFICANT CHANGE UP (ref 3.5–5.3)
RBC # BLD: 4 M/UL — SIGNIFICANT CHANGE UP (ref 3.8–5.2)
RBC # FLD: 14 % — SIGNIFICANT CHANGE UP (ref 10.3–14.5)
SODIUM SERPL-SCNC: 138 MMOL/L — SIGNIFICANT CHANGE UP (ref 135–145)
WBC # BLD: 21.77 K/UL — HIGH (ref 3.8–10.5)
WBC # FLD AUTO: 21.77 K/UL — HIGH (ref 3.8–10.5)

## 2021-06-04 RX ORDER — INSULIN LISPRO 100/ML
4 VIAL (ML) SUBCUTANEOUS
Refills: 0 | Status: DISCONTINUED | OUTPATIENT
Start: 2021-06-04 | End: 2021-06-05

## 2021-06-04 RX ADMIN — MONTELUKAST 10 MILLIGRAM(S): 4 TABLET, CHEWABLE ORAL at 21:40

## 2021-06-04 RX ADMIN — Medication 1 PATCH: at 11:28

## 2021-06-04 RX ADMIN — INSULIN GLARGINE 22 UNIT(S): 100 INJECTION, SOLUTION SUBCUTANEOUS at 21:25

## 2021-06-04 RX ADMIN — Medication 1 MILLIGRAM(S): at 13:11

## 2021-06-04 RX ADMIN — Medication 4 UNIT(S): at 12:52

## 2021-06-04 RX ADMIN — ESCITALOPRAM OXALATE 10 MILLIGRAM(S): 10 TABLET, FILM COATED ORAL at 11:29

## 2021-06-04 RX ADMIN — CEFTRIAXONE 100 MILLIGRAM(S): 500 INJECTION, POWDER, FOR SOLUTION INTRAMUSCULAR; INTRAVENOUS at 15:03

## 2021-06-04 RX ADMIN — Medication 150 MICROGRAM(S): at 05:15

## 2021-06-04 RX ADMIN — Medication 12: at 11:52

## 2021-06-04 RX ADMIN — Medication 1 PATCH: at 11:54

## 2021-06-04 RX ADMIN — Medication 1 MILLIGRAM(S): at 05:15

## 2021-06-04 RX ADMIN — Medication 1 MILLIGRAM(S): at 21:39

## 2021-06-04 RX ADMIN — Medication 40 MILLIGRAM(S): at 17:38

## 2021-06-04 RX ADMIN — Medication 4 UNIT(S): at 17:41

## 2021-06-04 RX ADMIN — Medication 4 UNIT(S): at 12:53

## 2021-06-04 RX ADMIN — Medication 10: at 17:42

## 2021-06-04 RX ADMIN — Medication 3 MILLILITER(S): at 23:49

## 2021-06-04 RX ADMIN — Medication 8: at 21:26

## 2021-06-04 RX ADMIN — Medication 1 PATCH: at 21:34

## 2021-06-04 RX ADMIN — Medication 40 MILLIGRAM(S): at 05:15

## 2021-06-04 RX ADMIN — Medication 3 MILLILITER(S): at 17:04

## 2021-06-04 RX ADMIN — BUDESONIDE AND FORMOTEROL FUMARATE DIHYDRATE 2 PUFF(S): 160; 4.5 AEROSOL RESPIRATORY (INHALATION) at 17:38

## 2021-06-04 RX ADMIN — Medication 12: at 08:01

## 2021-06-04 RX ADMIN — Medication 3 MILLILITER(S): at 11:01

## 2021-06-04 RX ADMIN — BUDESONIDE AND FORMOTEROL FUMARATE DIHYDRATE 2 PUFF(S): 160; 4.5 AEROSOL RESPIRATORY (INHALATION) at 05:51

## 2021-06-04 RX ADMIN — Medication 1 PATCH: at 07:55

## 2021-06-04 RX ADMIN — LOSARTAN POTASSIUM 50 MILLIGRAM(S): 100 TABLET, FILM COATED ORAL at 05:15

## 2021-06-04 RX ADMIN — ENOXAPARIN SODIUM 40 MILLIGRAM(S): 100 INJECTION SUBCUTANEOUS at 11:29

## 2021-06-04 RX ADMIN — Medication 3 MILLILITER(S): at 05:18

## 2021-06-04 NOTE — DIETITIAN INITIAL EVALUATION ADULT. - LITERATURE/VIDEOS GIVEN
Provided pt with DM & Low Sodium MNT literature/nutrition counseling & discussed wt loss tips to be initiated s/p discharge

## 2021-06-04 NOTE — DIETITIAN INITIAL EVALUATION ADULT. - PERTINENT MEDS FT
MEDICATIONS  (STANDING):  albuterol/ipratropium for Nebulization 3 milliLiter(s) Nebulizer every 6 hours  budesonide 160 MICROgram(s)/formoterol 4.5 MICROgram(s) Inhaler 2 Puff(s) Inhalation two times a day  cefTRIAXone   IVPB 1000 milliGRAM(s) IV Intermittent every 24 hours  clonazePAM  Tablet 1 milliGRAM(s) Oral three times a day  dextrose 40% Gel 15 Gram(s) Oral once  dextrose 5%. 1000 milliLiter(s) (50 mL/Hr) IV Continuous <Continuous>  dextrose 5%. 1000 milliLiter(s) (100 mL/Hr) IV Continuous <Continuous>  dextrose 50% Injectable 25 Gram(s) IV Push once  dextrose 50% Injectable 12.5 Gram(s) IV Push once  dextrose 50% Injectable 25 Gram(s) IV Push once  enoxaparin Injectable 40 milliGRAM(s) SubCutaneous daily  escitalopram 10 milliGRAM(s) Oral daily  glucagon  Injectable 1 milliGRAM(s) IntraMuscular once  insulin glargine Injectable (LANTUS) 22 Unit(s) SubCutaneous at bedtime  insulin lispro (ADMELOG) corrective regimen sliding scale   SubCutaneous at bedtime  insulin lispro (ADMELOG) corrective regimen sliding scale   SubCutaneous three times a day before meals  levothyroxine 150 MICROGram(s) Oral daily  losartan 50 milliGRAM(s) Oral daily  methylPREDNISolone sodium succinate Injectable 40 milliGRAM(s) IV Push two times a day  montelukast 10 milliGRAM(s) Oral at bedtime  nicotine -  14 mG/24Hr(s) Patch 1 patch Transdermal daily    MEDICATIONS  (PRN):  acetaminophen   Tablet .. 650 milliGRAM(s) Oral every 6 hours PRN Temp greater or equal to 38C (100.4F), Mild Pain (1 - 3)

## 2021-06-04 NOTE — DIETITIAN INITIAL EVALUATION ADULT. - OTHER INFO
Pt independent for ADLs at home; lives at home with significant other & son; son does food shopping & pt does cooking. Pt tries to follow no concentrated sweets diet at home. Pt with DM2 (HgbA1c = 8.6%), BG during admission > 250mg/dL (pt on steroids noted). Pt checks BG several times per week, usually 150-200mg/dL); pt reported she takes Januvia, Metformin, glimepiride, & Basaglar (30 units 2x daily) to control BG at home. Pt reported  lbs.; wt stable. Pt edentulous; reported she can manage current food consistency (regular), however will recommend soft diet for ease of chewing.

## 2021-06-04 NOTE — DIETITIAN INITIAL EVALUATION ADULT. - ETIOLOGY
Physiological cause (DM) requiring modified CHO intake Excessive energy intake & limited physical activity

## 2021-06-04 NOTE — DIETITIAN NUTRITION RISK NOTIFICATION - TREATMENT: THE FOLLOWING DIET HAS BEEN RECOMMENDED
Diet, Soft:   Consistent Carbohydrate {Evening Snack}  DASH/TLC {Sodium & Cholesterol Restricted} (06-04-21 @ 12:06) [Pending Verification By Attending]  Diet, Consistent Carbohydrate w/Evening Snack:   DASH/TLC {Sodium & Cholesterol Restricted} (06-03-21 @ 09:24) [Active]

## 2021-06-05 ENCOUNTER — TRANSCRIPTION ENCOUNTER (OUTPATIENT)
Age: 61
End: 2021-06-05

## 2021-06-05 VITALS
OXYGEN SATURATION: 96 % | HEART RATE: 99 BPM | DIASTOLIC BLOOD PRESSURE: 86 MMHG | TEMPERATURE: 98 F | SYSTOLIC BLOOD PRESSURE: 139 MMHG | RESPIRATION RATE: 18 BRPM

## 2021-06-05 LAB
ALBUMIN SERPL ELPH-MCNC: 3.9 G/DL — SIGNIFICANT CHANGE UP (ref 3.3–5)
ALP SERPL-CCNC: 84 U/L — SIGNIFICANT CHANGE UP (ref 40–120)
ALT FLD-CCNC: 15 U/L — SIGNIFICANT CHANGE UP (ref 12–78)
ANION GAP SERPL CALC-SCNC: 7 MMOL/L — SIGNIFICANT CHANGE UP (ref 5–17)
AST SERPL-CCNC: 6 U/L — LOW (ref 15–37)
BILIRUB SERPL-MCNC: 0.4 MG/DL — SIGNIFICANT CHANGE UP (ref 0.2–1.2)
BUN SERPL-MCNC: 28 MG/DL — HIGH (ref 7–23)
CALCIUM SERPL-MCNC: 8.9 MG/DL — SIGNIFICANT CHANGE UP (ref 8.5–10.1)
CHLORIDE SERPL-SCNC: 101 MMOL/L — SIGNIFICANT CHANGE UP (ref 96–108)
CO2 SERPL-SCNC: 31 MMOL/L — SIGNIFICANT CHANGE UP (ref 22–31)
CREAT SERPL-MCNC: 0.96 MG/DL — SIGNIFICANT CHANGE UP (ref 0.5–1.3)
GLUCOSE BLDC GLUCOMTR-MCNC: 304 MG/DL — HIGH (ref 70–99)
GLUCOSE SERPL-MCNC: 260 MG/DL — HIGH (ref 70–99)
HCT VFR BLD CALC: 36.9 % — SIGNIFICANT CHANGE UP (ref 34.5–45)
HGB BLD-MCNC: 11.7 G/DL — SIGNIFICANT CHANGE UP (ref 11.5–15.5)
MCHC RBC-ENTMCNC: 29.3 PG — SIGNIFICANT CHANGE UP (ref 27–34)
MCHC RBC-ENTMCNC: 31.7 GM/DL — LOW (ref 32–36)
MCV RBC AUTO: 92.5 FL — SIGNIFICANT CHANGE UP (ref 80–100)
NRBC # BLD: 0 /100 WBCS — SIGNIFICANT CHANGE UP (ref 0–0)
PLATELET # BLD AUTO: 319 K/UL — SIGNIFICANT CHANGE UP (ref 150–400)
POTASSIUM SERPL-MCNC: 4.1 MMOL/L — SIGNIFICANT CHANGE UP (ref 3.5–5.3)
POTASSIUM SERPL-SCNC: 4.1 MMOL/L — SIGNIFICANT CHANGE UP (ref 3.5–5.3)
PROT SERPL-MCNC: 7.3 GM/DL — SIGNIFICANT CHANGE UP (ref 6–8.3)
RBC # BLD: 3.99 M/UL — SIGNIFICANT CHANGE UP (ref 3.8–5.2)
RBC # FLD: 14 % — SIGNIFICANT CHANGE UP (ref 10.3–14.5)
SODIUM SERPL-SCNC: 139 MMOL/L — SIGNIFICANT CHANGE UP (ref 135–145)
WBC # BLD: 20.69 K/UL — HIGH (ref 3.8–10.5)
WBC # FLD AUTO: 20.69 K/UL — HIGH (ref 3.8–10.5)

## 2021-06-05 RX ORDER — BUDESONIDE AND FORMOTEROL FUMARATE DIHYDRATE 160; 4.5 UG/1; UG/1
2 AEROSOL RESPIRATORY (INHALATION)
Qty: 1 | Refills: 0
Start: 2021-06-05 | End: 2021-07-04

## 2021-06-05 RX ORDER — NICOTINE POLACRILEX 2 MG
1 GUM BUCCAL
Qty: 30 | Refills: 0
Start: 2021-06-05 | End: 2021-07-04

## 2021-06-05 RX ORDER — INSULIN LISPRO 100/ML
5 VIAL (ML) SUBCUTANEOUS ONCE
Refills: 0 | Status: COMPLETED | OUTPATIENT
Start: 2021-06-05 | End: 2021-06-05

## 2021-06-05 RX ORDER — LANOLIN ALCOHOL/MO/W.PET/CERES
3 CREAM (GRAM) TOPICAL ONCE
Refills: 0 | Status: COMPLETED | OUTPATIENT
Start: 2021-06-05 | End: 2021-06-05

## 2021-06-05 RX ORDER — CLONAZEPAM 1 MG
1 TABLET ORAL
Qty: 0 | Refills: 0 | DISCHARGE

## 2021-06-05 RX ORDER — FLUTICASONE PROPIONATE 220 MCG
2 AEROSOL WITH ADAPTER (GRAM) INHALATION
Qty: 0 | Refills: 0 | DISCHARGE

## 2021-06-05 RX ORDER — NICOTINE POLACRILEX 2 MG
1 GUM BUCCAL
Qty: 0 | Refills: 0 | DISCHARGE

## 2021-06-05 RX ORDER — CLONAZEPAM 1 MG
1 TABLET ORAL
Qty: 21 | Refills: 0
Start: 2021-06-05 | End: 2021-06-11

## 2021-06-05 RX ADMIN — Medication 5 UNIT(S): at 00:23

## 2021-06-05 RX ADMIN — BUDESONIDE AND FORMOTEROL FUMARATE DIHYDRATE 2 PUFF(S): 160; 4.5 AEROSOL RESPIRATORY (INHALATION) at 05:52

## 2021-06-05 RX ADMIN — LOSARTAN POTASSIUM 50 MILLIGRAM(S): 100 TABLET, FILM COATED ORAL at 05:52

## 2021-06-05 RX ADMIN — Medication 3 MILLILITER(S): at 05:53

## 2021-06-05 RX ADMIN — Medication 1 PATCH: at 07:08

## 2021-06-05 RX ADMIN — Medication 3 MILLIGRAM(S): at 00:23

## 2021-06-05 RX ADMIN — Medication 1 MILLIGRAM(S): at 05:52

## 2021-06-05 RX ADMIN — Medication 40 MILLIGRAM(S): at 05:53

## 2021-06-05 RX ADMIN — Medication 8: at 07:53

## 2021-06-05 RX ADMIN — Medication 150 MICROGRAM(S): at 05:52

## 2021-06-05 RX ADMIN — Medication 4 UNIT(S): at 07:53

## 2021-06-05 NOTE — DISCHARGE NOTE NURSING/CASE MANAGEMENT/SOCIAL WORK - NSDCPEEMAIL_GEN_ALL_CORE
[facility] for Tobacco Control email [email or web address]

## 2021-06-05 NOTE — DISCHARGE NOTE PROVIDER - DETAILS OF MALNUTRITION DIAGNOSIS/DIAGNOSES
This patient has been assessed with a concern for Malnutrition and was treated during this hospitalization for the following Nutrition diagnosis/diagnoses:     -  06/04/2021: Morbid obesity (BMI > 40)

## 2021-06-05 NOTE — DISCHARGE NOTE NURSING/CASE MANAGEMENT/SOCIAL WORK - NSDCPEWEB_GEN_ALL_CORE
[facility] for Tobacco Control website --- [email or web address] website --- [email or web address]

## 2021-06-05 NOTE — DISCHARGE NOTE NURSING/CASE MANAGEMENT/SOCIAL WORK - PATIENT PORTAL LINK FT
You can access the FollowMyHealth Patient Portal offered by [facility] by registering at the following website: [email or web address]. By joining [employer]’s FollowMyHealth portal, you will also be able to view your health information using other applications (apps) compatible with our system.

## 2021-06-05 NOTE — PROGRESS NOTE ADULT - NUTRITIONAL ASSESSMENT
This patient has been assessed with a concern for Malnutrition and has been determined to have a diagnosis/diagnoses of Morbid obesity (BMI > 40).    This patient is being managed with:   Diet Soft-  Consistent Carbohydrate {Evening Snack}  DASH/TLC {Sodium & Cholesterol Restricted}  Entered: Jun 4 2021 12:06PM

## 2021-06-05 NOTE — DISCHARGE NOTE PROVIDER - CARE PROVIDERS DIRECT ADDRESSES
,DirectAddress_Unknown,[email or web address]

## 2021-06-05 NOTE — DISCHARGE NOTE PROVIDER - NSDCCPCAREPLAN_GEN_ALL_CORE_FT
PRINCIPAL DISCHARGE DIAGNOSIS  Diagnosis: COPD (chronic obstructive pulmonary disease)  Assessment and Plan of Treatment: improved on steroids      SECONDARY DISCHARGE DIAGNOSES  Diagnosis: Bronchitis  Assessment and Plan of Treatment:

## 2021-06-05 NOTE — DISCHARGE NOTE PROVIDER - CARE PROVIDER_API CALL
Faizan Claudio)  Medicine  [address], [address]  [place] [address]  Phone: [phone]  Fax: [phone]  Follow Up Time:     Florentino Espinoza AND MAYNOR FELICIANO [facility]  [address] [place] [address]  Phone: [phone]  Fax: [phone]  Follow Up Time:

## 2021-06-05 NOTE — DISCHARGE NOTE PROVIDER - NSDCMRMEDTOKEN_GEN_ALL_CORE_FT
albuterol 90 mcg/inh inhalation aerosol: 2 puff(s) inhaled 4 times a day, As Needed  Basaglar KwikPen 100 units/mL subcutaneous solution: 30 unit(s) subcutaneous 2 times a day  clonazePAM 1 mg oral tablet: 1 milligram(s) orally 3 times a day  escitalopram 10 mg oral tablet: 1 tab(s) orally once a day  Flovent  mcg/inh inhalation aerosol: 2 puff(s) inhaled 2 times a day  gabapentin 300 mg oral capsule: 1 cap(s) orally 3 times a day  glimepiride 2 mg oral tablet: 1 tab(s) orally once a day  hydrOXYzine hydrochloride 50 mg oral tablet: 1 tab(s) orally every 8 hours, As needed, Itching  ipratropium-albuterol 0.5 mg-2.5 mg/3 mLinhalation solution: 3 milliliter(s) by nebulizer 3 times a day   Januvia 100 mg oral tablet: 100 milligram(s) orally once a day  levothyroxine 150 mcg (0.15 mg) oral tablet: 1 tab(s) orally once a day  losartan 50 mg oral tablet: 1 tab(s) orally once a day  meloxicam 15 mg oral tablet: 1 tab(s) orally once a day  montelukast 10 mg oral tablet: 1 tab(s) orally once a day (at bedtime)   nicotine 21 mg/24 hr transdermal film, extended release: 1 patch transdermally once a day

## 2021-06-05 NOTE — PROGRESS NOTE ADULT - SUBJECTIVE AND OBJECTIVE BOX
INTERVAL HPI/OVERNIGHT EVENTS:        REVIEW OF SYSTEMS:  CONSTITUTIONAL:    feels  better    NECK: No pain or stiffnes  RESPIRATORY: No SOB   CARDIOVASCULAR: No chest pain, palpitations, dizziness,   GASTROINTESTINAL: No abdominal pain. No nausea, vomiting,   NEUROLOGICAL: No headaches, no  blurry  vision no  dizziness  SKIN: No itching,   MUSCULOSKELETAL: No pain    MEDICATION:  acetaminophen   Tablet .. 650 milliGRAM(s) Oral every 6 hours PRN  albuterol/ipratropium for Nebulization 3 milliLiter(s) Nebulizer every 6 hours  budesonide 160 MICROgram(s)/formoterol 4.5 MICROgram(s) Inhaler 2 Puff(s) Inhalation two times a day  cefTRIAXone   IVPB 1000 milliGRAM(s) IV Intermittent every 24 hours  clonazePAM  Tablet 1 milliGRAM(s) Oral three times a day  dextrose 40% Gel 15 Gram(s) Oral once  dextrose 5%. 1000 milliLiter(s) IV Continuous <Continuous>  dextrose 5%. 1000 milliLiter(s) IV Continuous <Continuous>  dextrose 50% Injectable 25 Gram(s) IV Push once  dextrose 50% Injectable 12.5 Gram(s) IV Push once  dextrose 50% Injectable 25 Gram(s) IV Push once  enoxaparin Injectable 40 milliGRAM(s) SubCutaneous daily  escitalopram 10 milliGRAM(s) Oral daily  glucagon  Injectable 1 milliGRAM(s) IntraMuscular once  insulin glargine Injectable (LANTUS) 22 Unit(s) SubCutaneous at bedtime  insulin lispro (ADMELOG) corrective regimen sliding scale   SubCutaneous at bedtime  insulin lispro (ADMELOG) corrective regimen sliding scale   SubCutaneous three times a day before meals  levothyroxine 150 MICROGram(s) Oral daily  losartan 50 milliGRAM(s) Oral daily  methylPREDNISolone sodium succinate Injectable 40 milliGRAM(s) IV Push every 8 hours  montelukast 10 milliGRAM(s) Oral at bedtime  nicotine -  14 mG/24Hr(s) Patch 1 patch Transdermal daily    Vital Signs Last 24 Hrs  T(C): 36.6 (04 Jun 2021 05:08), Max: 36.9 (03 Jun 2021 21:38)  T(F): 97.8 (04 Jun 2021 05:08), Max: 98.4 (03 Jun 2021 21:38)  HR: 87 (04 Jun 2021 05:18) (84 - 105)  BP: 137/85 (04 Jun 2021 05:08) (99/60 - 137/85)  BP(mean): --  RR: 18 (04 Jun 2021 05:08) (18 - 20)  SpO2: 96% (04 Jun 2021 05:18) (92% - 97%)    PHYSICAL EXAM:  GENERAL: NAD, well-groomed, well-developed  HEENT : Conjuntivae  clear sclerae anicteric  NECK: Supple, No JVD, Normal thyroid  NERVOUS SYSTEM:  Alert oriented   no  focal  deficits;   CHEST/LUNG: Clear    HEART: Regular rate and rhythm; No murmurs, rubs, or gallops  ABDOMEN: Soft, Nontender, Nondistended; Bowel sounds present  EXTREMITIES:  no  edema no  tenderness  SKIN: No rashes   LABS:                        12.4   19.45 )-----------( 369      ( 03 Jun 2021 06:36 )             37.8     06-03    135  |  100  |  17  ----------------------------<  276<H>  4.2   |  30  |  0.84    Ca    8.8      03 Jun 2021 06:36    TPro  7.8  /  Alb  3.6  /  TBili  0.4  /  DBili  x   /  AST  10<L>  /  ALT  16  /  AlkPhos  94  06-03        CAPILLARY BLOOD GLUCOSE      POCT Blood Glucose.: 412 mg/dL (04 Jun 2021 08:00)  POCT Blood Glucose.: 409 mg/dL (03 Jun 2021 21:29)  POCT Blood Glucose.: 435 mg/dL (03 Jun 2021 21:23)  POCT Blood Glucose.: 405 mg/dL (03 Jun 2021 17:24)  POCT Blood Glucose.: 325 mg/dL (03 Jun 2021 16:11)  POCT Blood Glucose.: 376 mg/dL (03 Jun 2021 11:16)  POCT Blood Glucose.: 297 mg/dL (03 Jun 2021 08:29)      RADIOLOGY & ADDITIONAL TESTS:    Imaging reports  Personally Reviewed:  [ ] YES  [ ] NO    Consultant(s) Notes Reviewed:  [x ] YES  [ ] NO    Care Discussed with Consultants/Other Providers [x ] YES  [ ] NO  Assessment and Plan:   Problem/Plan - 1:  ·  Problem: COPD (chronic obstructive pulmonary disease).  Plan: O2  bronchdilators     rocephin.  taper  steroids   increase  activity  as  tolerated  Problem/Plan - 2:  ·  Problem: Tobacco abuse.  Plan: nicotione  patch.     Problem/Plan - 3:  ·  Problem: DM (diabetes mellitus).  Plan: insulin  coverage.     Problem/Plan - 4:  ·  Problem: Anxiety.  Plan: klonopin.     Problem/Plan - 5:  ·  Problem: Hypothyroid.  Plan: synthroid.     Problem/Plan - 6:  Problem: HTN (hypertension). Plan: losartan diet.    Problem/Plan - 7:  ·  Problem: Morbid obesity.  Plan: nutrition  consult.       
    INTERVAL HPI/OVERNIGHT EVENTS:        REVIEW OF SYSTEMS:  CONSTITUTIONAL:  no  complaints  wants  to  go  home no cp  no  sob   ambulating  freely    NECK: No pain or stiffnes  RESPIRATORY: No SOB   CARDIOVASCULAR: No chest pain, palpitations, dizziness,   GASTROINTESTINAL: No abdominal pain. No nausea, vomiting,   NEUROLOGICAL: No headaches, no  blurry  vision no  dizziness  SKIN: No itching,   MUSCULOSKELETAL: No pain    MEDICATION:  acetaminophen   Tablet .. 650 milliGRAM(s) Oral every 6 hours PRN  albuterol/ipratropium for Nebulization 3 milliLiter(s) Nebulizer every 6 hours  budesonide 160 MICROgram(s)/formoterol 4.5 MICROgram(s) Inhaler 2 Puff(s) Inhalation two times a day  cefTRIAXone   IVPB 1000 milliGRAM(s) IV Intermittent every 24 hours  clonazePAM  Tablet 1 milliGRAM(s) Oral three times a day  dextrose 40% Gel 15 Gram(s) Oral once  dextrose 5%. 1000 milliLiter(s) IV Continuous <Continuous>  dextrose 5%. 1000 milliLiter(s) IV Continuous <Continuous>  dextrose 50% Injectable 25 Gram(s) IV Push once  dextrose 50% Injectable 12.5 Gram(s) IV Push once  dextrose 50% Injectable 25 Gram(s) IV Push once  enoxaparin Injectable 40 milliGRAM(s) SubCutaneous daily  escitalopram 10 milliGRAM(s) Oral daily  glucagon  Injectable 1 milliGRAM(s) IntraMuscular once  insulin glargine Injectable (LANTUS) 22 Unit(s) SubCutaneous at bedtime  insulin lispro (ADMELOG) corrective regimen sliding scale   SubCutaneous at bedtime  insulin lispro (ADMELOG) corrective regimen sliding scale   SubCutaneous three times a day before meals  insulin lispro Injectable (ADMELOG) 4 Unit(s) SubCutaneous before breakfast  insulin lispro Injectable (ADMELOG) 4 Unit(s) SubCutaneous before lunch  insulin lispro Injectable (ADMELOG) 4 Unit(s) SubCutaneous before dinner  levothyroxine 150 MICROGram(s) Oral daily  losartan 50 milliGRAM(s) Oral daily  methylPREDNISolone sodium succinate Injectable 40 milliGRAM(s) IV Push two times a day  montelukast 10 milliGRAM(s) Oral at bedtime  nicotine -  14 mG/24Hr(s) Patch 1 patch Transdermal daily    Vital Signs Last 24 Hrs  T(C): 36.6 (05 Jun 2021 04:48), Max: 36.9 (05 Jun 2021 00:23)  T(F): 97.8 (05 Jun 2021 04:48), Max: 98.4 (05 Jun 2021 00:23)  HR: 68 (05 Jun 2021 05:53) (68 - 106)  BP: 132/71 (05 Jun 2021 04:48) (120/72 - 146/75)  BP(mean): --  RR: 18 (05 Jun 2021 04:48) (18 - 19)  SpO2: 95% (05 Jun 2021 05:53) (94% - 96%)    PHYSICAL EXAM:  GENERAL: NAD, well-groomed, well-developed  HEENT : Conjuntivae  clear sclerae anicteric  NECK: Supple, No JVD, Normal thyroid  NERVOUS SYSTEM:  Alert oriented   no  focal  deficits;   CHEST/LUNG: Clear    HEART: Regular rate and rhythm; No murmurs, rubs, or gallops  ABDOMEN: Soft, Nontender, Nondistended; Bowel sounds present  EXTREMITIES:  no  edema no  tenderness  SKIN: No rashes   LABS:                        11.7   20.69 )-----------( 319      ( 05 Jun 2021 06:18 )             36.9     06-05    139  |  101  |  28<H>  ----------------------------<  260<H>  4.1   |  31  |  0.96    Ca    8.9      05 Jun 2021 06:18    TPro  7.3  /  Alb  3.9  /  TBili  0.4  /  DBili  x   /  AST  6<L>  /  ALT  15  /  AlkPhos  84  06-05        CAPILLARY BLOOD GLUCOSE      POCT Blood Glucose.: 304 mg/dL (05 Jun 2021 07:34)  POCT Blood Glucose.: 410 mg/dL (04 Jun 2021 23:12)  POCT Blood Glucose.: 471 mg/dL (04 Jun 2021 21:11)  POCT Blood Glucose.: 463 mg/dL (04 Jun 2021 21:09)  POCT Blood Glucose.: 374 mg/dL (04 Jun 2021 17:37)  POCT Blood Glucose.: 425 mg/dL (04 Jun 2021 11:34)      RADIOLOGY & ADDITIONAL TESTS:    Imaging reports  Personally Reviewed:  [ ] YES  [ ] NO    Consultant(s) Notes Reviewed:  [x ] YES  [ ] NO    Care Discussed with Consultants/Other Providers [x ] YES  [ ] NO  tmpAssessment and Plan:   Problem/Plan - 1:  ·  Problem: COPD (chronic obstructive pulmonary disease).  Plan: O2  bronchdilators     rocephin.  taper  steroids   increase  activity  as  tolerated  discharge  home  Problem/Plan - 2:  ·  Problem: Tobacco abuse.  Plan: nicotione  patch.     Problem/Plan - 3:  ·  Problem: DM (diabetes mellitus).  Plan: insulin  coverage.     Problem/Plan - 4:  ·  Problem: Anxiety.  Plan: klonopin.     Problem/Plan - 5:  ·  Problem: Hypothyroid.  Plan: synthroid.     Problem/Plan - 6:  Problem: HTN (hypertension). Plan: losartan diet.    Problem/Plan - 7:  ·  Problem: Morbid obesity.  Plan: nutrition  consult.   
  INTERVAL HPI:  58yo Female with  Anxiety, Agoraphobia,  COPD has O2 at home, DM, HTN, Hypothyroidism, Active smoker since age 15, Obesity, MEENU( says insurance did not Cover CPAP0, Cholelithiasis, Bladder cancer,  Came to ED with SOB and palpitation, ran out of her clonazepam. Taking Augmentin for two days as was told by urgent care to have pneumonia.  Denies high fever, chills, cough or sputum production.    OVERNIGHT EVENTS:  Feels better.    Vital Signs Last 24 Hrs  T(C): 36.5 (04 Jun 2021 10:48), Max: 36.9 (03 Jun 2021 21:38)  T(F): 97.7 (04 Jun 2021 10:48), Max: 98.4 (03 Jun 2021 21:38)  HR: 106 (04 Jun 2021 10:48) (87 - 106)  BP: 146/75 (04 Jun 2021 10:48) (114/73 - 146/75)  BP(mean): --  RR: 19 (04 Jun 2021 10:48) (18 - 20)  SpO2: 94% (04 Jun 2021 10:48) (93% - 97%)    PHYSICAL EXAM:  GEN:         Awake, responsive and comfortable.  HEENT:    Normal.    RESP:        no wheezing  CVS:             Regular rate and rhythm.   ABD:         Soft, non-tender, non-distended;     MEDICATIONS  (STANDING):  albuterol/ipratropium for Nebulization 3 milliLiter(s) Nebulizer every 6 hours  budesonide 160 MICROgram(s)/formoterol 4.5 MICROgram(s) Inhaler 2 Puff(s) Inhalation two times a day  cefTRIAXone   IVPB 1000 milliGRAM(s) IV Intermittent every 24 hours  clonazePAM  Tablet 1 milliGRAM(s) Oral three times a day  dextrose 40% Gel 15 Gram(s) Oral once  dextrose 5%. 1000 milliLiter(s) (50 mL/Hr) IV Continuous <Continuous>  dextrose 5%. 1000 milliLiter(s) (100 mL/Hr) IV Continuous <Continuous>  dextrose 50% Injectable 25 Gram(s) IV Push once  dextrose 50% Injectable 12.5 Gram(s) IV Push once  dextrose 50% Injectable 25 Gram(s) IV Push once  enoxaparin Injectable 40 milliGRAM(s) SubCutaneous daily  escitalopram 10 milliGRAM(s) Oral daily  glucagon  Injectable 1 milliGRAM(s) IntraMuscular once  insulin glargine Injectable (LANTUS) 22 Unit(s) SubCutaneous at bedtime  insulin lispro (ADMELOG) corrective regimen sliding scale   SubCutaneous at bedtime  insulin lispro (ADMELOG) corrective regimen sliding scale   SubCutaneous three times a day before meals  insulin lispro Injectable (ADMELOG) 4 Unit(s) SubCutaneous before breakfast  insulin lispro Injectable (ADMELOG) 4 Unit(s) SubCutaneous before lunch  insulin lispro Injectable (ADMELOG) 4 Unit(s) SubCutaneous before dinner  levothyroxine 150 MICROGram(s) Oral daily  losartan 50 milliGRAM(s) Oral daily  methylPREDNISolone sodium succinate Injectable 40 milliGRAM(s) IV Push two times a day  montelukast 10 milliGRAM(s) Oral at bedtime  nicotine -  14 mG/24Hr(s) Patch 1 patch Transdermal daily    MEDICATIONS  (PRN):  acetaminophen   Tablet .. 650 milliGRAM(s) Oral every 6 hours PRN Temp greater or equal to 38C (100.4F), Mild Pain (1 - 3)    LABS:                        11.6   21.77 )-----------( 318      ( 04 Jun 2021 09:22 )             37.0     06-04    138  |  102  |  25<H>  ----------------------------<  409<H>  4.0   |  28  |  1.07    Ca    8.7      04 Jun 2021 09:18    TPro  7.8  /  Alb  3.6  /  TBili  0.4  /  DBili  x   /  AST  10<L>  /  ALT  16  /  AlkPhos  94  06-03    06-02 @ 08:50  pH: 7.39  pCO2: 43  pO2: 115  SaO2: 98    ASSESSMENT AND PLAN:  ·	SOB.  ·	Acute COPD exacerbation.  ·	Chronic hypoxic Respiratory failure.  ·	Tobacco abuse.  ·	Obesity.  ·	MEENU  ·	Leukocytosis.  ·	HTN.  ·	DM.  ·	HLD.  ·	Hypothyroidism.  ·	Anxiety.    Taper steroids over 5-7 days.  Continue Symbicort, O2 and nebulizer.  
    INTERVAL HPI/OVERNIGHT EVENTS:        REVIEW OF SYSTEMS:  CONSTITUTIONAL:   no  complaints    NECK: No pain or stiffnes  RESPIRATORY: No SOB   CARDIOVASCULAR: No chest pain, palpitations, dizziness,   GASTROINTESTINAL: No abdominal pain. No nausea, vomiting,   NEUROLOGICAL: No headaches, no  blurry  vision no  dizziness  SKIN: No itching,   MUSCULOSKELETAL: No pain    MEDICATION:  albuterol/ipratropium for Nebulization 3 milliLiter(s) Nebulizer every 6 hours  cefTRIAXone   IVPB 1000 milliGRAM(s) IV Intermittent every 24 hours  clonazePAM  Tablet 1 milliGRAM(s) Oral three times a day  dextrose 40% Gel 15 Gram(s) Oral once  dextrose 5%. 1000 milliLiter(s) IV Continuous <Continuous>  dextrose 5%. 1000 milliLiter(s) IV Continuous <Continuous>  dextrose 50% Injectable 25 Gram(s) IV Push once  dextrose 50% Injectable 12.5 Gram(s) IV Push once  dextrose 50% Injectable 25 Gram(s) IV Push once  enoxaparin Injectable 40 milliGRAM(s) SubCutaneous daily  escitalopram 10 milliGRAM(s) Oral daily  glucagon  Injectable 1 milliGRAM(s) IntraMuscular once  insulin glargine Injectable (LANTUS) 22 Unit(s) SubCutaneous at bedtime  insulin lispro (ADMELOG) corrective regimen sliding scale   SubCutaneous at bedtime  insulin lispro (ADMELOG) corrective regimen sliding scale   SubCutaneous three times a day before meals  levothyroxine 150 MICROGram(s) Oral daily  losartan 50 milliGRAM(s) Oral daily  methylPREDNISolone sodium succinate Injectable 40 milliGRAM(s) IV Push every 8 hours  montelukast 10 milliGRAM(s) Oral at bedtime  nicotine -  14 mG/24Hr(s) Patch 1 patch Transdermal daily    Vital Signs Last 24 Hrs  T(C): 36.6 (03 Jun 2021 04:37), Max: 36.7 (02 Jun 2021 23:37)  T(F): 97.9 (03 Jun 2021 04:37), Max: 98.1 (02 Jun 2021 23:37)  HR: 78 (03 Jun 2021 05:56) (75 - 91)  BP: 97/60 (03 Jun 2021 04:37) (97/60 - 130/79)  BP(mean): --  RR: 18 (03 Jun 2021 04:37) (18 - 18)  SpO2: 96% (03 Jun 2021 05:56) (96% - 98%)    PHYSICAL EXAM:  GENERAL: NAD, well-groomed, well-developed  HEENT : Conjuntivae  clear sclerae anicteric  NECK: Supple, No JVD, Normal thyroid  NERVOUS SYSTEM:  Alert oriented   no  focal  deficits;   CHEST/LUNG:   MILD  DIFFUSE  BRONCHOSPASM   HEART: Regular rate and rhythm; No murmurs, rubs, or gallops  ABDOMEN: Soft, Nontender, Nondistended; Bowel sounds present  EXTREMITIES:  no  edema no  tenderness  SKIN: No rashes   LABS:                        12.4   19.45 )-----------( 369      ( 03 Jun 2021 06:36 )             37.8     06-03    135  |  100  |  17  ----------------------------<  276<H>  4.2   |  30  |  0.84    Ca    8.8      03 Jun 2021 06:36    TPro  7.8  /  Alb  3.6  /  TBili  0.4  /  DBili  x   /  AST  10<L>  /  ALT  16  /  AlkPhos  94  06-03        CAPILLARY BLOOD GLUCOSE      POCT Blood Glucose.: 297 mg/dL (03 Jun 2021 08:29)  POCT Blood Glucose.: 323 mg/dL (02 Jun 2021 21:21)      RADIOLOGY & ADDITIONAL TESTS:    Imaging reports  Personally Reviewed:  [ ] YES  [ ] NO    Consultant(s) Notes Reviewed:  [ X] YES  [ ] NO    Care Discussed with Consultants/Other Providers [X ] YES  [ ] NO  Assessment and Plan:   Problem/Plan - 1:  ·  Problem: COPD (chronic obstructive pulmonary disease).  Plan: O2  bronchdilators  steroids   rocephin.     Problem/Plan - 2:  ·  Problem: Tobacco abuse.  Plan: nicotione  patch.     Problem/Plan - 3:  ·  Problem: DM (diabetes mellitus).  Plan: insulin  coverage.     Problem/Plan - 4:  ·  Problem: Anxiety.  Plan: klonopin.     Problem/Plan - 5:  ·  Problem: Hypothyroid.  Plan: synthroid.     Problem/Plan - 6:  Problem: HTN (hypertension). Plan: losartan diet.    Problem/Plan - 7:  ·  Problem: Morbid obesity.  Plan: nutrition  consult.   
  INTERVAL HPI:  60yo Female with  Anxiety, Agoraphobia,  COPD has O2 at home, DM, HTN, Hypothyroidism, Active smoker since age 15, Obesity, MEENU( says insurance did not Cover CPAP0, Cholelithiasis, Bladder cancer,  Came to ED with SOB and palpitation, ran out of her clonazepam. Taking Augmentin for two days as was told by urgent care to have pneumonia.  Denies high fever, chills, cough or sputum production.    OVERNIGHT EVENTS:  Awake and comfortable.    Vital Signs Last 24 Hrs  T(C): 36.5 (03 Jun 2021 10:55), Max: 36.7 (02 Jun 2021 23:37)  T(F): 97.7 (03 Jun 2021 10:55), Max: 98.1 (02 Jun 2021 23:37)  HR: 91 (03 Jun 2021 11:40) (75 - 92)  BP: 99/60 (03 Jun 2021 10:55) (97/60 - 130/79)  BP(mean): --  RR: 18 (03 Jun 2021 10:55) (18 - 18)  SpO2: 93% (03 Jun 2021 11:40) (93% - 98%)    PHYSICAL EXAM:  GEN:         Awake, responsive and comfortable.  HEENT:    Normal.    RESP:       no wheezing.  CVS:             Regular rate and rhythm.   ABD:         Soft, non-tender, non-distended;     MEDICATIONS  (STANDING):  albuterol/ipratropium for Nebulization 3 milliLiter(s) Nebulizer every 6 hours  cefTRIAXone   IVPB 1000 milliGRAM(s) IV Intermittent every 24 hours  clonazePAM  Tablet 1 milliGRAM(s) Oral three times a day  dextrose 40% Gel 15 Gram(s) Oral once  dextrose 5%. 1000 milliLiter(s) (50 mL/Hr) IV Continuous <Continuous>  dextrose 5%. 1000 milliLiter(s) (100 mL/Hr) IV Continuous <Continuous>  dextrose 50% Injectable 25 Gram(s) IV Push once  dextrose 50% Injectable 12.5 Gram(s) IV Push once  dextrose 50% Injectable 25 Gram(s) IV Push once  enoxaparin Injectable 40 milliGRAM(s) SubCutaneous daily  escitalopram 10 milliGRAM(s) Oral daily  glucagon  Injectable 1 milliGRAM(s) IntraMuscular once  insulin glargine Injectable (LANTUS) 22 Unit(s) SubCutaneous at bedtime  insulin lispro (ADMELOG) corrective regimen sliding scale   SubCutaneous at bedtime  insulin lispro (ADMELOG) corrective regimen sliding scale   SubCutaneous three times a day before meals  levothyroxine 150 MICROGram(s) Oral daily  losartan 50 milliGRAM(s) Oral daily  methylPREDNISolone sodium succinate Injectable 40 milliGRAM(s) IV Push every 8 hours  montelukast 10 milliGRAM(s) Oral at bedtime  nicotine -  14 mG/24Hr(s) Patch 1 patch Transdermal daily    LABS:                        12.4   19.45 )-----------( 369      ( 03 Jun 2021 06:36 )             37.8     06-03    135  |  100  |  17  ----------------------------<  276<H>  4.2   |  30  |  0.84    Ca    8.8      03 Jun 2021 06:36    TPro  7.8  /  Alb  3.6  /  TBili  0.4  /  DBili  x   /  AST  10<L>  /  ALT  16  /  AlkPhos  94  06-03    06-02 @ 08:50  pH: 7.39  pCO2: 43  pO2: 115  SaO2: 98    ASSESSMENT AND PLAN:  ·	SOB.  ·	Acute COPD exacerbation.  ·	Chronic hypoxic Respiratory failure.  ·	Tobacco abuse.  ·	Obesity.  ·	MEENU  ·	Leukocytosis.  ·	HTN.  ·	DM.  ·	HLD.  ·	Hypothyroidism.  ·	Anxiety.    Continue O2, nebulizer and antibiotics.  Add Symbicort, taper steroids  DVT prophylaxis.  Smoking cessation.

## 2021-06-05 NOTE — DISCHARGE NOTE PROVIDER - HOSPITAL COURSE
60 yo female pmh of OA, dm , htn , anxiety disorder, copd on home oxygen admitted for copd exacerbation after no improvement in shortness of breath at home on Augmentin , pt started on iv steroids which were tapered , oxygen duo nebs Symbicort , and pulmonology consulted pt stable for discharge on prednisone taper with follow up with pulmonology and internal medicine

## 2021-06-15 DIAGNOSIS — J44.1 CHRONIC OBSTRUCTIVE PULMONARY DISEASE WITH (ACUTE) EXACERBATION: ICD-10-CM

## 2021-06-15 DIAGNOSIS — E11.9 TYPE 2 DIABETES MELLITUS WITHOUT COMPLICATIONS: ICD-10-CM

## 2021-06-15 DIAGNOSIS — Z79.4 LONG TERM (CURRENT) USE OF INSULIN: ICD-10-CM

## 2021-06-15 DIAGNOSIS — D72.829 ELEVATED WHITE BLOOD CELL COUNT, UNSPECIFIED: ICD-10-CM

## 2021-06-15 DIAGNOSIS — E78.5 HYPERLIPIDEMIA, UNSPECIFIED: ICD-10-CM

## 2021-06-15 DIAGNOSIS — E03.9 HYPOTHYROIDISM, UNSPECIFIED: ICD-10-CM

## 2021-06-15 DIAGNOSIS — F41.9 ANXIETY DISORDER, UNSPECIFIED: ICD-10-CM

## 2021-06-15 DIAGNOSIS — Z85.51 PERSONAL HISTORY OF MALIGNANT NEOPLASM OF BLADDER: ICD-10-CM

## 2021-06-15 DIAGNOSIS — I10 ESSENTIAL (PRIMARY) HYPERTENSION: ICD-10-CM

## 2021-06-15 DIAGNOSIS — E66.01 MORBID (SEVERE) OBESITY DUE TO EXCESS CALORIES: ICD-10-CM

## 2021-06-15 DIAGNOSIS — J96.11 CHRONIC RESPIRATORY FAILURE WITH HYPOXIA: ICD-10-CM

## 2021-06-15 DIAGNOSIS — Z99.81 DEPENDENCE ON SUPPLEMENTAL OXYGEN: ICD-10-CM

## 2021-06-15 DIAGNOSIS — M19.90 UNSPECIFIED OSTEOARTHRITIS, UNSPECIFIED SITE: ICD-10-CM

## 2021-06-15 DIAGNOSIS — F17.210 NICOTINE DEPENDENCE, CIGARETTES, UNCOMPLICATED: ICD-10-CM

## 2021-06-15 DIAGNOSIS — G47.33 OBSTRUCTIVE SLEEP APNEA (ADULT) (PEDIATRIC): ICD-10-CM

## 2021-06-15 DIAGNOSIS — F40.00 AGORAPHOBIA, UNSPECIFIED: ICD-10-CM

## 2021-06-19 NOTE — ED ADULT TRIAGE NOTE - NS ED NURSE BANDS TYPE
Home care instructions for Dermatitis    · Use steroid cream twice daily until rash resolves.  · Avoid scratching to prevent secondary bacterial infection.  · Avoid coming into contact with offending material if identified.  · May use over the counter hypoallergenic moisturizer.  · Take Children's Claritin daily  · May use over the counter calamine lotion/Sarna lotion and/or oatmeal baths for itching.  · Avoid hot showers which can make rash appear more reddened.  · Avoid irritants and allergens  · Use only mild skincare products and household , without fragrances or dyes.    · Use an emollient moisturizer twice daily   · Avoid excessive sunlight exposure, and protect your skin from sunlight. Use SPF 15+ or higher.      Follow up with primary care provider for any signs of infection such as increased redness, warmth, pain, drainage, or fevers.  Also follow up in one week if symptoms do not resolve.           Name band;

## 2021-06-22 NOTE — PRE-OP CHECKLIST - NOTHING BY MOUTH SINCE
07-Aug-2019 23:59 [General Appearance - Alert] : alert [General Appearance - Well-Appearing] : healthy appearing [Sclera] : the sclera and conjunctiva were normal [Outer Ear] : the ears and nose were normal in appearance [Neck Appearance] : the appearance of the neck was normal [] : no respiratory distress [Auscultation Breath Sounds / Voice Sounds] : lungs were clear to auscultation bilaterally [Heart Sounds] : normal S1 and S2 [Edema] : there was no peripheral edema [Bowel Sounds] : normal bowel sounds [Abdomen Soft] : soft [Abdomen Tenderness] : non-tender [Abnormal Walk] : normal gait [Skin Color & Pigmentation] : normal skin color and pigmentation [No Focal Deficits] : no focal deficits [Oriented To Time, Place, And Person] : oriented to person, place, and time [Affect] : the affect was normal [Mood] : the mood was normal

## 2021-09-01 NOTE — DISCHARGE NOTE PROVIDER - PROVIDER TOKENS
[facility] Internal Medicine Office Note  Chief Complaint:   No chief complaint on file. HPI:   This is a 39year old female coming in for  HPI    July 10, 2020   TSH   T3 reverse 9 (range 8-25)  T4 free 0.9 (range 0.8-1. 8)  T3 3.6 (range 2. Mold                        Comment: This is an error not allergic to mold  Peanuts                   Shrimp                    Tree Extract              Wheat Gluten              Yeast-Related             Current Outpatient Medications   Medication Sig D effects or complications from the treatments as a result of today.      Reji Matamoros MD PROVIDER:[TOKEN:[[id number]]],PROVIDER:[TOKEN:[[id number]]] no hematuria/no bladder infections/no dysuria

## 2021-09-20 NOTE — ED ADULT TRIAGE NOTE - PAIN RATING/NUMBER SCALE (0-10): ACTIVITY
Results seen by pt through portal. Placed call to pt to f/u. Instructed to call us back if any further questions.    4

## 2021-10-06 NOTE — PROVIDER CONTACT NOTE (CRITICAL VALUE NOTIFICATION) - PERSON GIVING RESULT:
[facility] - Abel Gamez Ketoconazole Pregnancy And Lactation Text: This medication is Pregnancy Category C and it isn't know if it is safe during pregnancy. It is also excreted in breast milk and breast feeding isn't recommended.

## 2021-10-13 ENCOUNTER — EMERGENCY (EMERGENCY)
Facility: HOSPITAL | Age: 61
LOS: 0 days | Discharge: HOME | End: 2021-10-13
Payer: MEDICAID

## 2021-10-13 VITALS
OXYGEN SATURATION: 96 % | WEIGHT: 240.08 LBS | RESPIRATION RATE: 18 BRPM | DIASTOLIC BLOOD PRESSURE: 79 MMHG | HEIGHT: 61 IN | HEART RATE: 87 BPM | TEMPERATURE: 98 F | SYSTOLIC BLOOD PRESSURE: 148 MMHG

## 2021-10-13 VITALS
SYSTOLIC BLOOD PRESSURE: 135 MMHG | RESPIRATION RATE: 18 BRPM | TEMPERATURE: 98 F | HEART RATE: 94 BPM | DIASTOLIC BLOOD PRESSURE: 72 MMHG | OXYGEN SATURATION: 94 %

## 2021-10-13 DIAGNOSIS — Z90.49 ACQUIRED ABSENCE OF OTHER SPECIFIED PARTS OF DIGESTIVE TRACT: Chronic | ICD-10-CM

## 2021-10-13 DIAGNOSIS — E03.9 HYPOTHYROIDISM, UNSPECIFIED: ICD-10-CM

## 2021-10-13 DIAGNOSIS — K08.89 OTHER SPECIFIED DISORDERS OF TEETH AND SUPPORTING STRUCTURES: ICD-10-CM

## 2021-10-13 DIAGNOSIS — Z79.84 LONG TERM (CURRENT) USE OF ORAL HYPOGLYCEMIC DRUGS: ICD-10-CM

## 2021-10-13 DIAGNOSIS — I10 ESSENTIAL (PRIMARY) HYPERTENSION: ICD-10-CM

## 2021-10-13 DIAGNOSIS — Z86.51 PERSONAL HISTORY OF COMBAT AND OPERATIONAL STRESS REACTION: ICD-10-CM

## 2021-10-13 DIAGNOSIS — K13.79 OTHER LESIONS OF ORAL MUCOSA: ICD-10-CM

## 2021-10-13 DIAGNOSIS — Z90.49 ACQUIRED ABSENCE OF OTHER SPECIFIED PARTS OF DIGESTIVE TRACT: ICD-10-CM

## 2021-10-13 DIAGNOSIS — F17.210 NICOTINE DEPENDENCE, CIGARETTES, UNCOMPLICATED: ICD-10-CM

## 2021-10-13 DIAGNOSIS — Z79.4 LONG TERM (CURRENT) USE OF INSULIN: ICD-10-CM

## 2021-10-13 DIAGNOSIS — J44.9 CHRONIC OBSTRUCTIVE PULMONARY DISEASE, UNSPECIFIED: ICD-10-CM

## 2021-10-13 DIAGNOSIS — E11.9 TYPE 2 DIABETES MELLITUS WITHOUT COMPLICATIONS: ICD-10-CM

## 2021-10-13 PROCEDURE — 99283 EMERGENCY DEPT VISIT LOW MDM: CPT

## 2021-10-13 RX ORDER — LIDOCAINE 4 G/100G
15 CREAM TOPICAL ONCE
Refills: 0 | Status: COMPLETED | OUTPATIENT
Start: 2021-10-13 | End: 2021-10-13

## 2021-10-13 RX ORDER — OXYCODONE HYDROCHLORIDE 5 MG/1
1 TABLET ORAL
Qty: 4 | Refills: 0
Start: 2021-10-13 | End: 2021-10-13

## 2021-10-13 RX ORDER — OXYCODONE HYDROCHLORIDE 5 MG/1
5 TABLET ORAL ONCE
Refills: 0 | Status: DISCONTINUED | OUTPATIENT
Start: 2021-10-13 | End: 2021-10-13

## 2021-10-13 RX ORDER — MONTELUKAST 4 MG/1
1 TABLET, CHEWABLE ORAL
Qty: 30 | Refills: 0

## 2021-10-13 RX ADMIN — LIDOCAINE 15 MILLILITER(S): 4 CREAM TOPICAL at 15:21

## 2021-10-13 RX ADMIN — OXYCODONE HYDROCHLORIDE 5 MILLIGRAM(S): 5 TABLET ORAL at 14:59

## 2021-10-13 NOTE — ED PROVIDER NOTE - NSFOLLOWUPINSTRUCTIONS_ED_ALL_ED_FT
Today you were seen in the ER for mouth/dental pain.     Take Tylenol 650mg (Two 325 mg pills) every 4-6 hours as needed for pain.    Take Motrin 600 mg every 8 hours as needed for moderate pain- take with food.    Take Oxycodone 5mg every 6 hours as needed for severe pain. Medication can make you drowsy. Do not drive or operate heavy machinery or drink alcohol with medication.     Continue with amoxicillin as prescribed by your dentist.     Follow up with oral surgeons as soon as possible. See below for referral.      Dental Pain    Dental pain (toothache) may be caused by many things including tooth decay (cavities or caries), abscess or infection, or trauma. If you were prescribed an antibiotic medicine, finish all of it even if you start to feel better. Rinsing your mouth with salt water or applying ice to the painful area of your face may help with the pain. Follow up with a dentist is important in ensuring good oral health and preventing the worsening of dental disease.    SEEK IMMEDIATE MEDICAL CARE IF YOU HAVE ANY OF THE FOLLOWING SYMPTOMS: unable to open your mouth, trouble breathing or swallowing, fever, or swelling of the face, neck, or jaw.    Advance activity as tolerated.     Continue all previously prescribed medications as directed unless otherwise instructed.     Follow up with your primary care physician in 48-72 hours- bring copies of your results.

## 2021-10-13 NOTE — ED PROVIDER NOTE - NSFOLLOWUPCLINICS_GEN_ALL_ED_FT
[facility]  Dental  [address] [place] [address]  Phone: [phone]  Fax:     Oral & Maxillofacial Surgery  Department of Dental Medicine  [phone] [place], [place] [address]  Phone: [phone]  Fax: [phone]

## 2021-10-13 NOTE — ED PROVIDER NOTE - CLINICAL SUMMARY MEDICAL DECISION MAKING FREE TEXT BOX
61y Female with PMHx of DM, COPD, HTN, HLD, bladder cancer in remission presents to the ER dental pain/injury. Frontal gum pain x 1-2 weeks. Seen by dentist 3 days ago, given amoxicillin for infection. Reports worsening pain, rated 10/10. Denies fever, chills, difficulty eating or drinking. Vital signs stable, afebrile, top superior gingival swelling without evidence of abscess. No facial swelling. Oral surgeron appt for 11/2021. Will give meds, reassess. Dc with dental clinic/OMFS.

## 2021-10-13 NOTE — ED PROVIDER NOTE - NS ED ROS FT
Constitutional: (-) Fever, (-) Chills  Skin: (-) Rashes, (-) Wounds  Eyes: (-) Visual changes, (-) Discharge, (-) Redness  Ears: (-) Hearing loss, (-)Tinnitus, (-) Ear pain  Nose: (-) Nasal congestion, (-) Runny nose  Mouth/Throat: (+)Dental pain, (-) Sore throat, (-) Vocal changes  CV: (-) Chest pain,  Resp: (-) Cough, (-) Shortness of breath  GI: (-) Abdominal pain, (-) Nausea, (-) Vomiting  : (-) Dysuria  MSK: (-) Myalgias, (-) Back pain  Neuro: (-) Headache

## 2021-10-13 NOTE — ED ADULT TRIAGE NOTE - CHIEF COMPLAINT QUOTE
c/o gum pain front top area x 1 week started abx 3 days ago states pain not improving last used tylenol this morning

## 2021-10-13 NOTE — ED PROVIDER NOTE - PATIENT PORTAL LINK FT
You can access the FollowMyHealth Patient Portal offered by [facility] by registering at the following website: [email or web address]. By joining [employer]’s FollowMyHealth portal, you will also be able to view your health information using other applications (apps) compatible with our system.

## 2021-10-13 NOTE — ED PROVIDER NOTE - NSCAREINITIATED _GEN_ER
ANESTHESIA INTUBATION  Date/Time: 9/26/2018 11:03 AM  Urgency: elective    Airway not difficult    General Information and Staff    Patient location during procedure: OR  Anesthesiologist: Leny Tong MD  Resident/CRNA: ESSIE Torres Shelli Delgado(PA)

## 2021-10-13 NOTE — ED PROVIDER NOTE - OBJECTIVE STATEMENT
61y Female with PMHx of DM, COPD, HTN, HLD, bladder cancer in remission presents to the ER dental pain/injury. Patient reports frontal gum back x 1-2 weeks. States she was seen by her dentist 3 days ago, given amoxicillin for infection. Reports worsening pain, rated 10/10. Denies fever, chills, difficulty eating or drinking. 61y Female with PMHx of DM, COPD, HTN, HLD, bladder cancer in remission presents to the ER dental pain/injury. Patient reports frontal gum pain x 1-2 weeks. States she was seen by her dentist 3 days ago, given amoxicillin for infection. Reports worsening pain, rated 10/10. Denies fever, chills, difficulty eating or drinking.

## 2021-11-04 NOTE — ED PROVIDER NOTE - HEAD, MLM
Patient called back and is scheduled for tomorrow at 2;30 Head is atraumatic. Head shape is symmetrical.

## 2022-01-01 NOTE — ED ADULT NURSE NOTE - CAS DISCH ACCOMP BY
FUTURE VISIT INFORMATION      FUTURE VISIT INFORMATION:    Date: 6/04/18    Time: 1:30    Location:   REFERRAL INFORMATION:    Referring provider:  Keegan Combs    Referring providers clinic: [facility]    Reason for visit/diagnosis: Olecranon bursitis of right elbow          
Self
2022

## 2022-03-09 ENCOUNTER — EMERGENCY (EMERGENCY)
Facility: HOSPITAL | Age: 62
LOS: 0 days | Discharge: HOME | End: 2022-03-09
Attending: STUDENT IN AN ORGANIZED HEALTH CARE EDUCATION/TRAINING PROGRAM
Payer: MEDICAID

## 2022-03-09 VITALS
HEART RATE: 90 BPM | WEIGHT: 179.9 LBS | SYSTOLIC BLOOD PRESSURE: 160 MMHG | TEMPERATURE: 98 F | RESPIRATION RATE: 18 BRPM | HEIGHT: 61 IN | DIASTOLIC BLOOD PRESSURE: 88 MMHG | OXYGEN SATURATION: 94 %

## 2022-03-09 VITALS
RESPIRATION RATE: 16 BRPM | SYSTOLIC BLOOD PRESSURE: 127 MMHG | HEART RATE: 98 BPM | DIASTOLIC BLOOD PRESSURE: 84 MMHG | TEMPERATURE: 98 F | OXYGEN SATURATION: 97 %

## 2022-03-09 DIAGNOSIS — Z90.49 ACQUIRED ABSENCE OF OTHER SPECIFIED PARTS OF DIGESTIVE TRACT: ICD-10-CM

## 2022-03-09 DIAGNOSIS — R11.0 NAUSEA: ICD-10-CM

## 2022-03-09 DIAGNOSIS — R10.33 PERIUMBILICAL PAIN: ICD-10-CM

## 2022-03-09 DIAGNOSIS — Z87.19 PERSONAL HISTORY OF OTHER DISEASES OF THE DIGESTIVE SYSTEM: ICD-10-CM

## 2022-03-09 DIAGNOSIS — I10 ESSENTIAL (PRIMARY) HYPERTENSION: ICD-10-CM

## 2022-03-09 DIAGNOSIS — Z90.49 ACQUIRED ABSENCE OF OTHER SPECIFIED PARTS OF DIGESTIVE TRACT: Chronic | ICD-10-CM

## 2022-03-09 DIAGNOSIS — Z85.51 PERSONAL HISTORY OF MALIGNANT NEOPLASM OF BLADDER: ICD-10-CM

## 2022-03-09 DIAGNOSIS — E03.9 HYPOTHYROIDISM, UNSPECIFIED: ICD-10-CM

## 2022-03-09 DIAGNOSIS — E11.9 TYPE 2 DIABETES MELLITUS WITHOUT COMPLICATIONS: ICD-10-CM

## 2022-03-09 DIAGNOSIS — J44.9 CHRONIC OBSTRUCTIVE PULMONARY DISEASE, UNSPECIFIED: ICD-10-CM

## 2022-03-09 DIAGNOSIS — Z87.891 PERSONAL HISTORY OF NICOTINE DEPENDENCE: ICD-10-CM

## 2022-03-09 DIAGNOSIS — R10.9 UNSPECIFIED ABDOMINAL PAIN: ICD-10-CM

## 2022-03-09 LAB
ALBUMIN SERPL ELPH-MCNC: 3.4 G/DL — SIGNIFICANT CHANGE UP (ref 3.3–5)
ALP SERPL-CCNC: 100 U/L — SIGNIFICANT CHANGE UP (ref 40–120)
ALT FLD-CCNC: 14 U/L — SIGNIFICANT CHANGE UP (ref 12–78)
ANION GAP SERPL CALC-SCNC: 3 MMOL/L — LOW (ref 5–17)
APPEARANCE UR: CLEAR — SIGNIFICANT CHANGE UP
AST SERPL-CCNC: 13 U/L — LOW (ref 15–37)
BASOPHILS # BLD AUTO: 0.1 K/UL — SIGNIFICANT CHANGE UP (ref 0–0.2)
BASOPHILS NFR BLD AUTO: 0.6 % — SIGNIFICANT CHANGE UP (ref 0–2)
BILIRUB SERPL-MCNC: 0.2 MG/DL — SIGNIFICANT CHANGE UP (ref 0.2–1.2)
BILIRUB UR-MCNC: NEGATIVE — SIGNIFICANT CHANGE UP
BUN SERPL-MCNC: 13 MG/DL — SIGNIFICANT CHANGE UP (ref 7–23)
CALCIUM SERPL-MCNC: 9.6 MG/DL — SIGNIFICANT CHANGE UP (ref 8.5–10.1)
CHLORIDE SERPL-SCNC: 104 MMOL/L — SIGNIFICANT CHANGE UP (ref 96–108)
CO2 SERPL-SCNC: 33 MMOL/L — HIGH (ref 22–31)
COLOR SPEC: YELLOW — SIGNIFICANT CHANGE UP
CREAT SERPL-MCNC: 0.88 MG/DL — SIGNIFICANT CHANGE UP (ref 0.5–1.3)
DIFF PNL FLD: NEGATIVE — SIGNIFICANT CHANGE UP
EGFR: 74 ML/MIN/1.73M2 — SIGNIFICANT CHANGE UP
EOSINOPHIL # BLD AUTO: 0.67 K/UL — HIGH (ref 0–0.5)
EOSINOPHIL NFR BLD AUTO: 4 % — SIGNIFICANT CHANGE UP (ref 0–6)
GLUCOSE SERPL-MCNC: 97 MG/DL — SIGNIFICANT CHANGE UP (ref 70–99)
GLUCOSE UR QL: NEGATIVE MG/DL — SIGNIFICANT CHANGE UP
HCT VFR BLD CALC: 38.7 % — SIGNIFICANT CHANGE UP (ref 34.5–45)
HGB BLD-MCNC: 12.5 G/DL — SIGNIFICANT CHANGE UP (ref 11.5–15.5)
IMM GRANULOCYTES NFR BLD AUTO: 0.6 % — SIGNIFICANT CHANGE UP (ref 0–1.5)
KETONES UR-MCNC: NEGATIVE — SIGNIFICANT CHANGE UP
LEUKOCYTE ESTERASE UR-ACNC: NEGATIVE — SIGNIFICANT CHANGE UP
LIDOCAIN IGE QN: 85 U/L — SIGNIFICANT CHANGE UP (ref 73–393)
LYMPHOCYTES # BLD AUTO: 22.1 % — SIGNIFICANT CHANGE UP (ref 13–44)
LYMPHOCYTES # BLD AUTO: 3.7 K/UL — HIGH (ref 1–3.3)
MCHC RBC-ENTMCNC: 28 PG — SIGNIFICANT CHANGE UP (ref 27–34)
MCHC RBC-ENTMCNC: 32.3 G/DL — SIGNIFICANT CHANGE UP (ref 32–36)
MCV RBC AUTO: 86.8 FL — SIGNIFICANT CHANGE UP (ref 80–100)
MONOCYTES # BLD AUTO: 0.9 K/UL — SIGNIFICANT CHANGE UP (ref 0–0.9)
MONOCYTES NFR BLD AUTO: 5.4 % — SIGNIFICANT CHANGE UP (ref 2–14)
NEUTROPHILS # BLD AUTO: 11.29 K/UL — HIGH (ref 1.8–7.4)
NEUTROPHILS NFR BLD AUTO: 67.3 % — SIGNIFICANT CHANGE UP (ref 43–77)
NITRITE UR-MCNC: NEGATIVE — SIGNIFICANT CHANGE UP
NRBC # BLD: 0 /100 WBCS — SIGNIFICANT CHANGE UP (ref 0–0)
PH UR: 7 — SIGNIFICANT CHANGE UP (ref 5–8)
PLATELET # BLD AUTO: 310 K/UL — SIGNIFICANT CHANGE UP (ref 150–400)
POTASSIUM SERPL-MCNC: 4.6 MMOL/L — SIGNIFICANT CHANGE UP (ref 3.5–5.3)
POTASSIUM SERPL-SCNC: 4.6 MMOL/L — SIGNIFICANT CHANGE UP (ref 3.5–5.3)
PROT SERPL-MCNC: 8.2 GM/DL — SIGNIFICANT CHANGE UP (ref 6–8.3)
PROT UR-MCNC: NEGATIVE MG/DL — SIGNIFICANT CHANGE UP
RBC # BLD: 4.46 M/UL — SIGNIFICANT CHANGE UP (ref 3.8–5.2)
RBC # FLD: 15.4 % — HIGH (ref 10.3–14.5)
SODIUM SERPL-SCNC: 140 MMOL/L — SIGNIFICANT CHANGE UP (ref 135–145)
SP GR SPEC: 1 — LOW (ref 1.01–1.02)
UROBILINOGEN FLD QL: NEGATIVE MG/DL — SIGNIFICANT CHANGE UP
WBC # BLD: 16.76 K/UL — HIGH (ref 3.8–10.5)
WBC # FLD AUTO: 16.76 K/UL — HIGH (ref 3.8–10.5)

## 2022-03-09 PROCEDURE — 74177 CT ABD & PELVIS W/CONTRAST: CPT | Mod: 26,MA

## 2022-03-09 PROCEDURE — 99285 EMERGENCY DEPT VISIT HI MDM: CPT

## 2022-03-09 RX ORDER — IOHEXOL 300 MG/ML
30 INJECTION, SOLUTION INTRAVENOUS ONCE
Refills: 0 | Status: COMPLETED | OUTPATIENT
Start: 2022-03-09 | End: 2022-03-09

## 2022-03-09 RX ORDER — ONDANSETRON 8 MG/1
4 TABLET, FILM COATED ORAL ONCE
Refills: 0 | Status: COMPLETED | OUTPATIENT
Start: 2022-03-09 | End: 2022-03-09

## 2022-03-09 RX ORDER — SODIUM CHLORIDE 9 MG/ML
1000 INJECTION INTRAMUSCULAR; INTRAVENOUS; SUBCUTANEOUS ONCE
Refills: 0 | Status: COMPLETED | OUTPATIENT
Start: 2022-03-09 | End: 2022-03-09

## 2022-03-09 RX ADMIN — SODIUM CHLORIDE 1000 MILLILITER(S): 9 INJECTION INTRAMUSCULAR; INTRAVENOUS; SUBCUTANEOUS at 15:34

## 2022-03-09 RX ADMIN — IOHEXOL 30 MILLILITER(S): 300 INJECTION, SOLUTION INTRAVENOUS at 15:55

## 2022-03-09 RX ADMIN — ONDANSETRON 4 MILLIGRAM(S): 8 TABLET, FILM COATED ORAL at 15:38

## 2022-03-09 NOTE — ED PROVIDER NOTE - PROGRESS NOTE DETAILS
CT showing umbilical hernia, afebrile here, WBC noted - surgery evaluated the patient no acute surgical intervention warranted, patient without tenderness, tolerating PO will dc to home

## 2022-03-09 NOTE — ED PROVIDER NOTE - OBJECTIVE STATEMENT
62F history of DM, HTN, umbilical hernia, here with abdominal pain x 2-3 days, associated with nausea. NO vomiting. Normal BM this morning. One fever yesterday to 101. Reports prior cholecystectomy. Denies chest pain, shortness of breath. Also reports low back pain radiating down RLE.

## 2022-03-09 NOTE — ED ADULT TRIAGE NOTE - CHIEF COMPLAINT QUOTE
pt biba  from home c/o umbilical hernia pain to the lower back radiating down the leg, x 2weeks hx COPD, DM

## 2022-03-09 NOTE — ED PROVIDER NOTE - GASTROINTESTINAL, MLM
Diffuse abdominal pain, most prominent to umbilicus and RUQ, + reducible umbilical hernia noted, no rebound or guarding

## 2022-03-09 NOTE — ED PROVIDER NOTE - PATIENT PORTAL LINK FT
You can access the FollowMyHealth Patient Portal offered by [facility] by registering at the following website: [email or web address]. By joining [employer]’s FollowMyHealth portal, you will also be able to view your health information using other applications (apps) compatible with our system.

## 2022-03-09 NOTE — ED PROVIDER NOTE - CARE PROVIDER_API CALL
Dajuan Crowe (MD)  Surgery  [address], [address] [place] [address]  Phone: [phone]  Fax: [phone]  Follow Up Time:

## 2022-03-09 NOTE — ED ADULT NURSE NOTE - OBJECTIVE STATEMENT
pt came in with abdominal pain that started on and off for a couple weeks. pt states she came in because the pain has gotten worse. pt states she has had an umbilical hernia for "a couple of years". pt states the hernia is giving her pain that radiates through the back, into the groin, and down her right leg. pt rates the pain an 8/10 and describes it as stabbing. pt states she took motrin this morning around 8am but it didn't not give her any relief.  pt states she has been eating and drinking normally.   pt denies any N/V/D. last bowel movement was today.  pt denies any urinary issues at this time  pt states she had a 101 fever yesterday.

## 2022-03-09 NOTE — ED PROVIDER NOTE - CLINICAL SUMMARY MEDICAL DECISION MAKING FREE TEXT BOX
ZOILA Weaver, Attending: seen with ACP and reviewd note.    62 yoF, PMHX of tobacco use, obesity, anxiety, DM, known hernia presenting with umbilical pain at location of hernia. No changes in bowel. No NV. Has visiting RN that found fever at home this week. S/p yuri. Denies urinary sx. Does not drink. No chest pain or URI sx.    Soft reducible umbilical hernia. Skin normal. Obese. No marked ttp. No distension. Looks well. Non toxic.    Given body habitus and known hernia will obtain imaging. Less likely divertic/appy. Eval for cystitis.    If CT non actionable and pain controlled/hernia unchanged, can go home.

## 2022-03-09 NOTE — CONSULT NOTE ADULT - SUBJECTIVE AND OBJECTIVE BOX
Patient is a 62y old  Female who presents with a chief complaint of fever 101, abdominal pain denies nausea or vomiting. States she "always has a high wbc" unclear etiology.       PAST MEDICAL & SURGICAL HISTORY:  Anxiety  COPD (chronic obstructive pulmonary disease)  Agoraphobia  Hypothyroid  Emphysema lung  Smoker  HTN (hypertension)  DM (diabetes mellitus)  Obesity  Bladder cancer  S/P cholecystectomy  Review of Systems:  I have reviewed 9 systems with the patient and the only positive findings were fever, abdominal pain non specific and non reproducible.       Allergies    No Known Allergies    SOCIAL HISTORY + smoking denies etoh          FAMILY HISTORY: non contributory       Vital Signs Last 24 Hrs  T(C): 36.8 (09 Mar 2022 14:12), Max: 36.8 (09 Mar 2022 14:12)  T(F): 98.3 (09 Mar 2022 14:12), Max: 98.3 (09 Mar 2022 14:12)  HR: 90 (09 Mar 2022 14:12) (90 - 90)  BP: 160/88 (09 Mar 2022 14:12) (160/88 - 160/88)  BP(mean): --  RR: 18 (09 Mar 2022 14:12) (18 - 18)  SpO2: 94% (09 Mar 2022 14:12) (94% - 94%)    Physical Exam:  General:  Appears stated age, well-groomed, well-nourished, no distress  Eyes: EOMI, conjunctiva clear  HENT:  WNL, no JVD  Chest:  clear breath sounds  Cardiovascular:  Regular rate & rhythm  Abdomen:  protruberant, non distended. REDUCIBLE umbilical hernia   Extremities:  no pedal edema or calf tenderness noted  Skin:  No rash  Musculoskeletal:  normal strength  Neuro/Psych:  Alert, oriented to time, place and person     LABS:                        12.5   16.76 )-----------( 310      ( 09 Mar 2022 15:42 )             38.7     03-09    140  |  104  |  13  ----------------------------<  97  4.6   |  33<H>  |  0.88    Ca    9.6      09 Mar 2022 15:42    TPro  8.2  /  Alb  3.4  /  TBili  0.2  /  DBili  x   /  AST  13<L>  /  ALT  14  /  AlkPhos  100  03-09      Urinalysis Basic - ( 09 Mar 2022 15:44 )    Color: Yellow / Appearance: Clear / S.005 / pH: x  Gluc: x / Ketone: Negative  / Bili: Negative / Urobili: Negative mg/dL   Blood: x / Protein: Negative mg/dL / Nitrite: Negative   Leuk Esterase: Negative / RBC: x / WBC x   Sq Epi: x / Non Sq Epi: x / Bacteria: x      RADIOLOGY & ADDITIONAL STUDIES:  < from: CT Abdomen and Pelvis w/ Oral Cont and w/ IV Cont (22 @ 18:08) >  IMPRESSION:  Stable exam. Moderate-sized fat-containing umbilical hernia. No bowel   obstruction or inflammatory change.  Hepatomegaly and hepatic steatosis. Stable indeterminate left adrenal   nodules.    < end of copied text >      Impression/Plan:  62F with febrile illness likely unrelated to umbilical hernia    recommend further medical work up for source of leukocytosis and fever  covid/viral panel pending  no acute surgical intervention  Discussed and reviewed with Dr. Crowe- may follow up with Dr. Crowe in the office for umbilical hernia prn.

## 2022-03-10 LAB
CULTURE RESULTS: SIGNIFICANT CHANGE UP
SPECIMEN SOURCE: SIGNIFICANT CHANGE UP

## 2022-03-18 ENCOUNTER — EMERGENCY (EMERGENCY)
Facility: HOSPITAL | Age: 62
LOS: 0 days | Discharge: HOME | End: 2022-03-18
Attending: EMERGENCY MEDICINE
Payer: MEDICAID

## 2022-03-18 VITALS
DIASTOLIC BLOOD PRESSURE: 86 MMHG | HEIGHT: 61 IN | HEART RATE: 103 BPM | OXYGEN SATURATION: 96 % | SYSTOLIC BLOOD PRESSURE: 138 MMHG | TEMPERATURE: 98 F | RESPIRATION RATE: 20 BRPM | WEIGHT: 190.04 LBS

## 2022-03-18 VITALS
DIASTOLIC BLOOD PRESSURE: 55 MMHG | HEART RATE: 100 BPM | TEMPERATURE: 98 F | RESPIRATION RATE: 19 BRPM | SYSTOLIC BLOOD PRESSURE: 129 MMHG | OXYGEN SATURATION: 95 %

## 2022-03-18 DIAGNOSIS — R06.02 SHORTNESS OF BREATH: ICD-10-CM

## 2022-03-18 DIAGNOSIS — M79.89 OTHER SPECIFIED SOFT TISSUE DISORDERS: ICD-10-CM

## 2022-03-18 DIAGNOSIS — Z90.49 ACQUIRED ABSENCE OF OTHER SPECIFIED PARTS OF DIGESTIVE TRACT: Chronic | ICD-10-CM

## 2022-03-18 DIAGNOSIS — M25.571 PAIN IN RIGHT ANKLE AND JOINTS OF RIGHT FOOT: ICD-10-CM

## 2022-03-18 DIAGNOSIS — Z20.822 CONTACT WITH AND (SUSPECTED) EXPOSURE TO COVID-19: ICD-10-CM

## 2022-03-18 DIAGNOSIS — F17.210 NICOTINE DEPENDENCE, CIGARETTES, UNCOMPLICATED: ICD-10-CM

## 2022-03-18 DIAGNOSIS — R05.1 ACUTE COUGH: ICD-10-CM

## 2022-03-18 DIAGNOSIS — J44.9 CHRONIC OBSTRUCTIVE PULMONARY DISEASE, UNSPECIFIED: ICD-10-CM

## 2022-03-18 DIAGNOSIS — I10 ESSENTIAL (PRIMARY) HYPERTENSION: ICD-10-CM

## 2022-03-18 DIAGNOSIS — M79.671 PAIN IN RIGHT FOOT: ICD-10-CM

## 2022-03-18 DIAGNOSIS — E11.9 TYPE 2 DIABETES MELLITUS WITHOUT COMPLICATIONS: ICD-10-CM

## 2022-03-18 DIAGNOSIS — M25.572 PAIN IN LEFT ANKLE AND JOINTS OF LEFT FOOT: ICD-10-CM

## 2022-03-18 DIAGNOSIS — Z79.4 LONG TERM (CURRENT) USE OF INSULIN: ICD-10-CM

## 2022-03-18 DIAGNOSIS — F41.9 ANXIETY DISORDER, UNSPECIFIED: ICD-10-CM

## 2022-03-18 DIAGNOSIS — R00.0 TACHYCARDIA, UNSPECIFIED: ICD-10-CM

## 2022-03-18 DIAGNOSIS — E03.9 HYPOTHYROIDISM, UNSPECIFIED: ICD-10-CM

## 2022-03-18 LAB
ALBUMIN SERPL ELPH-MCNC: 3.5 G/DL — SIGNIFICANT CHANGE UP (ref 3.3–5)
ALP SERPL-CCNC: 102 U/L — SIGNIFICANT CHANGE UP (ref 40–120)
ALT FLD-CCNC: 19 U/L — SIGNIFICANT CHANGE UP (ref 12–78)
ANION GAP SERPL CALC-SCNC: 3 MMOL/L — LOW (ref 5–17)
APTT BLD: 33.7 SEC — SIGNIFICANT CHANGE UP (ref 27.5–35.5)
AST SERPL-CCNC: 15 U/L — SIGNIFICANT CHANGE UP (ref 15–37)
BASOPHILS # BLD AUTO: 0.1 K/UL — SIGNIFICANT CHANGE UP (ref 0–0.2)
BASOPHILS NFR BLD AUTO: 0.7 % — SIGNIFICANT CHANGE UP (ref 0–2)
BILIRUB SERPL-MCNC: 0.3 MG/DL — SIGNIFICANT CHANGE UP (ref 0.2–1.2)
BUN SERPL-MCNC: 17 MG/DL — SIGNIFICANT CHANGE UP (ref 7–23)
CALCIUM SERPL-MCNC: 9.3 MG/DL — SIGNIFICANT CHANGE UP (ref 8.5–10.1)
CHLORIDE SERPL-SCNC: 107 MMOL/L — SIGNIFICANT CHANGE UP (ref 96–108)
CO2 SERPL-SCNC: 28 MMOL/L — SIGNIFICANT CHANGE UP (ref 22–31)
CREAT SERPL-MCNC: 0.81 MG/DL — SIGNIFICANT CHANGE UP (ref 0.5–1.3)
D DIMER BLD IA.RAPID-MCNC: <150 NG/ML DDU — SIGNIFICANT CHANGE UP
EGFR: 82 ML/MIN/1.73M2 — SIGNIFICANT CHANGE UP
EOSINOPHIL # BLD AUTO: 0.59 K/UL — HIGH (ref 0–0.5)
EOSINOPHIL NFR BLD AUTO: 4 % — SIGNIFICANT CHANGE UP (ref 0–6)
FLUAV AG NPH QL: SIGNIFICANT CHANGE UP
FLUBV AG NPH QL: SIGNIFICANT CHANGE UP
GLUCOSE BLDC GLUCOMTR-MCNC: 147 MG/DL — HIGH (ref 70–99)
GLUCOSE SERPL-MCNC: 119 MG/DL — HIGH (ref 70–99)
HCT VFR BLD CALC: 37.6 % — SIGNIFICANT CHANGE UP (ref 34.5–45)
HGB BLD-MCNC: 12 G/DL — SIGNIFICANT CHANGE UP (ref 11.5–15.5)
IMM GRANULOCYTES NFR BLD AUTO: 1.1 % — SIGNIFICANT CHANGE UP (ref 0–1.5)
INR BLD: 0.98 RATIO — SIGNIFICANT CHANGE UP (ref 0.88–1.16)
LYMPHOCYTES # BLD AUTO: 19.6 % — SIGNIFICANT CHANGE UP (ref 13–44)
LYMPHOCYTES # BLD AUTO: 2.92 K/UL — SIGNIFICANT CHANGE UP (ref 1–3.3)
MCHC RBC-ENTMCNC: 27.9 PG — SIGNIFICANT CHANGE UP (ref 27–34)
MCHC RBC-ENTMCNC: 31.9 G/DL — LOW (ref 32–36)
MCV RBC AUTO: 87.4 FL — SIGNIFICANT CHANGE UP (ref 80–100)
MONOCYTES # BLD AUTO: 0.86 K/UL — SIGNIFICANT CHANGE UP (ref 0–0.9)
MONOCYTES NFR BLD AUTO: 5.8 % — SIGNIFICANT CHANGE UP (ref 2–14)
NEUTROPHILS # BLD AUTO: 10.26 K/UL — HIGH (ref 1.8–7.4)
NEUTROPHILS NFR BLD AUTO: 68.8 % — SIGNIFICANT CHANGE UP (ref 43–77)
NRBC # BLD: 0 /100 WBCS — SIGNIFICANT CHANGE UP (ref 0–0)
NT-PROBNP SERPL-SCNC: 69 PG/ML — SIGNIFICANT CHANGE UP (ref 0–125)
PLATELET # BLD AUTO: 274 K/UL — SIGNIFICANT CHANGE UP (ref 150–400)
POTASSIUM SERPL-MCNC: 4.5 MMOL/L — SIGNIFICANT CHANGE UP (ref 3.5–5.3)
POTASSIUM SERPL-SCNC: 4.5 MMOL/L — SIGNIFICANT CHANGE UP (ref 3.5–5.3)
PROT SERPL-MCNC: 7.9 GM/DL — SIGNIFICANT CHANGE UP (ref 6–8.3)
PROTHROM AB SERPL-ACNC: 11.6 SEC — SIGNIFICANT CHANGE UP (ref 10.5–13.4)
RBC # BLD: 4.3 M/UL — SIGNIFICANT CHANGE UP (ref 3.8–5.2)
RBC # FLD: 15.7 % — HIGH (ref 10.3–14.5)
SARS-COV-2 RNA SPEC QL NAA+PROBE: SIGNIFICANT CHANGE UP
SODIUM SERPL-SCNC: 138 MMOL/L — SIGNIFICANT CHANGE UP (ref 135–145)
TROPONIN I, HIGH SENSITIVITY RESULT: 3.9 NG/L — SIGNIFICANT CHANGE UP
WBC # BLD: 14.89 K/UL — HIGH (ref 3.8–10.5)
WBC # FLD AUTO: 14.89 K/UL — HIGH (ref 3.8–10.5)

## 2022-03-18 PROCEDURE — 93010 ELECTROCARDIOGRAM REPORT: CPT

## 2022-03-18 PROCEDURE — 93970 EXTREMITY STUDY: CPT | Mod: 26

## 2022-03-18 PROCEDURE — 99285 EMERGENCY DEPT VISIT HI MDM: CPT

## 2022-03-18 PROCEDURE — 73630 X-RAY EXAM OF FOOT: CPT | Mod: 26,50

## 2022-03-18 PROCEDURE — 73610 X-RAY EXAM OF ANKLE: CPT | Mod: 26,50

## 2022-03-18 RX ORDER — LEVOTHYROXINE SODIUM 125 MCG
1 TABLET ORAL
Qty: 0 | Refills: 0 | DISCHARGE

## 2022-03-18 RX ORDER — INSULIN GLARGINE 100 [IU]/ML
30 INJECTION, SOLUTION SUBCUTANEOUS
Qty: 0 | Refills: 0 | DISCHARGE

## 2022-03-18 RX ORDER — OXYCODONE AND ACETAMINOPHEN 5; 325 MG/1; MG/1
1 TABLET ORAL ONCE
Refills: 0 | Status: DISCONTINUED | OUTPATIENT
Start: 2022-03-18 | End: 2022-03-18

## 2022-03-18 RX ORDER — GABAPENTIN 400 MG/1
1 CAPSULE ORAL
Qty: 0 | Refills: 0 | DISCHARGE

## 2022-03-18 RX ORDER — IPRATROPIUM/ALBUTEROL SULFATE 18-103MCG
3 AEROSOL WITH ADAPTER (GRAM) INHALATION
Qty: 240 | Refills: 0

## 2022-03-18 RX ORDER — ESCITALOPRAM OXALATE 10 MG/1
1 TABLET, FILM COATED ORAL
Qty: 0 | Refills: 0 | DISCHARGE

## 2022-03-18 RX ORDER — HYDROXYZINE HCL 10 MG
1 TABLET ORAL
Qty: 0 | Refills: 0 | DISCHARGE

## 2022-03-18 RX ORDER — LOSARTAN POTASSIUM 100 MG/1
1 TABLET, FILM COATED ORAL
Qty: 30 | Refills: 0

## 2022-03-18 RX ORDER — SITAGLIPTIN 50 MG/1
100 TABLET, FILM COATED ORAL
Qty: 0 | Refills: 0 | DISCHARGE

## 2022-03-18 RX ORDER — MELOXICAM 15 MG/1
1 TABLET ORAL
Qty: 0 | Refills: 0 | DISCHARGE

## 2022-03-18 RX ADMIN — OXYCODONE AND ACETAMINOPHEN 1 TABLET(S): 5; 325 TABLET ORAL at 15:46

## 2022-03-18 RX ADMIN — OXYCODONE AND ACETAMINOPHEN 1 TABLET(S): 5; 325 TABLET ORAL at 14:46

## 2022-03-18 NOTE — ED ADULT TRIAGE NOTE - IDEAL BODY WEIGHT(KG)
----- Message from Mel johnjesús sent at 11/27/2019 10:03 AM CST -----  Type:  Patient Returning Call    Who Called: pt    Who Left Message for Patient: Myrna    Does the patient know what this is regarding?:     Would the patient rather a call back or a response via [facility]? Call back     Best Call Back Number: [phone]    Additional Information:      48

## 2022-03-18 NOTE — ED PROVIDER NOTE - CLINICAL SUMMARY MEDICAL DECISION MAKING FREE TEXT BOX
hx, exam labs, xray of bilateral feet pt's wbc is elevated most likely due to chronic steroid taking about same from last visit. Pt's breath sounds are again equal clear and good bilaterally

## 2022-03-18 NOTE — ED ADULT TRIAGE NOTE - CHIEF COMPLAINT QUOTE
pt biba from home c/o rt foot pain x 2 weeks, sent by tele med doctor to r/o PE pt denies any sob, COPD on home 02 at 3l

## 2022-03-18 NOTE — ED PROVIDER NOTE - PATIENT PORTAL LINK FT
You can access the FollowMyHealth Patient Portal offered by [facility] by registering at the following website: [email or web address]. By joining [employer]’s FollowMyHealth portal, you will also be able to view your health information using other applications (apps) compatible with our system.

## 2022-03-18 NOTE — ED PROVIDER NOTE - CONSTITUTIONAL, MLM
normal... Well appearing, awake, alert, oriented to person, place, time/situation and in no apparent distress. Speaking in clear full sentences no nasal flaring no shoulders retractions no diaphoresis lying on her left side eating lolipop candy. appears very comfortable

## 2022-03-18 NOTE — ED PROVIDER NOTE - EXTREMITY EXAM
no deformity, pain or tenderness, no restriction of movement/left lower extremity findings/right lower extremity findings

## 2022-03-18 NOTE — ED ADULT NURSE NOTE - OBJECTIVE STATEMENT
pt 61 y/o female c/c of pain to R foot, onset 2 weeks ago. pt states "R foot pain is shooting to top of leg". BIBEMS. pt sent by [facility] Dr. Paredes to r/o DVT.  +2 bilateral pedal pulse palpated. PMH of HTN, diabetes II, obesity, HDL, COPD. 2-3L O2 at home. pt placed on 2L NC O2. AAOX4, speaking in full sentences. pt denies chest pain, SOB. Pt states smokes 1/2 pack of cigarettes daily.

## 2022-03-18 NOTE — ED PROVIDER NOTE - OBJECTIVE STATEMENT
62 years old female by ems here sent by tele med doctor for bilateral distal feet and ankle pain for two weeks and to rule out pulmonary embolism. Pt sts she has a hx of copd uses home oxygen 3 liters pt is currently smoking cigarettes daily. Pt had J & J vaccine in Dec of last year. Pt also c/o coughs, intermittent sob on exertion. Pt denies headache, dizziness, blurred visions, trauma to bilateral extremities, light sensitivities, focal/distal weakness or numbness, neck/back pain, chest pain, nausea, vomiting, fever, chills, abd pain, dysuria, vaginal spotting or discharge or irregular bowel movements.

## 2022-03-28 ENCOUNTER — EMERGENCY (EMERGENCY)
Facility: HOSPITAL | Age: 62
LOS: 0 days | Discharge: HOME | End: 2022-03-28
Attending: EMERGENCY MEDICINE
Payer: MEDICAID

## 2022-03-28 VITALS
SYSTOLIC BLOOD PRESSURE: 130 MMHG | TEMPERATURE: 98 F | HEIGHT: 61 IN | DIASTOLIC BLOOD PRESSURE: 65 MMHG | RESPIRATION RATE: 16 BRPM | HEART RATE: 103 BPM | OXYGEN SATURATION: 91 % | WEIGHT: 190.04 LBS

## 2022-03-28 VITALS
SYSTOLIC BLOOD PRESSURE: 113 MMHG | DIASTOLIC BLOOD PRESSURE: 75 MMHG | OXYGEN SATURATION: 96 % | RESPIRATION RATE: 16 BRPM | HEART RATE: 94 BPM

## 2022-03-28 DIAGNOSIS — Z90.49 ACQUIRED ABSENCE OF OTHER SPECIFIED PARTS OF DIGESTIVE TRACT: Chronic | ICD-10-CM

## 2022-03-28 DIAGNOSIS — E66.9 OBESITY, UNSPECIFIED: ICD-10-CM

## 2022-03-28 DIAGNOSIS — Z90.49 ACQUIRED ABSENCE OF OTHER SPECIFIED PARTS OF DIGESTIVE TRACT: ICD-10-CM

## 2022-03-28 DIAGNOSIS — Z79.4 LONG TERM (CURRENT) USE OF INSULIN: ICD-10-CM

## 2022-03-28 DIAGNOSIS — I10 ESSENTIAL (PRIMARY) HYPERTENSION: ICD-10-CM

## 2022-03-28 DIAGNOSIS — Z79.84 LONG TERM (CURRENT) USE OF ORAL HYPOGLYCEMIC DRUGS: ICD-10-CM

## 2022-03-28 DIAGNOSIS — Z87.891 PERSONAL HISTORY OF NICOTINE DEPENDENCE: ICD-10-CM

## 2022-03-28 DIAGNOSIS — K59.00 CONSTIPATION, UNSPECIFIED: ICD-10-CM

## 2022-03-28 DIAGNOSIS — F41.9 ANXIETY DISORDER, UNSPECIFIED: ICD-10-CM

## 2022-03-28 DIAGNOSIS — J44.9 CHRONIC OBSTRUCTIVE PULMONARY DISEASE, UNSPECIFIED: ICD-10-CM

## 2022-03-28 DIAGNOSIS — R10.31 RIGHT LOWER QUADRANT PAIN: ICD-10-CM

## 2022-03-28 DIAGNOSIS — E03.9 HYPOTHYROIDISM, UNSPECIFIED: ICD-10-CM

## 2022-03-28 DIAGNOSIS — Z86.51 PERSONAL HISTORY OF COMBAT AND OPERATIONAL STRESS REACTION: ICD-10-CM

## 2022-03-28 DIAGNOSIS — F40.00 AGORAPHOBIA, UNSPECIFIED: ICD-10-CM

## 2022-03-28 DIAGNOSIS — E11.9 TYPE 2 DIABETES MELLITUS WITHOUT COMPLICATIONS: ICD-10-CM

## 2022-03-28 LAB
ALBUMIN SERPL ELPH-MCNC: 3.4 G/DL — SIGNIFICANT CHANGE UP (ref 3.3–5)
ALP SERPL-CCNC: 101 U/L — SIGNIFICANT CHANGE UP (ref 40–120)
ALT FLD-CCNC: 15 U/L — SIGNIFICANT CHANGE UP (ref 12–78)
ANION GAP SERPL CALC-SCNC: 4 MMOL/L — LOW (ref 5–17)
APPEARANCE UR: CLEAR — SIGNIFICANT CHANGE UP
AST SERPL-CCNC: 18 U/L — SIGNIFICANT CHANGE UP (ref 15–37)
BACTERIA # UR AUTO: NEGATIVE — SIGNIFICANT CHANGE UP
BASOPHILS # BLD AUTO: 0.07 K/UL — SIGNIFICANT CHANGE UP (ref 0–0.2)
BASOPHILS NFR BLD AUTO: 0.4 % — SIGNIFICANT CHANGE UP (ref 0–2)
BILIRUB SERPL-MCNC: 0.4 MG/DL — SIGNIFICANT CHANGE UP (ref 0.2–1.2)
BILIRUB UR-MCNC: NEGATIVE — SIGNIFICANT CHANGE UP
BUN SERPL-MCNC: 18 MG/DL — SIGNIFICANT CHANGE UP (ref 7–23)
CALCIUM SERPL-MCNC: 9.2 MG/DL — SIGNIFICANT CHANGE UP (ref 8.5–10.1)
CHLORIDE SERPL-SCNC: 103 MMOL/L — SIGNIFICANT CHANGE UP (ref 96–108)
CO2 SERPL-SCNC: 29 MMOL/L — SIGNIFICANT CHANGE UP (ref 22–31)
COLOR SPEC: YELLOW — SIGNIFICANT CHANGE UP
CREAT SERPL-MCNC: 0.94 MG/DL — SIGNIFICANT CHANGE UP (ref 0.5–1.3)
DIFF PNL FLD: ABNORMAL
EGFR: 69 ML/MIN/1.73M2 — SIGNIFICANT CHANGE UP
EOSINOPHIL # BLD AUTO: 0.44 K/UL — SIGNIFICANT CHANGE UP (ref 0–0.5)
EOSINOPHIL NFR BLD AUTO: 2.2 % — SIGNIFICANT CHANGE UP (ref 0–6)
EPI CELLS # UR: SIGNIFICANT CHANGE UP
GLUCOSE SERPL-MCNC: 145 MG/DL — HIGH (ref 70–99)
GLUCOSE UR QL: NEGATIVE MG/DL — SIGNIFICANT CHANGE UP
HCT VFR BLD CALC: 37.2 % — SIGNIFICANT CHANGE UP (ref 34.5–45)
HGB BLD-MCNC: 12.1 G/DL — SIGNIFICANT CHANGE UP (ref 11.5–15.5)
IMM GRANULOCYTES NFR BLD AUTO: 0.9 % — SIGNIFICANT CHANGE UP (ref 0–1.5)
KETONES UR-MCNC: NEGATIVE — SIGNIFICANT CHANGE UP
LEUKOCYTE ESTERASE UR-ACNC: NEGATIVE — SIGNIFICANT CHANGE UP
LIDOCAIN IGE QN: 98 U/L — SIGNIFICANT CHANGE UP (ref 73–393)
LYMPHOCYTES # BLD AUTO: 15.6 % — SIGNIFICANT CHANGE UP (ref 13–44)
LYMPHOCYTES # BLD AUTO: 3.07 K/UL — SIGNIFICANT CHANGE UP (ref 1–3.3)
MAGNESIUM SERPL-MCNC: 2.4 MG/DL — SIGNIFICANT CHANGE UP (ref 1.6–2.6)
MCHC RBC-ENTMCNC: 28.7 PG — SIGNIFICANT CHANGE UP (ref 27–34)
MCHC RBC-ENTMCNC: 32.5 G/DL — SIGNIFICANT CHANGE UP (ref 32–36)
MCV RBC AUTO: 88.2 FL — SIGNIFICANT CHANGE UP (ref 80–100)
MONOCYTES # BLD AUTO: 0.85 K/UL — SIGNIFICANT CHANGE UP (ref 0–0.9)
MONOCYTES NFR BLD AUTO: 4.3 % — SIGNIFICANT CHANGE UP (ref 2–14)
NEUTROPHILS # BLD AUTO: 15.07 K/UL — HIGH (ref 1.8–7.4)
NEUTROPHILS NFR BLD AUTO: 76.6 % — SIGNIFICANT CHANGE UP (ref 43–77)
NITRITE UR-MCNC: NEGATIVE — SIGNIFICANT CHANGE UP
NRBC # BLD: 0 /100 WBCS — SIGNIFICANT CHANGE UP (ref 0–0)
PH UR: 5 — SIGNIFICANT CHANGE UP (ref 5–8)
PLATELET # BLD AUTO: 286 K/UL — SIGNIFICANT CHANGE UP (ref 150–400)
POTASSIUM SERPL-MCNC: 4.8 MMOL/L — SIGNIFICANT CHANGE UP (ref 3.5–5.3)
POTASSIUM SERPL-SCNC: 4.8 MMOL/L — SIGNIFICANT CHANGE UP (ref 3.5–5.3)
PROT SERPL-MCNC: 8 GM/DL — SIGNIFICANT CHANGE UP (ref 6–8.3)
PROT UR-MCNC: 15 MG/DL
RBC # BLD: 4.22 M/UL — SIGNIFICANT CHANGE UP (ref 3.8–5.2)
RBC # FLD: 16.4 % — HIGH (ref 10.3–14.5)
RBC CASTS # UR COMP ASSIST: SIGNIFICANT CHANGE UP /HPF (ref 0–4)
SODIUM SERPL-SCNC: 136 MMOL/L — SIGNIFICANT CHANGE UP (ref 135–145)
SP GR SPEC: 1.01 — SIGNIFICANT CHANGE UP (ref 1.01–1.02)
UROBILINOGEN FLD QL: NEGATIVE MG/DL — SIGNIFICANT CHANGE UP
WBC # BLD: 19.68 K/UL — HIGH (ref 3.8–10.5)
WBC # FLD AUTO: 19.68 K/UL — HIGH (ref 3.8–10.5)
WBC UR QL: SIGNIFICANT CHANGE UP

## 2022-03-28 PROCEDURE — 99285 EMERGENCY DEPT VISIT HI MDM: CPT

## 2022-03-28 PROCEDURE — 74177 CT ABD & PELVIS W/CONTRAST: CPT | Mod: 26,MA

## 2022-03-28 RX ORDER — SODIUM CHLORIDE 9 MG/ML
1000 INJECTION INTRAMUSCULAR; INTRAVENOUS; SUBCUTANEOUS ONCE
Refills: 0 | Status: COMPLETED | OUTPATIENT
Start: 2022-03-28 | End: 2022-03-28

## 2022-03-28 RX ORDER — MORPHINE SULFATE 50 MG/1
4 CAPSULE, EXTENDED RELEASE ORAL ONCE
Refills: 0 | Status: DISCONTINUED | OUTPATIENT
Start: 2022-03-28 | End: 2022-03-28

## 2022-03-28 RX ORDER — CYCLOBENZAPRINE HYDROCHLORIDE 10 MG/1
1 TABLET, FILM COATED ORAL
Qty: 6 | Refills: 0
Start: 2022-03-28 | End: 2022-03-30

## 2022-03-28 RX ADMIN — MORPHINE SULFATE 4 MILLIGRAM(S): 50 CAPSULE, EXTENDED RELEASE ORAL at 17:14

## 2022-03-28 RX ADMIN — SODIUM CHLORIDE 1000 MILLILITER(S): 9 INJECTION INTRAMUSCULAR; INTRAVENOUS; SUBCUTANEOUS at 16:53

## 2022-03-28 RX ADMIN — MORPHINE SULFATE 4 MILLIGRAM(S): 50 CAPSULE, EXTENDED RELEASE ORAL at 18:31

## 2022-03-28 RX ADMIN — MORPHINE SULFATE 4 MILLIGRAM(S): 50 CAPSULE, EXTENDED RELEASE ORAL at 18:24

## 2022-03-28 RX ADMIN — MORPHINE SULFATE 4 MILLIGRAM(S): 50 CAPSULE, EXTENDED RELEASE ORAL at 21:17

## 2022-03-28 NOTE — ED ADULT NURSE NOTE - BREATH SOUNDS, MLM
DISCHARGE SUMMARY   Patient: Ramirez Pitts  Medical Record Number: [id number]  YOB: 2022  Admit Date: 2022  Gestational Age: 37w1d  Discharge Date: 2022    Outpatient Pediatrician: Hugo Lyle MD    Mothers History:   Age: 30 year old   /Para:        LISA: 2022, by Last Menstrual Period         Past Maternal History:   Past Medical History:   Diagnosis Date   • History of abnormal cervical Pap smear 09/10/2020   • No known problems         Family History:   family history includes Diabetes in her paternal grandmother; Patient is unaware of any medical problems in her father and mother.     Social History     Tobacco Use   • Smoking status: Never Smoker   • Smokeless tobacco: Never Used   Substance Use Topics   • Alcohol use: Not Currently     Comment: occasionally        Prenatal Labs:  Information for the patient's mother:  Deb Pitts [[id number]]     Recent Labs   Lab 22  1627 22  0000 03/10/22  0000   GBS Negative for  Streptococcus agalactiae (Strep group B)  --   --    HIV Antigen/Antibody Screen  --  nonreactive nonreactive   HEP B Surface AG  --   --  negative   RPR Screen  --  nonreactive  nonreactive   Neisseria gonorrhoeae by Nucleic Acid Amplification  --   --  Negative   Chlamydia trachomatis by Nucleic Acid Amplification  --   --  Negative   Rubella Antibody IgG  --   --  immune      Information for the patient's mother:  Deb Pitts [[id number]]   No results for input(s): CULT, SDES in the last 8765 hours.       GBS: Negative No antibiotics needed.    Delivery Vaginal, Spontaneous at 9:03 AM on 2022          Events of Labor:  Rupture date & time: 2022 4:07 AM   Rupture type: Artificial   Fluid color: Clear;Bloody   Antibiotics given in labor? No   Induction: Oxytocin Hypertension   Labor complications: None     Placenta appearance: Intact   Cord vessels: 3 Vessels   Cord complications None   Indications for :      Presentation/position: Vertex Left Occiput Anterior   Forceps attempted? No     Vacuum attempted? No     Shoulder dystocia? No                     Blood gases sent? Yes     Delivery Clinician:  TRANG SCHNEIDER [[id number]]     INFORMATION   Resuscitation:  None     Apgars 1 and 5 minutes:      Erythromycin: Yes  Vitamin K: Yes    Heme:  Mom blood type: B- Baby blood type AB- Jay negative. Pt monitored for hyperbilirubinemia and did not require phototherapy.    HOL 25, 5.7 mg/dL, LIR, Phototherapy Threshold: 11.7  Recommendations per  AAP Hyperbilirubinemia Guidelines: Follow-up within 2 days; TcB or TSB according to clinical judgment    ADMISSION DISCHARGE   Admit: 2022 Discharge: 2022      Admit Age: 0 hours Discharge Age: 25-hour old    Birthweight: 6 lb 0.7 oz (2740 g)  Discharge Weight: 2695 g (22 021)       Length: 48.3 cm  Length: 19\" (48.3 cm) (22 0933)     Head Circumference: 34 cm  Head Circumference: 34 cm (13.39\") (Filed from Delivery Summary) (22)      DISCHARGE PHYSICAL EXAM     Vitals 24 Hour Range   Temperature   Temp  Min: 97.7 °F (36.5 °C)  Max: 98.5 °F (36.9 °C)   Pulse   Pulse  Min: 116  Max: 128   Respiratory   Resp  Min: 40  Max: 48   Blood Pressure   No data recorded   Pulse Oximetry    No data recorded     Vitals signs Flow sheet reviewed    GENERAL: Baby Boy is an alert, vigorous male with appropriate behavior. He is in no acute distress.  SKIN: His skin is warm with normal turgor. The color of the skin is pink. There is no rash. There are no bruises or other signs of injury. Significant jaundice is not present.  HEAD: The head is atraumatic and normocephalic. The anterior fontanel is open and flat.  EYES: The conjunctivae appear normal with neither icterus nor subconjunctival hemorrhage. Red reflexes are seen bilaterally.  EARS: Pinnae normal. No ear pits or tags.   NOSE: There is no nasal flaring, nares patent bilaterally.  THROAT: The  oropharynx is normal. No cleft lip or palate.   NECK: Clavicles without crepitus.  TRUNK AND THORAX: There are no lesions on the trunk; there is no dimple over the presacral area. LUNGS: The lung fields are clear to auscultation. There are no retractions.  HEART: The precordium is quiet. The heart rhythm is grossly regular. S1 and S2 are normal. There are no murmurs. Normal femoral pulses.  ABDOMEN: The umbilical cord stump is normal. There is not an umbilical hernia. The abdomen is flat and soft.   GENITALIA: normal male genitalia with bilateral descended testes  RECTAL: Anus patent  MSK: Moving all 4 extremities. The hip exam is normal. There are no hip clicks or clunks.  10 fingers, 10 toes.   NEUROLOGIC: He displays normal tone throughout. He is not jittery. Normal  reflexes (suck, adriana, grasp, palmar, plantar)    SCREENINGS and PROCEDURES   ment and Plan   Immunizations:   Most Recent Immunizations   Administered Date(s) Administered   • None   Deferred Date(s) Deferred   • Hep B, adolescent or pediatric 2022      Hearing Test: Pass R, Pass L (22 1000)  CCHD Screening:   Screening complete: Done (22 0950)  Right hand reading %: 98 %  Foot reading %: 100 %  CHD: Normal  Circumcision: Done (22 0850)   screen: Sent, results pending    ASSESSMENT and PLAN     Ramirez Pitts is a 25-hour old old former Gestational Age: 37w1d, date of birth 2022, birthweight 2740 g male infant admitted 2022  9:03 AM. Baby SGA and placed on hypoglycemic protocol initially requiring OGG x3 however subsequently had 4 glucose readings wnl and passed. Weight down -2% at time of discharge. Baby tolerated feedings well with appropriate stools and voids. Anticipatory guidance given and answered all parent questions.     Patient Active Problem List   Diagnosis   • Normal  (single liveborn)   • SGA (small for gestational age)   •  hypoglycemia       Outpatient  Management:  1) Feeds: 2-3 oz every 2-3 hours   2) Discussed normal  anticipatory guidance including feeding, safe sleep, car seat, fevers, umbilical cord management and vitamin D  3) Follow up on administration of Hep B vaccine     Outpatient Follow up:  [place]o [place]  [address] [address] [place] [phone]    Go on 2022   appt scheduled at 11 AM. Please bring your ID, insurance card, and discharge papers. If unable to attend, please contact office for rescheduling.       Clear

## 2022-03-28 NOTE — ED PROVIDER NOTE - OBJECTIVE STATEMENT
62F hx copd on home 3l, obesity, dm pw abd pain. notes 2 days rlq abd pain assd with constipation. took laxative today and now running stools. no fever, vomiting. didn't take anything for pain. ros neg for ha, vision loss, rhinorrhea, cp, sob, numbness, rash, bleeding, dysuria, back pain, anxiety

## 2022-03-28 NOTE — ED ADULT NURSE NOTE - OBJECTIVE STATEMENT
pt came in complaining of RLQ pain. pt states that the pain started 5 days ago and has progressively gotten worse. pt rates the pain a 9/10. pt describes the pain as "someone stabbing me with a knife, sharp sharp gas pain". pt states it radiates to her back and sometimes to her lower left abdomen. pt states she took 2 tylenol this morning for the pain but had no relief. pt hasn't been eating/drinking normally due to the pain. pt denies any N/V, fever, CP, SOB.  pt states she had a couple episodes of diarrhea recently.   AOx3, ambulatory

## 2022-03-28 NOTE — ED PROVIDER NOTE - PATIENT PORTAL LINK FT
You can access the FollowMyHealth Patient Portal offered by [facility] by registering at the following website: [email or web address]. By joining [employer]’s FollowMyHealth portal, you will also be able to view your health information using other applications (apps) compatible with our system.

## 2022-03-28 NOTE — ED ADULT TRIAGE NOTE - CHIEF COMPLAINT QUOTE
RLQ abdominal pains shooting into her navel and back since Thursday. advised to go to hospital as it could be her appendix .uses 3 liters oxygen at home for copd, no sob now.

## 2022-03-28 NOTE — ED PROVIDER NOTE - NSFOLLOWUPINSTRUCTIONS_ED_ALL_ED_FT
Rest, drink plenty of fluids.  Advance activity as tolerated.  Continue all previously prescribed medications as directed.  Follow up with your PMD 2-3 days and bring copies of your results.  Return to the ER for worsening symptoms, fevers, vomiting, chest pain, shortness of breath, or new concerning symptoms.    Take acetaminophen 650 mg orally every 6-8 hours for pain control as needed. Please do not exceed 4,000 mg of acetaminophen during a 24 hours period. Acetaminophen can be found in many over-the-counter cold medications as well as opioid medications that may be given for pain.    Take ibuprofen (also known as MOTRIN or ADVIL) 400 mg orally every 6-8 hours for pain control as needed with food to avoid an upset stomach. Ibuprofen can be found in many over-the-counter medications. Please do not take ibuprofen if you have a bleeding disorder, stomach or gastrointestinal ulcer, or liver disease.    If needed, you can alternate these medications so that you can take one medication every 3 hours. For example, at noon take ibuprofen, then at 3PM take acetaminophen, then at 6PM take ibuprofen.

## 2022-03-28 NOTE — ED PROVIDER NOTE - PROGRESS NOTE DETAILS
josé bailey: CT AP negative, labs unremarkable except leukocytosis of 19k. However, afebrile, normal vitals, well appearing. I have discussed with the patient about the ED workup, lab results, diagnostics results, plan for discharge home, need for follow-up with primary care physician/specialists, and return precautions. At this time, the patient does not require further workup in the ED. The patient is subjectively feeling better and would like to be discharged home. The patient had the opportunity to ask questions and I have answered all inquiries. The patient verbalizes understanding and agreement with the plan. The patient is hemodynamically stable, clinically well-appearing, ambulatory, mentating well and ready for discharge home.

## 2022-03-28 NOTE — ED PROVIDER NOTE - NSFOLLOWUPCLINICS_GEN_ALL_ED_FT
Urine culture results not finalized at this time  Gastroenterology at [facility]  Gastroenterology  [address] [place] [address]  Phone: [phone]  Fax:     [facility]  Gastroenterology  [address] [address]  Phone: [phone]  Fax:     [facility]  Gastroenterology  [address] [place] [address]  Phone: [phone]  Fax: [phone]

## 2022-03-28 NOTE — ED ADULT NURSE NOTE - ED STAT RN HANDOFF DETAILS
Report received from SERGIO Frazier. Safety checks completed this shift. Safety rounds completed hourly.  IV sites checked Q2+remains WDL.  Fall & skin precautions in place. Will continue to monitor. Report received from SERGIO Frazier. Safety checks completed this shift. Safety rounds completed hourly.  IV sites checked Q2+remains WDL.   Fall & skin precautions in place. Will continue to monitor.

## 2022-03-28 NOTE — ED ADULT NURSE NOTE - NSFALLRSKASSESSDT_ED_ALL_ED
HISTORY OF PRESENT ILLNESS     HPI Comments: 58-year-old female who presents to the walk-in clinic today with a complaint of cough, chest congestion, shortness of breath, and chest tightness.  Patient does have a history of COPD and asthma.  Patient is also complaining of some sinus pressure as well.  Symptoms have been present for last 2 days.  She is been using her Symbicort inhaler daily.  No nausea or vomiting.  No fever or chills.  Patient is out of her albuterol.      PAST MEDICAL, FAMILY AND SOCIAL HISTORY     The following histories were personally reviewed and updated.  Current medications, Allergies, Problem list, Past Medical History and Social History    REVIEW OF SYSTEMS     Review of Systems   HENT: Positive for congestion, postnasal drip, rhinorrhea and sinus pressure.    Respiratory: Positive for cough, chest tightness and shortness of breath.    Cardiovascular: Negative.    Neurological: Positive for headaches.   All other systems reviewed and are negative.      PHYSICAL EXAM     Physical Exam   Constitutional: She is oriented to person, place, and time. She appears well-developed and well-nourished. No distress.   HENT:   Head: Normocephalic and atraumatic.   Thick mucus secretions seen behind the tympanic membranes bilaterally with slight bulging.  Palpation of the frontal and maxillary sinuses did not elicit tenderness.  Oropharynx appears clear.   Eyes: Conjunctivae and EOM are normal. Pupils are equal, round, and reactive to light.   Neck: Normal range of motion. Neck supple.   Cardiovascular: Normal rate, regular rhythm and normal heart sounds.  Exam reveals no gallop and no friction rub.    No murmur heard.  Pulmonary/Chest: Effort normal and breath sounds normal. No respiratory distress. She has no wheezes. She has no rales.   Patient states that breathing feels restricted.   Neurological: She is alert and oriented to person, place, and time.   Skin: Skin is warm and dry. She is not  diaphoretic.   Psychiatric: She has a normal mood and affect. Her behavior is normal.   Nursing note and vitals reviewed.      ASSESSMENT/PLAN     58-year-old female with URI and acute mild exacerbation of her COPD.    1.  I will refill the patient's albuterol and treat her with a Medrol Dosepak.  Patient was also given a Z-Martin.  She may take Tylenol every 4-6 hours when necessary for any discomfort.  Home care instructions provided for review.    If symptoms worsen or fail to improve, patient should follow-up with her PCP.   28-Mar-2022 16:11

## 2022-04-27 NOTE — ED PROVIDER NOTE - NS_EDPROVIDERDISPOUSERTYPE_ED_A_ED
Discussed condition and exacerbating conditions/situations (e.g., dry/arid environments, overhead fans, air conditioners, side effect of medications). Attending Attestation (For Attendings USE Only)...

## 2022-07-07 NOTE — ED ADULT NURSE NOTE - PAIN: PRESENCE, MLM
Ria Saxena is a 12 year old female who is being evaluated via a billable video visit.        How would you like to obtain your AVS? by Mail  Primary method for receiving video invitation: Send to e-mail at: [email or web address]  If the video visit is dropped, the invitation should be resent by: Call Patient at [phone]  Will anyone else be joining your video visit? No    Adrienne Horton CMA    Type of service:  Video Visit    Video-Visit Details    Video Start Time: 3:45 PM    Video End Time:4:12 PM  Originating Location (pt. Location): Home    Distant Location (provider location):  [facility]    Platform used for Video Visit: Derrell         back/complains of pain/discomfort

## 2022-07-08 NOTE — ED ADULT NURSE NOTE - CHIEF COMPLAINT
GENERAL PRE-PROCEDURE:   Procedure:  Heart catheterization possible intervention  Date/Time:  7/8/2022 12:29 PM    Written consent obtained?: Yes    Risks and benefits: Risks, benefits and alternatives were discussed    Consent given by:  Patient  Patient states understanding of procedure being performed: Yes    Patient's understanding of procedure matches consent: Yes    Procedure consent matches procedure scheduled: Yes    Expected level of sedation:  Moderate  Appropriately NPO:  Yes  Lungs:  Lungs clear with good breath sounds bilaterally  Heart:  Normal heart sounds and rate  History & Physical reviewed:  History and physical reviewed and no updates needed  Statement of review:  I have reviewed the lab findings, diagnostic data, medications, and the plan for sedation  risk benefit indication for cath poss  Intervention discussed  With pt  Discussed  Poss dapt  All questions answered and she wishes to proceed  
The patient is a 62y Female complaining of abdominal pain.

## 2022-07-09 ENCOUNTER — EMERGENCY (EMERGENCY)
Facility: HOSPITAL | Age: 62
LOS: 0 days | Discharge: HOME | End: 2022-07-09
Attending: STUDENT IN AN ORGANIZED HEALTH CARE EDUCATION/TRAINING PROGRAM

## 2022-07-09 VITALS
HEART RATE: 100 BPM | DIASTOLIC BLOOD PRESSURE: 84 MMHG | RESPIRATION RATE: 16 BRPM | TEMPERATURE: 99 F | SYSTOLIC BLOOD PRESSURE: 135 MMHG | OXYGEN SATURATION: 92 %

## 2022-07-09 VITALS
TEMPERATURE: 99 F | RESPIRATION RATE: 18 BRPM | HEIGHT: 61 IN | DIASTOLIC BLOOD PRESSURE: 85 MMHG | OXYGEN SATURATION: 88 % | HEART RATE: 101 BPM | SYSTOLIC BLOOD PRESSURE: 127 MMHG | WEIGHT: 190.04 LBS

## 2022-07-09 DIAGNOSIS — J44.1 CHRONIC OBSTRUCTIVE PULMONARY DISEASE WITH (ACUTE) EXACERBATION: ICD-10-CM

## 2022-07-09 DIAGNOSIS — Z79.84 LONG TERM (CURRENT) USE OF ORAL HYPOGLYCEMIC DRUGS: ICD-10-CM

## 2022-07-09 DIAGNOSIS — E11.9 TYPE 2 DIABETES MELLITUS WITHOUT COMPLICATIONS: ICD-10-CM

## 2022-07-09 DIAGNOSIS — R06.2 WHEEZING: ICD-10-CM

## 2022-07-09 DIAGNOSIS — E03.9 HYPOTHYROIDISM, UNSPECIFIED: ICD-10-CM

## 2022-07-09 DIAGNOSIS — Z20.822 CONTACT WITH AND (SUSPECTED) EXPOSURE TO COVID-19: ICD-10-CM

## 2022-07-09 DIAGNOSIS — I10 ESSENTIAL (PRIMARY) HYPERTENSION: ICD-10-CM

## 2022-07-09 DIAGNOSIS — F17.200 NICOTINE DEPENDENCE, UNSPECIFIED, UNCOMPLICATED: ICD-10-CM

## 2022-07-09 DIAGNOSIS — R05.9 COUGH, UNSPECIFIED: ICD-10-CM

## 2022-07-09 DIAGNOSIS — R00.0 TACHYCARDIA, UNSPECIFIED: ICD-10-CM

## 2022-07-09 DIAGNOSIS — Z99.81 DEPENDENCE ON SUPPLEMENTAL OXYGEN: ICD-10-CM

## 2022-07-09 DIAGNOSIS — Z90.49 ACQUIRED ABSENCE OF OTHER SPECIFIED PARTS OF DIGESTIVE TRACT: Chronic | ICD-10-CM

## 2022-07-09 LAB
ALBUMIN SERPL ELPH-MCNC: 3.5 G/DL — SIGNIFICANT CHANGE UP (ref 3.3–5)
ALP SERPL-CCNC: 117 U/L — SIGNIFICANT CHANGE UP (ref 40–120)
ALT FLD-CCNC: 15 U/L — SIGNIFICANT CHANGE UP (ref 12–78)
ANION GAP SERPL CALC-SCNC: 7 MMOL/L — SIGNIFICANT CHANGE UP (ref 5–17)
AST SERPL-CCNC: 10 U/L — LOW (ref 15–37)
BASOPHILS # BLD AUTO: 0.12 K/UL — SIGNIFICANT CHANGE UP (ref 0–0.2)
BASOPHILS NFR BLD AUTO: 0.7 % — SIGNIFICANT CHANGE UP (ref 0–2)
BILIRUB SERPL-MCNC: 0.3 MG/DL — SIGNIFICANT CHANGE UP (ref 0.2–1.2)
BUN SERPL-MCNC: 15 MG/DL — SIGNIFICANT CHANGE UP (ref 7–23)
CALCIUM SERPL-MCNC: 9.2 MG/DL — SIGNIFICANT CHANGE UP (ref 8.5–10.1)
CHLORIDE SERPL-SCNC: 96 MMOL/L — SIGNIFICANT CHANGE UP (ref 96–108)
CO2 SERPL-SCNC: 30 MMOL/L — SIGNIFICANT CHANGE UP (ref 22–31)
CREAT SERPL-MCNC: 1.06 MG/DL — SIGNIFICANT CHANGE UP (ref 0.5–1.3)
EGFR: 59 ML/MIN/1.73M2 — LOW
EOSINOPHIL # BLD AUTO: 0.34 K/UL — SIGNIFICANT CHANGE UP (ref 0–0.5)
EOSINOPHIL NFR BLD AUTO: 2 % — SIGNIFICANT CHANGE UP (ref 0–6)
GLUCOSE SERPL-MCNC: 320 MG/DL — HIGH (ref 70–99)
HCT VFR BLD CALC: 41.7 % — SIGNIFICANT CHANGE UP (ref 34.5–45)
HGB BLD-MCNC: 13.8 G/DL — SIGNIFICANT CHANGE UP (ref 11.5–15.5)
IMM GRANULOCYTES NFR BLD AUTO: 0.8 % — SIGNIFICANT CHANGE UP (ref 0–1.5)
LYMPHOCYTES # BLD AUTO: 19.2 % — SIGNIFICANT CHANGE UP (ref 13–44)
LYMPHOCYTES # BLD AUTO: 3.27 K/UL — SIGNIFICANT CHANGE UP (ref 1–3.3)
MCHC RBC-ENTMCNC: 28.8 PG — SIGNIFICANT CHANGE UP (ref 27–34)
MCHC RBC-ENTMCNC: 33.1 G/DL — SIGNIFICANT CHANGE UP (ref 32–36)
MCV RBC AUTO: 87.1 FL — SIGNIFICANT CHANGE UP (ref 80–100)
MONOCYTES # BLD AUTO: 0.79 K/UL — SIGNIFICANT CHANGE UP (ref 0–0.9)
MONOCYTES NFR BLD AUTO: 4.6 % — SIGNIFICANT CHANGE UP (ref 2–14)
NEUTROPHILS # BLD AUTO: 12.33 K/UL — HIGH (ref 1.8–7.4)
NEUTROPHILS NFR BLD AUTO: 72.7 % — SIGNIFICANT CHANGE UP (ref 43–77)
NRBC # BLD: 0 /100 WBCS — SIGNIFICANT CHANGE UP (ref 0–0)
NT-PROBNP SERPL-SCNC: 60 PG/ML — SIGNIFICANT CHANGE UP (ref 0–125)
PLATELET # BLD AUTO: 282 K/UL — SIGNIFICANT CHANGE UP (ref 150–400)
POTASSIUM SERPL-MCNC: 4.3 MMOL/L — SIGNIFICANT CHANGE UP (ref 3.5–5.3)
POTASSIUM SERPL-SCNC: 4.3 MMOL/L — SIGNIFICANT CHANGE UP (ref 3.5–5.3)
PROT SERPL-MCNC: 8.3 GM/DL — SIGNIFICANT CHANGE UP (ref 6–8.3)
RAPID RVP RESULT: SIGNIFICANT CHANGE UP
RBC # BLD: 4.79 M/UL — SIGNIFICANT CHANGE UP (ref 3.8–5.2)
RBC # FLD: 15.7 % — HIGH (ref 10.3–14.5)
SARS-COV-2 RNA SPEC QL NAA+PROBE: SIGNIFICANT CHANGE UP
SODIUM SERPL-SCNC: 133 MMOL/L — LOW (ref 135–145)
TROPONIN I, HIGH SENSITIVITY RESULT: 6.4 NG/L — SIGNIFICANT CHANGE UP
WBC # BLD: 16.99 K/UL — HIGH (ref 3.8–10.5)
WBC # FLD AUTO: 16.99 K/UL — HIGH (ref 3.8–10.5)

## 2022-07-09 PROCEDURE — 71045 X-RAY EXAM CHEST 1 VIEW: CPT | Mod: 26

## 2022-07-09 PROCEDURE — 93010 ELECTROCARDIOGRAM REPORT: CPT

## 2022-07-09 PROCEDURE — 99285 EMERGENCY DEPT VISIT HI MDM: CPT

## 2022-07-09 RX ORDER — IPRATROPIUM/ALBUTEROL SULFATE 18-103MCG
3 AEROSOL WITH ADAPTER (GRAM) INHALATION ONCE
Refills: 0 | Status: COMPLETED | OUTPATIENT
Start: 2022-07-09 | End: 2022-07-09

## 2022-07-09 RX ORDER — AZITHROMYCIN 500 MG/1
1 TABLET, FILM COATED ORAL
Qty: 1 | Refills: 0
Start: 2022-07-09

## 2022-07-09 RX ORDER — INSULIN GLARGINE 100 [IU]/ML
30 INJECTION, SOLUTION SUBCUTANEOUS
Qty: 1 | Refills: 0
Start: 2022-07-09

## 2022-07-09 RX ADMIN — Medication 40 MILLIGRAM(S): at 16:29

## 2022-07-09 RX ADMIN — Medication 3 MILLILITER(S): at 17:59

## 2022-07-09 RX ADMIN — Medication 3 MILLILITER(S): at 18:56

## 2022-07-09 RX ADMIN — Medication 3 MILLILITER(S): at 16:25

## 2022-07-09 NOTE — ED PROVIDER NOTE - PROGRESS NOTE DETAILS
Patient reevaluated, still with some scattered wheezes but not requiring additional supplemental O2. She feels comfortable going home with PO steroids and azithromycin. No focal pneumonia on CXR. Patient remains hemodynamically stable at this time. Patient reevaluated, still with some scattered wheezes but not requiring additional supplemental O2. She feels comfortable going home with PO steroids and azithromycin. No focal pneumonia on CXR. Patient remains hemodynamically stable at this time and is speaking in full sentences. She endorses some chronic hematuria and cramping secondary to her bladder cancer. Instructed to follow up with her oncologist, states she has appt upcoming.

## 2022-07-09 NOTE — ED PROVIDER NOTE - OBJECTIVE STATEMENT
63 y/o F with PMH COPD on 3L home O2, HTN, DM, hypothyroidism, presents to the ED with COPD exacerbation. States she has been coughing more over the past few weeks and now wheezing. She tried using her albuterol inhaler without relief of symptoms. She went to urgent care and as directed to come to the ED. Does endorse some chills at home. No documented fever. She has no chest pain.

## 2022-07-09 NOTE — ED ADULT NURSE NOTE - HAVE YOU HAD A FIRST COVID-19 BOOSTER?
No

## 2022-07-09 NOTE — ED ADULT NURSE NOTE - HOW OFTEN DO YOU HAVE A DRINK CONTAINING ALCOHOL?
Guillermina Smith is a 55 year old female 18 months status post right medial unicompartmental knee arthroplasty. The patient is here for routine clinical and radiographic reassessment. She has had a recent atypical onset of recurrent left medial knee pain over the past week. She developed a cold/upper respiratory infection last week. About 5 days ago she noted spontaneous onset, at night, of right medial knee pain. This was severely painful with weightbearing but has now nearly fully resolved. She used a cane temporarily. There was no history of injury and no focal signs or symptoms of infection. Otherwise her knee implant has been fully functional and pain-free. She has relatively asymptomatic left medial knee degenerative arthritis as well.    Pain complaints: None prior to recent onset of right medial knee symptoms.  Gait: Normal.  Medications for pain: None.  Activity Limitations: None prior to recent flareup of medial knee pain.    Musculoskeletal review of systems is negative for fever, chills, wound erythema, surgical site swelling, calf swelling, symptoms of instability, start up pain, or audible noise.    I have reviewed the patient's medications and allergies, past medical, surgical, social and family history, updating these as appropriate.  See Histories section of the EMR for a display of this information.    Current Outpatient Prescriptions   Medication Sig Dispense Refill   • fluocinolone acetonide (DERMOTIC OIL) 0.01 % Oil Instill 3-4 drops into both ears twice weekly at bedtime, weaning to once weekly. 20 mL 2   • sertraline (ZOLOFT) 100 MG tablet Take 2 tablets by mouth daily. 180 tablet 1   • atorvastatin (LIPITOR) 40 MG tablet TAKE ONE TABLET BY MOUTH DAILY 30 tablet 11   • fluticasone (FLONASE) 50 MCG/ACT nasal spray Spray 2 sprays in each nostril daily. 16 g 0   • cephALEXin (KEFLEX) 500 MG capsule 4 capsules 1 hour prior to dental appointment. May discontinue 2 years after joint replacement  surgery 24 capsule 1   • aspirin-acetaminophen-caffeine (EXCEDRIN MIGRAINE) 250-250-65 MG per tablet Take 2 tablets by mouth every 4 hours as needed for Pain.       No current facility-administered medications for this visit.        Examination:    Gait: Normal.  Station: Normal.  Rises from the chair without use of the armrests.  Leg length: Equal.  Skin incision is benign with no erythema or tenderness.  Active range of motion: 0-130° of flexion right knee.  Passive range of motion: Same.  Effusion: None.  Synovitis: None.  Stability: Normal.   Joint line tenderness: No specific joint line tenderness although she is tender over the right medial pes anserine bursa. There is absolutely no joint effusion, synovial hypersensitivity, warmth, or erythema.    Distal neurovascular examination is normal with no significant edema, venous stasis discoloration, or pulse deficit. Distal capillary refill is satisfactory.    X-rays: Well fixed right medial unicompartmental knee arthroplasty without evidence of subsidence, loosening, osteolysis, or significant polyethylene wear. Absolutely no evidence of progressive lateral or patellofemoral compartment disease.    Impression: Excellent course 18 months following right medial unicompartmental knee arthroplasty. Recent flareup of pes anserine bursitis following a viral illness. No evidence of joint or implant involvement.    Plan: Activity modifications are once again addressed. Dental prophylaxis is addressed based on current [employer] Academy of orthopedic surgeons recommendations. Followup exam will be 5 years for x-rays and clinical recheck. She is reassured regarding her now resolving medial soft tissue symptoms.       Never

## 2022-07-09 NOTE — ED PROVIDER NOTE - NS ED ROS FT
CONST: no fevers, + chills  EYES: no pain, no vision changes  ENT: no sore throat, no ear pain, no change in hearing  CV: no chest pain, no leg swelling  RESP: + shortness of breath, + cough  ABD: no abdominal pain, no nausea, no vomiting, no diarrhea  : no dysuria, no flank pain, no hematuria  MSK: no back pain, no extremity pain  NEURO: no headache or additional neurologic complaints  HEME: no easy bleeding  SKIN:  no rash

## 2022-07-09 NOTE — ED PROVIDER NOTE - PHYSICAL EXAMINATION
GENERAL: Awake, alert, NAD  HEENT: NC/AT, moist mucous membranes  LUNGS: few bilateral expiratory wheeze  CARDIAC: tachycardic, regular rhythm, no m/r/g  ABDOMEN: Soft, non tender, non distended, no rebound, no guarding  EXT: No edema, no calf tenderness, 2+ DP pulses bilaterally, no deformities.  NEURO: A&Ox3. Moving all extremities.  SKIN: Warm and dry. No rash.  PSYCH: Normal affect. GENERAL: Awake, alert, NAD  HEENT: NC/AT, moist mucous membranes  LUNGS: bilateral end expiratory wheeze  CARDIAC: tachycardic, regular rhythm, no m/r/g  ABDOMEN: Soft, non tender, non distended, no rebound, no guarding  EXT: No edema, no calf tenderness, 2+ DP pulses bilaterally, no deformities.  NEURO: A&Ox3. Moving all extremities.  SKIN: Warm and dry. No rash.  PSYCH: Normal affect.

## 2022-07-09 NOTE — ED ADULT TRIAGE NOTE - CHIEF COMPLAINT QUOTE
patient c/o of cough difficulty breathing and wheezing started 2 weeks ago , patient on oxygen at home

## 2022-07-09 NOTE — ED PROVIDER NOTE - NSFOLLOWUPINSTRUCTIONS_ED_ALL_ED_FT
You were seen in the ED for COPD exacerbation.    Take antibiotics and steroids as prescribed.    Follow up with your primary care doctor in the next 48 hours.    You should continue using your home oxygen as prescribed. You were seen in the ED for COPD exacerbation.    Take antibiotics and steroids as prescribed.    Follow up with your primary care doctor in the next 48 hours.    You should continue using your home oxygen as prescribed.    SEEK IMMEDIATE MEDICAL CARE IF YOU HAVE ANY OF THE FOLLOWING SYMPTOMS: worsening shortness of breath, chest pain, back pain, abdominal pain, fever, coughing up blood, lightheadedness/dizziness.

## 2022-07-09 NOTE — ED ADULT NURSE NOTE - OBJECTIVE STATEMENT
Patient received with complaints of SOB for the past 3 days, pt states she has been using her nebulizer at home with no relief. Pt placed on 2 L of O2, sating 96%. Stat blood collected and sent to lab. place on cardiac monitor, SR on monitor.

## 2022-07-09 NOTE — ED PROVIDER NOTE - CLINICAL SUMMARY MEDICAL DECISION MAKING FREE TEXT BOX
63 y/o F with PMH COPD on 3L home O2, HTN, DM, hypothyroidism, presents to the ED with COPD exacerbation. Patient is mildly tachycardic. She is saturating well on her 3L O2 requirements. Exam with some expiratory wheeze. Will dose duonebs, solumedrol, check labs and xr. Reassess.

## 2022-07-09 NOTE — ED PROVIDER NOTE - PATIENT PORTAL LINK FT
You can access the FollowMyHealth Patient Portal offered by [facility] by registering at the following website: [email or web address]. By joining [employer]’s FollowMyHealth portal, you will also be able to view your health information using other applications (apps) compatible with our system.

## 2022-07-15 ENCOUNTER — EMERGENCY (EMERGENCY)
Facility: HOSPITAL | Age: 62
LOS: 0 days | Discharge: HOME | End: 2022-07-15
Attending: EMERGENCY MEDICINE

## 2022-07-15 VITALS
RESPIRATION RATE: 16 BRPM | TEMPERATURE: 99 F | OXYGEN SATURATION: 94 % | HEART RATE: 94 BPM | SYSTOLIC BLOOD PRESSURE: 123 MMHG | DIASTOLIC BLOOD PRESSURE: 68 MMHG

## 2022-07-15 VITALS
SYSTOLIC BLOOD PRESSURE: 169 MMHG | WEIGHT: 179.9 LBS | HEART RATE: 105 BPM | OXYGEN SATURATION: 93 % | TEMPERATURE: 100 F | RESPIRATION RATE: 22 BRPM | DIASTOLIC BLOOD PRESSURE: 85 MMHG | HEIGHT: 61 IN

## 2022-07-15 DIAGNOSIS — F41.9 ANXIETY DISORDER, UNSPECIFIED: ICD-10-CM

## 2022-07-15 DIAGNOSIS — Z90.49 ACQUIRED ABSENCE OF OTHER SPECIFIED PARTS OF DIGESTIVE TRACT: Chronic | ICD-10-CM

## 2022-07-15 DIAGNOSIS — I10 ESSENTIAL (PRIMARY) HYPERTENSION: ICD-10-CM

## 2022-07-15 DIAGNOSIS — J44.9 CHRONIC OBSTRUCTIVE PULMONARY DISEASE, UNSPECIFIED: ICD-10-CM

## 2022-07-15 DIAGNOSIS — Z79.84 LONG TERM (CURRENT) USE OF ORAL HYPOGLYCEMIC DRUGS: ICD-10-CM

## 2022-07-15 DIAGNOSIS — E11.9 TYPE 2 DIABETES MELLITUS WITHOUT COMPLICATIONS: ICD-10-CM

## 2022-07-15 DIAGNOSIS — Z99.81 DEPENDENCE ON SUPPLEMENTAL OXYGEN: ICD-10-CM

## 2022-07-15 DIAGNOSIS — Z79.4 LONG TERM (CURRENT) USE OF INSULIN: ICD-10-CM

## 2022-07-15 DIAGNOSIS — C67.9 MALIGNANT NEOPLASM OF BLADDER, UNSPECIFIED: ICD-10-CM

## 2022-07-15 LAB
APPEARANCE UR: CLEAR — SIGNIFICANT CHANGE UP
BILIRUB UR-MCNC: NEGATIVE — SIGNIFICANT CHANGE UP
COLOR SPEC: YELLOW — SIGNIFICANT CHANGE UP
DIFF PNL FLD: ABNORMAL
EPI CELLS # UR: SIGNIFICANT CHANGE UP
GLUCOSE BLDC GLUCOMTR-MCNC: 338 MG/DL — HIGH (ref 70–99)
GLUCOSE BLDC GLUCOMTR-MCNC: 359 MG/DL — HIGH (ref 70–99)
GLUCOSE UR QL: 1000 MG/DL
KETONES UR-MCNC: ABNORMAL
LEUKOCYTE ESTERASE UR-ACNC: ABNORMAL
NITRITE UR-MCNC: NEGATIVE — SIGNIFICANT CHANGE UP
PH UR: 6 — SIGNIFICANT CHANGE UP (ref 5–8)
PROT UR-MCNC: 30 MG/DL
RBC CASTS # UR COMP ASSIST: SIGNIFICANT CHANGE UP /HPF (ref 0–4)
SP GR SPEC: 1.02 — SIGNIFICANT CHANGE UP (ref 1.01–1.02)
UROBILINOGEN FLD QL: NEGATIVE MG/DL — SIGNIFICANT CHANGE UP
WBC UR QL: SIGNIFICANT CHANGE UP

## 2022-07-15 PROCEDURE — 99283 EMERGENCY DEPT VISIT LOW MDM: CPT

## 2022-07-15 RX ORDER — INSULIN GLARGINE 100 [IU]/ML
30 INJECTION, SOLUTION SUBCUTANEOUS
Qty: 840 | Refills: 0
Start: 2022-07-15 | End: 2022-07-28

## 2022-07-15 RX ORDER — METFORMIN HYDROCHLORIDE 850 MG/1
1 TABLET ORAL
Qty: 14 | Refills: 0
Start: 2022-07-15 | End: 2022-07-28

## 2022-07-15 RX ORDER — GLIMEPIRIDE 1 MG
1 TABLET ORAL
Qty: 0 | Refills: 0 | DISCHARGE

## 2022-07-15 RX ORDER — CLONAZEPAM 1 MG
1 TABLET ORAL ONCE
Refills: 0 | Status: DISCONTINUED | OUTPATIENT
Start: 2022-07-15 | End: 2022-07-15

## 2022-07-15 RX ORDER — GLIMEPIRIDE 1 MG
1 TABLET ORAL
Qty: 14 | Refills: 0
Start: 2022-07-15 | End: 2022-07-28

## 2022-07-15 RX ADMIN — Medication 1 MILLIGRAM(S): at 17:52

## 2022-07-15 NOTE — ED ADULT NURSE REASSESSMENT NOTE - NS ED NURSE REASSESS COMMENT FT1
patient refusing blood work and IV insertion at this time. states "I just want my medications and go home". complaining she had recent blood work done and doesn't need it. Dr. poon notified. Dr. poon to speak with patient.

## 2022-07-15 NOTE — ED PROVIDER NOTE - PROGRESS NOTE DETAILS
Patient declines blood work, wishes to go home. Explained would not give insulin without labs. Has outpatient f/u w PMD.

## 2022-07-15 NOTE — ED PROVIDER NOTE - CLINICAL SUMMARY MEDICAL DECISION MAKING FREE TEXT BOX
Ddx: Says hematuria is similar to prior bleeding from known bladder cancer/ ro UTI/ hyperglycemia in setting of no insulin use  Plan: Cbc, cmp, ua, ucx, insulin, fluids, reassess

## 2022-07-15 NOTE — ED PROVIDER NOTE - PATIENT PORTAL LINK FT
You can access the FollowMyHealth Patient Portal offered by [facility] by registering at the following website: [email or web address]. By joining [employer]’s FollowMyHealth portal, you will also be able to view your health information using other applications (apps) compatible with our system.

## 2022-07-15 NOTE — ED ADULT NURSE REASSESSMENT NOTE - NS ED NURSE REASSESS COMMENT FT1
Report received from Angelic HILL after break coverage. as per dr. poon patient ok to eat and d/c, No repeat FS necessary at this time. Patient continues to receive blood work. dr. poon aware and spoke with patient. Patient uncooperative. Patient in stable condition at this time.

## 2022-07-15 NOTE — ED PROVIDER NOTE - OBJECTIVE STATEMENT
Pt is a 63 yo lady with a pmhx of COPD on 3l O2 at home, HTN, IDDM, anxiety, and chronic back pain, bladder cancer who presents to the ED with several issues. She says she is not taking her insulin for the month, and she is having her hematuria, which is constant for her. Has no dark or bloody stools, no dysuria. No fever. No abdominal pain.

## 2022-07-15 NOTE — ED ADULT NURSE NOTE - OBJECTIVE STATEMENT
patient is A&Ox4. repeating herself often. PMH DM, HTN, COPD, anxiety, bladder cancer. Patient BIBEMS from home for vaginal bleeding x one week. as per EMS, patient hasn't been taking insulin and metformin for DM "for weeks". Patient non-complaint with BP meds. States "they don't refill my medications". Patient unable to answer questions appropriately. poor historian. complaining of on and off vaginal bleeding x one week, states it is getting worse. reports the bleeding was red and now it is "pinkish". complaining of pelvic area pain along with vaginal bleeding. unable to provide with information about amount of bleeding. patient reports seeing bleeding when wiping. Patient received in pull-up. patient states this is the 2nd time getting bladder cancer and scheduled for removal, unable to provide complete information on when it is scheduled. Complaining of being recently hospitalized due to COPD exacerbations and d/c with Nebulizer treatments. Patient reports productive cough with "white secretions" for "a couple of days" now. Patient complaining of chills. denies fever, cp, SOB. denies any others symptoms. Patient current tobacco smoker (pack a day since 13 years of age) patient O2 sat 92-93 on RA.

## 2022-07-15 NOTE — ED ADULT TRIAGE NOTE - CHIEF COMPLAINT QUOTE
Patient BIBA: Patient reports "Bleeding down there." Unable to tell whether bleeding is vaginal or from bladder. Patient also reports "I changed doctors and haven't been on my insulin or Metformin." Also reports "COPD bad- Coughing a lot."  History of DM, COPD, Bladder Cancer

## 2022-07-18 LAB
CULTURE RESULTS: SIGNIFICANT CHANGE UP
SPECIMEN SOURCE: SIGNIFICANT CHANGE UP

## 2022-08-10 NOTE — ED PROVIDER NOTE - NS_EDPROVIDERDISPOUSERTYPE_ED_A_ED
Physical Therapy  Visit Type: re-evaluation  Precautions:  Medical precautions:  fall risk; standard precautions.  The patient is a 57 year old male admitted on 8/1/2022 from [facility] with AMS. Pt admitted with diagnosis of Bacteremia (R78.81),Delirium (R41.0),Troponin level elevated (R77.8)  Acute cystitis without hematuria (N30.00), Toxic metabolic encephalopathy (G92.8)., sepsis. (CT head unremarkable), RRT 8/2 d/t unresponsiveness.     *Prior recent admit 7/5-7/29 with Multivessel CAD s/p cardiac catheterization (7/12), NSTEMI,  s/p PCI of RCA with atherectomy and stent placement on 7/18 s/p emergent intubation on 7/18 & episodes of AMS (DC to [facility])    *PMH includes CVA with L sided hemiparesis, ESRD (HD)    Pt DC to [facility] 7/29 following prolonged admission. Prior to previous admit  pt resides With family in home; 8STE. Pt typically IND with ADLs & ambulatory without device.   Lines:     Basic: telemetry and IV      Lines in chart and on patient reviewed, precautions maintained throughout session.  Safety Measures: chair alarm    SUBJECTIVE  Patient agreed to participate in therapy this date.  Pt expresses fatigue and weakness.  Patient / Family Goal: maximize function, return to previous functional status and return home     OBJECTIVE     Oriented to person, place, time and situation     Arousal alertness: appropriate responses to stimuli    Affect/Behavior: alert, calm and cooperative  Functional Communication/Cognition       Form of communication:  Verbal   Attention span:  Attends with cues to redirect    Commands: follows one step commands consistently.    Transition between tasks: transition with cues.    Safety judgement: good awareness of safety precautions.    Awareness of deficits: decreased awareness of deficits.  Range of Motion (measured in degrees unless otherwise noted, active unless indicated)  WFL: LUE RUE except as noted  Shoulder:  - Flexion (180):      • Left:  0 Passive: 10  Elbow/Forearm:   -  Flexion (140-150):      • Left:  90 Passive: 90   - Extension (0):      • Left:  -90 Passive: -90  WFL: LLE, RLE, except as noted  Comments / Details: Pt with end range limitations grossly on the L LE  Strength (out of 5 unless otherwise indicated)   5/5: LLE, RLE, except as noted  Ankle:    - Dorsiflexion:      • Left: 3+   - Plantar Flexion:      • Left: 3+  Balance (trials measured in sec unless otherwise indicted)    Sitting: Static: stand by assist back unsupported and single upper extremity support, Dynamic: contact guard/touching/steadying assist    Standing - Firm Surface - Eyes Open: Static: minimal assist  Dynamic: minimal assist single upper extremity support  Sensation - Lower Extremity  L1 (back, over trochanter and groin):     - Light Touch: • Left: intact • Right: intact  L2 (back, front of thigh to knee):     - Light Touch: • Left: intact • Right: intact  L3 (back, upper buttock, anterior thigh, knee, medial lower leg):     - Light Touch: • Left: intact • Right: intact  L4 (medial buttock, lateral thigh, medial leg, dorsum of foot, great toe):     - Light Touch: • Left: intact • Right: intact  L5 (buttock, posterior and lateral thigh, lateral aspect of leg, dorsum of foot, medial half of sole, 1-3rd toes):     - Light Touch: • Left: intact • Right: intact   S1 (buttock, thigh, posterior leg):     - Light Touch: • Left: intact • Right: intact    Bed Mobility:    Rolling left: minimal assist    Rolling right: moderate assist    Repositioning in bed: moderate assist      Supine to sit: minimal assist and moderate assist  Training completed:    Tasks: rolling left, rolling right, boosting and supine to sit    Education details: patient safety, body mechanics and patient requires additional training  Transfers:    Assistive devices: gait belt and hand hold    Sit to stand: minimal assist    Stand to sit: minimal assist    Stand pivot: minimal assist  Training completed:    Tasks: sit to stand and stand to  sit    Education details: body mechanics and patient safety  Gait/Ambulation:     Assistance: minimal assist and with tactile cues   Assistive device: gait belt and hand hold    Distance (ft): 10; 10    Pattern: step to    Type: unsteady and decreased douglas    Stance phase: Left: decreased weight shift and decreased time in single limb stance; Right: decreased weight shift and decreased time in single limb stance    Swing phase: Left: decreased knee flexion; Right: decreased knee flexion  Training Completed:    Tasks: gait training on level surfaces    Education details: body mechanics, patient safety and patient requires additional training    Pt demonstrates a posterior lean and requires verbal and tactile cueing to shift.       Interventions     Supine    Lower Extremity: Bilateral: quad sets, gluteus sets, ankle pumps, SLR, heel slides, hip abduction and hip adduction, AROM and AAROM, 10 reps, 1 sets  Seated    Lower Extremity: Bilateral: knee flexion, seated hip flexion, toe raises, heel raises and knee extensions, AROM, 10 reps, 2 sets  Additional exercise details: Standing weight shift, step taps  Training provided: activity tolerance, functional ambulation, transfer training, balance retraining, bed mobility training and safety training    Skilled input: Verbal instruction/cues and tactile instruction/cues  Verbal Consent: Writer verbally educated and received verbal consent for hand placement, positioning of patient, and techniques to be performed today from patient for clothing adjustments for techniques and therapist position for techniques as described above and how they are pertinent to the patient's plan of care.       ASSESSMENT   Impairments: range of motion, strength, balance deficits, pain, endurance, activity tolerance, abnormal tone and balance  Functional Limitations: all functional mobility  Patient seen for physical therapy Re evaluation. Pt sitting  in bed at start of session, cleared by  RN. Pt educated on the role of physical therapy in hospital setting, pt verbalized understanding and agreeable to therapy session. Pt instructed on seated exercises in preparation for mobility, pt tolerated with cues and encouragement.   The patient now presents with impairments in activity tolerance, balance, limited knowledge of compensatory strategies, postural control, ROM, safety awareness, strength and tone  which impact safe and effective performance in functional mobility.  Patient currently able to perform sit-stand transfer with min A but demonstrates a posterior lean requiring mod A 1-2 x for standing balance, ambulation with min A  using handheld assist. Pt will benefit from continued therapy to improve safety and maximize independence with mobility. Pt assisted back to chair at end of session with chair alarm on and all needs within reach, handoff with RN.      Discharge Recommendations   Recommendation for Discharge: PT IL: Patient is appropriate for daily Physical Therapy                    Progress: progressing toward goals    • Skilled therapy is required to address these limitations in attempt to maximize the patient's independence.     • Predicted patient presentation: Low (stable) - Patient comorbidities and complexities, as defined above, will have little effect on progress for prescribed plan of care.    Education Provided:   Learning Assessment:  - Primary learner: patient  - Are they ready to learn: yes  - Preferred learning style: verbal and demonstration  - Barriers to learning: no barriers apparent at this time  Education provided during session:  - Receiving Education: patient  - Results of above outlined education: Demonstrates understanding and Needs reinforcement    Patient at End of Session:   Location: in chair  Safety measures: alarm system in place/re-engaged, call light within reach, equipment intact and lines intact  Handoff to: nurse    PLAN   Suggestions for next session as  indicated: PT Frequency: 5 days/week    Interventions: balance, bed mobility, body mechanics, continued evaluation, endurance training, functional transfer training, gait training, HEP train/position, ROM, safety education, stairs retraining and strengthening  Agreement to plan and goals: patient agrees with goals and treatment plan        GOALS  Review Date: 8/10/2022  Long Term Goals: (to be met by time of discharge from hospital)  Sit to supine: Patient will complete sit to supine independent.  Supine to sit: Patient will complete supine to sit independent.  Sit to stand: Patient will complete sit to stand transfer with independent.   Stand to sit: Patient will complete stand to sit transfer with independent.   Ambulation (even): Patient will ambulate on even surface for 150 feet with hand hold, modified independent.     Documented in the chart in the following areas: Prior Level of Function. Assessment.      Therapy procedure time and total treatment time can be found documented on the Time Entry flowsheet   Scribe Attestation (For Scribes USE Only)... Attending Attestation (For Attendings USE Only).../Scribe Attestation (For Scribes USE Only)...

## 2022-09-05 NOTE — PROGRESS NOTE ADULT - PROBLEM/PLAN-4
Seizure pads applied to pt's bedrails       Nilesh Siu RN  09/05/22 [id number]
DISPLAY PLAN FREE TEXT
DISPLAY PLAN FREE TEXT

## 2022-09-21 NOTE — ED PROVIDER NOTE - PHYSICAL EXAMINATION
[facility] AMBULATORY ENCOUNTER  FAMILY MEDICINE OFFICE VISIT    Chief Complaint   Patient presents with   • Establish Care       ASSESSMENT/PLAN     Essential hypertension  (primary encounter diagnosis)  Prostate cancer screening  Need for hepatitis C screening test  Screening for diabetes mellitus (DM)  Colon cancer screening  Screening for cardiovascular condition  Screening for endocrine, nutritional, metabolic and immunity disorder       1. Essential hypertension  Discussed risks of uncontrolled hypertension. He declines wanting to be on any medication. Recommend DASH diet and exercise 150 min/week.  - COMPREHENSIVE METABOLIC PANEL; Future  - CBC WITH DIFFERENTIAL; Future  - LIPID PANEL WITH REFLEX; Future  - THYROID STIMULATING HORMONE; Future    2. Prostate cancer screening  - PSA; Future    3. Need for hepatitis C screening test  - HEPATITIS C ANTIBODY WITH REFLEX; Future    4. Screening for diabetes mellitus (DM)  - GLYCOHEMOGLOBIN; Future    5. Colon cancer screening  - COLOGUARD    6. Screening for cardiovascular condition  - COMPREHENSIVE METABOLIC PANEL; Future  - CBC WITH DIFFERENTIAL; Future  - LIPID PANEL WITH REFLEX; Future  - GLYCOHEMOGLOBIN; Future  - THYROID STIMULATING HORMONE; Future    7. Screening for endocrine, nutritional, metabolic and immunity disorder  - COMPREHENSIVE METABOLIC PANEL; Future  - CBC WITH DIFFERENTIAL; Future  - PSA; Future  - HEPATITIS C ANTIBODY WITH REFLEX; Future  - LIPID PANEL WITH REFLEX; Future  - GLYCOHEMOGLOBIN; Future  - THYROID STIMULATING HORMONE; Future        Return in about 6 months (around 3/21/2023), or if symptoms worsen or fail to improve, for MWV.           SUBJECTIVE   Apollo Smith is a 69 year old male who presents today for labs. He states \"I'm just here for blood work\". Denies any symptoms other than joint pain. He has had high blood pressure for a long time. Was last seen here in 2016 and was elevated at that time as well. He states he does not  want to go on any medications.    REVIEW OF SYSTEMS:  As above.    Relevant medications and allergies, past medical, surgical, social and family history were reviewed.    OBJECTIVE     Visit Vitals  BP (!) 152/100 (BP Location: LUE - Left upper extremity, Patient Position: Sitting, Cuff Size: Large Adult)   Pulse 73   Temp 98.1 °F (36.7 °C) (Temporal)   Resp 18   Ht 5' 10.2\" (1.783 m) Comment: with shoes   Wt 95.8 kg (211 lb 1.6 oz)   SpO2 99%   BMI 30.12 kg/m²     GENERAL:  Well developed, well nourished, no acute distress, non-toxic appearance.   HEENT:  Atraumatic, external ears normal.  Neck- Normal range of motion, supple.  Eyes:  Conjunctivae normal.  Extraocular motions intact.   CARDIOVASCULAR:  Regular, rate and rhythm without murmur, rubs, or gallops.  RESPIRATORY:   Normal respiratory effort.  CTA.  No wheezes, rales or rhonchi.  GASTROINTESTINAL:  Soft and non tender.  No hepatomegaly or splenomegaly.  No guarding or rebound tenderness.  No masses.  MUSCULOSKELETAL:  No deformities.  SKIN:  Well hydrated, no rash.   NEUROLOGICAL:  Alert and oriented x3.  Cranial nerves II-XII grossly intact.  Normal motor function.  PSYCHIATRIC:  Speech and behavior appropriate.      Gen: Alert, NAD  Head: NC, AT   Eyes: PERRL, EOMI, normal lids/conjunctiva  ENT: normal hearing, patent oropharynx without erythema/exudate, uvula midline  Neck: supple, no tenderness, Trachea midline  Pulm: Bilateral BS, normal resp effort, no wheeze/stridor/retractions  CV: RRR, no M/R/G, 2+ radial and dp pulses bl, no edema  Abd: obese, rlq ttp   Mskel: extremities x4 with normal ROM and no joint effusions. no ctl spine ttp.   Skin: no rash, no bruising   Neuro: AAOx3, no sensory/motor deficits, CN 2-12 intact

## 2022-10-31 NOTE — ED ADULT TRIAGE NOTE - BMI (KG/M2)
What Type Of Note Output Would You Prefer (Optional)?: Standard Output Is The Patient Presenting As Previously Scheduled?: Yes Is This A New Presentation, Or A Follow-Up?: Rash Detail Level: Detailed 37.8

## 2022-11-03 NOTE — ED PROVIDER NOTE - LOCATION
ankle/foot/leg/toe Hydroxychloroquine Counseling:  I discussed with the patient that a baseline ophthalmologic exam is needed at the start of therapy and every year thereafter while on therapy. A CBC may also be warranted for monitoring.  The side effects of this medication were discussed with the patient, including but not limited to agranulocytosis, aplastic anemia, seizures, rashes, retinopathy, and liver toxicity. Patient instructed to call the office should any adverse effect occur.  The patient verbalized understanding of the proper use and possible adverse effects of Plaquenil.  All the patient's questions and concerns were addressed.

## 2022-11-26 NOTE — ED ADULT NURSE NOTE - ISOLATION TYPE:
Airborne+Contact precautions Bed: 05  Expected date:   Expected time:   [place] of [place] [employer] [employer]  Comments:  MALGORZATA SOB

## 2022-12-02 NOTE — H&P ADULT - DOES THIS PATIENT HAVE A HISTORY OF OR HAS BEEN DX WITH HEART FAILURE?
DILATED FUNDUS EXAM WAS PERFORMED TODAY AND NO MACULAR THICKENING WAS SEEN. NORMAL EYE EXAM.  MACULAR OCT WAS ALSO USED FOR EVALUATION. AN AMSLER GRID WAS GIVEN TO THE PATIENT TO MONITOR VISION WHILE AT HOME. REPEAT OPHTHALMIC EXAM SHOULD BE PERFORMED AT 4 MONTHS, SINCE MOST CASES OF MACULAR EDEMA DEVELOP WITHIN THIS TIME PERIOD. THEN SURVEILLANCE EXAMS AT 6 MONTHS, AND ANNUALLY THEREAFTER. THE PATIENT WAS ADVISED TO RETURN SOONER IF THE PATIENT EXPERIENCES ANY VISUAL SYMPTOMS. no Alert and oriented to person, place and time

## 2022-12-06 NOTE — ED ADULT TRIAGE NOTE - ARRIVAL FROM
"  Assessment & Plan     Seborrheic keratoses  Many on back,  Two irritated ones frozen with liquid N2    Intermittent asthma with reliever use up to twice per week, uncomplicated  - albuterol (PROAIR HFA/PROVENTIL HFA/VENTOLIN HFA) 108 (90 Base) MCG/ACT inhaler  Dispense: 8.5 g; Refill: 5      Return in about 9 months (around 9/6/2023) for medicare annual wellness, or earlier as needed.    Anjali Navarro MD  [facility]    Subjective   Mona is a 70 year old accompanied by her PCA, presenting for the following health issues:  Skin Tags and Recheck Medication (PT'S CARE GIVER REPORTS THAT pT HAS SKIN TAG ON HER BACK, AND HERE FOR FOLLOW UP.)      HPI     PT IS HERE WITH HER ILS WORKER AND STATES THAT SHE IS HERE FOR AN FOLLOW UP APPOINTMENT FOR A SKIN TAG ON HER BACK.    Mona has multiple seborrheic keratoses on her back, in a [place] tree like pattern.  She does have some that seem to bother her, though hard to say how much.  She reaches around to scratch one    Also needs a refill of her albuterol - only uses when she is walking    Review of Systems   Feeling otherwise well      Objective    /68 (BP Location: Left arm, Patient Position: Sitting, Cuff Size: Adult Regular)   Pulse 88   Temp 97.8  F (36.6  C) (Tympanic)   Resp 16   Ht 1.499 m (4' 11\")   Wt 84.5 kg (186 lb 4.8 oz)   SpO2 95%   BMI 37.63 kg/m    Body mass index is 37.63 kg/m .  Physical Exam   General appearance - alert, well appearing, and in no distress  Mental status - pleasant, affectioinate. Not a clear historian (her usual)  Skin - multiple seborrheic keratoses on her back, in a [place] tree like pattern.  There are two particularly hyeprkeratotic, scaly ones that bother her that I froze with Liquid N2, both close to midlin back              " Home

## 2022-12-07 NOTE — ED PROVIDER NOTE - OBJECTIVE STATEMENT
57 year old female with PMH of Agoraphobia, COPD, anxiety, DM II, Hypothyroid, HTN, states presenting for sore throat also noted is burning discomfort on urination as well for past week. Has had sore throat and fever noted 102 at home last night. Denies malaise and otherwise states ran out of some of her meds and needs some refills, pt did not bring a list of meds she needs, looking at record of past, pt noted to be on gabapentin and on ventolin, synthroid etc, may continue pt on meds if she has run out of these meds. 57 year old female with PMH of Agoraphobia, COPD, anxiety, Hypothyroid, HTN, states presenting for sore throat also noted is burning discomfort on urination as well for past week. Has had sore throat and fever noted 102 at home last night. Denies malaise and otherwise states ran out of some of her meds and needs some refills, pt did not bring a list of meds she needs, looking at record of past, pt noted to be on gabapentin and on ventolin, synthroid etc, may continue pt on meds if she has run out of these meds. no history of blood product transfusion

## 2023-01-19 NOTE — ED ADULT NURSE NOTE - OBJECTIVE STATEMENT
Patient was BIBA c/o headache x 2 weeks. Patient finished antibitoics for sinus infection yesterday. Yes

## 2023-02-10 NOTE — PHYSICAL THERAPY INITIAL EVALUATION ADULT - FOLLOWS COMMANDS/ANSWERS QUESTIONS, REHAB EVAL
Nutrition Progress Note  (Physician Action Needed)    Physician- please edit note, check the appropriate diagnosis from list below and indicate whether you agree with the plan of care    The registered dietitian has identified that this patient meets criteria for malnutrition.    Nutrition Diagnosis:  Nutrition Diagnosis: Malnutrition  Malnutrition in the context of acute illness or injury: Non-severe (moderate)  Related to: Altered GI function/GI disorder, Inability to take/tolerate, Abdominal pain   As evidenced by: Calculated needs, Consuming <50% estimated calorie needs, Documented/reported poor oral intake, Need for NPO status, Weight loss over time   Malnutrition diagnosis acute illness/injury; non-severe: Energy intake of <75% of estimated energy requirement for >7 days, Weight loss of 5%/1 month  Primary Nutrition Diagnosis status: New nutrition diagnosis    Intervention:  TREATMENT PLAN: Monitoring & Interventions   Intervention: Meals and snacks, Nutrition education, Nutrition supplement therapy          Meals & snacks: NPO.         Nutrition supplement therapy: Pt was agreeable to try Ensure Clear, however now NPO. She notes that she prefers strawberry Ensure & not vanilla. Will send once diet advances.      Nutrition education: Encouraged intakes as tolerated, utilizing Ensure at home, suggested consuming small frequent meals and choosing low fat foods per hx pancreatitis. Placed low fat diet tips in AVS.     Goal: Tolerate oral diet, Meet >/equal 75% estimated needs, Transition to oral intake, Maintain or improve weight   Intervention goal status: Initiated  Time frame to achieve goal: 3-5 days      Dietitian will monitor: Diet advancement, Food, beverage, and nutrient intake, Anthropometric Measurements            Physician Documentation  Based on above, patient meets criteria for:    []  Severe Protein Calorie Malnutrition  []  Moderate Protein Calorie Malnutrition  []  Mild Protein Calorie  Malnutrition  [] Unable to determine   [] Other:     * Please add the appropriate diagnosis to the patient's Problem List and subsequent Progress Notes.     Plan of care:   []  Agree with nutrition assessment and plan of care as stated above.    [] Agree with nutrition assessment and plan of care, with exception. Patient meets criteria for diagnosis except ________.    [] Disagree with nutrition assessment and plan of care because_________.         verbal cueing and manual guidance with carryover/100% of the time

## 2023-03-01 NOTE — ED ADULT TRIAGE NOTE - CHIEF COMPLAINT QUOTE
Medication(s) Requested:    lisdexamfetamine (VYVANSE) 40 MG capsule 60 capsule 0 2/1/2023     Sig - Route: Take 2 capsules by mouth daily. Do not start before February 1, 2023. Must last 30 days - Oral    Sent to pharmacy as: Lisdexamfetamine Dimesylate 40 MG Oral Capsule (VYVANSE)    Class: Eprescribe        Last appointment: 11/29/2022  Next Appointment: 4/11/2023  Last refill: 2/3/2023 per PDMP  Is the patient due for refill of this medication(s): Yes on 3/5/2023  PDMP review: Criteria not met because future fill date. Forwarded to Physician/FREDDIE for further review and decision on medication(s) requested.   [facility]     Ok to fill when due?     Pt BIba. Pt c/o dizziness, blurry vision ,earache, and cold symptoms. H/O COPD, MVP, DM. AS per EMS PT NOT COMPLIANT WITH O2 AT HOME

## 2023-03-21 NOTE — ED ADULT TRIAGE NOTE - ACCOMPANIED BY
EMT/paramedic
clear to auscultation bilaterally/airway patent/breath sounds equal/good air movement/respirations non-labored

## 2023-03-28 NOTE — ED PROVIDER NOTE - DISPOSITION TYPE
Plan had been to transition the patient to continuous infusion Zosyn but he does not think he can manage the pump due to conflicts with his job.  We will continue ceftriaxone and daptomycin for now.  If any worsening CPK or new symptoms such as weakness or myalgias will likely stop daptomycin as unclear significance of the Enterococcus faecalis.  If he continues to do well over the next few weeks we could also consider transition to oral biotics with fluoroquinolone.  Discussed with outpatient infusion center pharmacist who will place orders.  Please repeat CPK in 3 days.    Gail Santos MD  
ADMIT

## 2023-05-07 NOTE — PHYSICAL THERAPY INITIAL EVALUATION ADULT - ADDITIONAL COMMENTS
Pt lives with her son in the basement of a house. Indep with ambulation and ADLS without assistive device.  Pt has 12 steps to basement. no fracture, no dislocation

## 2023-05-20 NOTE — ED ADULT TRIAGE NOTE - BMI (KG/M2)
CHIEF COMPLAINT:  Patient is a 89y old  Male who presents with a chief complaint of Chest pain (16 May 2023 01:39)      INTERVAL HISTORY/OVERNIGHT EVENTS:  No overnight events. Pt syncopized again during physical therapy today. Was caught and helped back into chair with quick return to baseline. Possible orthostatic syncope     ======================  MEDICATIONS:  MEDICATIONS  (STANDING):  amLODIPine   Tablet 2.5 milliGRAM(s) Oral at bedtime  aspirin  chewable 81 milliGRAM(s) Oral daily  atorvastatin 40 milliGRAM(s) Oral at bedtime  chlorhexidine 2% Cloths 1 Application(s) Topical <User Schedule>  cholecalciferol 1000 Unit(s) Oral daily  clopidogrel Tablet 75 milliGRAM(s) Oral daily  dextrose 5%. 1000 milliLiter(s) (100 mL/Hr) IV Continuous <Continuous>  dextrose 5%. 1000 milliLiter(s) (50 mL/Hr) IV Continuous <Continuous>  dextrose 50% Injectable 25 Gram(s) IV Push once  dextrose 50% Injectable 25 Gram(s) IV Push once  dextrose 50% Injectable 12.5 Gram(s) IV Push once  doxazosin 4 milliGRAM(s) Oral at bedtime  finasteride 5 milliGRAM(s) Oral daily  folic acid 1 milliGRAM(s) Oral daily  furosemide    Tablet 20 milliGRAM(s) Oral daily  glucagon  Injectable 1 milliGRAM(s) IntraMuscular once  insulin lispro (ADMELOG) corrective regimen sliding scale   SubCutaneous three times a day before meals  isosorbide   mononitrate ER Tablet (IMDUR) 30 milliGRAM(s) Oral daily  melatonin 5 milliGRAM(s) Oral at bedtime  multivitamin 1 Tablet(s) Oral daily  pantoprazole    Tablet 40 milliGRAM(s) Oral before breakfast  PARoxetine 20 milliGRAM(s) Oral daily  polyethylene glycol 3350 17 Gram(s) Oral daily  senna 2 Tablet(s) Oral at bedtime    MEDICATIONS  (PRN):  dextrose Oral Gel 15 Gram(s) Oral once PRN Blood Glucose LESS THAN 70 milliGRAM(s)/deciliter  nitroglycerin     SubLingual 0.4 milliGRAM(s) SubLingual every 10 minutes PRN Chest Pain    ======================  PHYSICAL EXAMINATION:  CONSTITUTIONAL: Well groomed, no apparent distress  EYES: PERRLA and symmetric, EOMI, No conjunctival or scleral injection, non-icteric  ENMT: Oral mucosa with moist membranes. Normal dentition; no pharyngeal injection or exudates  NECK: Supple, symmetric and without tracheal deviation   RESP: No respiratory distress, no use of accessory muscles; CTA b/l, no WRR  CV: RRR, +S1S2, no MRG; no JVD; no peripheral edema  GI: Soft, NT, ND, no rebound, no guarding; no palpable masses; no hepatosplenomegaly; no hernia palpated  MSK: Normal gait; No digital clubbing or cyanosis; examination of the (head/neck/spine/ribs/pelvis, RUE, LUE, RLE, LLE) without misalignment,   Normal ROM without pain, no spinal tenderness, normal muscle strength/tone  SKIN: No rashes or ulcers noted; no subcutaneous nodules or induration palpable  NEURO: CN II-XII intact; normal reflexes in upper and lower extremities, sensation intact in upper and lower extremities b/l to light touch   PSYCH: Appropriate insight/judgment; A+O x 3, mood and affect appropriate, recent/remote memory intact  ======================  OBJECTIVE:  Vital Signs Last 24 Hrs  T(C): 36.8 (19 May 2023 07:42), Max: 36.8 (19 May 2023 07:42)  T(F): 98.2 (19 May 2023 07:42), Max: 98.2 (19 May 2023 07:42)  HR: 85 (19 May 2023 07:42) (69 - 85)  BP: 131/62 (19 May 2023 07:42) (113/60 - 228/102)  BP(mean): 89 (19 May 2023 07:42) (80 - 147)  RR: 18 (19 May 2023 08:05) (18 - 28)  SpO2: 96% (19 May 2023 07:42) (55% - 100%)    Parameters below as of 18 May 2023 20:00  Patient On (Oxygen Delivery Method): room air    I/O:      05-17 @ 07:01  -  05-17 @ 18:35  --------------------------------------------------------  IN: 0 mL / OUT: 300 mL / NET: -300 mL        Weight trend:  Weight (kg): 80.7 (05-16)    ======================    LABS:                        8.6    5.99  )-----------( 109      ( 20 May 2023 05:36 )             26.0     05-20    137  |  102  |  22<H>  ----------------------------<  140<H>  3.9   |  24  |  0.9    Ca    8.7      20 May 2023 05:36  Mg     1.9     05-20    TPro  5.8<L>  /  Alb  3.6  /  TBili  0.7  /  DBili  x   /  AST  51<H>  /  ALT  33  /  AlkPhos  53  05-20                                     45.4

## 2023-06-15 NOTE — ED ADULT TRIAGE NOTE - BP NONINVASIVE SYSTOLIC (MM HG)
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 He has history of adenomas in the past on multiple studies elsewhere.  No major interval illness.  There is family history of colon cancer in his father at age less than 60.. 127

## 2023-07-13 NOTE — DIETITIAN INITIAL EVALUATION ADULT. - +GENDER
75 year old female with PMH infrarenal AAA, CVA, CAD s/p MI, stents x 2 2009/2011, ischemic cardiomyopathy, HFrEF 29% (last TTE 3/6/23), HTN, ventricular arrhythmia s/p ICD PPM 2010 2019, COPD, emphysema, rheumatoid arthritis, Alevism presents s/p endovascular repair of infrarenal AAA on 7/12    Plan  - Diet: clear liquid, advance as tolerated  - LR @75 cc/hour  - Pain control  - Restarted metoprolol and pantoprazole  - Hold plavix  - DVT prophylaxis  - Plan to dc [facility] CVC before transfer to surgical floors    Vascular surgery [id number]   75 year old female with PMH infrarenal AAA, CVA, CAD s/p MI, stents x 2 2009/2011, ischemic cardiomyopathy, HFrEF 29% (last TTE 3/6/23), HTN, ventricular arrhythmia s/p ICD PPM 2010 2019, COPD, emphysema, rheumatoid arthritis, Yarsani presents s/p endovascular repair of infrarenal AAA on 7/12. The patient is doing well.     Plan  -Regular Diet  - Pain control  - Hold plavix  - DVT prophylaxis  -D/C brunson  -PT consult  -Dispo to home tomorrow    Vascular surgery [id number]   Statement Selected

## 2023-07-21 NOTE — ED PROVIDER NOTE - OBJECTIVE STATEMENT
61 y/o obese female with pmh copd on home O2, DM, HTN, presents with inability to urinate and abd pain.  Pt was here 2 days ago for rt flank pain, hematuria, dysuria and urgency. CT with bladder mass. Pt was not sent home with vargas. UCx clear, no wcc, afebrile.     No fever/chills, No photophobia/eye pain/changes in vision, No ear pain/sore throat/dysphagia, No chest pain/palpitations, no SOB/cough, no wheeze/stridor, + abdominal pain, No N/V/D, +hematuria/dysuria, No neck/back pain, no rash, no changes in neurological status/function. Detail Level: Detailed

## 2023-08-21 NOTE — PATIENT PROFILE ADULT - NSTRANSFERBELONGINGSDISPO_GEN_A_NUR
[address]  Progress Note  Name: Libra Polanco  MRN: [id number]  Unit/Bed#: -01 I Date of Admission: 8/16/2023   Date of Service: 8/21/2023 I Hospital Day: 5    Assessment/Plan   * ROMAN (acute kidney injury) [facility])  Assessment & Plan  Recent Labs     08/19/23  0413 08/21/23  0439   CREATININE 1.14 1.32*   EGFR 55 46     Estimated Creatinine Clearance: 31.1 mL/min (A) (by C-G formula based on SCr of 1.32 mg/dL (H)). • POA; Further chart reviewed revealed Cr. Baseline likely closely to 1.3  • Likely prerenal leukopenia secondary to decreased oral intake    Plan:  • Monitor BMP daily and observe for downward trend of creatinine  • Gentle IV fluid hydration  • Avoid hypoperfusion of the kidneys, minimize nephrotoxins  · Hold Lisinopril;  Renal Dose Valtrex    Herpes zoster with complication  Assessment & Plan  Rash noted on the right lower extremity and right low back  Photos uploaded to media by wound and nursing team    Valtrex 1000 mg BID - renal dosing  Will need images of lesions to ensure crusting is present (signifying resolution) prior to removing precautions    Memory loss  Assessment & Plan  · DIL concerned due to progressive memory loss. Getting lost driving since removed care from his resident as he was driving to [place] alone but other times lost.      · If work up negative should follow up with PCP for further work up   · Vlaente #5 Gia Villasenor Final PCP WITH FAMILY  · Neuropsych consult for capacity and cognitive eval  · Patient has Capacity; mild cognitive impairment    Ambulatory dysfunction  Assessment & Plan  • PT/OT Eval and treat recommending post acute rehab services-patient agreeable to placement; plan for discharge to rehab on 8/23    · Patient came in with reported back pain.    · CT lumbar spine obtained: No acute compression collapse of the vertebra Multilevel degenerative disc disease with facet joint disease and multilevel foraminal narrowing Moderate right foraminal narrowing at L5-S1  · Conservative measure PT/OT eval pain management   · Responding to lidoderm and low dose oxy. AM-PAC Basic Mobility:  Basic Mobility Inpatient Raw Score: 13    JH-HL Achieved: 5: Stand (1 or more minutes)  -HL Goal: 4: Move to chair/commode  • Encourage appropriate mobility to achieve and improve upon [facility] Goals as noted    Hyperlipemia, mixed  Assessment & Plan  · Continue Statin    Acquired hypothyroidism  Assessment & Plan  Lab Results   Component Value Date    [id number] 16.341 (H) 08/18/2023    FREET4 0.95 08/18/2023      · Continue home levothyroxine  · Follow up T4 results and may need to adjust levothyroxine dose    Primary hypertension  Assessment & Plan  Blood Pressure: 154/66    Plan:  • Resume home med  • Monitor blood pressure  • PRN IV Hydralazine and Labetalol for SBP > 160      Coronary artery disease involving native coronary artery of native heart without angina pectoris  Assessment & Plan  · Continue beta-blocker; not in acute chest pain at this time             VTE Pharmacologic Prophylaxis: VTE Score: 3 Moderate Risk (Score 3-4) - Pharmacological DVT Prophylaxis Ordered: heparin. Patient Centered Rounds: I performed bedside rounds with nursing staff today. Discussions with Specialists or Other Care Team Provider:  IP CONSULT TO NEUROPSYCHOLOGY    Education and Discussions with Family / Patient: patient    Time Spent for Care: 55 minutes. More than 50% of total time spent on counseling and coordination of care as described above. Current Length of Stay: 5 day(s)  Current Patient Status: Inpatient   Certification Statement: The patient will continue to require additional inpatient hospital stay due to plan noted above  Discharge Plan: Anticipate discharge in 24-48 hrs to rehab facility. Code Status: Level 1 - Full Code    Subjective:   Pain continues today but is improving. No acute events reported overnight.  Understanding of plan.  All questions answered. Objective:     Vitals:   Temp (24hrs), Av.2 °F (36.8 °C), Min:98 °F (36.7 °C), Max:98.6 °F (37 °C)    Temp:  [98 °F (36.7 °C)-98.6 °F (37 °C)] 98.1 °F (36.7 °C)  HR:  [61-74] 74  Resp:  [18-19] 18  BP: (123-154)/(54-72) 123/54  SpO2:  [95 %-96 %] 95 %  Body mass index is 23.96 kg/m². Input and Output Summary (last 24 hours): Intake/Output Summary (Last 24 hours) at 2023 1630  Last data filed at 2023 1025  Gross per 24 hour   Intake 1720 ml   Output 725 ml   Net 995 ml       Physical Exam:   Physical Exam  Vitals and nursing note reviewed. Constitutional:       General: He is not in acute distress. Appearance: He is well-developed. He is ill-appearing (Chronically). He is not diaphoretic. HENT:      Head: Normocephalic and atraumatic. Nose: Nose normal.   Eyes:      General: No scleral icterus. Conjunctiva/sclera: Conjunctivae normal.      Pupils: Pupils are equal, round, and reactive to light. Neck:      Thyroid: No thyromegaly. Cardiovascular:      Rate and Rhythm: Normal rate and regular rhythm. Heart sounds: Normal heart sounds. No murmur heard. No friction rub. No gallop. Pulmonary:      Effort: Pulmonary effort is normal. No respiratory distress. Breath sounds: Normal breath sounds. No stridor. No wheezing or rales. Abdominal:      General: Bowel sounds are normal. There is no distension. Palpations: Abdomen is soft. There is no mass. Tenderness: There is no abdominal tenderness. Musculoskeletal:         General: No swelling or deformity. Cervical back: Neck supple. Skin:     General: Skin is warm and dry. Capillary Refill: Capillary refill takes less than 2 seconds. Findings: Lesion (Mild Vesicles still noted; not yet crusted. ) and rash present. Neurological:      General: No focal deficit present. Mental Status: He is alert.           Additional Data:     Labs:  Results from last 7 days   Lab Units 08/21/23  0439   WBC Thousand/uL 6.70   HEMOGLOBIN g/dL 11.7*   HEMATOCRIT % 35.5*   PLATELETS Thousands/uL 193   NEUTROS PCT % 58   LYMPHS PCT % 21   MONOS PCT % 16*   EOS PCT % 5     Results from last 7 days   Lab Units 08/21/23  0439   SODIUM mmol/L 133*   POTASSIUM mmol/L 4.3   CHLORIDE mmol/L 104   CO2 mmol/L 24   BUN mg/dL 26*   CREATININE mg/dL 1.32*   ANION GAP mmol/L 5   CALCIUM mg/dL 8.8   ALBUMIN g/dL 3.6   TOTAL BILIRUBIN mg/dL 0.62   ALK PHOS U/L 63   ALT U/L 10   AST U/L 19   GLUCOSE RANDOM mg/dL 102                       Lines/Drains:  Invasive Devices     Peripheral Intravenous Line  Duration           Peripheral IV 08/16/23 Left;Proximal;Ventral (anterior) Forearm 5 days                      Imaging: Reviewed radiology reports from this admission including: procedure reports    CT spine lumbar without contrast    Result Date: 8/16/2023  Impression: No acute compression collapse of the vertebra Multilevel degenerative disc disease with facet joint disease and multilevel foraminal narrowing Moderate right foraminal narrowing at L5-S1 Workstation performed: [id number]       No Chest XR results available for this patient.      Recent Cultures (last 7 days):         Last 24 Hours Medication List:   Current Facility-Administered Medications   Medication Dose Route Frequency Provider Last Rate   • acetaminophen  650 mg Oral Q6H PRN Phil Hercules, DO     • aspirin  81 mg Oral Daily Phil Hercules, DO     • atorvastatin  40 mg Oral Daily [employer], DO     • cyanocobalamin  100 mcg Oral Daily Phil Hercules DO     • heparin (porcine)  5,000 Units Subcutaneous Q8H [address] Hilaria Mckenzie PA-C     • hydrALAZINE  10 mg Intravenous Q6H PRN Phil Hercules, DO     • HYDROmorphone  0.2 mg Intravenous Q4H PRN Phil Hercules, DO     • labetalol  10 mg Intravenous Q6H PRN hPil Contrerasan, DO     • levothyroxine  88 mcg Oral Daily Phil Contrerasan, DO • lidocaine  1 patch Topical Daily [facility] Diomed[facility], DO     • melatonin  3 mg Oral HS PRN Jeanie Fry PA-C     • metoprolol succinate  25 mg Oral Daily [facility] Diomed[facility], DO     • multi-electrolyte  75 mL/hr Intravenous Continuous [facility] Diomedes, DO 75 mL/hr (08/21/23 1025)   • nicotine  1 patch Transdermal Daily [facility] Diomedes, DO     • nitroglycerin  0.4 mg Sublingual Q5 Min PRN [facility] Diomedes, DO     • ondansetron  4 mg Intravenous Q6H PRN [facility] Diomedes, DO     • oxyCODONE  5 mg Oral Q4H PRN [facility] Diomedes, DO     • oxyCODONE  2.5 mg Oral Q4H PRN [facility]     • senna-docusate sodium  1 tablet Oral HS [facility], DO     • tamsulosin  0.4 mg Oral Daily [employer], DO     • valACYclovir  1,000 mg Oral Q12H [address] [facility] Di[facility], DO          Today, Patient Was Seen By: Eryn Man DO    **Please Note: This note may have been constructed using a voice recognition system. ** not applicable

## 2023-09-19 NOTE — ED PROVIDER NOTE - CPE EDP ENMT NORM
Emergency Department Provider Note  Location: [address] [place] [address] ED  9/19/2023     Patient Identification  Florrie Collet is a 76 y.o. female    Chief Complaint  Generalized Body Aches (Pt arrives via squad from [address] [place] [address] with c/o generalized body pain. Pt states she \"called out for nurses for a long time but no one ever helped so I called 911 myself\". Pt denies pain at this time.)      Mode of Arrival  EMS    HPI  (History provided by patient)  This is a 76 y.o. female with a PMH significant for dementia  presented today for medical evaluation. Patient is currently a patient at [address] [place] [address] facility. States that she tried calling out for nurses since 7 PM however nobody came to help her. States that she just wanted a change in underwear and some Motrin for her chronic left hip pain and low back pain. She denies additional falls. Denies any fever. Denies any cough, chest pain or shortness of breath. Denies any vomiting or abdominal pain. ROS  Review of Systems   All other systems reviewed and are negative. I have reviewed the following nursing documentation:  Allergies: No Known Allergies    Past medical history:  has a past medical history of Falls and Hypertension. Past surgical history:  has a past surgical history that includes shoulder surgery; knee surgery; and Hysterectomy. Home medications:   Prior to Admission medications    Medication Sig Start Date End Date Taking?  Authorizing Provider   lidocaine 4 % external patch Place 1 patch onto the skin daily 6/3/21   Margo Haider MD   amLODIPine (NORVASC) 5 MG tablet Take 1 tablet by mouth daily 5/17/21   Whitney Paul MD   donepezil (ARICEPT) 5 MG tablet Take 10 mg by mouth nightly     Historical Provider, MD   calcium carbonate (OSCAL) 500 MG TABS tablet Take 500 mg by mouth daily    Historical Provider, MD   [place]-3 1000 MG CAPS Take by mouth    Historical Provider, MD   vitamin B-1 (THIAMINE) 100
normal...

## 2023-09-19 NOTE — ED PROVIDER NOTE - BIRTH SEX
Female Adbry Pregnancy And Lactation Text: It is unknown if this medication will adversely affect pregnancy or breast feeding.

## 2023-09-30 ENCOUNTER — EMERGENCY (EMERGENCY)
Facility: HOSPITAL | Age: 63
LOS: 0 days | Discharge: LEFT BEFORE DISCHARGE | End: 2023-10-01
Attending: STUDENT IN AN ORGANIZED HEALTH CARE EDUCATION/TRAINING PROGRAM
Payer: MEDICAID

## 2023-09-30 VITALS
SYSTOLIC BLOOD PRESSURE: 106 MMHG | WEIGHT: 250 LBS | RESPIRATION RATE: 18 BRPM | TEMPERATURE: 98 F | HEIGHT: 61 IN | DIASTOLIC BLOOD PRESSURE: 62 MMHG | HEART RATE: 86 BPM

## 2023-09-30 DIAGNOSIS — M25.561 PAIN IN RIGHT KNEE: ICD-10-CM

## 2023-09-30 DIAGNOSIS — S82.831A OTHER FRACTURE OF UPPER AND LOWER END OF RIGHT FIBULA, INITIAL ENCOUNTER FOR CLOSED FRACTURE: ICD-10-CM

## 2023-09-30 DIAGNOSIS — S80.211A ABRASION, RIGHT KNEE, INITIAL ENCOUNTER: ICD-10-CM

## 2023-09-30 DIAGNOSIS — E11.9 TYPE 2 DIABETES MELLITUS WITHOUT COMPLICATIONS: ICD-10-CM

## 2023-09-30 DIAGNOSIS — Z53.29 PROCEDURE AND TREATMENT NOT CARRIED OUT BECAUSE OF PATIENT'S DECISION FOR OTHER REASONS: ICD-10-CM

## 2023-09-30 DIAGNOSIS — J44.9 CHRONIC OBSTRUCTIVE PULMONARY DISEASE, UNSPECIFIED: ICD-10-CM

## 2023-09-30 DIAGNOSIS — S90.412A ABRASION, LEFT GREAT TOE, INITIAL ENCOUNTER: ICD-10-CM

## 2023-09-30 DIAGNOSIS — Z90.49 ACQUIRED ABSENCE OF OTHER SPECIFIED PARTS OF DIGESTIVE TRACT: Chronic | ICD-10-CM

## 2023-09-30 DIAGNOSIS — M25.562 PAIN IN LEFT KNEE: ICD-10-CM

## 2023-09-30 DIAGNOSIS — M79.671 PAIN IN RIGHT FOOT: ICD-10-CM
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
## 2023-09-30 PROCEDURE — 99285 EMERGENCY DEPT VISIT HI MDM: CPT

## 2023-09-30 NOTE — ED ADULT TRIAGE NOTE - CHIEF COMPLAINT QUOTE
BIBA went out for a cigarette and fell at the basement stairs , complaining of pain on the right knee and right ankle. h/o COPD, DM

## 2023-10-01 VITALS
OXYGEN SATURATION: 96 % | TEMPERATURE: 98 F | HEART RATE: 77 BPM | DIASTOLIC BLOOD PRESSURE: 74 MMHG | RESPIRATION RATE: 20 BRPM | SYSTOLIC BLOOD PRESSURE: 112 MMHG

## 2023-10-01 PROCEDURE — 73590 X-RAY EXAM OF LOWER LEG: CPT | Mod: 26,RT,76

## 2023-10-01 PROCEDURE — 73562 X-RAY EXAM OF KNEE 3: CPT | Mod: 26,RT

## 2023-10-01 PROCEDURE — 73620 X-RAY EXAM OF FOOT: CPT | Mod: 26,50

## 2023-10-01 PROCEDURE — 73600 X-RAY EXAM OF ANKLE: CPT | Mod: 26,RT,76,59

## 2023-10-01 PROCEDURE — 73610 X-RAY EXAM OF ANKLE: CPT | Mod: 26,RT

## 2023-10-01 RX ORDER — ACETAMINOPHEN 500 MG
650 TABLET ORAL ONCE
Refills: 0 | Status: COMPLETED | OUTPATIENT
Start: 2023-10-01 | End: 2023-10-01

## 2023-10-01 RX ORDER — IBUPROFEN 200 MG
600 TABLET ORAL ONCE
Refills: 0 | Status: COMPLETED | OUTPATIENT
Start: 2023-10-01 | End: 2023-10-01

## 2023-10-01 RX ORDER — OXYCODONE AND ACETAMINOPHEN 5; 325 MG/1; MG/1
1 TABLET ORAL ONCE
Refills: 0 | Status: DISCONTINUED | OUTPATIENT
Start: 2023-10-01 | End: 2023-10-01

## 2023-10-01 RX ADMIN — OXYCODONE AND ACETAMINOPHEN 1 TABLET(S): 5; 325 TABLET ORAL at 01:41

## 2023-10-01 RX ADMIN — Medication 600 MILLIGRAM(S): at 02:11

## 2023-10-01 RX ADMIN — OXYCODONE AND ACETAMINOPHEN 1 TABLET(S): 5; 325 TABLET ORAL at 02:11

## 2023-10-01 RX ADMIN — Medication 600 MILLIGRAM(S): at 01:41

## 2023-10-01 NOTE — PHYSICAL THERAPY INITIAL EVALUATION ADULT - LEVEL OF INDEPENDENCE: GAIT, REHAB EVAL
RLE (NOTE: Pt is suppose to do NON wt bearing RLE when standing and  ambulating but does FWB)/minimum assist (75% patients effort)

## 2023-10-01 NOTE — ED ADULT NURSE NOTE - DRUG PRE-SCREENING (DAST -1)
AUDIOLOGY REPORT      Cesar Mcqueen is a 3year old male     Referring Provider: No ref. provider found   YOB: 2014  Medical Record: [id number]      Patient Hearing History:  Patient was seen for follow up hearing testing.        Otoscopic In
Statement Selected

## 2023-10-01 NOTE — ED PROVIDER NOTE - PROGRESS NOTE DETAILS
Palafox DO: pt seen and reassessed, stable, pain free, normal respirations, normal oxygenation on room air. patient informed of care for fibula fx, splint, non weight bearing, need for use of crutches vs walker, pt agreeble, states that she has walker at home and would like to be discharged, will attempt to ambulate pt prior to dc, per ortho, follow up in office recommended in 1 week with recommendation for asa 324mg daily for dvt prevention, Palafox DO: pt seen by PT, recommending subacute rehab placement - patient refusing admission for rehab placement, I spoke w/ pt and pts sister Becky over the phone - patient's sister also unable to convince pt to stay in the hospital, pt is aox3, and states she will sign out against medical advise, I spoke to SW who will arrange transport for patient     The patient has decided to leave against medical advice.  The patient is AAOx3, not intoxicated, and displays normal decision making ability. We discussed all risks, benefits, and alternatives to the progression of treatment and the potential dangers of leaving including but not limited to permanent disability, injury, and death.  The patient was instructed that they are welcome to change their decision to leave against medical advice and return to the emergency department at any time and for any reason in order to allow us to render care.

## 2023-10-01 NOTE — PHYSICAL THERAPY INITIAL EVALUATION ADULT - PERTINENT HX OF CURRENT PROBLEM, REHAB EVAL
Pt is admitted with c/o leg pain, pt states she had recent fall, cast on R lower leg/foot, dx fx R prox fibula, Non wt bearing RLE status.

## 2023-10-01 NOTE — PHYSICAL THERAPY INITIAL EVALUATION ADULT - PERSONAL SAFETY AND JUDGMENT, REHAB EVAL
Pt is impulsive, unsteady gait, not maintaining NON wt beairng status on RLE but instead ambulates with full wt bearing on RLE./impaired/at risk behaviors demonstrated

## 2023-10-01 NOTE — PHYSICAL THERAPY INITIAL EVALUATION ADULT - RISK REDUCTION/PREVENTION, PT EVAL
NWB RLE due to dx of fx prox R fibula but patient ambulates with FWB RLE/risk factors/recurrence of condition/secondary impairments

## 2023-10-01 NOTE — PHYSICAL THERAPY INITIAL EVALUATION ADULT - GENERAL OBSERVATIONS, REHAB EVAL
Pt is alert, oriented x 4, follow instructions, not in distress, cast on RLE, bruises on left knee with some swelling.

## 2023-10-01 NOTE — PHYSICAL THERAPY INITIAL EVALUATION ADULT - GAIT DEVIATIONS NOTED, PT EVAL
RLE (NOTE: Pt is suppose to do NON wt bearing RLE when standing and  ambulating but does FWB)/decreased zahira/decreased velocity of limb motion/decreased step length/decreased stride length

## 2023-10-01 NOTE — PHYSICAL THERAPY INITIAL EVALUATION ADULT - BALANCE TRAINING, PT EVAL
Improve standing and ambulation to independent  and improve balance to good using appropriate assistive device and maintain NWB RLE.

## 2023-10-01 NOTE — ED PROVIDER NOTE - OBJECTIVE STATEMENT
63F PMH DM, COPD BIBEMS d/t R foot, ankle, knee pain s/p fall in backyard PTA. Pt reports going outside for cigarette, states felt R ankle roll beneath her, pt unable to get up, called 911. Denies head strike, no LOC. Pt denies h/a, dizziness, vision change, neck pain, CP, SOB, abd pain, back pain, N/V. Endorses generalized soreness. Pt w/ BL eye crusting, dry cracked lips - states currently being treated w/ eye drops for conjunctivitis.    PMH as above, PSH none, NKDA, meds as listed.

## 2023-10-01 NOTE — ED PROVIDER NOTE - PATIENT PORTAL LINK FT
You can access the FollowMyHealth Patient Portal offered by [facility] by registering at the following website: [email or web address]. By joining [facility]’s FollowMyHealth portal, you will also be able to view your health information using other applications (apps) compatible with our system.

## 2023-10-01 NOTE — ED ADULT NURSE NOTE - NSFALLRISKINTERV_ED_ALL_ED

## 2023-10-01 NOTE — PHYSICAL THERAPY INITIAL EVALUATION ADULT - DIAGNOSIS, PT EVAL
Pt presented with strength deficits in the LE, decreased standing and ambulation balance, NWB status on RLE due to fx of fibula BUT UNABLE to maintain non wt bearing status because of difficulty and  does full weight bearing on RLE instead. (Dr Javy MD and Nurse SERGIO Catherine informed)

## 2023-10-01 NOTE — PHYSICAL THERAPY INITIAL EVALUATION ADULT - CRITERIA FOR SKILLED THERAPEUTIC INTERVENTIONS
recent fall, NWB RLE, difficulty in bed mobility , transfers and ambulation , fall riskl/impairments found/functional limitations in following categories/risk reduction/prevention/anticipated discharge recommendation

## 2023-10-01 NOTE — ED PROVIDER NOTE - CLINICAL SUMMARY MEDICAL DECISION MAKING FREE TEXT BOX
63F PMH DM, COPD BIBEMS d/t R foot / ankle / knee pain s/p fall PTA. Afebrile, VSS. Exam as noted in PE. Plan for XR imaging, pain control. Re-eval. 63F PMH DM, COPD BIBEMS d/t R foot / ankle / knee pain s/p fall PTA. Afebrile, VSS. Exam as noted in PE. Plan for XR imaging, pain control. Re-eval.  XR w/ + R prox fib fx, ? [place] fx. Ortho consulted, will eval pt in ED. Pt care signed out at change of shift.

## 2023-10-01 NOTE — PHYSICAL THERAPY INITIAL EVALUATION ADULT - LIVES WITH, PROFILE
Pt states she lives in an apt building with , has 3 steps with rails to enter the building, stays on the main floor and does not use any steps. e/spouse

## 2023-10-01 NOTE — ED PROVIDER NOTE - PHYSICAL EXAMINATION
GEN: Awake, alert, interactive, NAD.  HEAD AND NECK: NC/AT. Airway patent. Neck supple.   EYES:  Clear b/l. EOMI. PERRL.   ENT: Moist mucus membranes.   CARDIAC: Regular rate, regular rhythm. No evident pedal edema.    RESP/CHEST: Normal respiratory effort with no use of accessory muscles or retractions. Clear throughout on auscultation.  ABD: Soft, non-distended, non-tender. No rebound, no guarding.   BACK: No midline spinal TTP. No CVAT.   EXTREMITIES: Moving all extremities with no apparent deformities. + significant edema / ecchymosis over R lateral malleolus.   SKIN: + abrasion R knee, L great toe.   NEURO: AOx3, CN II-XII grossly intact, no focal deficits.   PSYCH: Appropriate mood and affect.

## 2023-10-01 NOTE — PHYSICAL THERAPY INITIAL EVALUATION ADULT - GAIT TRAINING, PT EVAL
Improve ambulation distance up to 200 feet or more, improve balance to good, gait pattern with use of assistive device and maintain NWB status.

## 2023-10-01 NOTE — ED PROVIDER NOTE - CARE PROVIDER_API CALL
Curt Stephens  Orthopaedic Surgery  [address] [place] [address], [address]  [place] [address]  Phone: [phone]  Fax: [phone]  Follow Up Time: 4-6 Days

## 2023-10-01 NOTE — PHYSICAL THERAPY INITIAL EVALUATION ADULT - IMPAIRMENTS FOUND, PT EVAL
NON wt beaing on RLE but patient has difficulty maintaining the wt bearing order, instead she does FWB when standing and ambulating./aerobic capacity/endurance/ergonomics and body mechanics/gait, locomotion, and balance/muscle strength/poor safety awareness

## 2023-10-01 NOTE — PHYSICAL THERAPY INITIAL EVALUATION ADULT - PATIENT/FAMILY AGREES WITH PLAN
PT recommending short term rehab due to fall risk BUT pt is adamant that she would rather go home./no

## 2023-10-01 NOTE — PHYSICAL THERAPY INITIAL EVALUATION ADULT - NSPTDISCHREC_GEN_A_CORE
due to strength deficits in the RLE, difficulty in bed mobility , transfers and ambulation, unsteady gait fall risk, difficulty maintaining NWB on RLE and does FWB instead when standing and ambulating even using a rolling walker ./Sub-acute Rehab

## 2023-10-01 NOTE — PHYSICAL THERAPY INITIAL EVALUATION ADULT - WEIGHT-BEARING RESTRICTIONS: BED/CHAIR, REHAB EVAL
Memo Lawrence is a 80 y.o. female and presents with Anorexia (pt daughter states that the pt barely has an appetite); Fall (pt daughter states pt had a fall on 6/14/19); and ED Follow-up (for cold symptoms [facility] on 6/14/19. .. )    Subjective:  Pt here with no acute complaints. However, daughter concerned for mother's persistent report of right arm pain. Same arm with restricted movement following CVA years ago. No neck pain reported. Pain Scale: 0 - No pain/10. No treatments tried per pt report. H/o cervical spondylosis. Pt also with fall recently. Pt unsure of situation around fall. Daughter reports she tried to stand too quickly. No head injury or LOC reported. No subsequent pain. Needs updated order for hospital bed. In use since CVA years ago. Unable to tolerate regular bed or height with chronic knee pain and right arm weakness. Needs to reposition often to avoid bed sores with use of incontinence pads/diapers. Seen in ER for cold, prescribed meds. Not done yet, but feeling better. No pneumonia reportedly. Lastly daughter reports decreased appetite also. No weight loss however. Review of Systems  Constitutional: negative for fevers, chills, anorexia and weight loss  Respiratory:  negative for cough, hemoptysis, dyspnea, and wheezing  CV:   +HTN. negative for chest pain, palpitations, and lower extremity edema  GI:   negative for nausea, vomiting, diarrhea, abdominal pain, and melena  Endo:               + thyroid dysfunction, followed by Dr. Tyrese Rowell.  negative for polyuria,polydipsia,polyphagia, and heat intolerance  Genitourinary: negative for frequency, urgency, dysuria, retention, and hematuria  Integument:  negative for rash, ulcerations  Hematologic:  negative for easy bruising and bleeding  Musculoskel: negative for muscle weakness  Neurological:  negative for headaches, dizziness, vertigo,and memory problems  Behavl/Psych: negative for feelings of anxiety, depression, suicide, and mood changes    Past Medical History:   Diagnosis Date    Cancer [facility])     right breast.    Chronic pain     CVA     HTN (hypertension) 2/9/2011    Hypertension     Microscopic hematuria 5/11/2011    Other and unspecified hyperlipidemia 2/9/2011    Subclinical hyperthyroidism 8/29/2015    Venous thrombosis 2/17/2010    left arm DVT     Past Surgical History:   Procedure Laterality Date    BREAST SURGERY PROCEDURE UNLISTED      HX HEENT      HX HEMORRHOIDECTOMY       Social History     Socioeconomic History    Marital status:      Spouse name: Not on file    Number of children: Not on file    Years of education: Not on file    Highest education level: Not on file   Tobacco Use    Smoking status: Former Smoker    Smokeless tobacco: Never Used    Tobacco comment: smoked for 10 yrs   Substance and Sexual Activity    Alcohol use: Yes     Alcohol/week: 0.5 oz     Types: 1 Cans of beer per week     Comment: occ    Drug use: No    Sexual activity: Never     Family History   Problem Relation Age of Onset    Stroke Mother     Stroke Father     Diabetes Daughter      Current Outpatient Medications   Medication Sig Dispense Refill    diclofenac (VOLTAREN) 1 % gel Apply 2 g to affected area every six (6) hours. 100 g 2    acetaminophen (TYLENOL ARTHRITIS PAIN) 650 mg TbER Take 1 Tab by mouth three (3) times daily as needed for Pain. Indications: Pain associated with Arthritis 100 Tab 3    multivitamin (ONE A DAY) tablet Take 1 Tab by mouth daily. Indications: Treatment To Prevent Vitamin Deficiency 90 Tab 3    azithromycin (ZITHROMAX) 250 mg tablet Take two tablets today then one tablet daily 6 Tab 0    benzonatate (TESSALON PERLES) 100 mg capsule Take 1 Cap by mouth three (3) times daily as needed for Cough for up to 7 days. 15 Cap 0    metoprolol succinate (TOPROL-XL) 50 mg XL tablet Take 1 Tab by mouth daily.  90 Tab 3    methIMAzole (TAPAZOLE) 5 mg tablet Take 1 Tab by mouth every Monday, Wednesday, Friday. 30 Tab 11    simvastatin (ZOCOR) 20 mg tablet Take 1 Tab by mouth nightly. 90 Tab 3    lubiPROStone (AMITIZA) 24 mcg capsule Take 1 Cap by mouth two (2) times daily (with meals). Indications: chronic idiopathic constipation 60 Cap 5    lisinopril (PRINIVIL, ZESTRIL) 20 mg tablet Take 1 Tab by mouth daily. 90 Tab 3    docusate sodium (COLACE) 100 mg capsule Take 1 Cap by mouth two (2) times a day. 60 Cap 11    amLODIPine (NORVASC) 5 mg tablet Take 1 Tab by mouth daily. 30 Tab 11    anastrozole (ARIMIDEX) 1 mg tablet TAKE 1 TABLET BY MOUTH DAILY  3    ergocalciferol (ERGOCALCIFEROL) 50,000 unit capsule Take 1 Cap by mouth every seven (7) days. 12 Cap 4    mometasone (ELOCON) 0.1 % topical cream Apply  to affected area daily. 15 g 1    ranitidine (ZANTAC) 150 mg tablet Take 1 Tab by mouth two (2) times daily as needed for Indigestion. Take for acid reflux 60 Tab 11    gabapentin (NEURONTIN) 100 mg capsule TAKE 1 CAP BY MOUTH NIGHTLY AS NEEDED. 20 Cap 1    hydrocortisone (ANUSOL-HC) 2.5 % rectal cream Insert  into rectum four (4) times daily. 45 g 5    aspirin delayed-release 81 mg tablet Take 1 Tab by mouth daily. 90 Tab 3    [facility]. 1 Each 0    naloxone (NARCAN) 4 mg/actuation nasal spray Use 1 spray into 1 nostril for OVERDOSE. Call 911. For subsequent doses, give in alternating nostrils. May repeat every 2 to 3 min. 2 Each 0     No Known Allergies    Objective:  Visit Vitals  /72 (BP 1 Location: Left arm, BP Patient Position: Sitting)   Pulse 68   Temp 98.4 °F (36.9 °C) (Oral)   Resp 17   Ht 5' 1\" (1.549 m)   Wt 128 lb (58.1 kg)   SpO2 98%   BMI 24.19 kg/m²     Wt Readings from Last 3 Encounters:   06/17/19 128 lb (58.1 kg)   06/14/19 130 lb (59 kg)   04/17/19 130 lb (59 kg)     Physical Exam:    General appearance - alert, well appearing, and in no distress. Seated comfortably in W/C.   Mental status - A/O x 4, normal mood and affect. Neck -Supple ,normal CSP. FROM, non-tender. No significant adenopathy/thyromegaly. No JVD. Right  weaker with mild contracture noted. Chest - CTA. Symmetric chest rise. No wheezing, rales or rhonchi. Heart - Normal rate, regular rhythm. Normal S1, S2. No MGR or clicks. Ext- Radial, DP pulses, 2+ bilaterally. No pedal edema, clubbing, or cyanosis. Skin-Warm and dry. No clubbing or cyanosis. Hyperpigmentation to multiple nevi and skin tags. Neuro - Normal speech, no focal findings or movement disorder. Normal strength, gait, and muscle tone. Knees- LROM to full extension. NT. No crepitus. Assessment/Plan:  Reviewed past CSP imaging, cervical spondylosis noted. Home PT ordered. Use of voltaren gel and tylenol advised. Labs ordered, but use of MVI advised. Fall prevention discussed. I spent greater than 50% of 40 minute visit counseling patient about above mentioned topics. Medication Side Effects and Warnings were discussed with patient: yes   Patient Labs were reviewed: yes  Patient Past Records were reviewed: yes    See below for other orders   Follow-up and Dispositions    · Return if symptoms worsen or fail to improve. ICD-10-CM ICD-9-CM    1. Decreased appetite R63.0 783.0 MAGNESIUM      TSH 3RD GENERATION      VITAMIN B12 & FOLATE      METABOLIC PANEL, BASIC   2. Bilateral chronic knee pain M25.561 719.46 REFERRAL TO HOME HEALTH    M25.562 338.29 diclofenac (VOLTAREN) 1 % gel    G89.29  acetaminophen (TYLENOL ARTHRITIS PAIN) 650 mg TbER   3. Right arm pain M79.601 729.5 REFERRAL TO HOME HEALTH   4. Facet arthropathy, cervical M47.812 721.0 REFERRAL TO HOME HEALTH      diclofenac (VOLTAREN) 1 % gel      acetaminophen (TYLENOL ARTHRITIS PAIN) 650 mg TbER   5. History of recent fall Z91.81 V15.88 VITAMIN B12 & FOLATE      METABOLIC PANEL, BASIC   6. History of stroke with current residual effects I69.30 438.9     right arm contracture and weakness   7.  HTN (hypertension), benign I10 401.1    8. Subclinical hyperthyroidism E05.90 242.90    9. Urinary incontinence, unspecified type R32 788.30      Orders Placed This Encounter    MAGNESIUM    TSH 3RD GENERATION    VITAMIN B12 & FOLATE    METABOLIC PANEL, BASIC    REFERRAL TO HOME HEALTH     Referral Priority:   Routine     Referral Type:   Home Health Evaluation     Referral Reason:   Continuity of Care     Number of Visits Requested:   1    diclofenac (VOLTAREN) 1 % gel     Sig: Apply 2 g to affected area every six (6) hours. Dispense:  100 g     Refill:  2    acetaminophen (TYLENOL ARTHRITIS PAIN) 650 mg TbER     Sig: Take 1 Tab by mouth three (3) times daily as needed for Pain. Indications: Pain associated with Arthritis     Dispense:  100 Tab     Refill:  3    multivitamin (ONE A DAY) tablet     Sig: Take 1 Tab by mouth daily. Indications: Treatment To Prevent Vitamin Deficiency     Dispense:  90 Tab     Refill:  3       Alfred Doll expressed understanding of plan. An After Visit Summary was offered/printed and given to the patient. The patient's abnormal BMI was reviewed and deemed target BMI for the patient. An After Visit Summary was provided to the patient and/or caregiver. RLE (NOTE: Pt is suppose to do NON wt bearing RLE when standing and  ambulating but does FWB)/nonweight-bearing

## 2023-10-01 NOTE — ED PROVIDER NOTE - NSFOLLOWUPINSTRUCTIONS_ED_ALL_ED_FT
You were seen today for rib fibula fracture - this is a fracture near the top of your leg - you need to be in a splint at all times, and do not place weight on the leg or the fracture can move and worsen. We recommend use of walker or crutches . Follow up with orthopedist within one week.     Rest, ice, elevate the leg to decrease swelling    Return for any severe pain, fevers, shortness of breath, or swelling or numbness or worsening symptoms     Take aspirin 325mg daily for blood clot prevention    Do not get splint wet     For pain control     Take ibuprofen 400 mg every 8 hours as needed for pain with food and plenty of water for up to 5 days  Take tylenol 650 mg every 6 hours as needed for pain, max daily dose 3250 mg/day. You were seen today for right fibula fracture - this is a fracture near the top of your leg - you need to be in a splint at all times, and do not place weight on the leg or the fracture can move and worsen. We recommend use of walker or crutches . Follow up with orthopedist within one week.     Rest, ice, elevate the leg to decrease swelling    Return for any severe pain, fevers, shortness of breath, or swelling or numbness or worsening symptoms     Take aspirin 325mg daily for blood clot prevention. This can increase risk of bleeding if you fall so it is very important to prevent falls, if you feel that  you may fall then please ask for help or return to the hospital. Physical therapy recommended subacute rehab placement but you refused, you are welcome to come back at any time     Do not get splint wet     For pain control     Take ibuprofen 400 mg every 8 hours as needed for pain with food and plenty of water for up to 5 days  Take tylenol 650 mg every 6 hours as needed for pain

## 2023-10-01 NOTE — PHYSICAL THERAPY INITIAL EVALUATION ADULT - WEIGHT-BEARING RESTRICTIONS: STAND/SIT, REHAB EVAL
RLE (NOTE: Pt is suppose to do NON wt bearing RLE when standing and  ambulating but does FWB)/nonweight-bearing

## 2023-10-01 NOTE — ED ADULT NURSE NOTE - OBJECTIVE STATEMENT
pt is Aox3, ambulatory, pt came in s/p fall occurring tonight. pt states she stepped outside to have a cigarette when she tripped and fell on the cement. pt states she hit her head, denies LOC, denies blood thinners. pt is currently complaining of R ankle pain and rates it a 10/10, denies taking medication prior to arrival in the ED. pt has abrasions to the R foot and bilateral knees. pt denies CP, SOB, fever, chills, N/V/D at this time. pt has pmh of COPD, DM pt is Aox3, ambulatory, pt came in s/p fall occurring tonight. pt states she stepped outside to have a cigarette when she tripped and fell on the cement. pt states she hit her head, denies LOC, denies blood thinners. pt is currently complaining of R ankle pain and R knee pain, and rates it a 10/10, denies taking medication prior to arrival in the ED. pt has abrasions to the R foot and bilateral knees. pt denies CP, SOB, fever, chills, N/V/D at this time. pt has pmh of COPD, DM

## 2023-10-01 NOTE — PHYSICAL THERAPY INITIAL EVALUATION ADULT - STRENGTHENING, PT EVAL
Improve strength in the extremities to good, improve endurance to good and improve overall ability to do tasks safely using assistive device and prevent falls.

## 2023-10-01 NOTE — CONSULT NOTE ADULT - SUBJECTIVE AND OBJECTIVE BOX
63y Female community ambulatory presents c/o R ankle pain sp mechanical fall. Denies HS/LOC. Denies numbness/tingling. No other pain/injuries. Denies fevers/chills.     HEALTH ISSUES - PROBLEM Dx:  DM  COPD  Morbid obesity       MEDICATIONS  (STANDING):    Allergies    No Known Allergies    Intolerances      Vital Signs Last 24 Hrs  T(C): 37.1 (10-01-23 @ 06:00), Max: 37.1 (10-01-23 @ 06:00)  T(F): 98.8 (10-01-23 @ 06:00), Max: 98.8 (10-01-23 @ 06:00)  HR: 71 (10-01-23 @ 06:00) (71 - 86)  BP: 125/80 (10-01-23 @ 06:00) (106/62 - 125/80)  BP(mean): --  RR: 15 (10-01-23 @ 06:00) (15 - 18)  SpO2: 95% (10-01-23 @ 06:00) (94% - 95%)    Imaging:   XR demonstrates R proximal fibular maisonnuve fracture pattern that appears to open up on stress     Physical Exam  Gen: Nad  R LE: Skin intact, +TTP lateral malleolus and prox fibula, no bony ttp elsewhere, +ehl/fhl/ta/gs function, L3-S1 SILT, dp/pt pulse intact, negative log roll, compartments soft/compressive, extremity warm/well perfused, abrasion to R foot , abrasion to left knee   Secondary Survey: No TTP bony prominences with full AROM at baseline per patient, SILT diffusely, Capillary refill brisk, compartments soft and compressible, able to SLR with contralateral leg, no ttp axial spine.     A/P: 63y Female with R proximal fibular maisonnuve fracture pattern that appears to open up on stress   Pain control  NWB RLE in splint, keep c/d/I, cane/crutches/walker as needed  Ice/elevation  Si/sx compartment syndrome discussed with patient, told to return to ED if exhibit any  Given significant comorbidities and anatomic alignment in splint, recommending a trial of nonoperative management at this time   qd for 30 days   No acute orthopaedic surgical intervention indicated at this time. This patient is orthopaedically stable for discharge.   Patient to follow up with Dr. Stephens as an outpatient for further evaluation and management.   All of the patient's questions and concerns were answered and addressed.

## 2023-10-02 ENCOUNTER — INPATIENT (INPATIENT)
Facility: HOSPITAL | Age: 63
LOS: 6 days | Discharge: NURSING, REHAB OR LONG-TERM CARE | End: 2023-10-09
Attending: STUDENT IN AN ORGANIZED HEALTH CARE EDUCATION/TRAINING PROGRAM | Admitting: STUDENT IN AN ORGANIZED HEALTH CARE EDUCATION/TRAINING PROGRAM
Payer: MEDICAID

## 2023-10-02 VITALS
OXYGEN SATURATION: 97 % | HEIGHT: 61 IN | SYSTOLIC BLOOD PRESSURE: 139 MMHG | DIASTOLIC BLOOD PRESSURE: 81 MMHG | RESPIRATION RATE: 20 BRPM | WEIGHT: 199.96 LBS | TEMPERATURE: 98 F | HEART RATE: 72 BPM

## 2023-10-02 DIAGNOSIS — Z90.49 ACQUIRED ABSENCE OF OTHER SPECIFIED PARTS OF DIGESTIVE TRACT: Chronic | ICD-10-CM

## 2023-10-02 LAB
ALBUMIN SERPL ELPH-MCNC: 4 G/DL — SIGNIFICANT CHANGE UP (ref 3.3–5)
ALP SERPL-CCNC: 78 U/L — SIGNIFICANT CHANGE UP (ref 40–120)
ALT FLD-CCNC: 23 U/L — SIGNIFICANT CHANGE UP (ref 12–78)
ANION GAP SERPL CALC-SCNC: 6 MMOL/L — SIGNIFICANT CHANGE UP (ref 5–17)
AST SERPL-CCNC: 40 U/L — HIGH (ref 15–37)
BASOPHILS # BLD AUTO: 0.06 K/UL — SIGNIFICANT CHANGE UP (ref 0–0.2)
BASOPHILS NFR BLD AUTO: 0.6 % — SIGNIFICANT CHANGE UP (ref 0–2)
BILIRUB SERPL-MCNC: 1.3 MG/DL — HIGH (ref 0.2–1.2)
BUN SERPL-MCNC: 14 MG/DL — SIGNIFICANT CHANGE UP (ref 7–23)
CALCIUM SERPL-MCNC: 8.9 MG/DL — SIGNIFICANT CHANGE UP (ref 8.5–10.1)
CHLORIDE SERPL-SCNC: 101 MMOL/L — SIGNIFICANT CHANGE UP (ref 96–108)
CO2 SERPL-SCNC: 34 MMOL/L — HIGH (ref 22–31)
CREAT SERPL-MCNC: 1.18 MG/DL — SIGNIFICANT CHANGE UP (ref 0.5–1.3)
EGFR: 52 ML/MIN/1.73M2 — LOW
EOSINOPHIL # BLD AUTO: 0.08 K/UL — SIGNIFICANT CHANGE UP (ref 0–0.5)
EOSINOPHIL NFR BLD AUTO: 0.8 % — SIGNIFICANT CHANGE UP (ref 0–6)
GLUCOSE SERPL-MCNC: 77 MG/DL — SIGNIFICANT CHANGE UP (ref 70–99)
HCT VFR BLD CALC: 52.3 % — HIGH (ref 34.5–45)
HGB BLD-MCNC: 16.7 G/DL — HIGH (ref 11.5–15.5)
IMM GRANULOCYTES NFR BLD AUTO: 0.4 % — SIGNIFICANT CHANGE UP (ref 0–0.9)
LYMPHOCYTES # BLD AUTO: 1.44 K/UL — SIGNIFICANT CHANGE UP (ref 1–3.3)
LYMPHOCYTES # BLD AUTO: 14.7 % — SIGNIFICANT CHANGE UP (ref 13–44)
MCHC RBC-ENTMCNC: 30.1 PG — SIGNIFICANT CHANGE UP (ref 27–34)
MCHC RBC-ENTMCNC: 31.9 G/DL — LOW (ref 32–36)
MCV RBC AUTO: 94.4 FL — SIGNIFICANT CHANGE UP (ref 80–100)
MONOCYTES # BLD AUTO: 0.61 K/UL — SIGNIFICANT CHANGE UP (ref 0–0.9)
MONOCYTES NFR BLD AUTO: 6.2 % — SIGNIFICANT CHANGE UP (ref 2–14)
NEUTROPHILS # BLD AUTO: 7.58 K/UL — HIGH (ref 1.8–7.4)
NEUTROPHILS NFR BLD AUTO: 77.3 % — HIGH (ref 43–77)
NRBC # BLD: 0 /100 WBCS — SIGNIFICANT CHANGE UP (ref 0–0)
PLATELET # BLD AUTO: 153 K/UL — SIGNIFICANT CHANGE UP (ref 150–400)
POTASSIUM SERPL-MCNC: 3.6 MMOL/L — SIGNIFICANT CHANGE UP (ref 3.5–5.3)
POTASSIUM SERPL-SCNC: 3.6 MMOL/L — SIGNIFICANT CHANGE UP (ref 3.5–5.3)
PROT SERPL-MCNC: 7.7 GM/DL — SIGNIFICANT CHANGE UP (ref 6–8.3)
RBC # BLD: 5.54 M/UL — HIGH (ref 3.8–5.2)
RBC # FLD: 16.6 % — HIGH (ref 10.3–14.5)
SODIUM SERPL-SCNC: 141 MMOL/L — SIGNIFICANT CHANGE UP (ref 135–145)
WBC # BLD: 9.81 K/UL — SIGNIFICANT CHANGE UP (ref 3.8–10.5)
WBC # FLD AUTO: 9.81 K/UL — SIGNIFICANT CHANGE UP (ref 3.8–10.5)

## 2023-10-02 PROCEDURE — 99285 EMERGENCY DEPT VISIT HI MDM: CPT

## 2023-10-02 RX ORDER — SODIUM CHLORIDE 9 MG/ML
1000 INJECTION INTRAMUSCULAR; INTRAVENOUS; SUBCUTANEOUS ONCE
Refills: 0 | Status: COMPLETED | OUTPATIENT
Start: 2023-10-02 | End: 2023-10-02

## 2023-10-02 RX ORDER — ONDANSETRON 8 MG/1
4 TABLET, FILM COATED ORAL EVERY 8 HOURS
Refills: 0 | Status: DISCONTINUED | OUTPATIENT
Start: 2023-10-02 | End: 2023-10-09

## 2023-10-02 RX ORDER — LANOLIN ALCOHOL/MO/W.PET/CERES
3 CREAM (GRAM) TOPICAL AT BEDTIME
Refills: 0 | Status: DISCONTINUED | OUTPATIENT
Start: 2023-10-02 | End: 2023-10-09

## 2023-10-02 RX ORDER — ACETAMINOPHEN 500 MG
650 TABLET ORAL EVERY 6 HOURS
Refills: 0 | Status: DISCONTINUED | OUTPATIENT
Start: 2023-10-02 | End: 2023-10-09

## 2023-10-02 RX ADMIN — SODIUM CHLORIDE 1000 MILLILITER(S): 9 INJECTION INTRAMUSCULAR; INTRAVENOUS; SUBCUTANEOUS at 16:03

## 2023-10-02 NOTE — ED ADULT NURSE NOTE - ED STAT RN HANDOFF DETAILS
Report given to Tammy HILL. Pt A&O x4 no acute distress noted. Safety maintained. Assessment ongoing

## 2023-10-02 NOTE — ED PROVIDER NOTE - OBJECTIVE STATEMENT
64 y/o F with PMH DM, COPD, here yesterday for proximal fibular fx, seen by ortho and splinted, presents for eval explaining that while she is able to walk, it is painful with the fracture.   +worse with ambulation, better at rest.  has been using cane.   no additional falls, additional pain,, fever, cp, sob, back pain, ab pain, n/v, redness, swelling, warmth, calf pain 64 y/o F with PMH DM, COPD, here yesterday for proximal fibular fx, seen by ortho and splinted, presents for inability to ambulate 2/2 pain   no additional falls, additional pain,, fever, cp, sob, back pain, ab pain, n/v, redness, swelling, warmth, calf pain

## 2023-10-02 NOTE — ED PROVIDER NOTE - CLINICAL SUMMARY MEDICAL DECISION MAKING FREE TEXT BOX
+closed fibular fx  able to ambulate with cane-- steady gait.   Reviewed all results and necessity for follow up. Counseled on red flags and to return for them.  Patient appears well on discharge. +closed fibular fx  unable to ambulate

## 2023-10-02 NOTE — ED ADULT NURSE REASSESSMENT NOTE - NS ED NURSE REASSESS COMMENT FT1
when pt sister was made aware that pt was being d/c'ed she requested that pt remain in hospital as she feels its in the pts best interest to stay and receive care/rehab as she will not be cared for at home. RN made YAMINI Leonard aware and MD Hoyt and they confirmed they will speak with pt and family.

## 2023-10-02 NOTE — ED ADULT NURSE NOTE - NSFALLRISKINTERV_ED_ALL_ED
Assistance OOB with selected safe patient handling equipment if applicable/Assistance with ambulation/Communicate fall risk and risk factors to all staff, patient, and family/Monitor gait and stability/Provide visual cue: yellow wristband, yellow gown, etc/Reinforce activity limits and safety measures with patient and family/Call bell, personal items and telephone in reach/Instruct patient to call for assistance before getting out of bed/chair/stretcher/Non-slip footwear applied when patient is off stretcher/[place] to call system/Physically safe environment - no spills, clutter or unnecessary equipment/Purposeful Proactive Rounding/Room/bathroom lighting operational, light cord in reach

## 2023-10-02 NOTE — ED ADULT NURSE NOTE - OBJECTIVE STATEMENT
63yF A&Ox3 presenting to ED after being d/c yesterday, d/t pain from right foot. pt reports that she fell and broke her right foot and injured the whole right side of her body when she fell. pt foot is wrapped and she reports having difficulty bearing weight on that leg/foot. pt requires additional med eval at this time, including labwork and possible rehab referral.

## 2023-10-02 NOTE — ED PROVIDER NOTE - PHYSICAL EXAMINATION
PHYSICAL EXAM:    GENERAL: Alert, appears stated age, well appearing, non-toxic  SKIN: Warm, and dry. MMM.   HEAD: NC, AT  EYE: Normal lids/conjunctiva  ENT: Normal hearing, patent oropharynx   NECK: +supple. No meningismus  Pulm: Bilateral BS, normal resp effort CTAB  CV: RRR, no M/R/G, 2+and = radial pulses  Mskel: no erythema, cyanosis, edema. no calf tenderness 2+ dp pulses   Neuro: AAOx3, no sensory/motor deficits, ambulatory with cane and splint in place PHYSICAL EXAM:    GENERAL: Alert, appears stated age, well appearing, non-toxic  SKIN: Warm, and dry. MMM.   HEAD: NC, AT  EYE: Normal lids/conjunctiva  ENT: Normal hearing, patent oropharynx   NECK: +supple. No meningismus  Pulm: Bilateral BS, normal resp effort CTAB  CV: RRR, no M/R/G, 2+and = radial pulses  Mskel: no erythema, cyanosis, edema. no calf tenderness 2+ dp pulses   Neuro: AAOx3, no sensory/motor deficits unable to ambulate

## 2023-10-02 NOTE — ED ADULT NURSE NOTE - BEFAST SPEECH PHRASE
[facility]  History and Physical/Consultation  Podiatry    Referring Physician : MD Brigette Davis Gillian 57 RECORD NUMBER:  [id number]  AGE: 80 y.o. GENDER: female  : 1935  EPISODE DATE:  2018  Subjective:     Chief Complaint   Patient presents with    Wound Check     Rt leg         HISTORY of PRESENT ILLNESS HPI     Norma Eng is a 80 y.o. female who presents today for wound/ulcer evaluation. History of Wound Context:  The patient has had a wound of trauma which was first noted approximately 3 weeks. This has been treated polysporin. On their initial visit to the wound healing center, 18, the patient has noted that the wound has not been improving. The patient has had wounds similar to this in the past.        Pt is currently on abx.       Wound/Ulcer Pain Timing/Severity: constant  Quality of pain: aching  Severity:  2 / 10   Modifying Factors: pain  Associated Signs/Symptoms: edema, erythema and drainage    Ulcer Identification:  Ulcer Type: traumatic  Contributing Factors: edema and venous stasis    Diabetic/Pressure/Non Pressure Ulcers onl y:  Ulcer: Non-Pressure ulcer, fat layer exposed    If patient has diabetic lower extremity wounds  Lock Classification of diabetic lower extremity wounds:    Grade Description   []  0 No open wound   []  1 Superficial ulcer involving the full skin thickness   []  2 Deep ulcer involves ligament, tendon, joint capsule, or fascia  No bone involvement or abscess presence   []  3 Deep Ulcer with abcess formation and/or osteomyelitis   []  4 Localized gangrene   []  5 Extensive gangrene of the foot     Wound: Crush Injury        PAST MEDICAL HISTORY      Diagnosis Date    Atrial fibrillation (HCC)     C. 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; Dr Kylee Martin; no plans for surgery    Neuropathy     chronic; triggered by Flagyl     Osteoporosis     PAF (paroxysmal atrial fibrillation) (HCC)     anticoagulation per cardiology     Rheumatoid arthritis with rheumatoid factor ([facility] [address]     Rheumatoid arthritis(714.0)     Dr Murray Dad; on DMD Terry Keen)    Rib fractures 2014    Skin cancer of lip     squamous cell    Staph infection 2017    left ankle    Tachyarrhythmia     PSVT; later A-fib; sees Dr Asiya Weiner      Past Surgical History:   Procedure Laterality Date    CARDIAC CATHETERIZATION      CARPAL TUNNEL RELEASE       SECTION      COLONOSCOPY  2011    Dr Violeta Olivas; diverticular disease; repeat in 2016    GASTRIC FUNDOPLICATION  [address], TOTAL ABDOMINAL      TOTAL KNEE ARTHROPLASTY  2007    bilateral     Family History   Problem Relation Age of Onset    Heart Attack Mother 58    Other Father         suicide, depression, leg trouble    Other Sister         Dementia    Hypertension Sister     Hypertension Sister     Hypertension Brother     Thyroid Disease Sister     Rheum Arthritis Sister     Rheum Arthritis Brother     Other Daughter         hemochromatosis     Other Son         hemachromatosis      Social History   Substance Use Topics    Smoking status: Never Smoker    Smokeless tobacco: Never Used    Alcohol use No     Allergies   Allergen Reactions    Milk-Related Compounds Nausea And Vomiting     Current Outpatient Prescriptions on File Prior to Encounter   Medication Sig Dispense Refill    losartan-hydrochlorothiazide (HYZAAR) 100-25 MG per tablet Take 1 tablet by mouth daily 30 tablet 3    metoprolol tartrate (LOPRESSOR) 25 MG tablet Take 0.5 tablets by mouth 2 times daily 90 tablet 3    fluticasone (FLONASE) 50 MCG/ACT nasal spray 2 sprays by Nasal route daily 2 sprays in each nostril daily 1 Bottle 11    levothyroxine (LEVOTHROID) 100 MCG tablet Take 1 tablet by mouth Daily 90 tablet 1    montelukast (SINGULAIR) 10 MG tablet TAKE 1 TABLET NIGHTLY 90 tablet 1    omeprazole (PRILOSEC) 40 MG delayed release capsule Take 1 capsule by mouth daily 90 capsule 3    flecainide (TAMBOCOR) 50 MG tablet Take 1 tablet by mouth 2 times daily 180 tablet 3    rivaroxaban (XARELTO) 15 MG TABS tablet Take 1 tablet by mouth daily (with breakfast) 90 tablet 3    celecoxib (CELEBREX) 200 MG capsule TAKE 1 CAPSULE DAILY 90 capsule 3    clobetasol (TEMOVATE) 0.05 % cream Apply topically 2 times daily (Patient taking differently: Apply topically as needed ) 1 Tube 1    Probiotic Product (ACIDOPHILUS PROBIOTIC) CAPS capsule Take 1 capsule by mouth daily      docusate sodium (COLACE, DULCOLAX) 100 MG CAPS Take 100 mg by mouth 2 times daily. (Patient taking differently: Take 100 mg by mouth as needed ) 60 capsule 0    fexofenadine (ALLEGRA) 180 MG tablet Take 180 mg by mouth daily.  leucovorin calcium (WELLCOVORIN) 5 MG tablet Take 25 mg by mouth once a week.  polyethyl glycol-propyl glycol 0.4-0.3 % (SYSTANE) 0.4-0.3 % ophthalmic solution Place 1 drop into both eyes as needed.  methotrexate 2.5 MG tablet Take by mouth once a week 2 tabs at lunch and 4 tabs  in the pm once a week on Friday      ondansetron (ZOFRAN) 4 MG tablet Take 4 mg by mouth every 8 hours as needed for Nausea or Vomiting       No current facility-administered medications on file prior to encounter.         REVIEW OF SYSTEMS   ROS : All others Negative if blank [], Positive if [x]  General Vascular   [] Fevers [] Claudication   [] Chills [] Rest Pain   Skin Neurologic   [x] Tissue Loss [x] Lower extremity neuropathy     Objective:    /60   Pulse 60   Temp Yes Length (cm) 1 cm 1/20/2016 10:25 AM   Wound Width (cm) 1 cm 1/20/2016 10:25 AM   Wound Depth (cm)  0.1 1/20/2016 10:25 AM   Calculated Wound Size (cm^2) (l*w) 1 cm^2 1/20/2016 10:25 AM   Change in Wound Size % (l*w) 93.65 1/20/2016 10:25 AM   Wound Assessment Red;Yellow;Black 1/20/2016 10:00 AM   Drainage Amount Small 1/20/2016 10:00 AM   Drainage Description Yellow; Tan 1/20/2016 10:00 AM   Odor None 1/20/2016 10:00 AM   Jaycee-wound Assessment Dry; Intact;Dark edges 1/20/2016 10:00 AM   Red%Wound Bed 80 1/20/2016 10:00 AM   Yellow%Wound Bed 20 1/20/2016 10:00 AM   Black%Wound Bed 20 12/30/2015 10:57 AM   Culture Taken Yes 12/30/2015 11:36 AM   Debridement per physician Subcutaneous 1/20/2016 10:25 AM   Time out Yes 1/20/2016 10:25 AM   Procedural Pain 3 1/20/2016 10:25 AM   Post procedural Pain 1 1/20/2016 10:25 AM   Number of days: 993       Wound 08/09/17 Skin tear Leg Left;Lateral wound # 4 acquired 6/11/17 (Active)   Wound Type Wound 9/20/2017 11:36 AM   Wound Venous 9/20/2017 11:36 AM   Dressing Status Changed 10/11/2017 12:16 PM   Dressing Changed Changed/New 10/11/2017 12:16 PM   Wound Cleansed Rinsed/Irrigated with saline 10/11/2017 12:16 PM   Wound Length (cm) 0 cm 10/18/2017 11:55 AM   Wound Width (cm) 0 cm 10/18/2017 11:55 AM   Wound Depth (cm)  0 10/18/2017 11:55 AM   Calculated Wound Size (cm^2) (l*w) 0 cm^2 10/18/2017 11:55 AM   Change in Wound Size % (l*w) 100 10/18/2017 11:55 AM   Wound Assessment Epithelialization 10/18/2017 11:07 AM   Drainage Amount None 10/18/2017 11:07 AM   Drainage Description Sanguinous 10/11/2017 11:53 AM   Odor None 10/11/2017 11:53 AM   Jaycee-wound Assessment Intact 10/18/2017 11:07 AM   Culture Taken Yes 8/9/2017 11:27 AM   Debridement per physician Full thickness 10/11/2017 11:53 AM   Time out Yes 10/11/2017 11:53 AM   Procedural Pain 0 10/11/2017 11:53 AM   Post procedural Pain 0 10/11/2017 11:53 AM   Number of days: 399       Wound 09/12/18 Venous ulcer Leg Right; Lower #5

## 2023-10-02 NOTE — ED PROVIDER NOTE - PATIENT PORTAL LINK FT
You can access the FollowMyHealth Patient Portal offered by [facility] by registering at the following website: [email or web address]. By joining [employer]’s FollowMyHealth portal, you will also be able to view your health information using other applications (apps) compatible with our system.

## 2023-10-02 NOTE — ED ADULT NURSE NOTE - LOCATION
How Severe Are Your Molluscum?: moderate Is This A New Presentation, Or A Follow-Up?: Skin Lesions foot

## 2023-10-02 NOTE — ED PROVIDER NOTE - PROVIDER TOKENS
Patient tolerated Tdap and flu vaccine IM with no complication. Patient received VIS information.   FREE:[LAST:[your pmd in 1-3 days],PHONE:[(   )    -],FAX:[(   )    -]]

## 2023-10-03 DIAGNOSIS — Z90.49 ACQUIRED ABSENCE OF OTHER SPECIFIED PARTS OF DIGESTIVE TRACT: Chronic | ICD-10-CM

## 2023-10-03 LAB
ANION GAP SERPL CALC-SCNC: 5 MMOL/L — SIGNIFICANT CHANGE UP (ref 5–17)
BUN SERPL-MCNC: 13 MG/DL — SIGNIFICANT CHANGE UP (ref 7–23)
C DIFF BY PCR RESULT: SIGNIFICANT CHANGE UP
CALCIUM SERPL-MCNC: 8 MG/DL — LOW (ref 8.5–10.1)
CHLORIDE SERPL-SCNC: 103 MMOL/L — SIGNIFICANT CHANGE UP (ref 96–108)
CO2 SERPL-SCNC: 35 MMOL/L — HIGH (ref 22–31)
CREAT SERPL-MCNC: 1.17 MG/DL — SIGNIFICANT CHANGE UP (ref 0.5–1.3)
EGFR: 52 ML/MIN/1.73M2 — LOW
GI PCR PANEL: SIGNIFICANT CHANGE UP
GLUCOSE BLDC GLUCOMTR-MCNC: 101 MG/DL — HIGH (ref 70–99)
GLUCOSE BLDC GLUCOMTR-MCNC: 120 MG/DL — HIGH (ref 70–99)
GLUCOSE BLDC GLUCOMTR-MCNC: 156 MG/DL — HIGH (ref 70–99)
GLUCOSE BLDC GLUCOMTR-MCNC: 74 MG/DL — SIGNIFICANT CHANGE UP (ref 70–99)
GLUCOSE SERPL-MCNC: 74 MG/DL — SIGNIFICANT CHANGE UP (ref 70–99)
HCT VFR BLD CALC: 48.3 % — HIGH (ref 34.5–45)
HGB BLD-MCNC: 15.4 G/DL — SIGNIFICANT CHANGE UP (ref 11.5–15.5)
MCHC RBC-ENTMCNC: 30.6 PG — SIGNIFICANT CHANGE UP (ref 27–34)
MCHC RBC-ENTMCNC: 31.9 G/DL — LOW (ref 32–36)
MCV RBC AUTO: 95.8 FL — SIGNIFICANT CHANGE UP (ref 80–100)
NRBC # BLD: 0 /100 WBCS — SIGNIFICANT CHANGE UP (ref 0–0)
PLATELET # BLD AUTO: 148 K/UL — LOW (ref 150–400)
POTASSIUM SERPL-MCNC: 3.4 MMOL/L — LOW (ref 3.5–5.3)
POTASSIUM SERPL-SCNC: 3.4 MMOL/L — LOW (ref 3.5–5.3)
RAPID RVP RESULT: SIGNIFICANT CHANGE UP
RBC # BLD: 5.04 M/UL — SIGNIFICANT CHANGE UP (ref 3.8–5.2)
RBC # FLD: 16.6 % — HIGH (ref 10.3–14.5)
SARS-COV-2 RNA SPEC QL NAA+PROBE: SIGNIFICANT CHANGE UP
SODIUM SERPL-SCNC: 143 MMOL/L — SIGNIFICANT CHANGE UP (ref 135–145)
WBC # BLD: 8.71 K/UL — SIGNIFICANT CHANGE UP (ref 3.8–10.5)
WBC # FLD AUTO: 8.71 K/UL — SIGNIFICANT CHANGE UP (ref 3.8–10.5)

## 2023-10-03 PROCEDURE — 99233 SBSQ HOSP IP/OBS HIGH 50: CPT

## 2023-10-03 PROCEDURE — 73590 X-RAY EXAM OF LOWER LEG: CPT | Mod: 26,RT

## 2023-10-03 PROCEDURE — 99221 1ST HOSP IP/OBS SF/LOW 40: CPT

## 2023-10-03 PROCEDURE — 73600 X-RAY EXAM OF ANKLE: CPT | Mod: 26,RT

## 2023-10-03 PROCEDURE — 73562 X-RAY EXAM OF KNEE 3: CPT | Mod: 26,RT

## 2023-10-03 PROCEDURE — 99222 1ST HOSP IP/OBS MODERATE 55: CPT

## 2023-10-03 PROCEDURE — 71045 X-RAY EXAM CHEST 1 VIEW: CPT | Mod: 26

## 2023-10-03 RX ORDER — CLONAZEPAM 1 MG
1 TABLET ORAL EVERY 8 HOURS
Refills: 0 | Status: DISCONTINUED | OUTPATIENT
Start: 2023-10-03 | End: 2023-10-03

## 2023-10-03 RX ORDER — DEXTROSE 50 % IN WATER 50 %
15 SYRINGE (ML) INTRAVENOUS ONCE
Refills: 0 | Status: DISCONTINUED | OUTPATIENT
Start: 2023-10-03 | End: 2023-10-09

## 2023-10-03 RX ORDER — INSULIN LISPRO 100/ML
VIAL (ML) SUBCUTANEOUS
Refills: 0 | Status: DISCONTINUED | OUTPATIENT
Start: 2023-10-03 | End: 2023-10-09

## 2023-10-03 RX ORDER — ZINC OXIDE 200 MG/G
1 OINTMENT TOPICAL EVERY 12 HOURS
Refills: 0 | Status: DISCONTINUED | OUTPATIENT
Start: 2023-10-03 | End: 2023-10-09

## 2023-10-03 RX ORDER — ENOXAPARIN SODIUM 100 MG/ML
40 INJECTION SUBCUTANEOUS EVERY 24 HOURS
Refills: 0 | Status: DISCONTINUED | OUTPATIENT
Start: 2023-10-03 | End: 2023-10-09

## 2023-10-03 RX ORDER — MORPHINE SULFATE 50 MG/1
2 CAPSULE, EXTENDED RELEASE ORAL EVERY 4 HOURS
Refills: 0 | Status: DISCONTINUED | OUTPATIENT
Start: 2023-10-03 | End: 2023-10-06

## 2023-10-03 RX ORDER — SODIUM CHLORIDE 9 MG/ML
1000 INJECTION, SOLUTION INTRAVENOUS
Refills: 0 | Status: DISCONTINUED | OUTPATIENT
Start: 2023-10-03 | End: 2023-10-09

## 2023-10-03 RX ORDER — DEXTROSE 50 % IN WATER 50 %
25 SYRINGE (ML) INTRAVENOUS ONCE
Refills: 0 | Status: DISCONTINUED | OUTPATIENT
Start: 2023-10-03 | End: 2023-10-09

## 2023-10-03 RX ORDER — KETOROLAC TROMETHAMINE 30 MG/ML
15 SYRINGE (ML) INJECTION EVERY 8 HOURS
Refills: 0 | Status: DISCONTINUED | OUTPATIENT
Start: 2023-10-03 | End: 2023-10-07

## 2023-10-03 RX ORDER — DEXTROSE 50 % IN WATER 50 %
12.5 SYRINGE (ML) INTRAVENOUS ONCE
Refills: 0 | Status: DISCONTINUED | OUTPATIENT
Start: 2023-10-03 | End: 2023-10-09

## 2023-10-03 RX ORDER — CLONAZEPAM 1 MG
1 TABLET ORAL THREE TIMES A DAY
Refills: 0 | Status: DISCONTINUED | OUTPATIENT
Start: 2023-10-03 | End: 2023-10-09

## 2023-10-03 RX ORDER — GLUCAGON INJECTION, SOLUTION 0.5 MG/.1ML
1 INJECTION, SOLUTION SUBCUTANEOUS ONCE
Refills: 0 | Status: DISCONTINUED | OUTPATIENT
Start: 2023-10-03 | End: 2023-10-09

## 2023-10-03 RX ORDER — POTASSIUM CHLORIDE 20 MEQ
20 PACKET (EA) ORAL ONCE
Refills: 0 | Status: COMPLETED | OUTPATIENT
Start: 2023-10-03 | End: 2023-10-03

## 2023-10-03 RX ORDER — ACETAMINOPHEN 500 MG
650 TABLET ORAL EVERY 6 HOURS
Refills: 0 | Status: DISCONTINUED | OUTPATIENT
Start: 2023-10-03 | End: 2023-10-09

## 2023-10-03 RX ORDER — INSULIN GLARGINE 100 [IU]/ML
4 INJECTION, SOLUTION SUBCUTANEOUS AT BEDTIME
Refills: 0 | Status: DISCONTINUED | OUTPATIENT
Start: 2023-10-03 | End: 2023-10-09

## 2023-10-03 RX ORDER — INFLUENZA VIRUS VACCINE 15; 15; 15; 15 UG/.5ML; UG/.5ML; UG/.5ML; UG/.5ML
0.5 SUSPENSION INTRAMUSCULAR ONCE
Refills: 0 | Status: DISCONTINUED | OUTPATIENT
Start: 2023-10-03 | End: 2023-10-09

## 2023-10-03 RX ADMIN — MORPHINE SULFATE 2 MILLIGRAM(S): 50 CAPSULE, EXTENDED RELEASE ORAL at 17:51

## 2023-10-03 RX ADMIN — Medication 20 MILLIEQUIVALENT(S): at 10:14

## 2023-10-03 RX ADMIN — Medication 650 MILLIGRAM(S): at 04:56

## 2023-10-03 RX ADMIN — MORPHINE SULFATE 2 MILLIGRAM(S): 50 CAPSULE, EXTENDED RELEASE ORAL at 23:00

## 2023-10-03 RX ADMIN — Medication 3: at 12:42

## 2023-10-03 RX ADMIN — INSULIN GLARGINE 4 UNIT(S): 100 INJECTION, SOLUTION SUBCUTANEOUS at 22:00

## 2023-10-03 RX ADMIN — Medication 1 MILLIGRAM(S): at 05:38

## 2023-10-03 RX ADMIN — Medication 1 MILLIGRAM(S): at 15:03

## 2023-10-03 RX ADMIN — Medication 1 MILLIGRAM(S): at 21:06

## 2023-10-03 RX ADMIN — Medication 650 MILLIGRAM(S): at 03:48

## 2023-10-03 RX ADMIN — Medication 3 MILLIGRAM(S): at 21:05

## 2023-10-03 RX ADMIN — MORPHINE SULFATE 2 MILLIGRAM(S): 50 CAPSULE, EXTENDED RELEASE ORAL at 22:45

## 2023-10-03 RX ADMIN — ZINC OXIDE 1 APPLICATION(S): 200 OINTMENT TOPICAL at 21:07

## 2023-10-03 RX ADMIN — ENOXAPARIN SODIUM 40 MILLIGRAM(S): 100 INJECTION SUBCUTANEOUS at 10:14

## 2023-10-03 NOTE — H&P ADULT - NSHPLABSRESULTS_GEN_ALL_CORE
16.7   9.81  )-----------( 153      ( 02 Oct 2023 14:30 )             52.3     10-02    141  |  101  |  14  ----------------------------<  77  3.6   |  34<H>  |  1.18    Ca    8.9      02 Oct 2023 14:30    TPro  7.7  /  Alb  4.0  /  TBili  1.3<H>  /  DBili  x   /  AST  40<H>  /  ALT  23  /  AlkPhos  78  10-02      Urinalysis Basic - ( 02 Oct 2023 14:30 )    Color: x / Appearance: x / SG: x / pH: x  Gluc: 77 mg/dL / Ketone: x  / Bili: x / Urobili: x   Blood: x / Protein: x / Nitrite: x   Leuk Esterase: x / RBC: x / WBC x   Sq Epi: x / Non Sq Epi: x / Bacteria: x

## 2023-10-03 NOTE — PROGRESS NOTE ADULT - ASSESSMENT
63F w/ hx of COPD on home 2L NC , DM2, HTN, obesity, smoker ? was seen in ER 10/1/23 for   R proximal fibular fracture after mechanical fall at home , was seen by ortho recommended splint and outpatient follow-up now presents again with  RLE pain and inability to ambulation.    In ED, pt afebrile and HDS.  Pt given 1L NS.  of note: patient's  is in CCU here at [facility]     Assessment and Plan:   Inability to ambulate due to pain   Rt proximal fibular Fx in splint (due to mechanical fall at home)   --seen by ortho 10/1 had recommended Given significant comorbidities and anatomic alignment in splint, recommending a trial of nonoperative management at this time.  qd for 30 days   --reconsulted ortho , no surgical intervention recommended.   --PT brendaal      63F w/ hx of COPD on home 2L NC , DM2, HTN, obesity, smoker ? was seen in ER 10/1/23 for   R proximal fibular fracture after mechanical fall at home , was seen by ortho recommended splint and outpatient follow-up now presents again with  RLE pain and inability to ambulation.    In ED, pt afebrile and HDS.  Pt given 1L NS.  of note: patient's  is in CCU here at [facility]     Assessment and Plan:   Inability to ambulate due to pain   Rt proximal fibular Fx in splint (due to mechanical fall at home)   --seen by ortho 10/1 had recommended Given significant comorbidities and anatomic alignment in splint, recommending a trial of nonoperative management at this time.  qd for 30 days   --reconsulted ortho , no surgical intervention recommended.   --PT eval   pain management PRN     Diarrhea   patient endorsed she's been taking exlax multiple times a day for at least a month because she has been trying to lose weight for her son's wedding   she also endorses that she's been on abx recently   --obtain stool PCR , stool CX, c.diff,   stool count     DM2   A1c pending   continue with current regimen     COPD on 2L home O2   not in exacerbation   continue with inhalers     anxiety , stable   continue with Klonopin tid     Preventative Measures   lovenox SQ-dvt ppx   fall precautions

## 2023-10-03 NOTE — CONSULT NOTE ADULT - SUBJECTIVE AND OBJECTIVE BOX
63F PMH DM, HTN, COPD, morbid obesity who was admitted to hospital overnight for increased RLE pain and inability to ambulate. Was recently seen in ED on 10/1 after mechanical fall where ED imaging showed FACUNDO parkinson. She was splinted and discharged to home after. She reported increased RLE pain and inability to ambulate, causing her to come back to ED yesterday. Denies numbness, tingling, fevers, chills, CP, SOB, N/V/D.    Vital Signs (24 Hrs):  T(C): 36.7 (10-03-23 @ 05:20), Max: 37.1 (10-02-23 @ 22:08)  HR: 65 (10-03-23 @ 05:20) (60 - 89)  BP: 109/68 (10-03-23 @ 05:20) (102/68 - 139/81)  RR: 18 (10-03-23 @ 05:20) (18 - 20)  SpO2: 95% (10-03-23 @ 05:20) (67% - 100%)  Wt(kg): --    LABS:                          15.4   8.71  )-----------( 148      ( 03 Oct 2023 07:44 )             48.3     10-03    143  |  103  |  13  ----------------------------<  74  3.4<L>   |  35<H>  |  1.17    Ca    8.0<L>      03 Oct 2023 07:44    TPro  7.7  /  Alb  4.0  /  TBili  1.3<H>  /  DBili  x   /  AST  40<H>  /  ALT  23  /  AlkPhos  78  10-02    LIVER FUNCTIONS - ( 02 Oct 2023 14:30 )  Alb: 4.0 g/dL / Pro: 7.7 gm/dL / ALK PHOS: 78 U/L / ALT: 23 U/L / AST: 40 U/L / GGT: x           Physical Exam:  General: NAD, pt laying in bed comfortably    RLE:  Splint in place, C/D/I  Accessible compartments soft, compressible  Calves nontender  Motor: wiggles toes grossly  SILT: SPN, DPN, Tib, Saph, Dinesh  +DP    **INCOMPLETE** **INCOMPLETE** **INCOMPLETE** **INCOMPLETE** **INCOMPLETE** **INCOMPLETE** **INCOMPLETE** **INCOMPLETE** **INCOMPLETE**  63F PMH DM, HTN, COPD, morbid obesity who was admitted to hospital overnight for increased RLE pain and inability to ambulate. Was recently seen in ED on 10/1 after mechanical fall where ED imaging showed FACUNDO parkinson. She was splinted and discharged to home after. She reported increased RLE pain and inability to ambulate, causing her to come back to ED yesterday. Denies numbness, tingling, fevers, chills, CP, SOB, N/V/D.    Vital Signs (24 Hrs):  T(C): 36.7 (10-03-23 @ 05:20), Max: 37.1 (10-02-23 @ 22:08)  HR: 65 (10-03-23 @ 05:20) (60 - 89)  BP: 109/68 (10-03-23 @ 05:20) (102/68 - 139/81)  RR: 18 (10-03-23 @ 05:20) (18 - 20)  SpO2: 95% (10-03-23 @ 05:20) (67% - 100%)  Wt(kg): --    LABS:                          15.4   8.71  )-----------( 148      ( 03 Oct 2023 07:44 )             48.3     10-03    143  |  103  |  13  ----------------------------<  74  3.4<L>   |  35<H>  |  1.17    Ca    8.0<L>      03 Oct 2023 07:44    TPro  7.7  /  Alb  4.0  /  TBili  1.3<H>  /  DBili  x   /  AST  40<H>  /  ALT  23  /  AlkPhos  78  10-02    LIVER FUNCTIONS - ( 02 Oct 2023 14:30 )  Alb: 4.0 g/dL / Pro: 7.7 gm/dL / ALK PHOS: 78 U/L / ALT: 23 U/L / AST: 40 U/L / GGT: x           Physical Exam:  General: NAD, pt laying in bed comfortably    RLE:  Splint in place, C/D/I  Accessible compartments soft, compressible  Calves nontender  No pain with passive stretch   Motor: wiggles toes grossly  SILT: SPN, DPN, Tib, Saph, Dinesh  Capillary refill brisk    Imaging:  XR R Knee/Tfib/Ankle: Stable fx compared to 10/1 imaging (personal read)    A/P:    63F who presents with worsening RLE pain, inability    NWB RLE in splint, keep C/D/I, Use assistive devices PRN  PT/OT  Analgesics  Continue ASA 325mg qd for 30 days  No acute orthopaedic interventions at this time  Stable for discharge from orthopaedic standpoint   Appreciate medical recs  Can follow up with Dr. Stephens in office for further management  Ortho to sign off, can reconsult with any changes in clinical course  Will discuss plan with Dr. Stephens and notify primary team of any changes to plan

## 2023-10-03 NOTE — PATIENT PROFILE ADULT - FALL HARM RISK - HARM RISK INTERVENTIONS

## 2023-10-03 NOTE — H&P ADULT - HISTORY OF PRESENT ILLNESS
63F w/ hx of COPD, DM, HTN p/w RLE pain and decreased ambulation. Pt presented the day prior and found to have R proximal fibular fracture. Pt reports decreased ability to ambulate 2/2 pain.     In ED, pt afebrile and HDS. Labs unremarkable. Pt given 1L NS.

## 2023-10-03 NOTE — H&P ADULT - ASSESSMENT
63F w/ hx of COPD, DM p/w RLE pain and decreased ambulation.     #RLE pain  #Decreased ambulation  - SW for placement  - tylenol, toradol, morphine prn    #DM  - lantus 4u qhs  - ISS    #FEN/PPX  - carb controlled  - lovenx for DVT ppx

## 2023-10-03 NOTE — PROGRESS NOTE ADULT - SUBJECTIVE AND OBJECTIVE BOX
PROGRESS NOTE:     Patient is a 63y old  Female who presents with a chief complaint of       SUBJECTIVE & OBJECTIVE:   Pt seen and examined at bedside in AM    no overnight events.   no new complaints    REVIEW OF SYSTEMS: remaining ROS negative     PHYSICAL EXAM:  T(C): 36.5 (10-03-23 @ 12:10), Max: 37.1 (10-02-23 @ 22:08)  HR: 67 (10-03-23 @ 12:10) (60 - 89)  BP: 96/65 (10-03-23 @ 12:10) (96/65 - 136/74)  RR: 18 (10-03-23 @ 12:10) (18 - 19)  SpO2: 95% (10-03-23 @ 12:10) (67% - 100%)  Wt(kg): -- Height (cm): 154.9 (10-03 @ 00:39)  Weight (kg): 112.8 (10-03 @ 00:39)  BMI (kg/m2): 47 (10-03 @ 00:39)  BSA (m2): 2.07 (10-03 @ 00:39)    I&O's Detail        GENERAL: NAD, well-groomed, well-developed, no increased WOB  HEAD:  Atraumatic, Normocephalic  EYES: EOMI, PERRLA, conjunctiva and sclera clear  ENMT: Moist mucous membranes  NECK: Supple, No JVD  NERVOUS SYSTEM:  Alert & Oriented X3, no focal neuro deficits   CHEST/LUNG: Clear to auscultation bilaterally; No rales, rhonchi, wheezing, or rubs  HEART: Regular rate and rhythm; No murmurs, rubs, or gallops  ABDOMEN: Soft, Nontender, Nondistended; Bowel sounds present  EXTREMITIES:  2+ Peripheral Pulses b/l, No clubbing, cyanosis, calf tenderness or edema b/l    MEDICATIONS  (STANDING):  clonazePAM  Tablet 1 milliGRAM(s) Oral three times a day  dextrose 5%. 1000 milliLiter(s) (100 mL/Hr) IV Continuous <Continuous>  dextrose 5%. 1000 milliLiter(s) (50 mL/Hr) IV Continuous <Continuous>  dextrose 50% Injectable 25 Gram(s) IV Push once  dextrose 50% Injectable 12.5 Gram(s) IV Push once  dextrose 50% Injectable 25 Gram(s) IV Push once  enoxaparin Injectable 40 milliGRAM(s) SubCutaneous every 24 hours  glucagon  Injectable 1 milliGRAM(s) IntraMuscular once  influenza   Vaccine 0.5 milliLiter(s) IntraMuscular once  insulin glargine Injectable (LANTUS) 4 Unit(s) SubCutaneous at bedtime  insulin lispro (ADMELOG) corrective regimen sliding scale   SubCutaneous three times a day before meals  zinc oxide 40% Paste 1 Application(s) Topical every 12 hours    MEDICATIONS  (PRN):  acetaminophen     Tablet .. 650 milliGRAM(s) Oral every 6 hours PRN Mild Pain (1 - 3)  acetaminophen     Tablet .. 650 milliGRAM(s) Oral every 6 hours PRN Temp greater or equal to 38C (100.4F), Mild Pain (1 - 3)  aluminum hydroxide/magnesium hydroxide/simethicone Suspension 30 milliLiter(s) Oral every 4 hours PRN Dyspepsia  dextrose Oral Gel 15 Gram(s) Oral once PRN Blood Glucose LESS THAN 70 milliGRAM(s)/deciliter  ketorolac   Injectable 15 milliGRAM(s) IV Push every 8 hours PRN Moderate Pain (4 - 6)  melatonin 3 milliGRAM(s) Oral at bedtime PRN Insomnia  morphine  - Injectable 2 milliGRAM(s) IV Push every 4 hours PRN Severe Pain (7 - 10)  ondansetron Injectable 4 milliGRAM(s) IV Push every 8 hours PRN Nausea and/or Vomiting      LABS:                        15.4   8.71  )-----------( 148      ( 03 Oct 2023 07:44 )             48.3     10-03    143  |  103  |  13  ----------------------------<  74  3.4<L>   |  35<H>  |  1.17    Ca    8.0<L>      03 Oct 2023 07:44    TPro  7.7  /  Alb  4.0  /  TBili  1.3<H>  /  DBili  x   /  AST  40<H>  /  ALT  23  /  AlkPhos  78  10-02      Urinalysis Basic - ( 03 Oct 2023 07:44 )    Color: x / Appearance: x / SG: x / pH: x  Gluc: 74 mg/dL / Ketone: x  / Bili: x / Urobili: x   Blood: x / Protein: x / Nitrite: x   Leuk Esterase: x / RBC: x / WBC x   Sq Epi: x / Non Sq Epi: x / Bacteria: x        CAPILLARY BLOOD GLUCOSE      POCT Blood Glucose.: 101 mg/dL (03 Oct 2023 11:22)  POCT Blood Glucose.: 74 mg/dL (03 Oct 2023 07:34)            RECENT CULTURES:          RADIOLOGY & ADDITIONAL TESTS:          Radiology reports read and imaging reviewed  :  [ x] YES  [ ] NO  (I am not a radiologist and therefore rely on Radiologist reports to facilitate with diagnosis and treatment plans)    Consultant(s) Notes Reviewed:  [x ] YES  [ ] NO    Care Discussed with Consultants/Other Providers [x ] YES  [ ] NO  Care plan and all findings were discussed in detail with patient.  All questions and concerns addressed   PROGRESS NOTE:   63F w/ hx of COPD, DM, HTN p/w RLE pain and decreased ambulation. Pt presented the day prior and found to have R proximal fibular fracture. Pt reports decreased ability to ambulate 2/2 pain.     In ED, pt afebrile and HDS. Labs unremarkable. Pt given 1L NS.        SUBJECTIVE & OBJECTIVE:   Pt seen and examined at bedside in AM    no overnight events.   complaining of diarrhea , multiple loose nonbloody  stools since this AM     REVIEW OF SYSTEMS: remaining ROS negative     PHYSICAL EXAM:  T(C): 36.5 (10-03-23 @ 12:10), Max: 37.1 (10-02-23 @ 22:08)  HR: 67 (10-03-23 @ 12:10) (60 - 89)  BP: 96/65 (10-03-23 @ 12:10) (96/65 - 136/74)  RR: 18 (10-03-23 @ 12:10) (18 - 19)  SpO2: 95% (10-03-23 @ 12:10) (67% - 100%)  Wt(kg): -- Height (cm): 154.9 (10-03 @ 00:39)  Weight (kg): 112.8 (10-03 @ 00:39)  BMI (kg/m2): 47 (10-03 @ 00:39)  BSA (m2): 2.07 (10-03 @ 00:39)    I&O's Detail        GENERAL: NAD,  no increased WOB  HEAD:  Atraumatic, Normocephalic  EYES: EOMI, PERRLA, conjunctiva and sclera clear  ENMT: Moist mucous membranes  NECK: Supple, No JVD  NERVOUS SYSTEM:  awake and alert, no focal neuro deficits   CHEST/LUNG: Clear to auscultation bilaterally; No rales, rhonchi, wheezing, or rubs  HEART: Regular rate and rhythm; No murmurs, rubs, or gallops  ABDOMEN: obese, Soft, Nontender, Nondistended; Bowel sounds present  EXTREMITIES:  2+ Peripheral Pulses b/l, No clubbing, cyanosis, calf tenderness or edema b/l    MEDICATIONS  (STANDING):  clonazePAM  Tablet 1 milliGRAM(s) Oral three times a day  dextrose 5%. 1000 milliLiter(s) (100 mL/Hr) IV Continuous <Continuous>  dextrose 5%. 1000 milliLiter(s) (50 mL/Hr) IV Continuous <Continuous>  dextrose 50% Injectable 25 Gram(s) IV Push once  dextrose 50% Injectable 12.5 Gram(s) IV Push once  dextrose 50% Injectable 25 Gram(s) IV Push once  enoxaparin Injectable 40 milliGRAM(s) SubCutaneous every 24 hours  glucagon  Injectable 1 milliGRAM(s) IntraMuscular once  influenza   Vaccine 0.5 milliLiter(s) IntraMuscular once  insulin glargine Injectable (LANTUS) 4 Unit(s) SubCutaneous at bedtime  insulin lispro (ADMELOG) corrective regimen sliding scale   SubCutaneous three times a day before meals  zinc oxide 40% Paste 1 Application(s) Topical every 12 hours    MEDICATIONS  (PRN):  acetaminophen     Tablet .. 650 milliGRAM(s) Oral every 6 hours PRN Mild Pain (1 - 3)  acetaminophen     Tablet .. 650 milliGRAM(s) Oral every 6 hours PRN Temp greater or equal to 38C (100.4F), Mild Pain (1 - 3)  aluminum hydroxide/magnesium hydroxide/simethicone Suspension 30 milliLiter(s) Oral every 4 hours PRN Dyspepsia  dextrose Oral Gel 15 Gram(s) Oral once PRN Blood Glucose LESS THAN 70 milliGRAM(s)/deciliter  ketorolac   Injectable 15 milliGRAM(s) IV Push every 8 hours PRN Moderate Pain (4 - 6)  melatonin 3 milliGRAM(s) Oral at bedtime PRN Insomnia  morphine  - Injectable 2 milliGRAM(s) IV Push every 4 hours PRN Severe Pain (7 - 10)  ondansetron Injectable 4 milliGRAM(s) IV Push every 8 hours PRN Nausea and/or Vomiting      LABS:                        15.4   8.71  )-----------( 148      ( 03 Oct 2023 07:44 )             48.3     10-03    143  |  103  |  13  ----------------------------<  74  3.4<L>   |  35<H>  |  1.17    Ca    8.0<L>      03 Oct 2023 07:44    TPro  7.7  /  Alb  4.0  /  TBili  1.3<H>  /  DBili  x   /  AST  40<H>  /  ALT  23  /  AlkPhos  78  10-02      Urinalysis Basic - ( 03 Oct 2023 07:44 )    Color: x / Appearance: x / SG: x / pH: x  Gluc: 74 mg/dL / Ketone: x  / Bili: x / Urobili: x   Blood: x / Protein: x / Nitrite: x   Leuk Esterase: x / RBC: x / WBC x   Sq Epi: x / Non Sq Epi: x / Bacteria: x            RECENT CULTURES:          RADIOLOGY & ADDITIONAL TESTS:          Radiology reports read and imaging reviewed  :  [ x] YES  [ ] NO  (I am not a radiologist and therefore rely on Radiologist reports to facilitate with diagnosis and treatment plans)    Consultant(s) Notes Reviewed:  [x ] YES  [ ] NO    Care Discussed with Consultants/Other Providers [x ] YES  [ ] NO  Care plan and all findings were discussed in detail with patient.  All questions and concerns addressed

## 2023-10-03 NOTE — CONSULT NOTE ADULT - ATTENDING COMMENTS
R maisoneuve fracture.  she is completely anatomically reduced in the splint.  Given medical comorbidities she is a high risk surgical candidate without much surgical benefit given that the fracture is anatomically reduced.  It is able to maintain this position there is no benefit to surgical intervention we will treat nonoperatively at this time she will be nonweightbearing.  We will repeat x-rays in 1 week to assess stability of the fracture.  We will consider open reduction internal fixation if there is any displacement.  She is nonweightbearing.  DVT prophylaxis pain control per medicine physical therapy.

## 2023-10-03 NOTE — H&P ADULT - NSICDXPASTMEDICALHX_GEN_ALL_CORE_FT
PAST MEDICAL HISTORY:  COPD, severe     DM (diabetes mellitus)     HTN (hypertension)     Severe anxiety

## 2023-10-03 NOTE — H&P ADULT - NSHPPHYSICALEXAM_GEN_ALL_CORE
T(C): 36.7 (10-03-23 @ 05:20), Max: 37.1 (10-02-23 @ 22:08)  HR: 65 (10-03-23 @ 05:20) (60 - 89)  BP: 109/68 (10-03-23 @ 05:20) (102/68 - 139/81)  RR: 18 (10-03-23 @ 05:20) (18 - 20)  SpO2: 95% (10-03-23 @ 05:20) (67% - 100%)    CONSTITUTIONAL: Well groomed, no apparent distress  EYES: PERRLA and symmetric, EOMI, No conjunctival or scleral injection, non-icteric  ENMT: Oral mucosa with moist membranes. Normal dentition; no pharyngeal injection or exudates             NECK: Supple, symmetric and without tracheal deviation   RESP: No respiratory distress, no use of accessory muscles; CTA b/l, no WRR  CV: RRR, +S1S2, no MRG; no JVD; no peripheral edema  GI: Soft, NT, ND, no rebound, no guarding; no palpable masses; no hepatosplenomegaly; no hernia palpated  LYMPH: No cervical LAD or tenderness; no axillary LAD or tenderness; no inguinal LAD or tenderness  MSK: Normal gait; No digital clubbing or cyanosis; examination of the (head/neck/spine/ribs/pelvis, RUE, LUE, RLE, LLE) without misalignment,            Normal ROM without pain, no spinal tenderness, normal muscle strength/tone  SKIN: No rashes or ulcers noted; no subcutaneous nodules or induration palpable  NEURO: CN II-XII intact; normal reflexes in upper and lower extremities, sensation intact in upper and lower extremities b/l to light touch   PSYCH: Appropriate insight/judgment; A+O x 3, mood and affect appropriate, recent/remote memory intact

## 2023-10-04 LAB
A1C WITH ESTIMATED AVERAGE GLUCOSE RESULT: 6.5 % — HIGH (ref 4–5.6)
ANION GAP SERPL CALC-SCNC: 2 MMOL/L — LOW (ref 5–17)
BUN SERPL-MCNC: 13 MG/DL — SIGNIFICANT CHANGE UP (ref 7–23)
CALCIUM SERPL-MCNC: 8.4 MG/DL — LOW (ref 8.5–10.1)
CHLORIDE SERPL-SCNC: 102 MMOL/L — SIGNIFICANT CHANGE UP (ref 96–108)
CO2 SERPL-SCNC: 37 MMOL/L — HIGH (ref 22–31)
CREAT SERPL-MCNC: 1.26 MG/DL — SIGNIFICANT CHANGE UP (ref 0.5–1.3)
EGFR: 48 ML/MIN/1.73M2 — LOW
ESTIMATED AVERAGE GLUCOSE: 140 MG/DL — HIGH (ref 68–114)
GLUCOSE BLDC GLUCOMTR-MCNC: 112 MG/DL — HIGH (ref 70–99)
GLUCOSE BLDC GLUCOMTR-MCNC: 127 MG/DL — HIGH (ref 70–99)
GLUCOSE BLDC GLUCOMTR-MCNC: 152 MG/DL — HIGH (ref 70–99)
GLUCOSE BLDC GLUCOMTR-MCNC: 152 MG/DL — HIGH (ref 70–99)
GLUCOSE SERPL-MCNC: 126 MG/DL — HIGH (ref 70–99)
HCV AB S/CO SERPL IA: 0.05 S/CO — SIGNIFICANT CHANGE UP (ref 0–0.99)
HCV AB SERPL-IMP: SIGNIFICANT CHANGE UP
MAGNESIUM SERPL-MCNC: 2.6 MG/DL — SIGNIFICANT CHANGE UP (ref 1.6–2.6)
PHOSPHATE SERPL-MCNC: 3.5 MG/DL — SIGNIFICANT CHANGE UP (ref 2.5–4.5)
POTASSIUM SERPL-MCNC: 3.3 MMOL/L — LOW (ref 3.5–5.3)
POTASSIUM SERPL-SCNC: 3.3 MMOL/L — LOW (ref 3.5–5.3)
SODIUM SERPL-SCNC: 141 MMOL/L — SIGNIFICANT CHANGE UP (ref 135–145)

## 2023-10-04 PROCEDURE — 99232 SBSQ HOSP IP/OBS MODERATE 35: CPT

## 2023-10-04 RX ORDER — LORATADINE 10 MG/1
10 TABLET ORAL DAILY
Refills: 0 | Status: DISCONTINUED | OUTPATIENT
Start: 2023-10-04 | End: 2023-10-09

## 2023-10-04 RX ORDER — PANTOPRAZOLE SODIUM 20 MG/1
40 TABLET, DELAYED RELEASE ORAL
Refills: 0 | Status: DISCONTINUED | OUTPATIENT
Start: 2023-10-04 | End: 2023-10-09

## 2023-10-04 RX ORDER — SODIUM CHLORIDE 9 MG/ML
1000 INJECTION, SOLUTION INTRAVENOUS
Refills: 0 | Status: DISCONTINUED | OUTPATIENT
Start: 2023-10-04 | End: 2023-10-09

## 2023-10-04 RX ORDER — ALBUTEROL 90 UG/1
2 AEROSOL, METERED ORAL EVERY 6 HOURS
Refills: 0 | Status: DISCONTINUED | OUTPATIENT
Start: 2023-10-04 | End: 2023-10-09

## 2023-10-04 RX ORDER — LOPERAMIDE HCL 2 MG
2 TABLET ORAL DAILY
Refills: 0 | Status: DISCONTINUED | OUTPATIENT
Start: 2023-10-04 | End: 2023-10-09

## 2023-10-04 RX ORDER — TIOTROPIUM BROMIDE 18 UG/1
2 CAPSULE ORAL; RESPIRATORY (INHALATION) DAILY
Refills: 0 | Status: DISCONTINUED | OUTPATIENT
Start: 2023-10-04 | End: 2023-10-09

## 2023-10-04 RX ORDER — POTASSIUM CHLORIDE 20 MEQ
40 PACKET (EA) ORAL ONCE
Refills: 0 | Status: COMPLETED | OUTPATIENT
Start: 2023-10-04 | End: 2023-10-04

## 2023-10-04 RX ORDER — BUDESONIDE AND FORMOTEROL FUMARATE DIHYDRATE 160; 4.5 UG/1; UG/1
2 AEROSOL RESPIRATORY (INHALATION)
Refills: 0 | Status: DISCONTINUED | OUTPATIENT
Start: 2023-10-04 | End: 2023-10-09

## 2023-10-04 RX ORDER — NICOTINE POLACRILEX 2 MG
1 GUM BUCCAL DAILY
Refills: 0 | Status: DISCONTINUED | OUTPATIENT
Start: 2023-10-04 | End: 2023-10-09

## 2023-10-04 RX ADMIN — Medication 1: at 11:49

## 2023-10-04 RX ADMIN — ALBUTEROL 2 PUFF(S): 90 AEROSOL, METERED ORAL at 18:24

## 2023-10-04 RX ADMIN — Medication 40 MILLIEQUIVALENT(S): at 12:43

## 2023-10-04 RX ADMIN — MORPHINE SULFATE 2 MILLIGRAM(S): 50 CAPSULE, EXTENDED RELEASE ORAL at 23:17

## 2023-10-04 RX ADMIN — LORATADINE 10 MILLIGRAM(S): 10 TABLET ORAL at 11:53

## 2023-10-04 RX ADMIN — INSULIN GLARGINE 4 UNIT(S): 100 INJECTION, SOLUTION SUBCUTANEOUS at 22:00

## 2023-10-04 RX ADMIN — Medication 1 PATCH: at 20:14

## 2023-10-04 RX ADMIN — MORPHINE SULFATE 2 MILLIGRAM(S): 50 CAPSULE, EXTENDED RELEASE ORAL at 04:34

## 2023-10-04 RX ADMIN — SODIUM CHLORIDE 75 MILLILITER(S): 9 INJECTION, SOLUTION INTRAVENOUS at 06:27

## 2023-10-04 RX ADMIN — MORPHINE SULFATE 2 MILLIGRAM(S): 50 CAPSULE, EXTENDED RELEASE ORAL at 04:19

## 2023-10-04 RX ADMIN — Medication 1 MILLIGRAM(S): at 21:59

## 2023-10-04 RX ADMIN — BUDESONIDE AND FORMOTEROL FUMARATE DIHYDRATE 2 PUFF(S): 160; 4.5 AEROSOL RESPIRATORY (INHALATION) at 18:24

## 2023-10-04 RX ADMIN — PANTOPRAZOLE SODIUM 40 MILLIGRAM(S): 20 TABLET, DELAYED RELEASE ORAL at 08:31

## 2023-10-04 RX ADMIN — ZINC OXIDE 1 APPLICATION(S): 200 OINTMENT TOPICAL at 05:15

## 2023-10-04 RX ADMIN — ZINC OXIDE 1 APPLICATION(S): 200 OINTMENT TOPICAL at 18:19

## 2023-10-04 RX ADMIN — Medication 1 PATCH: at 12:43

## 2023-10-04 RX ADMIN — Medication 1 MILLIGRAM(S): at 05:20

## 2023-10-04 RX ADMIN — MORPHINE SULFATE 2 MILLIGRAM(S): 50 CAPSULE, EXTENDED RELEASE ORAL at 21:59

## 2023-10-04 RX ADMIN — ENOXAPARIN SODIUM 40 MILLIGRAM(S): 100 INJECTION SUBCUTANEOUS at 11:52

## 2023-10-04 RX ADMIN — BUDESONIDE AND FORMOTEROL FUMARATE DIHYDRATE 2 PUFF(S): 160; 4.5 AEROSOL RESPIRATORY (INHALATION) at 06:27

## 2023-10-04 NOTE — PHYSICAL THERAPY INITIAL EVALUATION ADULT - LEVEL OF INDEPENDENCE: CHAIR TO BED, REHAB EVAL
S/w Floresita from office notified pt needs to be in network so we are unable to supply referral patient has been advised in office as well.  Sheridan Arias will give pt a call maximum assist (25% patients effort)

## 2023-10-04 NOTE — PROGRESS NOTE ADULT - SUBJECTIVE AND OBJECTIVE BOX
PROGRESS NOTE:   63F w/ hx of COPD, DM, HTN p/w RLE pain and decreased ambulation. Pt presented the day prior and found to have R proximal fibular fracture. Pt reports decreased ability to ambulate 2/2 pain.     In ED, pt afebrile and HDS. Labs unremarkable. Pt given 1L NS.        SUBJECTIVE & OBJECTIVE:   Pt seen and examined at bedside in AM . Overnight, no acute events. patient is uncomfortable this AM. Her foot is bothering her, but pain is tolerable. Evaluated by PT today.     REVIEW OF SYSTEMS: remaining ROS negative     PHYSICAL EXAM:  T(C): 36.5 (10-03-23 @ 12:10), Max: 37.1 (10-02-23 @ 22:08)  HR: 67 (10-03-23 @ 12:10) (60 - 89)  BP: 96/65 (10-03-23 @ 12:10) (96/65 - 136/74)  RR: 18 (10-03-23 @ 12:10) (18 - 19)  SpO2: 95% (10-03-23 @ 12:10) (67% - 100%)  Wt(kg): -- Height (cm): 154.9 (10-03 @ 00:39)  Weight (kg): 112.8 (10-03 @ 00:39)  BMI (kg/m2): 47 (10-03 @ 00:39)  BSA (m2): 2.07 (10-03 @ 00:39)    I&O's Detail        GENERAL: NAD,  no increased WOB  HEAD:  Atraumatic, Normocephalic  EYES: EOMI, PERRLA, conjunctiva and sclera clear  ENMT: Moist mucous membranes  NECK: Supple, No JVD  NERVOUS SYSTEM:  awake and alert, no focal neuro deficits   CHEST/LUNG: Clear to auscultation bilaterally; No rales, rhonchi, wheezing, or rubs  HEART: Regular rate and rhythm; No murmurs, rubs, or gallops  ABDOMEN: obese, Soft, Nontender, Nondistended; Bowel sounds present  EXTREMITIES:  2+ Peripheral Pulses b/l, No clubbing, cyanosis, calf tenderness or edema b/l    MEDICATIONS  (STANDING):  clonazePAM  Tablet 1 milliGRAM(s) Oral three times a day  dextrose 5%. 1000 milliLiter(s) (100 mL/Hr) IV Continuous <Continuous>  dextrose 5%. 1000 milliLiter(s) (50 mL/Hr) IV Continuous <Continuous>  dextrose 50% Injectable 25 Gram(s) IV Push once  dextrose 50% Injectable 12.5 Gram(s) IV Push once  dextrose 50% Injectable 25 Gram(s) IV Push once  enoxaparin Injectable 40 milliGRAM(s) SubCutaneous every 24 hours  glucagon  Injectable 1 milliGRAM(s) IntraMuscular once  influenza   Vaccine 0.5 milliLiter(s) IntraMuscular once  insulin glargine Injectable (LANTUS) 4 Unit(s) SubCutaneous at bedtime  insulin lispro (ADMELOG) corrective regimen sliding scale   SubCutaneous three times a day before meals  zinc oxide 40% Paste 1 Application(s) Topical every 12 hours    MEDICATIONS  (PRN):  acetaminophen     Tablet .. 650 milliGRAM(s) Oral every 6 hours PRN Mild Pain (1 - 3)  acetaminophen     Tablet .. 650 milliGRAM(s) Oral every 6 hours PRN Temp greater or equal to 38C (100.4F), Mild Pain (1 - 3)  aluminum hydroxide/magnesium hydroxide/simethicone Suspension 30 milliLiter(s) Oral every 4 hours PRN Dyspepsia  dextrose Oral Gel 15 Gram(s) Oral once PRN Blood Glucose LESS THAN 70 milliGRAM(s)/deciliter  ketorolac   Injectable 15 milliGRAM(s) IV Push every 8 hours PRN Moderate Pain (4 - 6)  melatonin 3 milliGRAM(s) Oral at bedtime PRN Insomnia  morphine  - Injectable 2 milliGRAM(s) IV Push every 4 hours PRN Severe Pain (7 - 10)  ondansetron Injectable 4 milliGRAM(s) IV Push every 8 hours PRN Nausea and/or Vomiting      LABS:                        15.4   8.71  )-----------( 148      ( 03 Oct 2023 07:44 )             48.3     10-03    143  |  103  |  13  ----------------------------<  74  3.4<L>   |  35<H>  |  1.17    Ca    8.0<L>      03 Oct 2023 07:44    TPro  7.7  /  Alb  4.0  /  TBili  1.3<H>  /  DBili  x   /  AST  40<H>  /  ALT  23  /  AlkPhos  78  10-02      Urinalysis Basic - ( 03 Oct 2023 07:44 )    Color: x / Appearance: x / SG: x / pH: x  Gluc: 74 mg/dL / Ketone: x  / Bili: x / Urobili: x   Blood: x / Protein: x / Nitrite: x   Leuk Esterase: x / RBC: x / WBC x   Sq Epi: x / Non Sq Epi: x / Bacteria: x            RECENT CULTURES:          RADIOLOGY & ADDITIONAL TESTS:          Radiology reports read and imaging reviewed  :  [ x] YES  [ ] NO  (I am not a radiologist and therefore rely on Radiologist reports to facilitate with diagnosis and treatment plans)    Consultant(s) Notes Reviewed:  [x ] YES  [ ] NO    Care Discussed with Consultants/Other Providers [x ] YES  [ ] NO  Care plan and all findings were discussed in detail with patient.  All questions and concerns addressed   PROGRESS NOTE:   63F w/ hx of COPD, DM, HTN p/w RLE pain and decreased ambulation. Pt presented the day prior and found to have R proximal fibular fracture. Pt reports decreased ability to ambulate 2/2 pain.     In ED, pt afebrile and HDS. Labs unremarkable. Pt given 1L NS.        SUBJECTIVE & OBJECTIVE:   Pt seen and examined at bedside in AM . Overnight, no acute events. Patient is uncomfortable this AM. Her foot is bothering her, but pain is tolerable. Evaluated by PT today.     REVIEW OF SYSTEMS: remaining ROS negative     PHYSICAL EXAM:  T(C): 36.5 (10-03-23 @ 12:10), Max: 37.1 (10-02-23 @ 22:08)  HR: 67 (10-03-23 @ 12:10) (60 - 89)  BP: 96/65 (10-03-23 @ 12:10) (96/65 - 136/74)  RR: 18 (10-03-23 @ 12:10) (18 - 19)  SpO2: 95% (10-03-23 @ 12:10) (67% - 100%)  Wt(kg): -- Height (cm): 154.9 (10-03 @ 00:39)  Weight (kg): 112.8 (10-03 @ 00:39)  BMI (kg/m2): 47 (10-03 @ 00:39)  BSA (m2): 2.07 (10-03 @ 00:39)    I&O's Detail        GENERAL: NAD,  no increased WOB  HEAD:  Atraumatic, Normocephalic  EYES: EOMI, PERRLA, conjunctiva and sclera clear  ENMT: Moist mucous membranes  NECK: Supple, No JVD  NERVOUS SYSTEM:  awake and alert, no focal neuro deficits   CHEST/LUNG: Clear to auscultation bilaterally; No rales, rhonchi, wheezing, or rubs  HEART: Regular rate and rhythm; No murmurs, rubs, or gallops  ABDOMEN: obese, Soft, Nontender, Nondistended; Bowel sounds present  EXTREMITIES:  2+ Peripheral Pulses b/l, No clubbing, cyanosis, calf tenderness or edema b/l    MEDICATIONS  (STANDING):  clonazePAM  Tablet 1 milliGRAM(s) Oral three times a day  dextrose 5%. 1000 milliLiter(s) (100 mL/Hr) IV Continuous <Continuous>  dextrose 5%. 1000 milliLiter(s) (50 mL/Hr) IV Continuous <Continuous>  dextrose 50% Injectable 25 Gram(s) IV Push once  dextrose 50% Injectable 12.5 Gram(s) IV Push once  dextrose 50% Injectable 25 Gram(s) IV Push once  enoxaparin Injectable 40 milliGRAM(s) SubCutaneous every 24 hours  glucagon  Injectable 1 milliGRAM(s) IntraMuscular once  influenza   Vaccine 0.5 milliLiter(s) IntraMuscular once  insulin glargine Injectable (LANTUS) 4 Unit(s) SubCutaneous at bedtime  insulin lispro (ADMELOG) corrective regimen sliding scale   SubCutaneous three times a day before meals  zinc oxide 40% Paste 1 Application(s) Topical every 12 hours    MEDICATIONS  (PRN):  acetaminophen     Tablet .. 650 milliGRAM(s) Oral every 6 hours PRN Mild Pain (1 - 3)  acetaminophen     Tablet .. 650 milliGRAM(s) Oral every 6 hours PRN Temp greater or equal to 38C (100.4F), Mild Pain (1 - 3)  aluminum hydroxide/magnesium hydroxide/simethicone Suspension 30 milliLiter(s) Oral every 4 hours PRN Dyspepsia  dextrose Oral Gel 15 Gram(s) Oral once PRN Blood Glucose LESS THAN 70 milliGRAM(s)/deciliter  ketorolac   Injectable 15 milliGRAM(s) IV Push every 8 hours PRN Moderate Pain (4 - 6)  melatonin 3 milliGRAM(s) Oral at bedtime PRN Insomnia  morphine  - Injectable 2 milliGRAM(s) IV Push every 4 hours PRN Severe Pain (7 - 10)  ondansetron Injectable 4 milliGRAM(s) IV Push every 8 hours PRN Nausea and/or Vomiting      LABS:                        15.4   8.71  )-----------( 148      ( 03 Oct 2023 07:44 )             48.3     10-03    143  |  103  |  13  ----------------------------<  74  3.4<L>   |  35<H>  |  1.17    Ca    8.0<L>      03 Oct 2023 07:44    TPro  7.7  /  Alb  4.0  /  TBili  1.3<H>  /  DBili  x   /  AST  40<H>  /  ALT  23  /  AlkPhos  78  10-02      Urinalysis Basic - ( 03 Oct 2023 07:44 )    Color: x / Appearance: x / SG: x / pH: x  Gluc: 74 mg/dL / Ketone: x  / Bili: x / Urobili: x   Blood: x / Protein: x / Nitrite: x   Leuk Esterase: x / RBC: x / WBC x   Sq Epi: x / Non Sq Epi: x / Bacteria: x            RECENT CULTURES:          RADIOLOGY & ADDITIONAL TESTS:          Radiology reports read and imaging reviewed  :  [ x] YES  [ ] NO  (I am not a radiologist and therefore rely on Radiologist reports to facilitate with diagnosis and treatment plans)    Consultant(s) Notes Reviewed:  [x ] YES  [ ] NO    Care Discussed with Consultants/Other Providers [x ] YES  [ ] NO  Care plan and all findings were discussed in detail with patient.  All questions and concerns addressed

## 2023-10-04 NOTE — PROGRESS NOTE ADULT - ASSESSMENT
63F w/ hx of COPD on home 2L NC , DM2, HTN, obesity, smoker ? was seen in ER 10/1/23 for   R proximal fibular fracture after mechanical fall at home , was seen by ortho recommended splint and outpatient follow-up now presents again with  RLE pain and inability to ambulation.    In ED, pt afebrile and HDS.  Pt given 1L NS.  of note: patient's  is in CCU here at [facility]     Assessment and Plan:   Inability to ambulate due to pain   Rt proximal fibular Fx in splint (due to mechanical fall at home)   seen by ortho 10/1 had recommended Given significant comorbidities and anatomic alignment in splint, recommending a trial of nonoperative management at this time.  qd for 30 days   reconsulted ortho , no surgical intervention recommended.   PT eval - rec EN  pain management PRN     Diarrhea   patient endorsed she's been taking exlax multiple times a day for at least a month because she has been trying to lose weight for her son's wedding   she also endorses that she's been on abx recently   --obtain stool PCR , stool CX, c.diff,   stool count     DM2   A1c pending   continue with current regimen     COPD on 2L home O2   not in exacerbation   continue with inhalers     anxiety , stable   continue with Klonopin tid     Preventative Measures   lovenox SQ-dvt ppx   fall precautions        63F w/ hx of COPD on home 2L NC , DM2, HTN, obesity, smoker ? was seen in ER 10/1/23 for   R proximal fibular fracture after mechanical fall at home , was seen by ortho recommended splint and outpatient follow-up now presents again with  RLE pain and inability to ambulation.    In ED, pt afebrile and HDS.  Pt given 1L NS.  of note: patient's  is in CCU here at [facility]     Assessment and Plan:   Inability to ambulate due to pain   Rt proximal fibular Fx in splint (due to mechanical fall at home)   seen by ortho 10/1 had recommended Given significant comorbidities and anatomic alignment in splint, recommending a trial of nonoperative management at this time.  qd for 30 days   reconsulted ortho , no surgical intervention recommended.   PT eval - rec EN  pain management PRN     Diarrhea   patient endorsed she's been taking exlax multiple times a day for at least a month because she has been trying to lose weight for her son's wedding   she also endorses that she's been on abx recently   Cdiff and stool cx neg   stool count     DM2   A1c pending   continue with current regimen     COPD on 2L home O2   not in exacerbation   continue with inhalers     anxiety , stable   continue with Klonopin tid     Preventative Measures   lovenox SQ-dvt ppx   fall precautions        63F w/ hx of COPD on home 2L NC , DM2, HTN, obesity, smoker ? was seen in ER 10/1/23 for   R proximal fibular fracture after mechanical fall at home , was seen by ortho recommended splint and outpatient follow-up now presents again with  RLE pain and inability to ambulation.    In ED, pt afebrile and HDS.  Pt given 1L NS.  of note: patient's  is in CCU here at [facility]     Assessment and Plan:   Inability to ambulate due to pain   Rt proximal fibular Fx in splint (due to mechanical fall at home)   seen by ortho 10/1 had recommended Given significant comorbidities and anatomic alignment in splint, recommending a trial of nonoperative management at this time.  qd for 30 days   reconsulted ortho , no surgical intervention recommended.   PT eval - rec EN  pain management PRN     Diarrhea   patient endorsed she's been taking exlax multiple times a day for at least a month because she has been trying to lose weight for her son's wedding   she also endorses that she's been on abx recently   Cdiff and stool cx neg   stool count     DM2   A1c 6.5  continue with current regimen     COPD on 2L home O2   not in exacerbation   continue with inhalers     anxiety , stable   continue with Klonopin tid     Preventative Measures   lovenox SQ-dvt ppx   fall precautions     DISPO: EN, consult CM     63F w/ hx of COPD on home 2L NC , DM2, HTN, obesity, smoker ? was seen in ER 10/1/23 for   R proximal fibular fracture after mechanical fall at home , was seen by ortho recommended splint and outpatient follow-up now presents again with  RLE pain and inability to ambulation.    In ED, pt afebrile and HDS.  Pt given 1L NS.  of note: patient's  is in CCU here at [facility]     Assessment and Plan:   Inability to ambulate due to pain   Rt proximal fibular Fx in splint (due to mechanical fall at home)   seen by ortho 10/1 had recommended Given significant comorbidities and anatomic alignment in splint, recommending a trial of nonoperative management at this time.  qd for 30 days   reconsulted ortho , no surgical intervention recommended.   PT eval - rec EN  pain management PRN     Diarrhea   patient endorsed she's been taking exlax multiple times a day for at least a month because she has been trying to lose weight for her son's wedding   she also endorses that she's been on abx recently   Cdiff and stool cx neg   stool count     DM2   A1c 6.5  continue with current regimen     Chronic hypoxemic respiratory failure 2/2 COPD  COPD on 2L home O2   not in exacerbation, but does require nasal cannula  continue with inhalers       anxiety , stable   continue with Klonopin tid     Preventative Measures   lovenox SQ-dvt ppx   fall precautions     DISPO: EN, consult CM

## 2023-10-05 LAB
CULTURE RESULTS: SIGNIFICANT CHANGE UP
GLUCOSE BLDC GLUCOMTR-MCNC: 114 MG/DL — HIGH (ref 70–99)
GLUCOSE BLDC GLUCOMTR-MCNC: 149 MG/DL — HIGH (ref 70–99)
GLUCOSE BLDC GLUCOMTR-MCNC: 193 MG/DL — HIGH (ref 70–99)
GLUCOSE BLDC GLUCOMTR-MCNC: 242 MG/DL — HIGH (ref 70–99)
SPECIMEN SOURCE: SIGNIFICANT CHANGE UP

## 2023-10-05 PROCEDURE — 99232 SBSQ HOSP IP/OBS MODERATE 35: CPT

## 2023-10-05 RX ADMIN — Medication 1 PATCH: at 19:30

## 2023-10-05 RX ADMIN — MORPHINE SULFATE 2 MILLIGRAM(S): 50 CAPSULE, EXTENDED RELEASE ORAL at 11:30

## 2023-10-05 RX ADMIN — ALBUTEROL 2 PUFF(S): 90 AEROSOL, METERED ORAL at 11:30

## 2023-10-05 RX ADMIN — BUDESONIDE AND FORMOTEROL FUMARATE DIHYDRATE 2 PUFF(S): 160; 4.5 AEROSOL RESPIRATORY (INHALATION) at 17:20

## 2023-10-05 RX ADMIN — Medication 1 PATCH: at 11:28

## 2023-10-05 RX ADMIN — PANTOPRAZOLE SODIUM 40 MILLIGRAM(S): 20 TABLET, DELAYED RELEASE ORAL at 06:27

## 2023-10-05 RX ADMIN — Medication 2: at 12:20

## 2023-10-05 RX ADMIN — Medication 1 PATCH: at 06:17

## 2023-10-05 RX ADMIN — Medication 1: at 17:17

## 2023-10-05 RX ADMIN — Medication 1 MILLIGRAM(S): at 06:27

## 2023-10-05 RX ADMIN — ZINC OXIDE 1 APPLICATION(S): 200 OINTMENT TOPICAL at 06:30

## 2023-10-05 RX ADMIN — Medication 1 MILLIGRAM(S): at 15:16

## 2023-10-05 RX ADMIN — ALBUTEROL 2 PUFF(S): 90 AEROSOL, METERED ORAL at 17:20

## 2023-10-05 RX ADMIN — Medication 650 MILLIGRAM(S): at 15:16

## 2023-10-05 RX ADMIN — BUDESONIDE AND FORMOTEROL FUMARATE DIHYDRATE 2 PUFF(S): 160; 4.5 AEROSOL RESPIRATORY (INHALATION) at 11:29

## 2023-10-05 RX ADMIN — Medication 1 MILLIGRAM(S): at 21:33

## 2023-10-05 RX ADMIN — INSULIN GLARGINE 4 UNIT(S): 100 INJECTION, SOLUTION SUBCUTANEOUS at 21:33

## 2023-10-05 RX ADMIN — ENOXAPARIN SODIUM 40 MILLIGRAM(S): 100 INJECTION SUBCUTANEOUS at 11:29

## 2023-10-05 RX ADMIN — Medication 650 MILLIGRAM(S): at 06:27

## 2023-10-05 RX ADMIN — LORATADINE 10 MILLIGRAM(S): 10 TABLET ORAL at 11:29

## 2023-10-05 NOTE — PROGRESS NOTE ADULT - ASSESSMENT
63F w/ hx of COPD on home 2L NC , DM2, HTN, obesity, smoker ? was seen in ER 10/1/23 for   R proximal fibular fracture after mechanical fall at home , was seen by ortho recommended splint and outpatient follow-up now presents again with  RLE pain and inability to ambulation.    In ED, pt afebrile and HDS.  Pt given 1L NS.  of note: patient's  is in CCU here at [facility]     Assessment and Plan:   Inability to ambulate due to pain   Rt proximal fibular Fx in splint (due to mechanical fall at home)   seen by ortho 10/1 had recommended Given significant comorbidities and anatomic alignment in splint, recommending a trial of nonoperative management at this time.  qd for 30 days   reconsulted ortho , no surgical intervention recommended.   PT eval - rec EN  pain management PRN     Diarrhea   patient endorsed she's been taking exlax multiple times a day for at least a month because she has been trying to lose weight for her son's wedding   she also endorses that she's been on abx recently   Cdiff and stool cx neg   stool count     DM2   A1c 6.5  continue with current regimen     COPD on 2L home O2   not in exacerbation   continue with inhalers     anxiety , stable   continue with Klonopin tid     Preventative Measures   lovenox SQ-dvt ppx   fall precautions     DISPO: EN, pending insurance authorization

## 2023-10-05 NOTE — PROGRESS NOTE ADULT - SUBJECTIVE AND OBJECTIVE BOX
PROGRESS NOTE:   63F w/ hx of COPD, DM, HTN p/w RLE pain and decreased ambulation. Pt presented the day prior and found to have R proximal fibular fracture. Pt reports decreased ability to ambulate 2/2 pain.     In ED, pt afebrile and HDS. Labs unremarkable. Pt given 1L NS.        SUBJECTIVE & OBJECTIVE:   Pt seen and examined at bedside in AM . Overnight, no acute events.     REVIEW OF SYSTEMS: remaining ROS negative     PHYSICAL EXAM:  T(C): 36.5 (10-03-23 @ 12:10), Max: 37.1 (10-02-23 @ 22:08)  HR: 67 (10-03-23 @ 12:10) (60 - 89)  BP: 96/65 (10-03-23 @ 12:10) (96/65 - 136/74)  RR: 18 (10-03-23 @ 12:10) (18 - 19)  SpO2: 95% (10-03-23 @ 12:10) (67% - 100%)  Wt(kg): -- Height (cm): 154.9 (10-03 @ 00:39)  Weight (kg): 112.8 (10-03 @ 00:39)  BMI (kg/m2): 47 (10-03 @ 00:39)  BSA (m2): 2.07 (10-03 @ 00:39)    I&O's Detail        GENERAL: NAD,  no increased WOB  HEAD:  Atraumatic, Normocephalic  EYES: EOMI, PERRLA, conjunctiva and sclera clear  ENMT: Moist mucous membranes  NECK: Supple, No JVD  NERVOUS SYSTEM:  awake and alert, no focal neuro deficits   CHEST/LUNG: Clear to auscultation bilaterally; No rales, rhonchi, wheezing, or rubs  HEART: Regular rate and rhythm; No murmurs, rubs, or gallops  ABDOMEN: obese, Soft, Nontender, Nondistended; Bowel sounds present  EXTREMITIES:  2+ Peripheral Pulses b/l, No clubbing, cyanosis, calf tenderness or edema b/l    MEDICATIONS  (STANDING):  clonazePAM  Tablet 1 milliGRAM(s) Oral three times a day  dextrose 5%. 1000 milliLiter(s) (100 mL/Hr) IV Continuous <Continuous>  dextrose 5%. 1000 milliLiter(s) (50 mL/Hr) IV Continuous <Continuous>  dextrose 50% Injectable 25 Gram(s) IV Push once  dextrose 50% Injectable 12.5 Gram(s) IV Push once  dextrose 50% Injectable 25 Gram(s) IV Push once  enoxaparin Injectable 40 milliGRAM(s) SubCutaneous every 24 hours  glucagon  Injectable 1 milliGRAM(s) IntraMuscular once  influenza   Vaccine 0.5 milliLiter(s) IntraMuscular once  insulin glargine Injectable (LANTUS) 4 Unit(s) SubCutaneous at bedtime  insulin lispro (ADMELOG) corrective regimen sliding scale   SubCutaneous three times a day before meals  zinc oxide 40% Paste 1 Application(s) Topical every 12 hours    MEDICATIONS  (PRN):  acetaminophen     Tablet .. 650 milliGRAM(s) Oral every 6 hours PRN Mild Pain (1 - 3)  acetaminophen     Tablet .. 650 milliGRAM(s) Oral every 6 hours PRN Temp greater or equal to 38C (100.4F), Mild Pain (1 - 3)  aluminum hydroxide/magnesium hydroxide/simethicone Suspension 30 milliLiter(s) Oral every 4 hours PRN Dyspepsia  dextrose Oral Gel 15 Gram(s) Oral once PRN Blood Glucose LESS THAN 70 milliGRAM(s)/deciliter  ketorolac   Injectable 15 milliGRAM(s) IV Push every 8 hours PRN Moderate Pain (4 - 6)  melatonin 3 milliGRAM(s) Oral at bedtime PRN Insomnia  morphine  - Injectable 2 milliGRAM(s) IV Push every 4 hours PRN Severe Pain (7 - 10)  ondansetron Injectable 4 milliGRAM(s) IV Push every 8 hours PRN Nausea and/or Vomiting      LABS:                        15.4   8.71  )-----------( 148      ( 03 Oct 2023 07:44 )             48.3     10-03    143  |  103  |  13  ----------------------------<  74  3.4<L>   |  35<H>  |  1.17    Ca    8.0<L>      03 Oct 2023 07:44    TPro  7.7  /  Alb  4.0  /  TBili  1.3<H>  /  DBili  x   /  AST  40<H>  /  ALT  23  /  AlkPhos  78  10-02      Urinalysis Basic - ( 03 Oct 2023 07:44 )    Color: x / Appearance: x / SG: x / pH: x  Gluc: 74 mg/dL / Ketone: x  / Bili: x / Urobili: x   Blood: x / Protein: x / Nitrite: x   Leuk Esterase: x / RBC: x / WBC x   Sq Epi: x / Non Sq Epi: x / Bacteria: x            RECENT CULTURES:          RADIOLOGY & ADDITIONAL TESTS:          Radiology reports read and imaging reviewed  :  [ x] YES  [ ] NO  (I am not a radiologist and therefore rely on Radiologist reports to facilitate with diagnosis and treatment plans)    Consultant(s) Notes Reviewed:  [x ] YES  [ ] NO    Care Discussed with Consultants/Other Providers [x ] YES  [ ] NO  Care plan and all findings were discussed in detail with patient.  All questions and concerns addressed

## 2023-10-05 NOTE — CHART NOTE - NSCHARTNOTEFT_GEN_A_CORE
Patient seen for BMI > 40 risk rescreening. Patient has been screened for and presents negative for    -Pressure ulcers stage 2 or greater  -Enteral or Parenteral Nutrition  -BMI <18  -[id number] >8  -Chewing/Swallowing Difficulty  -Decreased appetite  -Unintentional Weight Loss  -Inadequate diet (i.e. NPO/Clear Liquids)    No intervention required at this time. RD remains available. Pt seen on medical floor adm s/p fall. PMH COPD ; DM (10/4 - HgbA1c = 6.5%) ; HTN ; . P/W RLE pain & decreased ambulation. Pt w/ proximal fibular Fx & decreased ability 2/2 to pain. Denies N/V/D/C/Chewing/Swallowing issues, No food allergies. Educated and provided pt w/ written literature on Tips to support weight loss. Consuming 75 - 100 % meals. Pt is confused and not sure why she is in the Hospital. RN aware. BMI = 47.

## 2023-10-06 LAB
GLUCOSE BLDC GLUCOMTR-MCNC: 111 MG/DL — HIGH (ref 70–99)
GLUCOSE BLDC GLUCOMTR-MCNC: 126 MG/DL — HIGH (ref 70–99)
GLUCOSE BLDC GLUCOMTR-MCNC: 127 MG/DL — HIGH (ref 70–99)
GLUCOSE BLDC GLUCOMTR-MCNC: 161 MG/DL — HIGH (ref 70–99)

## 2023-10-06 PROCEDURE — 99232 SBSQ HOSP IP/OBS MODERATE 35: CPT

## 2023-10-06 RX ORDER — OXYCODONE HYDROCHLORIDE 5 MG/1
5 TABLET ORAL EVERY 6 HOURS
Refills: 0 | Status: DISCONTINUED | OUTPATIENT
Start: 2023-10-06 | End: 2023-10-09

## 2023-10-06 RX ADMIN — Medication 1 PATCH: at 07:46

## 2023-10-06 RX ADMIN — LORATADINE 10 MILLIGRAM(S): 10 TABLET ORAL at 12:17

## 2023-10-06 RX ADMIN — ZINC OXIDE 1 APPLICATION(S): 200 OINTMENT TOPICAL at 17:57

## 2023-10-06 RX ADMIN — TIOTROPIUM BROMIDE 2 PUFF(S): 18 CAPSULE ORAL; RESPIRATORY (INHALATION) at 12:34

## 2023-10-06 RX ADMIN — MORPHINE SULFATE 2 MILLIGRAM(S): 50 CAPSULE, EXTENDED RELEASE ORAL at 02:22

## 2023-10-06 RX ADMIN — Medication 1 PATCH: at 08:23

## 2023-10-06 RX ADMIN — ZINC OXIDE 1 APPLICATION(S): 200 OINTMENT TOPICAL at 05:27

## 2023-10-06 RX ADMIN — PANTOPRAZOLE SODIUM 40 MILLIGRAM(S): 20 TABLET, DELAYED RELEASE ORAL at 05:22

## 2023-10-06 RX ADMIN — Medication 1 PATCH: at 12:18

## 2023-10-06 RX ADMIN — MORPHINE SULFATE 2 MILLIGRAM(S): 50 CAPSULE, EXTENDED RELEASE ORAL at 09:59

## 2023-10-06 RX ADMIN — TIOTROPIUM BROMIDE 2 PUFF(S): 18 CAPSULE ORAL; RESPIRATORY (INHALATION) at 12:31

## 2023-10-06 RX ADMIN — MORPHINE SULFATE 2 MILLIGRAM(S): 50 CAPSULE, EXTENDED RELEASE ORAL at 02:50

## 2023-10-06 RX ADMIN — INSULIN GLARGINE 4 UNIT(S): 100 INJECTION, SOLUTION SUBCUTANEOUS at 22:10

## 2023-10-06 RX ADMIN — Medication 1 MILLIGRAM(S): at 14:37

## 2023-10-06 RX ADMIN — Medication 1 MILLIGRAM(S): at 22:09

## 2023-10-06 RX ADMIN — BUDESONIDE AND FORMOTEROL FUMARATE DIHYDRATE 2 PUFF(S): 160; 4.5 AEROSOL RESPIRATORY (INHALATION) at 17:55

## 2023-10-06 RX ADMIN — ENOXAPARIN SODIUM 40 MILLIGRAM(S): 100 INJECTION SUBCUTANEOUS at 09:18

## 2023-10-06 RX ADMIN — Medication 1 PATCH: at 12:34

## 2023-10-06 RX ADMIN — MORPHINE SULFATE 2 MILLIGRAM(S): 50 CAPSULE, EXTENDED RELEASE ORAL at 09:24

## 2023-10-06 RX ADMIN — Medication 1 MILLIGRAM(S): at 05:22

## 2023-10-06 RX ADMIN — BUDESONIDE AND FORMOTEROL FUMARATE DIHYDRATE 2 PUFF(S): 160; 4.5 AEROSOL RESPIRATORY (INHALATION) at 05:25

## 2023-10-06 NOTE — PHYSICAL THERAPY INITIAL EVALUATION ADULT - REFERRAL TO ANOTHER SERVICE NEEDED, PT EVAL
Per sister to get Psych & Podiatry consult- [facility] confirmed  addressing these issues to social work department.

## 2023-10-06 NOTE — PHYSICAL THERAPY INITIAL EVALUATION ADULT - PATIENT/FAMILY/SIGNIFICANT OTHER GOALS STATEMENT, PT EVAL
[facility], Division of Infectious Diseases  ELLIOT Hopkins A. Lee  [phone]    Name: DIONICIO SULLIVAN  Age: 73y  Gender: Male  MRN: [id number]    Interval History--  Notes reviewed  pt says his breathing is better  and feels his cough has improved also      Past Medical History--  KIRTI (obstructive sleep apnea)  Hypertension  Diabetes  Hepatitis B  Lymphoma  AIHA (autoimmune hemolytic anemia)  Leukemia  Diabetes  HTN (hypertension)  H/O lithotripsy    For details regarding the patient's social history, family history, and other miscellaneous elements, please refer the initial infectious diseases consultation and/or the admitting history and physical examination for this admission.    Allergies    sulfa drugs (Unknown)    Intolerances        Medications--  Antibiotics:  cefepime   IVPB 2000 milliGRAM(s) IV Intermittent every 12 hours  cefepime   IVPB      entecavir 0.5 milliGRAM(s) Oral daily  vancomycin  IVPB 1250 milliGRAM(s) IV Intermittent every 12 hours    Immunologic:    Other:  acetaminophen   Tablet .. PRN  acetaminophen   Tablet .. PRN  albuterol/ipratropium for Nebulization PRN  guaiFENesin   Syrup  (Sugar-Free) PRN  hydrocodone/homatropine Syrup PRN  pantoprazole    Tablet  potassium chloride    Tablet ER  predniSONE   Tablet      Review of Systems--  A 10-point review of systems was obtained.     Pertinent positives and negatives--  Constitutional: No fevers. No Chills. No Rigors.   Cardiovascular: No chest pain. No palpitations.  Respiratory: No shortness of breath. No cough.  Gastrointestinal: No nausea or vomiting. No diarrhea or constipation.   Psychiatric: no depression    Review of systems otherwise negative except as previously noted.    Physical Examination--  Vital Signs: T(F): 98.1 (03-18-20 @ 12:04), Max: 101 (03-17-20 @ 15:00)  HR: 84 (03-18-20 @ 12:04)  BP: 124/64 (03-18-20 @ 12:04)  RR: 14 (03-18-20 @ 12:04)  SpO2: 98% (03-18-20 @ 12:04)  Wt(kg): --  General: Nontoxic-appearing Male in no acute distress.  HEENT: AT/NC.  Anicteric. Conjunctiva pink and moist.   Neck: Not rigid. No sense of mass.  Nodes: None palpable.  Lungs: Clear bilaterally without rales, wheezing or rhonchi  Heart: Regular rate and rhythm. No Murmur.   Abdomen: Bowel sounds present and normoactive. Soft.  Extremities: No cyanosis or clubbing. trace edema.   Skin: Warm. Dry. Good turgor. No rash. No vasculitic stigmata.  Psychiatric: Appropriate affect and mood for situation.         Laboratory Studies--  CBC                        6.6    6.09  )-----------( 2        ( 18 Mar 2020 06:41 )             19.7       Chemistries  03-18    136  |  110<H>  |  23  ----------------------------<  108<H>  3.0<L>   |  17<L>  |  1.10    Ca    7.9<L>      18 Mar 2020 06:41  Phos  2.0     03-16  Mg     1.9     03-16        Culture Data    Culture - Blood (collected 16 Mar 2020 22:03)  Source: .Blood Blood  Preliminary Report (17 Mar 2020 23:02):    No growth to date.    Culture - Blood (collected 16 Mar 2020 22:03)  Source: .Blood Blood  Preliminary Report (17 Mar 2020 23:02):    No growth to date.        < from: Xray Chest 1 View- PORTABLE-Routine (03.17.20 @ 12:19) >    EXAM:  XR CHEST PORTABLE ROUTINE 1V                            PROCEDURE DATE:  03/17/2020          INTERPRETATION:  CLINICAL STATEMENT: Follow-up chest pain.    TECHNIQUE: AP view of the chest.    COMPARISON: 3/16/2020    FINDINGS/  IMPRESSION:  Nonspecific opacity right midlung zone without significant change.    No significant pleural effusion    Heart size cannot be accurately assessed in this projection.              < end of copied text >
improve foot dryness
pt has dec cognitive function

## 2023-10-06 NOTE — PROGRESS NOTE ADULT - ASSESSMENT
63F w/ hx of COPD on home 2L NC , DM2, HTN, obesity, smoker ? was seen in ER 10/1/23 for   R proximal fibular fracture after mechanical fall at home , was seen by ortho recommended splint and outpatient follow-up now presents again with  RLE pain and inability to ambulation.    In ED, pt afebrile and HDS.  Pt given 1L NS.  of note: patient's  is in CCU here at [facility]     Assessment and Plan:   Inability to ambulate due to pain   Rt proximal fibular Fx in splint (due to mechanical fall at home)   seen by ortho 10/1 had recommended Given significant comorbidities and anatomic alignment in splint, recommending a trial of nonoperative management at this time.  qd for 30 days   reconsulted ortho , no surgical intervention recommended.   PT eval - rec EN  pain management PRN     Diarrhea   patient endorsed she's been taking exlax multiple times a day for at least a month because she has been trying to lose weight for her son's wedding   she also endorses that she's been on abx recently   Cdiff and stool cx neg   stool count     DM2   A1c 6.5  continue with current regimen     COPD on 2L home O2   not in exacerbation   continue with inhalers     anxiety , stable   continue with Klonopin tid     Preventative Measures   lovenox SQ-dvt ppx   fall precautions     DISPO: EN, pending insurance authorization    63F w/ hx of COPD on home 2L NC , DM2, HTN, obesity, smoker ? was seen in ER 10/1/23 for   R proximal fibular fracture after mechanical fall at home , was seen by ortho recommended splint and outpatient follow-up now presents again with  RLE pain and inability to ambulation.    In ED, pt afebrile and HDS.  Pt given 1L NS.  of note: patient's  is in CCU here at [facility]     Assessment and Plan:   Inability to ambulate due to pain   Rt proximal fibular Fx in splint (due to mechanical fall at home)   seen by ortho 10/1 had recommended Given significant comorbidities and anatomic alignment in splint, recommending a trial of nonoperative management at this time.  qd for 30 days   reconsulted ortho , no surgical intervention recommended.   PT eval - rec EN  pain management PRN   Wound care consulted for thickened skin/scab on toes on the Right    DM2   A1c 6.5  continue with current regimen     Chronic respiratory failure 2/2 COPD  COPD on 2L home O2   not in exacerbation  continue with inhalers     anxiety , stable   continue with Klonopin tid     Tobacco smoker  Nicotine patch   Educated on lifesyle modifications     Preventative Measures   lovenox SQ-dvt ppx   fall precautions     DISPO: EN, pending insurance authorization    63F w/ hx of COPD on home 2L NC , DM2, HTN, obesity, smoker ? was seen in ER 10/1/23 for   R proximal fibular fracture after mechanical fall at home , was seen by ortho recommended splint and outpatient follow-up now presents again with  RLE pain and inability to ambulation.    In ED, pt afebrile and HDS.  Pt given 1L NS.  of note: patient's  is in CCU here at [facility]     Assessment and Plan:   Inability to ambulate due to pain   Rt proximal fibular Fx in splint (due to mechanical fall at home)   seen by ortho 10/1 had recommended Given significant comorbidities and anatomic alignment in splint, recommending a trial of nonoperative management at this time.  qd for 30 days   reconsulted ortho , no surgical intervention recommended.   PT eval - rec EN  pain management PRN   Wound care consulted for thickened skin/scab on toes on the Right    DM2   A1c 6.5  continue with current regimen     Chronic hypoxemic respiratory failure 2/2 COPD  COPD on 2L home O2   not in exacerbation, but does require nasal cannula  continue with inhalers     anxiety , stable   continue with Klonopin tid     Tobacco smoker  Nicotine patch   Educated on lifesyle modifications     Preventative Measures   lovenox SQ-dvt ppx   fall precautions     DISPO: EN, pending insurance authorization

## 2023-10-06 NOTE — PHYSICAL THERAPY INITIAL EVALUATION ADULT - MODALITIES TREATMENT COMMENTS
5/21/2021    PRIMARY: Marcela Wise MD      ASSESSMENT:    1. Paroxysmal atrial fibrillation (CMS/HCC)    2. Medication monitoring encounter      · PAF without reoccurrence on flecainide 150 mg BID  · EKG stable. SB with bifasicular block, normal QT, unchanged  · On immunologics new since 1/2019 also for MM. Echo 1/2019 no signs of infiltrative CMP- will update  · AC on Xarelto.  No bleed      PLAN:  · Update echo.  · OV in 1 year, sooner if needs arise      Orders Placed This Encounter   • Electrocardiogram 12-Lead   • Echo M-Mode/2D/Doppler (Routine)     Return in about 1 year (around 5/21/2022) for PAF wtih EKG.    Frederick Watkins is a 64 year old male here for evaluation of:  1.  Paroxysmal Atrial Fibrillation  2.  Flecainide monitoring  3.  Anticoagulation management   A. Xarelto  4.  IgG myeloma    SUBJECTIVE:  Feeling well. Same medications for his MM.  Working out 5 days a week  No exertional symptoms.  No swelling  Occasional palpitations \"like a flop\" lasting 2-3 seconds every few weeks.  No bleeding concerns.  EKG sinus angelina with 1st degree AVB, bifascicular block.     ROS:Review of systems as above otherwise negative.    Current Outpatient Medications   Medication Sig   • fluticasone (FLONASE) 50 MCG/ACT nasal spray USE 1 SPRAY IN EACH NOSTRIL DAILY AS NEEDED FOR POST NASAL DRIP/RUNNY NOSE RELATED TO RHINITIS   • loratadine (CLARITIN) 10 MG tablet TAKE 1 TABLET BY MOUTH DAILY (Patient taking differently: Take 10 mg by mouth daily as needed. )   • Xarelto 20 MG Tab TAKE 1 TABLET BY MOUTH DAILY WITH DINNER   • Collagen Hydrolysate Powder    • HYDROcodone-acetaminophen (Norco) 5-325 MG per tablet Take 1 tablet by mouth every 6 hours as needed (for severe pain).   • Misc Natural Products (Urinozinc Plus) Tab Take by mouth daily. Indications: Takes 1 tablet once day   • albuterol 108 (90 Base) MCG/ACT inhaler Inhale 2 puffs into the lungs every 4 hours as needed for Shortness of Breath or Wheezing.  R gettoe medial aspect- Dry cracked skin - Pt has significant h/o poorly managed DM2 . Unkempt long toe nails noted.   • sildenafil (VIAGRA) 50 MG tablet TAKE 1 TABLET BY MOUTH AS NEEDED FR ERECTILE DYSFUNCTION   • acyclovir (ZOVIRAX) 400 MG tablet Take 1 tablet by mouth 2 times daily.   • flecainide (TAMBOCOR) 150 MG tablet Take 1 tablet by mouth 2 times daily.   • montelukast (SINGULAIR) 10 MG tablet TAKE 1 TABLET BY MOUTH EVERY NIGHT   • Probiotic Product (PROBIOTIC DAILY PO) Take by mouth daily.   • MAGNESIUM PO Take by mouth daily.   • B Complex Vitamins (VITAMIN B-COMPLEX PO) Take by mouth daily.   • Turmeric (CURCUMIN 95 PO) Take 1 tablet by mouth daily.    • Ascorbic Acid (VITAMIN C PO) Take 1 tablet by mouth daily.    • Omega-3 Fatty Acids (FISH OIL OMEGA-3 PO)    • MISC NATURAL PRODUCTS PO Take 5 tablets by mouth daily. Barley powder in tablet form - Wheat grass supplement (company [employer])    • Cholecalciferol (VITAMIN D3) 52401 UNITS Cap Take 1 capsule by mouth every morning.      No current facility-administered medications for this visit.       OBJECTIVE:  I have reviewed the patient's medications and allergies, problem list, past medical, surgical, social and family history, spending a lot of time updating these as appropriate, especially the problem list.  See Histories section of the EMR for a display of this information.     Patient Active Problem List    Diagnosis Date Noted   • Hypogammaglobulinemia (CMS/[facility]) 10/22/2019     Priority: Medium   • Posterior subcapsular age-related cataract of both eyes 01/14/2018     Priority: Medium   • Bilateral dry eyes 01/14/2018     Priority: Medium   • Chronic bilateral low back pain without sciatica 08/01/2019     Priority: Low   • Encounter for monitoring flecainide therapy 01/17/2019     Priority: Low   • Bilateral pulmonary embolism (CMS/[facility]) 07/11/2015     Priority: Low     On chronic anticoagulation with rivaroxaban (Xarelto)  March 2018. 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  p17 abnormality  January 2011. Rx with bortezomib, lenalidomide and dexamethasone with very good response. Developed deep venous thrombosis while on therapy.  July 2011. Very good response, declined autologous bone marrow transplantation. Rx maintenance lenalidomide.  Recurrent disease with increasing paraprotein levels. Restarted bortezomib, lenalidomide and dexamethasone.  Good response to therapy, lenalidomide and dexamethasone discontinued due to toxicities.   August 2013 Ongoing therapy with single agent bortezomib  September 2015. Progressive disease.FISH negative with a multiple myeloma panel normal cytogenetics.High dose arm 2  October 2015. Rx with Carfilzomib on clinical trial SWOG . Patient decided against stem cell transplantation (Dr. Vizcaino at [facility])  January 2016. Acute pulmonary injury presumed secondary to Carfilzomib. February February 2016. Carfilzomib resumed, dose level -1.  March 2016. Very good response with Rx Carfilzomib.  September 2016. Rx with high-dose Carfilzomib on clinical trial completed.  Patient achieved Very Good Partial Response on Clinical Trial treatment.  November 2016. Rx maintenance Carfilzomib (every other week)   August/Spetember 2018. Rising M protein levels.  November 2018. BMbx 10-15%, FISH 11q13, PET scan \"negative\"  January 2019. Rx Daratumumab/pomalidomide/dex   June 2019. Very good partial response thus far. Pomalidomide dose reduced to 3 mg secondary to leukopenia  October 2019. Pomalidomide discontinued secondary to neutropenia.  Ongoing Rx daratumumab.       • Paroxysmal atrial fibrillation 09/12/2013     Priority: Low       PHYSICAL EXAM:  Vitals:    05/21/21 0854   BP: 132/86   Pulse: (!) 55     BP Readings from Last 4 Encounters:    05/21/21 132/86   05/04/21 134/81   04/27/21 133/79   03/30/21 134/81     Wt Readings from Last 4 Encounters:   05/21/21 76.8 kg   05/04/21 77.4 kg   03/02/21 76.5 kg   01/05/21 79.6 kg     Body mass index is 23.72 kg/m².  General:  No distress   Head and Neck:  Normocephalic-atraumatic. No JVD  Chest:  Symmetrical expansion.  No chest wall deformity  Lungs:  Clear to auscultation bilaterally.  Heart:  Regular rate and rhythm.  Skin:  Skin color, texture, turgor normal. No rashes or lesions.  Neurologic: Alert and oriented to person, place and time  Extremities:  extremities normal, atraumatic, no cyanosis or edema.    LABS:  Recent Labs   Lab 04/27/21  0835 03/30/21  0836 02/24/21  1153 08/11/20  0834   Cholesterol  --   --   --  185   HDL  --   --   --  60   Triglycerides  --   --   --  72   CALCLDL  --   --   --  111   Non-HDL Cholesterol  --   --   --  125   Sodium 138 141 140  --    Potassium 4.2 4.4 4.2  --    Chloride 103 104 104  --    Carbon Dioxide 27 29 29  --    BUN 30* 26* 28*  --    Creatinine 1.14 1.08 1.15  --    Glucose 94 88 91  --    WBC 3.6* 3.6* 4.2  --    HGB 15.9 15.5 15.4  --    HCT 44.7 44.6 44.5  --     158 165  --      Greater than 50% of the visit was spent was spent counseling this patient on the above diagnosis(es), testing and treatment options.  Total time spent 30 minutes.    Kelsey Agosto NP

## 2023-10-06 NOTE — PHYSICAL THERAPY INITIAL EVALUATION ADULT - PERTINENT HX OF CURRENT PROBLEM, REHAB EVAL
pt admitted for fall, s/p RLE fibular fx
Sister took therapist outside room and explained social issues including hygiene, living conditions , abuse of medications , hoarding, fall risk conditions in home. Pt has h/o multiple falls. Pt is a smoker but she cannot climb out of stairs from her basement apt so she cracks the door open to smoke. During taking numerous smoking trips  to the door, negotiating obstacles/wires., pt looses her balance and falls. Sister worried that due to pt having  diabetes she is worried of complications. The bathroom has mould all over. The living environment in unsanitary.  Shared pic of  unsafe living conditions of patient's residence. [place] that lately pt has not been taking showers. Pt & her partner( who is currently admitted in [facility] - in a room across patient's room) eat unhealthy meals. Partner admitted for drug overdose. Pt calls her sister numerous times a day/night . Per pt she is confused at night time- asking where she is etc.

## 2023-10-06 NOTE — PHYSICAL THERAPY INITIAL EVALUATION ADULT - GENERAL OBSERVATIONS, REHAB EVAL
Pt recd prone nad, short leg cast on right lower limb. Sister Becky Chin at bedside. Shr informed that her sister's R foot is dry skin c cracks so she asked doctor for wound care consult.  Pt referred to PT for R grt toe dryness.
pt encountered in bed supine, aaox3, + primafit, + cast on RLE foot sec to s/p fall with proximal fibular fx

## 2023-10-07 LAB
GLUCOSE BLDC GLUCOMTR-MCNC: 145 MG/DL — HIGH (ref 70–99)
GLUCOSE BLDC GLUCOMTR-MCNC: 147 MG/DL — HIGH (ref 70–99)
GLUCOSE BLDC GLUCOMTR-MCNC: 162 MG/DL — HIGH (ref 70–99)
GLUCOSE BLDC GLUCOMTR-MCNC: 163 MG/DL — HIGH (ref 70–99)
GLUCOSE BLDC GLUCOMTR-MCNC: 207 MG/DL — HIGH (ref 70–99)

## 2023-10-07 PROCEDURE — 99232 SBSQ HOSP IP/OBS MODERATE 35: CPT

## 2023-10-07 RX ADMIN — BUDESONIDE AND FORMOTEROL FUMARATE DIHYDRATE 2 PUFF(S): 160; 4.5 AEROSOL RESPIRATORY (INHALATION) at 18:20

## 2023-10-07 RX ADMIN — Medication 1 MILLIGRAM(S): at 21:34

## 2023-10-07 RX ADMIN — Medication 1: at 12:50

## 2023-10-07 RX ADMIN — ZINC OXIDE 1 APPLICATION(S): 200 OINTMENT TOPICAL at 05:32

## 2023-10-07 RX ADMIN — Medication 1 PATCH: at 08:22

## 2023-10-07 RX ADMIN — TIOTROPIUM BROMIDE 2 PUFF(S): 18 CAPSULE ORAL; RESPIRATORY (INHALATION) at 14:45

## 2023-10-07 RX ADMIN — ZINC OXIDE 1 APPLICATION(S): 200 OINTMENT TOPICAL at 18:19

## 2023-10-07 RX ADMIN — Medication 1 PATCH: at 12:39

## 2023-10-07 RX ADMIN — Medication 1 PATCH: at 20:14

## 2023-10-07 RX ADMIN — Medication 1 MILLIGRAM(S): at 14:45

## 2023-10-07 RX ADMIN — PANTOPRAZOLE SODIUM 40 MILLIGRAM(S): 20 TABLET, DELAYED RELEASE ORAL at 05:31

## 2023-10-07 RX ADMIN — Medication 15 MILLIGRAM(S): at 14:45

## 2023-10-07 RX ADMIN — INSULIN GLARGINE 4 UNIT(S): 100 INJECTION, SOLUTION SUBCUTANEOUS at 21:35

## 2023-10-07 RX ADMIN — Medication 1 MILLIGRAM(S): at 05:31

## 2023-10-07 RX ADMIN — ENOXAPARIN SODIUM 40 MILLIGRAM(S): 100 INJECTION SUBCUTANEOUS at 12:43

## 2023-10-07 RX ADMIN — BUDESONIDE AND FORMOTEROL FUMARATE DIHYDRATE 2 PUFF(S): 160; 4.5 AEROSOL RESPIRATORY (INHALATION) at 06:12

## 2023-10-07 RX ADMIN — Medication 1 PATCH: at 12:38

## 2023-10-07 RX ADMIN — Medication 15 MILLIGRAM(S): at 15:23

## 2023-10-07 NOTE — PROGRESS NOTE ADULT - ASSESSMENT
63F w/ hx of COPD on home 2L NC , DM2, HTN, obesity, smoker ? was seen in ER 10/1/23 for   R proximal fibular fracture after mechanical fall at home , was seen by ortho recommended splint and outpatient follow-up now presents again with  RLE pain and inability to ambulation.    In ED, pt afebrile and HDS.  Pt given 1L NS.  of note: patient's  is in CCU here at [facility]     Assessment and Plan:   Inability to ambulate due to pain   Rt proximal fibular Fx in splint (due to mechanical fall at home)   seen by ortho 10/1 had recommended Given significant comorbidities and anatomic alignment in splint, recommending a trial of nonoperative management at this time.  qd for 30 days   reconsulted ortho , no surgical intervention recommended.   PT eval - rec EN  pain management PRN   Wound care consulted  Podiatry consulted for diabetic foot care     DM2   A1c 6.5  continue with current regimen     Chronic hypoxemic respiratory failure 2/2 COPD  COPD on 2L home O2   not in exacerbation, but does require nasal cannula  continue with inhalers     anxiety , stable   continue with Klonopin tid   Will order pysch consult on Monday     Tobacco smoker  Nicotine patch   Educated on lifestyle modifications     Preventative Measures   lovenox SQ-dvt ppx   fall precautions     DISPO: EN, pending insurance authorization

## 2023-10-07 NOTE — PROGRESS NOTE ADULT - SUBJECTIVE AND OBJECTIVE BOX
PROGRESS NOTE:   63F w/ hx of COPD, DM, HTN p/w RLE pain and decreased ambulation. Pt presented the day prior and found to have R proximal fibular fracture. Pt reports decreased ability to ambulate 2/2 pain.         SUBJECTIVE & OBJECTIVE:   Pt seen and examined at bedside in AM . Overnight, no acute events. Pending placement to [facility] [place]. c/o pain in affected foot. Attempted to call sister x 2, with no answer.       REVIEW OF SYSTEMS: remaining ROS negative     PHYSICAL EXAM:  T(C): 36.5 (10-03-23 @ 12:10), Max: 37.1 (10-02-23 @ 22:08)  HR: 67 (10-03-23 @ 12:10) (60 - 89)  BP: 96/65 (10-03-23 @ 12:10) (96/65 - 136/74)  RR: 18 (10-03-23 @ 12:10) (18 - 19)  SpO2: 95% (10-03-23 @ 12:10) (67% - 100%)  Wt(kg): -- Height (cm): 154.9 (10-03 @ 00:39)  Weight (kg): 112.8 (10-03 @ 00:39)  BMI (kg/m2): 47 (10-03 @ 00:39)  BSA (m2): 2.07 (10-03 @ 00:39)    I&O's Detail        GENERAL: NAD,  no increased WOB  HEAD:  Atraumatic, Normocephalic  EYES: EOMI, PERRLA, conjunctiva and sclera clear  ENMT: Moist mucous membranes  NECK: Supple, No JVD  NERVOUS SYSTEM:  awake and alert, no focal neuro deficits   CHEST/LUNG: Clear to auscultation bilaterally; No rales, rhonchi, wheezing, or rubs  HEART: Regular rate and rhythm; No murmurs, rubs, or gallops  ABDOMEN: obese, Soft, Nontender, Nondistended; Bowel sounds present  EXTREMITIES:  Right foot wrapped up     MEDICATIONS  (STANDING):  clonazePAM  Tablet 1 milliGRAM(s) Oral three times a day  dextrose 5%. 1000 milliLiter(s) (100 mL/Hr) IV Continuous <Continuous>  dextrose 5%. 1000 milliLiter(s) (50 mL/Hr) IV Continuous <Continuous>  dextrose 50% Injectable 25 Gram(s) IV Push once  dextrose 50% Injectable 12.5 Gram(s) IV Push once  dextrose 50% Injectable 25 Gram(s) IV Push once  enoxaparin Injectable 40 milliGRAM(s) SubCutaneous every 24 hours  glucagon  Injectable 1 milliGRAM(s) IntraMuscular once  influenza   Vaccine 0.5 milliLiter(s) IntraMuscular once  insulin glargine Injectable (LANTUS) 4 Unit(s) SubCutaneous at bedtime  insulin lispro (ADMELOG) corrective regimen sliding scale   SubCutaneous three times a day before meals  zinc oxide 40% Paste 1 Application(s) Topical every 12 hours    MEDICATIONS  (PRN):  acetaminophen     Tablet .. 650 milliGRAM(s) Oral every 6 hours PRN Mild Pain (1 - 3)  acetaminophen     Tablet .. 650 milliGRAM(s) Oral every 6 hours PRN Temp greater or equal to 38C (100.4F), Mild Pain (1 - 3)  aluminum hydroxide/magnesium hydroxide/simethicone Suspension 30 milliLiter(s) Oral every 4 hours PRN Dyspepsia  dextrose Oral Gel 15 Gram(s) Oral once PRN Blood Glucose LESS THAN 70 milliGRAM(s)/deciliter  ketorolac   Injectable 15 milliGRAM(s) IV Push every 8 hours PRN Moderate Pain (4 - 6)  melatonin 3 milliGRAM(s) Oral at bedtime PRN Insomnia  morphine  - Injectable 2 milliGRAM(s) IV Push every 4 hours PRN Severe Pain (7 - 10)  ondansetron Injectable 4 milliGRAM(s) IV Push every 8 hours PRN Nausea and/or Vomiting      LABS:                        15.4   8.71  )-----------( 148      ( 03 Oct 2023 07:44 )             48.3     10-03    143  |  103  |  13  ----------------------------<  74  3.4<L>   |  35<H>  |  1.17    Ca    8.0<L>      03 Oct 2023 07:44    TPro  7.7  /  Alb  4.0  /  TBili  1.3<H>  /  DBili  x   /  AST  40<H>  /  ALT  23  /  AlkPhos  78  10-02      Urinalysis Basic - ( 03 Oct 2023 07:44 )    Color: x / Appearance: x / SG: x / pH: x  Gluc: 74 mg/dL / Ketone: x  / Bili: x / Urobili: x   Blood: x / Protein: x / Nitrite: x   Leuk Esterase: x / RBC: x / WBC x   Sq Epi: x / Non Sq Epi: x / Bacteria: x            RECENT CULTURES:          RADIOLOGY & ADDITIONAL TESTS:          Radiology reports read and imaging reviewed  :  [ x] YES  [ ] NO  (I am not a radiologist and therefore rely on Radiologist reports to facilitate with diagnosis and treatment plans)    Consultant(s) Notes Reviewed:  [x ] YES  [ ] NO    Care Discussed with Consultants/Other Providers [x ] YES  [ ] NO  Care plan and all findings were discussed in detail with patient.  All questions and concerns addressed

## 2023-10-08 LAB
ANION GAP SERPL CALC-SCNC: 6 MMOL/L — SIGNIFICANT CHANGE UP (ref 5–17)
BUN SERPL-MCNC: 15 MG/DL — SIGNIFICANT CHANGE UP (ref 7–23)
CALCIUM SERPL-MCNC: 8.3 MG/DL — LOW (ref 8.5–10.1)
CHLORIDE SERPL-SCNC: 102 MMOL/L — SIGNIFICANT CHANGE UP (ref 96–108)
CO2 SERPL-SCNC: 32 MMOL/L — HIGH (ref 22–31)
CREAT SERPL-MCNC: 1.04 MG/DL — SIGNIFICANT CHANGE UP (ref 0.5–1.3)
EGFR: 60 ML/MIN/1.73M2 — SIGNIFICANT CHANGE UP
GLUCOSE BLDC GLUCOMTR-MCNC: 137 MG/DL — HIGH (ref 70–99)
GLUCOSE BLDC GLUCOMTR-MCNC: 145 MG/DL — HIGH (ref 70–99)
GLUCOSE BLDC GLUCOMTR-MCNC: 168 MG/DL — HIGH (ref 70–99)
GLUCOSE BLDC GLUCOMTR-MCNC: 185 MG/DL — HIGH (ref 70–99)
GLUCOSE SERPL-MCNC: 165 MG/DL — HIGH (ref 70–99)
HCT VFR BLD CALC: 50.6 % — HIGH (ref 34.5–45)
HGB BLD-MCNC: 16.2 G/DL — HIGH (ref 11.5–15.5)
MCHC RBC-ENTMCNC: 29.7 PG — SIGNIFICANT CHANGE UP (ref 27–34)
MCHC RBC-ENTMCNC: 32 G/DL — SIGNIFICANT CHANGE UP (ref 32–36)
MCV RBC AUTO: 92.8 FL — SIGNIFICANT CHANGE UP (ref 80–100)
NRBC # BLD: 0 /100 WBCS — SIGNIFICANT CHANGE UP (ref 0–0)
PLATELET # BLD AUTO: 168 K/UL — SIGNIFICANT CHANGE UP (ref 150–400)
POTASSIUM SERPL-MCNC: 3.8 MMOL/L — SIGNIFICANT CHANGE UP (ref 3.5–5.3)
POTASSIUM SERPL-SCNC: 3.8 MMOL/L — SIGNIFICANT CHANGE UP (ref 3.5–5.3)
RBC # BLD: 5.45 M/UL — HIGH (ref 3.8–5.2)
RBC # FLD: 15.9 % — HIGH (ref 10.3–14.5)
SODIUM SERPL-SCNC: 140 MMOL/L — SIGNIFICANT CHANGE UP (ref 135–145)
WBC # BLD: 7.55 K/UL — SIGNIFICANT CHANGE UP (ref 3.8–10.5)
WBC # FLD AUTO: 7.55 K/UL — SIGNIFICANT CHANGE UP (ref 3.8–10.5)

## 2023-10-08 PROCEDURE — 99232 SBSQ HOSP IP/OBS MODERATE 35: CPT

## 2023-10-08 PROCEDURE — 99231 SBSQ HOSP IP/OBS SF/LOW 25: CPT

## 2023-10-08 PROCEDURE — 73590 X-RAY EXAM OF LOWER LEG: CPT | Mod: 26,RT

## 2023-10-08 PROCEDURE — 73600 X-RAY EXAM OF ANKLE: CPT | Mod: 26,RT

## 2023-10-08 RX ORDER — CIPROFLOXACIN HCL 0.3 %
1 DROPS OPHTHALMIC (EYE)
Refills: 0 | Status: DISCONTINUED | OUTPATIENT
Start: 2023-10-08 | End: 2023-10-09

## 2023-10-08 RX ORDER — MORPHINE SULFATE 50 MG/1
2 CAPSULE, EXTENDED RELEASE ORAL EVERY 6 HOURS
Refills: 0 | Status: DISCONTINUED | OUTPATIENT
Start: 2023-10-08 | End: 2023-10-09

## 2023-10-08 RX ADMIN — Medication 1 MILLIGRAM(S): at 22:07

## 2023-10-08 RX ADMIN — INSULIN GLARGINE 4 UNIT(S): 100 INJECTION, SOLUTION SUBCUTANEOUS at 22:07

## 2023-10-08 RX ADMIN — PANTOPRAZOLE SODIUM 40 MILLIGRAM(S): 20 TABLET, DELAYED RELEASE ORAL at 08:03

## 2023-10-08 RX ADMIN — TIOTROPIUM BROMIDE 2 PUFF(S): 18 CAPSULE ORAL; RESPIRATORY (INHALATION) at 12:21

## 2023-10-08 RX ADMIN — Medication 1 PATCH: at 12:30

## 2023-10-08 RX ADMIN — ENOXAPARIN SODIUM 40 MILLIGRAM(S): 100 INJECTION SUBCUTANEOUS at 10:03

## 2023-10-08 RX ADMIN — ZINC OXIDE 1 APPLICATION(S): 200 OINTMENT TOPICAL at 17:38

## 2023-10-08 RX ADMIN — MORPHINE SULFATE 2 MILLIGRAM(S): 50 CAPSULE, EXTENDED RELEASE ORAL at 19:20

## 2023-10-08 RX ADMIN — ZINC OXIDE 1 APPLICATION(S): 200 OINTMENT TOPICAL at 05:49

## 2023-10-08 RX ADMIN — MORPHINE SULFATE 2 MILLIGRAM(S): 50 CAPSULE, EXTENDED RELEASE ORAL at 18:55

## 2023-10-08 RX ADMIN — Medication 1 MILLIGRAM(S): at 05:49

## 2023-10-08 RX ADMIN — Medication 1 PATCH: at 19:52

## 2023-10-08 RX ADMIN — LORATADINE 10 MILLIGRAM(S): 10 TABLET ORAL at 12:22

## 2023-10-08 RX ADMIN — Medication 1 PATCH: at 12:21

## 2023-10-08 RX ADMIN — Medication 1 MILLIGRAM(S): at 13:47

## 2023-10-08 RX ADMIN — Medication 1 DROP(S): at 18:53

## 2023-10-08 RX ADMIN — Medication 650 MILLIGRAM(S): at 07:27

## 2023-10-08 RX ADMIN — Medication 1 PATCH: at 08:13

## 2023-10-08 RX ADMIN — BUDESONIDE AND FORMOTEROL FUMARATE DIHYDRATE 2 PUFF(S): 160; 4.5 AEROSOL RESPIRATORY (INHALATION) at 05:50

## 2023-10-08 RX ADMIN — BUDESONIDE AND FORMOTEROL FUMARATE DIHYDRATE 2 PUFF(S): 160; 4.5 AEROSOL RESPIRATORY (INHALATION) at 17:37

## 2023-10-08 RX ADMIN — Medication 650 MILLIGRAM(S): at 08:20

## 2023-10-08 RX ADMIN — Medication 1: at 08:08

## 2023-10-08 NOTE — PROGRESS NOTE ADULT - ASSESSMENT
63F w/ hx of COPD on home 2L NC , DM2, HTN, obesity, smoker ? was seen in ER 10/1/23 for   R proximal fibular fracture after mechanical fall at home , was seen by ortho recommended splint and outpatient follow-up now presents again with  RLE pain and inability to ambulation.    In ED, pt afebrile and HDS.  Pt given 1L NS.  of note: patient's  is in CCU here at [facility]     Assessment and Plan:   Inability to ambulate due to pain   Rt proximal fibular Fx in splint (due to mechanical fall at home)   seen by ortho 10/1 had recommended Given significant comorbidities and anatomic alignment in splint, recommending a trial of nonoperative management at this time.  qd for 30 days   reconsulted ortho , no surgical intervention recommended.   Repeat XR today, non-worsening as per ortho note.  Plan remains the same   NWB 4-6 weeks   PT eval - getachew GATICA  pain management   Wound care on board  Podiatry consulted for diabetic foot care     DM2   A1c 6.5  continue with current regimen     Chronic hypoxemic respiratory failure 2/2 COPD  COPD on 2L home O2   not in exacerbation, but does require nasal cannula  continue with inhalers   Patient hemoglobin and hematocrit elevated, suspect 2/2 chronic hypoxemia  Spoke with patient today about keep her NC regularly as she is usually not wearing it    anxiety , stable   continue with Klonopin tid   Will order rangel consult on Monday     Tobacco smoker  Nicotine patch   Educated on lifestyle modifications     Preventative Measures   lovenox SQ-dvt ppx   fall precautions     DISPO: EN, pending insurance authorization

## 2023-10-08 NOTE — PROGRESS NOTE ADULT - NUTRITIONAL ASSESSMENT
This patient has been assessed with a concern for Malnutrition and has been determined to have a diagnosis/diagnoses of Morbid obesity (BMI > 40).    This patient is being managed with:   Diet Consistent Carbohydrate/No Snacks-  DASH/TLC {Sodium & Cholesterol Restricted}  Entered: Oct  5 2023 10:38AM  

## 2023-10-08 NOTE — CHART NOTE - NSCHARTNOTEFT_GEN_A_CORE
Repeat Xrays of the R Tibfib and ankle were obtained today to evaluate for any further displacement since last imaging on 10/3. Imaging today shows healing R Maisonneuve fracture that is still anatomically reduced in the splint (personal read). Given medical comorbidities, she remains a high risk surgical candidate without much surgical benefit given that the fracture remains anatomically reduced. We will continue to treat nonoperatively at this time.     A/P  63F with healing R Maisonneuve fx, no displacement since last imaging    Remain nonweightbearing on RLE  Keep splint C/D/I  No CAM boot needed at this time as splint is intact, will consider CAM boot at later point in time  We will repeat x-rays in 1 week (10/15) to assess stability of the fracture  Will consider open reduction internal fixation if there is any displacement  DVT prophylaxis per primary  Analgesics as needed Repeat Xrays of the R Tibfib and ankle were obtained today to evaluate for any further displacement since last imaging on 10/3. Imaging today shows healing R Maisonneuve fracture that is still anatomically reduced in the splint (personal read). Given medical comorbidities, she remains a high risk surgical candidate without much surgical benefit given that the fracture remains anatomically reduced. We will continue to treat nonoperatively at this time.     A/P  63F with healing R Stacyneuve fx, no displacement since last imaging    Remain nonweightbearing on RLE  Keep splint C/D/I  No CAM boot needed at this time as splint is intact, will consider CAM boot at later point in time  We will repeat x-rays in 1 week (10/15) to assess stability of the fracture  Will consider open reduction internal fixation if there is any displacement  DVT prophylaxis per primary  Analgesics as needed    patient seen and examined.  agree with resident plan.  cont NWB in splint.  repeat XR 1 week.  Plan 6-8 weeks NWB

## 2023-10-08 NOTE — PROGRESS NOTE ADULT - TIME BILLING
min spent reviewing patient's chart,  examining patient, discussing plan with patient and family and staff, reviewing consultant recommendations/communicating with consultants, writing progress note and placing orders.

## 2023-10-09 VITALS
DIASTOLIC BLOOD PRESSURE: 81 MMHG | SYSTOLIC BLOOD PRESSURE: 146 MMHG | HEART RATE: 75 BPM | TEMPERATURE: 98 F | RESPIRATION RATE: 18 BRPM | OXYGEN SATURATION: 94 %

## 2023-10-09 LAB
FLUAV AG NPH QL: SIGNIFICANT CHANGE UP
FLUBV AG NPH QL: SIGNIFICANT CHANGE UP
GLUCOSE BLDC GLUCOMTR-MCNC: 130 MG/DL — HIGH (ref 70–99)
GLUCOSE BLDC GLUCOMTR-MCNC: 141 MG/DL — HIGH (ref 70–99)
GLUCOSE BLDC GLUCOMTR-MCNC: 197 MG/DL — HIGH (ref 70–99)
SARS-COV-2 RNA SPEC QL NAA+PROBE: SIGNIFICANT CHANGE UP

## 2023-10-09 PROCEDURE — 99239 HOSP IP/OBS DSCHRG MGMT >30: CPT

## 2023-10-09 RX ORDER — TIOTROPIUM BROMIDE 18 UG/1
2 CAPSULE ORAL; RESPIRATORY (INHALATION)
Qty: 0 | Refills: 0 | DISCHARGE
Start: 2023-10-09

## 2023-10-09 RX ORDER — LOPERAMIDE HCL 2 MG
1 TABLET ORAL
Qty: 0 | Refills: 0 | DISCHARGE
Start: 2023-10-09

## 2023-10-09 RX ORDER — BUDESONIDE AND FORMOTEROL FUMARATE DIHYDRATE 160; 4.5 UG/1; UG/1
2 AEROSOL RESPIRATORY (INHALATION)
Qty: 0 | Refills: 0 | DISCHARGE
Start: 2023-10-09

## 2023-10-09 RX ORDER — POLYETHYLENE GLYCOL 3350 17 G/17G
17 POWDER, FOR SOLUTION ORAL DAILY
Refills: 0 | Status: DISCONTINUED | OUTPATIENT
Start: 2023-10-09 | End: 2023-10-09

## 2023-10-09 RX ORDER — POLYETHYLENE GLYCOL 3350 17 G/17G
17 POWDER, FOR SOLUTION ORAL
Qty: 0 | Refills: 0 | DISCHARGE
Start: 2023-10-09

## 2023-10-09 RX ORDER — LANOLIN ALCOHOL/MO/W.PET/CERES
1 CREAM (GRAM) TOPICAL
Qty: 0 | Refills: 0 | DISCHARGE
Start: 2023-10-09

## 2023-10-09 RX ORDER — ACETAMINOPHEN 500 MG
2 TABLET ORAL
Qty: 0 | Refills: 0 | DISCHARGE
Start: 2023-10-09

## 2023-10-09 RX ORDER — LORATADINE 10 MG/1
1 TABLET ORAL
Qty: 0 | Refills: 0 | DISCHARGE
Start: 2023-10-09

## 2023-10-09 RX ORDER — PANTOPRAZOLE SODIUM 20 MG/1
1 TABLET, DELAYED RELEASE ORAL
Qty: 0 | Refills: 0 | DISCHARGE
Start: 2023-10-09

## 2023-10-09 RX ORDER — CIPROFLOXACIN HCL 0.3 %
1 DROPS OPHTHALMIC (EYE)
Qty: 0 | Refills: 0 | DISCHARGE
Start: 2023-10-09

## 2023-10-09 RX ORDER — ALBUTEROL 90 UG/1
2 AEROSOL, METERED ORAL
Qty: 0 | Refills: 0 | DISCHARGE
Start: 2023-10-09

## 2023-10-09 RX ADMIN — Medication 1 PATCH: at 12:49

## 2023-10-09 RX ADMIN — ZINC OXIDE 1 APPLICATION(S): 200 OINTMENT TOPICAL at 17:53

## 2023-10-09 RX ADMIN — Medication 650 MILLIGRAM(S): at 13:41

## 2023-10-09 RX ADMIN — ZINC OXIDE 1 APPLICATION(S): 200 OINTMENT TOPICAL at 05:26

## 2023-10-09 RX ADMIN — MORPHINE SULFATE 2 MILLIGRAM(S): 50 CAPSULE, EXTENDED RELEASE ORAL at 06:27

## 2023-10-09 RX ADMIN — PANTOPRAZOLE SODIUM 40 MILLIGRAM(S): 20 TABLET, DELAYED RELEASE ORAL at 07:41

## 2023-10-09 RX ADMIN — TIOTROPIUM BROMIDE 2 PUFF(S): 18 CAPSULE ORAL; RESPIRATORY (INHALATION) at 12:50

## 2023-10-09 RX ADMIN — BUDESONIDE AND FORMOTEROL FUMARATE DIHYDRATE 2 PUFF(S): 160; 4.5 AEROSOL RESPIRATORY (INHALATION) at 17:46

## 2023-10-09 RX ADMIN — MORPHINE SULFATE 2 MILLIGRAM(S): 50 CAPSULE, EXTENDED RELEASE ORAL at 05:27

## 2023-10-09 RX ADMIN — LORATADINE 10 MILLIGRAM(S): 10 TABLET ORAL at 12:49

## 2023-10-09 RX ADMIN — Medication 1 DROP(S): at 00:16

## 2023-10-09 RX ADMIN — BUDESONIDE AND FORMOTEROL FUMARATE DIHYDRATE 2 PUFF(S): 160; 4.5 AEROSOL RESPIRATORY (INHALATION) at 05:26

## 2023-10-09 RX ADMIN — Medication 1: at 11:25

## 2023-10-09 RX ADMIN — Medication 1 DROP(S): at 12:50

## 2023-10-09 RX ADMIN — Medication 1 DROP(S): at 05:24

## 2023-10-09 RX ADMIN — ENOXAPARIN SODIUM 40 MILLIGRAM(S): 100 INJECTION SUBCUTANEOUS at 09:16

## 2023-10-09 RX ADMIN — Medication 1 MILLIGRAM(S): at 05:24

## 2023-10-09 RX ADMIN — Medication 1 MILLIGRAM(S): at 14:40

## 2023-10-09 RX ADMIN — Medication 1 PATCH: at 07:53

## 2023-10-09 RX ADMIN — Medication 1 DROP(S): at 17:45

## 2023-10-09 RX ADMIN — Medication 650 MILLIGRAM(S): at 11:24

## 2023-10-09 RX ADMIN — POLYETHYLENE GLYCOL 3350 17 GRAM(S): 17 POWDER, FOR SOLUTION ORAL at 12:58

## 2023-10-09 RX ADMIN — Medication 1 PATCH: at 12:23

## 2023-10-09 NOTE — DISCHARGE NOTE NURSING/CASE MANAGEMENT/SOCIAL WORK - NSDCPEFALRISK_GEN_ALL_CORE
For information on Fall & Injury Prevention, visit: [email or web address] OR  [email or web address] OR  https://www.cdc.gov/steadi/patient.html

## 2023-10-09 NOTE — DISCHARGE NOTE PROVIDER - HOSPITAL COURSE
63F w/ hx of COPD on home 2L NC , DM2, HTN, obesity, smoker ? was seen in ER 10/1/23 for   R proximal fibular fracture after mechanical fall at home , was seen by ortho recommended splint and outpatient follow-up now presents again with  RLE pain and inability to ambulation.    In ED, pt afebrile and HDS.  Pt given 1L NS.  of note: patient's  is in CCU here at [facility] VS     Patient seen and examined.     Inability to ambulate due to pain   Rt proximal fibular Fx in splint (due to mechanical fall at home)   seen by ortho 10/1 had recommended Given significant comorbidities and anatomic alignment in splint, recommending a trial of nonoperative management at this time.  qd for 30 days   reconsulted ortho , no surgical intervention recommended.   Repeat XR, non-worsening as per ortho note.  Plan remains the same   NWB 4-6 weeks   PT eval - getachew GATICA  pain management   Wound care on board  Podiatry consulted for diabetic foot care     DM2   A1c 6.5  continue with current regimen     Chronic hypoxemic respiratory failure 2/2 COPD  COPD on 2L home O2   not in exacerbation, but does require nasal cannula  continue with inhalers   Patient hemoglobin and hematocrit elevated, suspect 2/2 chronic hypoxemia  Spoke with patient about keeping her NC regularly as she is usually not wearing it    anxiety , stable   continue with Klonopin tid     Tobacco smoker  Nicotine patch   Educated on lifestyle modifications     Preventative Measures   lovenox SQ-dvt ppx   fall precautions     DISPO: EN

## 2023-10-09 NOTE — DISCHARGE NOTE PROVIDER - CARE PROVIDER_API CALL
Your PCP,   Phone: (   )    -  Fax: (   )    -  Follow Up Time:     Curt Stephens  Orthopaedic Surgery  [address] [place] [address], [address] [address]  Phone: [phone]  Fax: [phone]  Follow Up Time:

## 2023-10-09 NOTE — DISCHARGE NOTE NURSING/CASE MANAGEMENT/SOCIAL WORK - PATIENT PORTAL LINK FT
You can access the FollowMyHealth Patient Portal offered by [facility] by registering at the following website: [email or web address]. By joining [employer]’s FollowMyHealth portal, you will also be able to view your health information using other applications (apps) compatible with our system.

## 2023-10-09 NOTE — DISCHARGE NOTE PROVIDER - NSDCMRMEDTOKEN_GEN_ALL_CORE_FT
acetaminophen 325 mg oral tablet: 2 tab(s) orally every 6 hours As needed Temp greater or equal to 38C (100.4F), Mild Pain (1 - 3)  acetaminophen 325 mg oral tablet: 2 tab(s) orally every 6 hours As needed Mild Pain (1 - 3)  albuterol 90 mcg/inh inhalation aerosol: 2 puff(s) inhaled every 6 hours As needed Shortness of Breath and/or Wheezing  aluminum hydroxide-magnesium hydroxide 200 mg-200 mg/5 mL oral suspension: 30 milliliter(s) orally every 4 hours As needed Dyspepsia  Basaglar KwikPen 100 units/mL subcutaneous solution: 4 unit(s) subcutaneous once a day  ciprofloxacin 0.3% ophthalmic solution: 1 drop(s) to each affected eye 4 times a day  KlonoPIN 1 mg oral tablet: 1 tab(s) orally every 8 hours  loperamide 2 mg oral capsule: 1 cap(s) orally once a day As needed Diarrhea  loratadine 10 mg oral tablet: 1 tab(s) orally once a day  melatonin 3 mg oral tablet: 1 tab(s) orally once a day (at bedtime) As needed Insomnia  pantoprazole 40 mg oral delayed release tablet: 1 tab(s) orally once a day (before a meal)  polyethylene glycol 3350 oral powder for reconstitution: 17 gram(s) orally once a day  Symbicort 160 mcg-4.5 mcg/inh inhalation aerosol: 2 puff(s) inhaled 2 times a day  tiotropium 2.5 mcg/inh inhalation aerosol: 2 puff(s) inhaled once a day

## 2023-10-09 NOTE — DISCHARGE NOTE PROVIDER - CARE PROVIDERS DIRECT ADDRESSES
,DirectAddress_Unknown,[email or web address]

## 2023-10-09 NOTE — DISCHARGE NOTE PROVIDER - NSDCCPCAREPLAN_GEN_ALL_CORE_FT
PRINCIPAL DISCHARGE DIAGNOSIS  Diagnosis: Closed fibular fracture  Assessment and Plan of Treatment: XRAY done showing fracture healing well  At this time now orthopedic intervention   Follow up outpatient with orthopedics and PCP      SECONDARY DISCHARGE DIAGNOSES  Diagnosis: COPD, moderate  Assessment and Plan of Treatment: Continue inhalers as directed   please use O2  recommend outpatient pulmonary in order to get CPAP    Diagnosis: Diabetes mellitus  Assessment and Plan of Treatment: take scheduled insulin

## 2023-10-09 NOTE — DISCHARGE NOTE PROVIDER - DETAILS OF MALNUTRITION DIAGNOSIS/DIAGNOSES
This patient has been assessed with a concern for Malnutrition and was treated during this hospitalization for the following Nutrition diagnosis/diagnoses:     -  10/05/2023: Morbid obesity (BMI > 40)

## 2023-10-15 ENCOUNTER — OUTPATIENT (OUTPATIENT)
Dept: OUTPATIENT SERVICES | Facility: HOSPITAL | Age: 63
LOS: 1 days | Discharge: HOME | End: 2023-10-15
Payer: MEDICAID

## 2023-10-15 DIAGNOSIS — Z90.49 ACQUIRED ABSENCE OF OTHER SPECIFIED PARTS OF DIGESTIVE TRACT: Chronic | ICD-10-CM

## 2023-10-15 DIAGNOSIS — S82.91XA UNSPECIFIED FRACTURE OF RIGHT LOWER LEG, INITIAL ENCOUNTER FOR CLOSED FRACTURE: ICD-10-CM

## 2023-10-15 PROCEDURE — 73610 X-RAY EXAM OF ANKLE: CPT | Mod: 26,RT

## 2023-10-15 PROCEDURE — 73590 X-RAY EXAM OF LOWER LEG: CPT | Mod: 26,RT

## 2023-10-17 DIAGNOSIS — J44.9 CHRONIC OBSTRUCTIVE PULMONARY DISEASE, UNSPECIFIED: ICD-10-CM
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
## 2023-10-17 DIAGNOSIS — E66.01 MORBID (SEVERE) OBESITY DUE TO EXCESS CALORIES: ICD-10-CM

## 2023-10-17 DIAGNOSIS — E11.9 TYPE 2 DIABETES MELLITUS WITHOUT COMPLICATIONS: ICD-10-CM

## 2023-10-17 DIAGNOSIS — S82.831A OTHER FRACTURE OF UPPER AND LOWER END OF RIGHT FIBULA, INITIAL ENCOUNTER FOR CLOSED FRACTURE: ICD-10-CM

## 2023-10-17 DIAGNOSIS — I10 ESSENTIAL (PRIMARY) HYPERTENSION: ICD-10-CM

## 2023-10-17 DIAGNOSIS — S82.491A OTHER FRACTURE OF SHAFT OF RIGHT FIBULA, INITIAL ENCOUNTER FOR CLOSED FRACTURE: ICD-10-CM

## 2023-10-17 DIAGNOSIS — Z90.49 ACQUIRED ABSENCE OF OTHER SPECIFIED PARTS OF DIGESTIVE TRACT: ICD-10-CM

## 2023-10-17 DIAGNOSIS — R19.7 DIARRHEA, UNSPECIFIED: ICD-10-CM

## 2023-10-17 DIAGNOSIS — J96.11 CHRONIC RESPIRATORY FAILURE WITH HYPOXIA: ICD-10-CM

## 2023-10-17 DIAGNOSIS — G89.11 ACUTE PAIN DUE TO TRAUMA: ICD-10-CM

## 2023-10-17 DIAGNOSIS — F41.9 ANXIETY DISORDER, UNSPECIFIED: ICD-10-CM

## 2023-10-17 DIAGNOSIS — F17.200 NICOTINE DEPENDENCE, UNSPECIFIED, UNCOMPLICATED: ICD-10-CM

## 2023-10-17 DIAGNOSIS — Z79.4 LONG TERM (CURRENT) USE OF INSULIN: ICD-10-CM
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
## 2023-10-17 DIAGNOSIS — M79.661 PAIN IN RIGHT LOWER LEG: ICD-10-CM

## 2023-10-23 NOTE — ED ADULT NURSE NOTE - NSSUHOSCREENINGYN_ED_ALL_ED
"End Of Shift Note    Situation: Admitted for abdominal pain, N & V. CT showed bowel obstruction. Pt has a history of bowel obstructions.    Plan: Pt was on bowel rest and has now started stooling. He feels he is back to his \"normal\".    Subjective/Objective:    Neuro: A & O x 4    Cardiac: WNL, afebrile    Resp:  R/A, Clear dim in bases    GI/: Tolerating a reg diet. Multiple loose stools, which is his norm at home    Endo: no issues    MSK: Up with asst 1 to BR    Skin: no issues    LDAs: PIV x1, SL    " Yes - the patient is able to be screened

## 2023-11-02 ENCOUNTER — OUTPATIENT (OUTPATIENT)
Dept: OUTPATIENT SERVICES | Facility: HOSPITAL | Age: 63
LOS: 1 days | Discharge: HOME | End: 2023-11-02
Payer: MEDICAID

## 2023-11-02 DIAGNOSIS — R31.9 HEMATURIA, UNSPECIFIED: ICD-10-CM

## 2023-11-02 DIAGNOSIS — Z90.49 ACQUIRED ABSENCE OF OTHER SPECIFIED PARTS OF DIGESTIVE TRACT: Chronic | ICD-10-CM

## 2023-11-02 PROCEDURE — 74177 CT ABD & PELVIS W/CONTRAST: CPT | Mod: 26

## 2023-11-02 NOTE — ED ADULT TRIAGE NOTE - HEART RATE (BEATS/MIN)
The medication list included in this document is our record of what you are currently taking, including any changes that were made at today's visit. If you find any differences when compared to your medications at home, or have any questions that were not answered at your visit, please contact the office.
102

## 2023-11-09 ENCOUNTER — OUTPATIENT (OUTPATIENT)
Dept: OUTPATIENT SERVICES | Facility: HOSPITAL | Age: 63
LOS: 1 days | Discharge: HOME | End: 2023-11-09
Payer: MEDICAID

## 2023-11-09 DIAGNOSIS — M79.621 PAIN IN RIGHT UPPER ARM: ICD-10-CM

## 2023-11-09 DIAGNOSIS — M25.511 PAIN IN RIGHT SHOULDER: ICD-10-CM

## 2023-11-09 DIAGNOSIS — Z90.49 ACQUIRED ABSENCE OF OTHER SPECIFIED PARTS OF DIGESTIVE TRACT: Chronic | ICD-10-CM

## 2023-11-09 PROCEDURE — 73060 X-RAY EXAM OF HUMERUS: CPT | Mod: 26,RT

## 2023-11-09 PROCEDURE — 73030 X-RAY EXAM OF SHOULDER: CPT | Mod: 26,RT

## 2023-11-15 ENCOUNTER — OUTPATIENT (OUTPATIENT)
Dept: OUTPATIENT SERVICES | Facility: HOSPITAL | Age: 63
LOS: 1 days | Discharge: HOME | End: 2023-11-15
Payer: MEDICAID

## 2023-11-15 DIAGNOSIS — M96.671 FRACTURE OF TIBIA OR FIBULA FOLLOWING INSERTION OF ORTHOPEDIC IMPLANT, JOINT PROSTHESIS, OR BONE PLATE, RIGHT LEG: ICD-10-CM

## 2023-11-15 DIAGNOSIS — Z90.49 ACQUIRED ABSENCE OF OTHER SPECIFIED PARTS OF DIGESTIVE TRACT: Chronic | ICD-10-CM

## 2023-11-15 PROCEDURE — 73590 X-RAY EXAM OF LOWER LEG: CPT | Mod: 26,RT

## 2023-11-15 PROCEDURE — 73610 X-RAY EXAM OF ANKLE: CPT | Mod: 26,RT

## 2023-11-28 ENCOUNTER — APPOINTMENT (OUTPATIENT)
Dept: ORTHOPEDIC SURGERY | Facility: CLINIC | Age: 63
End: 2023-11-28

## 2023-11-28 DIAGNOSIS — S82.401A UNSPECIFIED FRACTURE OF SHAFT OF RIGHT FIBULA, INITIAL ENCOUNTER FOR CLOSED FRACTURE: ICD-10-CM

## 2023-12-05 ENCOUNTER — NON-APPOINTMENT (OUTPATIENT)
Age: 63
End: 2023-12-05

## 2023-12-05 ENCOUNTER — APPOINTMENT (OUTPATIENT)
Dept: ORTHOPEDIC SURGERY | Facility: CLINIC | Age: 63
End: 2023-12-05
Payer: MEDICAID

## 2023-12-05 DIAGNOSIS — S82.831A OTHER FRACTURE OF UPPER AND LOWER END OF RIGHT FIBULA, INITIAL ENCOUNTER FOR CLOSED FRACTURE: ICD-10-CM

## 2023-12-05 PROCEDURE — 73590 X-RAY EXAM OF LOWER LEG: CPT | Mod: RT

## 2023-12-05 PROCEDURE — 99203 OFFICE O/P NEW LOW 30 MIN: CPT

## 2023-12-05 PROCEDURE — 73610 X-RAY EXAM OF ANKLE: CPT | Mod: RT

## 2023-12-05 PROCEDURE — 99213 OFFICE O/P EST LOW 20 MIN: CPT

## 2024-01-10 NOTE — ED PROVIDER NOTE - DISCUSSED CLINICAL AND RADIOLOGICAL FINDINGS WITH, MDM
----- Message from Ruth Lowery MD sent at 1/9/2024 12:00 PM EST -----  Labs are okay except thyroid is a little bit undertreated I like to increase his dose, new prescription sent in, repeat blood work in 3 to 4 months   patient

## 2024-01-30 NOTE — PATIENT PROFILE ADULT - NSPROEDAABILITYLEARN_GEN_A_NUR
"[facility] Care Plan    POC reviewed with Pierre Jama and family at 1400. Pt verbalized understanding. Questions and concerns addressed. No acute events today. Pt progressing toward goals. Will continue to monitor. See below and flowsheets for full assessment and VS info.   -NGT placed  -cardene gtt continued            Is this a stroke patient? yes- Stroke booklet reviewed with patient and family, risk factors identified for patient and stroke booklet remains at bedside for ongoing education.     Neuro:  [place] Coma Scale  Best Eye Response: 3-->(E3) to speech  Best Motor Response: 5-->(M5) localizes pain  Best Verbal Response: 1-->(V1) none  Crys Coma Scale Score: 9  Assessment Qualifiers: patient not sedated/intubated, no eye obstruction present  Pupil PERRLA: yes     24 hr Temp:  [98.6 °F (37 °C)-100.4 °F (38 °C)]     CV:   Rhythm: paced rhythm  BP goals:   SBP < 140  MAP > 65    Resp:           Plan: N/A    GI/:     Diet/Nutrition Received: NPO  Last Bowel Movement: 01/26/24  Voiding Characteristics: external catheter    Intake/Output Summary (Last 24 hours) at 1/29/2024 1847  Last data filed at 1/29/2024 1800  Gross per 24 hour   Intake 1136.99 ml   Output 450 ml   Net 686.99 ml     Unmeasured Output  Urine Occurrence: 1  Stool Occurrence: 0  Pad Count: 1    Labs/Accuchecks:  Recent Labs   Lab 01/29/24  0014   WBC 8.32   RBC 4.04*   HGB 12.0*   HCT 36.9*         Recent Labs   Lab 01/29/24  0014   *   K 4.3   CO2 22*      BUN 35*   CREATININE 1.4   ALKPHOS 87   ALT 14   AST 16   BILITOT 0.7      Recent Labs   Lab 01/29/24  0014   INR 1.0   APTT 24.8    No results for input(s): "CPK", "CPKMB", "TROPONINI", "MB" in the last 168 hours.    Electrolytes: N/A - electrolytes WDL  Accuchecks: Q6H    Gtts:   sodium chloride 0.9% 50 mL/hr at 01/29/24 1800    nicardipine 2.5 mg/hr (01/29/24 1800)       LDA/Wounds:    Nurses Note -- 4 Eyes      1/29/2024   6:47 PM      Skin assessed during: Daily " Assessment      [x] No Altered Skin Integrity Present    []Prevention Measures Documented      [] Yes- Altered Skin Integrity Present or Discovered   [] LDA Added if Not in Epic (Describe Wound)   [] New Altered Skin Integrity was Present on Admit and Documented in LDA   [] Wound Image Taken    Wound Care Consulted? No    Attending Nurse:  Celine Gaytan RN/Staff Member:  Esteban DINH      [facility]     none

## 2024-02-05 ENCOUNTER — INPATIENT (INPATIENT)
Facility: HOSPITAL | Age: 64
LOS: 10 days | Discharge: HOME | End: 2024-02-16
Attending: INTERNAL MEDICINE | Admitting: INTERNAL MEDICINE
Payer: MEDICAID

## 2024-02-05 VITALS
HEART RATE: 87 BPM | TEMPERATURE: 99 F | OXYGEN SATURATION: 96 % | WEIGHT: 249.12 LBS | SYSTOLIC BLOOD PRESSURE: 95 MMHG | DIASTOLIC BLOOD PRESSURE: 63 MMHG | HEIGHT: 61 IN | RESPIRATION RATE: 25 BRPM

## 2024-02-05 DIAGNOSIS — Z90.49 ACQUIRED ABSENCE OF OTHER SPECIFIED PARTS OF DIGESTIVE TRACT: Chronic | ICD-10-CM

## 2024-02-05 LAB
ALBUMIN SERPL ELPH-MCNC: 3.6 G/DL — SIGNIFICANT CHANGE UP (ref 3.3–5)
ALP SERPL-CCNC: 81 U/L — SIGNIFICANT CHANGE UP (ref 40–120)
ALT FLD-CCNC: 18 U/L — SIGNIFICANT CHANGE UP (ref 12–78)
ANION GAP SERPL CALC-SCNC: 2 MMOL/L — LOW (ref 5–17)
APTT BLD: 36.4 SEC — HIGH (ref 24.5–35.6)
AST SERPL-CCNC: 30 U/L — SIGNIFICANT CHANGE UP (ref 15–37)
BASE EXCESS BLDV CALC-SCNC: 11.4 MMOL/L — HIGH (ref -2–3)
BASOPHILS # BLD AUTO: 0.09 K/UL — SIGNIFICANT CHANGE UP (ref 0–0.2)
BASOPHILS NFR BLD AUTO: 0.7 % — SIGNIFICANT CHANGE UP (ref 0–2)
BILIRUB SERPL-MCNC: 0.5 MG/DL — SIGNIFICANT CHANGE UP (ref 0.2–1.2)
BLOOD GAS COMMENTS, VENOUS: SIGNIFICANT CHANGE UP
BUN SERPL-MCNC: 16 MG/DL — SIGNIFICANT CHANGE UP (ref 7–23)
CALCIUM SERPL-MCNC: 9.1 MG/DL — SIGNIFICANT CHANGE UP (ref 8.5–10.1)
CHLORIDE SERPL-SCNC: 93 MMOL/L — LOW (ref 96–108)
CO2 BLDV-SCNC: 44 MMOL/L — HIGH (ref 22–26)
CO2 SERPL-SCNC: 37 MMOL/L — HIGH (ref 22–31)
CREAT SERPL-MCNC: 1.13 MG/DL — SIGNIFICANT CHANGE UP (ref 0.5–1.3)
D DIMER BLD IA.RAPID-MCNC: 300 NG/ML DDU — HIGH
EGFR: 54 ML/MIN/1.73M2 — LOW
EOSINOPHIL # BLD AUTO: 0.13 K/UL — SIGNIFICANT CHANGE UP (ref 0–0.5)
EOSINOPHIL NFR BLD AUTO: 1 % — SIGNIFICANT CHANGE UP (ref 0–6)
FLUAV H1 2009 PAND RNA SPEC QL NAA+PROBE: DETECTED
GAS PNL BLDV: SIGNIFICANT CHANGE UP
GLUCOSE SERPL-MCNC: 145 MG/DL — HIGH (ref 70–99)
HCO3 BLDV-SCNC: 41 MMOL/L — HIGH (ref 22–28)
HCT VFR BLD CALC: 44.6 % — SIGNIFICANT CHANGE UP (ref 34.5–45)
HGB BLD-MCNC: 14.5 G/DL — SIGNIFICANT CHANGE UP (ref 11.5–15.5)
IMM GRANULOCYTES NFR BLD AUTO: 2.2 % — HIGH (ref 0–0.9)
INR BLD: 0.95 RATIO — SIGNIFICANT CHANGE UP (ref 0.85–1.18)
LYMPHOCYTES # BLD AUTO: 19.2 % — SIGNIFICANT CHANGE UP (ref 13–44)
LYMPHOCYTES # BLD AUTO: 2.42 K/UL — SIGNIFICANT CHANGE UP (ref 1–3.3)
MAGNESIUM SERPL-MCNC: 2.5 MG/DL — SIGNIFICANT CHANGE UP (ref 1.6–2.6)
MCHC RBC-ENTMCNC: 31.7 PG — SIGNIFICANT CHANGE UP (ref 27–34)
MCHC RBC-ENTMCNC: 32.5 G/DL — SIGNIFICANT CHANGE UP (ref 32–36)
MCV RBC AUTO: 97.6 FL — SIGNIFICANT CHANGE UP (ref 80–100)
MONOCYTES # BLD AUTO: 0.71 K/UL — SIGNIFICANT CHANGE UP (ref 0–0.9)
MONOCYTES NFR BLD AUTO: 5.6 % — SIGNIFICANT CHANGE UP (ref 2–14)
NEUTROPHILS # BLD AUTO: 8.99 K/UL — HIGH (ref 1.8–7.4)
NEUTROPHILS NFR BLD AUTO: 71.3 % — SIGNIFICANT CHANGE UP (ref 43–77)
NRBC # BLD: 0 /100 WBCS — SIGNIFICANT CHANGE UP (ref 0–0)
NT-PROBNP SERPL-SCNC: 186 PG/ML — HIGH (ref 0–125)
PCO2 BLDV: 82 MMHG — HIGH (ref 42–55)
PH BLDV: 7.31 — LOW (ref 7.32–7.43)
PLATELET # BLD AUTO: 320 K/UL — SIGNIFICANT CHANGE UP (ref 150–400)
PO2 BLDV: 26 MMHG — SIGNIFICANT CHANGE UP (ref 25–45)
POTASSIUM SERPL-MCNC: 4.6 MMOL/L — SIGNIFICANT CHANGE UP (ref 3.5–5.3)
POTASSIUM SERPL-SCNC: 4.6 MMOL/L — SIGNIFICANT CHANGE UP (ref 3.5–5.3)
PROT SERPL-MCNC: 7.6 GM/DL — SIGNIFICANT CHANGE UP (ref 6–8.3)
PROTHROM AB SERPL-ACNC: 11.4 SEC — SIGNIFICANT CHANGE UP (ref 9.5–13)
RAPID RVP RESULT: DETECTED
RBC # BLD: 4.57 M/UL — SIGNIFICANT CHANGE UP (ref 3.8–5.2)
RBC # FLD: 15.6 % — HIGH (ref 10.3–14.5)
SAO2 % BLDV: 42.1 % — LOW (ref 94–98)
SARS-COV-2 RNA SPEC QL NAA+PROBE: SIGNIFICANT CHANGE UP
SODIUM SERPL-SCNC: 132 MMOL/L — LOW (ref 135–145)
TROPONIN I, HIGH SENSITIVITY RESULT: 7.2 NG/L — SIGNIFICANT CHANGE UP
WBC # BLD: 12.62 K/UL — HIGH (ref 3.8–10.5)
WBC # FLD AUTO: 12.62 K/UL — HIGH (ref 3.8–10.5)

## 2024-02-05 PROCEDURE — 93010 ELECTROCARDIOGRAM REPORT: CPT

## 2024-02-05 PROCEDURE — 71045 X-RAY EXAM CHEST 1 VIEW: CPT | Mod: 26

## 2024-02-05 PROCEDURE — 99285 EMERGENCY DEPT VISIT HI MDM: CPT

## 2024-02-05 PROCEDURE — 99222 1ST HOSP IP/OBS MODERATE 55: CPT

## 2024-02-05 RX ORDER — NICOTINE POLACRILEX 2 MG
1 GUM BUCCAL DAILY
Refills: 0 | Status: DISCONTINUED | OUTPATIENT
Start: 2024-02-05 | End: 2024-02-16

## 2024-02-05 RX ORDER — ALBUTEROL 90 UG/1
2 AEROSOL, METERED ORAL EVERY 6 HOURS
Refills: 0 | Status: DISCONTINUED | OUTPATIENT
Start: 2024-02-05 | End: 2024-02-16

## 2024-02-05 RX ORDER — BUDESONIDE AND FORMOTEROL FUMARATE DIHYDRATE 160; 4.5 UG/1; UG/1
2 AEROSOL RESPIRATORY (INHALATION)
Refills: 0 | Status: DISCONTINUED | OUTPATIENT
Start: 2024-02-05 | End: 2024-02-16

## 2024-02-05 RX ORDER — IPRATROPIUM/ALBUTEROL SULFATE 18-103MCG
3 AEROSOL WITH ADAPTER (GRAM) INHALATION
Refills: 0 | Status: COMPLETED | OUTPATIENT
Start: 2024-02-05 | End: 2024-02-05

## 2024-02-05 RX ORDER — KETOROLAC TROMETHAMINE 30 MG/ML
15 SYRINGE (ML) INJECTION ONCE
Refills: 0 | Status: DISCONTINUED | OUTPATIENT
Start: 2024-02-05 | End: 2024-02-05

## 2024-02-05 RX ORDER — ACETAMINOPHEN 500 MG
650 TABLET ORAL EVERY 6 HOURS
Refills: 0 | Status: DISCONTINUED | OUTPATIENT
Start: 2024-02-05 | End: 2024-02-16

## 2024-02-05 RX ORDER — IPRATROPIUM/ALBUTEROL SULFATE 18-103MCG
3 AEROSOL WITH ADAPTER (GRAM) INHALATION EVERY 6 HOURS
Refills: 0 | Status: DISCONTINUED | OUTPATIENT
Start: 2024-02-05 | End: 2024-02-14

## 2024-02-05 RX ORDER — LOSARTAN POTASSIUM 100 MG/1
50 TABLET, FILM COATED ORAL DAILY
Refills: 0 | Status: DISCONTINUED | OUTPATIENT
Start: 2024-02-05 | End: 2024-02-16

## 2024-02-05 RX ORDER — ENOXAPARIN SODIUM 100 MG/ML
40 INJECTION SUBCUTANEOUS EVERY 24 HOURS
Refills: 0 | Status: DISCONTINUED | OUTPATIENT
Start: 2024-02-05 | End: 2024-02-07

## 2024-02-05 RX ORDER — ESCITALOPRAM OXALATE 10 MG/1
10 TABLET, FILM COATED ORAL DAILY
Refills: 0 | Status: DISCONTINUED | OUTPATIENT
Start: 2024-02-05 | End: 2024-02-16

## 2024-02-05 RX ORDER — OXYCODONE HYDROCHLORIDE 5 MG/1
5 TABLET ORAL EVERY 6 HOURS
Refills: 0 | Status: DISCONTINUED | OUTPATIENT
Start: 2024-02-05 | End: 2024-02-10

## 2024-02-05 RX ORDER — LEVOTHYROXINE SODIUM 125 MCG
150 TABLET ORAL DAILY
Refills: 0 | Status: DISCONTINUED | OUTPATIENT
Start: 2024-02-05 | End: 2024-02-16

## 2024-02-05 RX ORDER — GABAPENTIN 400 MG/1
300 CAPSULE ORAL THREE TIMES A DAY
Refills: 0 | Status: DISCONTINUED | OUTPATIENT
Start: 2024-02-05 | End: 2024-02-16

## 2024-02-05 RX ORDER — MONTELUKAST 4 MG/1
10 TABLET, CHEWABLE ORAL AT BEDTIME
Refills: 0 | Status: DISCONTINUED | OUTPATIENT
Start: 2024-02-05 | End: 2024-02-16

## 2024-02-05 RX ADMIN — MONTELUKAST 10 MILLIGRAM(S): 4 TABLET, CHEWABLE ORAL at 23:23

## 2024-02-05 RX ADMIN — Medication 3 MILLILITER(S): at 15:52

## 2024-02-05 RX ADMIN — Medication 150 MICROGRAM(S): at 23:23

## 2024-02-05 RX ADMIN — Medication 3 MILLILITER(S): at 15:23

## 2024-02-05 RX ADMIN — GABAPENTIN 300 MILLIGRAM(S): 400 CAPSULE ORAL at 23:22

## 2024-02-05 RX ADMIN — Medication 125 MILLIGRAM(S): at 15:26

## 2024-02-05 RX ADMIN — Medication 15 MILLIGRAM(S): at 17:26

## 2024-02-05 RX ADMIN — Medication 1 PATCH: at 23:24

## 2024-02-05 RX ADMIN — Medication 3 MILLILITER(S): at 15:00

## 2024-02-05 RX ADMIN — Medication 75 MILLIGRAM(S): at 19:31

## 2024-02-05 NOTE — ED ADULT TRIAGE NOTE - CHIEF COMPLAINT QUOTE
as per ems, pt sent to ED by telemed MD for hypoxia. o2 sat 67% on 4LNC. smoking cigarette on scene. o2 sat 91% on 6LNC. no labored breathing noted. + chronic cough. also c/o right shoulder pain due to fall x 1 week ago. hx: COPD, emphysema, DM, high cholesterol, panic attack, HTN

## 2024-02-05 NOTE — ED PROVIDER NOTE - OBJECTIVE STATEMENT
64F pmhx COPD on 3L NC at home, HTN, DM, anxiety, chronic back pain, bladder ca, who presents to the ED with reported hypoxia to 67% on 4L NC while on a telemed visit - pt reportedly smoking a cigarette on scene per EMS. Pt denies complaints on evaluation - she states she is not sure why EMS was called, states she does not feel short of breath. She admits she smokes but plans to quit. She denies fevers. Notes chronic cough not worse than usual. States she does get anxious and has panic attacks and is requesting her home klonipin

## 2024-02-05 NOTE — ED ADULT NURSE NOTE - NSFALLHARMRISKINTERV_ED_ALL_ED

## 2024-02-05 NOTE — ED ADULT NURSE NOTE - HOW OFTEN DO YOU HAVE A DRINK CONTAINING ALCOHOL?
Never Dermal Autograft Text: The defect edges were debeveled with a #15 scalpel blade.  Given the location of the defect, shape of the defect and the proximity to free margins a dermal autograft was deemed most appropriate.  Using a sterile surgical marker, the primary defect shape was transferred to the donor site. The area thus outlined was incised deep to adipose tissue with a #15 scalpel blade.  The harvested graft was then trimmed of adipose and epidermal tissue until only dermis was left.  The skin graft was then placed in the primary defect and oriented appropriately.

## 2024-02-05 NOTE — H&P ADULT - NSHPPHYSICALEXAM_GEN_ALL_CORE
PHYSICAL EXAM:  GENERAL: NAD, lying in bed comfortably  HEAD:  Atraumatic, Normocephalic  EYES: EOMI, PERRLA, conjunctiva and sclera clear  ENT: Moist mucous membranes  NECK: Supple, No JVD  CHEST/LUNG: Bilateral wheezing. Unlabored respirations  HEART: Regular rate and rhythm; No murmurs, rubs, or gallops  ABDOMEN: Bowel sounds present; Soft, Nontender, Nondistended. No hepatomegaly  EXTREMITIES:  2+ Peripheral Pulses, brisk capillary refill. No clubbing, cyanosis, or edema  NERVOUS SYSTEM:  Alert & Oriented X3, speech clear. No deficits   MSK: FROM all 4 extremities, full and equal strength  SKIN: No rashes or lesions

## 2024-02-05 NOTE — ED ADULT NURSE REASSESSMENT NOTE - NS ED NURSE REASSESS COMMENT FT1
Pt verbalizing how she wants to go home. MD Cary made aware, plans to speak with patient at bedside.
Vaccine Information Sheet, \"Influenza - Inactivated\"  given to Erlene Lone [place], or parent/legal guardian of  Erlene Lone [place] and verbalized understanding. Patient responses:    Have you ever had a reaction to a flu vaccine? No  Are you able to eat eggs without adverse effects? Yes  Do you have any current illness? No  Have you ever had Guillian Mansfield Syndrome? No    Flu vaccine given per order. Please see immunization tab.
Pt O2 saturation noted at 86% on 3L nc which is pt's baseline supplemental O2 at home. Pt reports she is not in any acute distress. Pt respirations equal and unlabored. Pt speaking clearly and concisely without difficulty. Laying down on stretcher. Dr. Franz made aware of O2 requirements.

## 2024-02-05 NOTE — H&P ADULT - HISTORY OF PRESENT ILLNESS
Patient is a 64F with a past medical history of COPD on 3LNC at home, HTN, HLD, OA, bladder cancer, hypothyroidism, T2DM, and anxiety who presented to the ED for evaluation of shortness of breath. She had a telemedicine appointment and was reportedly satting in the high 60's on 4L. She is a heavy smoker and was smoking when EMS arrived, although she did not call, presumably the telemed personnel sent the ambulance to her residence.  In the ED she was hypoxic, placed on 5LNC with improvement. She tested positive for influenza A. Will admit to medicine for management of respiratory failure due to flu.

## 2024-02-05 NOTE — H&P ADULT - NSHPLABSRESULTS_GEN_ALL_CORE
(02-05 @ 14:50)                      14.5  12.62 )-----------( 320                 44.6    Neutrophils = 8.99 (71.3%)  Lymphocytes = 2.42 (19.2%)  Eosinophils = 0.13 (1.0%)  Basophils = 0.09 (0.7%)  Monocytes = 0.71 (5.6%)  Bands = --%    02-05    132<L>  |  93<L>  |  16  ----------------------------<  145<H>  4.6   |  37<H>  |  1.13    Ca    9.1      05 Feb 2024 14:50  Mg     2.5     02-05    TPro  7.6  /  Alb  3.6  /  TBili  0.5  /  DBili  x   /  AST  30  /  ALT  18  /  AlkPhos  81  02-05    ( 05 Feb 2024 14:50 )   PT: 11.4 sec;   INR: 0.95 ratio;       PTT:36.4 sec      RVP:(02-05 @ 14:50)  Detected      Venous Blood Gas:  02-05 @ 15:11  7.31/82/26/41/42.1  VBG Lactate: --        Tox:         Urinalysis Basic - ( 05 Feb 2024 14:50 )    Color: x / Appearance: x / SG: x / pH: x  Gluc: 145 mg/dL / Ketone: x  / Bili: x / Urobili: x   Blood: x / Protein: x / Nitrite: x   Leuk Esterase: x / RBC: x / WBC x   Sq Epi: x / Non Sq Epi: x / Bacteria: x

## 2024-02-05 NOTE — ED ADULT TRIAGE NOTE - RESPIRATORY RATE (BREATHS/MIN)
CARDIOVASCULAR - ADULT    • Maintains optimal cardiac output and hemodynamic stability Completed    • Absence of cardiac arrhythmias or at baseline Completed        GASTROINTESTINAL - ADULT    • Minimal or absence of nausea and vomiting Completed    • Main 25

## 2024-02-05 NOTE — H&P ADULT - ASSESSMENT
JOZEF CRAIN is a 64y Female with a past medical history as described above who presented for evaluation of shortness of breath.     # Acute Hypoxic Respiratory Failure due to Influenza A   - Tamiflu 75mg PO twice daily x5d   - Titrate O2 as tolerated. SpO2 88-92% goal   - Duonebs q6h   - Continue home inhalers   - Tessalon PRN cough   - Tylenol PRN fevers  - Isolation precautions   - Incentive spirometer     # COPD exacerbation due to Flu A  # Tobacco use disorder  - Tamiflu as above  - Continue home inhalers and montelukast   - Prednisone 40mg PO daily x5d   - Titrate O2 as tolerated  - Incentive spirometer   - Duobenbs  - Nicotine patch daily for cravings   - Outpt pulm follow-up     # HTN  - Home meds    # OA  - Oxy 5 prn     # T2DM  - FS qAC and qHS, insulin protocol if needed   - CC/DASH diet     # Hypothyroidism  - Synthroid 150mcg PO daily

## 2024-02-05 NOTE — ED PROVIDER NOTE - CARE PLAN
Principal Discharge DX:	Shortness of breath  Secondary Diagnosis:	Influenza  Secondary Diagnosis:	Hypoxia  Secondary Diagnosis:	COPD exacerbation   1

## 2024-02-05 NOTE — ED PROVIDER NOTE - CLINICAL SUMMARY MEDICAL DECISION MAKING FREE TEXT BOX
+copd exacerbation with low sat on 4lpm, sent in   +influenza  no pna  disucssed with dr. lang, will admit

## 2024-02-05 NOTE — ED ADULT NURSE NOTE - OBJECTIVE STATEMENT
patient a&ox3. patient resting in stretcher on cardiac monitor on 3L NC, at baseline. pt states to self ambulate outside of hospital. pt reports visiting nurse called EMS because of SOB and hypoxic presentation. Denies N/V/D, chest pain, fever, chills. Hx of COPD and emphysema, DM. Denies PSH. Pt states she broke her right leg and hit her head during a fall in December.

## 2024-02-05 NOTE — ED PROVIDER NOTE - PHYSICAL EXAMINATION
Gen: aox3, NAD   Head: NCAT  ENT: Airway patent, moist mucous membranes, nasal passageways clear   Cardiac: Normal rate, normal rhythm, no murmurs   Respiratory: diffuse wheezing, normal work of breathing, O2 sat 93% on 3L NC   Gastrointestinal: Abdomen soft, nontender, nondistended, no rebound, no guarding  MSK: No gross abnormalities, FROM of all four extremities, no edema  HEME: Extremities warm and well perfused   Skin: No rashes, no lesions  Neuro: No gross neurologic deficits, normal speech

## 2024-02-05 NOTE — ED PROVIDER NOTE - ATTENDING APP SHARED VISIT CONTRIBUTION OF CARE
64F pmhx COPD on 3L NC at home, HTN, DM, anxiety, chronic back pain, bladder ca, who presents to the ED with reported hypoxia to 67% on 4L NC while on a telemed visit - pt reportedly smoking a cigarette on scene per EMS. Pt denies complaints on evaluation - she states she is not sure why EMS was called, states she does not feel short of breath. She admits she smokes but plans to quit. She denies fevers. Notes chronic cough not worse than usual. States she does get anxious and has panic attacks and is requesting her home klonipin    Istop  Reference #: [id number] - last klonipin rx was 12/28/23 and no further prescriptions written    - diffuse wheezing, suspect COPD exacerbation, check for CO2 retention, eval for anemia, rule out viral infcn, xray rule out Pna , ekg, trop, reassess, steroids, duoneb 64F pmhx COPD on 3L NC at home, HTN, DM, anxiety, chronic back pain, bladder ca, who presents to the ED with reported hypoxia to 67% on 4L NC while on a telemed visit - pt reportedly smoking a cigarette on scene per EMS. Pt denies complaints on evaluation - she states she is not sure why EMS was called, states she does not feel short of breath. She admits she smokes but plans to quit. She denies fevers. Notes chronic cough not worse than usual. States she does get anxious and has panic attacks and is requesting her home klonipin    Istop  Reference #: [id number] - last klonipin rx was 12/28/23 and no further prescriptions written  -physical exam authored by me as above   - diffuse wheezing, suspect COPD exacerbation, check for CO2 retention, eval for anemia, rule out viral infcn, xray rule out Pna , ekg, trop, reassess, steroids, duoneb  - pt found to have co2 retention and hypoxia and flu+ , admission for copd exacerbation , ddimer age adjusted negative for vte

## 2024-02-06 DIAGNOSIS — R09.02 HYPOXEMIA: ICD-10-CM

## 2024-02-06 LAB
ALBUMIN SERPL ELPH-MCNC: 3.7 G/DL — SIGNIFICANT CHANGE UP (ref 3.3–5)
ALP SERPL-CCNC: 86 U/L — SIGNIFICANT CHANGE UP (ref 40–120)
ALT FLD-CCNC: 15 U/L — SIGNIFICANT CHANGE UP (ref 12–78)
ANION GAP SERPL CALC-SCNC: 5 MMOL/L — SIGNIFICANT CHANGE UP (ref 5–17)
AST SERPL-CCNC: 19 U/L — SIGNIFICANT CHANGE UP (ref 15–37)
BILIRUB SERPL-MCNC: 0.4 MG/DL — SIGNIFICANT CHANGE UP (ref 0.2–1.2)
BUN SERPL-MCNC: 19 MG/DL — SIGNIFICANT CHANGE UP (ref 7–23)
CALCIUM SERPL-MCNC: 9.4 MG/DL — SIGNIFICANT CHANGE UP (ref 8.5–10.1)
CHLORIDE SERPL-SCNC: 94 MMOL/L — LOW (ref 96–108)
CO2 SERPL-SCNC: 35 MMOL/L — HIGH (ref 22–31)
CREAT SERPL-MCNC: 0.94 MG/DL — SIGNIFICANT CHANGE UP (ref 0.5–1.3)
EGFR: 68 ML/MIN/1.73M2 — SIGNIFICANT CHANGE UP
GLUCOSE SERPL-MCNC: 174 MG/DL — HIGH (ref 70–99)
HCT VFR BLD CALC: 47.7 % — HIGH (ref 34.5–45)
HGB BLD-MCNC: 15.8 G/DL — HIGH (ref 11.5–15.5)
MAGNESIUM SERPL-MCNC: 2.8 MG/DL — HIGH (ref 1.6–2.6)
MCHC RBC-ENTMCNC: 31.9 PG — SIGNIFICANT CHANGE UP (ref 27–34)
MCHC RBC-ENTMCNC: 33.1 G/DL — SIGNIFICANT CHANGE UP (ref 32–36)
MCV RBC AUTO: 96.2 FL — SIGNIFICANT CHANGE UP (ref 80–100)
NRBC # BLD: 0 /100 WBCS — SIGNIFICANT CHANGE UP (ref 0–0)
PLATELET # BLD AUTO: 324 K/UL — SIGNIFICANT CHANGE UP (ref 150–400)
POTASSIUM SERPL-MCNC: 4.9 MMOL/L — SIGNIFICANT CHANGE UP (ref 3.5–5.3)
POTASSIUM SERPL-SCNC: 4.9 MMOL/L — SIGNIFICANT CHANGE UP (ref 3.5–5.3)
PROT SERPL-MCNC: 8.3 GM/DL — SIGNIFICANT CHANGE UP (ref 6–8.3)
RBC # BLD: 4.96 M/UL — SIGNIFICANT CHANGE UP (ref 3.8–5.2)
RBC # FLD: 15.8 % — HIGH (ref 10.3–14.5)
SODIUM SERPL-SCNC: 134 MMOL/L — LOW (ref 135–145)
WBC # BLD: 13.25 K/UL — HIGH (ref 3.8–10.5)
WBC # FLD AUTO: 13.25 K/UL — HIGH (ref 3.8–10.5)

## 2024-02-06 PROCEDURE — 99291 CRITICAL CARE FIRST HOUR: CPT

## 2024-02-06 PROCEDURE — 99233 SBSQ HOSP IP/OBS HIGH 50: CPT

## 2024-02-06 RX ORDER — INFLUENZA VIRUS VACCINE 15; 15; 15; 15 UG/.5ML; UG/.5ML; UG/.5ML; UG/.5ML
0.5 SUSPENSION INTRAMUSCULAR ONCE
Refills: 0 | Status: DISCONTINUED | OUTPATIENT
Start: 2024-02-06 | End: 2024-02-16

## 2024-02-06 RX ORDER — FLUTICASONE PROPIONATE 50 MCG
1 SPRAY, SUSPENSION NASAL
Refills: 0 | Status: DISCONTINUED | OUTPATIENT
Start: 2024-02-06 | End: 2024-02-16

## 2024-02-06 RX ADMIN — ENOXAPARIN SODIUM 40 MILLIGRAM(S): 100 INJECTION SUBCUTANEOUS at 06:07

## 2024-02-06 RX ADMIN — Medication 3 MILLILITER(S): at 18:35

## 2024-02-06 RX ADMIN — Medication 1 PATCH: at 21:46

## 2024-02-06 RX ADMIN — GABAPENTIN 300 MILLIGRAM(S): 400 CAPSULE ORAL at 21:45

## 2024-02-06 RX ADMIN — OXYCODONE HYDROCHLORIDE 5 MILLIGRAM(S): 5 TABLET ORAL at 18:36

## 2024-02-06 RX ADMIN — GABAPENTIN 300 MILLIGRAM(S): 400 CAPSULE ORAL at 06:07

## 2024-02-06 RX ADMIN — Medication 3 MILLILITER(S): at 02:32

## 2024-02-06 RX ADMIN — Medication 3 MILLILITER(S): at 06:31

## 2024-02-06 RX ADMIN — Medication 1 PATCH: at 11:54

## 2024-02-06 RX ADMIN — OXYCODONE HYDROCHLORIDE 5 MILLIGRAM(S): 5 TABLET ORAL at 10:49

## 2024-02-06 RX ADMIN — Medication 40 MILLIGRAM(S): at 06:07

## 2024-02-06 RX ADMIN — OXYCODONE HYDROCHLORIDE 5 MILLIGRAM(S): 5 TABLET ORAL at 18:06

## 2024-02-06 RX ADMIN — Medication 75 MILLIGRAM(S): at 18:07

## 2024-02-06 RX ADMIN — Medication 600 MILLIGRAM(S): at 21:45

## 2024-02-06 RX ADMIN — Medication 1 PATCH: at 20:25

## 2024-02-06 RX ADMIN — Medication 1 PATCH: at 07:23

## 2024-02-06 RX ADMIN — Medication 3 MILLILITER(S): at 11:54

## 2024-02-06 RX ADMIN — Medication 650 MILLIGRAM(S): at 14:26

## 2024-02-06 RX ADMIN — OXYCODONE HYDROCHLORIDE 5 MILLIGRAM(S): 5 TABLET ORAL at 11:19

## 2024-02-06 RX ADMIN — Medication 75 MILLIGRAM(S): at 06:09

## 2024-02-06 RX ADMIN — MONTELUKAST 10 MILLIGRAM(S): 4 TABLET, CHEWABLE ORAL at 21:45

## 2024-02-06 RX ADMIN — Medication 1 SPRAY(S): at 21:45

## 2024-02-06 RX ADMIN — ESCITALOPRAM OXALATE 10 MILLIGRAM(S): 10 TABLET, FILM COATED ORAL at 13:56

## 2024-02-06 RX ADMIN — Medication 650 MILLIGRAM(S): at 13:56

## 2024-02-06 RX ADMIN — BUDESONIDE AND FORMOTEROL FUMARATE DIHYDRATE 2 PUFF(S): 160; 4.5 AEROSOL RESPIRATORY (INHALATION) at 18:07

## 2024-02-06 RX ADMIN — GABAPENTIN 300 MILLIGRAM(S): 400 CAPSULE ORAL at 13:56

## 2024-02-06 RX ADMIN — Medication 150 MICROGRAM(S): at 06:09

## 2024-02-06 NOTE — RAPID RESPONSE TEAM SUMMARY - NSSITUATIONBACKGROUNDRRT_GEN_ALL_CORE
Responded to RRT called for hypoxia, o2 sat 70s. pt had removed her nasal canula. Denies SOB, stated feels "warm."  Also present at RRT hospitalist Dr. Vasquez    HPI:  Patient is a 64F with a past medical history of COPD on 3LNC at home, HTN, HLD, OA, bladder cancer, hypothyroidism, T2DM, and anxiety who presented to the ED for evaluation of shortness of breath. She had a telemedicine appointment and was reportedly satting in the high 60's on 4L. She is a heavy smoker and was smoking when EMS arrived, although she did not call, presumably the telemed personnel sent the ambulance to her residence.  In the ED she was hypoxic, placed on 5LNC with improvement. She tested positive for influenza A. Will admit to medicine for management of respiratory failure due to flu.  (05 Feb 2024 21:40)

## 2024-02-06 NOTE — PATIENT PROFILE ADULT - FALL HARM RISK - RISK INTERVENTIONS

## 2024-02-06 NOTE — PROGRESS NOTE ADULT - SUBJECTIVE AND OBJECTIVE BOX
Patient is a 64y old  Female who presents with a chief complaint of Acute hypoxic respiratory failure due to flu A (05 Feb 2024 21:40)      OVERNIGHT EVENTS:  Patients seen and examined at bedside this morning. No acute events overnight.    REVIEW OF SYSTEMS: denies chest pain/SOB, diaphoresis, no F/C, cough, dizziness, headache, blurry vision, nausea, vomiting, abdominal pain. All others review of systems negative     MEDICATIONS  (STANDING):  albuterol/ipratropium for Nebulization 3 milliLiter(s) Nebulizer every 6 hours  budesonide 160 MICROgram(s)/formoterol 4.5 MICROgram(s) Inhaler 2 Puff(s) Inhalation two times a day  enoxaparin Injectable 40 milliGRAM(s) SubCutaneous every 24 hours  escitalopram 10 milliGRAM(s) Oral daily  gabapentin 300 milliGRAM(s) Oral three times a day  levothyroxine 150 MICROGram(s) Oral daily  losartan 50 milliGRAM(s) Oral daily  montelukast 10 milliGRAM(s) Oral at bedtime  nicotine - 21 mG/24Hr(s) Patch 1 Patch Transdermal daily  oseltamivir 75 milliGRAM(s) Oral two times a day  predniSONE   Tablet 40 milliGRAM(s) Oral daily    MEDICATIONS  (PRN):  acetaminophen     Tablet .. 650 milliGRAM(s) Oral every 6 hours PRN Temp greater or equal to 38C (100.4F), Mild Pain (1 - 3)  albuterol    90 MICROgram(s) HFA Inhaler 2 Puff(s) Inhalation every 6 hours PRN Shortness of Breath and/or Wheezing  benzonatate 100 milliGRAM(s) Oral every 8 hours PRN Cough  oxyCODONE    IR 5 milliGRAM(s) Oral every 6 hours PRN Severe Pain (7 - 10)      Allergies    No Known Allergies    Intolerances        T(F): 98 (02-06-24 @ 10:51), Max: 98.6 (02-05-24 @ 14:03)  HR: 80 (02-06-24 @ 10:51) (64 - 87)  BP: 111/57 (02-06-24 @ 10:51) (95/63 - 125/77)  RR: 17 (02-06-24 @ 10:51) (12 - 25)  SpO2: 93% (02-06-24 @ 10:51) (89% - 100%)  Wt(kg): --    PHYSICAL EXAM:  GENERAL: NAD  HEAD:  Atraumatic, Normocephalic  EYES: PERRLA, conjunctiva and sclera clear  ENMT: Moist mucous membranes  NECK: Supple, No JVD, Normal thyroid  NERVOUS SYSTEM:  Alert & Awake  CHEST/LUNG: Clear to percussion bilaterally;   HEART: Regular rate and rhythm;   ABDOMEN: Soft, Nontender, Nondistended; Bowel sounds present  EXTREMITIES:  no edema BL LE  SKIN: moist    LABS:                        15.8   13.25 )-----------( 324      ( 06 Feb 2024 08:20 )             47.7     02-06    134<L>  |  94<L>  |  19  ----------------------------<  174<H>  4.9   |  35<H>  |  0.94    Ca    9.4      06 Feb 2024 08:20  Mg     2.8     02-06    TPro  8.3  /  Alb  3.7  /  TBili  0.4  /  DBili  x   /  AST  19  /  ALT  15  /  AlkPhos  86  02-06    PT/INR - ( 05 Feb 2024 14:50 )   PT: 11.4 sec;   INR: 0.95 ratio         PTT - ( 05 Feb 2024 14:50 )  PTT:36.4 sec  Urinalysis Basic - ( 06 Feb 2024 08:20 )    Color: x / Appearance: x / SG: x / pH: x  Gluc: 174 mg/dL / Ketone: x  / Bili: x / Urobili: x   Blood: x / Protein: x / Nitrite: x   Leuk Esterase: x / RBC: x / WBC x   Sq Epi: x / Non Sq Epi: x / Bacteria: x      Cultures;   CAPILLARY BLOOD GLUCOSE        Lipid panel:           RADIOLOGY & ADDITIONAL TESTS:    Imaging Personally Reviewed:  [x ] YES      Consultant(s) Notes Reviewed:  [x ] YES     Care Discussed with [x ] Consultants [X ] Patient [ ] Family  [x ]    [x ]  Other; RN

## 2024-02-06 NOTE — PROGRESS NOTE ADULT - ASSESSMENT
JOZEF CRAIN is a 64y Female with a past medical history as described above who presented for evaluation of shortness of breath.     # Acute Hypoxic Respiratory Failure due to Influenza A   - Tamiflu 75mg PO twice daily x5d   - Titrate O2 as tolerated. SpO2 88-92% goal   - Duonebs q6h   - Continue home inhalers   - Tessalon PRN cough   - Tylenol PRN fevers  - Isolation precautions   - Incentive spirometer     # acute COPD exacerbation due to Flu A  # Tobacco use disorder  - Tamiflu as above  - Continue home inhalers and montelukast   - Prednisone 40mg PO daily x5d   - Titrate O2 as tolerated  - Incentive spirometer   - Duobenbs  - Nicotine patch daily for cravings   - Outpt pulm follow-up     # HTN  - Home meds    # OA  - Oxy 5 prn     # T2DM  - FS qAC and qHS, insulin protocol if needed   - CC/DASH diet     # Hypothyroidism  - Synthroid 150mcg PO daily

## 2024-02-06 NOTE — RAPID RESPONSE TEAM SUMMARY - NSADDTLFINDINGSRRT_GEN_ALL_CORE
T 97.9 /70 HR 83 RR 20 O2 sat 70s  + productive deep cough  lips dusky dark blue  a+o x4, baseline mental status

## 2024-02-07 LAB
ANION GAP SERPL CALC-SCNC: 2 MMOL/L — LOW (ref 5–17)
BASE EXCESS BLDV CALC-SCNC: 13.6 MMOL/L — HIGH (ref -2–3)
BLOOD GAS COMMENTS, VENOUS: SIGNIFICANT CHANGE UP
BUN SERPL-MCNC: 25 MG/DL — HIGH (ref 7–23)
CALCIUM SERPL-MCNC: 9.1 MG/DL — SIGNIFICANT CHANGE UP (ref 8.5–10.1)
CHLORIDE SERPL-SCNC: 98 MMOL/L — SIGNIFICANT CHANGE UP (ref 96–108)
CO2 BLDV-SCNC: 46 MMOL/L — HIGH (ref 22–26)
CO2 SERPL-SCNC: 36 MMOL/L — HIGH (ref 22–31)
CREAT SERPL-MCNC: 0.9 MG/DL — SIGNIFICANT CHANGE UP (ref 0.5–1.3)
EGFR: 71 ML/MIN/1.73M2 — SIGNIFICANT CHANGE UP
GAS PNL BLDV: SIGNIFICANT CHANGE UP
GLUCOSE SERPL-MCNC: 100 MG/DL — HIGH (ref 70–99)
HCG SERPL-ACNC: <1 MIU/ML — SIGNIFICANT CHANGE UP
HCO3 BLDV-SCNC: 44 MMOL/L — HIGH (ref 22–28)
HCT VFR BLD CALC: 45.1 % — HIGH (ref 34.5–45)
HGB BLD-MCNC: 14.6 G/DL — SIGNIFICANT CHANGE UP (ref 11.5–15.5)
MCHC RBC-ENTMCNC: 32.1 PG — SIGNIFICANT CHANGE UP (ref 27–34)
MCHC RBC-ENTMCNC: 32.4 G/DL — SIGNIFICANT CHANGE UP (ref 32–36)
MCV RBC AUTO: 99.1 FL — SIGNIFICANT CHANGE UP (ref 80–100)
MRSA PCR RESULT.: SIGNIFICANT CHANGE UP
NRBC # BLD: 0 /100 WBCS — SIGNIFICANT CHANGE UP (ref 0–0)
PCO2 BLDV: 85 MMHG — HIGH (ref 42–55)
PH BLDV: 7.32 — SIGNIFICANT CHANGE UP (ref 7.32–7.43)
PLATELET # BLD AUTO: 277 K/UL — SIGNIFICANT CHANGE UP (ref 150–400)
PO2 BLDV: 62 MMHG — HIGH (ref 25–45)
POTASSIUM SERPL-MCNC: 4.4 MMOL/L — SIGNIFICANT CHANGE UP (ref 3.5–5.3)
POTASSIUM SERPL-SCNC: 4.4 MMOL/L — SIGNIFICANT CHANGE UP (ref 3.5–5.3)
RBC # BLD: 4.55 M/UL — SIGNIFICANT CHANGE UP (ref 3.8–5.2)
RBC # FLD: 15.9 % — HIGH (ref 10.3–14.5)
S AUREUS DNA NOSE QL NAA+PROBE: SIGNIFICANT CHANGE UP
SAO2 % BLDV: 91.6 % — LOW (ref 94–98)
SODIUM SERPL-SCNC: 136 MMOL/L — SIGNIFICANT CHANGE UP (ref 135–145)
WBC # BLD: 13.98 K/UL — HIGH (ref 3.8–10.5)
WBC # FLD AUTO: 13.98 K/UL — HIGH (ref 3.8–10.5)

## 2024-02-07 PROCEDURE — 93970 EXTREMITY STUDY: CPT | Mod: 26

## 2024-02-07 PROCEDURE — 71275 CT ANGIOGRAPHY CHEST: CPT | Mod: 26

## 2024-02-07 PROCEDURE — 99223 1ST HOSP IP/OBS HIGH 75: CPT

## 2024-02-07 RX ORDER — ENOXAPARIN SODIUM 100 MG/ML
40 INJECTION SUBCUTANEOUS EVERY 12 HOURS
Refills: 0 | Status: DISCONTINUED | OUTPATIENT
Start: 2024-02-07 | End: 2024-02-16

## 2024-02-07 RX ADMIN — GABAPENTIN 300 MILLIGRAM(S): 400 CAPSULE ORAL at 21:57

## 2024-02-07 RX ADMIN — Medication 150 MICROGRAM(S): at 05:32

## 2024-02-07 RX ADMIN — Medication 1 PATCH: at 12:54

## 2024-02-07 RX ADMIN — Medication 1 SPRAY(S): at 17:47

## 2024-02-07 RX ADMIN — OXYCODONE HYDROCHLORIDE 5 MILLIGRAM(S): 5 TABLET ORAL at 13:45

## 2024-02-07 RX ADMIN — LOSARTAN POTASSIUM 50 MILLIGRAM(S): 100 TABLET, FILM COATED ORAL at 05:32

## 2024-02-07 RX ADMIN — Medication 600 MILLIGRAM(S): at 17:47

## 2024-02-07 RX ADMIN — ENOXAPARIN SODIUM 40 MILLIGRAM(S): 100 INJECTION SUBCUTANEOUS at 05:32

## 2024-02-07 RX ADMIN — Medication 75 MILLIGRAM(S): at 05:36

## 2024-02-07 RX ADMIN — Medication 3 MILLILITER(S): at 23:10

## 2024-02-07 RX ADMIN — Medication 75 MILLIGRAM(S): at 17:47

## 2024-02-07 RX ADMIN — Medication 1 SPRAY(S): at 05:31

## 2024-02-07 RX ADMIN — GABAPENTIN 300 MILLIGRAM(S): 400 CAPSULE ORAL at 05:32

## 2024-02-07 RX ADMIN — GABAPENTIN 300 MILLIGRAM(S): 400 CAPSULE ORAL at 15:17

## 2024-02-07 RX ADMIN — BUDESONIDE AND FORMOTEROL FUMARATE DIHYDRATE 2 PUFF(S): 160; 4.5 AEROSOL RESPIRATORY (INHALATION) at 05:31

## 2024-02-07 RX ADMIN — Medication 1 PATCH: at 11:44

## 2024-02-07 RX ADMIN — Medication 3 MILLILITER(S): at 00:29

## 2024-02-07 RX ADMIN — BUDESONIDE AND FORMOTEROL FUMARATE DIHYDRATE 2 PUFF(S): 160; 4.5 AEROSOL RESPIRATORY (INHALATION) at 17:48

## 2024-02-07 RX ADMIN — ENOXAPARIN SODIUM 40 MILLIGRAM(S): 100 INJECTION SUBCUTANEOUS at 17:46

## 2024-02-07 RX ADMIN — OXYCODONE HYDROCHLORIDE 5 MILLIGRAM(S): 5 TABLET ORAL at 12:53

## 2024-02-07 RX ADMIN — Medication 1 PATCH: at 07:45

## 2024-02-07 RX ADMIN — Medication 600 MILLIGRAM(S): at 05:33

## 2024-02-07 RX ADMIN — Medication 3 MILLILITER(S): at 12:45

## 2024-02-07 RX ADMIN — Medication 40 MILLIGRAM(S): at 05:32

## 2024-02-07 RX ADMIN — MONTELUKAST 10 MILLIGRAM(S): 4 TABLET, CHEWABLE ORAL at 21:58

## 2024-02-07 RX ADMIN — Medication 3 MILLILITER(S): at 05:46

## 2024-02-07 RX ADMIN — ESCITALOPRAM OXALATE 10 MILLIGRAM(S): 10 TABLET, FILM COATED ORAL at 12:53

## 2024-02-07 RX ADMIN — Medication 3 MILLILITER(S): at 17:44

## 2024-02-07 NOTE — CONSULT NOTE ADULT - NS ATTEND AMEND GEN_ALL_CORE FT
pt seen and examined with NP    s/p RRT yesterday for hypoxia to the 70s after pt self dislodged NC from nose  O2 NC replaced back on pt and increased to 10L  + cough  denies SOB  wants to go home  afebrile  + flu A on RVP     64F active smoker since 15 years old with PMH obesity, COPD on 3LNC at home, MEENU, HTN, HLD, DM, bladder cancer, hypothyroidism, cholelithiasis, OA, anxiety presents to ED for SOB and cough. RVP positive for Influenza A. Admitted to medical service for acute COPD exacerbation with influenza A    DX: acute on chronic hypoxic respiratory failure, chronic hypercarbic respiratory failure, acute COPD exacerbation, influenza A infection, MEENU, active smoker    - goal to maintain O2 sat > 88%  - baseline oxygen dependent on 3L NC at home  - wean down FIO2 as tolerates  - cont tamiflu for underlying influenza A infection, to complete 5 day course of treatment  - cont with prednisone 40mg daily for 5 day course  - cont duonebs q6 hrs + chest PT q6 hrs to facilitate secretion mobilization  - manual chest PT performed at bedside today  - cont with nocturnal BIPAP for underlying MEENU and chronic hypercarbic respiratory failure  - smoking cessation discussed with patient, pt not ready to quit, nicotine patch

## 2024-02-07 NOTE — PROGRESS NOTE ADULT - SUBJECTIVE AND OBJECTIVE BOX
Patient is a 64y old  Female who presents with a chief complaint of Acute hypoxic respiratory failure due to flu A (06 Feb 2024 13:48)      OVERNIGHT EVENTS:  Patients seen and examined at bedside this morning. had RRT due to hypoxia yesterday.     REVIEW OF SYSTEMS: denies chest pain/SOB, diaphoresis, no F/C, cough, dizziness, headache, blurry vision, nausea, vomiting, abdominal pain. All others review of systems negative     MEDICATIONS  (STANDING):  albuterol/ipratropium for Nebulization 3 milliLiter(s) Nebulizer every 6 hours  budesonide 160 MICROgram(s)/formoterol 4.5 MICROgram(s) Inhaler 2 Puff(s) Inhalation two times a day  enoxaparin Injectable 40 milliGRAM(s) SubCutaneous every 24 hours  escitalopram 10 milliGRAM(s) Oral daily  fluticasone propionate 50 MICROgram(s)/spray Nasal Spray 1 Spray(s) Both Nostrils two times a day  gabapentin 300 milliGRAM(s) Oral three times a day  guaiFENesin  milliGRAM(s) Oral every 12 hours  influenza   Vaccine 0.5 milliLiter(s) IntraMuscular once  levothyroxine 150 MICROGram(s) Oral daily  losartan 50 milliGRAM(s) Oral daily  montelukast 10 milliGRAM(s) Oral at bedtime  nicotine - 21 mG/24Hr(s) Patch 1 Patch Transdermal daily  oseltamivir 75 milliGRAM(s) Oral two times a day  predniSONE   Tablet 40 milliGRAM(s) Oral daily    MEDICATIONS  (PRN):  acetaminophen     Tablet .. 650 milliGRAM(s) Oral every 6 hours PRN Temp greater or equal to 38C (100.4F), Mild Pain (1 - 3)  albuterol    90 MICROgram(s) HFA Inhaler 2 Puff(s) Inhalation every 6 hours PRN Shortness of Breath and/or Wheezing  benzonatate 100 milliGRAM(s) Oral every 8 hours PRN Cough  oxyCODONE    IR 5 milliGRAM(s) Oral every 6 hours PRN Severe Pain (7 - 10)      Allergies    No Known Allergies    Intolerances        T(F): 97.9 (02-07-24 @ 04:43), Max: 98.5 (02-06-24 @ 16:26)  HR: 75 (02-07-24 @ 08:55) (58 - 80)  BP: 127/83 (02-07-24 @ 04:43) (109/72 - 130/73)  RR: 19 (02-07-24 @ 04:43) (17 - 19)  SpO2: 95% (02-07-24 @ 08:55) (90% - 96%)  Wt(kg): --    PHYSICAL EXAM:  GENERAL: NAD  HEAD:  Atraumatic, Normocephalic  EYES: PERRLA, conjunctiva and sclera clear  ENMT: Moist mucous membranes  NECK: Supple, No JVD, Normal thyroid  NERVOUS SYSTEM:  Alert & Awake  CHEST/LUNG: Clear to percussion bilaterally;   HEART: Regular rate and rhythm;   ABDOMEN: Soft, Nontender, Nondistended; Bowel sounds present  EXTREMITIES:  no edema BL LE  SKIN: moist    LABS:                        14.6   13.98 )-----------( 277      ( 07 Feb 2024 06:35 )             45.1     02-07    136  |  98  |  25<H>  ----------------------------<  100<H>  4.4   |  36<H>  |  0.90    Ca    9.1      07 Feb 2024 06:35  Mg     2.8     02-06    TPro  8.3  /  Alb  3.7  /  TBili  0.4  /  DBili  x   /  AST  19  /  ALT  15  /  AlkPhos  86  02-06    PT/INR - ( 05 Feb 2024 14:50 )   PT: 11.4 sec;   INR: 0.95 ratio         PTT - ( 05 Feb 2024 14:50 )  PTT:36.4 sec  Urinalysis Basic - ( 07 Feb 2024 06:35 )    Color: x / Appearance: x / SG: x / pH: x  Gluc: 100 mg/dL / Ketone: x  / Bili: x / Urobili: x   Blood: x / Protein: x / Nitrite: x   Leuk Esterase: x / RBC: x / WBC x   Sq Epi: x / Non Sq Epi: x / Bacteria: x      Cultures;   CAPILLARY BLOOD GLUCOSE        Lipid panel:           RADIOLOGY & ADDITIONAL TESTS:    Imaging Personally Reviewed:  [x ] YES      Consultant(s) Notes Reviewed:  [x ] YES     Care Discussed with [x ] Consultants [X ] Patient [ ] Family  [x ]    [x ]  Other; RN

## 2024-02-07 NOTE — CONSULT NOTE ADULT - ASSESSMENT
64 year old female with PHX: COPD on 3LNC at home, HTN, HLD, OA, bladder cancer, hypothyroidism, T2DM, and anxiety presents to ED with SOB/ dyspnea and cough. RVP positive for Infuenza A. Pulmonary consulted.     DX: Acute on chronic hypoxemic respiratory failure, Influenza A, COPD exacerbation    Reccs:    - Currently on 10 L NC after RRT yesterday. Continue to titrate FiO2 as tolerates for goal O2 > 88%  - Complete 5 day course Tamiflu for Influenza A  - Supportive care for Influenza A  - Duonebs q6h while wheezing  - Symbicort, Singulair   - Complete Prednisone 40mg PO daily x5d   - Incentive spirometer     Plan of care discussed with Pulm attending Dr. Ramirez 64 year old female with PHX: COPD on 3LNC at home, HTN, HLD, OA, bladder cancer, hypothyroidism, T2DM, and anxiety presents to ED with SOB/ dyspnea and cough. RVP positive for Infuenza A. Pulmonary consulted.     DX: Acute on chronic hypoxemic respiratory failure, Influenza A, COPD exacerbation    Reccs:    - Titrated from 10 L NC to 3 L NC with SpO2 95%. (at baseline home O2 level however patient reports only uses PRN/intermittently at home).   - Complete 5 day course Tamiflu for Influenza A  - Supportive care for Influenza A  - Duonebs q6h while wheezing  - Symbicort, Singulair   - Complete Prednisone 40mg PO daily x5d   - Incentive spirometer     Plan of care discussed with Pulm attending Dr. Ramirez 64 year old female with PHX: COPD on 3LNC at home, HTN, HLD, OA, bladder cancer, hypothyroidism, T2DM, and anxiety presents to ED with SOB/ dyspnea and cough. RVP positive for Influenza A. Pulmonary consulted.     DX: Acute on chronic hypoxemic respiratory failure, Influenza A, COPD exacerbation    Reccs:    - Titrated from 10 L NC to 3 L NC with SpO2 95%. (at baseline home O2 level however patient reports only uses PRN/intermittently at home).   - Complete 5 day course Tamiflu for Influenza A  - Supportive care for Influenza A  - Duonebs q6h while wheezing  - Symbicort, Singulair   - Complete Prednisone 40mg PO daily x5d   - Incentive spirometer     Plan of care discussed with Pulm attending Dr. Ramirez

## 2024-02-07 NOTE — PROGRESS NOTE ADULT - ASSESSMENT
JOZEF CRAIN is a 64y Female with a past medical history as described above who presented for evaluation of shortness of breath.     # Acute Hypoxic Respiratory Failure due to Influenza A (had RRT 2/6/24)  - Tamiflu 75mg PO twice daily x5d   - Titrate O2 as tolerated. SpO2 88-92% goal, bipap  - Duonebs q6h   - Continue home inhalers   - Tessalon PRN cough   - Tylenol PRN fevers  - Isolation precautions   - Incentive spirometer   - pulmonary eval  - CTA chest to R/O PE, Venous doppler US BL LE to R/O DVT    # acute COPD exacerbation due to Flu A  # Tobacco use disorder  - Tamiflu as above  - Continue home inhalers and montelukast   - Prednisone 40mg PO daily x5d   - Titrate O2 as tolerated  - Incentive spirometer   - Duobenbs  - Nicotine patch daily for cravings   - Outpt pulm follow-up     # HTN  - Home meds    # OA  - Oxy 5 prn     # T2DM  - FS qAC and qHS, insulin protocol if needed   - CC/DASH diet     # Hypothyroidism  - Synthroid 150mcg PO daily

## 2024-02-07 NOTE — CONSULT NOTE ADULT - SUBJECTIVE AND OBJECTIVE BOX
HPI:  Patient is a 64F with a past medical history of COPD on 3LNC at home, HTN, HLD, OA, bladder cancer, hypothyroidism, T2DM, and anxiety who presented to the ED for evaluation of shortness of breath. She had a telemedicine appointment and was reportedly satting in the high 60's on 4L. She is a heavy smoker and was smoking when EMS arrived, although she did not call, presumably the telemed personnel sent the ambulance to her residence.  In the ED she was hypoxic, placed on 5LNC with improvement. She tested positive for influenza A. Will admit to medicine for management of respiratory failure due to flu.  (05 Feb 2024 21:40)    Interval Events: RRT yesterday 2/7 for Hypoxemia started on 10 L NC.      PAST MEDICAL & SURGICAL HISTORY:  Anxiety      COPD (chronic obstructive pulmonary disease)      Agoraphobia      Hypothyroid      Emphysema lung      Smoker      HTN (hypertension)      DM (diabetes mellitus)      Obesity      Bladder cancer      S/P cholecystectomy          FAMILY HISTORY:      SOCIAL HISTORY:  Smoking: [ ] Never Smoked [ ] Former Smoker (__ packs x ___ years) [ ] Current Smoker  (__ packs x ___ years)  Substance Use: [ ] Never Used [ ] Used ____  EtOH Use:  Marital Status: [ ] Single [ ]  [ ]  [ ]   Sexual History:   Occupation:  Recent Travel:  Country of Birth:  Advance Directives:    Allergies    No Known Allergies    Intolerances        HOME MEDICATIONS:    REVIEW OF SYSTEMS:  All negative unless listed within interval HPI     OBJECTIVE:  Vital Signs Last 24 Hrs  T(C): 36.6 (07 Feb 2024 04:43), Max: 36.9 (06 Feb 2024 16:26)  T(F): 97.9 (07 Feb 2024 04:43), Max: 98.5 (06 Feb 2024 16:26)  HR: 75 (07 Feb 2024 08:55) (58 - 80)  BP: 127/83 (07 Feb 2024 04:43) (109/72 - 130/73)  BP(mean): 84 (06 Feb 2024 16:26) (84 - 84)  ABP: --  ABP(mean): --  RR: 19 (07 Feb 2024 04:43) (17 - 19)  SpO2: 95% (07 Feb 2024 08:55) (90% - 96%)    O2 Parameters below as of 07 Feb 2024 07:07  Patient On (Oxygen Delivery Method): nasal cannula              CAPILLARY BLOOD GLUCOSE          PHYSICAL EXAM:  General:   HEENT:   Lymph Nodes:  Neck:   Respiratory:   Cardiovascular:   Abdomen:   Extremities:   Skin:   Neurological:  Psychiatry:    HOSPITAL MEDICATIONS:  enoxaparin Injectable 40 milliGRAM(s) SubCutaneous every 24 hours    oseltamivir 75 milliGRAM(s) Oral two times a day    losartan 50 milliGRAM(s) Oral daily    levothyroxine 150 MICROGram(s) Oral daily  predniSONE   Tablet 40 milliGRAM(s) Oral daily    albuterol    90 MICROgram(s) HFA Inhaler 2 Puff(s) Inhalation every 6 hours PRN  albuterol/ipratropium for Nebulization 3 milliLiter(s) Nebulizer every 6 hours  benzonatate 100 milliGRAM(s) Oral every 8 hours PRN  budesonide 160 MICROgram(s)/formoterol 4.5 MICROgram(s) Inhaler 2 Puff(s) Inhalation two times a day  guaiFENesin  milliGRAM(s) Oral every 12 hours  montelukast 10 milliGRAM(s) Oral at bedtime    acetaminophen     Tablet .. 650 milliGRAM(s) Oral every 6 hours PRN  escitalopram 10 milliGRAM(s) Oral daily  gabapentin 300 milliGRAM(s) Oral three times a day  oxyCODONE    IR 5 milliGRAM(s) Oral every 6 hours PRN            influenza   Vaccine 0.5 milliLiter(s) IntraMuscular once    fluticasone propionate 50 MICROgram(s)/spray Nasal Spray 1 Spray(s) Both Nostrils two times a day    nicotine - 21 mG/24Hr(s) Patch 1 Patch Transdermal daily      LABS:                        14.6   13.98 )-----------( 277      ( 07 Feb 2024 06:35 )             45.1     Hgb Trend: 14.6<--, 15.8<--, 14.5<--  02-07    136  |  98  |  25<H>  ----------------------------<  100<H>  4.4   |  36<H>  |  0.90    Ca    9.1      07 Feb 2024 06:35  Mg     2.8     02-06    TPro  8.3  /  Alb  3.7  /  TBili  0.4  /  DBili  x   /  AST  19  /  ALT  15  /  AlkPhos  86  02-06    Creatinine Trend: 0.90<--, 0.94<--, 1.13<--  PT/INR - ( 05 Feb 2024 14:50 )   PT: 11.4 sec;   INR: 0.95 ratio         PTT - ( 05 Feb 2024 14:50 )  PTT:36.4 sec  Urinalysis Basic - ( 07 Feb 2024 06:35 )    Color: x / Appearance: x / SG: x / pH: x  Gluc: 100 mg/dL / Ketone: x  / Bili: x / Urobili: x   Blood: x / Protein: x / Nitrite: x   Leuk Esterase: x / RBC: x / WBC x   Sq Epi: x / Non Sq Epi: x / Bacteria: x        Venous Blood Gas:  02-07 @ 07:23  7.32/85/62/44/91.6  VBG Lactate: --  Venous Blood Gas:  02-05 @ 15:11  7.31/82/26/41/42.1  VBG Lactate: --      MICROBIOLOGY:     Respiratory Viral Panel with COVID-19 by JAILENE (02.05.24 @ 14:50)    Rapid RVP Result: Detected   SARS-CoV-2: NotDetec: This Respiratory Panel uses polymerase chain reaction (PCR) to detect for  adenovirus; coronavirus (HKU1, NL63, 229E, OC43); human metapneumovirus  (hMPV); human enterovirus/rhinovirus (Entero/RV); influenza A; influenza  A/H1; influenza A/H3; influenza A/H1-2009; influenza B; parainfluenza  viruses 1, 2, 3, 4; respiratory syncytial virus; Mycoplasma pneumoniae;  Chlamydophila pneumoniae; and SARS-CoV-2.   Influenza AH3 (RapRVP): Detected        RADIOLOGY:    < from: Xray Chest 1 View- PORTABLE-Urgent (02.05.24 @ 15:51) >  FINDINGS:  CATHETERS AND TUBES: None    PULMONARY: The airway is midline. Mild pulmonary vascular congestion   without large airspace consolidation or pleural effusion.  There are no airspace consolidations or radiographic evidence of   pulmonary nodules..  No pneumothorax.    HEART/VASCULAR: The heart size and mediastinum configuration are within   the limits of normal allowing for magnification of active AP apical   lordotic view..    BONES: The visualized osseous thorax is intact.    IMPRESSION:    No radiographic evidence of active chest disease..    --- End of Report ---    < end of copied text >       HPI:  Patient is a 64F with a past medical history of COPD on 3LNC at home, HTN, HLD, OA, bladder cancer, hypothyroidism, T2DM, and anxiety who presented to the ED for evaluation of shortness of breath. She had a telemedicine appointment and was reportedly satting in the high 60's on 4L. She is a heavy smoker and was smoking when EMS arrived, although she did not call, presumably the telemed personnel sent the ambulance to her residence.  In the ED she was hypoxic, placed on 5LNC with improvement. She tested positive for influenza A. Will admit to medicine for management of respiratory failure due to flu.  (05 Feb 2024 21:40)    Interval Events: RRT yesterday 2/7 for Hypoxemia started on 10 L NC. Titrated to 3 L NC with Spo2 stable. C/O intermittent cough and SOB.       PAST MEDICAL & SURGICAL HISTORY:  Anxiety      COPD (chronic obstructive pulmonary disease)      Agoraphobia      Hypothyroid      Emphysema lung      Smoker      HTN (hypertension)      DM (diabetes mellitus)      Obesity      Bladder cancer      S/P cholecystectomy          FAMILY HISTORY:      SOCIAL HISTORY:  Smoking: [ ] Never Smoked [ ] Former Smoker (__1 packs x __50_ years) [ x] Current Smoker  (__ packs x ___ years)  Substance Use: [x ] Never Used [ ] Used ____  EtOH Use: denies  Marital Status: [ ] Single [ ]  [ ]  [ ]       Allergies    No Known Allergies    Intolerances        HOME MEDICATIONS:    REVIEW OF SYSTEMS:  All negative unless listed within interval HPI     OBJECTIVE:  Vital Signs Last 24 Hrs  T(C): 36.6 (07 Feb 2024 04:43), Max: 36.9 (06 Feb 2024 16:26)  T(F): 97.9 (07 Feb 2024 04:43), Max: 98.5 (06 Feb 2024 16:26)  HR: 75 (07 Feb 2024 08:55) (58 - 80)  BP: 127/83 (07 Feb 2024 04:43) (109/72 - 130/73)  BP(mean): 84 (06 Feb 2024 16:26) (84 - 84)  ABP: --  ABP(mean): --  RR: 19 (07 Feb 2024 04:43) (17 - 19)  SpO2: 95% (07 Feb 2024 08:55) (90% - 96%)    O2 Parameters below as of 07 Feb 2024 07:07  Patient On (Oxygen Delivery Method): nasal cannula              CAPILLARY BLOOD GLUCOSE          PHYSICAL EXAM:  General: NAD, sitting upright in bed  HEENT: NC/AT, PERRL, MMM, + NC in place  Neck: Supple, no JVD  Respiratory: Mild Wheeze, no increased WOB, no rhonchi or rales  Cardiovascular: RRR, no m/r/g  Abdomen: Soft, ND,NTTP, + BS  Extremities: mild LE Edema  Skin: intact  Neurological: A&Ox3, non focal  Psychiatry: appropriate     HOSPITAL MEDICATIONS:  enoxaparin Injectable 40 milliGRAM(s) SubCutaneous every 24 hours    oseltamivir 75 milliGRAM(s) Oral two times a day    losartan 50 milliGRAM(s) Oral daily    levothyroxine 150 MICROGram(s) Oral daily  predniSONE   Tablet 40 milliGRAM(s) Oral daily    albuterol    90 MICROgram(s) HFA Inhaler 2 Puff(s) Inhalation every 6 hours PRN  albuterol/ipratropium for Nebulization 3 milliLiter(s) Nebulizer every 6 hours  benzonatate 100 milliGRAM(s) Oral every 8 hours PRN  budesonide 160 MICROgram(s)/formoterol 4.5 MICROgram(s) Inhaler 2 Puff(s) Inhalation two times a day  guaiFENesin  milliGRAM(s) Oral every 12 hours  montelukast 10 milliGRAM(s) Oral at bedtime    acetaminophen     Tablet .. 650 milliGRAM(s) Oral every 6 hours PRN  escitalopram 10 milliGRAM(s) Oral daily  gabapentin 300 milliGRAM(s) Oral three times a day  oxyCODONE    IR 5 milliGRAM(s) Oral every 6 hours PRN            influenza   Vaccine 0.5 milliLiter(s) IntraMuscular once    fluticasone propionate 50 MICROgram(s)/spray Nasal Spray 1 Spray(s) Both Nostrils two times a day    nicotine - 21 mG/24Hr(s) Patch 1 Patch Transdermal daily      LABS:                        14.6   13.98 )-----------( 277      ( 07 Feb 2024 06:35 )             45.1     Hgb Trend: 14.6<--, 15.8<--, 14.5<--  02-07    136  |  98  |  25<H>  ----------------------------<  100<H>  4.4   |  36<H>  |  0.90    Ca    9.1      07 Feb 2024 06:35  Mg     2.8     02-06    TPro  8.3  /  Alb  3.7  /  TBili  0.4  /  DBili  x   /  AST  19  /  ALT  15  /  AlkPhos  86  02-06    Creatinine Trend: 0.90<--, 0.94<--, 1.13<--  PT/INR - ( 05 Feb 2024 14:50 )   PT: 11.4 sec;   INR: 0.95 ratio         PTT - ( 05 Feb 2024 14:50 )  PTT:36.4 sec  Urinalysis Basic - ( 07 Feb 2024 06:35 )    Color: x / Appearance: x / SG: x / pH: x  Gluc: 100 mg/dL / Ketone: x  / Bili: x / Urobili: x   Blood: x / Protein: x / Nitrite: x   Leuk Esterase: x / RBC: x / WBC x   Sq Epi: x / Non Sq Epi: x / Bacteria: x        Venous Blood Gas:  02-07 @ 07:23  7.32/85/62/44/91.6  VBG Lactate: --  Venous Blood Gas:  02-05 @ 15:11  7.31/82/26/41/42.1  VBG Lactate: --      MICROBIOLOGY:     Respiratory Viral Panel with COVID-19 by JAILENE (02.05.24 @ 14:50)    Rapid RVP Result: Detected   SARS-CoV-2: NotDetec: This Respiratory Panel uses polymerase chain reaction (PCR) to detect for  adenovirus; coronavirus (HKU1, NL63, 229E, OC43); human metapneumovirus  (hMPV); human enterovirus/rhinovirus (Entero/RV); influenza A; influenza  A/H1; influenza A/H3; influenza A/H1-2009; influenza B; parainfluenza  viruses 1, 2, 3, 4; respiratory syncytial virus; Mycoplasma pneumoniae;  Chlamydophila pneumoniae; and SARS-CoV-2.   Influenza AH3 (RapRVP): Detected        RADIOLOGY:    < from: Xray Chest 1 View- PORTABLE-Urgent (02.05.24 @ 15:51) >  FINDINGS:  CATHETERS AND TUBES: None    PULMONARY: The airway is midline. Mild pulmonary vascular congestion   without large airspace consolidation or pleural effusion.  There are no airspace consolidations or radiographic evidence of   pulmonary nodules..  No pneumothorax.    HEART/VASCULAR: The heart size and mediastinum configuration are within   the limits of normal allowing for magnification of active AP apical   lordotic view..    BONES: The visualized osseous thorax is intact.    IMPRESSION:    No radiographic evidence of active chest disease..    --- End of Report ---    < end of copied text >       HPI:  Patient is a 64F with a past medical history of COPD on 3LNC at home, HTN, HLD, OA, bladder cancer, hypothyroidism, T2DM, and anxiety who presented to the ED for evaluation of shortness of breath. She had a telemedicine appointment and was reportedly satting in the high 60's on 4L. She is a heavy smoker and was smoking when EMS arrived, although she did not call, presumably the telemed personnel sent the ambulance to her residence.  In the ED she was hypoxic, placed on 5LNC with improvement. She tested positive for influenza A. Will admit to medicine for management of respiratory failure due to flu.  (05 Feb 2024 21:40)    Interval Events: RRT yesterday 2/7 for Hypoxemia started on 10 L NC. Titrated to 3 L NC with Spo2 stable. C/O intermittent cough and SOB.       PAST MEDICAL & SURGICAL HISTORY:  Anxiety      COPD (chronic obstructive pulmonary disease)      Agoraphobia      Hypothyroid      Emphysema lung      Smoker      HTN (hypertension)      DM (diabetes mellitus)      Obesity      Bladder cancer      S/P cholecystectomy              SOCIAL HISTORY:  Smoking: [ ] Never Smoked [ ] Former Smoker (__1 packs x __50_ years) [ x] Current Smoker  (__ packs x ___ years)  Substance Use: [x ] Never Used [ ] Used ____  EtOH Use: denies  Marital Status: [ ] Single [ ]  [ ]  [ ]       Allergies  No Known Allergies              REVIEW OF SYSTEMS:  All negative unless listed within interval HPI   + cough  no CP  no abdominal pain      OBJECTIVE:  Vital Signs Last 24 Hrs  T(C): 36.6 (07 Feb 2024 04:43), Max: 36.9 (06 Feb 2024 16:26)  T(F): 97.9 (07 Feb 2024 04:43), Max: 98.5 (06 Feb 2024 16:26)  HR: 75 (07 Feb 2024 08:55) (58 - 80)  BP: 127/83 (07 Feb 2024 04:43) (109/72 - 130/73)  BP(mean): 84 (06 Feb 2024 16:26) (84 - 84)  RR: 19 (07 Feb 2024 04:43) (17 - 19)  SpO2: 95% (07 Feb 2024 08:55) (90% - 96%)    O2 Parameters below as of 07 Feb 2024 07:07  Patient On (Oxygen Delivery Method): nasal cannula            PHYSICAL EXAM:  General: obese female, NAD, sitting upright in bed  HEENT: NC/AT, PERRL, MMM, + NC in place  Neck: Supple, no JVD  Respiratory: Mild Wheeze, no increased WOB, no rhonchi or rales  Cardiovascular: RRR, no m/r/g  Abdomen: Soft, ND,NTTP, + BS  Extremities: mild LE Edema  Skin: intact  Neurological: A&Ox3, non focal  Psychiatry: appropriate       HOSPITAL MEDICATIONS:  enoxaparin Injectable 40 milliGRAM(s) SubCutaneous every 24 hours    oseltamivir 75 milliGRAM(s) Oral two times a day    losartan 50 milliGRAM(s) Oral daily    levothyroxine 150 MICROGram(s) Oral daily  predniSONE   Tablet 40 milliGRAM(s) Oral daily    albuterol    90 MICROgram(s) HFA Inhaler 2 Puff(s) Inhalation every 6 hours PRN  albuterol/ipratropium for Nebulization 3 milliLiter(s) Nebulizer every 6 hours  benzonatate 100 milliGRAM(s) Oral every 8 hours PRN  budesonide 160 MICROgram(s)/formoterol 4.5 MICROgram(s) Inhaler 2 Puff(s) Inhalation two times a day  guaiFENesin  milliGRAM(s) Oral every 12 hours  montelukast 10 milliGRAM(s) Oral at bedtime    acetaminophen     Tablet .. 650 milliGRAM(s) Oral every 6 hours PRN  escitalopram 10 milliGRAM(s) Oral daily  gabapentin 300 milliGRAM(s) Oral three times a day  oxyCODONE    IR 5 milliGRAM(s) Oral every 6 hours PRN            influenza   Vaccine 0.5 milliLiter(s) IntraMuscular once    fluticasone propionate 50 MICROgram(s)/spray Nasal Spray 1 Spray(s) Both Nostrils two times a day    nicotine - 21 mG/24Hr(s) Patch 1 Patch Transdermal daily        LABS:                        14.6   13.98 )-----------( 277      ( 07 Feb 2024 06:35 )             45.1     Hgb Trend: 14.6<--, 15.8<--, 14.5<--  02-07    136  |  98        |  25<H>  ----------------------------<  100<H>  4.4   |  36<H>  |  0.90    Ca    9.1      07 Feb 2024 06:35  Mg     2.8     02-06    TPro  8.3  /  Alb  3.7  /  TBili  0.4  /  AST  19  /  ALT  15  /  AlkPhos  86  02-06    Creatinine Trend: 0.90<--, 0.94<--, 1.13<--    PT/INR - ( 05 Feb 2024 14:50 )   PT: 11.4 sec;   INR: 0.95 ratio    PTT - ( 05 Feb 2024 14:50 )  PTT:36.4 sec        Venous Blood Gas:  02-07 @ 07:23  7.32/85/62/44/91.6    Venous Blood Gas:  02-05 @ 15:11  7.31/82/26/41/42.1        MICROBIOLOGY:     Respiratory Viral Panel with COVID-19 by JAILENE (02.05.24 @ 14:50)    Rapid RVP Result: Detected   SARS-CoV-2: NotDetec   Influenza AH3 (RapRVP): Detected        RADIOLOGY:    < from: Xray Chest 1 View- PORTABLE-Urgent (02.05.24 @ 15:51) >  FINDINGS:  CATHETERS AND TUBES: None    PULMONARY: The airway is midline. Mild pulmonary vascular congestion   without large airspace consolidation or pleural effusion.  There are no airspace consolidations or radiographic evidence of   pulmonary nodules..  No pneumothorax.    HEART/VASCULAR: The heart size and mediastinum configuration are within   the limits of normal allowing for magnification of active AP apical   lordotic view..    BONES: The visualized osseous thorax is intact.    IMPRESSION:    No radiographic evidence of active chest disease..

## 2024-02-08 LAB
ANION GAP SERPL CALC-SCNC: 1 MMOL/L — LOW (ref 5–17)
BUN SERPL-MCNC: 21 MG/DL — SIGNIFICANT CHANGE UP (ref 7–23)
CALCIUM SERPL-MCNC: 8.8 MG/DL — SIGNIFICANT CHANGE UP (ref 8.5–10.1)
CHLORIDE SERPL-SCNC: 95 MMOL/L — LOW (ref 96–108)
CO2 SERPL-SCNC: 39 MMOL/L — HIGH (ref 22–31)
CREAT SERPL-MCNC: 0.75 MG/DL — SIGNIFICANT CHANGE UP (ref 0.5–1.3)
EGFR: 89 ML/MIN/1.73M2 — SIGNIFICANT CHANGE UP
GLUCOSE BLDC GLUCOMTR-MCNC: 188 MG/DL — HIGH (ref 70–99)
GLUCOSE SERPL-MCNC: 283 MG/DL — HIGH (ref 70–99)
HCT VFR BLD CALC: 43.1 % — SIGNIFICANT CHANGE UP (ref 34.5–45)
HGB BLD-MCNC: 13.9 G/DL — SIGNIFICANT CHANGE UP (ref 11.5–15.5)
MCHC RBC-ENTMCNC: 32.3 G/DL — SIGNIFICANT CHANGE UP (ref 32–36)
MCHC RBC-ENTMCNC: 32.3 PG — SIGNIFICANT CHANGE UP (ref 27–34)
MCV RBC AUTO: 100 FL — SIGNIFICANT CHANGE UP (ref 80–100)
NRBC # BLD: 0 /100 WBCS — SIGNIFICANT CHANGE UP (ref 0–0)
PLATELET # BLD AUTO: 245 K/UL — SIGNIFICANT CHANGE UP (ref 150–400)
POTASSIUM SERPL-MCNC: 4.8 MMOL/L — SIGNIFICANT CHANGE UP (ref 3.5–5.3)
POTASSIUM SERPL-SCNC: 4.8 MMOL/L — SIGNIFICANT CHANGE UP (ref 3.5–5.3)
RBC # BLD: 4.31 M/UL — SIGNIFICANT CHANGE UP (ref 3.8–5.2)
RBC # FLD: 15.8 % — HIGH (ref 10.3–14.5)
SODIUM SERPL-SCNC: 135 MMOL/L — SIGNIFICANT CHANGE UP (ref 135–145)
WBC # BLD: 11.27 K/UL — HIGH (ref 3.8–10.5)
WBC # FLD AUTO: 11.27 K/UL — HIGH (ref 3.8–10.5)

## 2024-02-08 PROCEDURE — 99233 SBSQ HOSP IP/OBS HIGH 50: CPT

## 2024-02-08 PROCEDURE — 99497 ADVNCD CARE PLAN 30 MIN: CPT

## 2024-02-08 RX ADMIN — Medication 1 PATCH: at 12:00

## 2024-02-08 RX ADMIN — ENOXAPARIN SODIUM 40 MILLIGRAM(S): 100 INJECTION SUBCUTANEOUS at 18:13

## 2024-02-08 RX ADMIN — BUDESONIDE AND FORMOTEROL FUMARATE DIHYDRATE 2 PUFF(S): 160; 4.5 AEROSOL RESPIRATORY (INHALATION) at 05:31

## 2024-02-08 RX ADMIN — Medication 1 PATCH: at 08:21

## 2024-02-08 RX ADMIN — GABAPENTIN 300 MILLIGRAM(S): 400 CAPSULE ORAL at 22:56

## 2024-02-08 RX ADMIN — Medication 150 MICROGRAM(S): at 05:29

## 2024-02-08 RX ADMIN — Medication 3 MILLILITER(S): at 17:03

## 2024-02-08 RX ADMIN — ESCITALOPRAM OXALATE 10 MILLIGRAM(S): 10 TABLET, FILM COATED ORAL at 12:57

## 2024-02-08 RX ADMIN — ENOXAPARIN SODIUM 40 MILLIGRAM(S): 100 INJECTION SUBCUTANEOUS at 05:35

## 2024-02-08 RX ADMIN — BUDESONIDE AND FORMOTEROL FUMARATE DIHYDRATE 2 PUFF(S): 160; 4.5 AEROSOL RESPIRATORY (INHALATION) at 18:16

## 2024-02-08 RX ADMIN — Medication 1 PATCH: at 12:57

## 2024-02-08 RX ADMIN — LOSARTAN POTASSIUM 50 MILLIGRAM(S): 100 TABLET, FILM COATED ORAL at 05:30

## 2024-02-08 RX ADMIN — Medication 1 SPRAY(S): at 18:16

## 2024-02-08 RX ADMIN — MONTELUKAST 10 MILLIGRAM(S): 4 TABLET, CHEWABLE ORAL at 22:56

## 2024-02-08 RX ADMIN — Medication 75 MILLIGRAM(S): at 05:30

## 2024-02-08 RX ADMIN — GABAPENTIN 300 MILLIGRAM(S): 400 CAPSULE ORAL at 05:34

## 2024-02-08 RX ADMIN — Medication 75 MILLIGRAM(S): at 18:15

## 2024-02-08 RX ADMIN — Medication 1 SPRAY(S): at 05:31

## 2024-02-08 RX ADMIN — Medication 3 MILLILITER(S): at 05:11

## 2024-02-08 RX ADMIN — Medication 40 MILLIGRAM(S): at 05:30

## 2024-02-08 RX ADMIN — GABAPENTIN 300 MILLIGRAM(S): 400 CAPSULE ORAL at 13:01

## 2024-02-08 RX ADMIN — Medication 650 MILLIGRAM(S): at 22:56

## 2024-02-08 RX ADMIN — Medication 600 MILLIGRAM(S): at 18:15

## 2024-02-08 RX ADMIN — Medication 600 MILLIGRAM(S): at 05:29

## 2024-02-08 NOTE — RAPID RESPONSE TEAM SUMMARY - NSSITUATIONBACKGROUNDRRT_GEN_ALL_CORE
Patient is a 64F with a past medical history of COPD on 3LNC at home, HTN, HLD, OA, bladder cancer, hypothyroidism, T2DM, and anxiety, admitted with hypoxic respiratory failure 2/2 Influenza and COPDE. Of note, RN became aware that pt had a Klonopin prescription in her bag that was not accounted for on admission, and had taken 3 extra pills for her "anxiety", and proceeded to remove all monitors for the previous few hours. I was called to assess the patient, and found the patient lethargic and confused lying in bed, without any oxygen, and with evident cyanosis of lips and pallor of skin. Immediate vitals revealed O2 saturation of 58% on RA, with consistent wave form on monitor. 15L NC placed immediately, with repeat vitals /61, HR 85, and rapidly improving O2 saturation of 95%.     Pt otherwise A&Ox3, with harsh respirations but improved work of breathing once placed on O2. Her medication was confiscated and placed with security. Continuous Pulse Ox put in place, and 1:1 ordered for patient safety and O2 compliance.  Patient is a 64F with a past medical history of COPD on 3LNC at home, HTN, HLD, OA, bladder cancer, hypothyroidism, T2DM, and anxiety, admitted with hypoxic respiratory failure 2/2 Influenza and COPDE. Of note, RN became aware that pt had a Klonopin prescription in her bag that was not accounted for on admission, and had taken 3 extra pills for her "anxiety", and proceeded to remove all monitors for the previous few hours. I was called to assess the patient, and found the patient lethargic and confused lying in bed, without any oxygen, and with evident cyanosis of lips and pallor of skin. Immediate vitals revealed O2 saturation of 58% on RA, with consistent wave form on monitor. 15L NC placed immediately, with repeat vitals /61, HR 85, and rapidly improving O2 saturation of 95%.     Pt otherwise A&Ox3, with mild wheezing, but otherwise improved work of breathing once placed on O2. No acute neurologic deficits, poor insight into her medical condition. Her medication was confiscated and placed with security, and pt educated on risks of respiratory depression with BZD, as well as further risk of hypoxia if she does not wear her O2. Continuous Pulse Ox put in place, and 1:1 ordered for patient safety and O2 compliance.

## 2024-02-08 NOTE — PROGRESS NOTE ADULT - ASSESSMENT
JOZEF CRAIN is a 64y Female with a past medical history as described above who presented for evaluation of shortness of breath.     # Acute Hypoxic Respiratory Failure due to Influenza A (had RRT 2/6/24)  - Tamiflu 75mg PO twice daily x5d   - Titrate O2 as tolerated. SpO2 88-92% goal, bipap  - Duonebs q6h   - Continue home inhalers   - Tessalon PRN cough   - Tylenol PRN fevers  - Isolation precautions   - Incentive spirometer   - pulmonary eval appreciated   - CTA chest to R/O PE, Venous doppler US BL LE to R/O DVT    # acute COPD exacerbation due to Flu A  # Tobacco use disorder  - Tamiflu as above  - Continue home inhalers and montelukast   - Prednisone 40mg PO daily x5d   - Titrate O2 as tolerated  - Incentive spirometer   - Duobenbs  - Nicotine patch daily for cravings   - Outpt pulm follow-up     # HTN  - Home meds    # OA  - Oxy 5 prn     # T2DM  - FS qAC and qHS, insulin protocol if needed   - CC/DASH diet     # Hypothyroidism  - Synthroid 150mcg PO daily

## 2024-02-08 NOTE — PROGRESS NOTE ADULT - SUBJECTIVE AND OBJECTIVE BOX
Interval Events: patient lying in bed in NAD, no interval events overnight, on 4L NC    REVIEW OF SYSTEMS:  All negative unless listed above    OBJECTIVE:  Vital Signs Last 24 Hrs  T(C): 37 (08 Feb 2024 11:13), Max: 37 (08 Feb 2024 11:13)  T(F): 98.6 (08 Feb 2024 11:13), Max: 98.6 (08 Feb 2024 11:13)  HR: 88 (08 Feb 2024 11:13) (78 - 98)  BP: 118/69 (08 Feb 2024 11:13) (114/72 - 128/83)  BP(mean): 98 (07 Feb 2024 16:48) (98 - 98)  RR: 18 (08 Feb 2024 11:13) (17 - 19)  SpO2: 91% (08 Feb 2024 11:13) (91% - 97%)    O2 Parameters below as of 08 Feb 2024 06:24  Patient On (Oxygen Delivery Method): nasal cannula, [place] High Flow Nasal O2 Cannula       02-07 @ 07:01  -  02-08 @ 07:00  --------------------------------------------------------  IN: 240 mL / OUT: 600 mL / NET: -360 mL    PHYSICAL EXAM:  General: NAD, sitting upright in bed  HEENT: NC/AT, PERRL, MMM, + NC in place  Neck: Supple, no JVD  Respiratory: Mild Wheeze, no increased WOB, no rhonchi or rales  Cardiovascular: RRR, no m/r/g  Abdomen: Soft, ND,NTTP, + BS  Extremities: mild LE Edema  Skin: intact  Neurological: A&Ox3, non focal  Psychiatry: appropriate     HOSPITAL MEDICATIONS:  enoxaparin Injectable 40 milliGRAM(s) SubCutaneous every 12 hours  oseltamivir 75 milliGRAM(s) Oral two times a day  losartan 50 milliGRAM(s) Oral daily  levothyroxine 150 MICROGram(s) Oral daily  predniSONE   Tablet 40 milliGRAM(s) Oral daily  albuterol    90 MICROgram(s) HFA Inhaler 2 Puff(s) Inhalation every 6 hours PRN  albuterol/ipratropium for Nebulization 3 milliLiter(s) Nebulizer every 6 hours  benzonatate 100 milliGRAM(s) Oral every 8 hours PRN  budesonide 160 MICROgram(s)/formoterol 4.5 MICROgram(s) Inhaler 2 Puff(s) Inhalation two times a day  guaiFENesin  milliGRAM(s) Oral every 12 hours  montelukast 10 milliGRAM(s) Oral at bedtime  acetaminophen     Tablet .. 650 milliGRAM(s) Oral every 6 hours PRN  escitalopram 10 milliGRAM(s) Oral daily  gabapentin 300 milliGRAM(s) Oral three times a day  oxyCODONE    IR 5 milliGRAM(s) Oral every 6 hours PRN  influenza   Vaccine 0.5 milliLiter(s) IntraMuscular once  fluticasone propionate 50 MICROgram(s)/spray Nasal Spray 1 Spray(s) Both Nostrils two times a day  nicotine - 21 mG/24Hr(s) Patch 1 Patch Transdermal daily      LABS:                        13.9   11.27 )-----------( 245      ( 08 Feb 2024 10:30 )             43.1     Hgb Trend: 13.9<--, 14.6<--, 15.8<--, 14.5<--  02-08    135  |  95<L>  |  21  ----------------------------<  283<H>  4.8   |  39<H>  |  0.75    Ca    8.8      08 Feb 2024 10:30      Creatinine Trend: 0.75<--, 0.90<--, 0.94<--, 1.13<--    Urinalysis Basic - ( 08 Feb 2024 10:30 )    Color: x / Appearance: x / SG: x / pH: x  Gluc: 283 mg/dL / Ketone: x  / Bili: x / Urobili: x   Blood: x / Protein: x / Nitrite: x   Leuk Esterase: x / RBC: x / WBC x   Sq Epi: x / Non Sq Epi: x / Bacteria: x    Venous Blood Gas:  02-07 @ 07:23  7.32/85/62/44/91.6  VBG Lactate: --    MICROBIOLOGY:     RADIOLOGY:  [x] Reviewed and interpreted by me     Interval Events: patient lying in bed in NAD, no interval events overnight, on 4L NC    REVIEW OF SYSTEMS:  All negative unless listed above    OBJECTIVE:  Vital Signs Last 24 Hrs  T(C): 37 (08 Feb 2024 11:13), Max: 37 (08 Feb 2024 11:13)  T(F): 98.6 (08 Feb 2024 11:13), Max: 98.6 (08 Feb 2024 11:13)  HR: 88 (08 Feb 2024 11:13) (78 - 98)  BP: 118/69 (08 Feb 2024 11:13) (114/72 - 128/83)  BP(mean): 98 (07 Feb 2024 16:48) (98 - 98)  RR: 18 (08 Feb 2024 11:13) (17 - 19)  SpO2: 91% (08 Feb 2024 11:13) (91% - 97%)    O2 Parameters below as of 08 Feb 2024 06:24  Patient On (Oxygen Delivery Method): nasal cannula, [place] High Flow Nasal O2 Cannula       02-07 @ 07:01  -  02-08 @ 07:00  --------------------------------------------------------  IN: 240 mL / OUT: 600 mL / NET: -360 mL    PHYSICAL EXAM:  General: NAD, sitting upright in bed, obese  HEENT: NC/AT, PERRL, MMM, + NC in place  Neck: Supple, no JVD  Respiratory: Mild Wheeze, no increased WOB, no rhonchi or rales  Cardiovascular: RRR, no m/r/g  Abdomen: Soft, ND,NTTP, + BS  Extremities: mild LE Edema  Skin: intact  Neurological: A&Ox3, non focal  Psychiatry: appropriate     HOSPITAL MEDICATIONS:  enoxaparin Injectable 40 milliGRAM(s) SubCutaneous every 12 hours  oseltamivir 75 milliGRAM(s) Oral two times a day  losartan 50 milliGRAM(s) Oral daily  levothyroxine 150 MICROGram(s) Oral daily  predniSONE   Tablet 40 milliGRAM(s) Oral daily  albuterol    90 MICROgram(s) HFA Inhaler 2 Puff(s) Inhalation every 6 hours PRN  albuterol/ipratropium for Nebulization 3 milliLiter(s) Nebulizer every 6 hours  benzonatate 100 milliGRAM(s) Oral every 8 hours PRN  budesonide 160 MICROgram(s)/formoterol 4.5 MICROgram(s) Inhaler 2 Puff(s) Inhalation two times a day  guaiFENesin  milliGRAM(s) Oral every 12 hours  montelukast 10 milliGRAM(s) Oral at bedtime  acetaminophen     Tablet .. 650 milliGRAM(s) Oral every 6 hours PRN  escitalopram 10 milliGRAM(s) Oral daily  gabapentin 300 milliGRAM(s) Oral three times a day  oxyCODONE    IR 5 milliGRAM(s) Oral every 6 hours PRN  influenza   Vaccine 0.5 milliLiter(s) IntraMuscular once  fluticasone propionate 50 MICROgram(s)/spray Nasal Spray 1 Spray(s) Both Nostrils two times a day  nicotine - 21 mG/24Hr(s) Patch 1 Patch Transdermal daily      LABS:                        13.9   11.27 )-----------( 245      ( 08 Feb 2024 10:30 )             43.1     Hgb Trend: 13.9<--, 14.6<--, 15.8<--, 14.5<--  02-08    135  |  95<L>  |  21  ----------------------------<  283<H>  4.8   |  39<H>  |  0.75    Ca    8.8      08 Feb 2024 10:30      Creatinine Trend: 0.75<--, 0.90<--, 0.94<--, 1.13<--        Venous Blood Gas:  02-07 @ 07:23  7.32/85/62/44/91.6      MICROBIOLOGY:   Respiratory Viral Panel with COVID-19 by JAILENE (02.05.24 @ 14:50)    Rapid RVP Result: Detected   SARS-CoV-2: NotDetec   Influenza AH3 (RapRVP): Detected      RADIOLOGY:  [x] Reviewed and interpreted by me    < from: Xray Chest 1 View- PORTABLE-Urgent (02.05.24 @ 15:51) >  PULMONARY: The airway is midline. Mild pulmonary vascular congestion   without large airspace consolidation or pleural effusion.  There are no airspace consolidations or radiographic evidence of   pulmonary nodules..  No pneumothorax.    HEART/VASCULAR: The heart size and mediastinum configuration are within   the limits of normal allowing for magnification of active AP apical   lordotic view..    BONES: The visualized osseous thorax is intact.    IMPRESSION:    No radiographic evidence of active chest disease..    ----------------------------    < from: CT Angio Chest PE Protocol w/ IV Cont (02.07.24 @ 12:06) >  No evidence for pulmonary embolism.    Patent central airways. Small dependent density in the trachea and left   mainstem bronchus, suggestive of mucous secretion. Mild bronchial wall   thickening bilaterally may represent bronchitis, or reactive airway   disease.    Nonspecific mildly enlarged 1.2 cm pretracheal lymph node

## 2024-02-08 NOTE — PROGRESS NOTE ADULT - ASSESSMENT
64 year old female with PHX: active smoker COPD on 3LNC at home, HTN, HLD, OA, bladder cancer, hypothyroidism, T2DM, and anxiety presents to ED with SOB/ dyspnea and cough. RVP positive for Infuenza A.     DX: Acute on chronic hypoxemic respiratory failure, Influenza A, COPD exacerbation on home O2 (2-3L PRN), active smoker  Clinically improving, wheezing subsided    -Titrated from 10 L NC to 3 L NC with SpO2 91-92%  -Complete 5 day course Tamiflu for Influenza A (until 2/10)  -Supportive care for Influenza A  -Duonebs q6h while wheezing  -Symbicort, Singulair   -Complete Prednisone 40mg PO daily x5d (until 2/10)  -Incentive spirometer  -On nicotine patch as patient is active smoker, patient counselled on smoking cessation  -Patient follows with pulmonologist Dr. Alcala as outpatient  Plan of care discussed with Pulm attending Dr. Ramirez

## 2024-02-08 NOTE — RAPID RESPONSE TEAM SUMMARY - NSOTHERINTERVENTIONSRRT_GEN_ALL_CORE
Critical Care time: 40 mins assessing presenting problems of acute illness that poses high probability of life threatening deterioration or end organ damage/dysfunction.  Medical decision making including Initiating plan of care, reviewing data, reviewing radiology, discussing with multidisciplinary team, non inclusive of procedures, discussing goals of care with patient/family

## 2024-02-08 NOTE — PROGRESS NOTE ADULT - SUBJECTIVE AND OBJECTIVE BOX
Patient is a 64y old  Female who presents with a chief complaint of Acute hypoxic respiratory failure due to flu A (08 Feb 2024 12:34)      OVERNIGHT EVENTS:  Patients seen and examined at bedside this morning. No acute events overnight.    REVIEW OF SYSTEMS: denies chest pain/SOB, diaphoresis, no F/C, cough, dizziness, headache, blurry vision, nausea, vomiting, abdominal pain. All others review of systems negative     MEDICATIONS  (STANDING):  albuterol/ipratropium for Nebulization 3 milliLiter(s) Nebulizer every 6 hours  budesonide 160 MICROgram(s)/formoterol 4.5 MICROgram(s) Inhaler 2 Puff(s) Inhalation two times a day  enoxaparin Injectable 40 milliGRAM(s) SubCutaneous every 12 hours  escitalopram 10 milliGRAM(s) Oral daily  fluticasone propionate 50 MICROgram(s)/spray Nasal Spray 1 Spray(s) Both Nostrils two times a day  gabapentin 300 milliGRAM(s) Oral three times a day  guaiFENesin  milliGRAM(s) Oral every 12 hours  influenza   Vaccine 0.5 milliLiter(s) IntraMuscular once  levothyroxine 150 MICROGram(s) Oral daily  losartan 50 milliGRAM(s) Oral daily  montelukast 10 milliGRAM(s) Oral at bedtime  nicotine - 21 mG/24Hr(s) Patch 1 Patch Transdermal daily  oseltamivir 75 milliGRAM(s) Oral two times a day  predniSONE   Tablet 40 milliGRAM(s) Oral daily    MEDICATIONS  (PRN):  acetaminophen     Tablet .. 650 milliGRAM(s) Oral every 6 hours PRN Temp greater or equal to 38C (100.4F), Mild Pain (1 - 3)  albuterol    90 MICROgram(s) HFA Inhaler 2 Puff(s) Inhalation every 6 hours PRN Shortness of Breath and/or Wheezing  benzonatate 100 milliGRAM(s) Oral every 8 hours PRN Cough  oxyCODONE    IR 5 milliGRAM(s) Oral every 6 hours PRN Severe Pain (7 - 10)      Allergies    No Known Allergies    Intolerances        T(F): 98.6 (02-08-24 @ 11:13), Max: 98.6 (02-08-24 @ 11:13)  HR: 88 (02-08-24 @ 11:13) (78 - 98)  BP: 118/69 (02-08-24 @ 11:13) (114/72 - 128/83)  RR: 18 (02-08-24 @ 13:00) (17 - 19)  SpO2: 100% (02-08-24 @ 13:00) (91% - 100%)  Wt(kg): --    PHYSICAL EXAM:  GENERAL: NAD  HEAD:  Atraumatic, Normocephalic  EYES: PERRLA, conjunctiva and sclera clear  ENMT: Moist mucous membranes  NECK: Supple, No JVD, Normal thyroid  NERVOUS SYSTEM:  Alert & Awake  CHEST/LUNG: Clear to percussion bilaterally;   HEART: Regular rate and rhythm;   ABDOMEN: Soft, Nontender, Nondistended; Bowel sounds present  EXTREMITIES:  no edema BL LE  SKIN: moist    LABS:                        13.9   11.27 )-----------( 245      ( 08 Feb 2024 10:30 )             43.1     02-08    135  |  95<L>  |  21  ----------------------------<  283<H>  4.8   |  39<H>  |  0.75    Ca    8.8      08 Feb 2024 10:30        Urinalysis Basic - ( 08 Feb 2024 10:30 )    Color: x / Appearance: x / SG: x / pH: x  Gluc: 283 mg/dL / Ketone: x  / Bili: x / Urobili: x   Blood: x / Protein: x / Nitrite: x   Leuk Esterase: x / RBC: x / WBC x   Sq Epi: x / Non Sq Epi: x / Bacteria: x      Cultures;   CAPILLARY BLOOD GLUCOSE        Lipid panel:           RADIOLOGY & ADDITIONAL TESTS:    Imaging Personally Reviewed:  [x ] YES      Consultant(s) Notes Reviewed:  [x ] YES     Care Discussed with [x ] Consultants [X ] Patient [ ] Family  [x ]    [x ]  Other; RN

## 2024-02-09 LAB
ANION GAP SERPL CALC-SCNC: -1 MMOL/L — LOW (ref 5–17)
BUN SERPL-MCNC: 19 MG/DL — SIGNIFICANT CHANGE UP (ref 7–23)
CALCIUM SERPL-MCNC: 8.9 MG/DL — SIGNIFICANT CHANGE UP (ref 8.5–10.1)
CHLORIDE SERPL-SCNC: 96 MMOL/L — SIGNIFICANT CHANGE UP (ref 96–108)
CO2 SERPL-SCNC: 42 MMOL/L — HIGH (ref 22–31)
CREAT SERPL-MCNC: 0.79 MG/DL — SIGNIFICANT CHANGE UP (ref 0.5–1.3)
EGFR: 83 ML/MIN/1.73M2 — SIGNIFICANT CHANGE UP
GLUCOSE SERPL-MCNC: 159 MG/DL — HIGH (ref 70–99)
HCT VFR BLD CALC: 42.2 % — SIGNIFICANT CHANGE UP (ref 34.5–45)
HGB BLD-MCNC: 13.3 G/DL — SIGNIFICANT CHANGE UP (ref 11.5–15.5)
MCHC RBC-ENTMCNC: 31.5 G/DL — LOW (ref 32–36)
MCHC RBC-ENTMCNC: 31.7 PG — SIGNIFICANT CHANGE UP (ref 27–34)
MCV RBC AUTO: 100.7 FL — HIGH (ref 80–100)
NRBC # BLD: 0 /100 WBCS — SIGNIFICANT CHANGE UP (ref 0–0)
PLATELET # BLD AUTO: 257 K/UL — SIGNIFICANT CHANGE UP (ref 150–400)
POTASSIUM SERPL-MCNC: 4.6 MMOL/L — SIGNIFICANT CHANGE UP (ref 3.5–5.3)
POTASSIUM SERPL-SCNC: 4.6 MMOL/L — SIGNIFICANT CHANGE UP (ref 3.5–5.3)
RBC # BLD: 4.19 M/UL — SIGNIFICANT CHANGE UP (ref 3.8–5.2)
RBC # FLD: 15.9 % — HIGH (ref 10.3–14.5)
SODIUM SERPL-SCNC: 137 MMOL/L — SIGNIFICANT CHANGE UP (ref 135–145)
WBC # BLD: 17.52 K/UL — HIGH (ref 3.8–10.5)
WBC # FLD AUTO: 17.52 K/UL — HIGH (ref 3.8–10.5)

## 2024-02-09 PROCEDURE — 99233 SBSQ HOSP IP/OBS HIGH 50: CPT

## 2024-02-09 RX ORDER — SODIUM CHLORIDE 9 MG/ML
4 INJECTION INTRAMUSCULAR; INTRAVENOUS; SUBCUTANEOUS EVERY 6 HOURS
Refills: 0 | Status: DISCONTINUED | OUTPATIENT
Start: 2024-02-09 | End: 2024-02-14

## 2024-02-09 RX ADMIN — ESCITALOPRAM OXALATE 10 MILLIGRAM(S): 10 TABLET, FILM COATED ORAL at 11:28

## 2024-02-09 RX ADMIN — Medication 1 PATCH: at 20:50

## 2024-02-09 RX ADMIN — Medication 40 MILLIGRAM(S): at 05:45

## 2024-02-09 RX ADMIN — ENOXAPARIN SODIUM 40 MILLIGRAM(S): 100 INJECTION SUBCUTANEOUS at 11:28

## 2024-02-09 RX ADMIN — GABAPENTIN 300 MILLIGRAM(S): 400 CAPSULE ORAL at 05:46

## 2024-02-09 RX ADMIN — Medication 1 PATCH: at 08:37

## 2024-02-09 RX ADMIN — Medication 3 MILLILITER(S): at 00:27

## 2024-02-09 RX ADMIN — MONTELUKAST 10 MILLIGRAM(S): 4 TABLET, CHEWABLE ORAL at 21:12

## 2024-02-09 RX ADMIN — Medication 75 MILLIGRAM(S): at 17:55

## 2024-02-09 RX ADMIN — BUDESONIDE AND FORMOTEROL FUMARATE DIHYDRATE 2 PUFF(S): 160; 4.5 AEROSOL RESPIRATORY (INHALATION) at 05:47

## 2024-02-09 RX ADMIN — Medication 1 PATCH: at 11:27

## 2024-02-09 RX ADMIN — LOSARTAN POTASSIUM 50 MILLIGRAM(S): 100 TABLET, FILM COATED ORAL at 05:45

## 2024-02-09 RX ADMIN — SODIUM CHLORIDE 4 MILLILITER(S): 9 INJECTION INTRAMUSCULAR; INTRAVENOUS; SUBCUTANEOUS at 17:36

## 2024-02-09 RX ADMIN — BUDESONIDE AND FORMOTEROL FUMARATE DIHYDRATE 2 PUFF(S): 160; 4.5 AEROSOL RESPIRATORY (INHALATION) at 17:55

## 2024-02-09 RX ADMIN — GABAPENTIN 300 MILLIGRAM(S): 400 CAPSULE ORAL at 21:12

## 2024-02-09 RX ADMIN — Medication 150 MICROGRAM(S): at 05:45

## 2024-02-09 RX ADMIN — Medication 1 SPRAY(S): at 17:55

## 2024-02-09 RX ADMIN — Medication 1 PATCH: at 11:33

## 2024-02-09 RX ADMIN — GABAPENTIN 300 MILLIGRAM(S): 400 CAPSULE ORAL at 15:06

## 2024-02-09 RX ADMIN — Medication 600 MILLIGRAM(S): at 17:56

## 2024-02-09 RX ADMIN — Medication 1 SPRAY(S): at 05:47

## 2024-02-09 RX ADMIN — Medication 75 MILLIGRAM(S): at 05:48

## 2024-02-09 RX ADMIN — Medication 600 MILLIGRAM(S): at 05:45

## 2024-02-09 RX ADMIN — Medication 3 MILLILITER(S): at 17:36

## 2024-02-09 RX ADMIN — Medication 3 MILLILITER(S): at 05:14

## 2024-02-09 RX ADMIN — Medication 3 MILLILITER(S): at 11:14

## 2024-02-09 NOTE — PROGRESS NOTE ADULT - ASSESSMENT
JOZEF CRAIN is a 64y Female with a past medical history as described above who presented for evaluation of shortness of breath.     # Acute Hypoxic Respiratory Failure due to Influenza A (had RRT 2/6/24)  - Tamiflu 75mg PO twice daily x5d   - Titrate O2 as tolerated. SpO2 88-92% goal, bipap  - Duonebs q6h   - Continue home inhalers   - Tessalon PRN cough   - Tylenol PRN fevers  - Isolation precautions   - Incentive spirometer   - pulmonary eval appreciated   - CTA chest to R/O PE, Venous doppler US BL LE to R/O DVT    # acute COPD exacerbation due to Flu A  # Tobacco use disorder  - Tamiflu as above  - Continue home inhalers and montelukast   - Prednisone 40mg PO daily x5d   - Titrate O2 as tolerated  - Incentive spirometer   - Duobnebs  - Nicotine patch daily for cravings   - Outpt pulm follow-up     # HTN  - Home meds    # OA  - Oxy 5 prn     # T2DM  - FS qAC and qHS, insulin protocol if needed   - CC/DASH diet     # Hypothyroidism  - Synthroid 150mcg PO daily     Diet: DASH  DVT prophylaxis: lovenox   Dispo: Tele  Code Status:

## 2024-02-09 NOTE — PROGRESS NOTE ADULT - ASSESSMENT
64 year old female with PHX: active smoker COPD on 3LNC at home, HTN, HLD, OA, bladder cancer, hypothyroidism, T2DM, and anxiety presents to ED with SOB/ dyspnea and cough. RVP positive for Infuenza A.     DX: Acute on chronic hypoxemic respiratory failure, Influenza A, COPD exacerbation on home O2 (2-3L PRN), active smoker  Clinically improving, wheezing subsided    -Yesterday was 3 L NC with SpO2 91-92% (baseline home O2 level). FiO2 increased after RRT, will attempt to wean down today.  - C/W nocturnal NIV  -Complete 5 day course Tamiflu for Influenza A (until 2/10)  -Supportive care for Influenza A  -Duonebs q6h while wheezing  -Symbicort, Singulair   -Complete Prednisone 40mg PO daily x5d (until 2/10)  -Incentive spirometer  -On nicotine patch as patient is active smoker, patient counselled on smoking cessation  -Patient follows with pulmonologist Dr. Alcala as outpatient  Plan of care discussed with Pulm attending Dr. Ramirez 64 year old female with PHX: active smoker COPD on 3LNC at home, HTN, HLD, OA, bladder cancer, hypothyroidism, T2DM, and anxiety presents to ED with SOB/ dyspnea and cough. RVP positive for Infuenza A.     DX: Acute on chronic hypoxemic respiratory failure, Influenza A, COPD exacerbation on home O2 (2-3L PRN), active smoker  Clinically improving, wheezing subsided    -Yesterday was 3 L NC with SpO2 91-92% (baseline home O2 level). FiO2 increased after RRT. Currently on 8 L NC with SpO2 91%  - titrate FiO2 as tolerates for goal O@ > 88%  - C/W nocturnal NIV  - Will add hypersal and Aerobika to aid in secretion mobilization  -Duonebs q6h   -Symbicort, Singulair   -Complete 5 day course Tamiflu for Influenza A (until 2/10)  -Supportive care for Influenza A  -Complete Prednisone 40mg PO daily x5d (until 2/10)  -Incentive spirometer  -On nicotine patch as patient is active smoker, patient counselled on smoking cessation  -Patient follows with pulmonologist Dr. Alcala as outpatient  Plan of care discussed with Pulm attending Dr. Ramirez

## 2024-02-09 NOTE — PROGRESS NOTE ADULT - SUBJECTIVE AND OBJECTIVE BOX
Interval Events: RRT yesterday for hypoxemia, took 3 pills personal Klonopin and self removed Supplemental O2. Placed back on supplemental O2, started on CO.     REVIEW OF SYSTEMS:  All negative unless listed within interval HPI     OBJECTIVE:  Vital Signs Last 24 Hrs  T(C): 36.3 (09 Feb 2024 04:12), Max: 37 (08 Feb 2024 11:13)  T(F): 97.3 (09 Feb 2024 04:12), Max: 98.6 (08 Feb 2024 11:13)  HR: 85 (09 Feb 2024 08:51) (75 - 95)  BP: 118/74 (09 Feb 2024 04:12) (118/69 - 132/84)  BP(mean): --  ABP: --  ABP(mean): --  RR: 19 (09 Feb 2024 04:12) (18 - 19)  SpO2: 91% (09 Feb 2024 08:51) (91% - 100%)    O2 Parameters below as of 09 Feb 2024 08:51  Patient On (Oxygen Delivery Method): nasal cannula  O2 Flow (L/min): 9            02-08 @ 07:01  -  02-09 @ 07:00  --------------------------------------------------------  IN: 0 mL / OUT: 600 mL / NET: -600 mL      CAPILLARY BLOOD GLUCOSE      POCT Blood Glucose.: 188 mg/dL (08 Feb 2024 17:33)      PHYSICAL EXAM:  General:   HEENT:   Lymph Nodes:  Neck:   Respiratory:   Cardiovascular:   Abdomen:   Extremities:   Skin:   Neurological:  Psychiatry:    HOSPITAL MEDICATIONS:  enoxaparin Injectable 40 milliGRAM(s) SubCutaneous every 12 hours    oseltamivir 75 milliGRAM(s) Oral two times a day    losartan 50 milliGRAM(s) Oral daily    levothyroxine 150 MICROGram(s) Oral daily  predniSONE   Tablet 40 milliGRAM(s) Oral daily    albuterol    90 MICROgram(s) HFA Inhaler 2 Puff(s) Inhalation every 6 hours PRN  albuterol/ipratropium for Nebulization 3 milliLiter(s) Nebulizer every 6 hours  benzonatate 100 milliGRAM(s) Oral every 8 hours PRN  budesonide 160 MICROgram(s)/formoterol 4.5 MICROgram(s) Inhaler 2 Puff(s) Inhalation two times a day  guaiFENesin  milliGRAM(s) Oral every 12 hours  montelukast 10 milliGRAM(s) Oral at bedtime    acetaminophen     Tablet .. 650 milliGRAM(s) Oral every 6 hours PRN  escitalopram 10 milliGRAM(s) Oral daily  gabapentin 300 milliGRAM(s) Oral three times a day  oxyCODONE    IR 5 milliGRAM(s) Oral every 6 hours PRN            influenza   Vaccine 0.5 milliLiter(s) IntraMuscular once    fluticasone propionate 50 MICROgram(s)/spray Nasal Spray 1 Spray(s) Both Nostrils two times a day    nicotine - 21 mG/24Hr(s) Patch 1 Patch Transdermal daily      LABS:                        13.3   17.52 )-----------( 257      ( 09 Feb 2024 08:03 )             42.2     Hgb Trend: 13.3<--, 13.9<--, 14.6<--, 15.8<--, 14.5<--  02-09    137  |  96  |  19  ----------------------------<  159<H>  4.6   |  42<H>  |  0.79    Ca    8.9      09 Feb 2024 08:03      Creatinine Trend: 0.79<--, 0.75<--, 0.90<--, 0.94<--, 1.13<--    Urinalysis Basic - ( 09 Feb 2024 08:03 )    Color: x / Appearance: x / SG: x / pH: x  Gluc: 159 mg/dL / Ketone: x  / Bili: x / Urobili: x   Blood: x / Protein: x / Nitrite: x   Leuk Esterase: x / RBC: x / WBC x   Sq Epi: x / Non Sq Epi: x / Bacteria: x            MICROBIOLOGY:     Respiratory Viral Panel with COVID-19 by JAILENE (02.05.24 @ 14:50)    Rapid RVP Result: Detected   SARS-CoV-2: NotDetec: This Respiratory Panel uses polymerase chain reaction (PCR) to detect for  adenovirus; coronavirus (HKU1, NL63, 229E, OC43); human metapneumovirus  (hMPV); human enterovirus/rhinovirus (Entero/RV); influenza A; influenza  A/H1; influenza A/H3; influenza A/H1-2009; influenza B; parainfluenza  viruses 1, 2, 3, 4; respiratory syncytial virus; Mycoplasma pneumoniae;  Chlamydophila pneumoniae; and SARS-CoV-2.   Influenza AH3 (RapRVP): Detected            RADIOLOGY:  [ X] Reviewed Interval Events: RRT yesterday for hypoxemia, took 3 pills personal Klonopin and self removed Supplemental O2. Placed back on supplemental O2, started on CO. Today patient is awake and alert. Denies any medical complaints and is stating "I want to go home". Currently on 8 L NC with SpO2 91%    REVIEW OF SYSTEMS:  All negative unless listed within interval HPI     OBJECTIVE:  Vital Signs Last 24 Hrs  T(C): 36.3 (09 Feb 2024 04:12), Max: 37 (08 Feb 2024 11:13)  T(F): 97.3 (09 Feb 2024 04:12), Max: 98.6 (08 Feb 2024 11:13)  HR: 85 (09 Feb 2024 08:51) (75 - 95)  BP: 118/74 (09 Feb 2024 04:12) (118/69 - 132/84)  BP(mean): --  ABP: --  ABP(mean): --  RR: 19 (09 Feb 2024 04:12) (18 - 19)  SpO2: 91% (09 Feb 2024 08:51) (91% - 100%)    O2 Parameters below as of 09 Feb 2024 08:51  Patient On (Oxygen Delivery Method): nasal cannula  O2 Flow (L/min): 9 02-08 @ 07:01  -  02-09 @ 07:00  --------------------------------------------------------  IN: 0 mL / OUT: 600 mL / NET: -600 mL      CAPILLARY BLOOD GLUCOSE      POCT Blood Glucose.: 188 mg/dL (08 Feb 2024 17:33)      PHYSICAL EXAM:  General: NAD, sitting upright in bed  HEENT: NC/AT, PERRL, MMM, + NC in place  Neck: Supple, no JVD  Respiratory: B/L Rhonchi, no wheeze  Cardiovascular: RRR, no m/r/g  Abdomen: Soft, ND,NTTP, + BS  Extremities: mild LE Edema  Skin: intact  Neurological: A&Ox3, non focal  Psychiatry: appropriate           HOSPITAL MEDICATIONS:  enoxaparin Injectable 40 milliGRAM(s) SubCutaneous every 12 hours    oseltamivir 75 milliGRAM(s) Oral two times a day    losartan 50 milliGRAM(s) Oral daily    levothyroxine 150 MICROGram(s) Oral daily  predniSONE   Tablet 40 milliGRAM(s) Oral daily    albuterol    90 MICROgram(s) HFA Inhaler 2 Puff(s) Inhalation every 6 hours PRN  albuterol/ipratropium for Nebulization 3 milliLiter(s) Nebulizer every 6 hours  benzonatate 100 milliGRAM(s) Oral every 8 hours PRN  budesonide 160 MICROgram(s)/formoterol 4.5 MICROgram(s) Inhaler 2 Puff(s) Inhalation two times a day  guaiFENesin  milliGRAM(s) Oral every 12 hours  montelukast 10 milliGRAM(s) Oral at bedtime    acetaminophen     Tablet .. 650 milliGRAM(s) Oral every 6 hours PRN  escitalopram 10 milliGRAM(s) Oral daily  gabapentin 300 milliGRAM(s) Oral three times a day  oxyCODONE    IR 5 milliGRAM(s) Oral every 6 hours PRN            influenza   Vaccine 0.5 milliLiter(s) IntraMuscular once    fluticasone propionate 50 MICROgram(s)/spray Nasal Spray 1 Spray(s) Both Nostrils two times a day    nicotine - 21 mG/24Hr(s) Patch 1 Patch Transdermal daily      LABS:                        13.3   17.52 )-----------( 257      ( 09 Feb 2024 08:03 )             42.2     Hgb Trend: 13.3<--, 13.9<--, 14.6<--, 15.8<--, 14.5<--  02-09    137  |  96  |  19  ----------------------------<  159<H>  4.6   |  42<H>  |  0.79    Ca    8.9      09 Feb 2024 08:03      Creatinine Trend: 0.79<--, 0.75<--, 0.90<--, 0.94<--, 1.13<--    Urinalysis Basic - ( 09 Feb 2024 08:03 )    Color: x / Appearance: x / SG: x / pH: x  Gluc: 159 mg/dL / Ketone: x  / Bili: x / Urobili: x   Blood: x / Protein: x / Nitrite: x   Leuk Esterase: x / RBC: x / WBC x   Sq Epi: x / Non Sq Epi: x / Bacteria: x            MICROBIOLOGY:     Respiratory Viral Panel with COVID-19 by JAILENE (02.05.24 @ 14:50)    Rapid RVP Result: Detected   SARS-CoV-2: NotDetec: This Respiratory Panel uses polymerase chain reaction (PCR) to detect for  adenovirus; coronavirus (HKU1, NL63, 229E, OC43); human metapneumovirus  (hMPV); human enterovirus/rhinovirus (Entero/RV); influenza A; influenza  A/H1; influenza A/H3; influenza A/H1-2009; influenza B; parainfluenza  viruses 1, 2, 3, 4; respiratory syncytial virus; Mycoplasma pneumoniae;  Chlamydophila pneumoniae; and SARS-CoV-2.   Influenza AH3 (RapRVP): Detected            RADIOLOGY:  [ X] Reviewed Interval Events: RRT yesterday for hypoxemia, took 3 pills personal Klonopin and self removed Supplemental O2. Placed back on supplemental O2, started on CO. Today patient is awake and alert. Denies any medical complaints and is stating "I want to go home". Currently on 8 L NC with SpO2 91%    REVIEW OF SYSTEMS:  All negative unless listed within interval HPI     OBJECTIVE:  Vital Signs Last 24 Hrs  T(C): 36.3 (09 Feb 2024 04:12), Max: 37 (08 Feb 2024 11:13)  T(F): 97.3 (09 Feb 2024 04:12), Max: 98.6 (08 Feb 2024 11:13)  HR: 85 (09 Feb 2024 08:51) (75 - 95)  BP: 118/74 (09 Feb 2024 04:12) (118/69 - 132/84)  RR: 19 (09 Feb 2024 04:12) (18 - 19)  SpO2: 91% (09 Feb 2024 08:51) (91% - 100%)    O2 Parameters below as of 09 Feb 2024 08:51  Patient On (Oxygen Delivery Method): nasal cannula  O2 Flow (L/min): 9            02-08 @ 07:01  -  02-09 @ 07:00  --------------------------------------------------------  IN: 0 mL / OUT: 600 mL / NET: -600 mL      CAPILLARY BLOOD GLUCOSE  POCT Blood Glucose.: 188 mg/dL (08 Feb 2024 17:33)      PHYSICAL EXAM:  General: obese female, NAD, sitting upright in bed  HEENT: NC/AT, PERRL, MMM, + NC in place  Neck: Supple, no JVD  Respiratory: B/L diffuse Rhonchi, no wheeze  Cardiovascular: RRR, no m/r/g  Abdomen: Soft, ND,NTTP, + BS  Extremities: mild LE Edema  Skin: intact  Neurological: A&Ox3, non focal          HOSPITAL MEDICATIONS:  enoxaparin Injectable 40 milliGRAM(s) SubCutaneous every 12 hours    oseltamivir 75 milliGRAM(s) Oral two times a day    losartan 50 milliGRAM(s) Oral daily    levothyroxine 150 MICROGram(s) Oral daily  predniSONE   Tablet 40 milliGRAM(s) Oral daily    albuterol    90 MICROgram(s) HFA Inhaler 2 Puff(s) Inhalation every 6 hours PRN  albuterol/ipratropium for Nebulization 3 milliLiter(s) Nebulizer every 6 hours  benzonatate 100 milliGRAM(s) Oral every 8 hours PRN  budesonide 160 MICROgram(s)/formoterol 4.5 MICROgram(s) Inhaler 2 Puff(s) Inhalation two times a day  guaiFENesin  milliGRAM(s) Oral every 12 hours  montelukast 10 milliGRAM(s) Oral at bedtime    acetaminophen     Tablet .. 650 milliGRAM(s) Oral every 6 hours PRN  escitalopram 10 milliGRAM(s) Oral daily  gabapentin 300 milliGRAM(s) Oral three times a day  oxyCODONE    IR 5 milliGRAM(s) Oral every 6 hours PRN            influenza   Vaccine 0.5 milliLiter(s) IntraMuscular once    fluticasone propionate 50 MICROgram(s)/spray Nasal Spray 1 Spray(s) Both Nostrils two times a day    nicotine - 21 mG/24Hr(s) Patch 1 Patch Transdermal daily      LABS:                        13.3   17.52 )-----------( 257      ( 09 Feb 2024 08:03 )             42.2     Hgb Trend: 13.3<--, 13.9<--, 14.6<--, 15.8<--, 14.5<--  02-09    137  |  96  |  19  ----------------------------<  159<H>  4.6   |  42<H>  |  0.79    Ca    8.9      09 Feb 2024 08:03      Creatinine Trend: 0.79<--, 0.75<--, 0.90<--, 0.94<--, 1.13<--            MICROBIOLOGY:     Respiratory Viral Panel with COVID-19 by JAILENE (02.05.24 @ 14:50)   Rapid RVP Result: Detected   SARS-CoV-2: NotDetec   Influenza AH3 (RapRVP): Detected            RADIOLOGY:  [ X] Reviewed  < from: Xray Chest 1 View- PORTABLE-Urgent (02.05.24 @ 15:51) >  PULMONARY: The airway is midline. Mild pulmonary vascular congestion   without large airspace consolidation or pleural effusion.  There are no airspace consolidations or radiographic evidence of   pulmonary nodules..  No pneumothorax.    HEART/VASCULAR: The heart size and mediastinum configuration are within   the limits of normal allowing for magnification of active AP apical   lordotic view..    BONES: The visualized osseous thorax is intact.    IMPRESSION:    No radiographic evidence of active chest disease..

## 2024-02-09 NOTE — PROGRESS NOTE ADULT - SUBJECTIVE AND OBJECTIVE BOX
Patient is a 64y old  Female who presents with a chief complaint of Acute hypoxic respiratory failure due to flu A (2024 09:32)    INTERVAL HPI/OVERNIGHT EVENTS: NAEON, RRT because he was hypoxic and took threww personal klonopin and took off o2. Hypoxic to 58%. recovered with O2 administration      MEDICATIONS  (STANDING):  albuterol/ipratropium for Nebulization 3 milliLiter(s) Nebulizer every 6 hours  budesonide 160 MICROgram(s)/formoterol 4.5 MICROgram(s) Inhaler 2 Puff(s) Inhalation two times a day  enoxaparin Injectable 40 milliGRAM(s) SubCutaneous every 12 hours  escitalopram 10 milliGRAM(s) Oral daily  fluticasone propionate 50 MICROgram(s)/spray Nasal Spray 1 Spray(s) Both Nostrils two times a day  gabapentin 300 milliGRAM(s) Oral three times a day  guaiFENesin  milliGRAM(s) Oral every 12 hours  influenza   Vaccine 0.5 milliLiter(s) IntraMuscular once  levothyroxine 150 MICROGram(s) Oral daily  losartan 50 milliGRAM(s) Oral daily  montelukast 10 milliGRAM(s) Oral at bedtime  nicotine - 21 mG/24Hr(s) Patch 1 Patch Transdermal daily  oseltamivir 75 milliGRAM(s) Oral two times a day  predniSONE   Tablet 40 milliGRAM(s) Oral daily  sodium chloride 3%  Inhalation 4 milliLiter(s) Inhalation every 6 hours    MEDICATIONS  (PRN):  acetaminophen     Tablet .. 650 milliGRAM(s) Oral every 6 hours PRN Temp greater or equal to 38C (100.4F), Mild Pain (1 - 3)  albuterol    90 MICROgram(s) HFA Inhaler 2 Puff(s) Inhalation every 6 hours PRN Shortness of Breath and/or Wheezing  benzonatate 100 milliGRAM(s) Oral every 8 hours PRN Cough  oxyCODONE    IR 5 milliGRAM(s) Oral every 6 hours PRN Severe Pain (7 - 10)    Allergies    No Known Allergies    Intolerances      REVIEW OF SYSTEMS:  All other systems reviewed and are negative    Vital Signs Last 24 Hrs  T(C): 36.9 (2024 16:28), Max: 37.3 (2024 10:20)  T(F): 98.5 (2024 16:28), Max: 99.1 (2024 10:20)  HR: 89 (2024 16:34) (69 - 95)  BP: 115/67 (2024 16:28) (115/67 - 120/63)  BP(mean): --  RR: 18 (2024 16:28) (18 - 19)  SpO2: 91% (2024 16:34) (91% - 97%)    Parameters below as of 2024 11:14  Patient On (Oxygen Delivery Method): nasal cannula w/ humidification, 9lpm      Daily     Daily Weight in k (2024 04:12)  I&O's Summary    2024 07:01  -  2024 07:00  --------------------------------------------------------  IN: 0 mL / OUT: 600 mL / NET: -600 mL    2024 07:01  -  2024 16:38  --------------------------------------------------------  IN: 900 mL / OUT: 500 mL / NET: 400 mL      CAPILLARY BLOOD GLUCOSE      POCT Blood Glucose.: 188 mg/dL (2024 17:33)    PHYSICAL EXAM:  GENERAL: NAD  HEAD:  Atraumatic, Normocephalic  EYES: PERRLA, conjunctiva and sclera clear  ENMT: Moist mucous membranes  NECK: Supple, No JVD, Normal thyroid  NERVOUS SYSTEM:  Alert & Awake  CHEST/LUNG: Clear to percussion bilaterally;   HEART: Regular rate and rhythm;   ABDOMEN: Soft, Nontender, Nondistended; Bowel sounds present  EXTREMITIES:  no edema BL LE  SKIN: moist    Labs                          13.3   17.52 )-----------( 257      ( 2024 08:03 )             42.2     02-    137  |  96  |  19  ----------------------------<  159<H>  4.6   |  42<H>  |  0.79    Ca    8.9      2024 08:03            Urinalysis Basic - ( 2024 08:03 )    Color: x / Appearance: x / SG: x / pH: x  Gluc: 159 mg/dL / Ketone: x  / Bili: x / Urobili: x   Blood: x / Protein: x / Nitrite: x   Leuk Esterase: x / RBC: x / WBC x   Sq Epi: x / Non Sq Epi: x / Bacteria: x                  DVT prophylaxis: > Lovenox 40mg SQ daily  > Heparin   > SCD's

## 2024-02-10 LAB
ANION GAP SERPL CALC-SCNC: 1 MMOL/L — LOW (ref 5–17)
BASE EXCESS BLDA CALC-SCNC: 20.1 MMOL/L — HIGH (ref -2–3)
BASOPHILS # BLD AUTO: 0.07 K/UL — SIGNIFICANT CHANGE UP (ref 0–0.2)
BASOPHILS NFR BLD AUTO: 0.4 % — SIGNIFICANT CHANGE UP (ref 0–2)
BLOOD GAS COMMENTS ARTERIAL: SIGNIFICANT CHANGE UP
BUN SERPL-MCNC: 17 MG/DL — SIGNIFICANT CHANGE UP (ref 7–23)
CALCIUM SERPL-MCNC: 9.1 MG/DL — SIGNIFICANT CHANGE UP (ref 8.5–10.1)
CHLORIDE SERPL-SCNC: 93 MMOL/L — LOW (ref 96–108)
CO2 BLDA-SCNC: 49 MMOL/L — HIGH (ref 19–24)
CO2 SERPL-SCNC: 40 MMOL/L — HIGH (ref 22–31)
CREAT SERPL-MCNC: 0.73 MG/DL — SIGNIFICANT CHANGE UP (ref 0.5–1.3)
EGFR: 92 ML/MIN/1.73M2 — SIGNIFICANT CHANGE UP
EOSINOPHIL # BLD AUTO: 0.05 K/UL — SIGNIFICANT CHANGE UP (ref 0–0.5)
EOSINOPHIL NFR BLD AUTO: 0.3 % — SIGNIFICANT CHANGE UP (ref 0–6)
GAS PNL BLDA: SIGNIFICANT CHANGE UP
GLUCOSE SERPL-MCNC: 157 MG/DL — HIGH (ref 70–99)
HCO3 BLDA-SCNC: 47 MMOL/L — CRITICAL HIGH (ref 21–28)
HCT VFR BLD CALC: 40.7 % — SIGNIFICANT CHANGE UP (ref 34.5–45)
HGB BLD-MCNC: 13 G/DL — SIGNIFICANT CHANGE UP (ref 11.5–15.5)
HOROWITZ INDEX BLDA+IHG-RTO: 60 — SIGNIFICANT CHANGE UP
IMM GRANULOCYTES NFR BLD AUTO: 1 % — HIGH (ref 0–0.9)
LYMPHOCYTES # BLD AUTO: 14.1 % — SIGNIFICANT CHANGE UP (ref 13–44)
LYMPHOCYTES # BLD AUTO: 2.64 K/UL — SIGNIFICANT CHANGE UP (ref 1–3.3)
MAGNESIUM SERPL-MCNC: 2.2 MG/DL — SIGNIFICANT CHANGE UP (ref 1.6–2.6)
MCHC RBC-ENTMCNC: 31.8 PG — SIGNIFICANT CHANGE UP (ref 27–34)
MCHC RBC-ENTMCNC: 31.9 G/DL — LOW (ref 32–36)
MCV RBC AUTO: 99.5 FL — SIGNIFICANT CHANGE UP (ref 80–100)
MONOCYTES # BLD AUTO: 0.91 K/UL — HIGH (ref 0–0.9)
MONOCYTES NFR BLD AUTO: 4.9 % — SIGNIFICANT CHANGE UP (ref 2–14)
NEUTROPHILS # BLD AUTO: 14.87 K/UL — HIGH (ref 1.8–7.4)
NEUTROPHILS NFR BLD AUTO: 79.3 % — HIGH (ref 43–77)
NRBC # BLD: 0 /100 WBCS — SIGNIFICANT CHANGE UP (ref 0–0)
PCO2 BLDA: 62 MMHG — HIGH (ref 32–46)
PH BLDA: 7.49 — HIGH (ref 7.35–7.45)
PLATELET # BLD AUTO: 223 K/UL — SIGNIFICANT CHANGE UP (ref 150–400)
PO2 BLDA: 63 MMHG — LOW (ref 83–108)
POTASSIUM SERPL-MCNC: 4 MMOL/L — SIGNIFICANT CHANGE UP (ref 3.5–5.3)
POTASSIUM SERPL-SCNC: 4 MMOL/L — SIGNIFICANT CHANGE UP (ref 3.5–5.3)
RBC # BLD: 4.09 M/UL — SIGNIFICANT CHANGE UP (ref 3.8–5.2)
RBC # FLD: 15.6 % — HIGH (ref 10.3–14.5)
SAO2 % BLDA: 92.7 % — LOW (ref 94–98)
SODIUM SERPL-SCNC: 134 MMOL/L — LOW (ref 135–145)
WBC # BLD: 18.72 K/UL — HIGH (ref 3.8–10.5)
WBC # FLD AUTO: 18.72 K/UL — HIGH (ref 3.8–10.5)

## 2024-02-10 PROCEDURE — 71045 X-RAY EXAM CHEST 1 VIEW: CPT | Mod: 26

## 2024-02-10 PROCEDURE — 99233 SBSQ HOSP IP/OBS HIGH 50: CPT

## 2024-02-10 RX ORDER — LANOLIN ALCOHOL/MO/W.PET/CERES
3 CREAM (GRAM) TOPICAL AT BEDTIME
Refills: 0 | Status: DISCONTINUED | OUTPATIENT
Start: 2024-02-10 | End: 2024-02-16

## 2024-02-10 RX ADMIN — Medication 1 PATCH: at 08:39

## 2024-02-10 RX ADMIN — Medication 75 MILLIGRAM(S): at 05:45

## 2024-02-10 RX ADMIN — MONTELUKAST 10 MILLIGRAM(S): 4 TABLET, CHEWABLE ORAL at 21:29

## 2024-02-10 RX ADMIN — ENOXAPARIN SODIUM 40 MILLIGRAM(S): 100 INJECTION SUBCUTANEOUS at 12:33

## 2024-02-10 RX ADMIN — Medication 3 MILLILITER(S): at 11:35

## 2024-02-10 RX ADMIN — Medication 3 MILLILITER(S): at 05:27

## 2024-02-10 RX ADMIN — SODIUM CHLORIDE 4 MILLILITER(S): 9 INJECTION INTRAMUSCULAR; INTRAVENOUS; SUBCUTANEOUS at 11:35

## 2024-02-10 RX ADMIN — Medication 1 PATCH: at 12:29

## 2024-02-10 RX ADMIN — SODIUM CHLORIDE 4 MILLILITER(S): 9 INJECTION INTRAMUSCULAR; INTRAVENOUS; SUBCUTANEOUS at 17:48

## 2024-02-10 RX ADMIN — GABAPENTIN 300 MILLIGRAM(S): 400 CAPSULE ORAL at 05:45

## 2024-02-10 RX ADMIN — Medication 1 PATCH: at 19:15

## 2024-02-10 RX ADMIN — GABAPENTIN 300 MILLIGRAM(S): 400 CAPSULE ORAL at 15:00

## 2024-02-10 RX ADMIN — Medication 3 MILLILITER(S): at 00:09

## 2024-02-10 RX ADMIN — ENOXAPARIN SODIUM 40 MILLIGRAM(S): 100 INJECTION SUBCUTANEOUS at 18:18

## 2024-02-10 RX ADMIN — Medication 3 MILLIGRAM(S): at 22:50

## 2024-02-10 RX ADMIN — Medication 600 MILLIGRAM(S): at 05:46

## 2024-02-10 RX ADMIN — ENOXAPARIN SODIUM 40 MILLIGRAM(S): 100 INJECTION SUBCUTANEOUS at 00:34

## 2024-02-10 RX ADMIN — GABAPENTIN 300 MILLIGRAM(S): 400 CAPSULE ORAL at 21:29

## 2024-02-10 RX ADMIN — SODIUM CHLORIDE 4 MILLILITER(S): 9 INJECTION INTRAMUSCULAR; INTRAVENOUS; SUBCUTANEOUS at 05:27

## 2024-02-10 RX ADMIN — Medication 1 SPRAY(S): at 18:18

## 2024-02-10 RX ADMIN — Medication 600 MILLIGRAM(S): at 18:18

## 2024-02-10 RX ADMIN — Medication 1 SPRAY(S): at 05:47

## 2024-02-10 RX ADMIN — BUDESONIDE AND FORMOTEROL FUMARATE DIHYDRATE 2 PUFF(S): 160; 4.5 AEROSOL RESPIRATORY (INHALATION) at 05:47

## 2024-02-10 RX ADMIN — OXYCODONE HYDROCHLORIDE 5 MILLIGRAM(S): 5 TABLET ORAL at 22:30

## 2024-02-10 RX ADMIN — Medication 75 MILLIGRAM(S): at 18:18

## 2024-02-10 RX ADMIN — LOSARTAN POTASSIUM 50 MILLIGRAM(S): 100 TABLET, FILM COATED ORAL at 05:46

## 2024-02-10 RX ADMIN — Medication 3 MILLILITER(S): at 17:48

## 2024-02-10 RX ADMIN — BUDESONIDE AND FORMOTEROL FUMARATE DIHYDRATE 2 PUFF(S): 160; 4.5 AEROSOL RESPIRATORY (INHALATION) at 18:18

## 2024-02-10 RX ADMIN — Medication 1 PATCH: at 12:34

## 2024-02-10 RX ADMIN — OXYCODONE HYDROCHLORIDE 5 MILLIGRAM(S): 5 TABLET ORAL at 21:29

## 2024-02-10 RX ADMIN — ESCITALOPRAM OXALATE 10 MILLIGRAM(S): 10 TABLET, FILM COATED ORAL at 12:34

## 2024-02-10 RX ADMIN — SODIUM CHLORIDE 4 MILLILITER(S): 9 INJECTION INTRAMUSCULAR; INTRAVENOUS; SUBCUTANEOUS at 00:09

## 2024-02-10 RX ADMIN — Medication 40 MILLIGRAM(S): at 05:45

## 2024-02-10 RX ADMIN — Medication 150 MICROGRAM(S): at 05:46

## 2024-02-10 NOTE — PROGRESS NOTE ADULT - SUBJECTIVE AND OBJECTIVE BOX
Patient is a 64y old  Female who presents with a chief complaint of Acute hypoxic respiratory failure due to flu A (09 Feb 2024 16:38)    INTERVAL HPI/OVERNIGHT EVENTS: Hypoxic overnight requring Bipap  requesting to go home     MEDICATIONS  (STANDING):  albuterol/ipratropium for Nebulization 3 milliLiter(s) Nebulizer every 6 hours  budesonide 160 MICROgram(s)/formoterol 4.5 MICROgram(s) Inhaler 2 Puff(s) Inhalation two times a day  enoxaparin Injectable 40 milliGRAM(s) SubCutaneous every 12 hours  escitalopram 10 milliGRAM(s) Oral daily  fluticasone propionate 50 MICROgram(s)/spray Nasal Spray 1 Spray(s) Both Nostrils two times a day  gabapentin 300 milliGRAM(s) Oral three times a day  guaiFENesin  milliGRAM(s) Oral every 12 hours  influenza   Vaccine 0.5 milliLiter(s) IntraMuscular once  levothyroxine 150 MICROGram(s) Oral daily  losartan 50 milliGRAM(s) Oral daily  montelukast 10 milliGRAM(s) Oral at bedtime  nicotine - 21 mG/24Hr(s) Patch 1 Patch Transdermal daily  oseltamivir 75 milliGRAM(s) Oral two times a day  sodium chloride 3%  Inhalation 4 milliLiter(s) Inhalation every 6 hours    MEDICATIONS  (PRN):  acetaminophen     Tablet .. 650 milliGRAM(s) Oral every 6 hours PRN Temp greater or equal to 38C (100.4F), Mild Pain (1 - 3)  albuterol    90 MICROgram(s) HFA Inhaler 2 Puff(s) Inhalation every 6 hours PRN Shortness of Breath and/or Wheezing  benzonatate 100 milliGRAM(s) Oral every 8 hours PRN Cough  oxyCODONE    IR 5 milliGRAM(s) Oral every 6 hours PRN Severe Pain (7 - 10)    Allergies    No Known Allergies    Intolerances      REVIEW OF SYSTEMS:  All other systems reviewed and are negative    Vital Signs Last 24 Hrs  T(C): 36.8 (10 Feb 2024 05:43), Max: 37.1 (09 Feb 2024 21:10)  T(F): 98.2 (10 Feb 2024 05:43), Max: 98.7 (09 Feb 2024 21:10)  HR: 78 (10 Feb 2024 09:42) (68 - 90)  BP: 136/76 (10 Feb 2024 05:43) (115/67 - 136/76)  BP(mean): --  RR: 18 (10 Feb 2024 05:43) (18 - 18)  SpO2: 92% (10 Feb 2024 09:42) (91% - 97%)    Parameters below as of 10 Feb 2024 05:48  Patient On (Oxygen Delivery Method): nasal cannula      Daily     Daily   I&O's Summary    09 Feb 2024 07:01  -  10 Feb 2024 07:00  --------------------------------------------------------  IN: 900 mL / OUT: 900 mL / NET: 0 mL      CAPILLARY BLOOD GLUCOSE        PHYSICAL EXAM:  GENERAL: NAD on bipap  HEAD:  Atraumatic, Normocephalic  EYES: PERRLA, conjunctiva and sclera clear  ENMT: Moist mucous membranes  NECK: Supple, No JVD, Normal thyroid  NERVOUS SYSTEM:  Alert & Awake  CHEST/LUNG: Clear to percussion bilaterally;   HEART: Regular rate and rhythm;   ABDOMEN: Soft, Nontender, Nondistended; Bowel sounds present  EXTREMITIES:  no edema BL LE  SKIN: moist    Labs                          13.0   18.72 )-----------( 223      ( 10 Feb 2024 07:33 )             40.7     02-10    134<L>  |  93<L>  |  17  ----------------------------<  157<H>  4.0   |  40<H>  |  0.73    Ca    9.1      10 Feb 2024 07:33  Mg     2.2     02-10            Urinalysis Basic - ( 10 Feb 2024 07:33 )    Color: x / Appearance: x / SG: x / pH: x  Gluc: 157 mg/dL / Ketone: x  / Bili: x / Urobili: x   Blood: x / Protein: x / Nitrite: x   Leuk Esterase: x / RBC: x / WBC x   Sq Epi: x / Non Sq Epi: x / Bacteria: x                  DVT prophylaxis: > Lovenox 40mg SQ daily  > Heparin   > SCD's

## 2024-02-10 NOTE — PROGRESS NOTE ADULT - ASSESSMENT
JOZEF CRAIN is a 64y Female with a past medical history as described above who presented for evaluation of shortness of breath.     # Acute Hypoxic Respiratory Failure due to Influenza A  CTA chest to R/O PE, Venous doppler US BL LE to R/O DVT  - Tamiflu 75mg PO twice daily x5d   - C/w Duonebs, steroids  - Titrate O2 as tolerated. SpO2 88-92% goal, bipap  - Continue home inhalers   - Tessalon PRN cough   - Tylenol PRN fevers  - Isolation precautions   - Incentive spirometer   - pulmonary eval appreciated   - CTA chest to R/O PE, Venous doppler US BL LE to R/O DVT    # acute COPD exacerbation  # Tobacco use disorder  - Tamiflu as above  - Continue home inhalers and montelukast   - Prednisone 40mg PO daily x5d   - Titrate O2 as tolerated  - Incentive spirometer   - Duobnebs  - Nicotine patch daily for cravings   - Outpt pulm follow-up     # HTN  - Home meds    # OA  - Oxy 5 prn     # T2DM  - FS qAC and qHS, insulin protocol if needed   - CC/DASH diet     # Hypothyroidism  - Synthroid 150mcg PO daily     Diet: DASH  DVT prophylaxis: lovenox   Dispo: Tele  Code Status: FC

## 2024-02-11 LAB
ANION GAP SERPL CALC-SCNC: 1 MMOL/L — LOW (ref 5–17)
BASOPHILS # BLD AUTO: 0.05 K/UL — SIGNIFICANT CHANGE UP (ref 0–0.2)
BASOPHILS NFR BLD AUTO: 0.4 % — SIGNIFICANT CHANGE UP (ref 0–2)
BUN SERPL-MCNC: 18 MG/DL — SIGNIFICANT CHANGE UP (ref 7–23)
CALCIUM SERPL-MCNC: 8.8 MG/DL — SIGNIFICANT CHANGE UP (ref 8.5–10.1)
CHLORIDE SERPL-SCNC: 94 MMOL/L — LOW (ref 96–108)
CO2 SERPL-SCNC: 41 MMOL/L — HIGH (ref 22–31)
CREAT SERPL-MCNC: 0.66 MG/DL — SIGNIFICANT CHANGE UP (ref 0.5–1.3)
EGFR: 98 ML/MIN/1.73M2 — SIGNIFICANT CHANGE UP
EOSINOPHIL # BLD AUTO: 0.06 K/UL — SIGNIFICANT CHANGE UP (ref 0–0.5)
EOSINOPHIL NFR BLD AUTO: 0.5 % — SIGNIFICANT CHANGE UP (ref 0–6)
GLUCOSE SERPL-MCNC: 136 MG/DL — HIGH (ref 70–99)
HCT VFR BLD CALC: 39.3 % — SIGNIFICANT CHANGE UP (ref 34.5–45)
HGB BLD-MCNC: 13 G/DL — SIGNIFICANT CHANGE UP (ref 11.5–15.5)
IMM GRANULOCYTES NFR BLD AUTO: 0.6 % — SIGNIFICANT CHANGE UP (ref 0–0.9)
LYMPHOCYTES # BLD AUTO: 27.5 % — SIGNIFICANT CHANGE UP (ref 13–44)
LYMPHOCYTES # BLD AUTO: 3.16 K/UL — SIGNIFICANT CHANGE UP (ref 1–3.3)
MAGNESIUM SERPL-MCNC: 2.4 MG/DL — SIGNIFICANT CHANGE UP (ref 1.6–2.6)
MCHC RBC-ENTMCNC: 32.3 PG — SIGNIFICANT CHANGE UP (ref 27–34)
MCHC RBC-ENTMCNC: 33.1 G/DL — SIGNIFICANT CHANGE UP (ref 32–36)
MCV RBC AUTO: 97.5 FL — SIGNIFICANT CHANGE UP (ref 80–100)
MONOCYTES # BLD AUTO: 0.64 K/UL — SIGNIFICANT CHANGE UP (ref 0–0.9)
MONOCYTES NFR BLD AUTO: 5.6 % — SIGNIFICANT CHANGE UP (ref 2–14)
NEUTROPHILS # BLD AUTO: 7.52 K/UL — HIGH (ref 1.8–7.4)
NEUTROPHILS NFR BLD AUTO: 65.4 % — SIGNIFICANT CHANGE UP (ref 43–77)
NRBC # BLD: 0 /100 WBCS — SIGNIFICANT CHANGE UP (ref 0–0)
PLATELET # BLD AUTO: 201 K/UL — SIGNIFICANT CHANGE UP (ref 150–400)
POTASSIUM SERPL-MCNC: 3.7 MMOL/L — SIGNIFICANT CHANGE UP (ref 3.5–5.3)
POTASSIUM SERPL-SCNC: 3.7 MMOL/L — SIGNIFICANT CHANGE UP (ref 3.5–5.3)
RBC # BLD: 4.03 M/UL — SIGNIFICANT CHANGE UP (ref 3.8–5.2)
RBC # FLD: 15.1 % — HIGH (ref 10.3–14.5)
SODIUM SERPL-SCNC: 136 MMOL/L — SIGNIFICANT CHANGE UP (ref 135–145)
WBC # BLD: 11.5 K/UL — HIGH (ref 3.8–10.5)
WBC # FLD AUTO: 11.5 K/UL — HIGH (ref 3.8–10.5)

## 2024-02-11 PROCEDURE — 99232 SBSQ HOSP IP/OBS MODERATE 35: CPT

## 2024-02-11 RX ORDER — ALPRAZOLAM 0.25 MG
1 TABLET ORAL ONCE
Refills: 0 | Status: DISCONTINUED | OUTPATIENT
Start: 2024-02-11 | End: 2024-02-11

## 2024-02-11 RX ADMIN — ESCITALOPRAM OXALATE 10 MILLIGRAM(S): 10 TABLET, FILM COATED ORAL at 13:38

## 2024-02-11 RX ADMIN — Medication 600 MILLIGRAM(S): at 17:19

## 2024-02-11 RX ADMIN — Medication 3 MILLILITER(S): at 00:03

## 2024-02-11 RX ADMIN — Medication 1 PATCH: at 08:05

## 2024-02-11 RX ADMIN — Medication 3 MILLILITER(S): at 23:42

## 2024-02-11 RX ADMIN — Medication 600 MILLIGRAM(S): at 06:00

## 2024-02-11 RX ADMIN — SODIUM CHLORIDE 4 MILLILITER(S): 9 INJECTION INTRAMUSCULAR; INTRAVENOUS; SUBCUTANEOUS at 23:42

## 2024-02-11 RX ADMIN — MONTELUKAST 10 MILLIGRAM(S): 4 TABLET, CHEWABLE ORAL at 21:03

## 2024-02-11 RX ADMIN — Medication 150 MICROGRAM(S): at 05:59

## 2024-02-11 RX ADMIN — GABAPENTIN 300 MILLIGRAM(S): 400 CAPSULE ORAL at 21:03

## 2024-02-11 RX ADMIN — SODIUM CHLORIDE 4 MILLILITER(S): 9 INJECTION INTRAMUSCULAR; INTRAVENOUS; SUBCUTANEOUS at 17:04

## 2024-02-11 RX ADMIN — Medication 1 SPRAY(S): at 06:01

## 2024-02-11 RX ADMIN — ENOXAPARIN SODIUM 40 MILLIGRAM(S): 100 INJECTION SUBCUTANEOUS at 05:59

## 2024-02-11 RX ADMIN — BUDESONIDE AND FORMOTEROL FUMARATE DIHYDRATE 2 PUFF(S): 160; 4.5 AEROSOL RESPIRATORY (INHALATION) at 17:16

## 2024-02-11 RX ADMIN — LOSARTAN POTASSIUM 50 MILLIGRAM(S): 100 TABLET, FILM COATED ORAL at 06:25

## 2024-02-11 RX ADMIN — SODIUM CHLORIDE 4 MILLILITER(S): 9 INJECTION INTRAMUSCULAR; INTRAVENOUS; SUBCUTANEOUS at 12:08

## 2024-02-11 RX ADMIN — GABAPENTIN 300 MILLIGRAM(S): 400 CAPSULE ORAL at 13:37

## 2024-02-11 RX ADMIN — Medication 1 PATCH: at 19:15

## 2024-02-11 RX ADMIN — Medication 30 MILLILITER(S): at 13:37

## 2024-02-11 RX ADMIN — SODIUM CHLORIDE 4 MILLILITER(S): 9 INJECTION INTRAMUSCULAR; INTRAVENOUS; SUBCUTANEOUS at 00:03

## 2024-02-11 RX ADMIN — SODIUM CHLORIDE 4 MILLILITER(S): 9 INJECTION INTRAMUSCULAR; INTRAVENOUS; SUBCUTANEOUS at 05:34

## 2024-02-11 RX ADMIN — Medication 1 PATCH: at 12:46

## 2024-02-11 RX ADMIN — Medication 3 MILLIGRAM(S): at 21:02

## 2024-02-11 RX ADMIN — BUDESONIDE AND FORMOTEROL FUMARATE DIHYDRATE 2 PUFF(S): 160; 4.5 AEROSOL RESPIRATORY (INHALATION) at 06:03

## 2024-02-11 RX ADMIN — Medication 1 SPRAY(S): at 17:16

## 2024-02-11 RX ADMIN — ENOXAPARIN SODIUM 40 MILLIGRAM(S): 100 INJECTION SUBCUTANEOUS at 17:16

## 2024-02-11 RX ADMIN — Medication 1 MILLIGRAM(S): at 17:19

## 2024-02-11 RX ADMIN — Medication 3 MILLILITER(S): at 05:33

## 2024-02-11 RX ADMIN — Medication 3 MILLILITER(S): at 17:04

## 2024-02-11 RX ADMIN — Medication 3 MILLILITER(S): at 12:07

## 2024-02-11 RX ADMIN — GABAPENTIN 300 MILLIGRAM(S): 400 CAPSULE ORAL at 06:00

## 2024-02-11 NOTE — PROGRESS NOTE ADULT - SUBJECTIVE AND OBJECTIVE BOX
Patient is a 64y old  Female who presents with a chief complaint of Acute hypoxic respiratory failure due to flu A (10 Feb 2024 11:53)    INTERVAL HPI/OVERNIGHT EVENTS:    MEDICATIONS  (STANDING):  albuterol/ipratropium for Nebulization 3 milliLiter(s) Nebulizer every 6 hours  budesonide 160 MICROgram(s)/formoterol 4.5 MICROgram(s) Inhaler 2 Puff(s) Inhalation two times a day  enoxaparin Injectable 40 milliGRAM(s) SubCutaneous every 12 hours  escitalopram 10 milliGRAM(s) Oral daily  fluticasone propionate 50 MICROgram(s)/spray Nasal Spray 1 Spray(s) Both Nostrils two times a day  gabapentin 300 milliGRAM(s) Oral three times a day  guaiFENesin  milliGRAM(s) Oral every 12 hours  influenza   Vaccine 0.5 milliLiter(s) IntraMuscular once  levothyroxine 150 MICROGram(s) Oral daily  losartan 50 milliGRAM(s) Oral daily  melatonin 3 milliGRAM(s) Oral at bedtime  montelukast 10 milliGRAM(s) Oral at bedtime  nicotine - 21 mG/24Hr(s) Patch 1 Patch Transdermal daily  sodium chloride 3%  Inhalation 4 milliLiter(s) Inhalation every 6 hours    MEDICATIONS  (PRN):  acetaminophen     Tablet .. 650 milliGRAM(s) Oral every 6 hours PRN Temp greater or equal to 38C (100.4F), Mild Pain (1 - 3)  albuterol    90 MICROgram(s) HFA Inhaler 2 Puff(s) Inhalation every 6 hours PRN Shortness of Breath and/or Wheezing  benzonatate 100 milliGRAM(s) Oral every 8 hours PRN Cough  oxyCODONE    IR 5 milliGRAM(s) Oral every 6 hours PRN Severe Pain (7 - 10)    Allergies    No Known Allergies    Intolerances      REVIEW OF SYSTEMS:  All other systems reviewed and are negative    Vital Signs Last 24 Hrs  T(C): 36.4 (2024 04:44), Max: 37.1 (10 Feb 2024 12:35)  T(F): 97.5 (2024 04:44), Max: 98.8 (10 Feb 2024 12:35)  HR: 63 (2024 06:51) (60 - 87)  BP: 131/75 (2024 04:44) (103/62 - 131/75)  BP(mean): --  RR: 19 (2024 04:44) (18 - 19)  SpO2: 99% (2024 06:51) (90% - 99%)    Parameters below as of 2024 00:41  Patient On (Oxygen Delivery Method): BiPAP/CPAP      Daily     Daily Weight in k (10 Feb 2024 13:56)  I&O's Summary    10 Feb 2024 07:01  -  2024 07:00  --------------------------------------------------------  IN: 240 mL / OUT: 1000 mL / NET: -760 mL      CAPILLARY BLOOD GLUCOSE        PHYSICAL EXAM:  GENERAL: NAD on bipap  HEAD:  Atraumatic, Normocephalic  EYES: PERRLA, conjunctiva and sclera clear  ENMT: Moist mucous membranes  NECK: Supple, No JVD, Normal thyroid  NERVOUS SYSTEM:  Alert & Awake  CHEST/LUNG: Clear to percussion bilaterally;   HEART: Regular rate and rhythm;   ABDOMEN: Soft, Nontender, Nondistended; Bowel sounds present  EXTREMITIES:  no edema BL LE  SKIN: moist    Labs                          13.0   11.50 )-----------( 201      ( 2024 06:55 )             39.3     02-11    136  |  94<L>  |  18  ----------------------------<  136<H>  3.7   |  41<H>  |  0.66    Ca    8.8      2024 06:55  Mg     2.4     02-11            Urinalysis Basic - ( 2024 06:55 )    Color: x / Appearance: x / SG: x / pH: x  Gluc: 136 mg/dL / Ketone: x  / Bili: x / Urobili: x   Blood: x / Protein: x / Nitrite: x   Leuk Esterase: x / RBC: x / WBC x   Sq Epi: x / Non Sq Epi: x / Bacteria: x                  DVT prophylaxis: > Lovenox 40mg SQ daily  > Heparin   > SCD's Patient is a 64y old  Female who presents with a chief complaint of Acute hypoxic respiratory failure due to flu A (10 Feb 2024 11:53)    INTERVAL HPI/OVERNIGHT EVENTS: NAEON, improved oxygen     MEDICATIONS  (STANDING):  albuterol/ipratropium for Nebulization 3 milliLiter(s) Nebulizer every 6 hours  budesonide 160 MICROgram(s)/formoterol 4.5 MICROgram(s) Inhaler 2 Puff(s) Inhalation two times a day  enoxaparin Injectable 40 milliGRAM(s) SubCutaneous every 12 hours  escitalopram 10 milliGRAM(s) Oral daily  fluticasone propionate 50 MICROgram(s)/spray Nasal Spray 1 Spray(s) Both Nostrils two times a day  gabapentin 300 milliGRAM(s) Oral three times a day  guaiFENesin  milliGRAM(s) Oral every 12 hours  influenza   Vaccine 0.5 milliLiter(s) IntraMuscular once  levothyroxine 150 MICROGram(s) Oral daily  losartan 50 milliGRAM(s) Oral daily  melatonin 3 milliGRAM(s) Oral at bedtime  montelukast 10 milliGRAM(s) Oral at bedtime  nicotine - 21 mG/24Hr(s) Patch 1 Patch Transdermal daily  sodium chloride 3%  Inhalation 4 milliLiter(s) Inhalation every 6 hours    MEDICATIONS  (PRN):  acetaminophen     Tablet .. 650 milliGRAM(s) Oral every 6 hours PRN Temp greater or equal to 38C (100.4F), Mild Pain (1 - 3)  albuterol    90 MICROgram(s) HFA Inhaler 2 Puff(s) Inhalation every 6 hours PRN Shortness of Breath and/or Wheezing  benzonatate 100 milliGRAM(s) Oral every 8 hours PRN Cough  oxyCODONE    IR 5 milliGRAM(s) Oral every 6 hours PRN Severe Pain (7 - 10)    Allergies    No Known Allergies    Intolerances      REVIEW OF SYSTEMS:  All other systems reviewed and are negative    Vital Signs Last 24 Hrs  T(C): 36.4 (2024 04:44), Max: 37.1 (10 Feb 2024 12:35)  T(F): 97.5 (2024 04:44), Max: 98.8 (10 Feb 2024 12:35)  HR: 63 (2024 06:51) (60 - 87)  BP: 131/75 (2024 04:44) (103/62 - 131/75)  BP(mean): --  RR: 19 (2024 04:44) (18 - 19)  SpO2: 99% (2024 06:51) (90% - 99%)    Parameters below as of 2024 00:41  Patient On (Oxygen Delivery Method): BiPAP/CPAP      Daily     Daily Weight in k (10 Feb 2024 13:56)  I&O's Summary    10 Feb 2024 07:01  -  2024 07:00  --------------------------------------------------------  IN: 240 mL / OUT: 1000 mL / NET: -760 mL      CAPILLARY BLOOD GLUCOSE        PHYSICAL EXAM:  GENERAL: NAD on bipap  HEAD:  Atraumatic, Normocephalic  EYES: PERRLA, conjunctiva and sclera clear  ENMT: Moist mucous membranes  NECK: Supple, No JVD, Normal thyroid  NERVOUS SYSTEM:  Alert & Awake  CHEST/LUNG: Clear to percussion bilaterally;   HEART: Regular rate and rhythm;   ABDOMEN: Soft, Nontender, Nondistended; Bowel sounds present  EXTREMITIES:  no edema BL LE  SKIN: moist    Labs                          13.0   11.50 )-----------( 201      ( 2024 06:55 )             39.3     02-11    136  |  94<L>  |  18  ----------------------------<  136<H>  3.7   |  41<H>  |  0.66    Ca    8.8      2024 06:55  Mg     2.4     02-11            Urinalysis Basic - ( 2024 06:55 )    Color: x / Appearance: x / SG: x / pH: x  Gluc: 136 mg/dL / Ketone: x  / Bili: x / Urobili: x   Blood: x / Protein: x / Nitrite: x   Leuk Esterase: x / RBC: x / WBC x   Sq Epi: x / Non Sq Epi: x / Bacteria: x                  DVT prophylaxis: > Lovenox 40mg SQ daily  > Heparin   > SCD's

## 2024-02-12 ENCOUNTER — TRANSCRIPTION ENCOUNTER (OUTPATIENT)
Age: 64
End: 2024-02-12

## 2024-02-12 PROCEDURE — 99232 SBSQ HOSP IP/OBS MODERATE 35: CPT

## 2024-02-12 RX ADMIN — ALBUTEROL 2 PUFF(S): 90 AEROSOL, METERED ORAL at 17:34

## 2024-02-12 RX ADMIN — Medication 150 MICROGRAM(S): at 05:21

## 2024-02-12 RX ADMIN — Medication 3 MILLILITER(S): at 17:03

## 2024-02-12 RX ADMIN — Medication 3 MILLILITER(S): at 11:01

## 2024-02-12 RX ADMIN — Medication 1 PATCH: at 12:00

## 2024-02-12 RX ADMIN — GABAPENTIN 300 MILLIGRAM(S): 400 CAPSULE ORAL at 21:02

## 2024-02-12 RX ADMIN — SODIUM CHLORIDE 4 MILLILITER(S): 9 INJECTION INTRAMUSCULAR; INTRAVENOUS; SUBCUTANEOUS at 11:10

## 2024-02-12 RX ADMIN — SODIUM CHLORIDE 4 MILLILITER(S): 9 INJECTION INTRAMUSCULAR; INTRAVENOUS; SUBCUTANEOUS at 23:44

## 2024-02-12 RX ADMIN — ENOXAPARIN SODIUM 40 MILLIGRAM(S): 100 INJECTION SUBCUTANEOUS at 05:21

## 2024-02-12 RX ADMIN — ESCITALOPRAM OXALATE 10 MILLIGRAM(S): 10 TABLET, FILM COATED ORAL at 12:27

## 2024-02-12 RX ADMIN — LOSARTAN POTASSIUM 50 MILLIGRAM(S): 100 TABLET, FILM COATED ORAL at 05:22

## 2024-02-12 RX ADMIN — Medication 3 MILLILITER(S): at 05:51

## 2024-02-12 RX ADMIN — SODIUM CHLORIDE 4 MILLILITER(S): 9 INJECTION INTRAMUSCULAR; INTRAVENOUS; SUBCUTANEOUS at 05:50

## 2024-02-12 RX ADMIN — ENOXAPARIN SODIUM 40 MILLIGRAM(S): 100 INJECTION SUBCUTANEOUS at 18:00

## 2024-02-12 RX ADMIN — BUDESONIDE AND FORMOTEROL FUMARATE DIHYDRATE 2 PUFF(S): 160; 4.5 AEROSOL RESPIRATORY (INHALATION) at 05:20

## 2024-02-12 RX ADMIN — Medication 3 MILLIGRAM(S): at 21:02

## 2024-02-12 RX ADMIN — Medication 1 SPRAY(S): at 05:20

## 2024-02-12 RX ADMIN — Medication 1 PATCH: at 20:35

## 2024-02-12 RX ADMIN — Medication 3 MILLILITER(S): at 23:44

## 2024-02-12 RX ADMIN — MONTELUKAST 10 MILLIGRAM(S): 4 TABLET, CHEWABLE ORAL at 21:03

## 2024-02-12 RX ADMIN — GABAPENTIN 300 MILLIGRAM(S): 400 CAPSULE ORAL at 05:22

## 2024-02-12 RX ADMIN — Medication 600 MILLIGRAM(S): at 05:21

## 2024-02-12 RX ADMIN — BUDESONIDE AND FORMOTEROL FUMARATE DIHYDRATE 2 PUFF(S): 160; 4.5 AEROSOL RESPIRATORY (INHALATION) at 17:36

## 2024-02-12 RX ADMIN — SODIUM CHLORIDE 4 MILLILITER(S): 9 INJECTION INTRAMUSCULAR; INTRAVENOUS; SUBCUTANEOUS at 17:02

## 2024-02-12 NOTE — DISCHARGE NOTE PROVIDER - NSDCCPCAREPLAN_GEN_ALL_CORE_FT
PRINCIPAL DISCHARGE DIAGNOSIS  Diagnosis: Shortness of breath  Assessment and Plan of Treatment: COPD Exacerbation and FLu      SECONDARY DISCHARGE DIAGNOSES  Diagnosis: Influenza  Assessment and Plan of Treatment:     Diagnosis: Hypoxia  Assessment and Plan of Treatment:     Diagnosis: COPD exacerbation  Assessment and Plan of Treatment:      PRINCIPAL DISCHARGE DIAGNOSIS  Diagnosis: COPD exacerbation  Assessment and Plan of Treatment: COPD Exacerbation and FLu  Chronic obstructive pulmonary disease (COPD) is a lung condition in which airflow from the lungs is limited. Causes include smoking, secondhand smoke exposure, genetics, or recurrent infections. Take all medicines (inhaled or pills) as directed by your health care provider. Avoid exposure to irritants such as smoke, chemicals, and fumes that aggravate your breathing.  If you are a smoker, the most important thing that you can do is stop smoking. Continuing to smoke will cause further lung damage and breathing trouble. Ask your health care provider for help with quitting smoking.  SEEK IMMEDIATE MEDICAL CARE IF YOU HAVE ANY OF THE FOLLOWING SYMPTOMS: shortness of breath at rest or when talking, bluish discoloration of lips, skin, fever, worsening cough, unexplained chest pain, or lightheadedness/dizziness.        SECONDARY DISCHARGE DIAGNOSES  Diagnosis: Acute on chronic respiratory failure with hypoxia  Assessment and Plan of Treatment: follow up with your pulmonologist within 7-10 days of discharge.    Diagnosis: Influenza  Assessment and Plan of Treatment: Influenza, commonly called "the flu," is caused by influenza viruses when they infect the respiratory tract  (nose, throat, lungs). The “flu season” occurs in the fall and winter. People who have the flu often feel some or all of the following signs and symptoms: fever or feeling feverish, chills, cough, sore throat, runny or stuffy nose, muscle or body aches, headaches, and tiredness. Some people, particularly children, may also have vomiting and diarrhea. A person infected with flu may be able to infect others from 1 day before getting sick to about 5-7 days after getting sick.   This means that you may be able to spread the flu to someone else before you know you are sick, as well as while you are sick. Symptoms start 1-4 days after the virus enters the body (on average 2 days).   • Wash your hands frequently with soap and water, scrubbing your hands for at least 20 seconds  • Cover your mouth and nose with a tissue when you cough or sneeze  • Avoid touching your eyes, nose and mouth with unwashed hands  • Clean and disinfect frequently touched surfaces and objects, such as toys, doorknobs, tables, and counters  Try to stay home for at least 24 hours after your fever is gone except to get medical care. Information about the flu and other related health topics can be found at www.cdc.gov      Diagnosis: HTN (hypertension)  Assessment and Plan of Treatment: .Continue blood pressure medication regimen as directed. Monitor for any visual changes, headaches or dizziness.  Monitor blood pressure regularly.  Follow up with your primary care provider for further management for high blood pressure.      Diagnosis: HLD (hyperlipidemia)  Assessment and Plan of Treatment: .Continue prescribed medications to control your cholesterol levels and a DASH (Low fat/salt) diet. Follow up with your primary care provider upon discharge for further management and monitoring of cholesterol levels.

## 2024-02-12 NOTE — DISCHARGE NOTE PROVIDER - NSDCMRMEDTOKEN_GEN_ALL_CORE_FT
albuterol 90 mcg/inh inhalation aerosol: 2 puff(s) inhaled 4 times a day, As Needed  Basaglar KwikPen 100 units/mL subcutaneous solution: 30 unit(s) subcutaneous 2 times a day   Basaglar KwikPen 100 units/mL subcutaneous solution: 30 unit(s) subcutaneous 2 times a day  benzonatate 100 mg oral capsule: 1 cap(s) orally every 8 hours   budesonide-formoterol 160 mcg-4.5 mcg/inh inhalation aerosol: 2 puff(s) inhaled 2 times a day   escitalopram 10 mg oral tablet: 1 tab(s) orally once a day  gabapentin 300 mg oral capsule: 1 cap(s) orally 3 times a day  glimepiride 2 mg oral tablet: 1 tab(s) orally once a day  hydrOXYzine hydrochloride 50 mg oral tablet: 1 tab(s) orally every 8 hours, As needed, Itching  ipratropium-albuterol 0.5 mg-2.5 mg/3 mLinhalation solution: 3 milliliter(s) by nebulizer 3 times a day   Januvia 100 mg oral tablet: 100 milligram(s) orally once a day  levothyroxine 150 mcg (0.15 mg) oral tablet: 1 tab(s) orally once a day  losartan 50 mg oral tablet: 1 tab(s) orally once a day  meloxicam 15 mg oral tablet: 1 tab(s) orally once a day  metFORMIN 500 mg oral tablet: 1 tab(s) orally once a day   montelukast 10 mg oral tablet: 1 tab(s) orally once a day (at bedtime)   nicotine 21 mg/24 hr transdermal film, extended release: 1 patch transdermally once a day  oxyCODONE 5 mg oral capsule: 1 cap(s) orally every 6 hours, As Needed -for severe pain MDD:4   albuterol 90 mcg/inh inhalation aerosol: 2 puff(s) inhaled 4 times a day, As Needed  Basaglar KwikPen 100 units/mL subcutaneous solution: 30 unit(s) subcutaneous 2 times a day   Basaglar KwikPen 100 units/mL subcutaneous solution: 30 unit(s) subcutaneous 2 times a day  benzonatate 100 mg oral capsule: 1 cap(s) orally every 8 hours   budesonide-formoterol 160 mcg-4.5 mcg/inh inhalation aerosol: 2 puff(s) inhaled 2 times a day   escitalopram 10 mg oral tablet: 1 tab(s) orally once a day  gabapentin 300 mg oral capsule: 1 cap(s) orally 3 times a day  glimepiride 2 mg oral tablet: 1 tab(s) orally once a day  hydrOXYzine hydrochloride 50 mg oral tablet: 1 tab(s) orally every 8 hours, As needed, Itching  ipratropium-albuterol 0.5 mg-2.5 mg/3 mLinhalation solution: 3 milliliter(s) by nebulizer 3 times a day   Januvia 100 mg oral tablet: 100 milligram(s) orally once a day  KlonoPIN 0.5 mg oral tablet: 1 tab(s) orally 2 times a day as needed for anxiety MDD: 2  levothyroxine 150 mcg (0.15 mg) oral tablet: 1 tab(s) orally once a day  losartan 50 mg oral tablet: 1 tab(s) orally once a day  meloxicam 15 mg oral tablet: 1 tab(s) orally once a day  metFORMIN 500 mg oral tablet: 1 tab(s) orally once a day   montelukast 10 mg oral tablet: 1 tab(s) orally once a day (at bedtime)   nicotine 21 mg/24 hr transdermal film, extended release: 1 patch transdermally once a day  oxyCODONE 5 mg oral capsule: 1 cap(s) orally every 6 hours, As Needed -for severe pain MDD:4  pantoprazole 40 mg oral delayed release tablet: 1 tab(s) orally once a day  tiotropium 2.5 mcg/inh inhalation aerosol: 2 puff(s) inhaled once a day

## 2024-02-12 NOTE — CHART NOTE - NSCHARTNOTEFT_GEN_A_CORE
64F with a history of COPD on chronic oxygen at 3L. ABG while inpatient notes pH 7.49, pCO2 62, Bicarb 47. She requires nightly BiPaP at home. She will need NIV at discharge at home. PATIENT IS BEING DISCHARGED ON MECHANICAL VENTILATION, SHE'S IN CHRONIC RESPIRATORY FAILURE SECONDARY TO COPD. DESPITE BIPAP/ST PATIENT REMAINS HYPERCAPNIC RECENT PCO2 WAS 62MMHG ON FEBRUARY 10, 90054...THEREFORE PATIENT WILL REQUIRE AUGMENTED GUARANTEED TARGETED TIDAL VOLUMES WITH ABILITY FOR DAYTIME MOUTHPIECE VENTILATION TREATMENTS WHICH IS NOT AVAILABLE ON ANY [facility] PAP DEVICES, WITHOUT MECHANICAL VENTILATION PATIENT WILL CONTINUE WITH CLINICAL DECLINE AND HIGH RISK FOR HOSPITAL READMISSIONS. PATIENT IS BEING DISCHARGED ON MECHANICAL VENTILATION, SHE'S IN CHRONIC HYPERCAPNIC AND HYPOXIC RESPIRATORY FAILURE SECONDARY TO COPD. DESPITE BIPAP/ST PATIENT REMAINS HYPERCAPNIC RECENT PCO2 WAS 62MMHG ON FEBRUARY 10, 2024. THEREFORE, PATIENT WILL REQUIRE AUGMENTED GUARANTEED TARGETED TIDAL VOLUMES WITH ABILITY FOR DAYTIME MOUTHPIECE VENTILATION TREATMENTS WHICH IS NOT AVAILABLE ON ANY [facility] PAP DEVICES, WITHOUT MECHANICAL VENTILATION PATIENT WILL CONTINUE WITH CLINICAL DECLINE AND HIGH RISK FOR HOSPITAL READMISSIONS.

## 2024-02-12 NOTE — DISCHARGE NOTE PROVIDER - NSDCFUADDAPPT_GEN_ALL_CORE_FT
Pulmonologist is Dr. Alcala     It is important to see your primary physician as well as the physicians noted below within the next week to perform a comprehensive medical review.  Call their offices for an appointment as soon as you leave the hospital.  You will also need to see them for renewal of your medications.  If you do not have a primary physician, contact the [facility] Physician Referral Service at [phone].  To obtain your results, you can access the [facility] Portal at [email or web address].  Your medical issues appear to be stable at this time, but if your symptoms recur or worsen, contact your physicians and/or return to the hospital if necessary.  If you encounter any issues or questions with your medication, call your physicians before stopping the medication.    APPTS ARE READY TO BE MADE: [ x] YES    Best Family or Patient Contact (if needed):    Additional Information about above appointments (if needed):    1:   2:   3:     Other comments or requests:    Pulmonologist is Dr. Alcala     It is important to see your primary physician as well as the physicians noted below within the next week to perform a comprehensive medical review.  Call their offices for an appointment as soon as you leave the hospital.  You will also need to see them for renewal of your medications.  If you do not have a primary physician, contact the [facility] Physician Referral Service at [phone].  To obtain your results, you can access the [facility] Patient Portal at [email or web address].  Your medical issues appear to be stable at this time, but if your symptoms recur or worsen, contact your physicians and/or return to the hospital if necessary.  If you encounter any issues or questions with your medication, call your physicians before stopping the medication.    APPTS ARE READY TO BE MADE: [ x] YES    Best Family or Patient Contact (if needed):    Additional Information about above appointments (if needed):    1:   2:   3:     Other comments or requests: Patient advised they did not want to proceed with scheduling appointments with the providers on their referrals. They will coordinate care on their own.

## 2024-02-12 NOTE — DIETITIAN INITIAL EVALUATION ADULT - OTHER INFO
Pt seen on medical floor, adm w/ acute hypoxic respiratory failure due to Flu A; Acute COPD ; tobacco user ; HTN ; OA ' DM2 ( A1c = N/A). Denies N/V/D/C/Chewing/Swallowing issues, No food allergies. Consuming 50 - 75% meals. Consumed 100 % breakfast. Pt lives w/ her  and his son. She does the food shopping / cooking and also receives meals on wheels. Educated and provided pt w/ written literature on Tips to support weight loss.  No c/o at this time. BMI =47.1 on admission.

## 2024-02-12 NOTE — PROGRESS NOTE ADULT - SUBJECTIVE AND OBJECTIVE BOX
INTERVAL HPI/OVERNIGHT EVENTS: Weaned to 3-4L NC with o2 saturation 93%. No acute events overnight. Patient reports feeling better.     SUBJECTIVE: Patient seen and examined at bedside.   ROS: All negative except as listed above.    VITAL SIGNS:  ICU Vital Signs Last 24 Hrs  T(C): 36.4 (12 Feb 2024 04:41), Max: 37.1 (11 Feb 2024 16:00)  T(F): 97.5 (12 Feb 2024 04:41), Max: 98.8 (11 Feb 2024 16:00)  HR: 60 (12 Feb 2024 09:23) (52 - 77)  BP: 127/64 (12 Feb 2024 04:41) (115/64 - 127/64)  BP(mean): --  ABP: --  ABP(mean): --  RR: 18 (12 Feb 2024 04:41) (17 - 18)  SpO2: 88% (12 Feb 2024 09:23) (88% - 96%)    O2 Parameters below as of 11 Feb 2024 18:08  Patient On (Oxygen Delivery Method): nasal cannula            Plateau pressure:   P/F ratio:     02-11 @ 07:01  -  02-12 @ 07:00  --------------------------------------------------------  IN: 600 mL / OUT: 1150 mL / NET: -550 mL    02-12 @ 07:01 - 02-12 @ 09:40  --------------------------------------------------------  IN: 240 mL / OUT: 0 mL / NET: 240 mL      CAPILLARY BLOOD GLUCOSE    ECG: reviewed.    PHYSICAL EXAM:  General: obese female, NAD, sitting upright in bed  HEENT: NC/AT, PERRL, MMM, + NC in place  Neck: Supple, no JVD  Respiratory: B/L diffuse crackles mild, no wheeze  Cardiovascular: RRR, no m/r/g  Abdomen: Soft, ND,NTTP, + BS  Extremities: mild LE Edema  Skin: intact  Neurological: A&Ox3, non focal      MEDICATIONS:  MEDICATIONS  (STANDING):  albuterol/ipratropium for Nebulization 3 milliLiter(s) Nebulizer every 6 hours  budesonide 160 MICROgram(s)/formoterol 4.5 MICROgram(s) Inhaler 2 Puff(s) Inhalation two times a day  enoxaparin Injectable 40 milliGRAM(s) SubCutaneous every 12 hours  escitalopram 10 milliGRAM(s) Oral daily  fluticasone propionate 50 MICROgram(s)/spray Nasal Spray 1 Spray(s) Both Nostrils two times a day  gabapentin 300 milliGRAM(s) Oral three times a day  guaiFENesin  milliGRAM(s) Oral every 12 hours  influenza   Vaccine 0.5 milliLiter(s) IntraMuscular once  levothyroxine 150 MICROGram(s) Oral daily  losartan 50 milliGRAM(s) Oral daily  melatonin 3 milliGRAM(s) Oral at bedtime  montelukast 10 milliGRAM(s) Oral at bedtime  nicotine - 21 mG/24Hr(s) Patch 1 Patch Transdermal daily  sodium chloride 3%  Inhalation 4 milliLiter(s) Inhalation every 6 hours    MEDICATIONS  (PRN):  acetaminophen     Tablet .. 650 milliGRAM(s) Oral every 6 hours PRN Temp greater or equal to 38C (100.4F), Mild Pain (1 - 3)  albuterol    90 MICROgram(s) HFA Inhaler 2 Puff(s) Inhalation every 6 hours PRN Shortness of Breath and/or Wheezing  aluminum hydroxide/magnesium hydroxide/simethicone Suspension 30 milliLiter(s) Oral every 6 hours PRN Dyspepsia  benzonatate 100 milliGRAM(s) Oral every 8 hours PRN Cough  oxyCODONE    IR 5 milliGRAM(s) Oral every 6 hours PRN Severe Pain (7 - 10)      ALLERGIES:  Allergies    No Known Allergies    Intolerances        LABS:                        13.0   11.50 )-----------( 201      ( 11 Feb 2024 06:55 )             39.3     02-11    136  |  94<L>  |  18  ----------------------------<  136<H>  3.7   |  41<H>  |  0.66    Ca    8.8      11 Feb 2024 06:55  Mg     2.4     02-11        Urinalysis Basic - ( 11 Feb 2024 06:55 )    Color: x / Appearance: x / SG: x / pH: x  Gluc: 136 mg/dL / Ketone: x  / Bili: x / Urobili: x   Blood: x / Protein: x / Nitrite: x   Leuk Esterase: x / RBC: x / WBC x   Sq Epi: x / Non Sq Epi: x / Bacteria: x              RADIOLOGY & ADDITIONAL TESTS: Reviewed.  [ X] Reviewed  < from: Xray Chest 1 View- PORTABLE-Urgent (02.05.24 @ 15:51) >  PULMONARY: The airway is midline. Mild pulmonary vascular congestion   without large airspace consolidation or pleural effusion.  There are no airspace consolidations or radiographic evidence of   pulmonary nodules..  No pneumothorax.    HEART/VASCULAR: The heart size and mediastinum configuration are within   the limits of normal allowing for magnification of active AP apical   lordotic view..    BONES: The visualized osseous thorax is intact.    IMPRESSION:    No radiographic evidence of active chest disease..

## 2024-02-12 NOTE — DIETITIAN NUTRITION RISK NOTIFICATION - TREATMENT: THE FOLLOWING DIET HAS BEEN RECOMMENDED
Diet, DASH/TLC:   Sodium & Cholesterol Restricted  Consistent Carbohydrate {Evening Snack} (02-05-24 @ 21:54) [Active]

## 2024-02-12 NOTE — PROGRESS NOTE ADULT - ASSESSMENT
JOZEF CRAIN is a 64y Female with a past medical history as described above who presented for evaluation of shortness of breath.     # Acute Hypoxic Respiratory Failure due to Influenza A  # acute COPD exacerbation  # Tobacco use disorder  Acute on chronic hypoxic respiratory failure. On 3L at home at baseline. CTA chest to R/O PE, Venous doppler US BL LE to R/O DVT. Flu positive.   S/p Tamiflu 75mg PO twice daily x5d, steroid course.  Briefly required Bipap but now saturating well on Nasal cannula. Pulmonology evaluated patient. Given nicotine replacement here. Back on home 02 3-4L. Symptomatically improved. Multiple RRTs d/t patient taking off oxygen and taking her own klonopin. She was educated multiple times about the importance of keeping her oxygen on.   - C/w Duonebs   - Titrate O2 as tolerated. SpO2 88-92% goal, NIV for home ordered   - Continue home inhalers   - Tessalon PRN cough   - Tylenol PRN fevers  - Incentive spirometer   - pulmonary collowing     # HTN  - Stable on Home meds    # T2DM  - FS qAC and qHS, insulin protocol if needed   - CC/DASH diet     # Hypothyroidism  - Stable, Synthroid 150mcg PO daily     Diet: DASH  DVT prophylaxis: lovenox   Dispo: Tele  Code Status: FC

## 2024-02-12 NOTE — DISCHARGE NOTE PROVIDER - NSDCCAREPROVSEEN_GEN_ALL_CORE_FT
Ashley, Che Hidalgo, Stu Gallo, Bee Jain, Katie Gardner, Jose Arthur, Estrella Cornejo, Geni Alvarado, Tonia Stephen, Jose L

## 2024-02-12 NOTE — DIETITIAN INITIAL EVALUATION ADULT - PERTINENT MEDS FT
MEDICATIONS  (STANDING):  albuterol/ipratropium for Nebulization 3 milliLiter(s) Nebulizer every 6 hours  budesonide 160 MICROgram(s)/formoterol 4.5 MICROgram(s) Inhaler 2 Puff(s) Inhalation two times a day  enoxaparin Injectable 40 milliGRAM(s) SubCutaneous every 12 hours  escitalopram 10 milliGRAM(s) Oral daily  fluticasone propionate 50 MICROgram(s)/spray Nasal Spray 1 Spray(s) Both Nostrils two times a day  gabapentin 300 milliGRAM(s) Oral three times a day  guaiFENesin  milliGRAM(s) Oral every 12 hours  influenza   Vaccine 0.5 milliLiter(s) IntraMuscular once  levothyroxine 150 MICROGram(s) Oral daily  losartan 50 milliGRAM(s) Oral daily  melatonin 3 milliGRAM(s) Oral at bedtime  montelukast 10 milliGRAM(s) Oral at bedtime  nicotine - 21 mG/24Hr(s) Patch 1 Patch Transdermal daily  sodium chloride 3%  Inhalation 4 milliLiter(s) Inhalation every 6 hours    MEDICATIONS  (PRN):  acetaminophen     Tablet .. 650 milliGRAM(s) Oral every 6 hours PRN Temp greater or equal to 38C (100.4F), Mild Pain (1 - 3)  albuterol    90 MICROgram(s) HFA Inhaler 2 Puff(s) Inhalation every 6 hours PRN Shortness of Breath and/or Wheezing  aluminum hydroxide/magnesium hydroxide/simethicone Suspension 30 milliLiter(s) Oral every 6 hours PRN Dyspepsia  benzonatate 100 milliGRAM(s) Oral every 8 hours PRN Cough  oxyCODONE    IR 5 milliGRAM(s) Oral every 6 hours PRN Severe Pain (7 - 10)

## 2024-02-12 NOTE — DIETITIAN INITIAL EVALUATION ADULT - NS FNS DIET ORDER
Diet, DASH/TLC:   Sodium & Cholesterol Restricted  Consistent Carbohydrate {Evening Snack} (02-05-24 @ 21:54)

## 2024-02-12 NOTE — DIETITIAN INITIAL EVALUATION ADULT - PERTINENT LABORATORY DATA
02-11    136  |  94<L>  |  18  ----------------------------<  136<H>  3.7   |  41<H>  |  0.66    Ca    8.8      11 Feb 2024 06:55  Mg     2.4     02-11

## 2024-02-12 NOTE — PROGRESS NOTE ADULT - ASSESSMENT
64 year old female with PHX: active smoker COPD on 3LNC at home, HTN, HLD, OA, bladder cancer, hypothyroidism, T2DM, and anxiety presents to ED with SOB/ dyspnea and cough. RVP positive for Infuenza A.     DX: Acute on chronic hypoxemic respiratory failure, Influenza A, COPD exacerbation on home O2 (2-3L PRN), active smoker    Recommendations:   - Clinically improving, currently on 4L NC. Home o2 3L NC, so almost at her baseline   - reports feeling better iwth no SOB   - titrate FiO2 as tolerates for goal O@ > 88%  - C/W nocturnal NIV  - continue hypersal and Aerobika to aid in secretion mobilization while hospitalized but can wean   - Duonebs q6h   -Symbicort, Singulair   -Completed 5 day course Tamiflu for Influenza A  -Supportive care for Influenza A  -Completed Prednisone 40mg PO daily x5days   -Incentive spirometer  -On nicotine patch as patient is active smoker, patient counselled on smoking cessation  -Patient follows with pulmonologist Dr. Alcala as outpatient, will need sleep study and outpt follow up on d/c with him  - No pulmonary contraindications for dispo     Plan of care discussed with Pulm attending Dr. Hidalgo

## 2024-02-12 NOTE — DISCHARGE NOTE PROVIDER - ATTENDING DISCHARGE PHYSICAL EXAMINATION:
GENERAL: NAD on NC   HEAD:  Atraumatic, Normocephalic  EYES: PERRLA, conjunctiva and sclera clear  ENMT: Moist mucous membranes  NECK: Supple, No JVD, Normal thyroid  NERVOUS SYSTEM:  Alert & Awake  CHEST/LUNG: Clear to percussion bilaterally;   HEART: Regular rate and rhythm;   ABDOMEN: Soft, Nontender, Nondistended; Bowel sounds present  EXTREMITIES:  no edema BL LE  SKIN: moist

## 2024-02-12 NOTE — PROGRESS NOTE ADULT - SUBJECTIVE AND OBJECTIVE BOX
Patient is a 64y old  Female who presents with a chief complaint of SHORTNESS OF BREATH; INFLUENZA; HYPOXIA; COPD EXACERBATION     (2024 14:40)    INTERVAL HPI/OVERNIGHT EVENTS: NAEON    MEDICATIONS  (STANDING):  albuterol/ipratropium for Nebulization 3 milliLiter(s) Nebulizer every 6 hours  budesonide 160 MICROgram(s)/formoterol 4.5 MICROgram(s) Inhaler 2 Puff(s) Inhalation two times a day  enoxaparin Injectable 40 milliGRAM(s) SubCutaneous every 12 hours  escitalopram 10 milliGRAM(s) Oral daily  fluticasone propionate 50 MICROgram(s)/spray Nasal Spray 1 Spray(s) Both Nostrils two times a day  gabapentin 300 milliGRAM(s) Oral three times a day  guaiFENesin  milliGRAM(s) Oral every 12 hours  influenza   Vaccine 0.5 milliLiter(s) IntraMuscular once  levothyroxine 150 MICROGram(s) Oral daily  losartan 50 milliGRAM(s) Oral daily  melatonin 3 milliGRAM(s) Oral at bedtime  montelukast 10 milliGRAM(s) Oral at bedtime  nicotine - 21 mG/24Hr(s) Patch 1 Patch Transdermal daily  sodium chloride 3%  Inhalation 4 milliLiter(s) Inhalation every 6 hours    MEDICATIONS  (PRN):  acetaminophen     Tablet .. 650 milliGRAM(s) Oral every 6 hours PRN Temp greater or equal to 38C (100.4F), Mild Pain (1 - 3)  albuterol    90 MICROgram(s) HFA Inhaler 2 Puff(s) Inhalation every 6 hours PRN Shortness of Breath and/or Wheezing  aluminum hydroxide/magnesium hydroxide/simethicone Suspension 30 milliLiter(s) Oral every 6 hours PRN Dyspepsia  benzonatate 100 milliGRAM(s) Oral every 8 hours PRN Cough  oxyCODONE    IR 5 milliGRAM(s) Oral every 6 hours PRN Severe Pain (7 - 10)    Allergies    No Known Allergies    Intolerances      REVIEW OF SYSTEMS:  All other systems reviewed and are negative    Vital Signs Last 24 Hrs  T(C): 36.9 (2024 11:10), Max: 37.1 (2024 16:00)  T(F): 98.5 (2024 11:10), Max: 98.8 (2024 16:00)  HR: 68 (2024 11:15) (52 - 77)  BP: 129/70 (2024 11:10) (120/72 - 129/70)  BP(mean): --  RR: 18 (2024 11:10) (17 - 18)  SpO2: 92% (2024 11:15) (88% - 96%)    Parameters below as of 2024 11:15  Patient On (Oxygen Delivery Method): nasal cannula      Daily     Daily Weight in k.8 (2024 04:41)  I&O's Summary    2024 07:01  -  2024 07:00  --------------------------------------------------------  IN: 600 mL / OUT: 1150 mL / NET: -550 mL    2024 07:01  -  2024 14:52  --------------------------------------------------------  IN: 240 mL / OUT: 0 mL / NET: 240 mL      CAPILLARY BLOOD GLUCOSE        PHYSICAL EXAM:  GENERAL: NAD on NC   HEAD:  Atraumatic, Normocephalic  EYES: PERRLA, conjunctiva and sclera clear  ENMT: Moist mucous membranes  NECK: Supple, No JVD, Normal thyroid  NERVOUS SYSTEM:  Alert & Awake  CHEST/LUNG: Clear to percussion bilaterally;   HEART: Regular rate and rhythm;   ABDOMEN: Soft, Nontender, Nondistended; Bowel sounds present  EXTREMITIES:  no edema BL LE  SKIN: moist    Labs                          13.0   11.50 )-----------( 201      ( 2024 06:55 )             39.3     02-11    136  |  94<L>  |  18  ----------------------------<  136<H>  3.7   |  41<H>  |  0.66    Ca    8.8      2024 06:55  Mg     2.4     02-11            Urinalysis Basic - ( 2024 06:55 )    Color: x / Appearance: x / SG: x / pH: x  Gluc: 136 mg/dL / Ketone: x  / Bili: x / Urobili: x   Blood: x / Protein: x / Nitrite: x   Leuk Esterase: x / RBC: x / WBC x   Sq Epi: x / Non Sq Epi: x / Bacteria: x                  DVT prophylaxis: > Lovenox 40mg SQ daily  > Heparin   > SCD's

## 2024-02-12 NOTE — DISCHARGE NOTE PROVIDER - HOSPITAL COURSE
Patient is a 64F with a past medical history of COPD on 3LNC at home, HTN, HLD, OA, bladder cancer, hypothyroidism, T2DM, and anxiety who presented to the ED for evaluation of shortness of breath. She had a telemedicine appointment and was reportedly satting in the high 60's on 4L. She is a heavy smoker and was smoking when EMS arrived, although she did not call, presumably the telemed personnel sent the ambulance to her residence.  In the ED she was hypoxic, placed on 5LNC with improvement. She tested positive for influenza A. Will admit to medicine for management of respiratory failure due to flu.     # Acute Hypoxic Respiratory Failure due to Influenza A  # acute COPD exacerbation  # Tobacco use disorder  Acute on chronic hypoxic respiratory failure. On 3L at home at baseline. CTA chest to R/O PE, Venous doppler US BL LE to R/O DVT. Flu positive.   S/p Tamiflu 75mg PO twice daily x5d, steroid course.  Briefly required Bipap but now saturating well on Nasal cannula. Pulmonology evaluated patient. Given nicotine replacement here. Back on home 02 3-4L. Symptomatically improved. Multiple RRTs d/t patient taking off oxygen and taking her own klonopin. She was educated multiple times about the importance of keeping her oxygen on. Will discharge with NIV and pulm follow up     # HTN  - Stable on Home meds    # T2DM  - FS qAC and qHS, insulin protocol if needed   - CC/DASH diet     # Hypothyroidism  - Stable, Synthroid 150mcg PO daily     Stable for discharge with close followup with Pulm and PCP. Patient in agreement with this plan.    Patient is a 64F with a past medical history of COPD on 3LNC at home, HTN, HLD, OA, bladder cancer, hypothyroidism, T2DM, and anxiety who presented to the ED for evaluation of shortness of breath. She had a telemedicine appointment and was reportedly satting in the high 60's on 4L. She is a heavy smoker and was smoking when EMS arrived, although she did not call, presumably the telemed personnel sent the ambulance to her residence.  In the ED she was hypoxic, placed on 5LNC with improvement. She tested positive for influenza A. Will admit to medicine for management of respiratory failure due to flu.     # Acute Hypoxic Respiratory Failure due to Influenza A  # acute COPD exacerbation  # Tobacco use disorder  Acute on chronic hypoxic respiratory failure. On 3L at home at baseline. CTA chest to R/O PE, Venous doppler US BL LE to R/O DVT. Flu positive.   S/p Tamiflu 75mg PO twice daily x5d, steroid course.  Briefly required Bipap but now saturating well on Nasal cannula. Pulmonology evaluated patient. Given nicotine replacement here. Back on home 02 3-4L. Symptomatically improved. Multiple RRTs d/t patient taking off oxygen and taking her own Klonopin. She was educated multiple times about the importance of keeping her oxygen on. Will discharge with NIV and pulm follow up     # HTN  - Stable on Home meds    # T2DM  - FS qAC and qHS, insulin protocol if needed   - CC/DASH diet     # Hypothyroidism  - Stable, Synthroid 150mcg PO daily     Stable for discharge with close followup with Pulm and PCP. Patient in agreement with this plan.

## 2024-02-13 PROCEDURE — 99233 SBSQ HOSP IP/OBS HIGH 50: CPT

## 2024-02-13 PROCEDURE — 99232 SBSQ HOSP IP/OBS MODERATE 35: CPT

## 2024-02-13 RX ORDER — PANTOPRAZOLE SODIUM 20 MG/1
40 TABLET, DELAYED RELEASE ORAL DAILY
Refills: 0 | Status: DISCONTINUED | OUTPATIENT
Start: 2024-02-13 | End: 2024-02-16

## 2024-02-13 RX ADMIN — SODIUM CHLORIDE 4 MILLILITER(S): 9 INJECTION INTRAMUSCULAR; INTRAVENOUS; SUBCUTANEOUS at 11:04

## 2024-02-13 RX ADMIN — GABAPENTIN 300 MILLIGRAM(S): 400 CAPSULE ORAL at 21:21

## 2024-02-13 RX ADMIN — Medication 1 SPRAY(S): at 17:07

## 2024-02-13 RX ADMIN — ALBUTEROL 2 PUFF(S): 90 AEROSOL, METERED ORAL at 22:50

## 2024-02-13 RX ADMIN — Medication 650 MILLIGRAM(S): at 12:43

## 2024-02-13 RX ADMIN — Medication 3 MILLIGRAM(S): at 21:21

## 2024-02-13 RX ADMIN — SODIUM CHLORIDE 4 MILLILITER(S): 9 INJECTION INTRAMUSCULAR; INTRAVENOUS; SUBCUTANEOUS at 05:37

## 2024-02-13 RX ADMIN — BUDESONIDE AND FORMOTEROL FUMARATE DIHYDRATE 2 PUFF(S): 160; 4.5 AEROSOL RESPIRATORY (INHALATION) at 17:06

## 2024-02-13 RX ADMIN — ESCITALOPRAM OXALATE 10 MILLIGRAM(S): 10 TABLET, FILM COATED ORAL at 11:40

## 2024-02-13 RX ADMIN — GABAPENTIN 300 MILLIGRAM(S): 400 CAPSULE ORAL at 05:53

## 2024-02-13 RX ADMIN — Medication 30 MILLILITER(S): at 23:40

## 2024-02-13 RX ADMIN — LOSARTAN POTASSIUM 50 MILLIGRAM(S): 100 TABLET, FILM COATED ORAL at 05:53

## 2024-02-13 RX ADMIN — Medication 600 MILLIGRAM(S): at 05:52

## 2024-02-13 RX ADMIN — Medication 150 MICROGRAM(S): at 05:53

## 2024-02-13 RX ADMIN — Medication 650 MILLIGRAM(S): at 11:43

## 2024-02-13 RX ADMIN — MONTELUKAST 10 MILLIGRAM(S): 4 TABLET, CHEWABLE ORAL at 21:21

## 2024-02-13 RX ADMIN — SODIUM CHLORIDE 4 MILLILITER(S): 9 INJECTION INTRAMUSCULAR; INTRAVENOUS; SUBCUTANEOUS at 17:06

## 2024-02-13 RX ADMIN — ENOXAPARIN SODIUM 40 MILLIGRAM(S): 100 INJECTION SUBCUTANEOUS at 17:06

## 2024-02-13 RX ADMIN — Medication 3 MILLILITER(S): at 05:38

## 2024-02-13 RX ADMIN — Medication 3 MILLILITER(S): at 17:05

## 2024-02-13 RX ADMIN — Medication 600 MILLIGRAM(S): at 17:06

## 2024-02-13 RX ADMIN — ENOXAPARIN SODIUM 40 MILLIGRAM(S): 100 INJECTION SUBCUTANEOUS at 05:54

## 2024-02-13 RX ADMIN — Medication 1 PATCH: at 07:20

## 2024-02-13 RX ADMIN — BUDESONIDE AND FORMOTEROL FUMARATE DIHYDRATE 2 PUFF(S): 160; 4.5 AEROSOL RESPIRATORY (INHALATION) at 05:54

## 2024-02-13 RX ADMIN — Medication 3 MILLILITER(S): at 11:04

## 2024-02-13 RX ADMIN — Medication 1 SPRAY(S): at 05:54

## 2024-02-13 RX ADMIN — PANTOPRAZOLE SODIUM 40 MILLIGRAM(S): 20 TABLET, DELAYED RELEASE ORAL at 23:57

## 2024-02-13 RX ADMIN — Medication 1 PATCH: at 11:40

## 2024-02-13 RX ADMIN — GABAPENTIN 300 MILLIGRAM(S): 400 CAPSULE ORAL at 13:00

## 2024-02-13 NOTE — PROGRESS NOTE ADULT - SUBJECTIVE AND OBJECTIVE BOX
INTERVAL HPI/OVERNIGHT EVENTS: No acute events overnight. On home 3L NC. Pending home when NIV is set up.    SUBJECTIVE: Patient seen and examined at bedside.   ROS: All negative except as listed above.    VITAL SIGNS:  Vital Signs Last 24 Hrs  T(C): 36.7 (13 Feb 2024 04:59), Max: 37.2 (12 Feb 2024 16:38)  T(F): 98 (13 Feb 2024 04:59), Max: 98.9 (12 Feb 2024 16:38)  HR: 61 (13 Feb 2024 06:15) (60 - 83)  BP: 128/67 (13 Feb 2024 04:59) (121/74 - 134/80)  BP(mean): --  ABP: --  ABP(mean): --  RR: 16 (13 Feb 2024 04:59) (16 - 18)  SpO2: 92% (13 Feb 2024 06:15) (88% - 96%)    O2 Parameters below as of 13 Feb 2024 06:15  Patient On (Oxygen Delivery Method): nasal cannula w/ humidification, 3L            Plateau pressure:   P/F ratio:     02-12 @ 07:01  -  02-13 @ 07:00  --------------------------------------------------------  IN: 840 mL / OUT: 1550 mL / NET: -710 mL      CAPILLARY BLOOD GLUCOSE    ECG: reviewed.    PHYSICAL EXAM:  General: obese female, NAD, sitting upright in bed  HEENT: NC/AT, PERRL, MMM, + NC in place  Neck: Supple, no JVD  Respiratory: B/L diffuse crackles mild, no wheeze  Cardiovascular: RRR, no m/r/g  Abdomen: Soft, ND,NTTP, + BS  Extremities: mild LE Edema  Skin: intact  Neurological: A&Ox3, non focal      MEDICATIONS:  MEDICATIONS  (STANDING):  albuterol/ipratropium for Nebulization 3 milliLiter(s) Nebulizer every 6 hours  budesonide 160 MICROgram(s)/formoterol 4.5 MICROgram(s) Inhaler 2 Puff(s) Inhalation two times a day  enoxaparin Injectable 40 milliGRAM(s) SubCutaneous every 12 hours  escitalopram 10 milliGRAM(s) Oral daily  fluticasone propionate 50 MICROgram(s)/spray Nasal Spray 1 Spray(s) Both Nostrils two times a day  gabapentin 300 milliGRAM(s) Oral three times a day  guaiFENesin  milliGRAM(s) Oral every 12 hours  influenza   Vaccine 0.5 milliLiter(s) IntraMuscular once  levothyroxine 150 MICROGram(s) Oral daily  losartan 50 milliGRAM(s) Oral daily  melatonin 3 milliGRAM(s) Oral at bedtime  montelukast 10 milliGRAM(s) Oral at bedtime  nicotine - 21 mG/24Hr(s) Patch 1 Patch Transdermal daily  sodium chloride 3%  Inhalation 4 milliLiter(s) Inhalation every 6 hours    MEDICATIONS  (PRN):  acetaminophen     Tablet .. 650 milliGRAM(s) Oral every 6 hours PRN Temp greater or equal to 38C (100.4F), Mild Pain (1 - 3)  albuterol    90 MICROgram(s) HFA Inhaler 2 Puff(s) Inhalation every 6 hours PRN Shortness of Breath and/or Wheezing  aluminum hydroxide/magnesium hydroxide/simethicone Suspension 30 milliLiter(s) Oral every 6 hours PRN Dyspepsia  benzonatate 100 milliGRAM(s) Oral every 8 hours PRN Cough      ALLERGIES:  Allergies    No Known Allergies    Intolerances        LABS: No new Labs      ABG:      vBG:    Micro:        RADIOLOGY & ADDITIONAL TESTS: Reviewed.

## 2024-02-13 NOTE — PROGRESS NOTE ADULT - ASSESSMENT
JOZEF CRAIN is a 64y Female with a past medical history as described above who presented for evaluation of shortness of breath.     # Acute Hypoxic Respiratory Failure due to Influenza A  # acute COPD exacerbation  # Tobacco use disorder  Acute on chronic hypoxic respiratory failure. On 3L at home at baseline. CTA chest to R/O PE, Venous doppler US BL LE to R/O DVT. Flu positive.   S/p Tamiflu 75mg PO twice daily x5d, steroid course.  Briefly required Bipap but now saturating well on Nasal cannula. Pulmonology evaluated patient. Given nicotine replacement here. Back on home 02 3-4L. Symptomatically improved. Multiple RRTs d/t patient taking off oxygen and taking her own klonopin. She was educated multiple times about the importance of keeping her oxygen on.   - C/w Duonebs   - Now on home O2. SpO2 88-92% goal, NIV for home ordered   - Continue home inhalers   - Tessalon PRN cough   - Tylenol PRN fevers  - Incentive spirometer   - pulmonary collowing     # HTN  - Stable on Home meds    # T2DM  - FS qAC and qHS, insulin protocol if needed   - CC/DASH diet     # Hypothyroidism  - Stable, Synthroid 150mcg PO daily     Diet: DASH  DVT prophylaxis: lovenox   Dispo: Tele  Code Status: FC

## 2024-02-13 NOTE — PROGRESS NOTE ADULT - SUBJECTIVE AND OBJECTIVE BOX
Patient is a 64y old  Female who presents with a chief complaint of Acute hypoxic respiratory failure due to flu A (13 Feb 2024 08:18)    INTERVAL HPI/OVERNIGHT EVENTS:    MEDICATIONS  (STANDING):  albuterol/ipratropium for Nebulization 3 milliLiter(s) Nebulizer every 6 hours  budesonide 160 MICROgram(s)/formoterol 4.5 MICROgram(s) Inhaler 2 Puff(s) Inhalation two times a day  enoxaparin Injectable 40 milliGRAM(s) SubCutaneous every 12 hours  escitalopram 10 milliGRAM(s) Oral daily  fluticasone propionate 50 MICROgram(s)/spray Nasal Spray 1 Spray(s) Both Nostrils two times a day  gabapentin 300 milliGRAM(s) Oral three times a day  guaiFENesin  milliGRAM(s) Oral every 12 hours  influenza   Vaccine 0.5 milliLiter(s) IntraMuscular once  levothyroxine 150 MICROGram(s) Oral daily  losartan 50 milliGRAM(s) Oral daily  melatonin 3 milliGRAM(s) Oral at bedtime  montelukast 10 milliGRAM(s) Oral at bedtime  nicotine - 21 mG/24Hr(s) Patch 1 Patch Transdermal daily  sodium chloride 3%  Inhalation 4 milliLiter(s) Inhalation every 6 hours    MEDICATIONS  (PRN):  acetaminophen     Tablet .. 650 milliGRAM(s) Oral every 6 hours PRN Temp greater or equal to 38C (100.4F), Mild Pain (1 - 3)  albuterol    90 MICROgram(s) HFA Inhaler 2 Puff(s) Inhalation every 6 hours PRN Shortness of Breath and/or Wheezing  aluminum hydroxide/magnesium hydroxide/simethicone Suspension 30 milliLiter(s) Oral every 6 hours PRN Dyspepsia  benzonatate 100 milliGRAM(s) Oral every 8 hours PRN Cough    Allergies    No Known Allergies    Intolerances      REVIEW OF SYSTEMS:  All other systems reviewed and are negative    Vital Signs Last 24 Hrs  T(C): 36.9 (13 Feb 2024 15:58), Max: 37.1 (12 Feb 2024 21:00)  T(F): 98.4 (13 Feb 2024 15:58), Max: 98.8 (12 Feb 2024 21:00)  HR: 62 (13 Feb 2024 15:58) (60 - 78)  BP: 127/73 (13 Feb 2024 15:58) (117/68 - 134/80)  BP(mean): --  RR: 18 (13 Feb 2024 15:58) (16 - 18)  SpO2: 93% (13 Feb 2024 15:58) (91% - 96%)    Parameters below as of 13 Feb 2024 11:04  Patient On (Oxygen Delivery Method): nasal cannula      Daily     Daily   I&O's Summary    12 Feb 2024 07:01  -  13 Feb 2024 07:00  --------------------------------------------------------  IN: 840 mL / OUT: 1550 mL / NET: -710 mL    13 Feb 2024 07:01  -  13 Feb 2024 16:38  --------------------------------------------------------  IN: 360 mL / OUT: 0 mL / NET: 360 mL      CAPILLARY BLOOD GLUCOSE        PHYSICAL EXAM:  General: obese female, NAD, sitting upright in bed  HEENT: NC/AT, PERRL, MMM, + NC in place  Neck: Supple, no JVD  Respiratory: B/L diffuse crackles mild, no wheeze  Cardiovascular: RRR, no m/r/g  Abdomen: Soft, ND,NTTP, + BS  Extremities: mild LE Edema  Skin: intact  Neurological: A&Ox3, non focal    Labs                                DVT prophylaxis: > Lovenox 40mg SQ daily  > Heparin   > SCD's

## 2024-02-13 NOTE — PROGRESS NOTE ADULT - ASSESSMENT
64 year old female with COPD on 3LNC at home, HTN, HLD, OA, bladder cancer, hypothyroidism, T2DM, and anxiety admitted for acute hypoxic respiratory failure and COPD exacerbation due to Influenza A.     Plan   - Continue to improve, currently on 3L NC (home amount)  - Completed course of treatment for influenza  - c/w Duonebs q6h PRN  - c/w Symbicort 160-4.5 2 puffs BID  - Patient will need NIV on discharge .  -Incentive spirometer  -On nicotine patch as patient is active smoker, patient counselled on smoking cessation  -Patient follows with pulmonologist Dr. Alcala as outpatient, will need sleep study and outpt follow up on d/c with him  - No pulmonary contraindications for dispo     *Discussed with Pulm attending MD Hidalgo

## 2024-02-14 PROCEDURE — 99232 SBSQ HOSP IP/OBS MODERATE 35: CPT

## 2024-02-14 RX ORDER — LANOLIN ALCOHOL/MO/W.PET/CERES
3 CREAM (GRAM) TOPICAL ONCE
Refills: 0 | Status: COMPLETED | OUTPATIENT
Start: 2024-02-14 | End: 2024-02-14

## 2024-02-14 RX ORDER — IPRATROPIUM/ALBUTEROL SULFATE 18-103MCG
3 AEROSOL WITH ADAPTER (GRAM) INHALATION EVERY 6 HOURS
Refills: 0 | Status: DISCONTINUED | OUTPATIENT
Start: 2024-02-14 | End: 2024-02-16

## 2024-02-14 RX ORDER — TIOTROPIUM BROMIDE 18 UG/1
2 CAPSULE ORAL; RESPIRATORY (INHALATION) DAILY
Refills: 0 | Status: DISCONTINUED | OUTPATIENT
Start: 2024-02-14 | End: 2024-02-16

## 2024-02-14 RX ORDER — CLONAZEPAM 1 MG
0.5 TABLET ORAL
Refills: 0 | Status: DISCONTINUED | OUTPATIENT
Start: 2024-02-14 | End: 2024-02-16

## 2024-02-14 RX ADMIN — Medication 1 PATCH: at 08:06

## 2024-02-14 RX ADMIN — Medication 1 SPRAY(S): at 18:12

## 2024-02-14 RX ADMIN — PANTOPRAZOLE SODIUM 40 MILLIGRAM(S): 20 TABLET, DELAYED RELEASE ORAL at 12:48

## 2024-02-14 RX ADMIN — LOSARTAN POTASSIUM 50 MILLIGRAM(S): 100 TABLET, FILM COATED ORAL at 05:30

## 2024-02-14 RX ADMIN — MONTELUKAST 10 MILLIGRAM(S): 4 TABLET, CHEWABLE ORAL at 21:16

## 2024-02-14 RX ADMIN — Medication 650 MILLIGRAM(S): at 16:56

## 2024-02-14 RX ADMIN — Medication 600 MILLIGRAM(S): at 05:30

## 2024-02-14 RX ADMIN — Medication 600 MILLIGRAM(S): at 18:12

## 2024-02-14 RX ADMIN — GABAPENTIN 300 MILLIGRAM(S): 400 CAPSULE ORAL at 21:10

## 2024-02-14 RX ADMIN — GABAPENTIN 300 MILLIGRAM(S): 400 CAPSULE ORAL at 13:55

## 2024-02-14 RX ADMIN — ESCITALOPRAM OXALATE 10 MILLIGRAM(S): 10 TABLET, FILM COATED ORAL at 11:31

## 2024-02-14 RX ADMIN — Medication 150 MICROGRAM(S): at 05:30

## 2024-02-14 RX ADMIN — Medication 3 MILLIGRAM(S): at 01:47

## 2024-02-14 RX ADMIN — Medication 0.5 MILLIGRAM(S): at 21:16

## 2024-02-14 RX ADMIN — Medication 0.5 MILLIGRAM(S): at 13:55

## 2024-02-14 RX ADMIN — ENOXAPARIN SODIUM 40 MILLIGRAM(S): 100 INJECTION SUBCUTANEOUS at 18:12

## 2024-02-14 RX ADMIN — GABAPENTIN 300 MILLIGRAM(S): 400 CAPSULE ORAL at 05:30

## 2024-02-14 RX ADMIN — ENOXAPARIN SODIUM 40 MILLIGRAM(S): 100 INJECTION SUBCUTANEOUS at 05:30

## 2024-02-14 RX ADMIN — Medication 3 MILLIGRAM(S): at 21:10

## 2024-02-14 NOTE — PROGRESS NOTE ADULT - ASSESSMENT
JOZEF CRAIN is a 64y Female with a past medical history as described above who presented for evaluation of shortness of breath.     # Acute on Chronic Hypercarbic and  Hypoxic Respiratory Failure due to Influenza A  # acute COPD exacerbation  # Tobacco use disorder  Acute on chronic hypoxic respiratory failure. On 3L at home at baseline. CTA chest to R/O PE, Venous doppler US BL LE to R/O DVT. Flu positive.   S/p Tamiflu 75mg PO twice daily x5d, steroid course.  Briefly required Bipap but now saturating well on Nasal cannula. Pulmonology evaluated patient. Given nicotine replacement here. Back on home 02 3-4L. Symptomatically improved. Multiple RRTs d/t patient taking off oxygen and taking her own klonopin. She was educated multiple times about the importance of keeping her oxygen on.   - C/w Duonebs   - Now on home O2. SpO2 88-92% goal, NIV for home ordered   - Continue home inhalers   - Tessalon PRN cough   - Tylenol PRN fevers  - Incentive spirometer   - pulmonary following     # HTN  - Stable on Home meds    # T2DM  - FS qAC and qHS, insulin protocol if needed   - CC/DASH diet     # Hypothyroidism  - Stable, Synthroid 150mcg PO daily     # Obesity class 3 – BMI =40 kg/m2- pt counselled on diet and lifestyle modification, gradual weight loss, and activity.   Diet: DASH  DVT prophylaxis: lovenox   Dispo: Tele  Code Status: FC   Stable for d/c home with NIV once arrangements are completed.

## 2024-02-14 NOTE — PROGRESS NOTE ADULT - SUBJECTIVE AND OBJECTIVE BOX
24 hr events:  no acute events  pt found with O2 sat 88% on RA at rest - self removed O2 NC  placed back on 3L NC with O2 sat 95%  denies SOB  reports "feeling 100% better"    ## ROS:  no fever, no chills  no HA, no dizziness  no visual changes, no auditory changes  no sore throat, no sinus congestion  no SOB, no cough  no chest pain, no palpitations  no abdominal pain, no N/V/D  no dysuria, no hematuria  no myalgias, no arthralgias  no swelling  no rashes, no pruritis      ## Labs:  no new labs    Complete Blood Count + Automated Diff in AM (02.11.24 @ 06:55)    WBC Count: 11.50 K/uL   RBC Count: 4.03 M/uL   Hemoglobin: 13.0 g/dL   Hematocrit: 39.3 %   Mean Cell Volume: 97.5 fl   Mean Cell Hemoglobin: 32.3 pg   Mean Cell Hemoglobin Conc: 33.1 g/dL   Red Cell Distrib Width: 15.1 %   Platelet Count - Automated: 201 K/uL   Auto Neutrophil #: 7.52 K/uL   Auto Lymphocyte #: 3.16 K/uL   Auto Monocyte #: 0.64 K/uL   Auto Eosinophil #: 0.06 K/uL   Auto Basophil #: 0.05 K/uL   Auto Neutrophil %: 65.4: Differential percentages must be correlated with absolute numbers for  clinical significance. %   Auto Lymphocyte %: 27.5 %   Auto Monocyte %: 5.6 %   Auto Eosinophil %: 0.5 %   Auto Basophil %: 0.4 %   Auto Immature Granulocyte %: 0.6: (Includes meta, myelo and promyelocytes). Mild elevations in immature  granulocytes may be seen with many inflammatory processes and pregnancy;  clinical correlation suggested. %   Nucleated RBC: 0 /100 WBCs            Basic Metabolic Panel in AM (02.11.24 @ 06:55)    Sodium: 136 mmol/L   Potassium: 3.7 mmol/L   Chloride: 94 mmol/L   Carbon Dioxide: 41 mmol/L   Anion Gap: 1 mmol/L   Blood Urea Nitrogen: 18 mg/dL   Creatinine: 0.66 mg/dL   Glucose: 136 mg/dL   Calcium: 8.8 mg/dL      Respiratory Viral Panel with COVID-19 by JAILENE (02.05.24 @ 14:50)   Rapid RVP Result: Detected   SARS-CoV-2: NotDetec   Influenza AH3 (RapRVP): Detected        ## Imaging:  < from: Xray Chest 1 View- PORTABLE-Routine (Xray Chest 1 View- PORTABLE-Routine .) (02.10.24 @ 13:45) >  There are no vascular or support catheters within the image.    The lungs are clear without discrete infiltrate.  There is no pulmonary   vascular congestion.  There are no pleural effusions.    The heart and mediastinal contours are within normal limits.  The trachea   is midline.    The osseous structures are intact.    IMPRESSION:  No acute pulmonary pathology.          ## Medications:    losartan 50 milliGRAM(s) Oral daily    albuterol    90 MICROgram(s) HFA Inhaler 2 Puff(s) Inhalation every 6 hours PRN  albuterol/ipratropium for Nebulization 3 milliLiter(s) Nebulizer every 6 hours  benzonatate 100 milliGRAM(s) Oral every 8 hours PRN  budesonide 160 MICROgram(s)/formoterol 4.5 MICROgram(s) Inhaler 2 Puff(s) Inhalation two times a day  guaiFENesin  milliGRAM(s) Oral every 12 hours  montelukast 10 milliGRAM(s) Oral at bedtime  sodium chloride 3%  Inhalation 4 milliLiter(s) Inhalation every 6 hours    levothyroxine 150 MICROGram(s) Oral daily    enoxaparin Injectable 40 milliGRAM(s) SubCutaneous every 12 hours    aluminum hydroxide/magnesium hydroxide/simethicone Suspension 30 milliLiter(s) Oral every 6 hours PRN  pantoprazole  Injectable 40 milliGRAM(s) IV Push daily    acetaminophen     Tablet .. 650 milliGRAM(s) Oral every 6 hours PRN  clonazePAM  Tablet 0.5 milliGRAM(s) Oral two times a day PRN  escitalopram 10 milliGRAM(s) Oral daily  gabapentin 300 milliGRAM(s) Oral three times a day  melatonin 3 milliGRAM(s) Oral at bedtime      ## Vitals:  T(C): 36.5 (02-14-24 @ 10:31), Max: 36.8 (02-13-24 @ 21:15)  HR: 76 (02-14-24 @ 10:31) (52 - 78)  BP: 130/84 (02-14-24 @ 10:31) (118/78 - 142/76)  RR: 18 (02-14-24 @ 10:31) (18 - 18)  SpO2: 90% (02-14-24 @ 10:31) (90% - 96%)      02-13 @ 07:01  -  02-14 @ 07:00  --------------------------------------------------------  IN: 715 mL / OUT: 3800 mL / NET: -3085 mL    02-14 @ 07:01  -  02-14 @ 16:31  --------------------------------------------------------  IN: 310 mL / OUT: 800 mL / NET: -490 mL          ## P/E:  Gen: obese female, lying comfortably in bed in no apparent distress  HEENT: PERRL, EOMI, sclera white  Resp: CTA B/L, no wheeze, no rhonchi  CVS: RRR  Abd: soft NT/ND +BS  Ext: no c/c/e  Neuro: A&Ox3

## 2024-02-14 NOTE — PROGRESS NOTE ADULT - ASSESSMENT
64F active smoker since 15 years old with PMH obesity, COPD on 3LNC at home, MEENU, HTN, HLD, DM, bladder cancer, hypothyroidism, cholelithiasis, OA, anxiety presents to ED for SOB and cough. RVP positive for Influenza A. Admitted to medical service for acute COPD exacerbation with influenza A    DX: acute on chronic hypoxic respiratory failure, chronic hypercarbic respiratory failure, acute COPD exacerbation, influenza A infection, MEENU, active smoker    - currently at baseline O2 requirement of 3L NC  - oxygenating well on 3L at 95%  - maintain goal O2 sat > 88%  - s/p 5 day course of tamiflu for underlying influenza A infection  - completed 5 day course of prednisone 40mg daily for COPD exacerbation  - cont duonebs + hypertonsic saline nebs q6 hrs  - can d/c hypertonic nebs and change duonebs to prn frequency: secretions improved  - no wheeze on exam  - cont symbicort  - add spiriva for underlying COPD  - cont with nocturnal BIPAP for underlying MEENU and chronic hypercarbic respiratory failure  - pending home NIV setup  - smoking cessation, pt not ready to quit, nicotine patch  - reports feeling better with klonopin  - respiratory status stable  - d/c planning per primary team   64F active smoker since 15 years old with PMH obesity, COPD on 3LNC at home, MEENU, HTN, HLD, DM, bladder cancer, hypothyroidism, cholelithiasis, OA, anxiety presents to ED for SOB and cough. RVP positive for Influenza A. Admitted to medical service for acute COPD exacerbation with influenza A    DX: acute on chronic hypoxic respiratory failure, chronic hypercarbic respiratory failure, acute COPD exacerbation, influenza A infection, MEENU, active smoker    - currently at baseline O2 requirement of 3L NC  - oxygenating well on 3L at 95%  - maintain goal O2 sat > 88%  - s/p 5 day course of tamiflu for underlying influenza A infection  - completed 5 day course of prednisone 40mg daily for COPD exacerbation  - has been on duonebs + hypertonic saline nebs q6 hrs  - can d/c hypertonic nebs and change duonebs to prn frequency: secretions improved  - no wheeze on exam  - cont symbicort  - add spiriva for underlying COPD  - cont with nocturnal BIPAP for underlying MEENU and chronic hypercarbic respiratory failure  - pending home NIV setup  - smoking cessation, pt not ready to quit, nicotine patch  - reports feeling better with klonopin  - respiratory status stable  - d/c planning per primary team  - outpatient pulmonary follow up with pulmonologist

## 2024-02-14 NOTE — CHART NOTE - NSCHARTNOTEFT_GEN_A_CORE
Search Terms: andres freeman, 1960Search Date: 02/14/2024 12:21:14 PM  The Drug Utilization Report below displays all of the controlled substance prescriptions, if any, that your patient has filled in the last twelve months. The information displayed on this report is compiled from pharmacy submissions to the Department, and accurately reflects the information as submitted by the pharmacies.    This report was requested by: Ha Santoyo | Reference #: [id number]    Practitioner Count: 1  Pharmacy Count: 2  Current Opioid Prescriptions: 0  Current Benzodiazepine Prescriptions: 0  Current Stimulant Prescriptions: 0      Patient Demographic Information (PDI)       PDI	First Name	Last Name	Birth Date	Gender	Street Address	[facility]	Zip Code  A	Andres Freeman	1960	Female	[address] [facility] [address]	[facility]	[address]  B	Andres Freeman	1960	Female	[address]	[place]	[address]  C	Andres Freeman	1960	Female	[address]	[facility]	[address]    Prescription Information      PDI Filter:    PDI	My Rx	Current Rx	Drug Type	Rx Written	Rx Dispensed	Drug	Quantity	Days Supply	Prescriber Name	Prescriber LETICIA #	Payment Method	Dispenser  A	N	N	B	11/25/2023	11/25/2023	clonazepam 1 mg tablet	21	7	Chasity Ortiz MD	[id number]	Medicaid	[id number]  A	N	N	B	11/18/2023	11/19/2023	clonazepam 1 mg tablet	21	7	Chasity Ortiz MD	[id number]	Medicaid	[id number]  A	N	N	O	11/16/2023	11/16/2023	tramadol hcl 50 mg tablet	7	7	Sudha Boykin	[id number]	Medicaid	[id number]  A	N	N	B	09/22/2023	09/25/2023	clonazepam 1 mg tablet	42	14	Chasity Ortiz MD	[id number]	Medicaid	[id number]  A	N	N	B	09/09/2023	09/11/2023	clonazepam 1 mg tablet	42	14	Chasity Ortiz MD	[id number]	Medicaid	[id number]  A	N	N	B	08/25/2023	08/28/2023	clonazepam 1 mg tablet	42	14	Chasity Ortiz MD	[id number]	Medicaid	[id number]  A	N	N	B	08/11/2023	08/12/2023	clonazepam 1 mg tablet	42	14	InciongChasity MD	[id number]	Medicaid	[id number]  A	N	N	B	07/28/2023	07/29/2023	clonazepam 1 mg tablet	42	14	InciongChasity MD	[id number]	Medicaid	[id number]  A	N	N	B	07/15/2023	07/15/2023	clonazepam 1 mg tablet	42	14	InciongChasity MD	[id number]	Medicaid	[id number]  A	N	N	B	06/30/2023	07/01/2023	clonazepam 1 mg tablet	42	14	InciongChasity MD	[id number]	Medicaid	[id number]  A	N	N	B	06/17/2023	06/17/2023	clonazepam 1 mg tablet	42	14	InciongChasity MD	[id number]	Medicaid	[id number]  A	N	N	B	06/03/2023	06/03/2023	clonazepam 1 mg tablet	42	14	InciongChasity MD	[id number]	Medicaid	[id number]  A	N	N	B	05/19/2023	05/20/2023	clonazepam 1 mg tablet	42	14	InciongChasity MD	[id number]	Medicaid	[id number]  A	N	N	B	05/05/2023	05/06/2023	clonazepam 1 mg tablet	42	14	InciongChasity MD	[id number]	Medicaid	[id number]  A	N	N	B	04/21/2023	04/22/2023	clonazepam 1 mg tablet	42	14	InciongChasity MD	[id number]	Medicaid	[id number]  A	N	N	B	04/07/2023	04/07/2023	clonazepam 1 mg tablet	42	14	InciongChasity MD	[id number]	Medicaid	[id number]  A	N	N	B	03/25/2023	03/25/2023	clonazepam 1 mg tablet	42	14	InciongChasity MD	[id number]	[id number]  A	N	N	B	03/10/2023	03/11/2023	clonazepam 1 mg tablet	42	14	InciongChasity MD	[id number]	Insurance	[id number]  A	N	N	B	02/25/2023	02/25/2023	clonazepam 1 mg tablet	42	14	DianaChasity MD	[id number]	Insurance	[id number]  B	N	N	B	12/28/2023	12/29/2023	clonazepam 1 mg tablet	84	28	IncrobbieChasity MD	[id number]	Medicaid	[facility]  B	N	N	B	11/30/2023	12/01/2023	clonazepam 1 mg tablet	84	28	InciontarikChasity MD	[id number]	Medicaid	[facility]  C	N	N	O	11/14/2023	11/14/2023	tramadol hcl 50 mg tablet	30	8	Florentino Espinoza MD	[id number]	[place]	[facility]  C	N	N	B	11/13/2023	11/13/2023	clonazepam 1 mg tablet	21	7	Татьяна Delong	[id number]	[place]ash	[facility]  C	N	N	B	11/06/2023	11/06/2023	clonazepam 1 mg tablet	21	7	Татьяна Delong	[id number]	[place]	[facility]  C	N	N	O	11/06/2023	11/06/2023	tramadol hcl 50 mg tablet	30	8	Татьяна Delong	[id number]	[place]	[facility]  C	N	N	B	11/02/2023	11/02/2023	clonazepam 1 mg tablet	9	3	Sudha Boykin	[id number]	[place]	[facility]  C	N	N	B	10/31/2023	10/31/2023	clonazepam 1 mg tablet	6	2	Sudha Boykin	[id number]	[place]	[facility]  C	N	N	B	10/30/2023	10/30/2023	clonazepam 1 mg tablet	3	1	Татьяна Delong	[id number]	[place]	[facility]  C	N	N	O	10/22/2023	10/22/2023	tramadol hcl 50 mg tablet	28	7	Татьяна Delong	[id number]	[place]	[facility]  C	N	N	B	10/21/2023	10/21/2023	clonazepam 1 mg tablet	21	7	Татьяна Delong	[id number]	[place]	[facility]  C	N	N	B	10/16/2023	10/16/2023	clonazepam 1 mg tablet	21	7	Sudha Boykin	[id number]	[place]	[facility]  C	N	N	B	10/09/2023	10/09/2023	clonazepam 1 mg tablet	21	7	Sudha Boykin	[id number]	[facility]      Electronic Signatures:  Ha Santoyo (MD)  (Signed 14-Feb-2024 12:22)  	Authored: Note Type, Note      Last Updated: 14-Feb-2024 12:22 by Ha Santoyo)

## 2024-02-14 NOTE — PROGRESS NOTE ADULT - SUBJECTIVE AND OBJECTIVE BOX
Patient: JOZEF CRAIN [id number] 64y Female                            Hospitalist Attending Note    Reports some anxiety, otherwise nO complaints.  No SOB.  On O2.      ____________________PHYSICAL EXAM:  GENERAL:  NAD Alert and Oriented x 3   HEENT: NCAT  CARDIOVASCULAR:  S1, S2  LUNGS: coarse BS b/l .   ABDOMEN:  soft, (-) tenderness, (-) distension, (+) bowel sounds, (-) guarding, (-) rebound (-) rigidity  EXTREMITIES:  no cyanosis / clubbing / edema.   PSYCH appropriate  ____________________     VITALS:  Vital Signs Last 24 Hrs  T(C): 37 (14 Feb 2024 17:02), Max: 37 (14 Feb 2024 17:02)  T(F): 98.6 (14 Feb 2024 17:02), Max: 98.6 (14 Feb 2024 17:02)  HR: 68 (14 Feb 2024 17:02) (52 - 78)  BP: 154/78 (14 Feb 2024 17:02) (118/78 - 154/78)  BP(mean): --  RR: 17 (14 Feb 2024 17:02) (17 - 18)  SpO2: 97% (14 Feb 2024 17:02) (90% - 97%)    Parameters below as of 14 Feb 2024 10:31  Patient On (Oxygen Delivery Method): room air     Daily     Daily   CAPILLARY BLOOD GLUCOSE        I&O's Summary    13 Feb 2024 07:01  -  14 Feb 2024 07:00  --------------------------------------------------------  IN: 715 mL / OUT: 3800 mL / NET: -3085 mL    14 Feb 2024 07:01  -  14 Feb 2024 17:19  --------------------------------------------------------  IN: 310 mL / OUT: 800 mL / NET: -490 mL        HISTORY:  PAST MEDICAL & SURGICAL HISTORY:  Anxiety      COPD (chronic obstructive pulmonary disease)      Agoraphobia      Hypothyroid      Emphysema lung      Smoker      HTN (hypertension)      DM (diabetes mellitus)      Obesity      Bladder cancer      S/P cholecystectomy      Allergies    No Known Allergies    Intolerances       LABS:                        MEDICATIONS:  MEDICATIONS  (STANDING):  budesonide 160 MICROgram(s)/formoterol 4.5 MICROgram(s) Inhaler 2 Puff(s) Inhalation two times a day  enoxaparin Injectable 40 milliGRAM(s) SubCutaneous every 12 hours  escitalopram 10 milliGRAM(s) Oral daily  fluticasone propionate 50 MICROgram(s)/spray Nasal Spray 1 Spray(s) Both Nostrils two times a day  gabapentin 300 milliGRAM(s) Oral three times a day  guaiFENesin  milliGRAM(s) Oral every 12 hours  influenza   Vaccine 0.5 milliLiter(s) IntraMuscular once  levothyroxine 150 MICROGram(s) Oral daily  losartan 50 milliGRAM(s) Oral daily  melatonin 3 milliGRAM(s) Oral at bedtime  montelukast 10 milliGRAM(s) Oral at bedtime  nicotine - 21 mG/24Hr(s) Patch 1 Patch Transdermal daily  pantoprazole  Injectable 40 milliGRAM(s) IV Push daily  tiotropium 2.5 MICROgram(s) Inhaler 2 Puff(s) Inhalation daily    MEDICATIONS  (PRN):  acetaminophen     Tablet .. 650 milliGRAM(s) Oral every 6 hours PRN Temp greater or equal to 38C (100.4F), Mild Pain (1 - 3)  albuterol    90 MICROgram(s) HFA Inhaler 2 Puff(s) Inhalation every 6 hours PRN Shortness of Breath and/or Wheezing  albuterol/ipratropium for Nebulization 3 milliLiter(s) Nebulizer every 6 hours PRN Shortness of Breath and/or Wheezing  aluminum hydroxide/magnesium hydroxide/simethicone Suspension 30 milliLiter(s) Oral every 6 hours PRN Dyspepsia  benzonatate 100 milliGRAM(s) Oral every 8 hours PRN Cough  clonazePAM  Tablet 0.5 milliGRAM(s) Oral two times a day PRN anxiety

## 2024-02-15 PROCEDURE — 99232 SBSQ HOSP IP/OBS MODERATE 35: CPT

## 2024-02-15 RX ADMIN — Medication 0.5 MILLIGRAM(S): at 21:26

## 2024-02-15 RX ADMIN — Medication 650 MILLIGRAM(S): at 01:03

## 2024-02-15 RX ADMIN — BUDESONIDE AND FORMOTEROL FUMARATE DIHYDRATE 2 PUFF(S): 160; 4.5 AEROSOL RESPIRATORY (INHALATION) at 17:09

## 2024-02-15 RX ADMIN — Medication 650 MILLIGRAM(S): at 23:31

## 2024-02-15 RX ADMIN — ESCITALOPRAM OXALATE 10 MILLIGRAM(S): 10 TABLET, FILM COATED ORAL at 12:20

## 2024-02-15 RX ADMIN — LOSARTAN POTASSIUM 50 MILLIGRAM(S): 100 TABLET, FILM COATED ORAL at 05:26

## 2024-02-15 RX ADMIN — GABAPENTIN 300 MILLIGRAM(S): 400 CAPSULE ORAL at 05:26

## 2024-02-15 RX ADMIN — Medication 0.5 MILLIGRAM(S): at 17:06

## 2024-02-15 RX ADMIN — ENOXAPARIN SODIUM 40 MILLIGRAM(S): 100 INJECTION SUBCUTANEOUS at 05:33

## 2024-02-15 RX ADMIN — Medication 600 MILLIGRAM(S): at 05:26

## 2024-02-15 RX ADMIN — Medication 150 MICROGRAM(S): at 05:25

## 2024-02-15 RX ADMIN — MONTELUKAST 10 MILLIGRAM(S): 4 TABLET, CHEWABLE ORAL at 21:25

## 2024-02-15 RX ADMIN — Medication 1 SPRAY(S): at 17:08

## 2024-02-15 RX ADMIN — GABAPENTIN 300 MILLIGRAM(S): 400 CAPSULE ORAL at 13:04

## 2024-02-15 RX ADMIN — TIOTROPIUM BROMIDE 2 PUFF(S): 18 CAPSULE ORAL; RESPIRATORY (INHALATION) at 12:20

## 2024-02-15 RX ADMIN — GABAPENTIN 300 MILLIGRAM(S): 400 CAPSULE ORAL at 21:25

## 2024-02-15 RX ADMIN — BUDESONIDE AND FORMOTEROL FUMARATE DIHYDRATE 2 PUFF(S): 160; 4.5 AEROSOL RESPIRATORY (INHALATION) at 05:25

## 2024-02-15 RX ADMIN — Medication 1 SPRAY(S): at 05:25

## 2024-02-15 RX ADMIN — PANTOPRAZOLE SODIUM 40 MILLIGRAM(S): 20 TABLET, DELAYED RELEASE ORAL at 12:20

## 2024-02-15 RX ADMIN — Medication 600 MILLIGRAM(S): at 17:09

## 2024-02-15 RX ADMIN — Medication 3 MILLIGRAM(S): at 21:25

## 2024-02-15 RX ADMIN — Medication 1 MILLIGRAM(S): at 17:42

## 2024-02-15 NOTE — PROGRESS NOTE ADULT - SUBJECTIVE AND OBJECTIVE BOX
[facility] Division of Hospital Medicine  Deandre Anthony MD  Available via MS Teams  Pager: [phone]    SUBJECTIVE / OVERNIGHT EVENTS:  - no events overnight, no n/v/abd pain/cp/headaches/lower extremity swelling. SOB improving    MEDICATIONS  (STANDING):  budesonide 160 MICROgram(s)/formoterol 4.5 MICROgram(s) Inhaler 2 Puff(s) Inhalation two times a day  enoxaparin Injectable 40 milliGRAM(s) SubCutaneous every 12 hours  escitalopram 10 milliGRAM(s) Oral daily  fluticasone propionate 50 MICROgram(s)/spray Nasal Spray 1 Spray(s) Both Nostrils two times a day  gabapentin 300 milliGRAM(s) Oral three times a day  guaiFENesin  milliGRAM(s) Oral every 12 hours  influenza   Vaccine 0.5 milliLiter(s) IntraMuscular once  levothyroxine 150 MICROGram(s) Oral daily  losartan 50 milliGRAM(s) Oral daily  melatonin 3 milliGRAM(s) Oral at bedtime  montelukast 10 milliGRAM(s) Oral at bedtime  nicotine - 21 mG/24Hr(s) Patch 1 Patch Transdermal daily  pantoprazole  Injectable 40 milliGRAM(s) IV Push daily  tiotropium 2.5 MICROgram(s) Inhaler 2 Puff(s) Inhalation daily    MEDICATIONS  (PRN):  acetaminophen     Tablet .. 650 milliGRAM(s) Oral every 6 hours PRN Temp greater or equal to 38C (100.4F), Mild Pain (1 - 3)  albuterol    90 MICROgram(s) HFA Inhaler 2 Puff(s) Inhalation every 6 hours PRN Shortness of Breath and/or Wheezing  albuterol/ipratropium for Nebulization 3 milliLiter(s) Nebulizer every 6 hours PRN Shortness of Breath and/or Wheezing  aluminum hydroxide/magnesium hydroxide/simethicone Suspension 30 milliLiter(s) Oral every 6 hours PRN Dyspepsia  benzonatate 100 milliGRAM(s) Oral every 8 hours PRN Cough  clonazePAM  Tablet 0.5 milliGRAM(s) Oral two times a day PRN anxiety      I&O's Summary    14 Feb 2024 07:01  -  15 Feb 2024 07:00  --------------------------------------------------------  IN: 610 mL / OUT: 800 mL / NET: -190 mL        PHYSICAL EXAM:  Vital Signs Last 24 Hrs  T(C): 36.7 (15 Feb 2024 10:36), Max: 37.1 (14 Feb 2024 23:35)  T(F): 98 (15 Feb 2024 10:36), Max: 98.8 (14 Feb 2024 23:35)  HR: 69 (15 Feb 2024 11:39) (56 - 81)  BP: 114/80 (15 Feb 2024 10:36) (114/80 - 154/78)  BP(mean): --  RR: 18 (15 Feb 2024 10:36) (17 - 18)  SpO2: 96% (15 Feb 2024 11:39) (90% - 99%)    Parameters below as of 15 Feb 2024 10:36  Patient On (Oxygen Delivery Method): nasal cannula      CONSTITUTIONAL: NAD, well-developed, well-groomed  ENMT: Moist oral mucosa, no pharyngeal injection or exudates  NECK: Supple, no palpable masses;   RESPIRATORY: Normal respiratory effort; coarse breath sounds bilaterally   CARDIOVASCULAR: Regular rate and rhythm, normal S1 and S2, no murmur/rub/gallop; No lower extremity edema; Peripheral pulses are 2+ bilaterally  ABDOMEN: Nontender to palpation, normoactive bowel sounds, no rebound/guarding;  MUSCULOSKELETAL:  Normal gait; no clubbing or cyanosis of digits; no joint swelling or tenderness to palpation  PSYCH: A+O to person, place, and time; affect appropriate  NEUROLOGY: CN 2-12 are intact and symmetric; no gross sensory deficits   SKIN: No rashes; no palpable lesions    LABS:  SARS-CoV-2: NotDetec (05 Feb 2024 14:50)

## 2024-02-15 NOTE — PROGRESS NOTE ADULT - ASSESSMENT
JOZEF CRAIN is a 64y Female with a past medical history as described above who presented for evaluation of shortness of breath.     # Acute on Chronic Hypercarbic and  Hypoxic Respiratory Failure due to Influenza A  # acute COPD exacerbation  # Tobacco use disorder  - Acute on chronic hypoxic respiratory failure. On 3L at home at baseline. CTA chest to R/O PE, Venous doppler US BL LE to R/O DVT. Flu positive.   - S/p Tamiflu 75mg PO twice daily x5d, steroid course.  Briefly required Bipap but now saturating well on Nasal cannula. Pulmonology evaluated patient. Given nicotine replacement here. Back on home 02 3-4L. Symptomatically improved. Multiple RRTs d/t patient taking off oxygen and taking her own klonopin. She was educated multiple times about the importance of keeping her oxygen on.   - C/w Duonebs   - Now on home O2. SpO2 88-92% goal, NIV for home ordered   - Continue home inhalers   - Tessalon PRN cough   - Tylenol PRN fevers  - Incentive spirometer   - pulmonary following     # HTN  - Stable on Home meds    # T2DM  - FS qAC and qHS, insulin protocol if needed   - CC/DASH diet     # Hypothyroidism  - Stable, Synthroid 150mcg PO daily     Stable for d/c home with NIV. currently accepted, pending home assessment 2/16

## 2024-02-15 NOTE — PROGRESS NOTE ADULT - TIME BILLING
I have spent a total of 36 minutes to prepare to see the patient, obtaining and reviewing history, physical examination, explaining the diagnosis, prognosis and treatment plan with the patient/family/caregiver. I also have spent the time ordering studies and testing, interpreting results, medicine reconciliation, subspecialty consultation and documentation as above.
Patient encounter, exam F-2-F, medical decision making , chart review and documentation
Patient encounter, exam F-2-F, medical decision making , chart review and documentation
- Ordering, reviewing, and interpreting labs, testing, and imaging.  - Independently obtaining a review of systems and performing a physical exam  - Reviewing consultant documentation/recommendations in addition to discussing plan of care with consultants.  - Counselling and educating patient and family regarding interpretation of aforementioned items and plan of care.
I have spent a total of 51 minutes to prepare to see the patient, obtaining and reviewing history, physical examination, explaining the diagnosis, prognosis and treatment plan with the patient/family/caregiver. I also have spent the time ordering studies and testing, interpreting results, medicine reconciliation, subspecialty consultation and documentation as above.
I have spent a total of 36 minutes to prepare to see the patient, obtaining and reviewing history, physical examination, explaining the diagnosis, prognosis and treatment plan with the patient/family/caregiver. I also have spent the time ordering studies and testing, interpreting results, medicine reconciliation, subspecialty consultation and documentation as above.
I have spent a total of 54 minutes to prepare to see the patient, obtaining and reviewing history, physical examination, explaining the diagnosis, prognosis and treatment plan with the patient/family/caregiver. I also have spent the time ordering studies and testing, interpreting results, medicine reconciliation, subspecialty consultation and documentation as above.
Patient encounter, exam F-2-F, medical decision making , chart review and documentation
I have spent a total of 37 minutes to prepare to see the patient, obtaining and reviewing history, physical examination, explaining the diagnosis, prognosis and treatment plan with the patient/family/caregiver. I also have spent the time ordering studies and testing, interpreting results, medicine reconciliation, subspecialty consultation and documentation as above.
Review of patient records, including history, laboratory data, imaging. Patient evaluation and assessment. Coordination of care. Time excludes teaching.

## 2024-02-15 NOTE — PROGRESS NOTE ADULT - NUTRITIONAL ASSESSMENT
This patient has been assessed with a concern for Malnutrition and has been determined to have a diagnosis/diagnoses of Morbid obesity (BMI > 40).    This patient is being managed with:   Diet DASH/TLC-  Sodium & Cholesterol Restricted  Consistent Carbohydrate {Evening Snack}  Entered: Feb 5 2024  9:55PM  

## 2024-02-16 ENCOUNTER — TRANSCRIPTION ENCOUNTER (OUTPATIENT)
Age: 64
End: 2024-02-16

## 2024-02-16 VITALS
DIASTOLIC BLOOD PRESSURE: 80 MMHG | OXYGEN SATURATION: 91 % | HEART RATE: 75 BPM | TEMPERATURE: 99 F | RESPIRATION RATE: 19 BRPM | SYSTOLIC BLOOD PRESSURE: 113 MMHG

## 2024-02-16 PROCEDURE — 99406 BEHAV CHNG SMOKING 3-10 MIN: CPT

## 2024-02-16 PROCEDURE — 99232 SBSQ HOSP IP/OBS MODERATE 35: CPT | Mod: 25

## 2024-02-16 PROCEDURE — 99239 HOSP IP/OBS DSCHRG MGMT >30: CPT

## 2024-02-16 RX ORDER — PANTOPRAZOLE SODIUM 20 MG/1
1 TABLET, DELAYED RELEASE ORAL
Qty: 30 | Refills: 0
Start: 2024-02-16 | End: 2024-03-16

## 2024-02-16 RX ORDER — CLONAZEPAM 1 MG
1 TABLET ORAL
Refills: 0 | DISCHARGE

## 2024-02-16 RX ORDER — TIOTROPIUM BROMIDE 18 UG/1
2 CAPSULE ORAL; RESPIRATORY (INHALATION)
Qty: 1 | Refills: 0
Start: 2024-02-16 | End: 2024-03-16

## 2024-02-16 RX ORDER — CLONAZEPAM 1 MG
1 TABLET ORAL
Qty: 10 | Refills: 0
Start: 2024-02-16 | End: 2024-02-20

## 2024-02-16 RX ADMIN — Medication 650 MILLIGRAM(S): at 01:09

## 2024-02-16 RX ADMIN — LOSARTAN POTASSIUM 50 MILLIGRAM(S): 100 TABLET, FILM COATED ORAL at 05:08

## 2024-02-16 RX ADMIN — Medication 0.5 MILLIGRAM(S): at 13:03

## 2024-02-16 RX ADMIN — ESCITALOPRAM OXALATE 10 MILLIGRAM(S): 10 TABLET, FILM COATED ORAL at 11:22

## 2024-02-16 RX ADMIN — GABAPENTIN 300 MILLIGRAM(S): 400 CAPSULE ORAL at 13:03

## 2024-02-16 RX ADMIN — GABAPENTIN 300 MILLIGRAM(S): 400 CAPSULE ORAL at 05:08

## 2024-02-16 RX ADMIN — Medication 150 MICROGRAM(S): at 05:08

## 2024-02-16 RX ADMIN — Medication 600 MILLIGRAM(S): at 05:08

## 2024-02-16 NOTE — PROGRESS NOTE ADULT - NS ATTEST RISK PROBLEM GEN_ALL_CORE FT
hypoxic/hypercarbic respiratory failure, influenza A infection, COPD exacerbation
acute on chronic hypoxic respiratory failure, COPD exacerbation, flu A
acute on chronic hypoxic respiratory failure, chronic hypercarbic respiratory failure, COPD exacerbation, flu A
Review of patient records, including history, laboratory data, imaging. Patient evaluation and assessment. Coordination of care. Time excludes teaching.

## 2024-02-16 NOTE — DISCHARGE NOTE NURSING/CASE MANAGEMENT/SOCIAL WORK - NSDCFUADDAPPT_GEN_ALL_CORE_FT
Pulmonologist is Dr. Alcala     It is important to see your primary physician as well as the physicians noted below within the next week to perform a comprehensive medical review.  Call their offices for an appointment as soon as you leave the hospital.  You will also need to see them for renewal of your medications.  If you do not have a primary physician, contact the [facility] Physician Referral Service at [phone].  To obtain your results, you can access the [employer] Patient Portal at [email or web address].  Your medical issues appear to be stable at this time, but if your symptoms recur or worsen, contact your physicians and/or return to the hospital if necessary.  If you encounter any issues or questions with your medication, call your physicians before stopping the medication.    APPTS ARE READY TO BE MADE: [ x] YES    Best Family or Patient Contact (if needed):    Additional Information about above appointments (if needed):    1:   2:   3:     Other comments or requests: Patient advised they did not want to proceed with scheduling appointments with the providers on their referrals. They will coordinate care on their own.

## 2024-02-16 NOTE — DISCHARGE NOTE NURSING/CASE MANAGEMENT/SOCIAL WORK - PATIENT PORTAL LINK FT
You can access the FollowMyHealth Patient Portal offered by [facility] by registering at the following website: [email or web address]. By joining [employer]’s FollowMyHealth portal, you will also be able to view your health information using other applications (apps) compatible with our system.

## 2024-02-16 NOTE — PROGRESS NOTE ADULT - SUBJECTIVE AND OBJECTIVE BOX
INTERVAL HPI/OVERNIGHT EVENTS: no acute events overnight. Pt satting well on NC    SUBJECTIVE: Patient seen and examined at bedside.     ROS: All negative except as listed above.    VITAL SIGNS:  ICU Vital Signs Last 24 Hrs  T(C): 36.7 (16 Feb 2024 05:05), Max: 37.2 (15 Feb 2024 23:19)  T(F): 98 (16 Feb 2024 05:05), Max: 99 (15 Feb 2024 23:19)  HR: 83 (16 Feb 2024 05:05) (64 - 83)  BP: 144/85 (16 Feb 2024 05:05) (114/80 - 167/98)  BP(mean): --  ABP: --  ABP(mean): --  RR: 18 (16 Feb 2024 05:05) (18 - 19)  SpO2: 90% (16 Feb 2024 05:05) (90% - 98%)    O2 Parameters below as of 16 Feb 2024 05:05  Patient On (Oxygen Delivery Method): room air      CAPILLARY BLOOD GLUCOSE          ECG: reviewed.    PHYSICAL EXAM:    GENERAL: NAD, lying in bed comfortably  HEAD:  Atraumatic, normocephalic  EYES: EOMI, PERRL  NECK: Supple, trachea midline, no JVD  HEART: Regular rate and rhythm  LUNGS: Unlabored respirations.  Clear to auscultation bilaterally, no crackles, wheezing, or rhonchi  ABDOMEN: Soft, nontender, nondistended, +BS  EXTREMITIES: 2+ peripheral pulses bilaterally, cap refill<2 secs. No clubbing, cyanosis, or edema  NERVOUS SYSTEM:  A&Ox3, following commands, moving all extremities, no focal deficits       MEDICATIONS:  MEDICATIONS  (STANDING):  budesonide 160 MICROgram(s)/formoterol 4.5 MICROgram(s) Inhaler 2 Puff(s) Inhalation two times a day  enoxaparin Injectable 40 milliGRAM(s) SubCutaneous every 12 hours  escitalopram 10 milliGRAM(s) Oral daily  fluticasone propionate 50 MICROgram(s)/spray Nasal Spray 1 Spray(s) Both Nostrils two times a day  gabapentin 300 milliGRAM(s) Oral three times a day  guaiFENesin  milliGRAM(s) Oral every 12 hours  influenza   Vaccine 0.5 milliLiter(s) IntraMuscular once  levothyroxine 150 MICROGram(s) Oral daily  losartan 50 milliGRAM(s) Oral daily  melatonin 3 milliGRAM(s) Oral at bedtime  montelukast 10 milliGRAM(s) Oral at bedtime  nicotine - 21 mG/24Hr(s) Patch 1 Patch Transdermal daily  pantoprazole  Injectable 40 milliGRAM(s) IV Push daily  tiotropium 2.5 MICROgram(s) Inhaler 2 Puff(s) Inhalation daily    MEDICATIONS  (PRN):  acetaminophen     Tablet .. 650 milliGRAM(s) Oral every 6 hours PRN Temp greater or equal to 38C (100.4F), Mild Pain (1 - 3)  albuterol    90 MICROgram(s) HFA Inhaler 2 Puff(s) Inhalation every 6 hours PRN Shortness of Breath and/or Wheezing  albuterol/ipratropium for Nebulization 3 milliLiter(s) Nebulizer every 6 hours PRN Shortness of Breath and/or Wheezing  aluminum hydroxide/magnesium hydroxide/simethicone Suspension 30 milliLiter(s) Oral every 6 hours PRN Dyspepsia  benzonatate 100 milliGRAM(s) Oral every 8 hours PRN Cough  clonazePAM  Tablet 0.5 milliGRAM(s) Oral two times a day PRN anxiety      ALLERGIES:  Allergies    No Known Allergies    Intolerances              RADIOLOGY & ADDITIONAL TESTS: Reviewed.   INTERVAL HPI/OVERNIGHT EVENTS: no acute events overnight. Pt satting 92% on RA    SUBJECTIVE: Patient seen and examined at bedside.     ROS: All negative except as listed above.    VITAL SIGNS:  ICU Vital Signs Last 24 Hrs  T(C): 36.7 (16 Feb 2024 05:05), Max: 37.2 (15 Feb 2024 23:19)  T(F): 98 (16 Feb 2024 05:05), Max: 99 (15 Feb 2024 23:19)  HR: 83 (16 Feb 2024 05:05) (64 - 83)  BP: 144/85 (16 Feb 2024 05:05) (114/80 - 167/98)  BP(mean): --  ABP: --  ABP(mean): --  RR: 18 (16 Feb 2024 05:05) (18 - 19)  SpO2: 90% (16 Feb 2024 05:05) (90% - 98%)    O2 Parameters below as of 16 Feb 2024 05:05  Patient On (Oxygen Delivery Method): room air      CAPILLARY BLOOD GLUCOSE          ECG: reviewed.    PHYSICAL EXAM:    GENERAL: NAD, lying in bed comfortably  HEAD:  Atraumatic, normocephalic  EYES: EOMI, PERRL  NECK: Supple, trachea midline, no JVD  HEART: Regular rate and rhythm  LUNGS: Unlabored respirations.  Clear to auscultation bilaterally, no crackles, wheezing, or rhonchi  ABDOMEN: Soft, nontender, nondistended, +BS  EXTREMITIES: 2+ peripheral pulses bilaterally, cap refill<2 secs. No clubbing, cyanosis, or edema  NERVOUS SYSTEM:  A&Ox3, following commands, moving all extremities, no focal deficits       MEDICATIONS:  MEDICATIONS  (STANDING):  budesonide 160 MICROgram(s)/formoterol 4.5 MICROgram(s) Inhaler 2 Puff(s) Inhalation two times a day  enoxaparin Injectable 40 milliGRAM(s) SubCutaneous every 12 hours  escitalopram 10 milliGRAM(s) Oral daily  fluticasone propionate 50 MICROgram(s)/spray Nasal Spray 1 Spray(s) Both Nostrils two times a day  gabapentin 300 milliGRAM(s) Oral three times a day  guaiFENesin  milliGRAM(s) Oral every 12 hours  influenza   Vaccine 0.5 milliLiter(s) IntraMuscular once  levothyroxine 150 MICROGram(s) Oral daily  losartan 50 milliGRAM(s) Oral daily  melatonin 3 milliGRAM(s) Oral at bedtime  montelukast 10 milliGRAM(s) Oral at bedtime  nicotine - 21 mG/24Hr(s) Patch 1 Patch Transdermal daily  pantoprazole  Injectable 40 milliGRAM(s) IV Push daily  tiotropium 2.5 MICROgram(s) Inhaler 2 Puff(s) Inhalation daily    MEDICATIONS  (PRN):  acetaminophen     Tablet .. 650 milliGRAM(s) Oral every 6 hours PRN Temp greater or equal to 38C (100.4F), Mild Pain (1 - 3)  albuterol    90 MICROgram(s) HFA Inhaler 2 Puff(s) Inhalation every 6 hours PRN Shortness of Breath and/or Wheezing  albuterol/ipratropium for Nebulization 3 milliLiter(s) Nebulizer every 6 hours PRN Shortness of Breath and/or Wheezing  aluminum hydroxide/magnesium hydroxide/simethicone Suspension 30 milliLiter(s) Oral every 6 hours PRN Dyspepsia  benzonatate 100 milliGRAM(s) Oral every 8 hours PRN Cough  clonazePAM  Tablet 0.5 milliGRAM(s) Oral two times a day PRN anxiety      ALLERGIES:  Allergies    No Known Allergies    Intolerances              RADIOLOGY & ADDITIONAL TESTS: Reviewed.   INTERVAL HPI/OVERNIGHT EVENTS: no acute events overnight. Pt satting 92% on RA    SUBJECTIVE: Patient seen and examined at bedside.     ROS: All negative except as listed above.    VITAL SIGNS:  Vital Signs Last 24 Hrs  T(C): 36.7 (16 Feb 2024 05:05), Max: 37.2 (15 Feb 2024 23:19)  T(F): 98 (16 Feb 2024 05:05), Max: 99 (15 Feb 2024 23:19)  HR: 83 (16 Feb 2024 05:05) (64 - 83)  BP: 144/85 (16 Feb 2024 05:05) (114/80 - 167/98)  RR: 18 (16 Feb 2024 05:05) (18 - 19)  SpO2: 90% (16 Feb 2024 05:05) (90% - 98%)    O2 Parameters below as of 16 Feb 2024 05:05  Patient On (Oxygen Delivery Method): room air          PHYSICAL EXAM:  GENERAL: obese, NAD, sitting upright in chair comfortably  HEAD:  Atraumatic, normocephalic  EYES: EOMI, sclera white  NECK: Supple, trachea midline, short neck  HEART: Regular rate and rhythm  LUNGS: Unlabored respirations.  Clear to auscultation bilaterally, no crackles, wheezing, or rhonchi  ABDOMEN: Soft, nontender, nondistended, +BS, obese abdomen  EXTREMITIES: 2+ peripheral pulses bilaterally, cap refill<2 secs. No clubbing, cyanosis, or edema  NERVOUS SYSTEM:  A&Ox3, following commands, moving all extremities, no focal deficits         MEDICATIONS  (STANDING):  budesonide 160 MICROgram(s)/formoterol 4.5 MICROgram(s) Inhaler 2 Puff(s) Inhalation two times a day  enoxaparin Injectable 40 milliGRAM(s) SubCutaneous every 12 hours  escitalopram 10 milliGRAM(s) Oral daily  fluticasone propionate 50 MICROgram(s)/spray Nasal Spray 1 Spray(s) Both Nostrils two times a day  gabapentin 300 milliGRAM(s) Oral three times a day  guaiFENesin  milliGRAM(s) Oral every 12 hours  influenza   Vaccine 0.5 milliLiter(s) IntraMuscular once  levothyroxine 150 MICROGram(s) Oral daily  losartan 50 milliGRAM(s) Oral daily  melatonin 3 milliGRAM(s) Oral at bedtime  montelukast 10 milliGRAM(s) Oral at bedtime  nicotine - 21 mG/24Hr(s) Patch 1 Patch Transdermal daily  pantoprazole  Injectable 40 milliGRAM(s) IV Push daily  tiotropium 2.5 MICROgram(s) Inhaler 2 Puff(s) Inhalation daily    MEDICATIONS  (PRN):  acetaminophen     Tablet .. 650 milliGRAM(s) Oral every 6 hours PRN Temp greater or equal to 38C (100.4F), Mild Pain (1 - 3)  albuterol    90 MICROgram(s) HFA Inhaler 2 Puff(s) Inhalation every 6 hours PRN Shortness of Breath and/or Wheezing  albuterol/ipratropium for Nebulization 3 milliLiter(s) Nebulizer every 6 hours PRN Shortness of Breath and/or Wheezing  aluminum hydroxide/magnesium hydroxide/simethicone Suspension 30 milliLiter(s) Oral every 6 hours PRN Dyspepsia  benzonatate 100 milliGRAM(s) Oral every 8 hours PRN Cough  clonazePAM  Tablet 0.5 milliGRAM(s) Oral two times a day PRN anxiety        Allergies  No Known Allergies      LABS  no new blood work    Respiratory Viral Panel with COVID-19 by JAILENE (02.05.24 @ 14:50)   Rapid RVP Result: Detected   SARS-CoV-2: NotDetec   Influenza AH3 (RapRVP): Detected            RADIOLOGY & ADDITIONAL TESTS: Reviewed.  < from: Xray Chest 1 View- PORTABLE-Routine (Xray Chest 1 View- PORTABLE-Routine .) (02.10.24 @ 13:45) >  No acute pulmonary pathology.

## 2024-02-16 NOTE — PROGRESS NOTE ADULT - PROVIDER SPECIALTY LIST ADULT
Hospitalist
Hospitalist
Pulmonology
Pulmonology
Hospitalist
Hospitalist
Pulmonology
Hospitalist
Hospitalist
Pulmonology
Hospitalist

## 2024-02-16 NOTE — DISCHARGE NOTE NURSING/CASE MANAGEMENT/SOCIAL WORK - NSDCPEFALRISK_GEN_ALL_CORE
For information on Fall & Injury Prevention, visit: [email or web address] OR  [email or web address] OR  https://www.cdc.gov/steadi/patient.html

## 2024-02-16 NOTE — PROGRESS NOTE ADULT - REASON FOR ADMISSION
Acute hypoxic respiratory failure due to flu A

## 2024-02-16 NOTE — PROGRESS NOTE ADULT - SUBJECTIVE AND OBJECTIVE BOX
INTERVAL HPI/OVERNIGHT EVENTS:  Pt seen and examined at bedside.     Allergies/Intolerance: No Known Allergies      MEDICATIONS  (STANDING):  budesonide 160 MICROgram(s)/formoterol 4.5 MICROgram(s) Inhaler 2 Puff(s) Inhalation two times a day  enoxaparin Injectable 40 milliGRAM(s) SubCutaneous every 12 hours  escitalopram 10 milliGRAM(s) Oral daily  fluticasone propionate 50 MICROgram(s)/spray Nasal Spray 1 Spray(s) Both Nostrils two times a day  gabapentin 300 milliGRAM(s) Oral three times a day  guaiFENesin  milliGRAM(s) Oral every 12 hours  influenza   Vaccine 0.5 milliLiter(s) IntraMuscular once  levothyroxine 150 MICROGram(s) Oral daily  losartan 50 milliGRAM(s) Oral daily  melatonin 3 milliGRAM(s) Oral at bedtime  montelukast 10 milliGRAM(s) Oral at bedtime  nicotine - 21 mG/24Hr(s) Patch 1 Patch Transdermal daily  pantoprazole  Injectable 40 milliGRAM(s) IV Push daily  tiotropium 2.5 MICROgram(s) Inhaler 2 Puff(s) Inhalation daily    MEDICATIONS  (PRN):  acetaminophen     Tablet .. 650 milliGRAM(s) Oral every 6 hours PRN Temp greater or equal to 38C (100.4F), Mild Pain (1 - 3)  albuterol    90 MICROgram(s) HFA Inhaler 2 Puff(s) Inhalation every 6 hours PRN Shortness of Breath and/or Wheezing  albuterol/ipratropium for Nebulization 3 milliLiter(s) Nebulizer every 6 hours PRN Shortness of Breath and/or Wheezing  aluminum hydroxide/magnesium hydroxide/simethicone Suspension 30 milliLiter(s) Oral every 6 hours PRN Dyspepsia  benzonatate 100 milliGRAM(s) Oral every 8 hours PRN Cough  clonazePAM  Tablet 0.5 milliGRAM(s) Oral two times a day PRN anxiety        ROS: all systems reviewed and wnl      PHYSICAL EXAMINATION:  Vital Signs Last 24 Hrs  T(C): 37.1 (16 Feb 2024 10:13), Max: 37.2 (15 Feb 2024 23:19)  T(F): 98.8 (16 Feb 2024 10:13), Max: 99 (15 Feb 2024 23:19)  HR: 77 (16 Feb 2024 10:13) (64 - 83)  BP: 125/70 (16 Feb 2024 10:13) (125/70 - 167/98)  BP(mean): --  RR: 18 (16 Feb 2024 10:13) (18 - 19)  SpO2: 93% (16 Feb 2024 10:13) (90% - 98%)    Parameters below as of 16 Feb 2024 05:05  Patient On (Oxygen Delivery Method): room air      CAPILLARY BLOOD GLUCOSE          02-16 @ 07:01  -  02-16 @ 12:41  --------------------------------------------------------  IN: 420 mL / OUT: 0 mL / NET: 420 mL        GENERAL:   NECK: supple, No JVD  CHEST/LUNG: clear to auscultation bilaterally; no rales, rhonchi, or wheezing b/l  HEART: normal S1, S2  ABDOMEN: BS+, soft, ND, NT   EXTREMITIES:  pulses palpable; no clubbing, cyanosis, or edema b/l LEs  SKIN: no rashes or lesions      LABS:                     INTERVAL HPI/OVERNIGHT EVENTS:  Pt seen and examined at bedside.     Allergies/Intolerance: No Known Allergies      MEDICATIONS  (STANDING):  budesonide 160 MICROgram(s)/formoterol 4.5 MICROgram(s) Inhaler 2 Puff(s) Inhalation two times a day  enoxaparin Injectable 40 milliGRAM(s) SubCutaneous every 12 hours  escitalopram 10 milliGRAM(s) Oral daily  fluticasone propionate 50 MICROgram(s)/spray Nasal Spray 1 Spray(s) Both Nostrils two times a day  gabapentin 300 milliGRAM(s) Oral three times a day  guaiFENesin  milliGRAM(s) Oral every 12 hours  influenza   Vaccine 0.5 milliLiter(s) IntraMuscular once  levothyroxine 150 MICROGram(s) Oral daily  losartan 50 milliGRAM(s) Oral daily  melatonin 3 milliGRAM(s) Oral at bedtime  montelukast 10 milliGRAM(s) Oral at bedtime  nicotine - 21 mG/24Hr(s) Patch 1 Patch Transdermal daily  pantoprazole  Injectable 40 milliGRAM(s) IV Push daily  tiotropium 2.5 MICROgram(s) Inhaler 2 Puff(s) Inhalation daily    MEDICATIONS  (PRN):  acetaminophen     Tablet .. 650 milliGRAM(s) Oral every 6 hours PRN Temp greater or equal to 38C (100.4F), Mild Pain (1 - 3)  albuterol    90 MICROgram(s) HFA Inhaler 2 Puff(s) Inhalation every 6 hours PRN Shortness of Breath and/or Wheezing  albuterol/ipratropium for Nebulization 3 milliLiter(s) Nebulizer every 6 hours PRN Shortness of Breath and/or Wheezing  aluminum hydroxide/magnesium hydroxide/simethicone Suspension 30 milliLiter(s) Oral every 6 hours PRN Dyspepsia  benzonatate 100 milliGRAM(s) Oral every 8 hours PRN Cough  clonazePAM  Tablet 0.5 milliGRAM(s) Oral two times a day PRN anxiety        ROS: all systems reviewed and wnl      PHYSICAL EXAMINATION:  Vital Signs Last 24 Hrs  T(C): 37.1 (16 Feb 2024 10:13), Max: 37.2 (15 Feb 2024 23:19)  T(F): 98.8 (16 Feb 2024 10:13), Max: 99 (15 Feb 2024 23:19)  HR: 77 (16 Feb 2024 10:13) (64 - 83)  BP: 125/70 (16 Feb 2024 10:13) (125/70 - 167/98)  BP(mean): --  RR: 18 (16 Feb 2024 10:13) (18 - 19)  SpO2: 93% (16 Feb 2024 10:13) (90% - 98%)    Parameters below as of 16 Feb 2024 05:05  Patient On (Oxygen Delivery Method): room air      CAPILLARY BLOOD GLUCOSE          02-16 @ 07:01  -  02-16 @ 12:41  --------------------------------------------------------  IN: 420 mL / OUT: 0 mL / NET: 420 mL        GENERAL: stable, comfortable on RA  NECK: supple, No JVD  CHEST/LUNG: clear to auscultation bilaterally; no rales, rhonchi, or wheezing b/l  HEART: normal S1, S2  ABDOMEN: BS+, soft, ND, NT   EXTREMITIES:  pulses palpable; no clubbing, cyanosis, or edema b/l LEs    LABS:

## 2024-02-16 NOTE — PROGRESS NOTE ADULT - NS ATTEND AMEND GEN_ALL_CORE FT
64 year old female with COPD on 3LNC at home, HTN, HLD, OA, bladder cancer, hypothyroidism, T2DM, and anxiety admitted for acute hypoxic respiratory failure and COPD exacerbation due to Influenza A.     Plan   - Continue to improve, currently on 4L NC   - Completed course of treatment for influenza  - c/w Duonebs q6h PRN  - c/w Symbicort 160-4.5 2 puffs BID  - Patient will need NIV on discharge   - Patient to follow up with Dr. Alcala as an outpatient
case discussed with PA    agree with findings and plan of care    O2 supplementation weaned from 10L -> 3L NC  no events overnight    64F active smoker since 15 years old with PMH obesity, COPD on 3LNC at home, MEENU, HTN, HLD, DM, bladder cancer, hypothyroidism, cholelithiasis, OA, anxiety presents to ED for SOB and cough. RVP positive for Influenza A. Admitted to medical service for acute COPD exacerbation with influenza A    DX: acute on chronic hypoxic respiratory failure, chronic hypercarbic respiratory failure, acute COPD exacerbation, influenza A infection, MEENU, active smoker    - goal to maintain O2 sat > 88%  - baseline oxygen dependent on 3L NC at home  - wean down FIO2 as tolerates  - O2 weaned to 3L NC today  - cont tamiflu for underlying influenza A infection, to complete 5 day course of treatment  - cont with prednisone 40mg daily for 5 day course  - cont duonebs q6 hrs+ hypertonic saline nebs + chest PT q6 hrs to facilitate secretion mobilization  - cont with nocturnal BIPAP for underlying MEENU and chronic hypercarbic respiratory failure  - smoking cessation, pt not ready to quit, nicotine patch  - OOB to chair
64 year old female with COPD on 3LNC at home, HTN, HLD, OA, bladder cancer, hypothyroidism, T2DM, and anxiety admitted for acute hypoxic respiratory failure and COPD exacerbation due to Influenza A.     Plan   - Continue to improve  - Completed course of treatment for influenza  - c/w Duonebs q6h PRN  - c/w Symbicort 160-4.5 2 puffs BID  - Patient will need NIV on discharge   - Patient to follow up with Dr. Alcala as an outpatient
pt seen and examined    wants to go home  denies SOB  "I have no issues with my breathing. Everything is fine"  yesterday evening had RRT for lethargy and hypoxia to the 50s (pt self removed O2 which improved with oxygen reapplied. she also had taken additional klonopin pills x3 from her personal belongings self medicating for anxiety)    64F active smoker since 15 years old with PMH obesity, COPD on 3LNC at home, MEENU, HTN, HLD, DM, bladder cancer, hypothyroidism, cholelithiasis, OA, anxiety presents to ED for SOB and cough. RVP positive for Influenza A. Admitted to medical service for acute COPD exacerbation with influenza A    DX: acute on chronic hypoxic respiratory failure, chronic hypercarbic respiratory failure, acute COPD exacerbation, influenza A infection, MEENU, active smoker    - currently on 8L NC  - baseline oxygen dependent on 3L NC at home  - wean down FIO2 as tolerates  - maintain goal O2 sat > 88%  - cont tamiflu for underlying influenza A infection, to complete 5 day course of treatment  - cont with prednisone 40mg daily for 5 day course  - cont duonebs q6 hrs  - manual chest PT performed at bedside  - add hypertonic saline nebs + chest PT q6 hrs to facilitate secretion mobilization  - cont with nocturnal BIPAP for underlying MEENU and chronic hypercarbic respiratory failure  - smoking cessation, pt not ready to quit  - re-educated patient on the importance of wearing oxygen and remaining compliant  - pt would like to go home, however still acutely ill and advised to remain in hospital for treatment
pt seen and examined at bedside    comfortable   NAD  no SOB, no cough  currently on RA with O2 sat low 90s at rest  afebrile  d/c planning in progress    64F active smoker since 15 years old with PMH obesity, COPD on 3LNC at home, MEENU, HTN, HLD, DM, bladder cancer, hypothyroidism, cholelithiasis, OA, anxiety presents to ED for SOB and cough. RVP positive for Influenza A. Admitted to medical service for acute COPD exacerbation with influenza A    DX: acute on chronic hypoxic respiratory failure, chronic hypercarbic respiratory failure, acute COPD exacerbation, influenza A infection, MEENU, active smoker    - weaned to baseline O2 requirement of 3L NC  - oxygenating well on 3L at 95%  - maintain goal O2 sat > 88%  - on RA at rest today with O2 sat 93%  - s/p 5 day course of tamiflu for underlying influenza A infection  - s/p 5 day course of prednisone 40mg daily for COPD exacerbation  - clinically improved  - cont duunebs prn  - no wheeze on exam, secretions resolved  - cont symbicort + spiriva for underlying COPD  - cont with nocturnal BIPAP for underlying MEENU and chronic hypercarbic respiratory failure  - pending home NIV setup, to be delivered today  - smoking cessation discussed with patient again had been reluctant to quit however now reconsidering, nicotine patch  - respiratory status stable  - d/c planning per primary team  - d/w hospitalist

## 2024-02-16 NOTE — PROGRESS NOTE ADULT - ASSESSMENT
64F active smoker since 15 years old with PMH obesity, COPD on 3LNC at home, MEENU, HTN, HLD, DM, bladder cancer, hypothyroidism, cholelithiasis, OA, anxiety presents to ED for SOB and cough. RVP positive for Influenza A. Admitted to medical service for acute COPD exacerbation with influenza A    DX: acute on chronic hypoxic respiratory failure, chronic hypercarbic respiratory failure, acute COPD exacerbation, influenza A infection, MEENU, active smoker    - currently at baseline O2 requirement of 3L NC  - oxygenating well on 3L at 95%  - maintain goal O2 sat > 88%  - s/p 5 day course of tamiflu for underlying influenza A infection  - completed 5 day course of prednisone 40mg daily for COPD exacerbation  - has been on duonebs + hypertonic saline nebs q6 hrs  - nebs prn  - cont symbicort and spiriva   - cont with nocturnal BIPAP for underlying MEENU and chronic hypercarbic respiratory failure  - pending home NIV setup  - smoking cessation, pt not ready to quit, nicotine patch  - d/c planning per primary team  - outpatient pulmonary follow up with pulmonologist     note incomplete 64F active smoker since 15 years old with PMH obesity, COPD on 3LNC at home, MEENU, HTN, HLD, DM, bladder cancer, hypothyroidism, cholelithiasis, OA, anxiety presents to ED for SOB and cough. RVP positive for Influenza A. Admitted to medical service for acute COPD exacerbation with influenza A    DX: acute on chronic hypoxic respiratory failure, chronic hypercarbic respiratory failure, acute COPD exacerbation, influenza A infection, MEENU, active smoker    - satting well on RA  - s/p 5 day course of tamiflu for underlying influenza A infection  - completed 5 day course of prednisone 40mg daily for COPD exacerbation  - nebs prn  - cont symbicort and spiriva   - cont with nocturnal BIPAP for underlying MEENU and chronic hypercarbic respiratory failure  - pending home NIV setup  - smoking cessation, pt not ready to quit, nicotine patch  - d/c planning per primary team  - outpatient pulmonary follow up with pulmonologist     d/w dr cohen

## 2024-02-16 NOTE — DISCHARGE NOTE NURSING/CASE MANAGEMENT/SOCIAL WORK - NSDCVIVACCINE_GEN_ALL_CORE_FT
Tdap; 03-Mar-2016 17:33; Karli Lewis (RN); [employer]; [id number]; IntraMuscular; Deltoid Left.; 0.5 milliLiter(s); VIS (VIS Published: 09-May-2013, VIS Presented: 03-Mar-2016);

## 2024-02-16 NOTE — PROGRESS NOTE ADULT - ASSESSMENT
JOZEF CRAIN is a 64y Female with a past medical history as described above who presented for evaluation of shortness of breath.     # Acute on Chronic Hypercarbic and  Hypoxic Respiratory Failure due to Influenza A  # acute COPD exacerbation  # Tobacco use disorder  - Acute on chronic hypoxic respiratory failure. On 3L at home at baseline. CTA chest to R/O PE, Venous doppler US BL LE to R/O DVT. Flu positive.   - S/p Tamiflu 75mg PO twice daily x5d, steroid course.  Briefly required Bipap but now saturating well on Nasal cannula. Pulmonology evaluated patient. Given nicotine replacement here. Back on home 02 3-4L. Symptomatically improved. Multiple RRTs d/t patient taking off oxygen and taking her own klonopin. She was educated multiple times about the importance of keeping her oxygen on.   - C/w Duonebs   - Now on home O2. SpO2 88-92% goal, NIV for home ordered   - Continue home inhalers   - Tessalon PRN cough   - Tylenol PRN fevers  - Incentive spirometer   - pulmonary following     # HTN  - Stable on Home meds    # T2DM  - FS qAC and qHS, insulin protocol if needed   - CC/DASH diet     # Hypothyroidism  - Stable, Synthroid 150mcg PO daily     Stable for d/c home with NIV. currently accepted, pending home assessment 2/16     JOZEF CRAIN is a 64y Female with a past medical history as described above who presented for evaluation of shortness of breath.     # Acute on Chronic Hypercarbic and  Hypoxic Respiratory Failure due to Influenza A  # acute COPD exacerbation  # Tobacco use disorder  - Acute on chronic hypoxic respiratory failure. On 3L at home at baseline. CTA chest to R/O PE, Venous doppler US BL LE to R/O DVT. Flu positive.   - S/p Tamiflu 75mg PO twice daily x5d, steroid course.  Briefly required Bipap but now saturating well on Nasal cannula. Pulmonology evaluated patient. Given nicotine replacement here. Back on home 02 3-4L. Symptomatically improved. Multiple RRTs d/t patient taking off oxygen and taking her own klonopin. She was educated multiple times about the importance of keeping her oxygen on.   - C/w Duonebs   - Now on home O2. SpO2 88-92% goal, NIV for home ordered   - Continue home inhalers   - Tessalon PRN cough   - Tylenol PRN fevers  - Incentive spirometer   - pulmonary following     # HTN  - Stable on Home meds    # T2DM  - FS qAC and qHS, insulin protocol if needed   - CC/DASH diet     # Hypothyroidism  - Stable, Synthroid 150mcg PO daily     Addendum:  Stable for d/c home with home BIPAP. Will be delivered today.

## 2024-02-21 DIAGNOSIS — E66.01 MORBID (SEVERE) OBESITY DUE TO EXCESS CALORIES: ICD-10-CM

## 2024-02-21 DIAGNOSIS — F17.210 NICOTINE DEPENDENCE, CIGARETTES, UNCOMPLICATED: ICD-10-CM

## 2024-02-21 DIAGNOSIS — J10.1 INFLUENZA DUE TO OTHER IDENTIFIED INFLUENZA VIRUS WITH OTHER RESPIRATORY MANIFESTATIONS: ICD-10-CM

## 2024-02-21 DIAGNOSIS — R06.02 SHORTNESS OF BREATH: ICD-10-CM

## 2024-02-21 DIAGNOSIS — J43.9 EMPHYSEMA, UNSPECIFIED: ICD-10-CM

## 2024-02-21 DIAGNOSIS — Z90.49 ACQUIRED ABSENCE OF OTHER SPECIFIED PARTS OF DIGESTIVE TRACT: ICD-10-CM

## 2024-02-21 DIAGNOSIS — G89.29 OTHER CHRONIC PAIN: ICD-10-CM

## 2024-02-21 DIAGNOSIS — J96.22 ACUTE AND CHRONIC RESPIRATORY FAILURE WITH HYPERCAPNIA: ICD-10-CM

## 2024-02-21 DIAGNOSIS — Z85.51 PERSONAL HISTORY OF MALIGNANT NEOPLASM OF BLADDER: ICD-10-CM

## 2024-02-21 DIAGNOSIS — F41.9 ANXIETY DISORDER, UNSPECIFIED: ICD-10-CM

## 2024-02-21 DIAGNOSIS — F40.00 AGORAPHOBIA, UNSPECIFIED: ICD-10-CM

## 2024-02-21 DIAGNOSIS — G47.33 OBSTRUCTIVE SLEEP APNEA (ADULT) (PEDIATRIC): ICD-10-CM

## 2024-02-21 DIAGNOSIS — I10 ESSENTIAL (PRIMARY) HYPERTENSION: ICD-10-CM

## 2024-02-21 DIAGNOSIS — Z79.52 LONG TERM (CURRENT) USE OF SYSTEMIC STEROIDS: ICD-10-CM

## 2024-02-21 DIAGNOSIS — J96.21 ACUTE AND CHRONIC RESPIRATORY FAILURE WITH HYPOXIA: ICD-10-CM

## 2024-02-21 DIAGNOSIS — E03.9 HYPOTHYROIDISM, UNSPECIFIED: ICD-10-CM

## 2024-02-21 DIAGNOSIS — Z99.81 DEPENDENCE ON SUPPLEMENTAL OXYGEN: ICD-10-CM

## 2024-02-21 DIAGNOSIS — E11.9 TYPE 2 DIABETES MELLITUS WITHOUT COMPLICATIONS: ICD-10-CM

## 2024-02-21 DIAGNOSIS — M54.9 DORSALGIA, UNSPECIFIED: ICD-10-CM

## 2024-02-21 DIAGNOSIS — Z79.84 LONG TERM (CURRENT) USE OF ORAL HYPOGLYCEMIC DRUGS: ICD-10-CM

## 2024-02-23 NOTE — ED PROVIDER NOTE - CROS ED RESP ALL NEG
Caller: Grzegorz Gutierrez    Relationship: Self         What was the call regarding: HAD TO WORK           negative...

## 2024-03-01 NOTE — ED ADULT NURSE NOTE - NS_NURSE_DISC_TEACHING_YN_ED_ALL_ED
Yes
Detail Level: Detailed
Quality 226: Preventive Care And Screening: Tobacco Use: Screening And Cessation Intervention: Patient screened for tobacco use and is an ex/non-smoker

## 2024-03-18 NOTE — DISCHARGE NOTE PROVIDER - NSDCQMCOGNITION_NEU_ALL_CORE
No difficulties Julio Gregg  Neurology  [address], [address]  [place] [address]  Phone: [phone]  Fax: [phone]  Follow Up Time: 4-6 Days    James Castañeda  Neurology  [address], [address]  [place] [address]  Phone: [phone]  Fax: [phone]  Follow Up Time: 4-6 Days

## 2024-04-22 RX ORDER — CLONAZEPAM 1 MG
1 TABLET ORAL
Refills: 0 | DISCHARGE

## 2024-04-22 RX ORDER — INSULIN GLARGINE 100 [IU]/ML
4 INJECTION, SOLUTION SUBCUTANEOUS
Refills: 0 | DISCHARGE

## 2024-06-15 NOTE — SWALLOW BEDSIDE ASSESSMENT ADULT - COMMENTS
CXR 2/17/2021 IMPRESSION:Cardiomegaly. Clear lungs.     CT neck soft tissue with IV contrast 2/15/2021FINDINGS:Aerodigestive structures: No evidence of mass or abnormal enhancement.  Lymph nodes: No pathologic adenopathy by imaging size criteria.  Parotid and submandibular glands:  Normal. Thyroid gland: Normal.  Vascular structures: Patent. Retropharyngeal course of the bilateral cervical ICAs.  Osseous structures: No fracture, dislocation or destructive lesion.   IMPRESSION:No abscess or swelling involving the aerodigestive structures. No cervical lymphadenopathy. Yes

## 2024-07-08 NOTE — ED PROVIDER NOTE - NSTIMEPROVIDERCAREINITIATE_GEN_ER
Patient removed form all restraints at this time. Patient educated on inappropriate behaviors (masturbation) and expectations of patient now that she has been released from her restraints. Patient refusing to acknowledge education at this time.   02-May-2020 13:22

## 2024-07-26 NOTE — PROGRESS NOTE ADULT - SUBJECTIVE AND OBJECTIVE BOX
SCHOOL/WORK NOTE      24        Regarding:  Renetta Carlos  : 1990                     This is to certify that Renetta Carlos has been hospitalized from [unfilled] to 24 at [facility]. Please excuse her from school/work during these dates. She may return to work on  with restrictions until lifted by her primary care doctor:    Avoid shoulder or handbag use on the left arm.  Avoid lifting more than 5 pounds.   Avoid repeated outreaching of the arm.   Avoid work at or above shoulder height.  Avoid repeated forceful activities with the arm.   Avoid fast and repetitive tasks with the arm.      Sincerely,      Ashwin Vu MD  [place] [address]  Ph: [phone]    Patient Privacy Notice     The  Cent[employer] Cures Act makes medical notes like these available to patients in the interest of transparency. Please be advised that this is a medical document. Medical documents are intended to carry relevant information and the clinical opinion of the practitioner. The medical note is intended as medical provider to provider communication, and may appear blunt or direct. It is written in medical language, and may contain abbreviations or verbiage that are unfamiliar.     PROGRESS NOTE:   63F w/ hx of COPD, DM, HTN p/w RLE pain and decreased ambulation. Pt presented the day prior and found to have R proximal fibular fracture. Pt reports decreased ability to ambulate 2/2 pain.         SUBJECTIVE & OBJECTIVE:   Pt seen and examined at bedside in AM . Overnight, no acute events. Pending placement to [facility]. c/o pain in affected foot. Doesn't like how morphine makes her feel.      REVIEW OF SYSTEMS: remaining ROS negative     PHYSICAL EXAM:  T(C): 36.5 (10-03-23 @ 12:10), Max: 37.1 (10-02-23 @ 22:08)  HR: 67 (10-03-23 @ 12:10) (60 - 89)  BP: 96/65 (10-03-23 @ 12:10) (96/65 - 136/74)  RR: 18 (10-03-23 @ 12:10) (18 - 19)  SpO2: 95% (10-03-23 @ 12:10) (67% - 100%)  Wt(kg): -- Height (cm): 154.9 (10-03 @ 00:39)  Weight (kg): 112.8 (10-03 @ 00:39)  BMI (kg/m2): 47 (10-03 @ 00:39)  BSA (m2): 2.07 (10-03 @ 00:39)    I&O's Detail        GENERAL: NAD,  no increased WOB  HEAD:  Atraumatic, Normocephalic  EYES: EOMI, PERRLA, conjunctiva and sclera clear  ENMT: Moist mucous membranes  NECK: Supple, No JVD  NERVOUS SYSTEM:  awake and alert, no focal neuro deficits   CHEST/LUNG: Clear to auscultation bilaterally; No rales, rhonchi, wheezing, or rubs  HEART: Regular rate and rhythm; No murmurs, rubs, or gallops  ABDOMEN: obese, Soft, Nontender, Nondistended; Bowel sounds present  EXTREMITIES:  Right foot wrapped up     MEDICATIONS  (STANDING):  clonazePAM  Tablet 1 milliGRAM(s) Oral three times a day  dextrose 5%. 1000 milliLiter(s) (100 mL/Hr) IV Continuous <Continuous>  dextrose 5%. 1000 milliLiter(s) (50 mL/Hr) IV Continuous <Continuous>  dextrose 50% Injectable 25 Gram(s) IV Push once  dextrose 50% Injectable 12.5 Gram(s) IV Push once  dextrose 50% Injectable 25 Gram(s) IV Push once  enoxaparin Injectable 40 milliGRAM(s) SubCutaneous every 24 hours  glucagon  Injectable 1 milliGRAM(s) IntraMuscular once  influenza   Vaccine 0.5 milliLiter(s) IntraMuscular once  insulin glargine Injectable (LANTUS) 4 Unit(s) SubCutaneous at bedtime  insulin lispro (ADMELOG) corrective regimen sliding scale   SubCutaneous three times a day before meals  zinc oxide 40% Paste 1 Application(s) Topical every 12 hours    MEDICATIONS  (PRN):  acetaminophen     Tablet .. 650 milliGRAM(s) Oral every 6 hours PRN Mild Pain (1 - 3)  acetaminophen     Tablet .. 650 milliGRAM(s) Oral every 6 hours PRN Temp greater or equal to 38C (100.4F), Mild Pain (1 - 3)  aluminum hydroxide/magnesium hydroxide/simethicone Suspension 30 milliLiter(s) Oral every 4 hours PRN Dyspepsia  dextrose Oral Gel 15 Gram(s) Oral once PRN Blood Glucose LESS THAN 70 milliGRAM(s)/deciliter  ketorolac   Injectable 15 milliGRAM(s) IV Push every 8 hours PRN Moderate Pain (4 - 6)  melatonin 3 milliGRAM(s) Oral at bedtime PRN Insomnia  morphine  - Injectable 2 milliGRAM(s) IV Push every 4 hours PRN Severe Pain (7 - 10)  ondansetron Injectable 4 milliGRAM(s) IV Push every 8 hours PRN Nausea and/or Vomiting      LABS:                        15.4   8.71  )-----------( 148      ( 03 Oct 2023 07:44 )             48.3     10-03    143  |  103  |  13  ----------------------------<  74  3.4<L>   |  35<H>  |  1.17    Ca    8.0<L>      03 Oct 2023 07:44    TPro  7.7  /  Alb  4.0  /  TBili  1.3<H>  /  DBili  x   /  AST  40<H>  /  ALT  23  /  AlkPhos  78  10-02      Urinalysis Basic - ( 03 Oct 2023 07:44 )    Color: x / Appearance: x / SG: x / pH: x  Gluc: 74 mg/dL / Ketone: x  / Bili: x / Urobili: x   Blood: x / Protein: x / Nitrite: x   Leuk Esterase: x / RBC: x / WBC x   Sq Epi: x / Non Sq Epi: x / Bacteria: x            RECENT CULTURES:          RADIOLOGY & ADDITIONAL TESTS:          Radiology reports read and imaging reviewed  :  [ x] YES  [ ] NO  (I am not a radiologist and therefore rely on Radiologist reports to facilitate with diagnosis and treatment plans)    Consultant(s) Notes Reviewed:  [x ] YES  [ ] NO    Care Discussed with Consultants/Other Providers [x ] YES  [ ] NO  Care plan and all findings were discussed in detail with patient.  All questions and concerns addressed

## 2024-07-29 NOTE — ED ADULT NURSE NOTE - PAIN RATING/NUMBER SCALE (0-10): ACTIVITY
7/29/2024       RE: Lopez Hogue  [address] [address]     Dear Colleague,    Thank you for referring your patient, Lopez Hogue, to the [facility] [place] at [facility]. Please see a copy of my visit note below.    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 Esophagectomy     Imaging:   CT neck 7/8/24: prominent right lingual tonsil tissue suspicious for recurrence  PET/CT 7/26/24: ZANE    Interval history: Doing well today. Eating well. No complaints     Physical Examination:  Alert, NAD  Breathing comfortably  No palpable adenopathy  No lesions in oral cavity or oropharynx     Procedure: Fiberoptic laryngoscopy performed. No lesions seen. Airway patent     A/P:  Doing well without signs of disease in the HN.Follow up pet after concerning CT shows no disease. This is consistent with physical exam. We will continue routine follow up. I will see him back in 3 months.     Edi Felix MD        25 minutes spent on the date of the encounter in chart review, patient visit, review of tests, documentation and/or discussion with other providers about the issues documented above asides from time spent doing flexible laryngoscopy.      Again, thank you for allowing me to participate in the care of your patient.      Sincerely,    Edi Felix MD     8

## 2024-08-07 NOTE — DISCHARGE NOTE PROVIDER - REASON FOR ADMISSION
08/07/24 1139   PICC 06/13/24 Left Cephalic   Placement Date/Time: 06/13/24 0005   Present on Admission/Arrival: Yes  Lumen Type: Single Lumen PICC  Orientation: Left  Location: Cephalic   Central Line Being Utilized Yes   Criteria for Appropriate Use Long term IV/antibiotic administration   Site Assessment Clean, dry & intact   Phlebitis Assessment No symptoms   Infiltration Assessment 0   External Catheter Length (cm) 2 cm   Line Care Connections checked and tightened   Alcohol Cap Used Yes   Date of Last Dressing Change 08/07/24   Dressing Type Transparent w/CHG gel   Dressing Status New dressing applied   Dressing Intervention New;Security device changed;Dressing changed     Dressing changed per order using sterile technique. Line flushed and capped. Pt tolerated well.   
Acute hypoxic respiratory failure due to flu A

## 2024-08-09 NOTE — ED ADULT NURSE NOTE - HISTORY OF COVID-19 VACCINATION
Writer called patient on this day from the abdominal transplant clinic to update patient that her case was reviewed this morning at the kidney transplant selection committee meeting and based on the committee's decision - patient can be active again on the kidney transplant waitlist.    Writer went on to update patient that she will be receiving a letter outlying the status change - writer will also include an updated  Medication Teaching Sheet and a copy of Transplant Tips  for patient's review    Writer also reminde patient of her next appointment here in the clinic with Dr Maravilla on 9/23/2024     Patient verbalizes understanding of the above and currently has no additional questions or concerns at this time    +++++++++++++++++++++  Writer also called patient's dialysis unit [facility] and updated dialysis staff that patient is active again on the kidney transplant waitlist    +++++++++++++++++++++  Waitlist active letter mailed to patient and copy faxed to       JACLYN KLINE  [address]  [address] [address]  Phone: [phone]   Fax: [phone]        
No

## 2024-08-11 ENCOUNTER — EMERGENCY (EMERGENCY)
Facility: HOSPITAL | Age: 64
LOS: 0 days | Discharge: HOME | End: 2024-08-12
Attending: EMERGENCY MEDICINE
Payer: MEDICAID

## 2024-08-11 VITALS
DIASTOLIC BLOOD PRESSURE: 87 MMHG | TEMPERATURE: 98 F | SYSTOLIC BLOOD PRESSURE: 134 MMHG | OXYGEN SATURATION: 98 % | HEIGHT: 61 IN | WEIGHT: 169.98 LBS | HEART RATE: 78 BPM | RESPIRATION RATE: 18 BRPM

## 2024-08-11 DIAGNOSIS — Z90.49 ACQUIRED ABSENCE OF OTHER SPECIFIED PARTS OF DIGESTIVE TRACT: Chronic | ICD-10-CM

## 2024-08-11 DIAGNOSIS — E11.9 TYPE 2 DIABETES MELLITUS WITHOUT COMPLICATIONS: ICD-10-CM

## 2024-08-11 DIAGNOSIS — R06.02 SHORTNESS OF BREATH: ICD-10-CM

## 2024-08-11 DIAGNOSIS — J44.9 CHRONIC OBSTRUCTIVE PULMONARY DISEASE, UNSPECIFIED: ICD-10-CM

## 2024-08-11 DIAGNOSIS — F41.9 ANXIETY DISORDER, UNSPECIFIED: ICD-10-CM

## 2024-08-11 DIAGNOSIS — I10 ESSENTIAL (PRIMARY) HYPERTENSION: ICD-10-CM

## 2024-08-11 DIAGNOSIS — E03.9 HYPOTHYROIDISM, UNSPECIFIED: ICD-10-CM

## 2024-08-11 DIAGNOSIS — F17.200 NICOTINE DEPENDENCE, UNSPECIFIED, UNCOMPLICATED: ICD-10-CM

## 2024-08-11 PROCEDURE — 71045 X-RAY EXAM CHEST 1 VIEW: CPT | Mod: 26

## 2024-08-11 PROCEDURE — 99285 EMERGENCY DEPT VISIT HI MDM: CPT

## 2024-08-11 PROCEDURE — 93010 ELECTROCARDIOGRAM REPORT: CPT

## 2024-08-11 RX ORDER — IPRATROPIUM BROMIDE AND ALBUTEROL SULFATE 2.5; .5 MG/3ML; MG/3ML
3 SOLUTION RESPIRATORY (INHALATION) ONCE
Refills: 0 | Status: COMPLETED | OUTPATIENT
Start: 2024-08-11 | End: 2024-08-11

## 2024-08-11 RX ORDER — CLONAZEPAM 0.5 MG/1
1 TABLET ORAL ONCE
Refills: 0 | Status: DISCONTINUED | OUTPATIENT
Start: 2024-08-11 | End: 2024-08-11

## 2024-08-11 RX ADMIN — CLONAZEPAM 1 MILLIGRAM(S): 0.5 TABLET ORAL at 23:47

## 2024-08-11 RX ADMIN — IPRATROPIUM BROMIDE AND ALBUTEROL SULFATE 3 MILLILITER(S): 2.5; .5 SOLUTION RESPIRATORY (INHALATION) at 23:46

## 2024-08-11 NOTE — ED PROVIDER NOTE - NSICDXPASTMEDICALHX_GEN_ALL_CORE_FT
PAST MEDICAL HISTORY:  Agoraphobia     Anxiety     Bladder cancer     COPD (chronic obstructive pulmonary disease)     COPD, severe     DM (diabetes mellitus)     DM (diabetes mellitus)     Emphysema lung     HTN (hypertension)     HTN (hypertension)     Hypothyroid     Obesity     Severe anxiety     Smoker

## 2024-08-11 NOTE — ED PROVIDER NOTE - CLINICAL SUMMARY MEDICAL DECISION MAKING FREE TEXT BOX
Patient with negative workup in the ED given additional dose of her Klonopin 1 mg.  Patient states that she normally used to take 2 mg twice daily but recently was placed on 1 mg twice daily by her PMD.  States that she did feel better after medication and otherwise lungs were clear on x-ray and auscultation with no major findings on lab work indicating any acute pathology that may be causing shortness of breath.  Patient with improvement noted sustained will DC home with outpatient PMD follow-up as needed.

## 2024-08-11 NOTE — ED PROVIDER NOTE - PATIENT PORTAL LINK FT
You can access the FollowMyHealth Patient Portal offered by [facility] by registering at the following website: [email or web address]. By joining [employer]’s FollowMyHealth portal, you will also be able to view your health information using other applications (apps) compatible with our system.

## 2024-08-11 NOTE — ED ADULT NURSE NOTE - NSFALLRISKINTERV_ED_ALL_ED

## 2024-08-11 NOTE — ED PROVIDER NOTE - OBJECTIVE STATEMENT
64-year-old female with history of anxiety, agoraphobia, hypothyroid, COPD, hypertension, diabetes and recent uterine CA status post resection about 2 to 3 months ago presenting to ER due to feeling of shortness of breath and anxiety from home.  Patient denies any leg swelling no URI symptoms but states that she did recently smoke about a few days ago before symptoms began.

## 2024-08-11 NOTE — ED ADULT TRIAGE NOTE - CHIEF COMPLAINT QUOTE
pt c/o shortness of breath and chest pain x couple of days.  Pt on home o2 3L NC.  O2 sat on RA 96%. denies any recent travels, cough, fever, sick contact, pain.   Pt smoker.   hx of COPD, urine cancer, HTN. nkda

## 2024-08-11 NOTE — ED ADULT NURSE NOTE - OBJECTIVE STATEMENT
Pt presents to ED a&ox3 c/o shortness of breath "for a few days." Described as "pressure on my chest when I take a deep breath in." The pt uses 3L nasal cannula at baseline. She states "I messed up because I've been smoking cigs again, and I know I'm not supposed to." O2 sat maintained at 100% on 3L. Placed on cardiac monitor. Pt is anxious, repeatedly states "I'm just so nervous. I don't like to be alone, I'm scared." Pt presents to ED a&ox3 c/o shortness of breath "for a few days." Described as "pressure on my chest when I take a deep breath in." The pt uses 3L nasal cannula at baseline. She states "I messed up because I've been smoking cigs again, and I know I'm not supposed to." O2 sat maintained at 100% on 3L. Placed on cardiac monitor. Pt is anxious, repeatedly states "I'm just so nervous. I don't like to be alone, I'm scared." PMH copd 3L nc, uterine c &, hysterectomy treated at [facility], htn

## 2024-08-12 VITALS
RESPIRATION RATE: 19 BRPM | TEMPERATURE: 98 F | SYSTOLIC BLOOD PRESSURE: 120 MMHG | DIASTOLIC BLOOD PRESSURE: 64 MMHG | OXYGEN SATURATION: 100 % | HEART RATE: 72 BPM

## 2024-08-12 PROBLEM — J44.9 CHRONIC OBSTRUCTIVE PULMONARY DISEASE, UNSPECIFIED: Chronic | Status: ACTIVE | Noted: 2023-10-03

## 2024-08-12 PROBLEM — I10 ESSENTIAL (PRIMARY) HYPERTENSION: Chronic | Status: ACTIVE | Noted: 2023-10-03

## 2024-08-12 PROBLEM — E11.9 TYPE 2 DIABETES MELLITUS WITHOUT COMPLICATIONS: Chronic | Status: ACTIVE | Noted: 2023-10-03

## 2024-08-12 PROBLEM — F41.9 ANXIETY DISORDER, UNSPECIFIED: Chronic | Status: ACTIVE | Noted: 2023-10-03

## 2024-08-12 LAB
ALBUMIN SERPL ELPH-MCNC: 3.5 G/DL — SIGNIFICANT CHANGE UP (ref 3.3–5)
ALP SERPL-CCNC: 80 U/L — SIGNIFICANT CHANGE UP (ref 40–120)
ALT FLD-CCNC: 16 U/L — SIGNIFICANT CHANGE UP (ref 12–78)
ANION GAP SERPL CALC-SCNC: 9 MMOL/L — SIGNIFICANT CHANGE UP (ref 5–17)
APTT BLD: 36.2 SEC — HIGH (ref 24.5–35.6)
AST SERPL-CCNC: 17 U/L — SIGNIFICANT CHANGE UP (ref 15–37)
BASOPHILS # BLD AUTO: 0.12 K/UL — SIGNIFICANT CHANGE UP (ref 0–0.2)
BASOPHILS NFR BLD AUTO: 0.7 % — SIGNIFICANT CHANGE UP (ref 0–2)
BILIRUB SERPL-MCNC: 0.2 MG/DL — SIGNIFICANT CHANGE UP (ref 0.2–1.2)
BUN SERPL-MCNC: 9 MG/DL — SIGNIFICANT CHANGE UP (ref 7–23)
CALCIUM SERPL-MCNC: 9.9 MG/DL — SIGNIFICANT CHANGE UP (ref 8.5–10.1)
CHLORIDE SERPL-SCNC: 99 MMOL/L — SIGNIFICANT CHANGE UP (ref 96–108)
CO2 SERPL-SCNC: 27 MMOL/L — SIGNIFICANT CHANGE UP (ref 22–31)
CREAT SERPL-MCNC: 0.67 MG/DL — SIGNIFICANT CHANGE UP (ref 0.5–1.3)
D DIMER BLD IA.RAPID-MCNC: 207 NG/ML DDU — SIGNIFICANT CHANGE UP
EGFR: 98 ML/MIN/1.73M2 — SIGNIFICANT CHANGE UP
EOSINOPHIL # BLD AUTO: 0.59 K/UL — HIGH (ref 0–0.5)
EOSINOPHIL NFR BLD AUTO: 3.5 % — SIGNIFICANT CHANGE UP (ref 0–6)
GLUCOSE SERPL-MCNC: 106 MG/DL — HIGH (ref 70–99)
HCT VFR BLD CALC: 39.8 % — SIGNIFICANT CHANGE UP (ref 34.5–45)
HGB BLD-MCNC: 14 G/DL — SIGNIFICANT CHANGE UP (ref 11.5–15.5)
IMM GRANULOCYTES NFR BLD AUTO: 0.5 % — SIGNIFICANT CHANGE UP (ref 0–0.9)
INR BLD: 0.86 RATIO — SIGNIFICANT CHANGE UP (ref 0.85–1.18)
LYMPHOCYTES # BLD AUTO: 30.8 % — SIGNIFICANT CHANGE UP (ref 13–44)
LYMPHOCYTES # BLD AUTO: 5.12 K/UL — HIGH (ref 1–3.3)
MACROCYTES BLD QL: SIGNIFICANT CHANGE UP
MANUAL SMEAR VERIFICATION: SIGNIFICANT CHANGE UP
MCHC RBC-ENTMCNC: 31.1 PG — SIGNIFICANT CHANGE UP (ref 27–34)
MCHC RBC-ENTMCNC: 35.2 G/DL — SIGNIFICANT CHANGE UP (ref 32–36)
MCV RBC AUTO: 88.4 FL — SIGNIFICANT CHANGE UP (ref 80–100)
MICROCYTES BLD QL: SLIGHT — SIGNIFICANT CHANGE UP
MONOCYTES # BLD AUTO: 0.87 K/UL — SIGNIFICANT CHANGE UP (ref 0–0.9)
MONOCYTES NFR BLD AUTO: 5.2 % — SIGNIFICANT CHANGE UP (ref 2–14)
NEUTROPHILS # BLD AUTO: 9.83 K/UL — HIGH (ref 1.8–7.4)
NEUTROPHILS NFR BLD AUTO: 59.3 % — SIGNIFICANT CHANGE UP (ref 43–77)
NRBC # BLD: 0 /100 WBCS — SIGNIFICANT CHANGE UP (ref 0–0)
NT-PROBNP SERPL-SCNC: 126 PG/ML — HIGH (ref 0–125)
PLAT MORPH BLD: NORMAL — SIGNIFICANT CHANGE UP
PLATELET # BLD AUTO: 267 K/UL — SIGNIFICANT CHANGE UP (ref 150–400)
POTASSIUM SERPL-MCNC: 4 MMOL/L — SIGNIFICANT CHANGE UP (ref 3.5–5.3)
POTASSIUM SERPL-SCNC: 4 MMOL/L — SIGNIFICANT CHANGE UP (ref 3.5–5.3)
PROT SERPL-MCNC: 7.3 GM/DL — SIGNIFICANT CHANGE UP (ref 6–8.3)
PROTHROM AB SERPL-ACNC: 10.4 SEC — SIGNIFICANT CHANGE UP (ref 9.5–13)
RBC # BLD: 4.5 M/UL — SIGNIFICANT CHANGE UP (ref 3.8–5.2)
RBC # FLD: 12.4 % — SIGNIFICANT CHANGE UP (ref 10.3–14.5)
RBC BLD AUTO: ABNORMAL
SODIUM SERPL-SCNC: 135 MMOL/L — SIGNIFICANT CHANGE UP (ref 135–145)
STOMATOCYTES BLD QL SMEAR: SLIGHT — SIGNIFICANT CHANGE UP
TROPONIN I, HIGH SENSITIVITY RESULT: 6.6 NG/L — SIGNIFICANT CHANGE UP
WBC # BLD: 16.62 K/UL — HIGH (ref 3.8–10.5)
WBC # FLD AUTO: 16.62 K/UL — HIGH (ref 3.8–10.5)

## 2024-08-12 RX ORDER — CLONAZEPAM 0.5 MG/1
1 TABLET ORAL
Qty: 6 | Refills: 0
Start: 2024-08-12 | End: 2024-08-14

## 2024-11-18 NOTE — PATIENT PROFILE ADULT - NSPROREFERSVCHOMEBH_GEN_A_NUR
Patient to ED from home with BERTRAND flank pain. Patient was seen at u/c and was prescribed Bactrim. U/C called to confirm patients urine positive for Klebsiella proir to arrival to ED.   Hematuria present.     
no

## 2024-11-22 NOTE — PATIENT PROFILE ADULT - NSTOBACCONEVERSMOKERY/N_GEN_A
CRISIS INFORMATION  If you are experiencing a mental health emergency, you may call the [facility] Crisis Intervention Office 24 hours a day, 7 days per week at [phone].    In [facility], call [phone].    [facility] is a confidential 24/7 telephone support service manned by trained mental health consumers.  [facility] provides support, a listening ear and can provide information about available services.[facility] specializes in the concerns of mental health consumers, their families and friends.  However, we are also here for anyone who has a mental health concern, is confused about or just doesn't know anything about mental health or where to get information.  To reach [facility], call [phone].    HOW TO GET SUBSTANCE ABUSE HELP:  If you or someone you know has a drug or alcohol problem, there is help:  [facility] Drug & Alcohol Abuse Services: [phone]  [facility] Drug & Alcohol Abuse Services: [phone]  An assessment is the first step.   In addition to those listed there are other programs available in the area but assessment is best to determine an appropriate level of care.  If you DO NOT have Medical Assistance (MA) or Private Insurance, an assessment can be scheduled at one of these providers:  [facility]  [address] [place] [address]  [phone]   [facility]  [address] [place] [address]  [phone]   [address] [place] [address]  [phone]   [facility]  [address] [place] [address]  [phone]   [facility]  [address] [place] [address]  [phone]   [facility]  [address] [place] [address]  [phone]   [facility]  [address], [place] [address]  [phone]     If you HAVE Medical Assistance, an assessment can be scheduled at one of these providers:  [facility]  [address] [place] [address]  [phone]   [facility]  [address] [place] [address]  [phone]   [facility]  [address], [place] [address]  [phone]  [address] [place] [address], [address], [place] [address] [phone]   [address] [address]  [phone]   [facility]  [address] [place] [address]  [phone]   NET ([facility])  [address] [place] [address]  [phone]   [facility]  [address] [place] [address]  [phone]   [facility]  [address] [place] [address]  [phone]   [facility]  [address] [place] [address]  [phone]   [facility]  [address], [place] [address]  [phone]     If you HAVE Private Insurance, an assessment can be scheduled at one of these providers.  Please contact these Providers to determine if they are in your network plan:  [facility]  [address], [place] [address]  [phone]   [address] [address]  [phone]   [facility]  [address] [place] [address]  [phone]   NET ([facility])  [address] [place] [address]  [phone]   [facility]  [address] [place] [address]  [phone]   [facility]  [address], [place] [address]  [phone]     From the [facility] website [place].gov[place]guides/opioid-epidemic/#GetNaloxone    How do I get naloxone?  Family members and friends can access naloxone by:    Obtaining a prescription from their family doctor  Using the standing order issued by Acting Physician General Briana Guzman. A standing order is a prescription written  for the general public, rather than specifically for an individual.  To use the standing order, print it and take it with you to the pharmacy or have the digital version on your phone. Download the standing order from the Department of [facility] (PDF).    If you are unable to print it or use the digital version, the standing order is kept on file at many pharmacies. If a pharmacy does not have it on file, they may have the ability to look it up.    Naloxone prescriptions can be filled at most pharmacies. Although the medication might not be available for same-day pickup, it often can be ordered and available within a day or two.     Yes

## 2024-12-19 NOTE — ED ADULT NURSE NOTE - NS ED NURSE DC INFO COMPLEXITY
Gastrectomy Discharge Instructions  Thank you for choosing the Surgical Oncology team at [facility].    You just had a surgery (gastrectomy) that removed part or all of your stomach.   Managing Your Pain  Follow the guidelines below to help manage your pain at home:  Take your medications as directed and as needed. You may also take 1000 mg of acetaminophen (Tylenol®) every 6 hours as needed for pain.  Call your doctor if the medication prescribed for you doesn't ease your pain.  Don't drive or drink alcohol while you're taking prescription pain medication  Take enough medication to do your exercises comfortably. However, it's normal for your pain to increase a little as you start to be more active.  Keep track of when you take your pain medication. It works best 30 to 45 minutes after you take it. Taking it when your pain first begins is better than waiting for the pain to get worse.  Pain medication may cause constipation  Managing Constipation  Talk with your nurse about how to manage constipation. You can also follow the guidelines below.  Go to the bathroom at the same time every day.   Exercise. Walking is an excellent form of exercise.  Drink 8 (8-ounce) glasses (2 liters) of liquids daily, if you can. Drink water, juices (such as prune juice), soups, ice cream shakes, and other drinks that don't have caffeine.   If you haven't had a bowel movement in 3 days, contact our office.  Caring for Your Incision  It's normal for the skin below your incision to feel numb. This happens because some of the nerves were cut during your surgery. The numbness will go away over time.  Your surgeon used dissolvable sutures (stitches) beneath the skin, and they will not need to be removed.   If the area around your incision is red, puffy, or if you have any drainage from your incision, contact our office.  Eating and Drinking   After your gastrectomy, the way you eat and digest food will change. Your stomach may be  smaller. You will not be able to eat as much as you did before your surgery. Eat at least 5-6 small meals each day.   Drink plenty of liquids. Drink most of your liquids at least one hour before or one hour after eating. Don't drink more than 4 ounces of liquids with your meals. Don't drink alcohol until you check with your surgeon.  Showering  You may shower 48 hours after surgery. When you shower, use soap to gently wash your incision. After you shower, pat the area dry with a clean towel. Don't rub over your incision. Leave your incision uncovered, unless there's drainage.  Avoid tub baths until your surgeon says it's okay.  Activity and Exercise  Remember, recovery after surgery takes several weeks. It is common to feel tired or fatigued. Rest as needed.   Doing aerobic exercise, such as walking and stair climbing, will help you gain strength and feel better. Gradually increase the distance you walk. Rest or stop as needed.  Don't lift anything heavier than 10 pounds for at least 8 weeks after your surgery or until your surgeon says it's okay.   Avoid strenuous activity and exercises until your surgeon says it's okay.  Ask your surgeon when it is okay for you to drive.   Ask your surgeon when you can return to work.  When to Call:  Contact our office if you experience the following symptoms:  Fever of 100.4° F (38°C) or higher  Cloudy or smelly drainage from the incision site  The skin around your incision is warm/red/swollen  Sudden increase in pain or new pain  Shaking chills  Fast pulse  Shortness of breath or chest pain  Nausea or vomiting.   Diarrhea  Constipation that isn't relieved in 3 days  Signs of a bladder infection (urinating more often than usual, burning while urinating, bleeding or hesitancy while urinating).  Any new or unexplained symptoms    Our office will contact you for a follow up appointment.     Please arrive at the following location:  [place]: [place]  [address]  Denise QUINONEZ [facility]: [address] [place] [address]  Please call us at [phone] if you are unable to make it to your appointment or if you have any questions.        Simple: Patient demonstrates quick and easy understanding/Verbalized Understanding

## 2025-01-21 NOTE — DISCHARGE NOTE ADULT - VISION (WITH CORRECTIVE LENSES IF THE PATIENT USUALLY WEARS THEM):
01/21/25                            Frederick Quiros  [address] [id number]    To Whom It May Concern:    This is to certify Frederick Quiros was evaluated with Mirela May NP on 01/21/25.                  Electronically signed by:  Mirela May NP  [address] [address]  Dept Phone: [phone]        Normal vision: sees adequately in most situations; can see medication labels, newsprint

## 2025-02-21 NOTE — PROGRESS NOTE ADULT - NS_MD_PANP_GEN_ALL_CORE
Called pt to further assess symptoms that were annotated on Loop transmission. Left message for call back.  
Returned call to pt following [employer] message. ID verified using two patient identifiers. Pt states she has been having chest pain and left arm pain. Pt states she has had pain similar before and underwent cardiac catheterization without intervention. Lightheadedness started around 2/15 this month per pt. BP 97/56 this morning. Increase in PARR/lethargy noted, previously correlated with AF events. Pt states her HR gets down into the 40's and she gets lethargic-Royal parameters on Loop recorder adjusted to record HR events less than 40 bpm-previously set to 30. Will forward to Dr Griffin's care team for recommendations.      Pt unhappy with previous care under Dr Mendez and Dr Pisano and currently does not have general cardiologist- care to only be managed with Dr Griffin per pt preference for now.      Opportunities for questions, clarifications, and concerns provided. Pt encouraged to see emergency services as needed.  Pt expressed understanding.    
Attending and PA/NP shared services statement (NON-critical care):

## 2025-02-25 NOTE — PHYSICAL THERAPY INITIAL EVALUATION ADULT - BED MOBILITY LIMITATIONS, REHAB EVAL
locker 7/security/safe
decreased ability to use arms for pushing/pulling/decreased ability to use legs for bridging/pushing

## 2025-04-10 NOTE — ED PROVIDER NOTE - DISCHARGE REVIEW MATERIAL PRESENTED
Quality 137: Melanoma: Continuity Of Care - Recall System: Patient information entered into a recall system that includes: target date for the next exam specified AND a process to follow up with patients regarding missed or unscheduled appointments Detail Level: Detailed .

## 2025-06-20 ENCOUNTER — APPOINTMENT (OUTPATIENT)
Dept: ENDOCRINOLOGY | Facility: CLINIC | Age: 65
End: 2025-06-20

## 2025-06-30 NOTE — ED PROVIDER NOTE - PELVIS
Diagnoses and all orders for this visit:  Encounter for removal of sutures  Sutures removed  Wait till Friday for swimming  Continue with antibiotic ointment  
stable

## 2025-07-09 NOTE — ED ADULT TRIAGE NOTE - HEART RATE (BEATS/MIN)
Gastroenterology Follow Up Note     Patient: Ingrid Geronimo Date: 7/9/2025   YOB: 1996 PCP: Maryuri Gilbert DO   MRN: [id number] Referring: Sindi Aguilar MD       Consulted for:  Dyspepsia  Chief Compliant:   Chief Complaint   Patient presents with    Follow-up       Interval History:    Ingrid Geronimo is a 28 year old female who presents to the outpatient office today for follow up visit regarding H Pylori.  Tolerated triple therapy with rifabutin based therapy without compliance issues. Overall symptoms improved with particular regard to her dyspeptic type symptoms.  She continues to endorse occasional acid reflux for which she takes famotidine 20 mg twice daily as needed with relief.  Has not had a follow-up H. pylori testing yet but is ordered about which she is aware.    Denies abdominal pain including specifically right upper quadrant pain, jaundice, nausea, emesis, change in bowel habits, melena or hematochezia.  No recent changes in her medication.  Denies herbal supplement use.  Does not have a family history of underlying liver disease.  No prior blood transfusions or inadvertent contact with serum.  Works at [employer].  Started on Mounjaro in May.  Attempting efforts with weight loss.  Over the last 2 years gained roughly 80 pounds. No fevers chills or recent travel. No prior serologic workup. Had contrast enhanced CT suggestive of hepatic steatosis  03/2025.      HPI  The patient is a 28 year-old female who is being seen by gastroenterology for epigastric pain. Was seen for this complain on 3/31/25 in the ED, she had a KUB done that demonstrated no acute findings. CT of the abdomen with no abdomen or pelvis findings, however it did find hepatomegaly and diffuse hepatic steatosis. Was prescribed Pepcid BID with resolution of her epigastric pain. Today in clinic she endorses having acid reflux symptoms in the past, she treated this with tums. Acid reflux symptoms have resolved with  Pepcid BID. Started on Mounjaro 2 weeks ago, since then she has been dealing with constipation. Has tried miralax and dulcolax with no improvement in her symptoms, no melena or hematochezia noted. Before staring Mounjaro she would have a BM 1-2 everyday. Denies any prior GI history herself or in her family, takes ibuprofen twice a month for headaches. Used to be a \"every weekend drinker\", now has cut down in the past 4 years to once a month.      PAST MEDICAL HISTORY:  Past Medical History:   Diagnosis Date    Asthma (CMD)        PAST SURGICAL HISTORY:  Past Surgical History:   Procedure Laterality Date    Removal of tonsils,<11 y/o      age 10       PAST FAMILY HISTORY:  Family History   Problem Relation Age of Onset    Diabetes Mother     Down Syndrome Brother     Diabetes Maternal Grandmother     Diabetes Maternal Grandfather     Diabetes Maternal Cousin     Diabetes Maternal Aunt     Diabetes Maternal Uncle     Cancer Neg Hx         2024- negative breast, colon, ovarian, uterine       PAST SOCIAL HISTORY:  Social History     Tobacco Use   Smoking Status Never   Smokeless Tobacco Not on file     Social History     Substance and Sexual Activity   Alcohol Use Not Currently       REVIEW OF SYSTEMS:  A complete review of systems was performed and found to be negative except for what was stated in the HPI (History of Present Illness).    PHYSICAL EXAM:  Visit Vitals  /83   Pulse 98   Wt 104.9 kg (231 lb 3.2 oz)   LMP 03/06/2025 (Exact Date)   BMI 41.61 kg/m²       General: nondistressed  HEENT: an icterus,   Cardiovascular: RRR  Lungs: normal work of breath, bilateral chest expansion without respiratory distress  Abdomen/Rectal: soft, nondistended, nontender, no masses palpated  Extremities: no muscle wasting,   Neurologic: Alert,    Skin: warm, non jaundiced    LABORATORY DATA:  Lab Results   Component Value Date    AST 52 (H) 03/31/2025    AST 41 (H) 03/30/2025    AST 26 02/06/2025    GPT 94 (H) 03/31/2025     GPT 62 03/30/2025    GPT 52 02/06/2025    ALKPT 109 03/31/2025    ALKPT 81 03/30/2025    ALKPT 101 02/06/2025       Lab Results   Component Value Date    HGB 13.5 03/31/2025     03/31/2025       Lab Results   Component Value Date    SODIUM 137 03/31/2025    CREATININE 0.50 (L) 03/31/2025         IMAGING:      CT 03/2025    Liver: Enlarged measuring 21 cm in craniocaudal dimension. Diffuse  hepatic steatosis. No focal lesion is seen.     Gallbladder and biliary tree:  Gallbladder in situ. No gallbladder wall  thickening or pericholecystic fluid. No biliary dilatation.     Pancreas:  Normal.     Spleen:  Normal.    IMPRESSION:  No acute findings in the abdomen or pelvis.  Hepatomegaly and diffuse hepatic steatosis.    PRIOR ENDOSCOPY REPORTS:    No prior endoscopic reports    ASSESSMENT / RECOMMENDATIONS:    Ingrid Geronimo is a 28 year old female with BMI >40 on GLP-1 agonist therapy, H Pylori + stool testing done for dyspepsia presenting 7/9/2025 for follow-up visit regarding H. pylori.  Completed rifabutin based triple therapy without missed doses and is currently pending testing which we encouraged her to get.  Discussed elevated liver enzymes which is suspected to be due to metabolic associated steatotic liver disease/steatohepatitis which we would expect to improve with weight loss.  Serologic workup for underlying reversible causes not yet done and should be pursued.    H. pylori infection  (primary encounter diagnosis)  -Stool Ag testing positive 05/2025  -Treated with Rifabutin based triple therapy  -Compliance discussed in office today  Plan:  -Repeat stool Ag testing off PPI per protocol, date appropriately scheduled  -If positive will pursue EGD with biopsies for sensitivities    Elevated liver enzymes  -TSH wnl  -No evidence of DILI  -CT suggestive of steatosis  -Minimal risk factors for hereditable cause, chronic viral hepatitis  Plan:   -Hepatic Function Panel, Triglyceride,         Mitochondrial  Antibody, Hepatitis A IgG And IgM        Screen, Hepatitis Serology Panel Chronic with         Reflex HCV PCR, Iron And total Iron Binding         Capacity, Ferritin, Ceruloplasmin, Tissue         Transglutaminase Antibodies IgA & IgG, JARVIS         Screen With Antibody And IFA Reflex, Anti         Smooth Muscle Antibody, F Actin, Alpha 1         Antitrypsin Phenotype  -Follow up LFTs annually for now  -Non-invasive measures of fibrosis most appropriate for now  -Will follow FIB-4 or possibly FibroSure pending weight loss and appropriateness for FibroScan, does not need to be done at this point however will pursue if LFT elevation persists.  -Encouraged 5-10% total body weight loss  -Avoid known hepatotoxic agents  -Maintain normotension  -Return to clinic in a year.    Discussed with my attending, Sindi Aguilar MD, who is in agreement with the above plan. It was a pleasure to participate in the care of this patient.    Arun Sears DO  Gastroenterology Fellow  [phone]  7/9/2025    Teaching Attestation:  I have personally interviewed and examined the patient. I confirmed the findings as above.  This was discussed with the fellow and I agree with the assessment and plan as documented. The plan of care was discussed with the patient.      Sindi Aguilar MD             88

## 2025-07-17 NOTE — ED ADULT NURSE NOTE - PAIN: PRESENCE, MLM
Patient called as he did not show for his 11 am tx. No answer, LVMM to have patient call office to reschedule.   denies pain/discomfort

## 2025-07-21 ENCOUNTER — EMERGENCY (EMERGENCY)
Facility: HOSPITAL | Age: 65
LOS: 0 days | Discharge: HOME | End: 2025-07-21
Attending: STUDENT IN AN ORGANIZED HEALTH CARE EDUCATION/TRAINING PROGRAM
Payer: MEDICAID

## 2025-07-21 VITALS
DIASTOLIC BLOOD PRESSURE: 83 MMHG | HEART RATE: 69 BPM | RESPIRATION RATE: 16 BRPM | SYSTOLIC BLOOD PRESSURE: 138 MMHG | TEMPERATURE: 98 F | OXYGEN SATURATION: 99 %

## 2025-07-21 VITALS
RESPIRATION RATE: 16 BRPM | WEIGHT: 220.02 LBS | DIASTOLIC BLOOD PRESSURE: 80 MMHG | TEMPERATURE: 98 F | HEIGHT: 62 IN | HEART RATE: 60 BPM | OXYGEN SATURATION: 98 % | SYSTOLIC BLOOD PRESSURE: 127 MMHG

## 2025-07-21 DIAGNOSIS — E11.40 TYPE 2 DIABETES MELLITUS WITH DIABETIC NEUROPATHY, UNSPECIFIED: ICD-10-CM

## 2025-07-21 DIAGNOSIS — Z79.84 LONG TERM (CURRENT) USE OF ORAL HYPOGLYCEMIC DRUGS: ICD-10-CM

## 2025-07-21 DIAGNOSIS — Z85.51 PERSONAL HISTORY OF MALIGNANT NEOPLASM OF BLADDER: ICD-10-CM

## 2025-07-21 DIAGNOSIS — J44.9 CHRONIC OBSTRUCTIVE PULMONARY DISEASE, UNSPECIFIED: ICD-10-CM

## 2025-07-21 DIAGNOSIS — Z90.49 ACQUIRED ABSENCE OF OTHER SPECIFIED PARTS OF DIGESTIVE TRACT: Chronic | ICD-10-CM

## 2025-07-21 DIAGNOSIS — Z79.890 HORMONE REPLACEMENT THERAPY: ICD-10-CM

## 2025-07-21 DIAGNOSIS — R20.0 ANESTHESIA OF SKIN: ICD-10-CM

## 2025-07-21 DIAGNOSIS — F41.9 ANXIETY DISORDER, UNSPECIFIED: ICD-10-CM

## 2025-07-21 DIAGNOSIS — Z79.4 LONG TERM (CURRENT) USE OF INSULIN: ICD-10-CM

## 2025-07-21 DIAGNOSIS — R20.2 PARESTHESIA OF SKIN: ICD-10-CM

## 2025-07-21 DIAGNOSIS — I10 ESSENTIAL (PRIMARY) HYPERTENSION: ICD-10-CM

## 2025-07-21 LAB
ALBUMIN SERPL ELPH-MCNC: 4.3 G/DL — SIGNIFICANT CHANGE UP (ref 3.3–5)
ALP SERPL-CCNC: 56 U/L — SIGNIFICANT CHANGE UP (ref 40–120)
ALT FLD-CCNC: 18 U/L — SIGNIFICANT CHANGE UP (ref 12–78)
ANION GAP SERPL CALC-SCNC: 6 MMOL/L — SIGNIFICANT CHANGE UP (ref 5–17)
AST SERPL-CCNC: 19 U/L — SIGNIFICANT CHANGE UP (ref 15–37)
BASOPHILS # BLD AUTO: 0.11 K/UL — SIGNIFICANT CHANGE UP (ref 0–0.2)
BASOPHILS NFR BLD AUTO: 1.1 % — SIGNIFICANT CHANGE UP (ref 0–2)
BILIRUB SERPL-MCNC: 0.4 MG/DL — SIGNIFICANT CHANGE UP (ref 0.2–1.2)
BUN SERPL-MCNC: 14 MG/DL — SIGNIFICANT CHANGE UP (ref 7–23)
CALCIUM SERPL-MCNC: 9.7 MG/DL — SIGNIFICANT CHANGE UP (ref 8.5–10.1)
CHLORIDE SERPL-SCNC: 100 MMOL/L — SIGNIFICANT CHANGE UP (ref 96–108)
CO2 SERPL-SCNC: 31 MMOL/L — SIGNIFICANT CHANGE UP (ref 22–31)
CREAT SERPL-MCNC: 1.44 MG/DL — HIGH (ref 0.5–1.3)
EGFR: 40 ML/MIN/1.73M2 — LOW
EGFR: 40 ML/MIN/1.73M2 — LOW
EOSINOPHIL # BLD AUTO: 0.28 K/UL — SIGNIFICANT CHANGE UP (ref 0–0.5)
EOSINOPHIL NFR BLD AUTO: 2.7 % — SIGNIFICANT CHANGE UP (ref 0–6)
GLUCOSE SERPL-MCNC: 115 MG/DL — HIGH (ref 70–99)
HCT VFR BLD CALC: 38.1 % — SIGNIFICANT CHANGE UP (ref 34.5–45)
HGB BLD-MCNC: 12.9 G/DL — SIGNIFICANT CHANGE UP (ref 11.5–15.5)
IMM GRANULOCYTES NFR BLD AUTO: 0.2 % — SIGNIFICANT CHANGE UP (ref 0–0.9)
LYMPHOCYTES # BLD AUTO: 4.89 K/UL — HIGH (ref 1–3.3)
LYMPHOCYTES # BLD AUTO: 46.9 % — HIGH (ref 13–44)
MAGNESIUM SERPL-MCNC: 2.5 MG/DL — SIGNIFICANT CHANGE UP (ref 1.6–2.6)
MCHC RBC-ENTMCNC: 32 PG — SIGNIFICANT CHANGE UP (ref 27–34)
MCHC RBC-ENTMCNC: 33.9 G/DL — SIGNIFICANT CHANGE UP (ref 32–36)
MCV RBC AUTO: 94.5 FL — SIGNIFICANT CHANGE UP (ref 80–100)
MONOCYTES # BLD AUTO: 0.5 K/UL — SIGNIFICANT CHANGE UP (ref 0–0.9)
MONOCYTES NFR BLD AUTO: 4.8 % — SIGNIFICANT CHANGE UP (ref 2–14)
NEUTROPHILS # BLD AUTO: 4.62 K/UL — SIGNIFICANT CHANGE UP (ref 1.8–7.4)
NEUTROPHILS NFR BLD AUTO: 44.3 % — SIGNIFICANT CHANGE UP (ref 43–77)
NRBC BLD AUTO-RTO: 0 /100 WBCS — SIGNIFICANT CHANGE UP (ref 0–0)
PHOSPHATE SERPL-MCNC: 3.3 MG/DL — SIGNIFICANT CHANGE UP (ref 2.5–4.5)
PLATELET # BLD AUTO: 170 K/UL — SIGNIFICANT CHANGE UP (ref 150–400)
POTASSIUM SERPL-MCNC: 3.5 MMOL/L — SIGNIFICANT CHANGE UP (ref 3.5–5.3)
POTASSIUM SERPL-SCNC: 3.5 MMOL/L — SIGNIFICANT CHANGE UP (ref 3.5–5.3)
PROT SERPL-MCNC: 7.6 GM/DL — SIGNIFICANT CHANGE UP (ref 6–8.3)
RBC # BLD: 4.03 M/UL — SIGNIFICANT CHANGE UP (ref 3.8–5.2)
RBC # FLD: 14.3 % — SIGNIFICANT CHANGE UP (ref 10.3–14.5)
SODIUM SERPL-SCNC: 137 MMOL/L — SIGNIFICANT CHANGE UP (ref 135–145)
WBC # BLD: 10.42 K/UL — SIGNIFICANT CHANGE UP (ref 3.8–10.5)
WBC # FLD AUTO: 10.42 K/UL — SIGNIFICANT CHANGE UP (ref 3.8–10.5)

## 2025-07-21 PROCEDURE — 99284 EMERGENCY DEPT VISIT MOD MDM: CPT

## 2025-07-21 RX ORDER — METHOCARBAMOL 500 MG/1
2 TABLET, FILM COATED ORAL
Qty: 40 | Refills: 0
Start: 2025-07-21 | End: 2025-07-30

## 2025-07-21 RX ORDER — METHOCARBAMOL 500 MG/1
1500 TABLET, FILM COATED ORAL ONCE
Refills: 0 | Status: COMPLETED | OUTPATIENT
Start: 2025-07-21 | End: 2025-07-21

## 2025-07-21 RX ORDER — NAPROXEN SODIUM 275 MG
1 TABLET ORAL
Qty: 20 | Refills: 0
Start: 2025-07-21 | End: 2025-07-30

## 2025-07-21 RX ORDER — KETOROLAC TROMETHAMINE 30 MG/ML
15 INJECTION, SOLUTION INTRAMUSCULAR; INTRAVENOUS ONCE
Refills: 0 | Status: DISCONTINUED | OUTPATIENT
Start: 2025-07-21 | End: 2025-07-21

## 2025-07-21 RX ADMIN — Medication 1 LOZENGE: at 04:07

## 2025-07-21 RX ADMIN — METHOCARBAMOL 1500 MILLIGRAM(S): 500 TABLET, FILM COATED ORAL at 02:48

## 2025-07-21 RX ADMIN — KETOROLAC TROMETHAMINE 15 MILLIGRAM(S): 30 INJECTION, SOLUTION INTRAMUSCULAR; INTRAVENOUS at 02:48

## 2025-07-21 RX ADMIN — Medication 2000 MILLILITER(S): at 01:45

## 2025-07-21 NOTE — ED ADULT NURSE NOTE - CHPI ED NUR SYMPTOMS NEG
[Dear  ___] : Dear  [unfilled], [Consult Letter:] : I had the pleasure of evaluating your patient, [unfilled]. [Please see my note below.] : Please see my note below. [Consult Closing:] : Thank you very much for allowing me to participate in the care of this patient.  If you have any questions, please do not hesitate to contact me. [Sincerely,] : Sincerely, [FreeTextEntry3] : Roxana Galindo, AYALA-BC Board Certified Family Nurse Practitioner Pediatric Neurology [facility] [address] [place] [address] Tel: [phone] Fax: [phone]  no dizziness/no nausea/no pain/no vomiting/no weakness

## 2025-07-21 NOTE — ED ADULT NURSE NOTE - NSFALLHARMRISKINTERV_ED_ALL_ED

## 2025-07-21 NOTE — ED ADULT TRIAGE NOTE - CHIEF COMPLAINT QUOTE
Pt states, "I can't feel my hands or feet" x "a while". Pt also complains of pain to bilateral hands and feet. Pt with a history of diabetes.

## 2025-07-21 NOTE — ED PROVIDER NOTE - PATIENT PORTAL LINK FT
You can access the FollowMyHealth Patient Portal offered by [facility] by registering at the following website: [email or web address]. By joining [employer]’s FollowMyHealth portal, you will also be able to view your health information using other applications (apps) compatible with our system.

## 2025-07-21 NOTE — ED ADULT NURSE NOTE - OBJECTIVE STATEMENT
Pt is a 64yo Female AAOx4 NKDA pmh bladder CA, ovarian CA, hysterectomy pw numbness to the b/l hands and feet for the past two years. Pt reports the numbness got very bad tonight prompting her to come in. Pt denies any dizziness, cp, sob, abd pain, n/v/d at this time. Pt respirations equal and unlabored bilaterally. Pt labs sent as ordered. Pt medicated as ordered. Pt updated on plan of care. Pt reports 10/10 pain to hands and feet. wctm.

## 2025-07-21 NOTE — ED PROVIDER NOTE - NSFOLLOWUPCLINICS_GEN_ALL_ED_FT
[facility]  Neurology  [address] [place] [address] [address] [place] [address]  Phone: [phone]  Fax:

## 2025-07-21 NOTE — ED PROVIDER NOTE - CLINICAL SUMMARY MEDICAL DECISION MAKING FREE TEXT BOX
65-year-old female with past medical history of diabetes on insulin, hypertension, COPD, bladder cancer, anxiety presenting with numbness and tingling of hands and feet for years.  Patient states she has burning sensation associated with it.  Was told in past neuropathy.  States that she has not followed up with neurology.  States that burning sensation has been worse over the last 1 week.  On exam patient neuro intact appears comfortable hemodynamically stable.  Clinical presentation likely secondary to diabetic neuropathy, blood sugar here within normal limits, electrolytes normal.  Patient given Toradol and Robaxin with improvement of symptoms.  Will send prescription and have patient follow-up with neurologist

## 2025-07-24 NOTE — ED ADULT NURSE NOTE - HAVE YOU HAD A FIRST COVID-19 BOOSTER?
[Vaccines Reviewed] : Immunizations reviewed today. Please see immunization details in the vaccine log within the immunization flowsheet.  No